# Patient Record
Sex: FEMALE | Race: WHITE | NOT HISPANIC OR LATINO | Employment: FULL TIME | URBAN - METROPOLITAN AREA
[De-identification: names, ages, dates, MRNs, and addresses within clinical notes are randomized per-mention and may not be internally consistent; named-entity substitution may affect disease eponyms.]

---

## 2017-01-03 ENCOUNTER — APPOINTMENT (OUTPATIENT)
Dept: PHYSICAL THERAPY | Facility: CLINIC | Age: 52
End: 2017-01-03
Payer: COMMERCIAL

## 2017-01-05 ENCOUNTER — APPOINTMENT (OUTPATIENT)
Dept: PHYSICAL THERAPY | Facility: CLINIC | Age: 52
End: 2017-01-05
Payer: COMMERCIAL

## 2017-01-05 PROCEDURE — 97140 MANUAL THERAPY 1/> REGIONS: CPT

## 2017-01-05 PROCEDURE — 97110 THERAPEUTIC EXERCISES: CPT

## 2017-01-09 ENCOUNTER — APPOINTMENT (OUTPATIENT)
Dept: PHYSICAL THERAPY | Facility: CLINIC | Age: 52
End: 2017-01-09
Payer: COMMERCIAL

## 2017-01-09 PROCEDURE — 97140 MANUAL THERAPY 1/> REGIONS: CPT

## 2017-01-09 PROCEDURE — 97110 THERAPEUTIC EXERCISES: CPT

## 2017-01-12 ENCOUNTER — APPOINTMENT (OUTPATIENT)
Dept: PHYSICAL THERAPY | Facility: CLINIC | Age: 52
End: 2017-01-12
Payer: COMMERCIAL

## 2017-01-12 PROCEDURE — 97140 MANUAL THERAPY 1/> REGIONS: CPT

## 2017-01-12 PROCEDURE — 97110 THERAPEUTIC EXERCISES: CPT

## 2017-01-16 ENCOUNTER — APPOINTMENT (OUTPATIENT)
Dept: PHYSICAL THERAPY | Facility: CLINIC | Age: 52
End: 2017-01-16
Payer: COMMERCIAL

## 2017-01-16 PROCEDURE — 97110 THERAPEUTIC EXERCISES: CPT

## 2017-01-16 PROCEDURE — 97016 VASOPNEUMATIC DEVICE THERAPY: CPT

## 2017-01-17 ENCOUNTER — APPOINTMENT (OUTPATIENT)
Dept: PHYSICAL THERAPY | Facility: CLINIC | Age: 52
End: 2017-01-17
Payer: COMMERCIAL

## 2017-01-17 PROCEDURE — 97110 THERAPEUTIC EXERCISES: CPT

## 2017-01-17 PROCEDURE — 97140 MANUAL THERAPY 1/> REGIONS: CPT

## 2017-01-17 PROCEDURE — 97016 VASOPNEUMATIC DEVICE THERAPY: CPT

## 2017-01-19 ENCOUNTER — APPOINTMENT (OUTPATIENT)
Dept: PHYSICAL THERAPY | Facility: CLINIC | Age: 52
End: 2017-01-19
Payer: COMMERCIAL

## 2017-01-19 PROCEDURE — 97110 THERAPEUTIC EXERCISES: CPT

## 2017-01-19 PROCEDURE — 97016 VASOPNEUMATIC DEVICE THERAPY: CPT

## 2017-01-19 PROCEDURE — 97140 MANUAL THERAPY 1/> REGIONS: CPT

## 2017-01-23 ENCOUNTER — APPOINTMENT (OUTPATIENT)
Dept: PHYSICAL THERAPY | Facility: CLINIC | Age: 52
End: 2017-01-23
Payer: COMMERCIAL

## 2017-01-24 ENCOUNTER — APPOINTMENT (OUTPATIENT)
Dept: PHYSICAL THERAPY | Facility: CLINIC | Age: 52
End: 2017-01-24
Payer: COMMERCIAL

## 2017-01-24 PROCEDURE — 97140 MANUAL THERAPY 1/> REGIONS: CPT

## 2017-01-24 PROCEDURE — 97110 THERAPEUTIC EXERCISES: CPT

## 2017-01-25 ENCOUNTER — HOSPITAL ENCOUNTER (OUTPATIENT)
Dept: RADIOLOGY | Facility: CLINIC | Age: 52
Discharge: HOME | End: 2017-01-25
Payer: COMMERCIAL

## 2017-01-25 ENCOUNTER — TRANSCRIBE ORDERS (OUTPATIENT)
Dept: RADIOLOGY | Facility: CLINIC | Age: 52
End: 2017-01-25

## 2017-01-25 ENCOUNTER — GENERIC CONVERSION - ENCOUNTER (OUTPATIENT)
Dept: OTHER | Facility: OTHER | Age: 52
End: 2017-01-25

## 2017-01-25 DIAGNOSIS — M21.42 ACQUIRED RIGID FLAT FOOT, LEFT: Primary | ICD-10-CM

## 2017-01-25 DIAGNOSIS — M21.42 ACQUIRED RIGID FLAT FOOT, LEFT: ICD-10-CM

## 2017-01-25 PROCEDURE — 73630 X-RAY EXAM OF FOOT: CPT

## 2017-01-26 ENCOUNTER — APPOINTMENT (OUTPATIENT)
Dept: PHYSICAL THERAPY | Facility: CLINIC | Age: 52
End: 2017-01-26
Payer: COMMERCIAL

## 2017-01-26 PROCEDURE — 97140 MANUAL THERAPY 1/> REGIONS: CPT

## 2017-01-26 PROCEDURE — 97016 VASOPNEUMATIC DEVICE THERAPY: CPT

## 2017-01-26 PROCEDURE — 97110 THERAPEUTIC EXERCISES: CPT

## 2017-01-27 ENCOUNTER — GENERIC CONVERSION - ENCOUNTER (OUTPATIENT)
Dept: OTHER | Facility: OTHER | Age: 52
End: 2017-01-27

## 2017-01-30 ENCOUNTER — APPOINTMENT (OUTPATIENT)
Dept: PHYSICAL THERAPY | Facility: CLINIC | Age: 52
End: 2017-01-30
Payer: COMMERCIAL

## 2017-01-30 PROCEDURE — 97110 THERAPEUTIC EXERCISES: CPT

## 2017-01-30 PROCEDURE — 97016 VASOPNEUMATIC DEVICE THERAPY: CPT

## 2017-01-30 PROCEDURE — 97140 MANUAL THERAPY 1/> REGIONS: CPT

## 2017-02-01 ENCOUNTER — APPOINTMENT (OUTPATIENT)
Dept: PHYSICAL THERAPY | Facility: CLINIC | Age: 52
End: 2017-02-01
Payer: COMMERCIAL

## 2017-02-01 PROCEDURE — 97140 MANUAL THERAPY 1/> REGIONS: CPT

## 2017-02-01 PROCEDURE — 97110 THERAPEUTIC EXERCISES: CPT

## 2017-02-02 ENCOUNTER — APPOINTMENT (OUTPATIENT)
Dept: PHYSICAL THERAPY | Facility: CLINIC | Age: 52
End: 2017-02-02
Payer: COMMERCIAL

## 2017-02-06 ENCOUNTER — APPOINTMENT (OUTPATIENT)
Dept: PHYSICAL THERAPY | Facility: CLINIC | Age: 52
End: 2017-02-06
Payer: COMMERCIAL

## 2017-02-06 PROCEDURE — 97110 THERAPEUTIC EXERCISES: CPT

## 2017-02-06 PROCEDURE — 97140 MANUAL THERAPY 1/> REGIONS: CPT

## 2017-02-08 ENCOUNTER — APPOINTMENT (OUTPATIENT)
Dept: PHYSICAL THERAPY | Facility: CLINIC | Age: 52
End: 2017-02-08
Payer: COMMERCIAL

## 2017-02-08 PROCEDURE — 97140 MANUAL THERAPY 1/> REGIONS: CPT

## 2017-02-08 PROCEDURE — 97110 THERAPEUTIC EXERCISES: CPT

## 2017-02-09 ENCOUNTER — APPOINTMENT (OUTPATIENT)
Dept: PHYSICAL THERAPY | Facility: CLINIC | Age: 52
End: 2017-02-09
Payer: COMMERCIAL

## 2017-02-09 PROCEDURE — 97140 MANUAL THERAPY 1/> REGIONS: CPT

## 2017-02-09 PROCEDURE — 97016 VASOPNEUMATIC DEVICE THERAPY: CPT

## 2017-02-09 PROCEDURE — 97110 THERAPEUTIC EXERCISES: CPT

## 2017-02-13 ENCOUNTER — APPOINTMENT (OUTPATIENT)
Dept: PHYSICAL THERAPY | Facility: CLINIC | Age: 52
End: 2017-02-13
Payer: COMMERCIAL

## 2017-02-13 PROCEDURE — 97016 VASOPNEUMATIC DEVICE THERAPY: CPT

## 2017-02-13 PROCEDURE — 97110 THERAPEUTIC EXERCISES: CPT

## 2017-02-13 PROCEDURE — 97140 MANUAL THERAPY 1/> REGIONS: CPT

## 2017-02-15 ENCOUNTER — APPOINTMENT (OUTPATIENT)
Dept: PHYSICAL THERAPY | Facility: CLINIC | Age: 52
End: 2017-02-15
Payer: COMMERCIAL

## 2017-02-16 ENCOUNTER — APPOINTMENT (OUTPATIENT)
Dept: PHYSICAL THERAPY | Facility: CLINIC | Age: 52
End: 2017-02-16
Payer: COMMERCIAL

## 2017-02-17 ENCOUNTER — ALLSCRIPTS OFFICE VISIT (OUTPATIENT)
Dept: OTHER | Facility: OTHER | Age: 52
End: 2017-02-17

## 2017-02-17 ENCOUNTER — APPOINTMENT (OUTPATIENT)
Dept: PHYSICAL THERAPY | Facility: CLINIC | Age: 52
End: 2017-02-17
Payer: COMMERCIAL

## 2017-02-17 PROCEDURE — 97110 THERAPEUTIC EXERCISES: CPT

## 2017-02-17 PROCEDURE — 97140 MANUAL THERAPY 1/> REGIONS: CPT

## 2017-02-20 ENCOUNTER — APPOINTMENT (OUTPATIENT)
Dept: PHYSICAL THERAPY | Facility: CLINIC | Age: 52
End: 2017-02-20
Payer: COMMERCIAL

## 2017-02-20 PROCEDURE — 97110 THERAPEUTIC EXERCISES: CPT

## 2017-02-20 PROCEDURE — 97140 MANUAL THERAPY 1/> REGIONS: CPT

## 2017-02-21 ENCOUNTER — APPOINTMENT (OUTPATIENT)
Dept: PHYSICAL THERAPY | Facility: CLINIC | Age: 52
End: 2017-02-21
Payer: COMMERCIAL

## 2017-02-21 PROCEDURE — 97016 VASOPNEUMATIC DEVICE THERAPY: CPT

## 2017-02-21 PROCEDURE — 97140 MANUAL THERAPY 1/> REGIONS: CPT

## 2017-02-21 PROCEDURE — 97110 THERAPEUTIC EXERCISES: CPT

## 2017-02-23 ENCOUNTER — APPOINTMENT (OUTPATIENT)
Dept: PHYSICAL THERAPY | Facility: CLINIC | Age: 52
End: 2017-02-23
Payer: COMMERCIAL

## 2017-02-23 PROCEDURE — 97140 MANUAL THERAPY 1/> REGIONS: CPT

## 2017-02-23 PROCEDURE — 97110 THERAPEUTIC EXERCISES: CPT

## 2017-02-24 ENCOUNTER — ALLSCRIPTS OFFICE VISIT (OUTPATIENT)
Dept: OTHER | Facility: OTHER | Age: 52
End: 2017-02-24

## 2017-02-27 ENCOUNTER — APPOINTMENT (OUTPATIENT)
Dept: PHYSICAL THERAPY | Facility: CLINIC | Age: 52
End: 2017-02-27
Payer: COMMERCIAL

## 2017-02-27 PROCEDURE — 97110 THERAPEUTIC EXERCISES: CPT

## 2017-02-27 PROCEDURE — 97140 MANUAL THERAPY 1/> REGIONS: CPT

## 2017-02-28 ENCOUNTER — APPOINTMENT (OUTPATIENT)
Dept: PHYSICAL THERAPY | Facility: CLINIC | Age: 52
End: 2017-02-28
Payer: COMMERCIAL

## 2017-02-28 PROCEDURE — 97110 THERAPEUTIC EXERCISES: CPT

## 2017-02-28 PROCEDURE — 97140 MANUAL THERAPY 1/> REGIONS: CPT

## 2017-03-01 ENCOUNTER — ALLSCRIPTS OFFICE VISIT (OUTPATIENT)
Dept: OTHER | Facility: OTHER | Age: 52
End: 2017-03-01

## 2017-03-02 ENCOUNTER — APPOINTMENT (OUTPATIENT)
Dept: PHYSICAL THERAPY | Facility: CLINIC | Age: 52
End: 2017-03-02
Payer: COMMERCIAL

## 2017-03-06 ENCOUNTER — APPOINTMENT (OUTPATIENT)
Dept: PHYSICAL THERAPY | Facility: CLINIC | Age: 52
End: 2017-03-06
Payer: COMMERCIAL

## 2017-03-06 PROCEDURE — 97016 VASOPNEUMATIC DEVICE THERAPY: CPT

## 2017-03-06 PROCEDURE — 97140 MANUAL THERAPY 1/> REGIONS: CPT

## 2017-03-06 PROCEDURE — 97110 THERAPEUTIC EXERCISES: CPT

## 2017-03-07 ENCOUNTER — APPOINTMENT (OUTPATIENT)
Dept: PHYSICAL THERAPY | Facility: CLINIC | Age: 52
End: 2017-03-07
Payer: COMMERCIAL

## 2017-03-08 ENCOUNTER — APPOINTMENT (OUTPATIENT)
Dept: PHYSICAL THERAPY | Facility: CLINIC | Age: 52
End: 2017-03-08
Payer: COMMERCIAL

## 2017-03-08 PROCEDURE — 97110 THERAPEUTIC EXERCISES: CPT

## 2017-03-08 PROCEDURE — 97140 MANUAL THERAPY 1/> REGIONS: CPT

## 2017-03-08 PROCEDURE — 97016 VASOPNEUMATIC DEVICE THERAPY: CPT

## 2017-03-09 ENCOUNTER — APPOINTMENT (OUTPATIENT)
Dept: PHYSICAL THERAPY | Facility: CLINIC | Age: 52
End: 2017-03-09
Payer: COMMERCIAL

## 2017-03-10 ENCOUNTER — APPOINTMENT (OUTPATIENT)
Dept: PHYSICAL THERAPY | Facility: CLINIC | Age: 52
End: 2017-03-10
Payer: COMMERCIAL

## 2017-03-13 ENCOUNTER — APPOINTMENT (OUTPATIENT)
Dept: PHYSICAL THERAPY | Facility: CLINIC | Age: 52
End: 2017-03-13
Payer: COMMERCIAL

## 2017-03-13 PROCEDURE — 97164 PT RE-EVAL EST PLAN CARE: CPT

## 2017-03-13 PROCEDURE — 97110 THERAPEUTIC EXERCISES: CPT

## 2017-03-14 ENCOUNTER — APPOINTMENT (OUTPATIENT)
Dept: PHYSICAL THERAPY | Facility: CLINIC | Age: 52
End: 2017-03-14
Payer: COMMERCIAL

## 2017-03-15 ENCOUNTER — APPOINTMENT (OUTPATIENT)
Dept: PHYSICAL THERAPY | Facility: CLINIC | Age: 52
End: 2017-03-15
Payer: COMMERCIAL

## 2017-03-15 PROCEDURE — 97140 MANUAL THERAPY 1/> REGIONS: CPT

## 2017-03-15 PROCEDURE — 97110 THERAPEUTIC EXERCISES: CPT

## 2017-03-16 ENCOUNTER — APPOINTMENT (OUTPATIENT)
Dept: PHYSICAL THERAPY | Facility: CLINIC | Age: 52
End: 2017-03-16
Payer: COMMERCIAL

## 2017-03-17 ENCOUNTER — GENERIC CONVERSION - ENCOUNTER (OUTPATIENT)
Dept: OTHER | Facility: OTHER | Age: 52
End: 2017-03-17

## 2017-03-17 ENCOUNTER — APPOINTMENT (OUTPATIENT)
Dept: RADIATION ONCOLOGY | Facility: HOSPITAL | Age: 52
End: 2017-03-17
Attending: RADIOLOGY
Payer: COMMERCIAL

## 2017-03-17 PROCEDURE — 99214 OFFICE O/P EST MOD 30 MIN: CPT | Performed by: RADIOLOGY

## 2017-03-20 ENCOUNTER — APPOINTMENT (OUTPATIENT)
Dept: PHYSICAL THERAPY | Facility: CLINIC | Age: 52
End: 2017-03-20
Payer: COMMERCIAL

## 2017-03-20 PROCEDURE — 97016 VASOPNEUMATIC DEVICE THERAPY: CPT

## 2017-03-20 PROCEDURE — 97140 MANUAL THERAPY 1/> REGIONS: CPT

## 2017-03-20 PROCEDURE — 97110 THERAPEUTIC EXERCISES: CPT

## 2017-03-21 ENCOUNTER — APPOINTMENT (OUTPATIENT)
Dept: PHYSICAL THERAPY | Facility: CLINIC | Age: 52
End: 2017-03-21
Payer: COMMERCIAL

## 2017-03-22 ENCOUNTER — GENERIC CONVERSION - ENCOUNTER (OUTPATIENT)
Dept: OTHER | Facility: OTHER | Age: 52
End: 2017-03-22

## 2017-03-22 ENCOUNTER — APPOINTMENT (OUTPATIENT)
Dept: PHYSICAL THERAPY | Facility: CLINIC | Age: 52
End: 2017-03-22
Payer: COMMERCIAL

## 2017-03-22 PROCEDURE — 97110 THERAPEUTIC EXERCISES: CPT

## 2017-03-22 PROCEDURE — 97140 MANUAL THERAPY 1/> REGIONS: CPT

## 2017-03-23 ENCOUNTER — APPOINTMENT (OUTPATIENT)
Dept: PHYSICAL THERAPY | Facility: CLINIC | Age: 52
End: 2017-03-23
Payer: COMMERCIAL

## 2017-03-24 ENCOUNTER — APPOINTMENT (OUTPATIENT)
Dept: PHYSICAL THERAPY | Facility: CLINIC | Age: 52
End: 2017-03-24
Payer: COMMERCIAL

## 2017-03-27 ENCOUNTER — APPOINTMENT (OUTPATIENT)
Dept: PHYSICAL THERAPY | Facility: CLINIC | Age: 52
End: 2017-03-27
Payer: COMMERCIAL

## 2017-03-27 PROCEDURE — 97110 THERAPEUTIC EXERCISES: CPT

## 2017-03-27 PROCEDURE — 97140 MANUAL THERAPY 1/> REGIONS: CPT

## 2017-03-28 ENCOUNTER — APPOINTMENT (OUTPATIENT)
Dept: PHYSICAL THERAPY | Facility: CLINIC | Age: 52
End: 2017-03-28
Payer: COMMERCIAL

## 2017-03-29 ENCOUNTER — APPOINTMENT (OUTPATIENT)
Dept: PHYSICAL THERAPY | Facility: CLINIC | Age: 52
End: 2017-03-29
Payer: COMMERCIAL

## 2017-03-30 ENCOUNTER — APPOINTMENT (OUTPATIENT)
Dept: PHYSICAL THERAPY | Facility: CLINIC | Age: 52
End: 2017-03-30
Payer: COMMERCIAL

## 2017-03-31 ENCOUNTER — APPOINTMENT (OUTPATIENT)
Dept: PHYSICAL THERAPY | Facility: CLINIC | Age: 52
End: 2017-03-31
Payer: COMMERCIAL

## 2017-03-31 PROCEDURE — 97140 MANUAL THERAPY 1/> REGIONS: CPT

## 2017-03-31 PROCEDURE — 97110 THERAPEUTIC EXERCISES: CPT

## 2017-04-03 ENCOUNTER — APPOINTMENT (OUTPATIENT)
Dept: PHYSICAL THERAPY | Facility: CLINIC | Age: 52
End: 2017-04-03
Payer: COMMERCIAL

## 2017-04-03 PROCEDURE — 97140 MANUAL THERAPY 1/> REGIONS: CPT

## 2017-04-03 PROCEDURE — 97110 THERAPEUTIC EXERCISES: CPT

## 2017-04-03 PROCEDURE — 97016 VASOPNEUMATIC DEVICE THERAPY: CPT

## 2017-04-05 ENCOUNTER — APPOINTMENT (OUTPATIENT)
Dept: PHYSICAL THERAPY | Facility: CLINIC | Age: 52
End: 2017-04-05
Payer: COMMERCIAL

## 2017-04-07 ENCOUNTER — APPOINTMENT (OUTPATIENT)
Dept: PHYSICAL THERAPY | Facility: CLINIC | Age: 52
End: 2017-04-07
Payer: COMMERCIAL

## 2017-04-07 PROCEDURE — 97140 MANUAL THERAPY 1/> REGIONS: CPT

## 2017-04-07 PROCEDURE — 97110 THERAPEUTIC EXERCISES: CPT

## 2017-04-10 ENCOUNTER — APPOINTMENT (OUTPATIENT)
Dept: PHYSICAL THERAPY | Facility: CLINIC | Age: 52
End: 2017-04-10
Payer: COMMERCIAL

## 2017-04-10 PROCEDURE — 97110 THERAPEUTIC EXERCISES: CPT

## 2017-04-10 PROCEDURE — 97010 HOT OR COLD PACKS THERAPY: CPT

## 2017-04-10 PROCEDURE — 97140 MANUAL THERAPY 1/> REGIONS: CPT

## 2017-04-12 ENCOUNTER — APPOINTMENT (OUTPATIENT)
Dept: PHYSICAL THERAPY | Facility: CLINIC | Age: 52
End: 2017-04-12
Payer: COMMERCIAL

## 2017-04-14 ENCOUNTER — APPOINTMENT (OUTPATIENT)
Dept: PHYSICAL THERAPY | Facility: CLINIC | Age: 52
End: 2017-04-14
Payer: COMMERCIAL

## 2017-04-14 PROCEDURE — 97110 THERAPEUTIC EXERCISES: CPT

## 2017-04-14 PROCEDURE — 97140 MANUAL THERAPY 1/> REGIONS: CPT

## 2017-04-14 PROCEDURE — 97016 VASOPNEUMATIC DEVICE THERAPY: CPT

## 2017-04-17 ENCOUNTER — APPOINTMENT (OUTPATIENT)
Dept: PHYSICAL THERAPY | Facility: CLINIC | Age: 52
End: 2017-04-17
Payer: COMMERCIAL

## 2017-04-17 PROCEDURE — 97110 THERAPEUTIC EXERCISES: CPT

## 2017-04-17 PROCEDURE — 97016 VASOPNEUMATIC DEVICE THERAPY: CPT

## 2017-04-17 PROCEDURE — 97140 MANUAL THERAPY 1/> REGIONS: CPT

## 2017-04-18 ENCOUNTER — ALLSCRIPTS OFFICE VISIT (OUTPATIENT)
Dept: OTHER | Facility: OTHER | Age: 52
End: 2017-04-18

## 2017-04-18 DIAGNOSIS — C50.919 MALIGNANT NEOPLASM OF FEMALE BREAST (HCC): ICD-10-CM

## 2017-04-18 DIAGNOSIS — Z09 ENCOUNTER FOR FOLLOW-UP EXAMINATION AFTER COMPLETED TREATMENT FOR CONDITIONS OTHER THAN MALIGNANT NEOPLASM: ICD-10-CM

## 2017-04-19 ENCOUNTER — TRANSCRIBE ORDERS (OUTPATIENT)
Dept: ADMINISTRATIVE | Facility: HOSPITAL | Age: 52
End: 2017-04-19

## 2017-04-19 ENCOUNTER — GENERIC CONVERSION - ENCOUNTER (OUTPATIENT)
Dept: OTHER | Facility: OTHER | Age: 52
End: 2017-04-19

## 2017-04-19 ENCOUNTER — HOSPITAL ENCOUNTER (OUTPATIENT)
Dept: ULTRASOUND IMAGING | Facility: CLINIC | Age: 52
Discharge: HOME | End: 2017-04-19
Payer: COMMERCIAL

## 2017-04-19 DIAGNOSIS — Z09 RADIOTHERAPY FOLLOW-UP EXAMINATION: ICD-10-CM

## 2017-04-19 DIAGNOSIS — C50.912 MALIGNANT NEOPLASM OF LEFT FEMALE BREAST, UNSPECIFIED SITE OF BREAST: Primary | ICD-10-CM

## 2017-04-19 DIAGNOSIS — C50.912 MALIGNANT NEOPLASM OF LEFT FEMALE BREAST, UNSPECIFIED SITE OF BREAST: ICD-10-CM

## 2017-04-19 PROCEDURE — 76642 ULTRASOUND BREAST LIMITED: CPT

## 2017-04-20 ENCOUNTER — APPOINTMENT (OUTPATIENT)
Dept: PHYSICAL THERAPY | Facility: CLINIC | Age: 52
End: 2017-04-20
Payer: COMMERCIAL

## 2017-04-20 PROCEDURE — 97110 THERAPEUTIC EXERCISES: CPT

## 2017-04-20 PROCEDURE — 97140 MANUAL THERAPY 1/> REGIONS: CPT

## 2017-04-21 ENCOUNTER — ALLSCRIPTS OFFICE VISIT (OUTPATIENT)
Dept: OTHER | Facility: OTHER | Age: 52
End: 2017-04-21

## 2017-04-24 ENCOUNTER — APPOINTMENT (OUTPATIENT)
Dept: PHYSICAL THERAPY | Facility: CLINIC | Age: 52
End: 2017-04-24
Payer: COMMERCIAL

## 2017-04-24 PROCEDURE — 97140 MANUAL THERAPY 1/> REGIONS: CPT

## 2017-04-24 PROCEDURE — 97110 THERAPEUTIC EXERCISES: CPT

## 2017-04-27 ENCOUNTER — APPOINTMENT (OUTPATIENT)
Dept: PHYSICAL THERAPY | Facility: CLINIC | Age: 52
End: 2017-04-27
Payer: COMMERCIAL

## 2017-04-27 PROCEDURE — 97016 VASOPNEUMATIC DEVICE THERAPY: CPT

## 2017-04-27 PROCEDURE — 97140 MANUAL THERAPY 1/> REGIONS: CPT

## 2017-04-27 PROCEDURE — 97110 THERAPEUTIC EXERCISES: CPT

## 2017-04-28 ENCOUNTER — ALLSCRIPTS OFFICE VISIT (OUTPATIENT)
Dept: OTHER | Facility: OTHER | Age: 52
End: 2017-04-28

## 2017-05-01 ENCOUNTER — APPOINTMENT (OUTPATIENT)
Dept: PHYSICAL THERAPY | Facility: CLINIC | Age: 52
End: 2017-05-01
Payer: COMMERCIAL

## 2017-05-02 ENCOUNTER — APPOINTMENT (OUTPATIENT)
Dept: PHYSICAL THERAPY | Facility: CLINIC | Age: 52
End: 2017-05-02
Payer: COMMERCIAL

## 2017-05-02 PROCEDURE — 97140 MANUAL THERAPY 1/> REGIONS: CPT

## 2017-05-02 PROCEDURE — 97110 THERAPEUTIC EXERCISES: CPT

## 2017-05-02 PROCEDURE — 97016 VASOPNEUMATIC DEVICE THERAPY: CPT

## 2017-05-03 ENCOUNTER — HOSPITAL ENCOUNTER (OUTPATIENT)
Dept: RADIOLOGY | Facility: CLINIC | Age: 52
Discharge: HOME | End: 2017-05-03
Payer: COMMERCIAL

## 2017-05-03 ENCOUNTER — TRANSCRIBE ORDERS (OUTPATIENT)
Dept: RADIOLOGY | Facility: CLINIC | Age: 52
End: 2017-05-03

## 2017-05-03 DIAGNOSIS — M76.822 TIBIALIS POSTICUS TENDINITIS, LEFT: ICD-10-CM

## 2017-05-03 DIAGNOSIS — M21.41 FLAT FEET: ICD-10-CM

## 2017-05-03 DIAGNOSIS — M20.12: ICD-10-CM

## 2017-05-03 DIAGNOSIS — M21.42 FLAT FEET: ICD-10-CM

## 2017-05-03 DIAGNOSIS — M21.41 FLAT FEET: Primary | ICD-10-CM

## 2017-05-03 DIAGNOSIS — M21.42 FLAT FEET: Primary | ICD-10-CM

## 2017-05-03 PROCEDURE — 73610 X-RAY EXAM OF ANKLE: CPT

## 2017-05-03 PROCEDURE — 73630 X-RAY EXAM OF FOOT: CPT

## 2017-05-04 ENCOUNTER — APPOINTMENT (OUTPATIENT)
Dept: PHYSICAL THERAPY | Facility: CLINIC | Age: 52
End: 2017-05-04
Payer: COMMERCIAL

## 2017-05-05 ENCOUNTER — APPOINTMENT (OUTPATIENT)
Dept: PHYSICAL THERAPY | Facility: CLINIC | Age: 52
End: 2017-05-05
Payer: COMMERCIAL

## 2017-05-05 PROCEDURE — 97110 THERAPEUTIC EXERCISES: CPT

## 2017-05-05 PROCEDURE — 97140 MANUAL THERAPY 1/> REGIONS: CPT

## 2017-05-08 ENCOUNTER — APPOINTMENT (OUTPATIENT)
Dept: PHYSICAL THERAPY | Facility: CLINIC | Age: 52
End: 2017-05-08
Payer: COMMERCIAL

## 2017-05-08 PROCEDURE — 97016 VASOPNEUMATIC DEVICE THERAPY: CPT

## 2017-05-08 PROCEDURE — 97140 MANUAL THERAPY 1/> REGIONS: CPT

## 2017-05-08 PROCEDURE — 97110 THERAPEUTIC EXERCISES: CPT

## 2017-05-10 ENCOUNTER — ALLSCRIPTS OFFICE VISIT (OUTPATIENT)
Dept: OTHER | Facility: OTHER | Age: 52
End: 2017-05-10

## 2017-05-11 ENCOUNTER — APPOINTMENT (OUTPATIENT)
Dept: PHYSICAL THERAPY | Facility: CLINIC | Age: 52
End: 2017-05-11
Payer: COMMERCIAL

## 2017-05-11 PROCEDURE — 97140 MANUAL THERAPY 1/> REGIONS: CPT

## 2017-05-11 PROCEDURE — 97110 THERAPEUTIC EXERCISES: CPT

## 2017-05-11 PROCEDURE — 97112 NEUROMUSCULAR REEDUCATION: CPT

## 2017-05-12 ENCOUNTER — ALLSCRIPTS OFFICE VISIT (OUTPATIENT)
Dept: OTHER | Facility: OTHER | Age: 52
End: 2017-05-12

## 2017-05-16 ENCOUNTER — APPOINTMENT (OUTPATIENT)
Dept: PHYSICAL THERAPY | Facility: CLINIC | Age: 52
End: 2017-05-16
Payer: COMMERCIAL

## 2017-05-16 PROCEDURE — 97140 MANUAL THERAPY 1/> REGIONS: CPT

## 2017-05-16 PROCEDURE — 97016 VASOPNEUMATIC DEVICE THERAPY: CPT

## 2017-05-16 PROCEDURE — 97110 THERAPEUTIC EXERCISES: CPT

## 2017-06-05 ENCOUNTER — APPOINTMENT (OUTPATIENT)
Dept: PHYSICAL THERAPY | Facility: CLINIC | Age: 52
End: 2017-06-05
Payer: COMMERCIAL

## 2017-06-05 PROCEDURE — 97110 THERAPEUTIC EXERCISES: CPT

## 2017-06-05 PROCEDURE — 97140 MANUAL THERAPY 1/> REGIONS: CPT

## 2017-06-08 ENCOUNTER — APPOINTMENT (OUTPATIENT)
Dept: PHYSICAL THERAPY | Facility: CLINIC | Age: 52
End: 2017-06-08
Payer: COMMERCIAL

## 2017-06-08 PROCEDURE — 97110 THERAPEUTIC EXERCISES: CPT

## 2017-06-08 PROCEDURE — 97140 MANUAL THERAPY 1/> REGIONS: CPT

## 2017-06-12 ENCOUNTER — APPOINTMENT (OUTPATIENT)
Dept: PHYSICAL THERAPY | Facility: CLINIC | Age: 52
End: 2017-06-12
Payer: COMMERCIAL

## 2017-06-12 PROCEDURE — 97110 THERAPEUTIC EXERCISES: CPT

## 2017-06-12 PROCEDURE — 97140 MANUAL THERAPY 1/> REGIONS: CPT

## 2017-06-14 ENCOUNTER — ALLSCRIPTS OFFICE VISIT (OUTPATIENT)
Dept: OTHER | Facility: OTHER | Age: 52
End: 2017-06-14

## 2017-06-14 DIAGNOSIS — Z12.39 ENCOUNTER FOR OTHER SCREENING FOR MALIGNANT NEOPLASM OF BREAST: ICD-10-CM

## 2017-06-15 ENCOUNTER — APPOINTMENT (OUTPATIENT)
Dept: PHYSICAL THERAPY | Facility: CLINIC | Age: 52
End: 2017-06-15
Payer: COMMERCIAL

## 2017-06-15 PROCEDURE — 97140 MANUAL THERAPY 1/> REGIONS: CPT

## 2017-06-15 PROCEDURE — 97110 THERAPEUTIC EXERCISES: CPT

## 2017-06-16 ENCOUNTER — ALLSCRIPTS OFFICE VISIT (OUTPATIENT)
Dept: OTHER | Facility: OTHER | Age: 52
End: 2017-06-16

## 2017-06-16 ENCOUNTER — APPOINTMENT (OUTPATIENT)
Dept: PHYSICAL THERAPY | Facility: CLINIC | Age: 52
End: 2017-06-16
Payer: COMMERCIAL

## 2017-06-19 ENCOUNTER — APPOINTMENT (OUTPATIENT)
Dept: PHYSICAL THERAPY | Facility: CLINIC | Age: 52
End: 2017-06-19
Payer: COMMERCIAL

## 2017-06-19 PROCEDURE — 97110 THERAPEUTIC EXERCISES: CPT

## 2017-06-19 PROCEDURE — 97140 MANUAL THERAPY 1/> REGIONS: CPT

## 2017-06-22 ENCOUNTER — APPOINTMENT (OUTPATIENT)
Dept: PHYSICAL THERAPY | Facility: CLINIC | Age: 52
End: 2017-06-22
Payer: COMMERCIAL

## 2017-06-26 ENCOUNTER — APPOINTMENT (OUTPATIENT)
Dept: PHYSICAL THERAPY | Facility: CLINIC | Age: 52
End: 2017-06-26
Payer: COMMERCIAL

## 2017-06-26 PROCEDURE — 97110 THERAPEUTIC EXERCISES: CPT

## 2017-06-26 PROCEDURE — 97164 PT RE-EVAL EST PLAN CARE: CPT | Performed by: PHYSICAL THERAPIST

## 2017-06-26 PROCEDURE — 97164 PT RE-EVAL EST PLAN CARE: CPT

## 2017-06-26 PROCEDURE — 97140 MANUAL THERAPY 1/> REGIONS: CPT

## 2017-06-29 ENCOUNTER — APPOINTMENT (OUTPATIENT)
Dept: PHYSICAL THERAPY | Facility: CLINIC | Age: 52
End: 2017-06-29
Payer: COMMERCIAL

## 2017-06-29 PROCEDURE — 97140 MANUAL THERAPY 1/> REGIONS: CPT

## 2017-06-29 PROCEDURE — 97110 THERAPEUTIC EXERCISES: CPT

## 2017-07-03 ENCOUNTER — APPOINTMENT (OUTPATIENT)
Dept: PHYSICAL THERAPY | Facility: CLINIC | Age: 52
End: 2017-07-03
Payer: COMMERCIAL

## 2017-07-03 PROCEDURE — 97110 THERAPEUTIC EXERCISES: CPT

## 2017-07-03 PROCEDURE — 97140 MANUAL THERAPY 1/> REGIONS: CPT

## 2017-07-05 ENCOUNTER — ALLSCRIPTS OFFICE VISIT (OUTPATIENT)
Dept: OTHER | Facility: OTHER | Age: 52
End: 2017-07-05

## 2017-07-06 ENCOUNTER — APPOINTMENT (OUTPATIENT)
Dept: PHYSICAL THERAPY | Facility: CLINIC | Age: 52
End: 2017-07-06
Payer: COMMERCIAL

## 2017-07-07 ENCOUNTER — APPOINTMENT (OUTPATIENT)
Dept: PHYSICAL THERAPY | Facility: CLINIC | Age: 52
End: 2017-07-07
Payer: COMMERCIAL

## 2017-07-07 PROCEDURE — 97110 THERAPEUTIC EXERCISES: CPT

## 2017-07-07 PROCEDURE — 97140 MANUAL THERAPY 1/> REGIONS: CPT

## 2017-07-10 ENCOUNTER — APPOINTMENT (OUTPATIENT)
Dept: PHYSICAL THERAPY | Facility: CLINIC | Age: 52
End: 2017-07-10
Payer: COMMERCIAL

## 2017-07-10 PROCEDURE — 97140 MANUAL THERAPY 1/> REGIONS: CPT

## 2017-07-10 PROCEDURE — 97110 THERAPEUTIC EXERCISES: CPT

## 2017-07-13 ENCOUNTER — APPOINTMENT (OUTPATIENT)
Dept: PHYSICAL THERAPY | Facility: CLINIC | Age: 52
End: 2017-07-13
Payer: COMMERCIAL

## 2017-07-13 PROCEDURE — 97140 MANUAL THERAPY 1/> REGIONS: CPT

## 2017-07-13 PROCEDURE — 97110 THERAPEUTIC EXERCISES: CPT

## 2017-07-17 ENCOUNTER — APPOINTMENT (OUTPATIENT)
Dept: PHYSICAL THERAPY | Facility: CLINIC | Age: 52
End: 2017-07-17
Payer: COMMERCIAL

## 2017-07-17 PROCEDURE — 97110 THERAPEUTIC EXERCISES: CPT

## 2017-07-17 PROCEDURE — 97140 MANUAL THERAPY 1/> REGIONS: CPT

## 2017-07-20 ENCOUNTER — APPOINTMENT (OUTPATIENT)
Dept: PHYSICAL THERAPY | Facility: CLINIC | Age: 52
End: 2017-07-20
Payer: COMMERCIAL

## 2017-07-20 PROCEDURE — 97140 MANUAL THERAPY 1/> REGIONS: CPT

## 2017-07-20 PROCEDURE — 97110 THERAPEUTIC EXERCISES: CPT

## 2017-07-24 ENCOUNTER — APPOINTMENT (OUTPATIENT)
Dept: PHYSICAL THERAPY | Facility: CLINIC | Age: 52
End: 2017-07-24
Payer: COMMERCIAL

## 2017-07-24 PROCEDURE — 97110 THERAPEUTIC EXERCISES: CPT

## 2017-07-24 PROCEDURE — 97140 MANUAL THERAPY 1/> REGIONS: CPT

## 2017-07-27 ENCOUNTER — APPOINTMENT (OUTPATIENT)
Dept: PHYSICAL THERAPY | Facility: CLINIC | Age: 52
End: 2017-07-27
Payer: COMMERCIAL

## 2017-07-27 PROCEDURE — 97140 MANUAL THERAPY 1/> REGIONS: CPT

## 2017-07-31 ENCOUNTER — APPOINTMENT (OUTPATIENT)
Dept: PHYSICAL THERAPY | Facility: CLINIC | Age: 52
End: 2017-07-31
Payer: COMMERCIAL

## 2017-08-01 ENCOUNTER — APPOINTMENT (OUTPATIENT)
Dept: PHYSICAL THERAPY | Facility: CLINIC | Age: 52
End: 2017-08-01
Payer: COMMERCIAL

## 2017-08-01 PROCEDURE — 97140 MANUAL THERAPY 1/> REGIONS: CPT

## 2017-08-07 ENCOUNTER — APPOINTMENT (OUTPATIENT)
Dept: PHYSICAL THERAPY | Facility: CLINIC | Age: 52
End: 2017-08-07
Payer: COMMERCIAL

## 2017-08-07 PROCEDURE — 97140 MANUAL THERAPY 1/> REGIONS: CPT

## 2017-08-08 ENCOUNTER — TRANSCRIBE ORDERS (OUTPATIENT)
Dept: ADMINISTRATIVE | Facility: HOSPITAL | Age: 52
End: 2017-08-08

## 2017-08-08 ENCOUNTER — APPOINTMENT (OUTPATIENT)
Dept: LAB | Facility: CLINIC | Age: 52
End: 2017-08-08

## 2017-08-08 DIAGNOSIS — Z00.8 HEALTH EXAMINATION IN POPULATION SURVEY: Primary | ICD-10-CM

## 2017-08-08 DIAGNOSIS — Z00.8 HEALTH EXAMINATION IN POPULATION SURVEY: ICD-10-CM

## 2017-08-08 LAB
CHOLEST SERPL-MCNC: 210 MG/DL (ref 50–200)
EST. AVERAGE GLUCOSE BLD GHB EST-MCNC: 117 MG/DL
HBA1C MFR BLD: 5.7 % (ref 4.2–6.3)
HDLC SERPL-MCNC: 91 MG/DL (ref 40–60)
LDLC SERPL CALC-MCNC: 101 MG/DL (ref 0–100)
TRIGL SERPL-MCNC: 91 MG/DL

## 2017-08-08 PROCEDURE — 36415 COLL VENOUS BLD VENIPUNCTURE: CPT | Performed by: PREVENTIVE MEDICINE

## 2017-08-08 PROCEDURE — 83036 HEMOGLOBIN GLYCOSYLATED A1C: CPT | Performed by: PREVENTIVE MEDICINE

## 2017-08-08 PROCEDURE — 80061 LIPID PANEL: CPT

## 2017-08-10 ENCOUNTER — APPOINTMENT (OUTPATIENT)
Dept: PHYSICAL THERAPY | Facility: CLINIC | Age: 52
End: 2017-08-10
Payer: COMMERCIAL

## 2017-08-10 PROCEDURE — 97140 MANUAL THERAPY 1/> REGIONS: CPT

## 2017-08-14 ENCOUNTER — APPOINTMENT (OUTPATIENT)
Dept: PHYSICAL THERAPY | Facility: CLINIC | Age: 52
End: 2017-08-14
Payer: COMMERCIAL

## 2017-08-15 ENCOUNTER — APPOINTMENT (OUTPATIENT)
Dept: PHYSICAL THERAPY | Facility: CLINIC | Age: 52
End: 2017-08-15
Payer: COMMERCIAL

## 2017-08-15 PROCEDURE — 97140 MANUAL THERAPY 1/> REGIONS: CPT

## 2017-08-17 ENCOUNTER — APPOINTMENT (OUTPATIENT)
Dept: PHYSICAL THERAPY | Facility: CLINIC | Age: 52
End: 2017-08-17
Payer: COMMERCIAL

## 2017-08-17 PROCEDURE — 97140 MANUAL THERAPY 1/> REGIONS: CPT

## 2017-08-18 ENCOUNTER — ALLSCRIPTS OFFICE VISIT (OUTPATIENT)
Dept: OTHER | Facility: OTHER | Age: 52
End: 2017-08-18

## 2017-08-22 ENCOUNTER — APPOINTMENT (OUTPATIENT)
Dept: PHYSICAL THERAPY | Facility: CLINIC | Age: 52
End: 2017-08-22
Payer: COMMERCIAL

## 2017-08-22 PROCEDURE — 97140 MANUAL THERAPY 1/> REGIONS: CPT

## 2017-08-28 ENCOUNTER — APPOINTMENT (OUTPATIENT)
Dept: PHYSICAL THERAPY | Facility: CLINIC | Age: 52
End: 2017-08-28
Payer: COMMERCIAL

## 2017-08-29 ENCOUNTER — TRANSCRIBE ORDERS (OUTPATIENT)
Dept: ADMINISTRATIVE | Facility: HOSPITAL | Age: 52
End: 2017-08-29

## 2017-08-29 ENCOUNTER — APPOINTMENT (OUTPATIENT)
Dept: LAB | Facility: CLINIC | Age: 52
End: 2017-08-29
Payer: COMMERCIAL

## 2017-08-29 DIAGNOSIS — Z01.812 PRE-OPERATIVE LABORATORY EXAMINATION: Primary | ICD-10-CM

## 2017-08-29 DIAGNOSIS — Z01.812 PRE-OPERATIVE LABORATORY EXAMINATION: ICD-10-CM

## 2017-08-29 LAB
ANION GAP SERPL CALCULATED.3IONS-SCNC: 5 MMOL/L (ref 4–13)
BUN SERPL-MCNC: 15 MG/DL (ref 5–25)
CALCIUM SERPL-MCNC: 9.2 MG/DL (ref 8.3–10.1)
CHLORIDE SERPL-SCNC: 107 MMOL/L (ref 100–108)
CO2 SERPL-SCNC: 26 MMOL/L (ref 21–32)
CREAT SERPL-MCNC: 0.81 MG/DL (ref 0.6–1.3)
ERYTHROCYTE [DISTWIDTH] IN BLOOD BY AUTOMATED COUNT: 12.3 % (ref 11.6–15.1)
GFR SERPL CREATININE-BSD FRML MDRD: 85 ML/MIN/1.73SQ M
GLUCOSE SERPL-MCNC: 97 MG/DL (ref 65–140)
HCT VFR BLD AUTO: 37.9 % (ref 34.8–46.1)
HGB BLD-MCNC: 12.8 G/DL (ref 11.5–15.4)
MCH RBC QN AUTO: 29.8 PG (ref 26.8–34.3)
MCHC RBC AUTO-ENTMCNC: 33.8 G/DL (ref 31.4–37.4)
MCV RBC AUTO: 88 FL (ref 82–98)
PLATELET # BLD AUTO: 206 THOUSANDS/UL (ref 149–390)
PMV BLD AUTO: 12.3 FL (ref 8.9–12.7)
POTASSIUM SERPL-SCNC: 4.7 MMOL/L (ref 3.5–5.3)
RBC # BLD AUTO: 4.3 MILLION/UL (ref 3.81–5.12)
SODIUM SERPL-SCNC: 138 MMOL/L (ref 136–145)
WBC # BLD AUTO: 7.11 THOUSAND/UL (ref 4.31–10.16)

## 2017-08-29 PROCEDURE — 85027 COMPLETE CBC AUTOMATED: CPT

## 2017-08-29 PROCEDURE — 36415 COLL VENOUS BLD VENIPUNCTURE: CPT

## 2017-08-29 PROCEDURE — 80048 BASIC METABOLIC PNL TOTAL CA: CPT

## 2017-08-29 NOTE — H&P
Assessment  1  Breast Reconstruction With Tissue Expander Left Breast     Discussion/Summary  Discussion Summary:   Please see history of present illness  We discussed latissimus dorsi flap and tissue expander reconstruction of the left breast where she has significant radiation fibrosis, skin excoriation  Just the procedure in detail, including risks complications and limitations such as infection, bleeding, scarring, asymmetry, contour deformity, wound healing problems, unfavorable scars, need for Additional/revisional procedures in the future, etc  Their questions have been answered to their satisfaction, consent has been obtained  She will be seen the day prior to surgery to be marked  Counseling Documentation With Imm: The patient was counseled regarding patient and family education,impressions,risks and benefits of treatment options  total time of encounter was 25 [id number] minutes was spent counseling       Chief Complaint  Chief Complaint Free Text Note Form: Preoperative visit      History of Present Illness  HPI: Emili Felix presents today for a preoperative visit, she is scheduled next month for latissimus dorsi flap/tissue expander reconstruction of left breast  She is accompanied by her boyfriend, Emely Gentleman today       Review of Systems  Complete-Female:   Constitutional: no feverno chills  Eyes: no eyesight problems  ENT: no hearing loss  Cardiovascular: no chest pain  Respiratory: no shortness of breath  Gastrointestinal: no nausea,no constipationno diarrhea  Genitourinary: no dysuriano incontinence  Musculoskeletal: no limb swelling  Integumentary: no rashes  Neurological: no headache,no confusionno convulsions  Psychiatric: no anxietyno depression  Endocrine: no proptosisno deepening of the voice  Hematologic/Lymphatic: no tendency for easy bleedingno tendency for easy bruising       Active Problems  1  Aftercare involving the use of plastic surgery (V51 8) (Z09)  2   Ankle pain, unspecified laterality  3  Ankle Sprain (845 00)  4  Basal cell carcinoma of face (173 31) (C44 310)  5  Breast cancer (174 9) (C50 919)  6  Breast Reconstruction With Tissue Expander Left Breast  7  Capsular contracture of breast implant, initial encounter (611 83) (T85 44XA)  8  Encounter for gynecological examination (general) (routine) without abnormal findings   (V72 31) (Z01 419)  9  Encounter for routine gynecological examination (V72 31) (Z01 419)  10  Encounter for special screening examination for neoplasm of breast (V76 10) (Z12 31)  11  Encounter to discuss breast reconstruction (V65 49) (Z71 89)  12  Left Peroneal Tendonitis (726 79)  13  Mohs Micrographic Surgery Face  14  Posterior tibial tendinitis (726 72) (M76 829)  15  Right Breast Reconstruction With Latissimus Dorsi Flap  16  Skin lesion (709 9) (L98 9)  17  Swelling of extremity, right (729 81) (M79 89)  18  Tarsal tunnel syndrome of left side (355 5) (G57 52)  19  Upper extremity pain (729 5) (M79 603)  20  Use of tamoxifen (Nolvadex) (V07 51) (Z79 810)     Past Medical History  1  History of Abnormal findings on diagnostic imaging of breast (793 89) (R92 8)  2  History of Age At First Period 10 Years Old (Menarche)  3  History of Asthma (493 90) (J45 909)  4  History of Breast Cancer (V10 3)  5  History of Difficulty Falling Asleep  6  History of Gluteal abscess (682 5) (L02 31)  7  History of allergy (V15 09) (Z88 9)  8  History of headache (V13 89) (Z87 898)  9  History of Lobular carcinoma in situ of breast (233 0) (D05 00)  10  History of Mammogram Inconclusive Due To Dense Breasts (793 82)  11  History of Never Pregnant  12  History of Nipple Discharge (611 79)     Surgical History  1  History of Biopsy Breast Percutaneous Needle Core  2  Breast Reconstruction With Tissue Expander Left Breast  3  History of Breast Surgery  4  History of Breast Surgery Excision Of Breast Single Lesion  5  History of Breast Surgery Mastectomy  6   History of Bunion Correction By Rudolpho Conrad Procedure  7  History of Bx Breast Percutan Needle Core Use Imag Guide (Stereotactic)  8  History of Bx Breast Percutan Needle Core Use Imag Guide (Stereotactic)  9  History of Foot Arthrodesis Pantalar  10  History of Knee Surgery Right  11  Mohs Micrographic Surgery Face  12  History of Osteotomy Of The Calcaneus  13  History of Right Breast Lumpectomy  14  Right Breast Reconstruction With Latissimus Dorsi Flap  15  History of [place] Lymph Node Biopsy  16  History of [place] Lymph Node Biopsy  Surgical History Reviewed: The surgical history was reviewed and updated today        Family History  Mother   1  No pertinent family history  Father   2  No pertinent family history  Family History   3  Family history of Adopted  Family History Reviewed: The family history was reviewed and updated today        Social History   · Alcohol Use (History)   · Never A Smoker  Social History Reviewed: The social history was reviewed and updated today  The social history was reviewed and is unchanged       Current Meds  1  Acidophilus TABS; Therapy: (Recorded:24Apr2015) to Recorded  2  ALPRAZolam 0 25 MG Oral Tablet; prn Recorded  3  Calcium Citrate + Oral Tablet; Therapy: (Recorded:05Nov2015) to Recorded  4  Chromium Xtra 400 MCG Oral Tablet; Therapy: (Recorded:05Nov2015) to Recorded  5  Ibuprofen TABS; Therapy: (Recorded:11Nov2015) to Recorded  6  Multivitamin TABS; Therapy: [phone]) to Recorded  7  Tamoxifen Citrate 20 MG Oral Tablet; Take 1 tablet daily; Therapy: 20JBJ2556 to (Evaluate:14Nov2017)  Requested for: 84MWS9183; Last   Rx:[id number] Ordered  8  Vitamin C 500 MG Oral Tablet; Therapy: (Stormy Sprain) to Recorded  9  Vitamin D 2000 UNIT Oral Tablet; Therapy: (Recorded:14Jun2017) to Recorded  10  Zinc TABS; Therapy: (Recorded:24Apr2015) to Recorded  Medication List Reviewed: The medication list was reviewed and updated today        Allergies  1  Codeine Derivatives  2  Dilaudid SOLN     Vitals  Vital Signs     Recorded: 07Epk7759 01:06PM Recorded: 18Aug2017 01:03PM   BP Comments unobtainable unobtainable   Height 5 ft 6 in 5 ft 6 in   Weight 158 lb  150 lb    BMI Calculated 25 5 24 21   BSA Calculated 1 81 1 77      Physical Exam     Constitutional   General appearance: No acute distress, well appearing and well nourished  Eyes   Conjunctiva and lids: No swelling, erythema or discharge  Pulmonary   Respiratory effort: No increased work of breathing or signs of respiratory distress  Heart: RRR without gallop or rub  Abdomen   Abdomen: Non-tender, no masses      Musculoskeletal   Gait and station: Normal     Skin   Skin and subcutaneous tissue: Abnormal  Significant radiation fibrosis breast, tissue expander in place, well-healed latissimus dorsi flap reconstruction right breast    Psychiatric   Orientation to person, place, and time: Normal     Mood and affect: Normal

## 2017-08-31 ENCOUNTER — APPOINTMENT (OUTPATIENT)
Dept: PHYSICAL THERAPY | Facility: CLINIC | Age: 52
End: 2017-08-31
Payer: COMMERCIAL

## 2017-09-06 RX ORDER — LORATADINE 10 MG/1
CAPSULE, LIQUID FILLED ORAL
COMMUNITY
End: 2018-07-23 | Stop reason: ALTCHOICE

## 2017-09-06 RX ORDER — TAMOXIFEN CITRATE 20 MG/1
20 TABLET ORAL 2 TIMES DAILY
COMMUNITY
End: 2018-02-02

## 2017-09-07 ENCOUNTER — ANESTHESIA EVENT (OUTPATIENT)
Dept: PERIOP | Facility: HOSPITAL | Age: 52
DRG: 585 | End: 2017-09-07
Payer: COMMERCIAL

## 2017-09-07 ENCOUNTER — HOSPITAL ENCOUNTER (INPATIENT)
Facility: HOSPITAL | Age: 52
LOS: 2 days | Discharge: HOME | DRG: 585 | End: 2017-09-09
Attending: SURGERY | Admitting: SURGERY
Payer: COMMERCIAL

## 2017-09-07 ENCOUNTER — ANESTHESIA (OUTPATIENT)
Dept: PERIOP | Facility: HOSPITAL | Age: 52
DRG: 585 | End: 2017-09-07
Payer: COMMERCIAL

## 2017-09-07 ENCOUNTER — GENERIC CONVERSION - ENCOUNTER (OUTPATIENT)
Dept: OTHER | Facility: OTHER | Age: 52
End: 2017-09-07

## 2017-09-07 PROBLEM — Z09 SURGICAL FOLLOW-UP CARE: Status: ACTIVE | Noted: 2017-09-07

## 2017-09-07 LAB — EXT PREGNANCY TEST URINE: NEGATIVE

## 2017-09-07 PROCEDURE — 0KXG0ZZ TRANSFER LEFT TRUNK MUSCLE, OPEN APPROACH: ICD-10-PCS | Performed by: SURGERY

## 2017-09-07 PROCEDURE — 0HHU0NZ INSERTION OF TISSUE EXPANDER INTO LEFT BREAST, OPEN APPROACH: ICD-10-PCS | Performed by: SURGERY

## 2017-09-07 PROCEDURE — C1789 PROSTHESIS, BREAST, IMP: HCPCS | Performed by: SURGERY

## 2017-09-07 PROCEDURE — 81025 URINE PREGNANCY TEST: CPT | Performed by: SURGERY

## 2017-09-07 DEVICE — IMPLANTABLE DEVICE: Type: IMPLANTABLE DEVICE | Site: BREAST | Status: FUNCTIONAL

## 2017-09-07 RX ORDER — EPHEDRINE SULFATE 50 MG/ML
INJECTION, SOLUTION INTRAVENOUS AS NEEDED
Status: DISCONTINUED | OUTPATIENT
Start: 2017-09-07 | End: 2017-09-07 | Stop reason: SURG

## 2017-09-07 RX ORDER — MORPHINE SULFATE 2 MG/ML
2 INJECTION, SOLUTION INTRAMUSCULAR; INTRAVENOUS EVERY 4 HOURS PRN
Status: DISCONTINUED | OUTPATIENT
Start: 2017-09-07 | End: 2017-09-09 | Stop reason: HOSPADM

## 2017-09-07 RX ORDER — ONDANSETRON 2 MG/ML
4 INJECTION INTRAMUSCULAR; INTRAVENOUS ONCE AS NEEDED
Status: DISCONTINUED | OUTPATIENT
Start: 2017-09-07 | End: 2017-09-07 | Stop reason: HOSPADM

## 2017-09-07 RX ORDER — ROCURONIUM BROMIDE 10 MG/ML
INJECTION, SOLUTION INTRAVENOUS AS NEEDED
Status: DISCONTINUED | OUTPATIENT
Start: 2017-09-07 | End: 2017-09-07 | Stop reason: SURG

## 2017-09-07 RX ORDER — ONDANSETRON 2 MG/ML
4 INJECTION INTRAMUSCULAR; INTRAVENOUS EVERY 6 HOURS PRN
Status: DISCONTINUED | OUTPATIENT
Start: 2017-09-07 | End: 2017-09-09 | Stop reason: HOSPADM

## 2017-09-07 RX ORDER — TAMOXIFEN CITRATE 10 MG/1
20 TABLET ORAL 2 TIMES DAILY
Status: DISCONTINUED | OUTPATIENT
Start: 2017-09-07 | End: 2017-09-09 | Stop reason: HOSPADM

## 2017-09-07 RX ORDER — MORPHINE SULFATE 2 MG/ML
1 INJECTION, SOLUTION INTRAMUSCULAR; INTRAVENOUS
Status: DISCONTINUED | OUTPATIENT
Start: 2017-09-07 | End: 2017-09-07 | Stop reason: HOSPADM

## 2017-09-07 RX ORDER — GLYCOPYRROLATE 0.2 MG/ML
INJECTION INTRAMUSCULAR; INTRAVENOUS AS NEEDED
Status: DISCONTINUED | OUTPATIENT
Start: 2017-09-07 | End: 2017-09-07 | Stop reason: SURG

## 2017-09-07 RX ORDER — PROMETHAZINE HYDROCHLORIDE 25 MG/ML
25 INJECTION, SOLUTION INTRAMUSCULAR; INTRAVENOUS ONCE AS NEEDED
Status: DISCONTINUED | OUTPATIENT
Start: 2017-09-07 | End: 2017-09-07 | Stop reason: HOSPADM

## 2017-09-07 RX ORDER — FENTANYL CITRATE/PF 50 MCG/ML
25 SYRINGE (ML) INJECTION AS NEEDED
Status: DISCONTINUED | OUTPATIENT
Start: 2017-09-07 | End: 2017-09-07 | Stop reason: HOSPADM

## 2017-09-07 RX ORDER — KETOROLAC TROMETHAMINE 30 MG/ML
INJECTION, SOLUTION INTRAMUSCULAR; INTRAVENOUS AS NEEDED
Status: DISCONTINUED | OUTPATIENT
Start: 2017-09-07 | End: 2017-09-07 | Stop reason: SURG

## 2017-09-07 RX ORDER — DIPHENHYDRAMINE HYDROCHLORIDE 50 MG/ML
12.5 INJECTION INTRAMUSCULAR; INTRAVENOUS ONCE AS NEEDED
Status: DISCONTINUED | OUTPATIENT
Start: 2017-09-07 | End: 2017-09-07 | Stop reason: HOSPADM

## 2017-09-07 RX ORDER — SCOLOPAMINE TRANSDERMAL SYSTEM 1 MG/1
1 PATCH, EXTENDED RELEASE TRANSDERMAL
Status: DISCONTINUED | OUTPATIENT
Start: 2017-09-07 | End: 2017-09-07 | Stop reason: HOSPADM

## 2017-09-07 RX ORDER — SODIUM CHLORIDE, SODIUM LACTATE, POTASSIUM CHLORIDE, CALCIUM CHLORIDE 600; 310; 30; 20 MG/100ML; MG/100ML; MG/100ML; MG/100ML
20 INJECTION, SOLUTION INTRAVENOUS CONTINUOUS
Status: DISCONTINUED | OUTPATIENT
Start: 2017-09-07 | End: 2017-09-09 | Stop reason: HOSPADM

## 2017-09-07 RX ORDER — B-COMPLEX WITH VITAMIN C
1 TABLET ORAL
Status: DISCONTINUED | OUTPATIENT
Start: 2017-09-08 | End: 2017-09-09 | Stop reason: HOSPADM

## 2017-09-07 RX ORDER — ALPRAZOLAM 0.5 MG/1
0.5 TABLET ORAL
Status: DISCONTINUED | OUTPATIENT
Start: 2017-09-07 | End: 2017-09-09 | Stop reason: HOSPADM

## 2017-09-07 RX ORDER — BUPIVACAINE HYDROCHLORIDE 2.5 MG/ML
INJECTION, SOLUTION EPIDURAL; INFILTRATION; INTRACAUDAL AS NEEDED
Status: DISCONTINUED | OUTPATIENT
Start: 2017-09-07 | End: 2017-09-07 | Stop reason: HOSPADM

## 2017-09-07 RX ORDER — ACETAMINOPHEN 325 MG/1
650 TABLET ORAL EVERY 6 HOURS PRN
Status: DISCONTINUED | OUTPATIENT
Start: 2017-09-07 | End: 2017-09-09 | Stop reason: HOSPADM

## 2017-09-07 RX ORDER — ONDANSETRON 2 MG/ML
INJECTION INTRAMUSCULAR; INTRAVENOUS AS NEEDED
Status: DISCONTINUED | OUTPATIENT
Start: 2017-09-07 | End: 2017-09-07 | Stop reason: SURG

## 2017-09-07 RX ORDER — PROPOFOL 10 MG/ML
INJECTION, EMULSION INTRAVENOUS AS NEEDED
Status: DISCONTINUED | OUTPATIENT
Start: 2017-09-07 | End: 2017-09-07 | Stop reason: SURG

## 2017-09-07 RX ORDER — FENTANYL CITRATE 50 UG/ML
INJECTION, SOLUTION INTRAMUSCULAR; INTRAVENOUS AS NEEDED
Status: DISCONTINUED | OUTPATIENT
Start: 2017-09-07 | End: 2017-09-07 | Stop reason: SURG

## 2017-09-07 RX ORDER — LORATADINE 10 MG/1
10 TABLET ORAL DAILY
Status: DISCONTINUED | OUTPATIENT
Start: 2017-09-08 | End: 2017-09-09 | Stop reason: HOSPADM

## 2017-09-07 RX ORDER — OXYCODONE HYDROCHLORIDE AND ACETAMINOPHEN 5; 325 MG/1; MG/1
1 TABLET ORAL EVERY 4 HOURS PRN
Status: DISCONTINUED | OUTPATIENT
Start: 2017-09-07 | End: 2017-09-09 | Stop reason: HOSPADM

## 2017-09-07 RX ORDER — MIDAZOLAM HYDROCHLORIDE 1 MG/ML
INJECTION INTRAMUSCULAR; INTRAVENOUS AS NEEDED
Status: DISCONTINUED | OUTPATIENT
Start: 2017-09-07 | End: 2017-09-07 | Stop reason: SURG

## 2017-09-07 RX ORDER — SULFAMETHOXAZOLE AND TRIMETHOPRIM 800; 160 MG/1; MG/1
1 TABLET ORAL EVERY 12 HOURS SCHEDULED
Status: DISCONTINUED | OUTPATIENT
Start: 2017-09-07 | End: 2017-09-09 | Stop reason: HOSPADM

## 2017-09-07 RX ADMIN — FENTANYL CITRATE 25 MCG: 50 INJECTION, SOLUTION INTRAMUSCULAR; INTRAVENOUS at 14:09

## 2017-09-07 RX ADMIN — MORPHINE SULFATE 2 MG: 2 INJECTION, SOLUTION INTRAMUSCULAR; INTRAVENOUS at 16:52

## 2017-09-07 RX ADMIN — GLYCOPYRROLATE 0.2 MG: 0.2 INJECTION, SOLUTION INTRAMUSCULAR; INTRAVENOUS at 08:10

## 2017-09-07 RX ADMIN — FENTANYL CITRATE 100 MCG: 50 INJECTION, SOLUTION INTRAMUSCULAR; INTRAVENOUS at 08:10

## 2017-09-07 RX ADMIN — FENTANYL CITRATE 50 MCG: 50 INJECTION, SOLUTION INTRAMUSCULAR; INTRAVENOUS at 10:30

## 2017-09-07 RX ADMIN — MORPHINE SULFATE 2 MG: 2 INJECTION, SOLUTION INTRAMUSCULAR; INTRAVENOUS at 21:06

## 2017-09-07 RX ADMIN — FENTANYL CITRATE 50 MCG: 50 INJECTION, SOLUTION INTRAMUSCULAR; INTRAVENOUS at 12:30

## 2017-09-07 RX ADMIN — OXYCODONE HYDROCHLORIDE AND ACETAMINOPHEN 1 TABLET: 5; 325 TABLET ORAL at 18:59

## 2017-09-07 RX ADMIN — OXYCODONE HYDROCHLORIDE AND ACETAMINOPHEN 1 TABLET: 5; 325 TABLET ORAL at 18:58

## 2017-09-07 RX ADMIN — FENTANYL CITRATE 25 MCG: 50 INJECTION, SOLUTION INTRAMUSCULAR; INTRAVENOUS at 11:15

## 2017-09-07 RX ADMIN — CEFAZOLIN SODIUM 1000 MG: 1 SOLUTION INTRAVENOUS at 08:20

## 2017-09-07 RX ADMIN — ROCURONIUM BROMIDE 10 MG: 10 INJECTION, SOLUTION INTRAVENOUS at 12:00

## 2017-09-07 RX ADMIN — ROCURONIUM BROMIDE 50 MG: 10 INJECTION, SOLUTION INTRAVENOUS at 08:15

## 2017-09-07 RX ADMIN — OXYCODONE HYDROCHLORIDE AND ACETAMINOPHEN 1 TABLET: 5; 325 TABLET ORAL at 23:21

## 2017-09-07 RX ADMIN — ROCURONIUM BROMIDE 20 MG: 10 INJECTION, SOLUTION INTRAVENOUS at 10:30

## 2017-09-07 RX ADMIN — MIDAZOLAM HYDROCHLORIDE 2 MG: 1 INJECTION, SOLUTION INTRAMUSCULAR; INTRAVENOUS at 08:10

## 2017-09-07 RX ADMIN — ONDANSETRON 4 MG: 2 INJECTION INTRAMUSCULAR; INTRAVENOUS at 12:42

## 2017-09-07 RX ADMIN — SCOPALAMINE 1 PATCH: 1 PATCH, EXTENDED RELEASE TRANSDERMAL at 08:09

## 2017-09-07 RX ADMIN — FENTANYL CITRATE 100 MCG: 50 INJECTION, SOLUTION INTRAMUSCULAR; INTRAVENOUS at 08:45

## 2017-09-07 RX ADMIN — NEOSTIGMINE METHYLSULFATE 3 MG: 1 INJECTION, SOLUTION INTRAMUSCULAR; INTRAVENOUS; SUBCUTANEOUS at 12:30

## 2017-09-07 RX ADMIN — SODIUM CHLORIDE, SODIUM LACTATE, POTASSIUM CHLORIDE, AND CALCIUM CHLORIDE 20 ML/HR: .6; .31; .03; .02 INJECTION, SOLUTION INTRAVENOUS at 16:47

## 2017-09-07 RX ADMIN — SULFAMETHOXAZOLE AND TRIMETHOPRIM 1 TABLET: 800; 160 TABLET ORAL at 21:06

## 2017-09-07 RX ADMIN — PROPOFOL 200 MG: 10 INJECTION, EMULSION INTRAVENOUS at 08:15

## 2017-09-07 RX ADMIN — FENTANYL CITRATE 25 MCG: 50 INJECTION, SOLUTION INTRAMUSCULAR; INTRAVENOUS at 12:10

## 2017-09-07 RX ADMIN — DEXAMETHASONE SODIUM PHOSPHATE 8 MG: 10 INJECTION INTRAMUSCULAR; INTRAVENOUS at 08:20

## 2017-09-07 RX ADMIN — EPHEDRINE SULFATE 10 MG: 50 INJECTION, SOLUTION INTRAMUSCULAR; INTRAVENOUS; SUBCUTANEOUS at 10:11

## 2017-09-07 RX ADMIN — ROCURONIUM BROMIDE 50 MG: 10 INJECTION, SOLUTION INTRAVENOUS at 08:45

## 2017-09-07 RX ADMIN — FENTANYL CITRATE 25 MCG: 50 INJECTION, SOLUTION INTRAMUSCULAR; INTRAVENOUS at 13:40

## 2017-09-07 RX ADMIN — GLYCOPYRROLATE 0.4 MG: 0.2 INJECTION, SOLUTION INTRAMUSCULAR; INTRAVENOUS at 12:45

## 2017-09-07 RX ADMIN — MORPHINE SULFATE 1 MG: 2 INJECTION, SOLUTION INTRAMUSCULAR; INTRAVENOUS at 15:03

## 2017-09-07 RX ADMIN — ROCURONIUM BROMIDE 10 MG: 10 INJECTION, SOLUTION INTRAVENOUS at 11:15

## 2017-09-07 RX ADMIN — CEFAZOLIN SODIUM 1000 MG: 1 SOLUTION INTRAVENOUS at 16:53

## 2017-09-07 RX ADMIN — KETOROLAC TROMETHAMINE 30 MG: 30 INJECTION, SOLUTION INTRAMUSCULAR at 12:55

## 2017-09-07 RX ADMIN — SODIUM CHLORIDE, SODIUM LACTATE, POTASSIUM CHLORIDE, AND CALCIUM CHLORIDE 20 ML/HR: .6; .31; .03; .02 INJECTION, SOLUTION INTRAVENOUS at 07:20

## 2017-09-07 RX ADMIN — ROCURONIUM BROMIDE 10 MG: 10 INJECTION, SOLUTION INTRAVENOUS at 09:30

## 2017-09-07 RX ADMIN — ONDANSETRON 4 MG: 2 INJECTION INTRAMUSCULAR; INTRAVENOUS at 08:20

## 2017-09-07 RX ADMIN — SODIUM CHLORIDE, SODIUM LACTATE, POTASSIUM CHLORIDE, AND CALCIUM CHLORIDE: .6; .31; .03; .02 INJECTION, SOLUTION INTRAVENOUS at 11:24

## 2017-09-07 NOTE — DISCHARGE INSTRUCTIONS
[facility]  Dr Carrion Poster   [address] [facility], [address]  Phone: [phone]     Postoperative Instructions for Outpatient Surgery     These instructions are being provided by your doctor to give you basic guidelines during your post-op recovery  Please let our office know if your contact information has changed       Please call the office today for an appointment early next week      Dressings: Drain sponge around drains, change daily      Activity Restrictions: No strenuous activity      Bathing:  Keep dressings dry       Medications:    Resume pre-op medications  You may take tylenol, aleve, or ibuprofen for pain control             Percocet and Bactrim  Other: Ice to area as needed  Visiting nurses to assist with drain care  Drain and measure drain output daily

## 2017-09-08 PROBLEM — C50.919 BREAST CANCER (HCC): Status: ACTIVE | Noted: 2017-09-08

## 2017-09-08 LAB
PLATELET # BLD AUTO: 182 THOUSANDS/UL (ref 149–390)
PMV BLD AUTO: 11.7 FL (ref 8.9–12.7)

## 2017-09-08 PROCEDURE — 85049 AUTOMATED PLATELET COUNT: CPT | Performed by: PHYSICIAN ASSISTANT

## 2017-09-08 RX ORDER — DIPHENHYDRAMINE HCL 25 MG
25 TABLET ORAL EVERY 6 HOURS PRN
Status: DISCONTINUED | OUTPATIENT
Start: 2017-09-08 | End: 2017-09-09 | Stop reason: HOSPADM

## 2017-09-08 RX ADMIN — OXYCODONE HYDROCHLORIDE AND ACETAMINOPHEN 1 TABLET: 5; 325 TABLET ORAL at 03:22

## 2017-09-08 RX ADMIN — ENOXAPARIN SODIUM 40 MG: 40 INJECTION SUBCUTANEOUS at 08:01

## 2017-09-08 RX ADMIN — OXYCODONE HYDROCHLORIDE AND ACETAMINOPHEN 1 TABLET: 5; 325 TABLET ORAL at 12:57

## 2017-09-08 RX ADMIN — SULFAMETHOXAZOLE AND TRIMETHOPRIM 1 TABLET: 800; 160 TABLET ORAL at 21:20

## 2017-09-08 RX ADMIN — OXYCODONE HYDROCHLORIDE AND ACETAMINOPHEN 1 TABLET: 5; 325 TABLET ORAL at 17:24

## 2017-09-08 RX ADMIN — OXYCODONE HYDROCHLORIDE AND ACETAMINOPHEN 1 TABLET: 5; 325 TABLET ORAL at 07:58

## 2017-09-08 RX ADMIN — SULFAMETHOXAZOLE AND TRIMETHOPRIM 1 TABLET: 800; 160 TABLET ORAL at 08:02

## 2017-09-08 RX ADMIN — CEFAZOLIN SODIUM 1000 MG: 1 SOLUTION INTRAVENOUS at 00:53

## 2017-09-08 RX ADMIN — OXYCODONE HYDROCHLORIDE AND ACETAMINOPHEN 1 TABLET: 5; 325 TABLET ORAL at 21:21

## 2017-09-08 RX ADMIN — DIPHENHYDRAMINE HCL 25 MG: 25 TABLET, COATED ORAL at 12:18

## 2017-09-08 RX ADMIN — CALCIUM CARBONATE 500 MG (1,250 MG)-VITAMIN D3 200 UNIT TABLET 1 TABLET: at 07:58

## 2017-09-09 VITALS
BODY MASS INDEX: 27.64 KG/M2 | DIASTOLIC BLOOD PRESSURE: 64 MMHG | WEIGHT: 171.96 LBS | RESPIRATION RATE: 18 BRPM | TEMPERATURE: 99.3 F | SYSTOLIC BLOOD PRESSURE: 141 MMHG | HEART RATE: 68 BPM | HEIGHT: 66 IN | OXYGEN SATURATION: 98 %

## 2017-09-09 PROBLEM — T85.44XA BREAST IMPLANT CAPSULAR CONTRACTURE: Status: ACTIVE | Noted: 2017-09-09

## 2017-09-09 RX ADMIN — OXYCODONE HYDROCHLORIDE AND ACETAMINOPHEN 1 TABLET: 5; 325 TABLET ORAL at 09:05

## 2017-09-09 RX ADMIN — OXYCODONE HYDROCHLORIDE AND ACETAMINOPHEN 1 TABLET: 5; 325 TABLET ORAL at 03:55

## 2017-09-09 RX ADMIN — CALCIUM CARBONATE 500 MG (1,250 MG)-VITAMIN D3 200 UNIT TABLET 1 TABLET: at 09:05

## 2017-09-09 RX ADMIN — SULFAMETHOXAZOLE AND TRIMETHOPRIM 1 TABLET: 800; 160 TABLET ORAL at 10:02

## 2017-10-11 ENCOUNTER — ALLSCRIPTS OFFICE VISIT (OUTPATIENT)
Dept: OTHER | Facility: OTHER | Age: 52
End: 2017-10-11

## 2017-10-27 ENCOUNTER — ALLSCRIPTS OFFICE VISIT (OUTPATIENT)
Dept: OTHER | Facility: OTHER | Age: 52
End: 2017-10-27

## 2018-01-03 ENCOUNTER — ALLSCRIPTS OFFICE VISIT (OUTPATIENT)
Dept: OTHER | Facility: OTHER | Age: 53
End: 2018-01-03

## 2018-01-04 NOTE — PROGRESS NOTES
Assessment   1  Breast cancer (174 9) (C50 919)    Plan   Breast cancer    · Tamoxifen Citrate 20 MG Oral Tablet; Take 1 tablet daily   Rx By: Silviano Johns; Dispense: 90 Days ; #:90 Tablet; Refill: 3;For: Breast cancer; JORDON = N; Verified Transmission to [id number] Last Updated By: System, [employer]; 1/3/2018 3:02:00 PM   · Follow-up visit in 1 year Evaluation and Treatment  Follow-up  Status: Complete  Done:    [id number]   Ordered; For: Breast cancer; Ordered By: Silviano Johns Performed:  Due: 50LGP1304; Last Updated By: Mortimer Carolus; 1/3/2018 3:06:09 PM    Discussion/Summary   Discussion Summary:    A 61-year-old premenopausal woman, who has 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 , as well as a history of radiation treatment post lumpectomy  She developed bilateral breast cancer  Both of which are ER/NC positive, HER-2 negative disease  She has stage IA on the right, as well as stage IIA on the left with one positive lymph node  She underwent bilateral mastectomy with reconstruction resulting in the FRANKY in 2014  Her left-sided breast cancer has Oncotype DX score of only 7  Therefore, adjuvant chemotherapy was not indicated  He had postmastectomy radiation therapy to the left chest wall  Currently, she is on adjuvant hormonal therapy with tamoxifen with no side effect  She has no evidence recurrent disease, clinically  I recommended her to continue with tamoxifen 20 mg daily  Her last menstrual cycle was in September 2017  Therefore, she is not completely menopause  I will see her again in a year for routine follow-up  She is in agreement with my recommendations  Chief Complaint   Chief Complaint: Chief Complaint:      The patient presents to the office today with 1  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  Stage IA right breast cancer, grade 1, ER/NC positive, HER-2 negative disease  Diagnosed in June 2014   Stage IIA left breast cancer, grade 2, ER/NC positive, HER-2 negative disease with one positive lymph node  Oncotype DX recurrence score 7  Diagnosed in June 2014  BRCA mutation negative  Chief Complaint Free Text Note Form: 1  Stage IA right breast cancer, grade 1, ER/VT positive, HER-2 negative disease  Stage IIA left breast cancer, grade 2, ER/VT positive, HER-2 negative disease with one positive lymph node  BRCA mutation negative  History of Present Illness   HPI: A 54year old premenopausal woman, who has 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, 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  She underwent lumpectomy followed by radiation  She did not have any hormonal treatment  In 2013, she developed nipple discharge in the left breast  [facility] went to [facility], where she underwent stereotactic biopsy, which showed precancerous lesions  She underwent lumpectomy, which showed no evidence of malignancy  In March 2014, she appreciated the lump in the left breast  Subsequently, she was found to have abnormality of bilateral breast  She underwent bilateral mastectomy in June 6, 2014  Right mastectomy specimen showed up to 7 mm of invasive ductal carcinoma grade 1 ER VT positive, HER-2 negative disease  7  [place] were all negative for the metastatic disease in the right axilla  Left mastectomy specimen showed multifocal invasive ductal carcinoma with tumor measuring up to 15 mm, grade 2  This was ER/VT positive, HER-2 negative disease  One sentinel was positive for metastatic disease  There was some evidence of focal extranodal extension  She had immediate reconstruction with tissue expander  She presents today to discuss adjuvant treatment options  Her left breast tumor was sent for the Oncotype DX testing which came back recurrence score of 7, which is low risk disease  Previously, she was tested for BRCA gene mutation in 2013, which was negative  She does not know the family history, since she was adopted  Currently, she has no complaints except for some chest tightness   She has no respiratory symptoms  Her weight is stable  She has no bone pain  Her performance status is normal  She is to have 2 surgical drain in each axilla  Current Therapy: Adjuvant hormonal therapy with tamoxifen 20 mg once a day since July 2014  Disease Status: FRANKY status post bilateral mastectomy  Interval History: A 50year old premenopausal woman, who has 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  She underwent lumpectomy followed by radiation therapy  In June 2014, she was diagnosed with bilateral invasive breast cancer, stage IA, on the right and stage IIA on the left, both of which ER/PA positive, HER-2 negative disease  Her Oncotype DX score on the left breast cancer with only 7  Therefore, adjuvant chemotherapy was not indicated  She underwent bilateral mastectomy with reconstruction  She completed postmastectomy radiation therapy to the left chest wall  She is currently on adjuvant hormonal therapy with tamoxifen  She presents today for routine follow-up  She had regular menstrual cycle until September 2017  Since then, her menstrual cycle has stopped completely  She has no complaint hot flashes  She has no bone pain  Her weight is stable  She denied any respiratory symptoms  Her performance status is normal       Review of Systems   Complete-Female:      Constitutional: No fever, no chills, feels well, no tiredness, no recent weight gain or weight loss  Eyes: No complaints of eye pain, no red eyes, no eyesight problems, no discharge, no dry eyes, no itching of eyes  ENT: no complaints of earache, no loss of hearing, no nose bleeds, no nasal discharge, no sore throat, no hoarseness  Cardiovascular: No complaints of slow heart rate, no fast heart rate, no chest pain, no palpitations, no leg claudication, no lower extremity edema  Respiratory: No complaints of shortness of breath, no wheezing, no cough, no SOB on exertion, no orthopnea, no PND        Gastrointestinal: No complaints of abdominal pain, no constipation, no nausea or vomiting, no diarrhea, no bloody stools  Genitourinary: No complaints of dysuria, no incontinence, no pelvic pain, no dysmenorrhea, no vaginal discharge or bleeding  Musculoskeletal: No complaints of arthralgias, no myalgias, no joint swelling or stiffness, no limb pain or swelling  Integumentary: No complaints of skin rash or lesions, no itching, no skin wounds, no breast pain or lump  Neurological: No complaints of headache, no confusion, no convulsions, no numbness, no dizziness or fainting, no tingling, no limb weakness, no difficulty walking  Psychiatric: Not suicidal, no sleep disturbance, no anxiety or depression, no change in personality, no emotional problems  Endocrine: No complaints of proptosis, no hot flashes, no muscle weakness, no deepening of the voice, no feelings of weakness  Hematologic/Lymphatic: No complaints of swollen glands, no swollen glands in the neck, does not bleed easily, does not bruise easily  ROS Reviewed:    ROS reviewed  Active Problems   1  Aftercare involving the use of plastic surgery (V51 8) (Z09)   2  Ankle pain, unspecified laterality   3  Ankle Sprain (845 00)   4  Basal cell carcinoma of face (173 31) (C44 310)   5  Breast cancer (174 9) (C50 919)   6  Breast Reconstruction With Tissue Expander Left Breast   7  Breast Surgery Reconstruction With Latissimus Dorsi Flap   8  Capsular contracture of breast implant, initial encounter (511 83) (T85 44XA)   9  Encounter for gynecological examination (general) (routine) without abnormal findings     (V72 31) (Z01 419)   10  Encounter for routine gynecological examination (V72 31) (Z01 419)   11  Encounter for special screening examination for neoplasm of breast (V76 10) (Z12 31)   12  Encounter to discuss breast reconstruction (V65 49) (Z71 89)   13  Left Peroneal Tendonitis (001 79)   14  Mohs Micrographic Surgery Face   15  Posterior tibial tendinitis (726 72) (M76 829)   16  Right Breast Reconstruction With Latissimus Dorsi Flap   17  Skin lesion (709 9) (L98 9)   18  Swelling of extremity, right (729 81) (M79 89)   19  Tarsal tunnel syndrome of left side (355 5) (G57 52)   20  Upper extremity pain (729 5) (M79 603)   21  Use of tamoxifen (Nolvadex) (V07 51) (Z79 810)    Past Medical History   1  History of Abnormal findings on diagnostic imaging of breast (793 89) (R92 8)   2  History of Age At First Period 10 Years Old (Menarche)   3  History of Asthma (493 90) (J45 909)   4  History of Breast Cancer (V10 3)   5  History of Difficulty Falling Asleep   6  History of Gluteal abscess (682 5) (L02 31)   7  History of allergy (V15 09) (Z88 9)   8  History of headache (V13 89) (Z87 898)   9  History of Lobular carcinoma in situ of breast (233 0) (D05 00)   10  History of Mammogram Inconclusive Due To Dense Breasts (793 82)   11  History of Never Pregnant   12  History of Nipple Discharge (611 79)  Past Medical History Reviewed: The past medical history was reviewed and updated today  Surgical History   1  History of Biopsy Breast Percutaneous Needle Core   2  Breast Reconstruction With Tissue Expander Left Breast   3  History of Breast Surgery   4  History of Breast Surgery Excision Of Breast Single Lesion   5  History of Breast Surgery Mastectomy   6  History of Bunion Correction By Katrinka Richardson Procedure   7  History of Bx Breast Percutan Needle Core Use Imag Guide (Stereotactic)   8  History of Bx Breast Percutan Needle Core Use Imag Guide (Stereotactic)   9  History of Foot Arthrodesis Pantalar   10  History of Knee Surgery Right   11  Mohs Micrographic Surgery Face   12  History of Osteotomy Of The Calcaneus   13  History of Right Breast Lumpectomy   14  Right Breast Reconstruction With Latissimus Dorsi Flap   15  History of [place] Lymph Node Biopsy   16  History of [place] Lymph Node Biopsy  Surgical History Reviewed:     The surgical history was reviewed and updated today  Family History   Mother    1  No pertinent family history  Father    2  No pertinent family history  Family History    3  Family history of Adopted  Family History Reviewed: The family history was reviewed and updated today  Social History    · Alcohol Use (History)   · Never A Smoker  Social History Reviewed: The social history was reviewed and updated today  The social history was reviewed and is unchanged  Current Meds    1  Acidophilus TABS; Therapy: (Recorded:24Apr2015) to Recorded   2  ALPRAZolam 0 25 MG Oral Tablet; prn Recorded   3  Calcium Citrate + Oral Tablet; Therapy: (Recorded:05Nov2015) to Recorded   4  Chromium Xtra 400 MCG Oral Tablet; Therapy: (Recorded:05Nov2015) to Recorded   5  Ibuprofen TABS; Therapy: (Recorded:11Nov2015) to Recorded   6  Multivitamin TABS; Therapy: (24-58-82-35) to Recorded   7  Tamoxifen Citrate 20 MG Oral Tablet; Take 1 tablet daily; Therapy: 32BQO0841 to (Evaluate:11Mar2018)  Requested for: 03Ybv1822; Last     Rx:[id number] Ordered   8  Vitamin C 500 MG Oral Tablet; Therapy: (Luiza Rodarte) to Recorded   9  Vitamin D 2000 UNIT Oral Tablet; Therapy: (Recorded:14Jun2017) to Recorded   10  Zinc TABS; Therapy: (Recorded:24Apr2015) to Recorded  Medication List Reviewed: The medication list was reviewed and updated today  Allergies   1  Codeine Derivatives   2  Dilaudid SOLN    Vitals   Vital Signs    Recorded: 53RJG8668 02:31PM   Temperature 97 7 F   Heart Rate 91   Respiration 16   Systolic 023   Diastolic 70   Height 5 ft 6 in   Weight 164 lb    BMI Calculated 26 47   BSA Calculated 1 84   O2 Saturation 97     Physical Exam        Constitutional      General appearance: No acute distress, well appearing and well nourished  Eyes      Conjunctiva and lids: No swelling, erythema or discharge  Pupils and irises: Equal, round and reactive to light  Ears, Nose, Mouth, and Throat      External inspection of ears and nose: Normal        Otoscopic examination: Tympanic membranes translucent with normal light reflex  Canals patent without erythema  Nasal mucosa, septum, and turbinates: Normal without edema or erythema  Oropharynx: Normal with no erythema, edema, exudate or lesions  Pulmonary      Respiratory effort: No increased work of breathing or signs of respiratory distress  Auscultation of lungs: Clear to auscultation  Cardiovascular      Palpation of heart: Normal PMI, no thrills  Auscultation of heart: Normal rate and rhythm, normal S1 and S2, without murmurs  Examination of extremities for edema and/or varicosities: Normal        Carotid pulses: Normal        Abdomen      Abdomen: Non-tender, no masses  Liver and spleen: No hepatomegaly or splenomegaly  Lymphatic      Palpation of lymph nodes in neck: No lymphadenopathy  Musculoskeletal      Gait and station: Normal        Digits and nails: Normal without clubbing or cyanosis  Inspection/palpation of joints, bones, and muscles: Normal        Skin      Skin and subcutaneous tissue: Normal without rashes or lesions  Neurologic      Cranial nerves: Cranial nerves 2-12 intact  Reflexes: 2+ and symmetric  Sensation: No sensory loss  Psychiatric      Orientation to person, place, and time: Normal        Mood and affect: Normal        Additional Exam:  Breast exam; status post bilateral mastectomy with reconstruction  No palpable abnormality in either reconstructed breast or chest wall  ECOG 0       Results/Data   Results Form:    Results      Pathology Right mastectomy specimen showed a 7 mm of invasive ductal carcinoma, grade 1, ER/[place] positive, HER-2 negative disease  7  [place] lymph nodes were all negative for metastatic disease   Stage IA(pT1b,pN0,M0) mastectomy specimen showed up to 15 mm of invasive ductal carcinoma, multifocal, grade 2  ER/CO positive, HER-2 negative disease  One sentinel lymph node was positive for metastatic disease with focal extranodal extension  Stage IIA(pT1c, pN1a, M0)  Radiology Chest x-ray was negative for pulmonary disease  Lab Results BMP in September 2017 was normal  CBC is within normal limits in September 2017  Future Appointments      Date/Time Provider Specialty Site   01/24/2018 01:30 PM INDIRA Bonilla  Surgical Oncology [facility]   01/12/2018 03:00 PM INDIRA Toure  Plastic Surgery [facility] [id number]   02/02/2018 02:30 PM INDIRA Toure  Plastic Surgery [facility] [id number]   06/27/2018 11:30 AM INDIRA Chowdhury   Obstetrics/Gynecology [facility] [address]     Signatures    Electronically signed by : INDIRA Thomas ; Heath  3 2018  3:07PM EST                       (Author)

## 2018-01-10 NOTE — MISCELLANEOUS
Message   Date: 19 Apr 2017 1:17 PM EST, Recorded By: Donaldo Faulkner   Calling For: Gm Oakley, Care Giver   Received call from Radha Lopez at [facility] requesting clarification on US left breast order  Per Dr Margarette Kimbrough, pt is to have ultra sound of full left breast to rule out seroma with drainage and culture of fluid if needed  Radha Lopez verbalized understanding and states they will try their best         Active Problems    1  Aftercare involving the use of plastic surgery (V51 8) (Z09)   2  Ankle pain, unspecified laterality   3  Ankle Sprain (845 00)   4  Basal cell carcinoma of face (173 31) (C44 310)   5  Breast cancer (174 9) (C50 919)   6  Breast Reconstruction With Tissue Expander Left Breast   7  Capsular contracture of breast implant, initial encounter (611 83) (T85 44XA)   8  Encounter for gynecological examination with Papanicolaou smear of cervix (V72 31)   (Z01 419)   9  Encounter for routine gynecological examination (V72 31) (Z01 419)   10  Encounter to discuss breast reconstruction (V65 49) (Z71 89)   11  Left Peroneal Tendonitis (726 79)   12  Mohs Micrographic Surgery Face   13  Posterior tibial tendinitis (726 72) (M76 829)   14  Right Breast Reconstruction With Latissimus Dorsi Flap   15  Skin lesion (709 9) (L98 9)   16  Swelling of extremity, right (729 81) (M79 89)   17  Tarsal tunnel syndrome of left side (355 5) (G57 52)   18  Upper extremity pain (729 5) (M79 603)   19  Use of tamoxifen (Nolvadex) (V07 51) (Z79 810)    Current Meds   1  Acidophilus TABS; Therapy: (Recorded:81Cqh9186) to Recorded   2  ALPRAZolam 0 25 MG Oral Tablet; prn Recorded   3  Calcium Citrate + Oral Tablet; Therapy: (Recorded:76Wyd4992) to Recorded   4  Chromium Xtra 400 MCG Oral Tablet; Therapy: (Recorded:30Yiv6497) to Recorded   5  Ibuprofen TABS; Therapy: (Recorded:54Whf2265) to Recorded   6  Multivitamin TABS; Therapy: ([phone]) to Recorded   7   Tamoxifen Citrate 20 MG Oral Tablet; Take 1 tablet daily; Therapy: 09OVF4007 to (Evaluate:14Nov2017)  Requested for: 24LAB3562; Last   Rx:83Ghh9935 Ordered   8  Vitamin C 500 MG Oral Tablet; Therapy: (Synetta Drain) to Recorded   9  Zinc TABS; Therapy: (Recorded:24Apr2015) to Recorded    Allergies    1  Codeine Derivatives   2   Dilaudid SOLN    Signatures   Electronically signed by : Faiza Rivero RN; Apr 19 2017  1:28PM EST                       (Author)

## 2018-01-11 NOTE — PROGRESS NOTES
Assessment    1  Breast Surgery Reconstruction With Latissimus Dorsi Flap   2  Breast Reconstruction With Tissue Expander Left Breast    Discussion/Summary    Durga Marion is a pleasant 56-year-old female who is 4 weeks status post latissimus dorsi flap with tissue expander of left breast  Please see HPI  She will follow up with us in 2 weeks time to begin tissue expansion  She is encouraged to follow up sooner if needed  Chief Complaint  4 weeks post op  Post-Op  Post Op [facility]: Durga Marion is a pleasant 56-year-old female who is 4 weeks status post latissimus dorsi flap with tissue expander of left breast  She is feeling well and denies any complaints regarding her incision area as  Review of Systems    Integumentary: as noted in HPI  Active Problems    1  Aftercare involving the use of plastic surgery (V51 8) (Z09)   2  Ankle pain, unspecified laterality   3  Ankle Sprain (845 00)   4  Basal cell carcinoma of face (173 31) (C44 310)   5  Breast cancer (174 9) (C50 919)   6  Breast Reconstruction With Tissue Expander Left Breast   7  Breast Surgery Reconstruction With Latissimus Dorsi Flap   8  Capsular contracture of breast implant, initial encounter (611 83) (T85 44XA)   9  Encounter for gynecological examination (general) (routine) without abnormal findings   (V72 31) (Z01 419)   10  Encounter for routine gynecological examination (V72 31) (Z01 419)   11  Encounter for special screening examination for neoplasm of breast (V76 10) (Z12 31)   12  Encounter to discuss breast reconstruction (V65 49) (Z71 89)   13  Left Peroneal Tendonitis (726 79)   14  Mohs Micrographic Surgery Face   15  Posterior tibial tendinitis (726 72) (M76 829)   16  Right Breast Reconstruction With Latissimus Dorsi Flap   17  Skin lesion (709 9) (L98 9)   18  Swelling of extremity, right (729 81) (M79 89)   19  Tarsal tunnel syndrome of left side (355 5) (G57 52)   20  Upper extremity pain (729 5) (M79 603)   21   Use of tamoxifen (Nolvadex) (V07 51) (Z79 810)    Social History    · Alcohol Use (History)   · Never A Smoker    Current Meds   1  Acidophilus TABS; Therapy: (Recorded:24Apr2015) to Recorded   2  ALPRAZolam 0 25 MG Oral Tablet; prn Recorded   3  Calcium Citrate + Oral Tablet; Therapy: (Recorded:05Nov2015) to Recorded   4  Chromium Xtra 400 MCG Oral Tablet; Therapy: (Recorded:05Nov2015) to Recorded   5  Ibuprofen TABS; Therapy: (Recorded:11Nov2015) to Recorded   6  Multivitamin TABS; Therapy: ([phone]) to Recorded   7  Tamoxifen Citrate 20 MG Oral Tablet; Take 1 tablet daily; Therapy: 08YWT2901 to (Evaluate:14Nov2017)  Requested for: 13IQQ8001; Last   Rx:[id number] Ordered   8  Vitamin C 500 MG Oral Tablet; Therapy: (Abdi Penny) to Recorded   9  Vitamin D 2000 UNIT Oral Tablet; Therapy: (Recorded:14Jun2017) to Recorded   10  Zinc TABS; Therapy: (Recorded:24Apr2015) to Recorded    Allergies    1  Codeine Derivatives   2  Dilaudid SOLN    Physical Exam    Constitutional   General appearance: No acute distress, well appearing and well nourished  Skin   Skin and subcutaneous tissue: Normal without rashes or lesions  Her incisions are clean and intact healing well  No evidence of infection or dehiscence  Future Appointments    Date/Time Provider Specialty Site   06/27/2018 11:30 AM INDIRA Suarez   Obstetrics/Gynecology [facility] [place]     Signatures   Electronically signed by : Marii Harrington, [facility]; Oct 11 2017  1:34PM EST                       (Author)

## 2018-01-12 ENCOUNTER — ALLSCRIPTS OFFICE VISIT (OUTPATIENT)
Dept: OTHER | Facility: OTHER | Age: 53
End: 2018-01-12

## 2018-01-12 VITALS — HEIGHT: 66 IN | WEIGHT: 145 LBS | BODY MASS INDEX: 23.3 KG/M2

## 2018-01-12 VITALS — WEIGHT: 145 LBS | HEIGHT: 66 IN | BODY MASS INDEX: 23.3 KG/M2

## 2018-01-12 VITALS
OXYGEN SATURATION: 98 % | HEIGHT: 66 IN | DIASTOLIC BLOOD PRESSURE: 68 MMHG | TEMPERATURE: 98.4 F | WEIGHT: 154 LBS | HEART RATE: 98 BPM | RESPIRATION RATE: 14 BRPM | SYSTOLIC BLOOD PRESSURE: 110 MMHG | BODY MASS INDEX: 24.75 KG/M2

## 2018-01-13 VITALS — SYSTOLIC BLOOD PRESSURE: 110 MMHG | BODY MASS INDEX: 25.5 KG/M2 | DIASTOLIC BLOOD PRESSURE: 80 MMHG | WEIGHT: 158 LBS

## 2018-01-13 VITALS
SYSTOLIC BLOOD PRESSURE: 100 MMHG | TEMPERATURE: 98.9 F | DIASTOLIC BLOOD PRESSURE: 60 MMHG | HEART RATE: 102 BPM | OXYGEN SATURATION: 100 % | HEIGHT: 66 IN | RESPIRATION RATE: 16 BRPM | BODY MASS INDEX: 24.91 KG/M2 | WEIGHT: 155 LBS

## 2018-01-13 VITALS — HEIGHT: 66 IN | WEIGHT: 158 LBS | BODY MASS INDEX: 25.39 KG/M2

## 2018-01-13 VITALS
RESPIRATION RATE: 16 BRPM | BODY MASS INDEX: 24.67 KG/M2 | OXYGEN SATURATION: 100 % | HEART RATE: 95 BPM | TEMPERATURE: 97.7 F | DIASTOLIC BLOOD PRESSURE: 64 MMHG | SYSTOLIC BLOOD PRESSURE: 120 MMHG | WEIGHT: 153.5 LBS | HEIGHT: 66 IN

## 2018-01-13 NOTE — MISCELLANEOUS
Message   Recorded as Task   Date: 01/25/2017 01:37 PM, Created By: Sharmaine Carey   Task Name: Follow Up   Assigned To: [facility],Team   Regarding Patient: Sdynee Gentile, Status: Active   CommentKiara Osman - 25 Jan 2017 1:37 PM     TASK CREATED   [facility],April - 25 Jan 2017 1:58 PM     TASK EDITED  Rec'd call from patient r/t excoriated skin with associated pruritus to intergluteal cleft  patient states she was seen/treated by Dr Zackery Andino in the past for a ruptured sebaceous cyst to her left gluteal fold  patient denies any warmth/tenderness to area  Denies fever/chills  patient states she tried "jock itch cream" which was ineffective  advised patient to utilize over the counter Hydrocortisone cream and if symptoms persist to contact pcp if issue remains non-surgical   [facility],April - 27 Jan 2017 1:10 PM     TASK EDITED  rec'd call from patient concerning intergluteal cleft excoriation, spoke with provider and advised to have patient seen by pcp  patient in agreement        Active Problems    1  Aftercare involving the use of plastic surgery (V51 8) (Z09)   2  Ankle pain, unspecified laterality   3  Ankle Sprain (845 00)   4  Basal cell carcinoma of face (173 31) (C44 310)   5  Breast cancer (174 9) (C50 919)   6  Breast Reconstruction With Tissue Expander Left Breast   7  Encounter for gynecological examination with Papanicolaou smear of cervix   (V72 31,V76 2) (Z01 419,Z12 4)   8  Encounter for routine gynecological examination (V72 31) (Z01 419)   9  Encounter to discuss breast reconstruction (V65 49) (Z71 89)   10  Left Peroneal Tendonitis (726 79)   11  Mohs Micrographic Surgery Face   12  Posterior tibial tendinitis (726 72) (M76 829)   13  Right Breast Reconstruction With Latissimus Dorsi Flap   14  Skin lesion (709 9) (L98 9)   15  Swelling of extremity, right (729 81) (M79 89)   16  Tarsal tunnel syndrome of left side (355 5) (G57 52)   17  Upper extremity pain (729 5) (M79 603)   18   Use of tamoxifen (Nolvadex) (V07 51) (Z79 810)    Current Meds   1  Acidophilus TABS; Therapy: (Recorded:24Apr2015) to Recorded   2  ALPRAZolam 0 25 MG Oral Tablet; prn Recorded   3  Amoxicillin-Pot Clavulanate 500-125 MG Oral Tablet; TAKE 1 TABLET 3 TIMES DAILY   UNTIL GONE;   Therapy: 28QFX2498 to (Evaluate:06Jan2017)  Requested for: 87ZTW7073; Last   Rx:47Voz1141 Ordered   4  Calcium Citrate + Oral Tablet; Therapy: (Recorded:05Nov2015) to Recorded   5  Chromium Xtra 400 MCG Oral Tablet; Therapy: ([phone]) to Recorded   6  Ibuprofen TABS; Therapy: (Recorded:11Nov2015) to Recorded   7  Multivitamin TABS; Therapy: ([phone]) to Recorded   8  Tamoxifen Citrate 20 MG Oral Tablet; Take 1 tablet daily; Therapy: 97XCQ8005 to (Luz Corey)  Requested for: 75PCA9479; Last   Rx:00Bvf9014 Ordered   9  Vitamin C 500 MG Oral Tablet; Therapy: (Recorded:24Apr2015) to Recorded   10  Zinc TABS; Therapy: (Recorded:24Apr2015) to Recorded    Allergies    1  Codeine Derivatives   2   Dilaudid SOLN    Signatures   Electronically signed by : Yemi Carrizales LPN; Jan 27 5534  0:39QQ EST                       (Author)

## 2018-01-14 VITALS — BODY MASS INDEX: 25.39 KG/M2 | HEIGHT: 66 IN | WEIGHT: 158 LBS

## 2018-01-14 NOTE — MISCELLANEOUS
Message   Recorded as Task   Date: 01/25/2017 01:37 PM, Created By: Smita Oswald   Task Name: Follow Up   Assigned To: [facility],Team   Regarding Patient: Natalie Gaona, Status: Active   CommentCedric Colon - 25 Jan 2017 1:37 PM     TASK CREATED   [facility] Jan [facility] 1:58 PM     TASK EDITED  Rec'd call from patient r/t excoriated skin with associated pruritus to intergluteal cleft  patient states she was seen/treated by Dr Anjali Choudhary in the past for a ruptured sebaceous cyst to her left gluteal fold  patient denies any warmth/tenderness to area  Denies fever/chills  patient states she tried "jock itch cream" which was ineffective  advised patient to utilize over the counter Hydrocortisone cream and if symptoms persist to contact pcp if issue remains non-surgical        Active Problems    1  Aftercare involving the use of plastic surgery (V51 8) (Z09)   2  Ankle pain, unspecified laterality   3  Ankle Sprain (845 00)   4  Basal cell carcinoma of face (173 31) (C44 310)   5  Breast cancer (174 9) (C50 919)   6  Breast Reconstruction With Tissue Expander Left Breast   7  Encounter for gynecological examination with Papanicolaou smear of cervix   (V72 31,V76 2) (Z01 419,Z12 4)   8  Encounter for routine gynecological examination (V72 31) (Z01 419)   9  Encounter to discuss breast reconstruction (V65 49) (Z71 89)   10  Left Peroneal Tendonitis (726 79)   11  Mohs Micrographic Surgery Face   12  Posterior tibial tendinitis (726 72) (M76 829)   13  Right Breast Reconstruction With Latissimus Dorsi Flap   14  Skin lesion (709 9) (L98 9)   15  Swelling of extremity, right (729 81) (M79 89)   16  Tarsal tunnel syndrome of left side (355 5) (G57 52)   17  Upper extremity pain (729 5) (M79 603)   18  Use of tamoxifen (Nolvadex) (V07 51) (Z79 810)    Current Meds   1  Acidophilus TABS; Therapy: (Recorded:77Vvr0400) to Recorded   2  ALPRAZolam 0 25 MG Oral Tablet; prn Recorded   3   Amoxicillin-Pot Clavulanate 500-125 MG Oral Tablet; TAKE 1 TABLET 3 TIMES DAILY   UNTIL GONE;   Therapy: 29DKO0407 to (Evaluate:06Jan2017)  Requested for: 97KZX6465; Last   Rx:36Ygm0363 Ordered   4  Calcium Citrate + Oral Tablet; Therapy: (Recorded:05Nov2015) to Recorded   5  Chromium Xtra 400 MCG Oral Tablet; Therapy: (0472 51 11 42) to Recorded   6  Ibuprofen TABS; Therapy: (Recorded:11Nov2015) to Recorded   7  Multivitamin TABS; Therapy: (0472 51 11 42) to Recorded   8  Tamoxifen Citrate 20 MG Oral Tablet; Take 1 tablet daily; Therapy: 16FHS9067 to (Elroy Conde)  Requested for: 57BYU2651; Last   Rx:05Jxu4959 Ordered   9  Vitamin C 500 MG Oral Tablet; Therapy: (Recorded:24Apr2015) to Recorded   10  Zinc TABS; Therapy: (Recorded:24Apr2015) to Recorded    Allergies    1  Codeine Derivatives   2   Dilaudid SOLN    Signatures   Electronically signed by : Pacheco Mercedes LPN; Jan 25 9799  7:72RO EST                       (Author)

## 2018-01-17 NOTE — PROGRESS NOTES
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 , as well as a history of radiation treatment post lumpectomy  She developed bilateral breast cancer  Both of which are ER/ME positive, HER-2 negative disease  She has stage IA on the right, as well as stage IIA on the left with one positive lymph node  She underwent bilateral mastectomy with reconstruction resulting in the FRANKY  Her left-sided breast cancer has Oncotype DX score of only 7  Therefore, adjuvant chemotherapy was not indicated  He had postmastectomy radiation therapy to the left chest wall  Currently, she is on adjuvant hormonal therapy with tamoxifen with no side effect  She has no evidence of recurrent disease, based on her symptomatology and physical examination  I recommended her to continue with tamoxifen 20 mg once a day  Regarding her left foot surgery, I recommended her to hold tamoxifen 3 days prior and 10 days after the surgery, to prevent postoperative DVT  I will see her again in 6 months for routine followup  She is in agreement with my recommendations  Chief Complaint  Chief Complaint: Chief Complaint:   The patient presents to the office today with 1  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   2  Stage IA right breast cancer, grade 1, ER/ME positive, HER-2 negative disease  Diagnosed in June 2014    3  Stage IIA left breast cancer, grade 2, ER/ME positive, HER-2 negative disease with one positive lymph node  Oncotype DX recurrence score 7  Diagnosed in June 2014  4  BRCA mutation negative  Chief Complaint Free Text Note Form: 1  Stage IA right breast cancer, grade 1, ER/OK positive, HER-2 negative disease  2  Stage IIA left breast cancer, grade 2, ER/OK positive, HER-2 negative disease with one positive lymph node  3  BRCA mutation negative  History of Present Illness  HPI: A 54year old premenopausal woman, who has 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, 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  She underwent lumpectomy followed by radiation  She did not have any hormonal treatment  In 2013, she developed nipple discharge in the left breast  She went to [facility], where she underwent stereotactic biopsy, which showed precancerous lesions  She underwent lumpectomy, which showed no evidence of malignancy  In March 2014, she appreciated the lump in the left breast  Subsequently, she was found to have abnormality of bilateral breast  She underwent bilateral mastectomy in June 6, 2014  Right mastectomy specimen showed up to 7 mm of invasive ductal carcinoma grade 1 ER OK positive, HER-2 negative disease  7  Tate were all negative for the metastatic disease in the right axilla  Left mastectomy specimen showed multifocal invasive ductal carcinoma with tumor measuring up to 15 mm, grade 2  This was ER/OK positive, HER-2 negative disease  One sentinel was positive for metastatic disease  There was some evidence of focal extranodal extension  She had immediate reconstruction with tissue expander  She presents today to discuss adjuvant treatment options  Her left breast tumor was sent for the Oncotype DX testing which came back recurrence score of 7, which is low risk disease  Previously, she was tested for BRCA gene mutation in 2013, which was negative  She does not know the family history, since she was adopted  Currently, she has no complaints except for some chest tightness  She has no respiratory symptoms  Her weight is stable  She has no bone pain  Her performance status is normal  She is to have 2 surgical drain in each axilla  Current Therapy: Adjuvant hormonal therapy with tamoxifen 20 mg once a day since July 2014  Disease Status: FRANKY status post bilateral mastectomy  Interval History: A 52year old premenopausal woman, who has 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  She underwent lumpectomy followed by radiation therapy  In June 2014, she was diagnosed with bilateral invasive breast cancer, stage IA, on the right and stage IIA on the left, both of which ER/NY positive, HER-2 negative disease  Her Oncotype DX score on the left breast cancer with only 7  Therefore, adjuvant chemotherapy was not indicated  She underwent bilateral mastectomy with reconstruction  She completed postmastectomy radiation therapy to the left chest wall  She is currently on adjuvant hormonal therapy with tamoxifen  She came in today for followup  She continued to have regular menstrual cycle  She has no hot flashes  She is going to have left foot surgery in fall of 2016  She has no respiratory symptoms  Her weight is stable  She has no complaint of pain  Her performance status is normal       Review of Systems  Complete-Female:   Constitutional: No fever, no chills, feels well, no tiredness, no recent weight gain or weight loss  Eyes: No complaints of eye pain, no red eyes, no eyesight problems, no discharge, no dry eyes, no itching of eyes  ENT: no complaints of earache, no loss of hearing, no nose bleeds, no nasal discharge, no sore throat, no hoarseness  Cardiovascular: No complaints of slow heart rate, no fast heart rate, no chest pain, no palpitations, no leg claudication, no lower extremity edema  Respiratory: No complaints of shortness of breath, no wheezing, no cough, no SOB on exertion, no orthopnea, no PND     Gastrointestinal: No complaints of abdominal pain, no constipation, no nausea or vomiting, no diarrhea, no bloody stools  Genitourinary: No complaints of dysuria, no incontinence, no pelvic pain, no dysmenorrhea, no vaginal discharge or bleeding  Musculoskeletal: No complaints of arthralgias, no myalgias, no joint swelling or stiffness, no limb pain or swelling  Integumentary: No complaints of skin rash or lesions, no itching, no skin wounds, no breast pain or lump  Neurological: No complaints of headache, no confusion, no convulsions, no numbness, no dizziness or fainting, no tingling, no limb weakness, no difficulty walking  Psychiatric: Not suicidal, no sleep disturbance, no anxiety or depression, no change in personality, no emotional problems  Endocrine: No complaints of proptosis, no hot flashes, no muscle weakness, no deepening of the voice, no feelings of weakness  Hematologic/Lymphatic: No complaints of swollen glands, no swollen glands in the neck, does not bleed easily, does not bruise easily  ROS Reviewed:   ROS reviewed  Active Problems    1  Aftercare involving the use of plastic surgery (V51 8) (Z09)   2  Ankle pain, unspecified laterality (719 47) (M25 579)   3  Ankle Sprain (845 00)   4  Basal cell carcinoma of face (173 31) (C44 310)   5  Breast cancer (174 9) (C50 919)   6  Breast Reconstruction With Tissue Expander Left Breast   7  Encounter for gynecological examination with Papanicolaou smear of cervix   (V72 31,V76 2) (Z01 419,Z12 4)   8  Encounter for routine gynecological examination (V72 31) (Z01 419)   9  Encounter for screening mammogram for malignant neoplasm of breast (V76 12)   (Z12 31)   10  Encounter to discuss breast reconstruction (V65 49) (Z71 89)   11  History of breast cancer (V10 3) (Z85 3)   12  Left Peroneal Tendonitis (726 79)   13  Mohs Micrographic Surgery Face   14  Posterior tibial tendinitis (726 72) (X82 208)   15  Right Breast Reconstruction With Latissimus Dorsi Flap   16  Skin lesion (709 9) (L98 9)   17   Swelling of extremity, right (729 81) (M79 89)   18  Tarsal tunnel syndrome of left side (355 5) (G57 52)   19  Upper extremity pain (729 5) (M79 603)    Past Medical History    1  History of Abnormal findings on diagnostic imaging of breast (793 89) (R92 8)   2  History of Age At First Period 10 Years Old (Menarche)   3  History of Asthma (493 90) (J45 909)   4  History of Breast Cancer (V10 3)   5  History of Difficulty Falling Asleep   6  History of allergy (V15 09) (Z88 9)   7  History of headache (V13 89) (Z87 898)   8  History of Lobular carcinoma in situ of breast (233 0) (D05 00)   9  History of Mammogram Inconclusive Due To Dense Breasts (793 82)   10  History of Never Pregnant   11  History of Nipple Discharge (611 79)  Past Medical History Reviewed: The past medical history was reviewed and updated today  Surgical History    1  History of Biopsy Breast Percutaneous Needle Core   2  Breast Reconstruction With Tissue Expander Left Breast   3  History of Breast Surgery   4  History of Breast Surgery Excision Of Breast Single Lesion   5  History of Breast Surgery Mastectomy   6  History of Bx Breast Percutan Needle Core Use Imag Guide (Stereotactic)   7  History of Bx Breast Percutan Needle Core Use Imag Guide (Stereotactic)   8  History of Knee Surgery Right   9  Mohs Micrographic Surgery Face   10  History of Right Breast Lumpectomy   11  Right Breast Reconstruction With Latissimus Dorsi Flap   12  History of Perkins Lymph Node Biopsy   13  History of Perkins Lymph Node Biopsy  Surgical History Reviewed: The surgical history was reviewed and updated today  Family History    1  No pertinent family history    2  No pertinent family history    3  Family history of Adopted  Family History Reviewed: The family history was reviewed and updated today  Social History    · Alcohol Use (History)   · Never A Smoker  Social History Reviewed: The social history was reviewed and updated today   The social history was reviewed and is unchanged  Current Meds   1  Acidophilus TABS; Therapy: (Recorded:24Apr2015) to Recorded   2  ALPRAZolam 0 25 MG Oral Tablet; prn Recorded   3  Calcium Citrate + Oral Tablet; Therapy: (Recorded:05Nov2015) to Recorded   4  Chromium Xtra 400 MCG Oral Tablet; Therapy: (Recorded:05Nov2015) to Recorded   5  Ibuprofen TABS; Therapy: (Recorded:11Nov2015) to Recorded   6  Multivitamin TABS; Therapy: ([phone]) to Recorded   7  Tamoxifen Citrate 20 MG Oral Tablet; Take 1 tablet daily; Therapy: [id number] to (Félix Nori Marina)  Requested for: 44Cni9282 Ordered   8  Vitamin C 500 MG Oral Tablet; Therapy: (Vaishnavi Lewis) to Recorded   9  Zinc TABS; Therapy: (Recorded:24Apr2015) to Recorded  Medication List Reviewed: The medication list was reviewed and updated today  Allergies    1  Codeine Derivatives   2  Dilaudid SOLN    Vitals  Vital Signs [Data Includes: Current Encounter]    Recorded: 27WUB9138 03:36PM   Temperature 98 2 F   Heart Rate 68   Respiration 16   Systolic 716   Diastolic 70   Height 5 ft 6 in   Weight 162 lb    BMI Calculated 26 15   BSA Calculated 1 84   O2 Saturation 98     Physical Exam    Constitutional   General appearance: No acute distress, well appearing and well nourished  Eyes   Conjunctiva and lids: No swelling, erythema or discharge  Pupils and irises: Equal, round and reactive to light  Ears, Nose, Mouth, and Throat   External inspection of ears and nose: Normal     Otoscopic examination: Tympanic membranes translucent with normal light reflex  Canals patent without erythema  Nasal mucosa, septum, and turbinates: Normal without edema or erythema  Oropharynx: Normal with no erythema, edema, exudate or lesions  Pulmonary   Respiratory effort: No increased work of breathing or signs of respiratory distress  Auscultation of lungs: Clear to auscultation  Cardiovascular   Palpation of heart: Normal PMI, no thrills  Auscultation of heart: Normal rate and rhythm, normal S1 and S2, without murmurs  Examination of extremities for edema and/or varicosities: Normal     Carotid pulses: Normal     Abdomen   Abdomen: Non-tender, no masses  Liver and spleen: No hepatomegaly or splenomegaly  Lymphatic   Palpation of lymph nodes in neck: No lymphadenopathy  Musculoskeletal   Gait and station: Normal     Digits and nails: Normal without clubbing or cyanosis  Inspection/palpation of joints, bones, and muscles: Normal     Skin   Skin and subcutaneous tissue: Normal without rashes or lesions  Neurologic   Cranial nerves: Cranial nerves 2-12 intact  Reflexes: 2+ and symmetric  Sensation: No sensory loss  Psychiatric   Orientation to person, place, and time: Normal     Mood and affect: Normal     Additional Exam:  Breast exam; status post bilateral mastectomy with reconstruction  No palpable abnormality in either reconstructed breast or chest wall  ECOG 0       Results/Data  Results Form:   Results   Pathology Right mastectomy specimen showed a 7 mm of invasive ductal carcinoma, grade 1, ER/NV positive, HER-2 negative disease  7  [place] lymph nodes were all negative for metastatic disease  Stage IA(pT1b,pN0,M0)  Left mastectomy specimen showed up to 15 mm of invasive ductal carcinoma, multifocal, grade 2  ER/NV positive, HER-2 negative disease  One sentinel lymph node was positive for metastatic disease with focal extranodal extension  Stage IIA(pT1c, pN1a, M0)  Radiology Chest x-ray was negative for pulmonary disease  Lab Results Recent CBC is within normal limits  Future Appointments    Date/Time Provider Specialty Site   05/11/2016 01:00 PM INDIRA Reeder   [facility]urgical [facility]   05/06/2016 01:30 PM INDIRA Fernandez  [facility] PLASTIC/RECON SURGERY     Signatures   Electronically signed by : INDIRA Dolan ; Jan 20 2016  4:05PM EST (Author)

## 2018-01-23 VITALS
DIASTOLIC BLOOD PRESSURE: 70 MMHG | TEMPERATURE: 97.7 F | BODY MASS INDEX: 26.36 KG/M2 | RESPIRATION RATE: 16 BRPM | WEIGHT: 164 LBS | HEIGHT: 66 IN | SYSTOLIC BLOOD PRESSURE: 124 MMHG | HEART RATE: 91 BPM | OXYGEN SATURATION: 97 %

## 2018-01-24 ENCOUNTER — OFFICE VISIT (OUTPATIENT)
Dept: SURGICAL ONCOLOGY | Facility: CLINIC | Age: 53
End: 2018-01-24
Payer: COMMERCIAL

## 2018-01-24 VITALS
HEIGHT: 66 IN | SYSTOLIC BLOOD PRESSURE: 142 MMHG | RESPIRATION RATE: 15 BRPM | HEART RATE: 84 BPM | TEMPERATURE: 98.4 F | WEIGHT: 165 LBS | DIASTOLIC BLOOD PRESSURE: 90 MMHG | BODY MASS INDEX: 26.52 KG/M2

## 2018-01-24 DIAGNOSIS — Z17.0 BILATERAL MALIGNANT NEOPLASM OF BREAST IN FEMALE, ESTROGEN RECEPTOR POSITIVE, UNSPECIFIED SITE OF BREAST (HCC): Primary | ICD-10-CM

## 2018-01-24 DIAGNOSIS — C50.911 BILATERAL MALIGNANT NEOPLASM OF BREAST IN FEMALE, ESTROGEN RECEPTOR POSITIVE, UNSPECIFIED SITE OF BREAST (HCC): Primary | ICD-10-CM

## 2018-01-24 DIAGNOSIS — C50.912 BILATERAL MALIGNANT NEOPLASM OF BREAST IN FEMALE, ESTROGEN RECEPTOR POSITIVE, UNSPECIFIED SITE OF BREAST (HCC): Primary | ICD-10-CM

## 2018-01-24 PROCEDURE — 99214 OFFICE O/P EST MOD 30 MIN: CPT | Performed by: SURGERY

## 2018-01-24 RX ORDER — IBUPROFEN 800 MG/1
TABLET ORAL
Status: ON HOLD | COMMUNITY
End: 2019-03-07

## 2018-01-24 RX ORDER — TAMOXIFEN CITRATE 20 MG/1
1 TABLET ORAL DAILY
COMMUNITY
Start: 2014-07-09 | End: 2018-02-02 | Stop reason: SDUPTHER

## 2018-01-24 RX ORDER — MULTIVIT-MIN/IRON/FOLIC ACID/K 18-600-40
CAPSULE ORAL DAILY
COMMUNITY

## 2018-01-24 RX ORDER — MULTIVIT-MIN/IRON/FOLIC ACID/K 18-600-40
500 CAPSULE ORAL DAILY
COMMUNITY

## 2018-01-24 RX ORDER — ALPRAZOLAM 0.25 MG/1
0.25 TABLET ORAL
COMMUNITY
End: 2018-07-23 | Stop reason: SDUPTHER

## 2018-01-24 RX ORDER — L. ACIDOPHILUS/PECTIN, CITRUS 25MM-100MG
TABLET ORAL
COMMUNITY
End: 2018-07-23 | Stop reason: SDUPTHER

## 2018-01-24 RX ORDER — ELECTROLYTES/DEXTROSE
SOLUTION, ORAL ORAL
COMMUNITY
End: 2018-07-23 | Stop reason: SDUPTHER

## 2018-01-24 RX ORDER — ZINC GLUCONATE 50 MG
TABLET ORAL DAILY
COMMUNITY

## 2018-01-24 RX ORDER — CALCIUM CITRATE 1040MG
TABLET ORAL
COMMUNITY
End: 2019-06-20

## 2018-01-24 NOTE — PROGRESS NOTES
Surgical Oncology Follow Up       [address]  [facility] [place]  [address] [facility] [address]    Kedar Naranjo  1965  [id number]  [address]  [facility] [place]  [address] [address]    No chief complaint on file  Assessment/Plan     Advance Care Planning/Advance Directives:  Did not discuss n/a with the patient  Breast cancer ([address] )    5/2008 Surgery     Right breast stereotactic breast biopsy         2008 Surgery     Right breast lumpectomy, SLNB         5/2013 Surgery     Left breast stereotactic biopsy         7/24/2013 Surgery     Right breast excisional biopsy         5/2/2014 Surgery     Left breast biopsy X 3 sites         5/2/2014 Surgery     Right breast US guided  biopsy         6/6/2014 Surgery     Left breast mastectomy, SLNB  Right breast mastectomy, SLNB  Bilateral breast reconstruction with expanders  7/3/2014 Surgery     Revision right breast          9/17/2014 Surgery     latisimus dorsi flap, skin graft, replaced expander         10/27/2014 Surgery     Right breast revision         11/2014 -  Radiation     WBRT         9/7/2017 Surgery     Left breast LDF reconstruction with expander         9/8/2017 Initial Diagnosis     Breast cancer [facility])        Surgery             Surgery             Radiation     Right post mastectomy RT Left brast            History of Present Illness:   53-year-old female statu post bilateral mastectomy Melonie Wells for bilateral carcinoma  She had a recent expander placed with Dr Yumiko Diamond and is currently undergoing expansions   -Interval History:none    Review of Systems:  Review of Systems   Constitutional: Negative  Negative for appetite change and fever  HENT: Negative  Eyes: Negative  Respiratory: Negative  Negative for shortness of breath  Cardiovascular: Negative  Gastrointestinal: Negative  Endocrine: Negative      Genitourinary: Negative  Musculoskeletal: Negative  Negative for arthralgias and myalgias  Skin: Negative  Allergic/Immunologic: Negative  Neurological: Negative  Hematological: Negative  Negative for adenopathy  Does not bruise/bleed easily  Psychiatric/Behavioral: Negative  Patient Active Problem List   Diagnosis    Surgical follow-up care    Breast cancer [facility])    Breast implant capsular contracture     Past Medical History:   Diagnosis Date    Arthritis     Knees and feet    Asthma     [facility]     Cancer ([facility] )     Bilateral breast cancer history  Had two bouts of radiation    Pain in left foot     Pneumonia 2009    x1    PONV (postoperative nausea and vomiting)     Skin cancer      Past Surgical History:   Procedure Laterality Date    BREAST SURGERY Bilateral 2008, 2013, 2014    Breast reconstruction with expanders  Total of 7 surgeries   KNEE ARTHROSCOPY Right     MASTECTOMY Bilateral     Lymph nodes removed bilaterally  States she may have IV's in left side      MN BREAST RECONSTRUC W LAT DORSI FLAP Left 9/7/2017    Procedure: BREAST RECONSTRUCTION; LATISSIMUS DORSI FLAP; TISSUE EXPANDER;  Surgeon: Marlene Carr MD;  Location: [facility];  Service: Plastics    [address]  W/SESMDC W/RESCJ PROX PHAL  9/9/2016    Procedure: [place] Ohs MODIFIED ;  Surgeon: Karthik Gill DPM;  Location[facility];  Service: Podiatry    MN FUSION FOOT BONES,MIDTARSAL,MULTI Left 9/9/2016    Procedure: ARTHRODESIS FIRST [address] [address] ;  Surgeon: Karthik Gill DPM;  [facility];  Service: Podiatry    MN INSERT BREAST PROS IMMED AFTER EXCIS Left 9/7/2017    Procedure: TISSUE EXPANDER;  Surgeon: Malrene Carr MD;  [facility]: [facility];  Service: Plastics    MN OSTEOTOMY HEEL BONE Left 9/9/2016    Procedure: OSTEOTOMY CALCANEUS WITH APPLICATION AND ACTIVATION OF BONE STIMULATOR ;  Surgeon: Karthik Gill DPM;  [facility];  Service: Podiatry    WISDOM TOOTH EXTRACTION       Family History   Problem Relation Age of Onset    Adopted: Yes    Family history unknown: Yes     Social History     Social History    Marital status: Single     Spouse name: N/A    Number of children: N/A    Years of education: N/A     Occupational History    Not on file  Social History Main Topics    Smoking status: Never Smoker    Smokeless tobacco: Never Used    Alcohol use Yes      Comment: 4 weekly    Drug use: No    Sexual activity: Yes     Partners: Male     Other Topics Concern    Not on file     Social History Narrative    No narrative on file       Current Outpatient Prescriptions:     ALPRAZolam (XANAX) 0 25 mg tablet, Take 0 25 mg by mouth  , Disp: , Rfl:     Ascorbic Acid (VITAMIN C) 500 MG CAPS, Take 500 mg by mouth  , Disp: , Rfl:     Calcium Citrate 1040 MG TABS, Take by mouth, Disp: , Rfl:     Cholecalciferol (VITAMIN D) 2000 units CAPS, Take by mouth, Disp: , Rfl:     ibuprofen (MOTRIN) 800 mg tablet, Take by mouth, Disp: , Rfl:     Multiple Vitamin (MULTIVITAMIN) capsule, Take 1 capsule by mouth daily  , Disp: , Rfl:     tamoxifen (NOLVADEX) 20 mg tablet, Take 20 mg by mouth 2 (two) times a day, Disp: , Rfl:     zinc gluconate 50 mg tablet, Take by mouth, Disp: , Rfl:     acetaminophen (TYLENOL) 500 mg tablet, Take 500 mg by mouth every 6 (six) hours as needed for mild pain , Disp: , Rfl:     ALPRAZolam (XANAX) 0 5 mg tablet, Take 0 5 mg by mouth daily at bedtime as needed for anxiety  , Disp: , Rfl:     calcium-vitamin D (OSCAL) 250-125 MG-UNIT per tablet, Take 2 tablets by mouth daily  , Disp: , Rfl:     Chromium Picolinate 200 MCG CAPS, Take 200 mg by mouth daily  , Disp: , Rfl:     Lactobacillus (ACIDOPHILUS PO), Take by mouth daily  , Disp: , Rfl:     Lactobacillus Acid-Pectin (ACIDOPHILUS/CITRUS PECTIN) TABS, Take by mouth, Disp: , Rfl:     Loratadine (CLARITIN) 10 MG CAPS, Take by mouth, Disp: , Rfl:   Misc Natural Products (CHROMIUM XTRA) 400 MCG TABS, Take by mouth, Disp: , Rfl:     Multiple Vitamins-Minerals (MULTIVITAMIN ADULT) TABS, Take by mouth, Disp: , Rfl:     tamoxifen (NOLVADEX) 20 mg tablet, Take 1 tablet by mouth daily, Disp: , Rfl:   Allergies   Allergen Reactions    Codeine Itching     Severe    Dilaudid [Hydromorphone Hcl] Itching     Severe     Vitals:    01/24/18 1347   BP: 142/90   Pulse: 84   Resp: 15   Temp: 98 4 °F (36 9 °C)       Physical Exam   Constitutional: She is oriented to person, place, and time  She appears well-developed and well-nourished  HENT:   Head: Normocephalic and atraumatic  Cardiovascular: Normal rate  Pulmonary/Chest: Breath sounds normal  Right breast exhibits no mass and no tenderness  Left breast exhibits no mass and no tenderness  She has bilateral mastectomy scars with reconstruction  She recently had a revision on the left side and has some tightness in the superior aspect of the reconstructed breast   There are no chest wall masses or skin nodules  Abdominal: Soft  Neurological: She is alert and oriented to person, place, and time  Psychiatric: She has a normal mood and affect  Results:  Labs:      Imaging  No results found  I reviewed the above laboratory and imaging data  Discussion/Summary:  73-year-old female status post bilateral mastectomy and reconstruction  She had a recent revision with Dr Lon Nelson in September for the left side  There is no evidence of disease based on examination today  We discussed genetic status in general   She had prior BRCA one and two testing which were negative  I will refer her back to our genetic counselor to discuss a potential extended panel  I will see her again in one year sooner should the need arise

## 2018-02-02 ENCOUNTER — TELEPHONE (OUTPATIENT)
Dept: HEMATOLOGY ONCOLOGY | Facility: CLINIC | Age: 53
End: 2018-02-02

## 2018-02-02 ENCOUNTER — OFFICE VISIT (OUTPATIENT)
Dept: PLASTIC SURGERY | Facility: CLINIC | Age: 53
End: 2018-02-02
Payer: COMMERCIAL

## 2018-02-02 VITALS — WEIGHT: 160 LBS | HEIGHT: 66 IN | BODY MASS INDEX: 25.71 KG/M2

## 2018-02-02 DIAGNOSIS — Z42.1 AFTERCARE POSTMASTECTOMY FOR BREAST RECONSTRUCTION: Primary | ICD-10-CM

## 2018-02-02 DIAGNOSIS — C50.919 MALIGNANT NEOPLASM OF BREAST IN FEMALE, ESTROGEN RECEPTOR POSITIVE, UNSPECIFIED LATERALITY, UNSPECIFIED SITE OF BREAST (HCC): Primary | ICD-10-CM

## 2018-02-02 DIAGNOSIS — Z17.0 MALIGNANT NEOPLASM OF BREAST IN FEMALE, ESTROGEN RECEPTOR POSITIVE, UNSPECIFIED LATERALITY, UNSPECIFIED SITE OF BREAST (HCC): Primary | ICD-10-CM

## 2018-02-02 PROCEDURE — 99213 OFFICE O/P EST LOW 20 MIN: CPT | Performed by: SURGERY

## 2018-02-02 RX ORDER — TAMOXIFEN CITRATE 20 MG/1
20 TABLET ORAL DAILY
Qty: 90 TABLET | Refills: 3 | Status: SHIPPED | OUTPATIENT
Start: 2018-02-02 | End: 2019-01-17 | Stop reason: SDUPTHER

## 2018-02-02 NOTE — LETTER
February 2, 2018     Shiloh Evans MD  [address] [facility]  [address] [facility]    Patient: Yajaira Rivero   YOB: 1965   Date of Visit: 2/2/2018       Dear Dr Stanton Memory: Thank you for referring Yajaira Rivero to me for evaluation  Below are my notes for this consultation  If you have questions, please do not hesitate to call me  I look forward to following your patient along with you           Sincerely,        Camilla France MD        CC: No Recipients

## 2018-02-02 NOTE — PROGRESS NOTES
Assessment/Plan:  Please see HPI  Overall, she is doing well  She is interested in additional expansion of the left breast   At today's visit 50 cc of saline was injected into the left breast tissue expander, she now has 300 cc in a 500 cc expander with a 13 centimeter base diameter  Given the prior radiation, we will proceed a bit slowly, she will be seen again in 3-4 weeks for additional expansion  There are no diagnoses linked to this encounter  Subjective:  Overall, she is doing well and presents today for additional expansion  Patient ID: Rich Mckeon is a 50 y o  female  HPI Abelardo Roach had undergone latissimus dorsi flap reconstruction of the left breast, necessitated by prior radiation and severe capsular contracture  The latissimus dorsi flap and expander were placed on the September 7, 2017  She presents for expansion  The following portions of the patient's history were reviewed and updated as appropriate: allergies, current medications, past family history, past medical history, past social history, past surgical history and problem list     Review of Systems   Constitutional: Negative for chills and fever  HENT: Negative for hearing loss  Eyes: Negative for discharge and visual disturbance  Respiratory: Negative for chest tightness and shortness of breath  Cardiovascular: Negative for chest pain and leg swelling  Genitourinary: Negative for dysuria  Musculoskeletal: Negative for gait problem  Skin: Negative for rash  Allergic/Immunologic: Negative for immunocompromised state  Neurological: Negative for seizures and headaches  Hematological: Does not bruise/bleed easily  Psychiatric/Behavioral: Negative for dysphoric mood  The patient is not nervous/anxious  Objective:     Physical Exam   Constitutional: She is oriented to person, place, and time  She appears well-developed and well-nourished  HENT:   Head: Normocephalic     Eyes: Pupils are equal, round, and reactive to light  Neck: Normal range of motion  Pulmonary/Chest: Effort normal    Musculoskeletal: Normal range of motion  Neurological: She is alert and oriented to person, place, and time  Skin: Skin is warm and dry  Breast examination reveals the presence of a well-healed right latissimus dorsi flap and implant, the left breast demonstrates a well-healed latissimus dorsi flap and tissue expander    There is evidence of prior radiation, there is no evidence of unusual inflammation or infection

## 2018-02-02 NOTE — LETTER
February 2, 2018     Antoinette Muhammad MD  [address] [facility]  [address] [facility]    Patient: Deneen Velasquez   YOB: 1965   Date of Visit: 2/2/2018       Dear Dr Astrid Chappell: Thank you for referring Deneen Velasquez to me for evaluation  Below are my notes for this consultation  If you have questions, please do not hesitate to call me  I look forward to following your patient along with you           Sincerely,        Suni Uriarte MD        CC: No Recipients

## 2018-02-26 NOTE — CONSULTS
I had the pleasure of evaluating your patient, DEEDEE CROOKS  My full evaluation follows:      Chief Complaint  Follow-up visit      History of Present Illness  Dallin Hanna presents today for a follow-up visit, she is status post left breast reconstruction with a latissimus dorsi myocutaneous flap and tissue expander from September 7, 2017  Review of Systems    Constitutional: no fever and no chills  Eyes: no eyesight problems  ENT: no hearing loss  Cardiovascular: no chest pain and no lower extremity edema  Respiratory: no shortness of breath  Gastrointestinal: no nausea, no vomiting, no constipation, no diarrhea and no blood in stools  Genitourinary: no dysuria and no incontinence  Musculoskeletal: no limb swelling  Integumentary: no rashes and no skin wound  Neurological: no headache, no confusion and no convulsions  Endocrine: no proptosis and no deepening of the voice  Hematologic/Lymphatic: no tendency for easy bleeding and no tendency for easy bruising  Active Problems    1  Aftercare involving the use of plastic surgery (V51 8) (Z09)   2  Ankle pain, unspecified laterality   3  Ankle Sprain (845 00)   4  Basal cell carcinoma of face (173 31) (C44 310)   5  Breast cancer (174 9) (C50 919)   6  Breast Reconstruction With Tissue Expander Left Breast   7  Breast Surgery Reconstruction With Latissimus Dorsi Flap   8  Capsular contracture of breast implant, initial encounter (611 83) (T85 44XA)   9  Encounter for gynecological examination (general) (routine) without abnormal findings   (V72 31) (Z01 419)   10  Encounter for routine gynecological examination (V72 31) (Z01 419)   11  Encounter for special screening examination for neoplasm of breast (V76 10) (Z12 31)   12  Encounter to discuss breast reconstruction (V65 49) (Z71 89)   13  Left Peroneal Tendonitis (956 79)   14  Mohs Micrographic Surgery Face   15  Posterior tibial tendinitis (606 72) (P30 527)   16   Right Breast Reconstruction With Latissimus Dorsi Flap   17  Skin lesion (709 9) (L98 9)   18  Swelling of extremity, right (729 81) (M79 89)   19  Tarsal tunnel syndrome of left side (355 5) (G57 52)   20  Upper extremity pain (729 5) (M79 603)   21  Use of tamoxifen (Nolvadex) (V07 51) (Z79 810)    Past Medical History    · History of Abnormal findings on diagnostic imaging of breast (793 89) (R92 8)   · History of Age At First Period 10 Years Old (Menarche)   · History of Asthma (493 90) (J45 909)   · History of Breast Cancer (V10 3)   · History of Difficulty Falling Asleep   · History of Gluteal abscess (682 5) (L02 31)   · History of allergy (V15 09) (Z88 9)   · History of headache (V13 89) (Z87 898)   · History of Lobular carcinoma in situ of breast (233 0) (D05 00)   · History of Mammogram Inconclusive Due To Dense Breasts (793 82)   · History of Never Pregnant   · History of Nipple Discharge (611 79)    Surgical History    · History of Biopsy Breast Percutaneous Needle Core   · Breast Reconstruction With Tissue Expander Left Breast   · History of Breast Surgery   · History of Breast Surgery Excision Of Breast Single Lesion   · History of Breast Surgery Mastectomy   · History of Bunion Correction By Simonelyle Solis Procedure   · History of Bx Breast Percutan Needle Core Use Imag Guide (Stereotactic)   · History of Bx Breast Percutan Needle Core Use Imag Guide (Stereotactic)   · History of Foot Arthrodesis Pantalar   · History of Knee Surgery Right   · Mohs Micrographic Surgery Face   · History of Osteotomy Of The Calcaneus   · History of Right Breast Lumpectomy   · Right Breast Reconstruction With Latissimus Dorsi Flap   · History of [place] Lymph Node Biopsy   · History of [place] Lymph Node Biopsy    The surgical history was reviewed and updated today  Family History    · No pertinent family history    · No pertinent family history    · Family history of Adopted    The family history was reviewed and updated today         Social History    · Alcohol Use (History)   · Never A Smoker  The social history was reviewed and updated today  The social history was reviewed and is unchanged  Current Meds   1  Acidophilus TABS; Therapy: (Recorded:24Apr2015) to Recorded   2  ALPRAZolam 0 25 MG Oral Tablet; prn Recorded   3  Calcium Citrate + Oral Tablet; Therapy: (Recorded:05Nov2015) to Recorded   4  Chromium Xtra 400 MCG Oral Tablet; Therapy: (Recorded:05Nov2015) to Recorded   5  Ibuprofen TABS; Therapy: (Recorded:11Nov2015) to Recorded   6  Multivitamin TABS; Therapy: ([phone]) to Recorded   7  Tamoxifen Citrate 20 MG Oral Tablet; Take 1 tablet daily; Therapy: 48YDO8461 to (Evaluate:06Dkl7822)  Requested for: 76XPD5433; Last   Rx:03Jan2018 Ordered   8  Vitamin C 500 MG Oral Tablet; Therapy: ([phone]) to Recorded   9  Vitamin D 2000 UNIT Oral Tablet; Therapy: (Recorded:14Jun2017) to Recorded   10  Zinc TABS; Therapy: (Recorded:24Apr2015) to Recorded    The medication list was reviewed and updated today  Allergies    1  Codeine Derivatives   2  Dilaudid SOLN    Physical Exam    Constitutional   General appearance: No acute distress, well appearing and well nourished  Eyes   Conjunctiva and lids: No swelling, erythema or discharge  Pupils and irises: Equal, round and reactive to light  Ears, Nose, Mouth, and Throat   External inspection of ears and nose: Normal     Pulmonary   Respiratory effort: No increased work of breathing or signs of respiratory distress  Abdomen   Abdomen: Non-tender, no masses  Musculoskeletal   Gait and station: Normal     Skin   Skin and subcutaneous tissue: Normal without rashes or lesions  Psychiatric   Orientation to person, place, and time: Normal     Mood and affect: Normal        Assessment    1  Breast Surgery Reconstruction With Latissimus Dorsi Flap    Discussion/Summary    Please see HPI   She is interested in proceeding with tissue expansion and 50 cc was injected into the left breast tissue expander  She now has 250 cc in a 500 cc expander with a 13 cm base diameter  Given her prior radiation, we will proceed a bit slowly, and she will be seen again in 1 month for additional expansion  Thank you very much for allowing me to participate in the care of this patient  If you have any questions, please do not hesitate to contact me        Signatures   Electronically signed by : INDIRA Benson ; Jan 12 2018  3:30PM EST                       (Author)    Electronically signed by : INDIRA Benson ; Jan 12 2018  3:31PM EST                       (Author)

## 2018-04-06 ENCOUNTER — OFFICE VISIT (OUTPATIENT)
Dept: PLASTIC SURGERY | Facility: CLINIC | Age: 53
End: 2018-04-06
Payer: COMMERCIAL

## 2018-04-06 DIAGNOSIS — Z42.1 AFTERCARE POSTMASTECTOMY FOR BREAST RECONSTRUCTION: Primary | ICD-10-CM

## 2018-04-06 PROCEDURE — 99213 OFFICE O/P EST LOW 20 MIN: CPT | Performed by: SURGERY

## 2018-04-06 NOTE — PROGRESS NOTES
Assessment/Plan:  Please see HPI  Overall, she is doing well, she is interested in additional expansion  The flap looks great  An additional 60 cc was injected into the left breast tissue expander, she now has 360 cc in a 500 cc expander with a 13 cm base diameter  Given the prior radiation, we will explore band her slowly, and she will be seen again in 3-4 weeks     Diagnoses and all orders for this visit:    Aftercare postmastectomy for breast reconstruction          Subjective: Follow-up visit     Patient ID: Arlen Sarmiento is a 50 y o  female  HPI status post latissimus Tanner flap and tissue expander for left breast reconstruction    Review of Systems   Constitutional: Negative for chills and fever  HENT: Negative for hearing loss  Eyes: Negative for discharge and visual disturbance  Respiratory: Negative for chest tightness and shortness of breath  Cardiovascular: Negative for chest pain and leg swelling  Gastrointestinal: Negative for constipation and diarrhea  Endocrine: Negative for cold intolerance and heat intolerance  Genitourinary: Negative for dysuria  Musculoskeletal: Negative for gait problem  Skin: Negative for rash  Allergic/Immunologic: Negative for immunocompromised state  Neurological: Negative for seizures and headaches  Hematological: Does not bruise/bleed easily  Psychiatric/Behavioral: Negative for dysphoric mood  The patient is not nervous/anxious  Objective:     Physical Exam   Constitutional: She is oriented to person, place, and time  She appears well-developed and well-nourished  HENT:   Head: Normocephalic  Eyes: Pupils are equal, round, and reactive to light  Neck: Normal range of motion  Neck supple  Cardiovascular: Normal rate  Pulmonary/Chest: Effort normal and breath sounds normal    Musculoskeletal: Normal range of motion  Neurological: She is alert and oriented to person, place, and time  Skin: Skin is warm and dry     Left breast tissue expander in good position, well-healed flap, donor site benign

## 2018-05-04 ENCOUNTER — OFFICE VISIT (OUTPATIENT)
Dept: PLASTIC SURGERY | Facility: CLINIC | Age: 53
End: 2018-05-04

## 2018-05-04 VITALS — WEIGHT: 152 LBS | BODY MASS INDEX: 24.43 KG/M2 | HEIGHT: 66 IN

## 2018-05-04 DIAGNOSIS — Z42.1 AFTERCARE POSTMASTECTOMY FOR BREAST RECONSTRUCTION: Primary | ICD-10-CM

## 2018-05-04 PROCEDURE — 99213 OFFICE O/P EST LOW 20 MIN: CPT | Performed by: SURGERY

## 2018-05-04 NOTE — PROGRESS NOTES
Assessment/Plan:  Please see HPI  Overall, she is doing well, and interested in additional expansion  At today's visit an additional 40 cc was injected into the left breast tissue expander  She now has 400 cc in a 500 cc expander with a 13 centimeter base diameter  Given her prior radiation, we will proceed slowly, and she will be seen again in approximately 4 weeks  There are no diagnoses linked to this encounter  Subjective: Follow-up visit for tissue expansion left breast     Patient ID: Shade [place]ows is a 50 y o  female  HPI status post latissimus dorsi flap and tissue expander reconstruction of left breast from September of 2017  The following portions of the patient's history were reviewed and updated as appropriate: allergies, current medications, past family history, past medical history, past social history, past surgical history and problem list     Review of Systems   Constitutional: Negative for chills and fever  HENT: Negative for hearing loss  Eyes: Negative for discharge and visual disturbance  Respiratory: Negative for chest tightness and shortness of breath  Cardiovascular: Negative for chest pain and leg swelling  Genitourinary: Negative for dysuria  Musculoskeletal: Negative for gait problem  Skin: Negative for rash  Allergic/Immunologic: Negative for immunocompromised state  Neurological: Negative for seizures and headaches  Hematological: Does not bruise/bleed easily  Psychiatric/Behavioral: Negative for dysphoric mood  The patient is not nervous/anxious  Objective:      Ht 5' 6" (1 676 m)   Wt 68 9 kg (152 lb)   BMI 24 53 kg/m²          Physical Exam  left breast tissue expander in place, well-healed latissimus dorsi flap, no evidence of unusual inflammation or infection

## 2018-06-15 ENCOUNTER — OFFICE VISIT (OUTPATIENT)
Dept: PLASTIC SURGERY | Facility: CLINIC | Age: 53
End: 2018-06-15
Payer: COMMERCIAL

## 2018-06-15 DIAGNOSIS — Z42.1 AFTERCARE POSTMASTECTOMY FOR BREAST RECONSTRUCTION: Primary | ICD-10-CM

## 2018-06-15 PROCEDURE — 99214 OFFICE O/P EST MOD 30 MIN: CPT | Performed by: SURGERY

## 2018-06-15 NOTE — LETTER
Kaylie 15, 2018     Ty Jerome MD  [address] [facility]  [address] [facility]    Patient: Olu Spaer   YOB: 1965   Date of Visit: 6/15/2018       Dear Dr Yesi Fuentes Recipients: Thank you for referring Olu Spear to me for evaluation  Below are my notes for this consultation  If you have questions, please do not hesitate to call me  I look forward to following your patient along with you  Sincerely,        Michael Morin MD        CC: No Recipients  Michael Morin MD  6/15/2018  2:23 PM  Sign at close encounter  Assessment/Plan:  Please see HPI  Overall, she is doing well, she anticipates a surgical date perhaps in January of 2019  Given this timeframe, we will defer additional expansion for the time being  We will see her again in approximately 2 months  There are no diagnoses linked to this encounter  Subjective: With tissue expander Left breast reconstruction     Patient ID: Olu Spear is a 48 y o  female  HPI she is status post latissimus dorsi flap and tissue expander reconstruction of the left breast from September of 2017, the area had previously been radiated  The following portions of the patient's history were reviewed and updated as appropriate: allergies, current medications, past family history, past medical history, past social history, past surgical history and problem list     Review of Systems   Constitutional: Negative for chills and fever  HENT: Negative for hearing loss  Eyes: Negative for discharge and visual disturbance  Respiratory: Negative for chest tightness and shortness of breath  Cardiovascular: Negative for chest pain and leg swelling  Gastrointestinal: Negative for blood in stool, constipation, diarrhea and nausea  Endocrine: Negative for cold intolerance and heat intolerance  Genitourinary: Negative for dysuria  Musculoskeletal: Negative for gait problem  Skin: Negative for rash     Allergic/Immunologic: Negative for immunocompromised state  Neurological: Negative for seizures and headaches  Hematological: Does not bruise/bleed easily  Psychiatric/Behavioral: Negative for dysphoric mood  The patient is not nervous/anxious  Objective: There were no vitals taken for this visit  Physical Exam   Constitutional: She is oriented to person, place, and time  She appears well-nourished  HENT:   Head: Normocephalic  Eyes: Pupils are equal, round, and reactive to light  Neck: Normal range of motion  Pulmonary/Chest: Effort normal    Abdominal: Soft  Musculoskeletal: Normal range of motion  Neurological: She is alert and oriented to person, place, and time  Skin: Skin is warm and dry     Left breast with well-healed latissimus dorsi flap, tissue expander in place, no evidence of unusual inflammation or infection

## 2018-06-15 NOTE — PROGRESS NOTES
Assessment/Plan:  Please see HPI  Overall, she is doing well, she anticipates a surgical date perhaps in January of 2019  Given this timeframe, we will defer additional expansion for the time being  We will see her again in approximately 2 months  There are no diagnoses linked to this encounter  Subjective: With tissue expander Left breast reconstruction     Patient ID: Veronica Land is a 48 y o  female  HPI she is status post latissimus dorsi flap and tissue expander reconstruction of the left breast from September of 2017, the area had previously been radiated  The following portions of the patient's history were reviewed and updated as appropriate: allergies, current medications, past family history, past medical history, past social history, past surgical history and problem list     Review of Systems   Constitutional: Negative for chills and fever  HENT: Negative for hearing loss  Eyes: Negative for discharge and visual disturbance  Respiratory: Negative for chest tightness and shortness of breath  Cardiovascular: Negative for chest pain and leg swelling  Gastrointestinal: Negative for blood in stool, constipation, diarrhea and nausea  Endocrine: Negative for cold intolerance and heat intolerance  Genitourinary: Negative for dysuria  Musculoskeletal: Negative for gait problem  Skin: Negative for rash  Allergic/Immunologic: Negative for immunocompromised state  Neurological: Negative for seizures and headaches  Hematological: Does not bruise/bleed easily  Psychiatric/Behavioral: Negative for dysphoric mood  The patient is not nervous/anxious  Objective: There were no vitals taken for this visit  Physical Exam   Constitutional: She is oriented to person, place, and time  She appears well-nourished  HENT:   Head: Normocephalic  Eyes: Pupils are equal, round, and reactive to light  Neck: Normal range of motion     Pulmonary/Chest: Effort normal    Abdominal: Soft  Musculoskeletal: Normal range of motion  Neurological: She is alert and oriented to person, place, and time  Skin: Skin is warm and dry     Left breast with well-healed latissimus dorsi flap, tissue expander in place, no evidence of unusual inflammation or infection

## 2018-07-16 ENCOUNTER — EVALUATION (OUTPATIENT)
Dept: PHYSICAL THERAPY | Facility: CLINIC | Age: 53
End: 2018-07-16
Payer: COMMERCIAL

## 2018-07-16 DIAGNOSIS — M79.672 LEFT FOOT PAIN: Primary | ICD-10-CM

## 2018-07-16 DIAGNOSIS — R26.2 DIFFICULTY WALKING: ICD-10-CM

## 2018-07-16 PROCEDURE — G8979 MOBILITY GOAL STATUS: HCPCS | Performed by: PHYSICAL THERAPIST

## 2018-07-16 PROCEDURE — G8978 MOBILITY CURRENT STATUS: HCPCS | Performed by: PHYSICAL THERAPIST

## 2018-07-16 PROCEDURE — 97110 THERAPEUTIC EXERCISES: CPT | Performed by: PHYSICAL THERAPIST

## 2018-07-16 PROCEDURE — 97161 PT EVAL LOW COMPLEX 20 MIN: CPT | Performed by: PHYSICAL THERAPIST

## 2018-07-16 NOTE — PROGRESS NOTES
PT Evaluation     Today's date: 2018  Patient name: Christ Thomas  : 1965  MRN: [id number]  Referring provider: Moi Casiano PT , DPT  Dx:   Encounter Diagnosis     ICD-10-CM    1  Left foot pain M79 672    2  Difficulty walking R26 2                   Assessment  Impairments: abnormal muscle firing, abnormal or restricted ROM, activity intolerance, impaired physical strength, lacks appropriate home exercise program and pain with function    Assessment details: Christ Thomas is a 48 y o  female was evaluated on 2018 under Direct Access for Left foot pain  (primary encounter diagnosis)  Difficulty walking  Christ Thomas has the above listed impairments and will benefit from skilled PT to improve deficits to return to prior level of function  Under direct access, the patient can be treated for 30 days without a prescription from the physician  If you agree to the patient's plan of care, please sign and return  Please do not hesitate to contact me at [phone]  Thank you for allowing me to participate in Christ Pod care       Understanding of Dx/Px/POC: good   Prognosis: good    Goals  Impairment Goals  - Decrease pain <2/10  - Improve ROM to 15 degrees of DF  - Increase strength to 4/5 throughout    Functional Goals  - Return to Prior Level of Function  - Increase Functional Status Measure to: 54  - Patient will be independent with HEP  -Patient will be able to return to ambulate for more than 1 hour with out pain    Plan  Patient would benefit from: skilled PT  Planned therapy interventions: joint mobilization, manual therapy, patient education, postural training, activity modification, abdominal trunk stabilization, body mechanics training, flexibility, functional ROM exercises, graded exercise, home exercise program, neuromuscular re-education, strengthening, stretching, therapeutic activities and therapeutic exercise  Frequency: 2x week  Duration in weeks: 8  Plan of Care beginning date: 2018  Plan of Care expiration date: 9/10/2018  Treatment plan discussed with: patient        Subjective Evaluation    History of Present Illness  Mechanism of injury: Pt notes that overall her left foot has been having increased pain especially with ambulation over 20 minutes  She notes that she is not able to ambulate for more than a few minutes with out her shoes on  She notes that her ankle has also been cracking/snapping more since she was previously in PT  She notes that her orthotics do help  Pain  Current pain ratin  At best pain ratin  At worst pain ratin  Quality: dull ache, sharp, throbbing and pulling    Patient Goals  Patient goals for therapy: decreased pain, increased motion, independence with ADLs/IADLs, increased strength and return to sport/leisure activities          Objective     Observations   Left Ankle/Foot   Positive for edema  Palpation   Left   Tenderness of the posterior tibialis  Tenderness   Left Ankle/Foot   Tenderness in the Achilles insertion, fibula and navicular  Neurological Testing     Sensation     Ankle/Foot   Left Ankle/Foot   Intact: light touch    Right Ankle/Foot   Intact: light touch     Active Range of Motion   Left Ankle/Foot   Dorsiflexion (ke): 10 degrees   Plantar flexion: 50 degrees   Inversion: 10 degrees   Eversion: 5 degrees   Great toe flexion: 0 degrees   Great toe extension: 70 degrees     Right Ankle/Foot   Dorsiflexion (ke): 18 degrees   Plantar flexion: 68 degrees   Inversion: 45 degrees   Eversion: 25 degrees   Great toe flexion: WFL  Great toe extension: WFL    Passive Range of Motion   Left Ankle/Foot    Inversion: 20 degrees   Eversion: 15 degrees     Joint Play   Left Ankle/Foot  Hypermobile in the fibular head, proximal tibiofibular joint, distal tibiofibular joint, talocrural joint and subtalar joint       Strength/Myotome Testing     Left Hip   Planes of Motion   Abduction: 3+    Right Hip   Planes of Motion   Abduction: 3+    Left Ankle/Foot   Dorsiflexion: 3-  Plantar flexion: 3  Inversion: 3-  Eversion: 3-  Great toe flexion: 3-  Great toe extension: 3-    Right Ankle/Foot   Dorsiflexion: 4  Plantar flexion: 4-  Inversion: 4-  Eversion: 4-  Great toe flexion: 4-  Great toe extension: 4    Tests     Right Ankle/Foot   Positive for navicular drop  Ambulation   Weight-Bearing Status   Weight-Bearing Status (Left): full weight bearing   Weight-Bearing Status (Right): full weight-bearing    Assistive device used: none    Ambulation: Level Surfaces   Ambulation without assistive device: independent    Ambulation: Stairs   Ascend stairs: independent  Pattern: reciprocal  Railings: one rail  Descend stairs: independent  Pattern: non-reciprocal  Railings: one rail    Observational Gait   Gait: antalgic, asymmetric and circumduction   Walking speed within functional limits  Increased right stance time and left swing time  Decreased left stance time and right swing time  Right arm swing: within functional limits  Base of support: increased    Quality of Movement During Gait     Pelvis  Anterior pelvic tilt  Pelvis (Left): Positive retracted  Knee    Knee (Left): Positive recurvatum  Knee (Right): Positive recurvatum         Flowsheet Rows      Most Recent Value   PT/OT G-Codes   Current Score  44   Projected Score  66   FOTO information reviewed  Yes   Assessment Type  Evaluation   G code set  Mobility: Walking & Moving Around   Mobility: Walking and Moving Around Current Status ()  CK   Mobility: Walking and Moving Around Goal Status ()  CK          Precautions: History of breast cancer, with B mastectomy, hx of skin cancer, left midfoot fusion

## 2018-07-16 NOTE — LETTER
2018    Estephania Gautam Rome [facility] [address]  [address] [facility] [address]    Patient: Mayda Ruiz   YOB: 1965   Date of Visit: 2018     Encounter Diagnosis     ICD-10-CM    1  Left foot pain M79 672    2  Difficulty walking R26 2        Dear Dr Janie Cervantes:    Please review the attached Plan of Care from [facility] recent visit  Please verify that you agree therapy should continue by signing the attached document and sending it back to our office  If you have any questions or concerns, please don't hesitate to call  Sincerely,    Johanna Kenyon, PT      Referring Provider:      I certify that I have read the below Plan of Care and certify the need for these services furnished under this plan of treatment while under my care  Estephania RomeDO  [address] [facility] [address]  [facility] [place]: [phone]          PT Evaluation     Today's date: 2018  Patient name: Mayda Ruiz  : 1965  MRN: [id number]  Referring provider: Ajith Wren PT , DPT  Dx:   Encounter Diagnosis     ICD-10-CM    1  Left foot pain M79 672    2  Difficulty walking R26 2                   Assessment  Impairments: abnormal muscle firing, abnormal or restricted ROM, activity intolerance, impaired physical strength, lacks appropriate home exercise program and pain with function    Assessment details: Mayda Ruiz is a 48 y o  female was evaluated on 2018 under Direct Access for Left foot pain  (primary encounter diagnosis)  Difficulty walking  Mayda Ruiz has the above listed impairments and will benefit from skilled PT to improve deficits to return to prior level of function  Under direct access, the patient can be treated for 30 days without a prescription from the physician  If you agree to the patient's plan of care, please sign and return  Please do not hesitate to contact me at [phone]   Thank you for allowing me to participate in Mayda Ruiz care      Understanding of Dx/Px/POC: good   Prognosis: good    Goals  Impairment Goals  - Decrease pain <2/10  - Improve ROM to 15 degrees of DF  - Increase strength to 4/5 throughout    Functional Goals  - Return to Prior Level of Function  - Increase Functional Status Measure to: 54  - Patient will be independent with HEP  -Patient will be able to return to ambulate for more than 1 hour with out pain    Plan  Patient would benefit from: skilled PT  Planned therapy interventions: joint mobilization, manual therapy, patient education, postural training, activity modification, abdominal trunk stabilization, body mechanics training, flexibility, functional ROM exercises, graded exercise, home exercise program, neuromuscular re-education, strengthening, stretching, therapeutic activities and therapeutic exercise  Frequency: 2x week  Duration in weeks: 8  Plan of Care beginning date: 2018  Plan of Care expiration date: 9/10/2018  Treatment plan discussed with: patient        Subjective Evaluation    History of Present Illness  Mechanism of injury: Pt notes that overall her left foot has been having increased pain especially with ambulation over 20 minutes  She notes that she is not able to ambulate for more than a few minutes with out her shoes on  She notes that her ankle has also been cracking/snapping more since she was previously in PT  She notes that her orthotics do help  Pain  Current pain ratin  At best pain ratin  At worst pain ratin  Quality: dull ache, sharp, throbbing and pulling    Patient Goals  Patient goals for therapy: decreased pain, increased motion, independence with ADLs/IADLs, increased strength and return to sport/leisure activities          Objective     Observations   Left Ankle/Foot   Positive for edema  Palpation   Left   Tenderness of the posterior tibialis  Tenderness   Left Ankle/Foot   Tenderness in the Achilles insertion, fibula and navicular       Neurological Testing     Sensation     Ankle/Foot   Left Ankle/Foot   Intact: light touch    Right Ankle/Foot   Intact: light touch     Active Range of Motion   Left Ankle/Foot   Dorsiflexion (ke): 10 degrees   Plantar flexion: 50 degrees   Inversion: 10 degrees   Eversion: 5 degrees   Great toe flexion: 0 degrees   Great toe extension: 70 degrees     Right Ankle/Foot   Dorsiflexion (ke): 18 degrees   Plantar flexion: 68 degrees   Inversion: 45 degrees   Eversion: 25 degrees   Great toe flexion: WFL  Great toe extension: WFL    Passive Range of Motion   Left Ankle/Foot    Inversion: 20 degrees   Eversion: 15 degrees     Joint Play   Left Ankle/Foot  Hypermobile in the fibular head, proximal tibiofibular joint, distal tibiofibular joint, talocrural joint and subtalar joint  Strength/Myotome Testing     Left Hip   Planes of Motion   Abduction: 3+    Right Hip   Planes of Motion   Abduction: 3+    Left Ankle/Foot   Dorsiflexion: 3-  Plantar flexion: 3  Inversion: 3-  Eversion: 3-  Great toe flexion: 3-  Great toe extension: 3-    Right Ankle/Foot   Dorsiflexion: 4  Plantar flexion: 4-  Inversion: 4-  Eversion: 4-  Great toe flexion: 4-  Great toe extension: 4    Tests     Right Ankle/Foot   Positive for navicular drop  Ambulation   Weight-Bearing Status   Weight-Bearing Status (Left): full weight bearing   Weight-Bearing Status (Right): full weight-bearing    Assistive device used: none    Ambulation: Level Surfaces   Ambulation without assistive device: independent    Ambulation: Stairs   Ascend stairs: independent  Pattern: reciprocal  Railings: one rail  Descend stairs: independent  Pattern: non-reciprocal  Railings: one rail    Observational Gait   Gait: antalgic, asymmetric and circumduction   Walking speed within functional limits  Increased right stance time and left swing time  Decreased left stance time and right swing time     Right arm swing: within functional limits  Base of support: increased    Quality of Movement During Gait     Pelvis  Anterior pelvic tilt  Pelvis (Left): Positive retracted  Knee    Knee (Left): Positive recurvatum  Knee (Right): Positive recurvatum         Flowsheet Rows      Most Recent Value   PT/OT G-Codes   Current Score  44   Projected Score  66   FOTO information reviewed  Yes   Assessment Type  Evaluation   G code set  Mobility: Walking & Moving Around   Mobility: Walking and Moving Around Current Status ()  CK   Mobility: Walking and Moving Around Goal Status ()  CK          Precautions: History of breast cancer, with B mastectomy, hx of skin cancer, left midfoot fusion

## 2018-07-18 ENCOUNTER — TRANSCRIBE ORDERS (OUTPATIENT)
Dept: PHYSICAL THERAPY | Facility: CLINIC | Age: 53
End: 2018-07-18

## 2018-07-18 DIAGNOSIS — M79.672 LEFT FOOT PAIN: Primary | ICD-10-CM

## 2018-07-19 ENCOUNTER — APPOINTMENT (OUTPATIENT)
Dept: PHYSICAL THERAPY | Facility: CLINIC | Age: 53
End: 2018-07-19
Payer: COMMERCIAL

## 2018-07-23 ENCOUNTER — OFFICE VISIT (OUTPATIENT)
Dept: PHYSICAL THERAPY | Facility: CLINIC | Age: 53
End: 2018-07-23
Payer: COMMERCIAL

## 2018-07-23 DIAGNOSIS — M79.672 LEFT FOOT PAIN: Primary | ICD-10-CM

## 2018-07-23 DIAGNOSIS — R26.2 DIFFICULTY WALKING: ICD-10-CM

## 2018-07-23 PROCEDURE — 97110 THERAPEUTIC EXERCISES: CPT | Performed by: PHYSICAL THERAPIST

## 2018-07-23 PROCEDURE — 97112 NEUROMUSCULAR REEDUCATION: CPT | Performed by: PHYSICAL THERAPIST

## 2018-07-23 PROCEDURE — 97140 MANUAL THERAPY 1/> REGIONS: CPT | Performed by: PHYSICAL THERAPIST

## 2018-07-23 NOTE — PRE-PROCEDURE INSTRUCTIONS
Pre-Surgery Instructions:   Medication Instructions    acetaminophen (TYLENOL) 500 mg tablet Patient was instructed by Physician and understands   ALPRAZolam (XANAX) 0 5 mg tablet Patient was instructed by Physician and understands   Ascorbic Acid (VITAMIN C) 500 MG CAPS Patient was instructed by Physician and understands   Calcium-Magnesium-Vitamin D (CALCIUM 500 PO) Patient was instructed by Physician and understands   Chromium Picolinate 200 MCG CAPS Patient was instructed by Physician and understands   ibuprofen (MOTRIN) 800 mg tablet Patient was instructed by Physician and understands   tamoxifen (NOLVADEX) 20 mg tablet Patient was instructed by Physician and understands   zinc gluconate 50 mg tablet Patient was instructed by Physician and understands  Follow prep as per physician  You will receive a call with your time to arrive at the hospital   Secure transportation, you will be having anesthesia

## 2018-07-23 NOTE — PROGRESS NOTES
Daily Note     Today's date: 2018  Patient name: Olu Spear  : 1965  MRN: [id number]  Referring provider: June Metcalf DPM  Dx:   Encounter Diagnosis     ICD-10-CM    1  Left foot pain M79 672    2  Difficulty walking R26 2                   Subjective: pt notes that her foot has been aching, but unsure if it is from the footwear or the exercises  She does note that she was doing all the exercises with the blue band but was supposed to use the green for the side to side  Objective: See treatment diary below  Precautions: history of breast ca, and B mastectomy    Daily Treatment Diary       Manuals             Left soleus medially and left peroneal STM  8'                                                   Exercise Diary              Standing HR with Inv 2x10            Ankle IR and ER Green 2x10            S/l hip abd 3x10            S/l clams x2 2x10 ea                                                                                                                                                                                                                                                      Modalities                                                 Assessment: Tolerated treatment well  Patient demonstrated fatigue post treatment      Plan: Continue per plan of care

## 2018-07-31 ENCOUNTER — OFFICE VISIT (OUTPATIENT)
Dept: PHYSICAL THERAPY | Facility: CLINIC | Age: 53
End: 2018-07-31
Payer: COMMERCIAL

## 2018-07-31 ENCOUNTER — APPOINTMENT (OUTPATIENT)
Dept: PHYSICAL THERAPY | Facility: CLINIC | Age: 53
End: 2018-07-31
Payer: COMMERCIAL

## 2018-07-31 DIAGNOSIS — R26.2 DIFFICULTY WALKING: ICD-10-CM

## 2018-07-31 DIAGNOSIS — M79.672 LEFT FOOT PAIN: Primary | ICD-10-CM

## 2018-07-31 PROCEDURE — 97110 THERAPEUTIC EXERCISES: CPT

## 2018-07-31 NOTE — PROGRESS NOTES
Daily Note     Today's date: 2018  Patient name: Malu Garcia  : 1965  MRN: [id number]  Referring provider: Gloria Whitman, *  Dx:   Encounter Diagnosis     ICD-10-CM    1  Left foot pain M79 672    2  Difficulty walking R26 2                   Subjective: notes that she has been compliant with HEP every day      Objective: See treatment diary below  Precautions: history of breast ca, and B mastectomy    Daily Treatment Diary       Manuals            Left soleus medially and left peroneal STM  8'                                                   Exercise Diary              Standing HR with Inv 2x10            Ankle IR and ER Green 2x10 gtb  2x10           S/l hip abd 3x10 nv           S/l clams x2 2x10 ea Green  2x10           Bridges with band  Blue 2x10           PF and DF with band   Blue  2x10                                                                                                                                                                                                                           Modalities                                             Assessment: Reviewed exercises for technique and minimal cues for hip flexor stretch only   Patient would benefit from continued PT      Plan: Continue per plan of care

## 2018-08-01 ENCOUNTER — HOSPITAL ENCOUNTER (OUTPATIENT)
Facility: HOSPITAL | Age: 53
Setting detail: OUTPATIENT SURGERY
Discharge: HOME | End: 2018-08-01
Attending: INTERNAL MEDICINE | Admitting: INTERNAL MEDICINE
Payer: COMMERCIAL

## 2018-08-01 ENCOUNTER — ANESTHESIA (OUTPATIENT)
Dept: PERIOP | Facility: HOSPITAL | Age: 53
End: 2018-08-01
Payer: COMMERCIAL

## 2018-08-01 ENCOUNTER — ANESTHESIA EVENT (OUTPATIENT)
Dept: PERIOP | Facility: HOSPITAL | Age: 53
End: 2018-08-01
Payer: COMMERCIAL

## 2018-08-01 VITALS
SYSTOLIC BLOOD PRESSURE: 125 MMHG | TEMPERATURE: 98 F | OXYGEN SATURATION: 100 % | WEIGHT: 160 LBS | HEART RATE: 62 BPM | DIASTOLIC BLOOD PRESSURE: 64 MMHG | BODY MASS INDEX: 25.71 KG/M2 | RESPIRATION RATE: 18 BRPM | HEIGHT: 66 IN

## 2018-08-01 LAB — EXT PREGNANCY TEST URINE: NEGATIVE

## 2018-08-01 PROCEDURE — 81025 URINE PREGNANCY TEST: CPT | Performed by: INTERNAL MEDICINE

## 2018-08-01 RX ORDER — PROPOFOL 10 MG/ML
INJECTION, EMULSION INTRAVENOUS AS NEEDED
Status: DISCONTINUED | OUTPATIENT
Start: 2018-08-01 | End: 2018-08-01 | Stop reason: SURG

## 2018-08-01 RX ORDER — ONDANSETRON 2 MG/ML
4 INJECTION INTRAMUSCULAR; INTRAVENOUS ONCE AS NEEDED
Status: DISCONTINUED | OUTPATIENT
Start: 2018-08-01 | End: 2018-08-01 | Stop reason: HOSPADM

## 2018-08-01 RX ORDER — FENTANYL CITRATE/PF 50 MCG/ML
25 SYRINGE (ML) INJECTION
Status: DISCONTINUED | OUTPATIENT
Start: 2018-08-01 | End: 2018-08-01 | Stop reason: HOSPADM

## 2018-08-01 RX ORDER — SODIUM CHLORIDE 9 MG/ML
20 INJECTION, SOLUTION INTRAVENOUS CONTINUOUS
Status: DISCONTINUED | OUTPATIENT
Start: 2018-08-01 | End: 2018-08-01 | Stop reason: HOSPADM

## 2018-08-01 RX ADMIN — SODIUM CHLORIDE: 0.9 INJECTION, SOLUTION INTRAVENOUS at 08:57

## 2018-08-01 RX ADMIN — PROPOFOL 50 MG: 10 INJECTION, EMULSION INTRAVENOUS at 09:10

## 2018-08-01 RX ADMIN — PROPOFOL 70 MG: 10 INJECTION, EMULSION INTRAVENOUS at 09:03

## 2018-08-01 RX ADMIN — PROPOFOL 30 MG: 10 INJECTION, EMULSION INTRAVENOUS at 09:12

## 2018-08-01 RX ADMIN — PROPOFOL 50 MG: 10 INJECTION, EMULSION INTRAVENOUS at 09:08

## 2018-08-01 RX ADMIN — PROPOFOL 30 MG: 10 INJECTION, EMULSION INTRAVENOUS at 09:15

## 2018-08-01 RX ADMIN — PROPOFOL 50 MG: 10 INJECTION, EMULSION INTRAVENOUS at 09:06

## 2018-08-01 RX ADMIN — PROPOFOL 50 MG: 10 INJECTION, EMULSION INTRAVENOUS at 09:07

## 2018-08-01 NOTE — ANESTHESIA PREPROCEDURE EVALUATION
Review of Systems/Medical History  Patient summary reviewed  Chart reviewed  History of anesthetic complications PONV    Cardiovascular  Negative cardio ROS    Pulmonary  Asthma , well controlled/ stable ,        GI/Hepatic  Negative GI/hepatic ROS          Negative  ROS        Endo/Other  Negative endo/other ROS      GYN    Breast cancer Right mastectomy       Hematology  Negative hematology ROS      Musculoskeletal    Arthritis     Neurology  Negative neurology ROS      Psychology   Negative psychology ROS              Physical Exam    Airway    Mallampati score: II  TM Distance: >3 FB  Neck ROM: full     Dental   No notable dental hx     Cardiovascular  Comment: Negative ROS, Rhythm: regular, Rate: normal, Cardiovascular exam normal    Pulmonary  Pulmonary exam normal Breath sounds clear to auscultation,     Other Findings        Anesthesia Plan  ASA Score- 2     Anesthesia Type- IV sedation with anesthesia with ASA Monitors  Additional Monitors:   Airway Plan:         Plan Factors-    Induction- intravenous  Postoperative Plan-     Informed Consent- Anesthetic plan and risks discussed with patient  I personally reviewed this patient with the CRNA  Discussed and agreed on the Anesthesia Plan with the CRNA  Madhuri Garcia

## 2018-08-01 NOTE — OP NOTE
**** GI/ENDOSCOPY REPORT ****     PATIENT NAME: DEEDEE CROOKS ------ VISIT ID:  Patient ID: [id number] YOB: 1965     INTRODUCTION: Colonoscopy - A 48 female patient presents for an outpatient   Colonoscopy at [facility]  PREVIOUS COLONOSCOPY: No prior colonoscopy  INDICATIONS: Average-risk screening  CONSENT:  The benefits, risks, and alternatives to the procedure were   discussed and informed consent was obtained from the patient  PREPARATION: EKG, pulse, pulse oximetry and blood pressure were monitored   throughout the procedure  The patient was identified by myself both   verbally and by visual inspection of ID band  Airway Assessment   Classification: Airway class 2 - Visualization of the soft palate, fauces   and uvula  ASA Classification: See anesthesia record  MEDICATIONS: Anesthesia-check records     PROCEDURE:  The endoscope was passed without difficulty through the anus   under direct visualization and advanced to the cecum, confirmed by   appendiceal orifice and ileocecal valve  The scope was withdrawn and the   mucosa was carefully examined  The quality of the preparation was  RECTAL EXAM: Normal rectal exam      FINDINGS:  There was evidence of diverticulosis in the sigmoid colon and   descending colon  Diminutive internal hemorrhoids were found  COMPLICATIONS: There were no complications  IMPRESSIONS: Diverticulosis found in the sigmoid colon and descending   colon  Internal hemorrhoids  RECOMMENDATIONS: Resume regular diet as tolerated  Continue current   medications  Colonoscopy recommended in 10 years       ESTIMATED BLOOD LOSS:     PATHOLOGY SPECIMENS:     PROCEDURE CODES:  - colonoscopy for CA screen: Average risk without   biopsy     ICD-9 Codes: 562 10 Diverticulosis of colon (without mention of hemorrhage)     ICD-10 Codes: Z12 11 Encounter for screening for malignant neoplasm of   colon K57 Diverticular disease of intestine K64 9 Unspecified hemorrhoids     PERFORMED BY: INDIRA White  on  Version 1, electronically signed by INDIRA Cooley  on   08/01/2018 at 09:21

## 2018-08-10 ENCOUNTER — APPOINTMENT (OUTPATIENT)
Dept: PHYSICAL THERAPY | Facility: CLINIC | Age: 53
End: 2018-08-10
Payer: COMMERCIAL

## 2018-08-10 ENCOUNTER — OFFICE VISIT (OUTPATIENT)
Dept: PHYSICAL THERAPY | Facility: CLINIC | Age: 53
End: 2018-08-10
Payer: COMMERCIAL

## 2018-08-10 DIAGNOSIS — R26.2 DIFFICULTY WALKING: ICD-10-CM

## 2018-08-10 DIAGNOSIS — M79.672 LEFT FOOT PAIN: Primary | ICD-10-CM

## 2018-08-10 PROCEDURE — 97140 MANUAL THERAPY 1/> REGIONS: CPT

## 2018-08-10 PROCEDURE — 97110 THERAPEUTIC EXERCISES: CPT

## 2018-08-10 NOTE — PROGRESS NOTES
Daily Note     Today's date: 8/10/2018  Patient name: Neisha Leavitt  : 1965  MRN: [id number]  Referring provider: Charla Bojorquez, *  Dx:   Encounter Diagnosis     ICD-10-CM    1  Left foot pain M79 672    2  Difficulty walking R26 2                   Subjective: pt notes compliance with HEP      Objective: See treatment diary below  Precautions: history of breast ca, and B mastectomy    Daily Treatment Diary       Manuals 10          Left soleus medially and left peroneal STM  8'                                                   Exercise Diary              Standing HR with Inv 2x10            Ankle IR and ER Green 2x10 gtb  2x10 GTB  2x10          S/l hip abd 3x10 nv 3x10          S/l clams x2 2x10 ea Green  2x10 Green  2x10          Bridges with band  Blue 2x10 Blue  2x10          PF and DF with band   Blue  2x10 Blue  2x10                                                                                                                                                                                                                          Modalities                                               Assessment: Tolerated treatment with TTP over peroneal/solues region and mild tightness also    Patient exhibited good technique with therapeutic exercises and would benefit from continued PT      Plan: pt to increase to 3x10 at home with ankle TBands

## 2018-08-14 ENCOUNTER — APPOINTMENT (OUTPATIENT)
Dept: LAB | Facility: CLINIC | Age: 53
End: 2018-08-14

## 2018-08-14 ENCOUNTER — TRANSCRIBE ORDERS (OUTPATIENT)
Dept: ADMINISTRATIVE | Facility: HOSPITAL | Age: 53
End: 2018-08-14

## 2018-08-14 DIAGNOSIS — Z00.8 HEALTH EXAMINATION IN POPULATION SURVEY: ICD-10-CM

## 2018-08-14 DIAGNOSIS — Z00.8 HEALTH EXAMINATION IN POPULATION SURVEY: Primary | ICD-10-CM

## 2018-08-14 LAB
CHOLEST SERPL-MCNC: 201 MG/DL (ref 50–200)
EST. AVERAGE GLUCOSE BLD GHB EST-MCNC: 120 MG/DL
HBA1C MFR BLD: 5.8 % (ref 4.2–6.3)
HDLC SERPL-MCNC: 81 MG/DL (ref 40–60)
LDLC SERPL CALC-MCNC: 93 MG/DL (ref 0–100)
NONHDLC SERPL-MCNC: 120 MG/DL
TRIGL SERPL-MCNC: 134 MG/DL

## 2018-08-14 PROCEDURE — 80061 LIPID PANEL: CPT

## 2018-08-14 PROCEDURE — 36415 COLL VENOUS BLD VENIPUNCTURE: CPT

## 2018-08-14 PROCEDURE — 83036 HEMOGLOBIN GLYCOSYLATED A1C: CPT

## 2018-08-15 ENCOUNTER — ANNUAL EXAM (OUTPATIENT)
Dept: OBGYN CLINIC | Facility: MEDICAL CENTER | Age: 53
End: 2018-08-15
Payer: COMMERCIAL

## 2018-08-15 VITALS — SYSTOLIC BLOOD PRESSURE: 124 MMHG | WEIGHT: 160 LBS | BODY MASS INDEX: 25.82 KG/M2 | DIASTOLIC BLOOD PRESSURE: 52 MMHG

## 2018-08-15 DIAGNOSIS — Z01.419 ENCOUNTER FOR GYNECOLOGICAL EXAMINATION (GENERAL) (ROUTINE) WITHOUT ABNORMAL FINDINGS: Primary | ICD-10-CM

## 2018-08-15 PROCEDURE — 99396 PREV VISIT EST AGE 40-64: CPT | Performed by: OBSTETRICS & GYNECOLOGY

## 2018-08-15 NOTE — PROGRESS NOTES
ASSESSMENT & PLAN: Aisha Stephen was seen today for gynecologic exam     Diagnoses and all orders for this visit:    Encounter for gynecological examination (general) (routine) without abnormal findings        1  Routine well woman exam done today  2  Pap and HPV:  The patient's pap is up to date  Pap and cotesting was not done today  Current ASCCP Guidelines reviewed  3   Mammogram was ordered through Dr Mac Vega office  4   Colonoscopy is up to date  4  The following were reviewed in today's visit: adequate intake of calcium and vitamin D, exercise and healthy diet  5  F/u 1 year  CC:  Annual Gynecologic Examination    HPI: Dara Glasgow is a 48 y o  who presents for annual gynecologic examination  She has the following concerns:  Patient recently had a latissimus dorsi flap reconstruction performed  States she is doing great  Has breast expander on her left and will return for implant for left  Menses  Still gets monthly menses  Health Maintenance:    Patient describes her health as good  Patient has weight concerns  She exercises 3 days per week with exercise bike, leg exercises  She doeswears her seatbelt routinely  She does not perform regular monthly self breast exams  She does feel safe at home  Patients does follow a healthy diet  Patient gets 2 servings of dairy or calcium rich foods a day  Last pap: 2015  Last mammogram: per Dr Aryan Peoples  Last colonoscopy: 2018    Patient Active Problem List   Diagnosis    Surgical follow-up care    Breast cancer [facility])    Breast implant capsular contracture    Aftercare postmastectomy for breast reconstruction       Past Medical History:   Diagnosis Date    Abnormal findings on diagnostic imaging of breast     Last Assessesd: 4/25/2014    Arthritis     Knees and feet    Asthma     Breast cancer ([address] ) 2008    Right    Cancer [facility])     Bilateral breast cancer history   Had two bouts of radiation    Difficulty falling asleep     Gluteal abscess Last Assessed: 12/30/2016    Inconclusive mammogram due to dense breasts     Last Assessed: 4/7/2014    Limb alert care status     right    Lobular carcinoma in situ of breast     Last Assesed: 4/7/2014    Nipple discharge     Last Assessed: 4/7/2014    Pain in left foot     Pneumonia 2009    x1    PONV (postoperative nausea and vomiting)     Skin cancer        Past Surgical History:   Procedure Laterality Date    BREAST BIOPSY  05/02/2014    BREAST BIOPSY  05/2013    BREAST BIOPSY  05/2008    BREAST LUMPECTOMY Right 2008    BREAST SURGERY Bilateral 2008, 2013, 2014    Breast reconstruction with expanders  Total of 7 surgeries  7/3/14 right breast reconstruction revision  10/27/14 right breast reconstruction revision     BREAST SURGERY Left 07/24/2013    Excision of breast lesion    FOOT ARTHRODESIS      Pantalar    KNEE ARTHROSCOPY Right 05/1997    MASTECTOMY Bilateral 06/06/2014    Lymph nodes removed bilaterally  States she may have IV's in left side   MOHS SURGERY      Mohs Micrographic Surgery Face;  Last Assessed: 5/15/2015    NH BREAST RECONSTRUC W LAT DORSI FLAP Left 9/7/2017    Procedure: BREAST RECONSTRUCTION; LATISSIMUS DORSI FLAP; TISSUE EXPANDER;  Surgeon: Michael Morin MD;  Location[facility];  Service: Plastics    NH COLONOSCOPY FLX DX W/COLLJ [place] 1978 PFRMD N/A 8/1/2018    Procedure: COLONOSCOPY;  Surgeon: Chayo Kennedy MD;  [facility];  Service: Gastroenterology    NH [place] W/SESMDC W/RESCJ [address] [facility]  9/9/2016    Procedure: Catina Brito MODIFIED ;  Surgeon: Tita Samuels DPM;  Location[facility];  Service: Podiatry    NH FUSION FOOT BONES,MIDTARSAL,MULTI Left 9/9/2016    Procedure: ARTHRODESIS FIRST [address] [address] ;  Surgeon: Tita Samuels DPM;  [facility];  Service: Podiatry    NH INSERT BREAST PROS IMMED AFTER EXCIS Left 9/7/2017    Procedure: TISSUE EXPANDER;  Surgeon: Francisco Mcgrath MD;  Location:  MAIN OR;  Service: Plastics    AK OSTEOTOMY HEEL BONE Left 9/9/2016    Procedure: OSTEOTOMY CALCANEUS WITH APPLICATION AND ACTIVATION OF BONE STIMULATOR ;  Surgeon: Fabian Estrada DPM;  [facility];  Service: Podiatry    SENTINEL LYMPH NODE BIOPSY Bilateral 06/06/2014    SENTINEL LYMPH NODE BIOPSY Right 2008    WISDOM TOOTH EXTRACTION         Past OB/Gyn History:    History of abnormal pap smears: No    Patient is currently sexually active  heterosexual  Patient's family planning method is condoms always  Family History   Problem Relation Age of Onset    Adopted: Yes    No Known Problems Mother     No Known Problems Father        Social History:  Social History     Social History    Marital status: Single     Spouse name: N/A    Number of children: N/A    Years of education: N/A     Occupational History    Not on file  Social History Main Topics    Smoking status: Never Smoker    Smokeless tobacco: Never Used    Alcohol use Yes      Comment: 4 weekly    Drug use: No    Sexual activity: Yes     Partners: Male     Birth control/ protection: Condom Male     Other Topics Concern    Not on file     Social History Narrative    No narrative on file     Presently lives with domestic partner  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    Allergies   Allergen Reactions    Codeine Itching     Severe    Dilaudid [Hydromorphone Hcl] Itching     Severe    Hydromorphone Itching         Current Outpatient Prescriptions:     acetaminophen (TYLENOL) 500 mg tablet, Take 500 mg by mouth every 6 (six) hours as needed for mild pain , Disp: , Rfl:     ALPRAZolam (XANAX) 0 5 mg tablet, Take 0 5 mg by mouth daily at bedtime as needed for anxiety  , Disp: , Rfl:     Ascorbic Acid (VITAMIN C) 500 MG CAPS, Take 500 mg by mouth  , Disp: , Rfl:     Calcium Citrate 1040 MG TABS, Take by mouth, Disp: , Rfl:    Calcium-Magnesium-Vitamin D (CALCIUM 500 PO), Take by mouth daily, Disp: , Rfl:     calcium-vitamin D (OSCAL) 250-125 MG-UNIT per tablet, Take 2 tablets by mouth daily  , Disp: , Rfl:     Cholecalciferol (VITAMIN D) 2000 units CAPS, Take by mouth, Disp: , Rfl:     Chromium Picolinate 200 MCG CAPS, Take 200 mg by mouth daily  , Disp: , Rfl:     ibuprofen (MOTRIN) 800 mg tablet, Take by mouth, Disp: , Rfl:     Lactobacillus (ACIDOPHILUS PO), Take by mouth daily  , Disp: , Rfl:     Multiple Vitamin (MULTIVITAMIN) capsule, Take 1 capsule by mouth daily  , Disp: , Rfl:     tamoxifen (NOLVADEX) 20 mg tablet, Take 1 tablet (20 mg total) by mouth daily, Disp: 90 tablet, Rfl: 3    zinc gluconate 50 mg tablet, Take by mouth, Disp: , Rfl:     Review of Systems  Constitutional :no fever, feels well, no tiredness, no recent weight gain or loss  ENT: no ear ache, no loss of hearing, no nosebleeds or nasal discharge, no sore throat or hoarseness  Cardiovascular: no complaints of slow or fast heart beat, no chest pain, no palpitations, no leg claudication or lower extremity edema  Respiratory: no complaints of shortness of shortness of breath, no CORONADO  Breasts:no complaints of breast pain, breast lump, or nipple discharge  Gastrointestinal: no complaints of abdominal pain, constipation, nausea, vomiting, or diarrhea or bloody stools  Genitourinary : no complaints of dysuria, incontinence, pelvic pain, no dysmenorrhea, vaginal discharge or abnormal vaginal bleeding and as noted in HPI  Musculoskeletal: no complaints of arthralgia, no myalgia, no joint swelling or stiffness, no limb pain or swelling    Integumentary: no complaints of skin rash or lesion, itching or dry skin  Neurological: no complaints of headache, no confusion, no numbness or tingling, no dizziness or fainting    Physical Exam:   /52   Wt 72 6 kg (160 lb)   LMP 08/05/2018   BMI 25 82 kg/m²     General:   appears stated age, cooperative, alert normal mood and affect   Psychiatric oriented to person, place and time  Mood and affect normal   Neck: normal, supple,trachea midline, no masses  Thyroid: normal, no thyromegaly   Heart: regular rate and rhythm, S1, S2 normal, no murmur, click, rub or gallop   Lungs: clear to auscultation bilaterally, no increased work of breathing or signs of respiratory distress   Breasts: deferred   Abdomen: soft, non-tender, without masses or organomegaly   Vulva: normal , no lesions   Vagina: normal , no lesions or dryness   Urethra: normal   Urethal meatus normal   Bladder Normal, soft, non-tender and no prolapse or masses appreciated   Cervix: normal, no palpable masses    Uterus: normal , non-tender, not enlarged, no palpable masses   Adnexa: normal, non-tender without fullness or masses   Lymphatic Palpation of lymph nodes in neck, axilla, groin and/or other locations: no lymphadenopathy or masses noted   Skin Normal skin turgor and no rashes    Palpation of skin and subcutaneous tissue normal

## 2018-08-16 ENCOUNTER — OFFICE VISIT (OUTPATIENT)
Dept: PHYSICAL THERAPY | Facility: CLINIC | Age: 53
End: 2018-08-16
Payer: COMMERCIAL

## 2018-08-16 DIAGNOSIS — M79.672 LEFT FOOT PAIN: Primary | ICD-10-CM

## 2018-08-16 DIAGNOSIS — R26.2 DIFFICULTY WALKING: ICD-10-CM

## 2018-08-16 PROCEDURE — 97110 THERAPEUTIC EXERCISES: CPT

## 2018-08-16 PROCEDURE — G8978 MOBILITY CURRENT STATUS: HCPCS

## 2018-08-16 PROCEDURE — G8979 MOBILITY GOAL STATUS: HCPCS

## 2018-08-16 PROCEDURE — 97140 MANUAL THERAPY 1/> REGIONS: CPT

## 2018-08-16 NOTE — PROGRESS NOTES
Daily Note     Today's date: 2018  Patient name: Tk Cheung  : 1965  MRN: [id number]  Referring provider: Vinny Davenport, *  Dx:   Encounter Diagnosis     ICD-10-CM    1  Left foot pain M79 672    2  Difficulty walking R26 2                   Subjective: pt notes that she did increase to 3x10 reps of ankle theraband with no adverse effects  Objective: See treatment diary below  Precautions: history of breast ca, and B mastectomy    Daily Treatment Diary       Manuals 7/23 7/31 8/10 8/16         Left soleus medially and left peroneal STM  8'   10'                                                Exercise Diary              Standing HR with Inv 2x10            Ankle IR and ER Green 2x10 gtb  2x10 GTB  2x10 GTB  3x10         S/l hip abd 3x10 nv 3x10          S/l clams x2 2x10 ea Green  2x10 Green  2x10          Bridges with band  Blue 2x10 Blue  2x10          PF and DF with band   Blue  2x10 Blue  2x10 Blue  3x10         Hip flexor stretch    Supine  30"x3 ea                                                                                                                                                                                                            Modalities                                           Assessment: Tolerated treatment with TTP with manual treatment    Patient exhibited good technique with therapeutic exercises      Plan: Continue per plan of care

## 2018-10-03 ENCOUNTER — OFFICE VISIT (OUTPATIENT)
Dept: PLASTIC SURGERY | Facility: HOSPITAL | Age: 53
End: 2018-10-03
Payer: COMMERCIAL

## 2018-10-03 VITALS — WEIGHT: 161 LBS | BODY MASS INDEX: 25.88 KG/M2 | HEIGHT: 66 IN

## 2018-10-03 DIAGNOSIS — Z42.1 AFTERCARE POSTMASTECTOMY FOR BREAST RECONSTRUCTION: Primary | ICD-10-CM

## 2018-10-03 PROCEDURE — 99214 OFFICE O/P EST MOD 30 MIN: CPT | Performed by: SURGERY
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
## 2018-11-20 ENCOUNTER — OFFICE VISIT (OUTPATIENT)
Dept: PLASTIC SURGERY | Facility: CLINIC | Age: 53
End: 2018-11-20
Payer: COMMERCIAL

## 2018-11-20 VITALS — HEIGHT: 66 IN | BODY MASS INDEX: 25.88 KG/M2 | WEIGHT: 161 LBS

## 2018-11-20 DIAGNOSIS — Z42.1 AFTERCARE POSTMASTECTOMY FOR BREAST RECONSTRUCTION: Primary | ICD-10-CM

## 2018-11-20 PROCEDURE — 99214 OFFICE O/P EST MOD 30 MIN: CPT | Performed by: SURGERY

## 2018-11-20 NOTE — PROGRESS NOTES
Assessment/Plan:  Please see HPI  PETERSON is interested in undergoing removal of the left breast tissue expander and implant placement, she is looking at a potential date in February of 2019  At today's visit we discussed the procedure, how it is performed, as well as potential risks, complications and limitations including, but not limited to infection, bleeding, scarring, asymmetry, contour deformity, implant related problems such as migration, deflation, capsular contracture, rippling, etc   She is also interested in fat grafting to the right breast to address the hollowing of the upper pole as well as the rippling of the central portion of the breast, and will be interested in fat grafting to the left breast if it is deemed appropriate at the time of surgery  We discussed fat grafting as well, how it is performed, as well as potential risks, complications and limitations  Appropriate photographs were obtained  Her questions were answered to her satisfaction,and consent was obtained  She will be seen the day prior to surgery to be marked  The implant on the right is an [employer] Style 15 - 371 cc implant with a 12 9 centimeter base diameter and 4 1 centimeter projection  There are no diagnoses linked to this encounter  Subjective:      Patient ID: Justo Bella is a 48 y o  female  HPI she is status post latissimus dorsi flap tissue expander reconstruction of the left breast due to severe radiation induced skin changes  She is now seen for preoperative visit prior to expander/implant exchange  The following portions of the patient's history were reviewed and updated as appropriate: allergies, current medications, past family history, past medical history, past social history, past surgical history and problem list     Review of Systems   Constitutional: Negative for chills and fever  HENT: Negative for hearing loss  Eyes: Negative for discharge and visual disturbance     Respiratory: Negative for chest tightness and shortness of breath  Cardiovascular: Negative for chest pain and leg swelling  Gastrointestinal: Negative for blood in stool, constipation, diarrhea and nausea  Genitourinary: Negative for dysuria  Musculoskeletal: Negative for gait problem  Skin: Negative for rash  Allergic/Immunologic: Negative for immunocompromised state  Neurological: Negative for seizures and headaches  Hematological: Does not bruise/bleed easily  Psychiatric/Behavioral: Negative for dysphoric mood  The patient is not nervous/anxious  Objective:      Ht 5' 6" (1 676 m)   Wt 73 kg (161 lb)   BMI 25 99 kg/m²          Physical Exam   Constitutional: She is oriented to person, place, and time  She appears well-developed and well-nourished  HENT:   Head: Normocephalic  Eyes: Pupils are equal, round, and reactive to light  Neck: Normal range of motion  Cardiovascular: Normal rate  Pulmonary/Chest: Effort normal    Abdominal: Soft  Musculoskeletal: Normal range of motion  Neurological: She is alert and oriented to person, place, and time  Skin: Skin is warm  Left breast with well-healed latissimus dorsi flap, expander in good position, left breast volume slightly greater than right  Some hollowing right chest upper pole and rippling over central aspect of right breast mound, stable  Psychiatric: She has a normal mood and affect

## 2018-11-27 PROBLEM — Z85.3 PERSONAL HISTORY OF MALIGNANT NEOPLASM OF BREAST: Status: ACTIVE | Noted: 2018-11-27

## 2018-12-27 ENCOUNTER — OFFICE VISIT (OUTPATIENT)
Dept: PLASTIC SURGERY | Facility: CLINIC | Age: 53
End: 2018-12-27
Payer: COMMERCIAL

## 2018-12-27 DIAGNOSIS — C44.319 BASAL CELL CARCINOMA, FOREHEAD: ICD-10-CM

## 2018-12-27 DIAGNOSIS — C44.519 BASAL CELL CARCINOMA (BCC) OF BACK: ICD-10-CM

## 2018-12-27 DIAGNOSIS — C44.311 BASAL CELL CARCINOMA (BCC) OF RIGHT SIDE OF NOSE: ICD-10-CM

## 2018-12-27 DIAGNOSIS — L98.9 SKIN LESIONS: Primary | ICD-10-CM

## 2018-12-27 DIAGNOSIS — C44.519 BASAL CELL CARCINOMA (BCC) OF SKIN OF TRUNK: ICD-10-CM

## 2018-12-27 PROCEDURE — 99214 OFFICE O/P EST MOD 30 MIN: CPT | Performed by: PHYSICIAN ASSISTANT

## 2018-12-27 PROCEDURE — 88305 TISSUE EXAM BY PATHOLOGIST: CPT | Performed by: PATHOLOGY

## 2018-12-27 PROCEDURE — 11100 PR BIOPSY OF SKIN LESION: CPT | Performed by: PHYSICIAN ASSISTANT

## 2018-12-27 NOTE — PROGRESS NOTES
Assessment/Plan:  Renay Gonzalez is a pleasant 61-year-old female who presents for evaluation of multiple skin lesions located right medial canthus/nose, left forehead, right upper chest and left upper back  Please see HPI in the office I performed biopsies  We will see her back in 1 week to remove the stitch and to review pathology  Pathology results revealed basal cell carcinoma which was reviewed with the patient  Plan for [facility] of the right nose/medial canthus with reconstruction, possible full thickness skin graft, excision of the right chest, left back and left forehead with complex closure vs flap  Patient understood and agreed  We discussed with the patient the options, benefits, and risks of surgery such as anesthesia, bleeding, infection, scarring and the need for additional procedures  Consent was obtained and all questions answered to her satisfaction  We will plan for surgery at her earliest convenience  Diagnoses and all orders for this visit:    Skin lesions  -     Tissue Exam  -     Biopsy    Basal cell carcinoma (BCC) of skin of trunk    Basal cell carcinoma (BCC) of right side of nose    Basal cell carcinoma (BCC) of back    Basal cell carcinoma, forehead          Subjective:      Patient ID: Stevenson Archuleta is a 48 y o  female  HPI   Renay Gonzalez is a pleasant 61-year-old female who presents for evaluation of multiple skin lesions located right medial canthus/nose, left forehead, right upper chest and left upper back  She reports a history of previous basal cell carcinomas  She reports these 4 lesions have been present for several months to 1 on her right medial canthus/nose has been present for years  The following portions of the patient's history were reviewed and updated as appropriate: allergies, current medications, past family history, past medical history, past social history, past surgical history and problem list     Review of Systems   HENT: Negative for hearing loss      Eyes: Negative for visual disturbance  Respiratory: Negative for shortness of breath  Cardiovascular: Negative for chest pain  Gastrointestinal: Negative for abdominal pain, blood in stool, constipation, diarrhea, nausea and vomiting  Genitourinary: Negative for hematuria  Musculoskeletal: Negative for gait problem  Skin:        As per HPI  Neurological: Negative for seizures and headaches  Hematological: Does not bruise/bleed easily  Psychiatric/Behavioral: The patient is not nervous/anxious  Objective: There were no vitals taken for this visit  Physical Exam   Constitutional: She is oriented to person, place, and time  She appears well-developed and well-nourished  No distress  HENT:   Head: Normocephalic and atraumatic  Eyes: Pupils are equal, round, and reactive to light  EOM are normal  No scleral icterus  Neck: Neck supple  No tracheal deviation present  No thyromegaly present  Cardiovascular: Normal rate and regular rhythm  Exam reveals no gallop and no friction rub  No murmur heard  Pulmonary/Chest: Effort normal and breath sounds normal  She has no wheezes  She has no rales  Abdominal: Soft  Bowel sounds are normal  She exhibits no distension  There is no tenderness  There is no rebound and no guarding  Musculoskeletal: Normal range of motion  Lymphadenopathy:     She has no cervical adenopathy  Neurological: She is alert and oriented to person, place, and time  No cranial nerve deficit  Skin:   Right medial canthus/nose lesion measuring approximately 12 mm  It is irregularly shaped and scaled in appearance  Left forehead lesion in the hairline  It is pink raised and scaled and measures approximately 4-5 mm  Right upper chest pink raised scaled lesion noted next to a previous biopsy site which is voiding pigment  This area measures approximately 2 cm in length and is irregularly shaped    The left upper back/scapula lesion is pink raised and scaled with an irregular border and measures approximately 11 mm  Please see photos  Psychiatric: She has a normal mood and affect

## 2018-12-27 NOTE — PROGRESS NOTES
Biopsy  Date/Time: 12/27/2018 9:28 AM  Performed by: Dilia Alfredo  Authorized by: Dilia Alfredo     Procedure Details - Lesion Biopsy: Body area:  Head/neck    Head/neck location:  Forehead    Initial size (mm):  5    Final defect size (mm):  5    Destruction method: shave biopsy       Punch biopsy performed of the right medial canthus/nose  Shave biopsy performed of the left forehead  Shave biopsy performed of the right upper chest   Shave biopsy performed of the left upper back  Olivet Jimenez

## 2018-12-27 NOTE — LETTER
December 27, 2018     Cristian No, DO  [address] [facility]  [facility] [address]  [address] [address]    Patient: Jono Lance   YOB: 1965   Date of Visit: 12/27/2018       Dear Dr Paula Domingo: Thank you for referring Jono Lance to me for evaluation  Below are my notes for this consultation  If you have questions, please do not hesitate to call me  I look forward to following your patient along with you  Sincerely,        Pan Kelley PA-C        CC: No Recipients  Vergia Safer  12/27/2018  9:33 AM  Sign at close encounter  Biopsy  Date/Time: 12/27/2018 9:28 AM  Performed by: Loly Christiansen  Authorized by: Loly Christiansen     Procedure Details - Lesion Biopsy: Body area:  Head/neck    Head/neck location:  Forehead    Initial size (mm):  5    Final defect size (mm):  5    Destruction method: shave biopsy       Punch biopsy performed of the right medial canthus/nose  Shave biopsy performed of the left forehead  Shave biopsy performed of the right upper chest   Shave biopsy performed of the left upper back  Delvin Smith PA-C  12/27/2018  9:28 AM  Sign at close encounter  Assessment/Plan:  Salvador is a pleasant 57-year-old female who presents for evaluation of multiple skin lesions located right medial canthus/nose, left forehead, right upper chest and left upper back  Please see HPI in the office I performed biopsies  We will see her back in 1 week to remove the stitch and to review pathology  Diagnoses and all orders for this visit:    Skin lesions  -     Tissue Exam          Subjective:      Patient ID: Jono Lance is a 48 y o  female  HPI   Salvador is a pleasant 57-year-old female who presents for evaluation of multiple skin lesions located right medial canthus/nose, left forehead, right upper chest and left upper back  She reports a history of previous basal cell carcinomas    She reports these 4 lesions have been present for several months to 1 on her right medial canthus/nose has been present for years  The following portions of the patient's history were reviewed and updated as appropriate: allergies, current medications, past family history, past medical history, past social history, past surgical history and problem list     Review of Systems   HENT: Negative for hearing loss  Eyes: Negative for visual disturbance  Respiratory: Negative for shortness of breath  Cardiovascular: Negative for chest pain  Gastrointestinal: Negative for abdominal pain, blood in stool, constipation, diarrhea, nausea and vomiting  Genitourinary: Negative for hematuria  Musculoskeletal: Negative for gait problem  Skin:        As per HPI  Neurological: Negative for seizures and headaches  Hematological: Does not bruise/bleed easily  Psychiatric/Behavioral: The patient is not nervous/anxious  Objective: There were no vitals taken for this visit  Physical Exam   Constitutional: She is oriented to person, place, and time  She appears well-developed and well-nourished  No distress  HENT:   Head: Normocephalic and atraumatic  Eyes: Pupils are equal, round, and reactive to light  EOM are normal  No scleral icterus  Neck: Neck supple  No tracheal deviation present  No thyromegaly present  Cardiovascular: Normal rate and regular rhythm  Exam reveals no gallop and no friction rub  No murmur heard  Pulmonary/Chest: Effort normal and breath sounds normal  She has no wheezes  She has no rales  Abdominal: Soft  Bowel sounds are normal  She exhibits no distension  There is no tenderness  There is no rebound and no guarding  Musculoskeletal: Normal range of motion  Lymphadenopathy:     She has no cervical adenopathy  Neurological: She is alert and oriented to person, place, and time  No cranial nerve deficit  Skin:   Right medial canthus/nose lesion measuring approximately 12 mm  It is irregularly shaped and scaled in appearance  Left forehead lesion in the hairline  It is pink raised and scaled and measures approximately 4-5 mm  Right upper chest pink raised scaled lesion noted next to a previous biopsy site which is voiding pigment  This area measures approximately 2 cm in length and is irregularly shaped  The left upper back/scapula lesion is pink raised and scaled with an irregular border and measures approximately 11 mm  Please see photos  Psychiatric: She has a normal mood and affect

## 2019-01-04 PROBLEM — C44.519 BASAL CELL CARCINOMA (BCC) OF SKIN OF TRUNK: Status: ACTIVE | Noted: 2019-01-04

## 2019-01-04 PROBLEM — C44.311 BASAL CELL CARCINOMA (BCC) OF RIGHT SIDE OF NOSE: Status: ACTIVE | Noted: 2019-01-04

## 2019-01-11 ENCOUNTER — OFFICE VISIT (OUTPATIENT)
Dept: URGENT CARE | Age: 54
End: 2019-01-11
Payer: COMMERCIAL

## 2019-01-11 VITALS
DIASTOLIC BLOOD PRESSURE: 93 MMHG | RESPIRATION RATE: 20 BRPM | HEART RATE: 98 BPM | OXYGEN SATURATION: 96 % | TEMPERATURE: 98.4 F | SYSTOLIC BLOOD PRESSURE: 141 MMHG

## 2019-01-11 DIAGNOSIS — S20.01XA CONTUSION OF RIGHT BREAST, INITIAL ENCOUNTER: Primary | ICD-10-CM

## 2019-01-11 PROCEDURE — G0382 LEV 3 HOSP TYPE B ED VISIT: HCPCS | Performed by: FAMILY MEDICINE

## 2019-01-11 NOTE — PATIENT INSTRUCTIONS
Rest, limit activity  Ice to injured areas 2 to 3 times a day for 15-20 minutes  Tylenol, or ibuprofen (Advil/Motrin), or try Aleve (please take with food) as needed  Recheck/follow-up with plastic surgeon  Please go to the hospital emergency department if needed
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
## 2019-01-17 ENCOUNTER — OFFICE VISIT (OUTPATIENT)
Dept: HEMATOLOGY ONCOLOGY | Facility: CLINIC | Age: 54
End: 2019-01-17
Payer: COMMERCIAL

## 2019-01-17 VITALS
SYSTOLIC BLOOD PRESSURE: 118 MMHG | OXYGEN SATURATION: 98 % | HEIGHT: 66 IN | DIASTOLIC BLOOD PRESSURE: 78 MMHG | HEART RATE: 94 BPM | RESPIRATION RATE: 16 BRPM | TEMPERATURE: 98 F | BODY MASS INDEX: 27.16 KG/M2 | WEIGHT: 169 LBS

## 2019-01-17 DIAGNOSIS — C50.912 BILATERAL MALIGNANT NEOPLASM OF BREAST IN FEMALE, ESTROGEN RECEPTOR POSITIVE, UNSPECIFIED SITE OF BREAST (HCC): Primary | ICD-10-CM

## 2019-01-17 DIAGNOSIS — C50.911 BILATERAL MALIGNANT NEOPLASM OF BREAST IN FEMALE, ESTROGEN RECEPTOR POSITIVE, UNSPECIFIED SITE OF BREAST (HCC): Primary | ICD-10-CM

## 2019-01-17 DIAGNOSIS — Z17.0 MALIGNANT NEOPLASM OF BREAST IN FEMALE, ESTROGEN RECEPTOR POSITIVE, UNSPECIFIED LATERALITY, UNSPECIFIED SITE OF BREAST (HCC): ICD-10-CM

## 2019-01-17 DIAGNOSIS — C50.919 MALIGNANT NEOPLASM OF BREAST IN FEMALE, ESTROGEN RECEPTOR POSITIVE, UNSPECIFIED LATERALITY, UNSPECIFIED SITE OF BREAST (HCC): ICD-10-CM

## 2019-01-17 DIAGNOSIS — Z17.0 BILATERAL MALIGNANT NEOPLASM OF BREAST IN FEMALE, ESTROGEN RECEPTOR POSITIVE, UNSPECIFIED SITE OF BREAST (HCC): Primary | ICD-10-CM

## 2019-01-17 PROCEDURE — 99214 OFFICE O/P EST MOD 30 MIN: CPT | Performed by: INTERNAL MEDICINE

## 2019-01-17 RX ORDER — TAMOXIFEN CITRATE 20 MG/1
20 TABLET ORAL DAILY
Qty: 90 TABLET | Refills: 3 | Status: SHIPPED | OUTPATIENT
Start: 2019-01-17 | End: 2020-02-18 | Stop reason: SDUPTHER

## 2019-01-17 NOTE — PROGRESS NOTES
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 , as well as a history of radiation treatment post lumpectomy  She developed bilateral breast cancer  Both of which are ER/KS positive, HER-2 negative disease  She has stage IA on the right, as well as stage IIA on the left with one positive lymph node  She underwent bilateral mastectomy with reconstruction resulting in the FRANKY in   Her left-sided breast cancer has Oncotype DX score of only 7  Therefore, adjuvant chemotherapy was not indicated  He had postmastectomy radiation therapy to the left chest wall  Currently, she is on adjuvant hormonal therapy with tamoxifen with no side effect  Clinically, she has no evidence recurrent disease  I recommended her to continue with tamoxifen 20 mg daily  We discussed 10 years versus 5 years of tamoxifen  As long as she remain to be premenopausal, I would prefer 10 years of treatment which she is agreeable  I will see her again in a year for routine follow-up  Subjective:     HPI:  A 54year old premenopausal woman, who has 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, 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  She underwent lumpectomy followed by radiation  She did not have any hormonal treatment  In , she developed nipple discharge in the left breast  She went to [facility], where she underwent stereotactic biopsy, which showed precancerous lesions  She underwent lumpectomy, which showed no evidence of malignancy  In 2014, she appreciated the lump in the left breast  Subsequently, she was found to have abnormality of bilateral breast  She underwent bilateral mastectomy in 2014  Right mastectomy specimen showed up to 7 mm of invasive ductal carcinoma grade 1 ER KS positive, HER-2 negative disease   7  [place] were all negative for the metastatic disease in the right axilla  Left mastectomy specimen showed multifocal invasive ductal carcinoma with tumor measuring up to 15 mm, grade 2  This was ER/ME positive, HER-2 negative disease  One sentinel was positive for metastatic disease  There was some evidence of focal extranodal extension  She had immediate reconstruction with tissue expander  She presents today to discuss adjuvant treatment options  Her left breast tumor was sent for the Oncotype DX testing which came back recurrence score of 7, which is low risk disease  Previously, she was tested for BRCA gene mutation in 2013, which was negative  She does not know the family history, since she was adopted  Currently, she has no complaints except for some chest tightness  She has no respiratory symptoms  Her weight is stable  She has no bone pain  Her performance status is normal  She is to have 2 surgical drain in each axilla  Interval History:  A 48year old premenopausal woman, who has 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  She underwent lumpectomy followed by radiation therapy  In June 2014, she was diagnosed with bilateral invasive breast cancer, stage IA, on the right and stage IIA on the left, both of which ER/ME positive, HER-2 negative disease  Her Oncotype DX score on the left breast cancer with only 7  Therefore, adjuvant chemotherapy was not indicated  She underwent bilateral mastectomy with reconstruction  She completed postmastectomy radiation therapy to the left chest wall  She is currently on adjuvant hormonal therapy with tamoxifen  She presents today for routine follow-up  She continued to have quite regular menstrual cycle  She has no complaint hot flashes  She denied any respiratory symptoms  She has no swelling in the lower extremity  She has no complaint of pain  Her performance status is normal     Objective:     Primary Diagnosis:    1   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    2  Stage IA right breast cancer, grade 1, ER/ID positive, HER-2 negative disease  Diagnosed in June 2014  3  Stage IIA left breast cancer, grade 2, ER/ID positive, HER-2 negative disease with one positive lymph node  Oncotype DX recurrence score 7  Diagnosed in June 2014  4  BRCA mutation negative  Cancer Staging:  Cancer Staging  No matching staging information was found for the patient  Previous Hematologic/ Oncologic Treatment:         Current Hematologic/ Oncologic Treatment:      Adjuvant hormonal therapy with tamoxifen 20 mg once a day since July 2014  Disease Status:     FRANKY status post bilateral mastectomy  Test Results:    Pathology:    Right mastectomy specimen showed a 7 mm of invasive ductal carcinoma, grade 1, ER/ID positive, HER-2 negative disease  7  [place] lymph nodes were all negative for metastatic disease  Stage IA(pT1b,pN0,M0) mastectomy specimen showed up to 15 mm of invasive ductal carcinoma, multifocal, grade 2  ER/ID positive, HER-2 negative disease  One sentinel lymph node was positive for metastatic disease with focal extranodal extension  Stage IIA(pT1c, pN1a, M0)  Radiology:        Laboratory:        Physical Exam:      General Appearance:    Alert, oriented        Eyes:    PERRL   Ears:    Normal external ear canals, both ears   Nose:   Nares normal, septum midline   Throat:   Mucosa moist  Pharynx without injection  Neck:   Supple       Lungs:     Clear to auscultation bilaterally   Chest Wall:    No tenderness or deformity    Heart:    Regular rate and rhythm       Abdomen:     Soft, non-tender, bowel sounds +, no organomegaly           Extremities:   Extremities no cyanosis or edema       Skin:   no rash or icterus      Lymph nodes:   Cervical, supraclavicular, and axillary nodes normal   Neurologic:   CNII-XII intact, normal strength, sensation and reflexes     Throughout          Breast exam:    status post bilateral mastectomy with reconstruction  No palpable abnormality in either reconstructed breast or chest wall  ROS: Review of Systems   All other systems reviewed and are negative  Imaging: No results found  Labs:   Lab Results   Component Value Date    WBC 7 11 08/29/2017    HGB 12 8 08/29/2017    HCT 37 9 08/29/2017    MCV 88 08/29/2017     09/08/2017     Lab Results   Component Value Date     06/24/2015    K 4 7 08/29/2017     08/29/2017    CO2 26 08/29/2017    ANIONGAP 11 06/24/2015    BUN 15 08/29/2017    CREATININE 0 81 08/29/2017    GLUCOSE 80 06/24/2015    GLUF 74 08/14/2014    CALCIUM 9 2 08/29/2017    AST 14 05/27/2014    ALT 17 05/27/2014    ALKPHOS 75 05/27/2014    PROT 7 6 05/27/2014    BILITOT 0 3 05/27/2014    EGFR 85 08/29/2017         Current Medications: Reviewed  Allergies: Reviewed  PMH/FH/SH:  Reviewed      Vital Sign:    Body surface area is 1 86 meters squared      Wt Readings from Last 3 Encounters:   01/17/19 76 7 kg (169 lb)   11/20/18 73 kg (161 lb)   10/03/18 73 kg (161 lb)        Temp Readings from Last 3 Encounters:   01/17/19 98 °F (36 7 °C) (Tympanic)   01/11/19 98 4 °F (36 9 °C) (Temporal)   08/01/18 98 °F (36 7 °C)        BP Readings from Last 3 Encounters:   01/17/19 118/78   01/11/19 141/93   08/15/18 124/52         Pulse Readings from Last 3 Encounters:   01/17/19 94   01/11/19 98   08/01/18 62     @LASTSAO2(3)@

## 2019-02-11 ENCOUNTER — OFFICE VISIT (OUTPATIENT)
Dept: PLASTIC SURGERY | Facility: CLINIC | Age: 54
End: 2019-02-11
Payer: COMMERCIAL

## 2019-02-11 DIAGNOSIS — Z71.89 ENCOUNTER TO DISCUSS BREAST RECONSTRUCTION: ICD-10-CM

## 2019-02-11 DIAGNOSIS — C44.519 BASAL CELL CARCINOMA (BCC) OF BACK: ICD-10-CM

## 2019-02-11 DIAGNOSIS — C44.519 BASAL CELL CARCINOMA (BCC) OF SKIN OF TRUNK: Primary | ICD-10-CM

## 2019-02-11 PROCEDURE — 99214 OFFICE O/P EST MOD 30 MIN: CPT | Performed by: SURGERY

## 2019-02-11 NOTE — PROGRESS NOTES
Assessment/Plan:  Please see HPI  She is scheduled to undergo removal of the left breast tissue expander,  capsulectomy, and implant placement as well as excision of basal cell carcinoma from the right upper chest, and left upper back/posterior shoulder  We discussed the procedures, how they are performed, as well as potential risks, complications, and limitations  Photographs were taken  Her questions have been answered to her satisfaction, and consent had been obtained previously  There are no diagnoses linked to this encounter  Subjective:      Patient ID: Fede Becker is a 48 y o  female  HPI PETERSON presents today for a preoperative visit, she will be undergoing removal of the left breast tissue expander, capsulectomy and implant placement as well as excision of basal cell carcinomas from the right upper chest and left upper back/posterior shoulder  The following portions of the patient's history were reviewed and updated as appropriate: allergies, current medications, past family history, past medical history, past social history, past surgical history and problem list     Review of Systems   Constitutional: Negative for chills and fever  HENT: Negative for hearing loss  Eyes: Positive for visual disturbance  Negative for discharge  Respiratory: Negative for chest tightness and shortness of breath  Cardiovascular: Negative for chest pain and leg swelling  Gastrointestinal: Negative for blood in stool, constipation, diarrhea and nausea  Genitourinary: Negative for difficulty urinating and dysuria  Musculoskeletal: Negative for gait problem  Skin: Negative for rash  Allergic/Immunologic: Negative for immunocompromised state  Neurological: Negative for seizures and headaches  Hematological: Does not bruise/bleed easily  Psychiatric/Behavioral: Negative for dysphoric mood  The patient is nervous/anxious  Objective:       There were no vitals taken for this visit          Physical Exam   Constitutional: She is oriented to person, place, and time  She appears well-developed and well-nourished  HENT:   Head: Normocephalic  Eyes: Pupils are equal, round, and reactive to light  Neck: Normal range of motion  Cardiovascular: Normal rate and regular rhythm  Pulmonary/Chest: Effort normal    Abdominal: Soft  Musculoskeletal: Normal range of motion  Neurological: She is alert and oriented to person, place, and time  Skin: Skin is warm  Breast examination reveals the presence of bilateral latissimus dorsi flap reconstruction, there is a right breast implant and the breast is soft and supple  There is a left breast tissue expander, the breast is more firm, some surrounding skin displays stigmata of prior radiation, ulcerated skin lesion right upper chest left posterior shoulder/upper back consistent with basal cell carcinoma   Psychiatric: She has a normal mood and affect

## 2019-02-13 ENCOUNTER — APPOINTMENT (OUTPATIENT)
Dept: LAB | Facility: CLINIC | Age: 54
End: 2019-02-13
Payer: COMMERCIAL

## 2019-02-13 DIAGNOSIS — Z01.818 ENCOUNTER FOR PREADMISSION TESTING: ICD-10-CM

## 2019-02-13 LAB
ALBUMIN SERPL BCP-MCNC: 3.9 G/DL (ref 3.5–5)
ALP SERPL-CCNC: 50 U/L (ref 46–116)
ALT SERPL W P-5'-P-CCNC: 26 U/L (ref 12–78)
ANION GAP SERPL CALCULATED.3IONS-SCNC: 10 MMOL/L (ref 4–13)
AST SERPL W P-5'-P-CCNC: 20 U/L (ref 5–45)
BILIRUB SERPL-MCNC: 0.4 MG/DL (ref 0.2–1)
BUN SERPL-MCNC: 14 MG/DL (ref 5–25)
CALCIUM SERPL-MCNC: 9 MG/DL (ref 8.3–10.1)
CHLORIDE SERPL-SCNC: 105 MMOL/L (ref 100–108)
CO2 SERPL-SCNC: 25 MMOL/L (ref 21–32)
CREAT SERPL-MCNC: 0.73 MG/DL (ref 0.6–1.3)
GFR SERPL CREATININE-BSD FRML MDRD: 96 ML/MIN/1.73SQ M
GLUCOSE SERPL-MCNC: 102 MG/DL (ref 65–140)
POTASSIUM SERPL-SCNC: 3.9 MMOL/L (ref 3.5–5.3)
PROT SERPL-MCNC: 7.3 G/DL (ref 6.4–8.2)
SODIUM SERPL-SCNC: 140 MMOL/L (ref 136–145)

## 2019-02-13 PROCEDURE — 80053 COMPREHEN METABOLIC PANEL: CPT

## 2019-02-13 PROCEDURE — 36415 COLL VENOUS BLD VENIPUNCTURE: CPT

## 2019-02-13 NOTE — H&P
Assessment/Plan:  Please see HPI  She is scheduled to undergo removal of the left breast tissue expander,  capsulectomy, and implant placement as well as excision of basal cell carcinoma from the right upper chest, and left upper back/posterior shoulder  We discussed the procedures, how they are performed, as well as potential risks, complications, and limitations  Photographs were taken  Her questions have been answered to her satisfaction, and consent had been obtained previously             There are no diagnoses linked to this encounter        Subjective:       Patient ID: Travis Butler is a 48 y o  female      HPI PETERSON presents today for a preoperative visit, she will be undergoing removal of the left breast tissue expander, capsulectomy and implant placement as well as excision of basal cell carcinomas from the right upper chest and left upper back/posterior shoulder      The following portions of the patient's history were reviewed and updated as appropriate: allergies, current medications, past family history, past medical history, past social history, past surgical history and problem list      Review of Systems   Constitutional: Negative for chills and fever  HENT: Negative for hearing loss  Eyes: Positive for visual disturbance  Negative for discharge  Respiratory: Negative for chest tightness and shortness of breath  Cardiovascular: Negative for chest pain and leg swelling  Gastrointestinal: Negative for blood in stool, constipation, diarrhea and nausea  Genitourinary: Negative for difficulty urinating and dysuria  Musculoskeletal: Negative for gait problem  Skin: Negative for rash  Allergic/Immunologic: Negative for immunocompromised state  Neurological: Negative for seizures and headaches  Hematological: Does not bruise/bleed easily  Psychiatric/Behavioral: Negative for dysphoric mood   The patient is nervous/anxious            Objective:        There were no vitals taken for this visit             Physical Exam   Constitutional: She is oriented to person, place, and time  She appears well-developed and well-nourished  HENT:   Head: Normocephalic  Eyes: Pupils are equal, round, and reactive to light  Neck: Normal range of motion  Cardiovascular: Normal rate and regular rhythm  Pulmonary/Chest: Effort normal  CTAB  Abdominal: Soft  Musculoskeletal: Normal range of motion  Neurological: She is alert and oriented to person, place, and time  Skin: Skin is warm  Breast examination reveals the presence of bilateral latissimus dorsi flap reconstruction, there is a right breast implant and the breast is soft and supple  There is a left breast tissue expander, the breast is more firm, some surrounding skin displays stigmata of prior radiation, ulcerated skin lesion right upper chest left posterior shoulder/upper back consistent with basal cell carcinoma   Psychiatric: She has a normal mood and affect

## 2019-02-15 DIAGNOSIS — Z71.89 ENCOUNTER TO DISCUSS BREAST RECONSTRUCTION: Primary | ICD-10-CM

## 2019-02-15 RX ORDER — SODIUM CHLORIDE, SODIUM LACTATE, POTASSIUM CHLORIDE, CALCIUM CHLORIDE 600; 310; 30; 20 MG/100ML; MG/100ML; MG/100ML; MG/100ML
75 INJECTION, SOLUTION INTRAVENOUS CONTINUOUS
Status: CANCELLED | OUTPATIENT
Start: 2019-03-07

## 2019-02-15 RX ORDER — OXYCODONE HYDROCHLORIDE AND ACETAMINOPHEN 5; 325 MG/1; MG/1
1 TABLET ORAL EVERY 4 HOURS PRN
Qty: 18 TABLET | Refills: 0 | Status: SHIPPED | OUTPATIENT
Start: 2019-02-15 | End: 2019-02-26 | Stop reason: SDUPTHER

## 2019-02-15 NOTE — PRE-PROCEDURE INSTRUCTIONS
Pre-Surgery Instructions:   Medication Instructions    acetaminophen (TYLENOL) 500 mg tablet Instructed patient per Anesthesia Guidelines   ALPRAZolam (XANAX) 0 5 mg tablet Instructed patient per Anesthesia Guidelines   Ascorbic Acid (VITAMIN C) 500 MG CAPS Instructed patient per Anesthesia Guidelines   Calcium Citrate 1040 MG TABS Instructed patient per Anesthesia Guidelines   calcium-vitamin D (OSCAL) 250-125 MG-UNIT per tablet Instructed patient per Anesthesia Guidelines   Lactobacillus (ACIDOPHILUS PO) Instructed patient per Anesthesia Guidelines   zinc gluconate 50 mg tablet Instructed patient per Anesthesia Guidelines  Pre-op Showering Instructions for Surgery Patients    Before your operation, you play an important role in decreasing your risk for infection by washing with special antiseptic soap  This is an effective way to reduce bacteria on the skin which may help to prevent infections at the surgical site  Please read the following directions in advance  1  In the week before your operation, purchase a 4 ounce bottle of antiseptic soap containing chlorhexidine gluconate (CHG)  4%  Some brand names include: Aplicare®, Endure, and Hibiclens®  The cost is usually less than $5 00   For your convenience, the [employer] carries the soap   It may also be available at your doctors office or pre-admission testing center, and at most retail pharmacies   If you are allergic or sensitive to soaps containing CHG, please let your doctor know so another antiseptic can be suggested   CHG antiseptic soap is for external use only  2   The day before your operation, follow these instructions carefully to get ready   Please clean linens (sheets) on your bed; you should sleep on clean sheets after your evening shower   Get clean towels and washcloth ready - you need enough for 2 showers   Set aside clean underwear, pajamas, and clothing to wear after the showers     Reminders:   DO NOT use any other soap or body rinse on your skin during or after the antiseptic showers   DO NOT use lotion, powder, deodorant, or perfume/aftershave of any kind on your skin after your antiseptic shower   DO NOT shave any body parts in the 24 hours/day before your operation   DO NOT get the antiseptic soap in your eyes, ears, nose, mouth, or vaginal area    3  You will need to shower the night before AND the morning of your surgery  Shower 1:   The first evening before the operation, take the first shower   First, shampoo your hair with regular shampoo and rinse it completely before you use the antiseptic soap  After washing and rinsing your hair, rinse your body   Next, use a clean washcloth to apply the antiseptic soap and wash your body from the neck down to your toes using ½ bottle of the antiseptic soap  You will use the other ½ bottle for the second shower   Clean the area where your incision will be; lather this area well for about 2 minutes   If you are having head or neck surgery, wash areas with the antiseptic soap   Rinse yourself completely with running water   Use a clean towel to dry off   Wear clean underwear and clothing/pajamas  Shower 2   The morning of your operation, take the second shower following the same steps as Shower 1 using the second ½ of the bottle of antiseptic soap   Use clean cloths and towels to wash and dry yourself   Wear clean underwear and clothing  Pre- procedure instructions given without any further questions or concerns

## 2019-02-15 NOTE — PRE-PROCEDURE INSTRUCTIONS
Pre-Surgery Instructions:   Medication Instructions    acetaminophen (TYLENOL) 500 mg tablet Instructed patient per Anesthesia Guidelines   ALPRAZolam (XANAX) 0 5 mg tablet Instructed patient per Anesthesia Guidelines   Ascorbic Acid (VITAMIN C) 500 MG CAPS Instructed patient per Anesthesia Guidelines   calcium-vitamin D (OSCAL) 250-125 MG-UNIT per tablet Instructed patient per Anesthesia Guidelines   Chromium Picolinate 200 MCG CAPS Instructed patient per Anesthesia Guidelines   Lactobacillus (ACIDOPHILUS PO) Instructed patient per Anesthesia Guidelines   zinc gluconate 50 mg tablet Instructed patient per Anesthesia Guidelines  Pre-op Showering Instructions for Surgery Patients    Before your operation, you play an important role in decreasing your risk for infection by washing with special antiseptic soap  This is an effective way to reduce bacteria on the skin which may help to prevent infections at the surgical site  Please read the following directions in advance  1  In the week before your operation, purchase a 4 ounce bottle of antiseptic soap containing chlorhexidine gluconate (CHG)  4%  Some brand names include: Aplicare®, Endure, and Hibiclens®  The cost is usually less than $5 00   For your convenience, the [employer] carries the soap   It may also be available at your doctors office or pre-admission testing center, and at most retail pharmacies   If you are allergic or sensitive to soaps containing CHG, please let your doctor know so another antiseptic can be suggested   CHG antiseptic soap is for external use only  2   The day before your operation, follow these instructions carefully to get ready   Please clean linens (sheets) on your bed; you should sleep on clean sheets after your evening shower   Get clean towels and washcloth ready - you need enough for 2 showers   Set aside clean underwear, pajamas, and clothing to wear after the showers     Reminders:   DO NOT use any other soap or body rinse on your skin during or after the antiseptic showers   DO NOT use lotion, powder, deodorant, or perfume/aftershave of any kind on your skin after your antiseptic shower   DO NOT shave any body parts in the 24 hours/day before your operation   DO NOT get the antiseptic soap in your eyes, ears, nose, mouth, or vaginal area    3  You will need to shower the night before AND the morning of your surgery  Shower 1:   The first evening before the operation, take the first shower   First, shampoo your hair with regular shampoo and rinse it completely before you use the antiseptic soap  After washing and rinsing your hair, rinse your body   Next, use a clean washcloth to apply the antiseptic soap and wash your body from the neck down to your toes using ½ bottle of the antiseptic soap  You will use the other ½ bottle for the second shower   Clean the area where your incision will be; lather this area well for about 2 minutes   If you are having head or neck surgery, wash areas with the antiseptic soap   Rinse yourself completely with running water   Use a clean towel to dry off   Wear clean underwear and clothing/pajamas  Shower 2   The morning of your operation, take the second shower following the same steps as Shower 1 using the second ½ of the bottle of antiseptic soap   Use clean cloths and towels to wash and dry yourself   Wear clean underwear and clothing    Pre-procedure instructions given without any further questions or concerns at this time  Pt will use an antibacterial soap such as Dial on her face

## 2019-02-21 ENCOUNTER — ANESTHESIA (OUTPATIENT)
Dept: PERIOP | Facility: HOSPITAL | Age: 54
End: 2019-02-21
Payer: COMMERCIAL

## 2019-02-21 ENCOUNTER — ANESTHESIA EVENT (OUTPATIENT)
Dept: PERIOP | Facility: HOSPITAL | Age: 54
End: 2019-02-21
Payer: COMMERCIAL

## 2019-02-21 ENCOUNTER — HOSPITAL ENCOUNTER (OUTPATIENT)
Facility: HOSPITAL | Age: 54
Setting detail: OUTPATIENT SURGERY
Discharge: HOME | End: 2019-02-21
Attending: SURGERY | Admitting: SURGERY
Payer: COMMERCIAL

## 2019-02-21 VITALS
DIASTOLIC BLOOD PRESSURE: 67 MMHG | HEIGHT: 66 IN | TEMPERATURE: 98.8 F | OXYGEN SATURATION: 100 % | RESPIRATION RATE: 20 BRPM | WEIGHT: 166.4 LBS | HEART RATE: 95 BPM | SYSTOLIC BLOOD PRESSURE: 125 MMHG | BODY MASS INDEX: 26.74 KG/M2

## 2019-02-21 DIAGNOSIS — Z85.3 PERSONAL HISTORY OF MALIGNANT NEOPLASM OF BREAST: ICD-10-CM

## 2019-02-21 PROCEDURE — 11603 EXC TR-EXT MAL+MARG 2.1-3 CM: CPT | Performed by: SURGERY

## 2019-02-21 PROCEDURE — C1789 PROSTHESIS, BREAST, IMP: HCPCS | Performed by: SURGERY

## 2019-02-21 PROCEDURE — 19342 INSJ/RPLCMT BRST IMPLT SEP D: CPT | Performed by: PHYSICIAN ASSISTANT

## 2019-02-21 PROCEDURE — 88302 TISSUE EXAM BY PATHOLOGIST: CPT | Performed by: PATHOLOGY

## 2019-02-21 PROCEDURE — 13101 CMPLX RPR TRUNK 2.6-7.5 CM: CPT | Performed by: PHYSICIAN ASSISTANT

## 2019-02-21 PROCEDURE — 19342 INSJ/RPLCMT BRST IMPLT SEP D: CPT | Performed by: SURGERY

## 2019-02-21 PROCEDURE — 13101 CMPLX RPR TRUNK 2.6-7.5 CM: CPT | Performed by: SURGERY

## 2019-02-21 PROCEDURE — 88305 TISSUE EXAM BY PATHOLOGIST: CPT | Performed by: PATHOLOGY

## 2019-02-21 PROCEDURE — 13102 CMPLX RPR TRUNK ADDL 5CM/<: CPT | Performed by: PHYSICIAN ASSISTANT

## 2019-02-21 PROCEDURE — 81025 URINE PREGNANCY TEST: CPT | Performed by: SURGERY

## 2019-02-21 PROCEDURE — 13102 CMPLX RPR TRUNK ADDL 5CM/<: CPT | Performed by: SURGERY

## 2019-02-21 PROCEDURE — 11603 EXC TR-EXT MAL+MARG 2.1-3 CM: CPT | Performed by: PHYSICIAN ASSISTANT

## 2019-02-21 DEVICE — SMOOTH ROUND MODERATE PLUS PROFILE GEL-FILLED BREAST IMPLANT, 350CC  SMOOTH ROUND SILICONE
Type: IMPLANTABLE DEVICE | Site: BREAST | Status: FUNCTIONAL
Brand: MENTOR MEMORYGEL BREAST IMPLANT

## 2019-02-21 RX ORDER — EPHEDRINE SULFATE 50 MG/ML
INJECTION, SOLUTION INTRAVENOUS AS NEEDED
Status: DISCONTINUED | OUTPATIENT
Start: 2019-02-21 | End: 2019-02-21 | Stop reason: SURG

## 2019-02-21 RX ORDER — OXYCODONE HYDROCHLORIDE AND ACETAMINOPHEN 5; 325 MG/1; MG/1
1 TABLET ORAL EVERY 4 HOURS PRN
Status: DISCONTINUED | OUTPATIENT
Start: 2019-02-21 | End: 2019-02-21 | Stop reason: HOSPADM

## 2019-02-21 RX ORDER — PROPOFOL 10 MG/ML
INJECTION, EMULSION INTRAVENOUS AS NEEDED
Status: DISCONTINUED | OUTPATIENT
Start: 2019-02-21 | End: 2019-02-21 | Stop reason: SURG

## 2019-02-21 RX ORDER — DEXAMETHASONE SODIUM PHOSPHATE 4 MG/ML
INJECTION, SOLUTION INTRA-ARTICULAR; INTRALESIONAL; INTRAMUSCULAR; INTRAVENOUS; SOFT TISSUE AS NEEDED
Status: DISCONTINUED | OUTPATIENT
Start: 2019-02-21 | End: 2019-02-21 | Stop reason: SURG

## 2019-02-21 RX ORDER — CEPHALEXIN 500 MG/1
500 CAPSULE ORAL EVERY 6 HOURS SCHEDULED
Qty: 28 CAPSULE | Refills: 0 | Status: SHIPPED | OUTPATIENT
Start: 2019-02-21 | End: 2019-02-28

## 2019-02-21 RX ORDER — FENTANYL CITRATE 50 UG/ML
INJECTION, SOLUTION INTRAMUSCULAR; INTRAVENOUS AS NEEDED
Status: DISCONTINUED | OUTPATIENT
Start: 2019-02-21 | End: 2019-02-21 | Stop reason: SURG

## 2019-02-21 RX ORDER — FENTANYL CITRATE/PF 50 MCG/ML
25 SYRINGE (ML) INJECTION
Status: DISCONTINUED | OUTPATIENT
Start: 2019-02-21 | End: 2019-02-21 | Stop reason: HOSPADM

## 2019-02-21 RX ORDER — SODIUM CHLORIDE, SODIUM LACTATE, POTASSIUM CHLORIDE, CALCIUM CHLORIDE 600; 310; 30; 20 MG/100ML; MG/100ML; MG/100ML; MG/100ML
125 INJECTION, SOLUTION INTRAVENOUS CONTINUOUS
Status: DISCONTINUED | OUTPATIENT
Start: 2019-02-21 | End: 2019-02-21 | Stop reason: HOSPADM

## 2019-02-21 RX ORDER — ROCURONIUM BROMIDE 10 MG/ML
INJECTION, SOLUTION INTRAVENOUS AS NEEDED
Status: DISCONTINUED | OUTPATIENT
Start: 2019-02-21 | End: 2019-02-21

## 2019-02-21 RX ORDER — ROCURONIUM BROMIDE 10 MG/ML
INJECTION, SOLUTION INTRAVENOUS AS NEEDED
Status: DISCONTINUED | OUTPATIENT
Start: 2019-02-21 | End: 2019-02-21 | Stop reason: SURG

## 2019-02-21 RX ORDER — ONDANSETRON 2 MG/ML
INJECTION INTRAMUSCULAR; INTRAVENOUS AS NEEDED
Status: DISCONTINUED | OUTPATIENT
Start: 2019-02-21 | End: 2019-02-21 | Stop reason: SURG

## 2019-02-21 RX ORDER — CEFAZOLIN SODIUM 1 G/50ML
1000 SOLUTION INTRAVENOUS ONCE
Status: COMPLETED | OUTPATIENT
Start: 2019-02-21 | End: 2019-02-21

## 2019-02-21 RX ORDER — GLYCOPYRROLATE 0.2 MG/ML
INJECTION INTRAMUSCULAR; INTRAVENOUS AS NEEDED
Status: DISCONTINUED | OUTPATIENT
Start: 2019-02-21 | End: 2019-02-21 | Stop reason: SURG

## 2019-02-21 RX ORDER — KETOROLAC TROMETHAMINE 30 MG/ML
INJECTION, SOLUTION INTRAMUSCULAR; INTRAVENOUS AS NEEDED
Status: DISCONTINUED | OUTPATIENT
Start: 2019-02-21 | End: 2019-02-21 | Stop reason: SURG

## 2019-02-21 RX ORDER — SCOLOPAMINE TRANSDERMAL SYSTEM 1 MG/1
1 PATCH, EXTENDED RELEASE TRANSDERMAL ONCE
Status: DISCONTINUED | OUTPATIENT
Start: 2019-02-21 | End: 2019-02-21 | Stop reason: HOSPADM

## 2019-02-21 RX ORDER — MIDAZOLAM HYDROCHLORIDE 1 MG/ML
INJECTION INTRAMUSCULAR; INTRAVENOUS AS NEEDED
Status: DISCONTINUED | OUTPATIENT
Start: 2019-02-21 | End: 2019-02-21 | Stop reason: SURG

## 2019-02-21 RX ORDER — METOCLOPRAMIDE HYDROCHLORIDE 5 MG/ML
10 INJECTION INTRAMUSCULAR; INTRAVENOUS ONCE AS NEEDED
Status: DISCONTINUED | OUTPATIENT
Start: 2019-02-21 | End: 2019-02-21 | Stop reason: HOSPADM

## 2019-02-21 RX ORDER — BUPIVACAINE HYDROCHLORIDE 2.5 MG/ML
INJECTION, SOLUTION EPIDURAL; INFILTRATION; INTRACAUDAL AS NEEDED
Status: DISCONTINUED | OUTPATIENT
Start: 2019-02-21 | End: 2019-02-21 | Stop reason: HOSPADM

## 2019-02-21 RX ORDER — DIPHENHYDRAMINE HYDROCHLORIDE 50 MG/ML
12.5 INJECTION INTRAMUSCULAR; INTRAVENOUS ONCE AS NEEDED
Status: COMPLETED | OUTPATIENT
Start: 2019-02-21 | End: 2019-02-21

## 2019-02-21 RX ADMIN — SODIUM CHLORIDE, SODIUM LACTATE, POTASSIUM CHLORIDE, AND CALCIUM CHLORIDE: .6; .31; .03; .02 INJECTION, SOLUTION INTRAVENOUS at 08:59

## 2019-02-21 RX ADMIN — KETOROLAC TROMETHAMINE 30 MG: 30 INJECTION, SOLUTION INTRAMUSCULAR; INTRAVENOUS at 09:20

## 2019-02-21 RX ADMIN — FENTANYL CITRATE 50 MCG: 50 INJECTION, SOLUTION INTRAMUSCULAR; INTRAVENOUS at 08:23

## 2019-02-21 RX ADMIN — ONDANSETRON 4 MG: 2 INJECTION INTRAMUSCULAR; INTRAVENOUS at 09:19

## 2019-02-21 RX ADMIN — MIDAZOLAM HYDROCHLORIDE 2 MG: 1 INJECTION, SOLUTION INTRAMUSCULAR; INTRAVENOUS at 07:36

## 2019-02-21 RX ADMIN — CEFAZOLIN SODIUM 1000 MG: 1 SOLUTION INTRAVENOUS at 07:36

## 2019-02-21 RX ADMIN — ROCURONIUM BROMIDE 50 MG: 10 INJECTION, SOLUTION INTRAVENOUS at 07:43

## 2019-02-21 RX ADMIN — ROCURONIUM BROMIDE 20 MG: 10 INJECTION, SOLUTION INTRAVENOUS at 08:13

## 2019-02-21 RX ADMIN — PROPOFOL 170 MG: 10 INJECTION, EMULSION INTRAVENOUS at 07:42

## 2019-02-21 RX ADMIN — FENTANYL CITRATE 50 MCG: 50 INJECTION, SOLUTION INTRAMUSCULAR; INTRAVENOUS at 07:39

## 2019-02-21 RX ADMIN — DEXAMETHASONE SODIUM PHOSPHATE 8 MG: 4 INJECTION, SOLUTION INTRAMUSCULAR; INTRAVENOUS at 07:50

## 2019-02-21 RX ADMIN — FENTANYL CITRATE 50 MCG: 50 INJECTION, SOLUTION INTRAMUSCULAR; INTRAVENOUS at 08:10

## 2019-02-21 RX ADMIN — OXYCODONE HYDROCHLORIDE AND ACETAMINOPHEN 1 TABLET: 5; 325 TABLET ORAL at 11:28

## 2019-02-21 RX ADMIN — EPHEDRINE SULFATE 10 MG: 50 INJECTION, SOLUTION INTRAMUSCULAR; INTRAVENOUS; SUBCUTANEOUS at 07:57

## 2019-02-21 RX ADMIN — SCOPALAMINE 1 PATCH: 1 PATCH, EXTENDED RELEASE TRANSDERMAL at 07:14

## 2019-02-21 RX ADMIN — FENTANYL CITRATE 50 MCG: 50 INJECTION, SOLUTION INTRAMUSCULAR; INTRAVENOUS at 07:37

## 2019-02-21 RX ADMIN — EPHEDRINE SULFATE 10 MG: 50 INJECTION, SOLUTION INTRAMUSCULAR; INTRAVENOUS; SUBCUTANEOUS at 08:49

## 2019-02-21 RX ADMIN — EPHEDRINE SULFATE 10 MG: 50 INJECTION, SOLUTION INTRAMUSCULAR; INTRAVENOUS; SUBCUTANEOUS at 08:06

## 2019-02-21 RX ADMIN — NEOSTIGMINE METHYLSULFATE 3 MG: 1 INJECTION INTRAMUSCULAR; INTRAVENOUS; SUBCUTANEOUS at 09:27

## 2019-02-21 RX ADMIN — DIPHENHYDRAMINE HYDROCHLORIDE 12.5 MG: 50 INJECTION, SOLUTION INTRAMUSCULAR; INTRAVENOUS at 09:53

## 2019-02-21 RX ADMIN — GLYCOPYRROLATE 0.6 MG: 0.2 INJECTION, SOLUTION INTRAMUSCULAR; INTRAVENOUS at 09:26

## 2019-02-21 RX ADMIN — ROCURONIUM BROMIDE 20 MG: 10 INJECTION, SOLUTION INTRAVENOUS at 08:38

## 2019-02-21 RX ADMIN — SODIUM CHLORIDE, SODIUM LACTATE, POTASSIUM CHLORIDE, AND CALCIUM CHLORIDE 125 ML/HR: .6; .31; .03; .02 INJECTION, SOLUTION INTRAVENOUS at 07:14

## 2019-02-21 NOTE — OP NOTE
OPERATIVE REPORT  PATIENT NAME: Shahid Vargas    :  1965  MRN: [id number]  Pt Location: [facility] OR ROOM 02    SURGERY DATE: 2019    Surgeon(s) and Role:     Ayala Ferraro MD - Primary     * Ambar Tate PA-C - Assisting    Preop Diagnosis:  Personal history of malignant neoplasm of breast [Z85 3] basal cell carcinoma left back and right upper chest    Post-Op Diagnosis Codes:     * Personal history of malignant neoplasm of breast [Z85 3] basal cell carcinoma left back and right upper chest     Procedure 1  Excision basal cell carcinoma left upper back 2 point 3 cm excised diameter, 5 0 cm complex closure 2  Excision basal cell carcinoma right upper chest 2 1 cm excised diameter 4 3 cm complex closure 3  Removal intact left breast tissue expander 2  Left breast capsulectomy 3  Placement left breast implant for reconstruction   Specimen(s):  ID Type Source Tests Collected by Time Destination   1 : BCC lt  upper back long suture 1200 superior aspect, short suture 6:00 inferior Tissue Back TISSUE EXAM Palmira Pan MD 2019 0802    2 : +rt  chest wallBCC long suture 1200  superior, short suture 600 inferior Tissue Chest Wall TISSUE Shannan Alvarez MD 2019 0825    3 : mastectomy scar, long suture lateral, short suture left breast Tissue Breast, Left TISSUE Shannan Alvarez MD 2019 6457        Estimated Blood Loss:   Minimal    Drains:  Closed/Suction Drain Left Back Bulb 15 Fr  (Active)   Number of days: 532       Closed/Suction Drain Left Breast Bulb 15 Fr   (Active)   Number of days: 532       Anesthesia Type:   General    Operative Indications:  Personal history of malignant neoplasm of breast [Z85 3]  Basal cell carcinoma left back, right upper chest    Operative Findings  As above    Complications:   None    Procedure and Technique:  PETERSON was seen in the holding area preoperatively, the surgical sites were marked with her participation, and we reviewed the planned procedures as well as potential risks, complications, and limitations  She was taken to the operating room in and underwent induction of general anesthesia by the anesthesia personnel  The operative field was prepped and draped in sterile fashion and a proper time-out was performed  The left upper back basal cell carcinoma was marked for an elliptical excision and infiltrated with xylocaine with epinephrine  A 15 blade used to create skin incision, this was carried down through the dermis and lesion was excised deep in the plane of subcutaneous fat with curved iris scissors  Was marked with sutures to be sent to pathology  The wound edges were then widely and circumferentially undermined with the Bovie cautery deep to the dermis around the entire circumference of the wound in order to close the wound without significant, undue tension  The wound was irrigated hemostasis assured the Bovie cautery  Closure was undertaken with 3-0 and 4-0 PDS sutures  At the level of the deep dermis this was followed by running subcuticular 4-0 PDS  Benzoin, Steri-Strips and a dry sterile dressing were applied  An identical procedure was performed on the basal cell carcinoma of the right upper chest, similar dressings were applied  Both breasts were then prepped in usual fashion for breast surgery, the breasts were prepped and draped in sterile fashion  The incision was placed at the inferior most aspect of the latissimus dorsi skin  paddle, portion of the scar was excised, it was marked and sent to pathology  Bovie cautery was then used to dissect through subcutaneous tissue down through the latissimus muscle and to the level of the expander capsule  The capsule was opened with Metzenbaum scissors in the tissue expander was removed without difficulty  The pocket was then thoroughly irrigated with antibiotic solution    A nearly complete capsulectomy was then performed to the anterior surface overlying the expander this was performed utilizing the Bovie cautery hemostasis was assured with the cautery as the capsulectomy was performed  The wound was irrigated several times with antibiotic solution  Following the capsulectomy, a capsulotomy was performed around the medial and superior base of the subpectoral pocket due to the constriction that had occurred with the expander in place  Following this several implant sizers were placed, initially a 375 cc high-profile Sizer, S next a 375 cc moderate plus profile, and finally a 350 cc moderate profile plus Sizer were placed  At each stage the patient was assessed from the foot of the bed both the supine and upright positions  350 cc moderate plus Sizer gave the best symmetry with the opposite side  The Sizer was then removed and the pocket again irrigated with antibiotic solution  The skin was re-prepped and draped in sterile fashion, there instruments were wiped down with antibiotic solution, and the surgical team's gloves were changed  A mentor 350 cc moderate plus smooth round silicone implant was prepared, soaked in antibiotic solution and placed within the [facility]  It was delivered into the pocket without difficulty, position under direct vision  Closure was undertaken with 2-0 Vicryl sutures at the level of the capsule, prior to placing the last sutures at this level 10 cc of 0 25% Marcaine was instilled into the pocket  The skin edges were then reapproximated with 3-0 Vicryl buried at the level of deep dermis and the skin was closed with running subcuticular strata fix  Benzoin Steri-Strips, dry sterile dressing, and a surgical bra were then applied  The patient was transferred to the recovery room stable condition     I was present for the entire procedure    Patient Disposition:  PACU     SIGNATURE: Nahomi Hidalgo MD  DATE: February 21, 2019  TIME: 9:37 AM

## 2019-02-21 NOTE — DISCHARGE INSTRUCTIONS
[facility]  Dr Lupillo Rios   [address] [facility], [address]  Phone: [phone]     Postoperative Instructions for Outpatient Surgery     These instructions are being provided by your doctor to give you basic guidelines during your post-op recovery  Please let our office know if your contact information has changed       Please call the office today for an appointment Tomorrow/Friday for postoperative care      Dressings:  Keep clean and dry until seen tomorrow in the office      Activity Restrictions:  Nothing strenuous      Bathing:  No bathing until seen in the office      Medications:    Resume pre-op medications  You may take tylenol, aleve, or ibuprofen for pain control             Percocet as needed for pain  Keflex for 1 week for infection prevention  Other:  Apply ice to breast at 15 minute intervals as needed for pain or swelling

## 2019-02-21 NOTE — ANESTHESIA PREPROCEDURE EVALUATION
Review of Systems/Medical History  Patient summary reviewed  Chart reviewed  History of anesthetic complications PONV    Cardiovascular  Negative cardio ROS Exercise tolerance (METS): >4,     Pulmonary  Pneumonia, Asthma , well controlled/ stable Last rescue: < 6 months ago Asthma type of rescue: PRN inhaler,        GI/Hepatic  Negative GI/hepatic ROS          Negative  ROS        Endo/Other  Negative endo/other ROS      GYN    Breast cancer Right mastectomy and left mastectomy       Hematology  Negative hematology ROS      Musculoskeletal    Arthritis     Neurology  Negative neurology ROS      Psychology   Anxiety,              Physical Exam    Airway    Mallampati score: II  TM Distance: >3 FB  Neck ROM: full     Dental   No notable dental hx     Cardiovascular  Comment: Negative ROS, Rhythm: regular, Rate: normal, Cardiovascular exam normal    Pulmonary  Pulmonary exam normal Breath sounds clear to auscultation,     Other Findings        Anesthesia Plan  ASA Score- 2     Anesthesia Type- general with ASA Monitors  Additional Monitors:   Airway Plan: ETT  Comment: R/B/A d/w pt  Plan GA  PONV precautions  Plan Factors-    Induction- intravenous  Postoperative Plan- Plan for postoperative opioid use  Informed Consent- Anesthetic plan and risks discussed with patient  I personally reviewed this patient with the CRNA  Discussed and agreed on the Anesthesia Plan with the CRNA  Azucena Steele

## 2019-02-21 NOTE — ANESTHESIA POSTPROCEDURE EVALUATION
Post-Op Assessment Note    CV Status:  Stable  Pain Score: 0    Pain management: adequate     Mental Status:  Alert and awake   Hydration Status:  Euvolemic and stable   PONV Controlled:  Inadequately controlled   Airway Patency:  Patent   Post Op Vitals Reviewed: Yes      Staff: CRNA           BP      Temp      Pulse     Resp      SpO2

## 2019-02-21 NOTE — INTERIM OP NOTE
BREAST EXPANDER/IMPLANT EXCHANGE, UPPER CHEST BASAL CELL CARSINOMA EXCISION, UPPER BACK BCC EXCISION, RIGHT upper chest & LEFT upper back complex closure, CAPSULECTOMY  Postoperative Note  PATIENT NAME: Kedar Naranjo  : 1965  MRN: [id number]  [facility] OR ROOM 02    Surgery Date: 2019    Preop Diagnosis:  Personal history of malignant neoplasm of breast [Z85 3]    Post-Op Diagnosis Codes:     * Personal history of malignant neoplasm of breast [Z85 3]    Procedure(s) (LRB):  BREAST EXPANDER/IMPLANT EXCHANGE (Left)  UPPER CHEST BASAL CELL CARSINOMA EXCISION (Right)  UPPER BACK BCC EXCISION (Left)  RIGHT upper chest & LEFT upper back complex closure (N/A)  CAPSULECTOMY (Left)    Surgeon(s) and Role:     * Adriel Ring MD - Primary     * Rivka Szymanski PA-C - Assisting    Specimens:  ID Type Source Tests Collected by Time Destination   1 : BCC lt  upper back long suture 1200 superior aspect, short suture 6:00 inferior Tissue Back TISSUE Yana Preston MD 2019 0802    2 : +rt   chest wallBCC long suture 1200  superior, short suture 600 inferior Tissue Chest Wall TISSUE Yana Preston MD 2019 0825    3 : mastectomy scar, long suture lateral, short suture left breast Tissue Breast, Left TISSUE Yana Preston MD 2019 4460        Estimated Blood Loss:   Minimal    Anesthesia Type:   General     Findings:    None  Complications:   None    SIGNATURE: Rivka Szymanski PA-C   DATE: 2019   TIME: 9:42 AM

## 2019-02-21 NOTE — ADDENDUM NOTE
Addendum  created 02/21/19 1247 by Heidy Boyce,     Intraprocedure Event edited, Order sets accessed

## 2019-02-22 ENCOUNTER — OFFICE VISIT (OUTPATIENT)
Dept: PLASTIC SURGERY | Facility: CLINIC | Age: 54
End: 2019-02-22

## 2019-02-22 DIAGNOSIS — Z17.0 BILATERAL MALIGNANT NEOPLASM OF BREAST IN FEMALE, ESTROGEN RECEPTOR POSITIVE, UNSPECIFIED SITE OF BREAST (HCC): ICD-10-CM

## 2019-02-22 DIAGNOSIS — C50.911 BILATERAL MALIGNANT NEOPLASM OF BREAST IN FEMALE, ESTROGEN RECEPTOR POSITIVE, UNSPECIFIED SITE OF BREAST (HCC): ICD-10-CM

## 2019-02-22 DIAGNOSIS — C50.912 BILATERAL MALIGNANT NEOPLASM OF BREAST IN FEMALE, ESTROGEN RECEPTOR POSITIVE, UNSPECIFIED SITE OF BREAST (HCC): ICD-10-CM

## 2019-02-22 DIAGNOSIS — C44.519 BASAL CELL CARCINOMA (BCC) OF SKIN OF TRUNK: Primary | ICD-10-CM

## 2019-02-22 PROCEDURE — 99024 POSTOP FOLLOW-UP VISIT: CPT | Performed by: PHYSICIAN ASSISTANT

## 2019-02-22 NOTE — PROGRESS NOTES
Assessment/Plan:   Kim Arceo is a pleasant 61-year-old female who is postop day #1 status post left breast tissue expander removal with implant placement and capsulectomy, excision basal cell carcinoma left upper back and right upper chest with complex closure  Please see HPI  She can shower tomorrow after removing her dressing  We will see her back in 2 weeks for suture removal   She is encouraged to follow up sooner with any concerns  Diagnoses and all orders for this visit:    Basal cell carcinoma (BCC) of skin of trunk    Bilateral malignant neoplasm of breast in female, estrogen receptor positive, [facility] site [facility]          Subjective:     Patient ID: Demetri Cabral is a 48 y o  female  HPI   Kim Arceo is a pleasant 61-year-old female who is postop day #1 status post left breast tissue expander removal with implant placement and capsulectomy, excision basal cell carcinoma left upper back and right upper chest with complex closure  She reports a sloshing sound underneath the left arm  Otherwise she is well  Review of Systems   Skin:        As per HPI  Objective:     Physical Exam   Skin:   Outer dressings removed  Steri-Strips all intact  No evidence of erythema or ecchymosis noted  New dressing applied

## 2019-02-22 NOTE — LETTER
February 22, 2019     Petroleum Oyster, DO  [address] [facility],[address]  [address]    Patient: Saji Chapman   YOB: 1965   Date of Visit: 2/22/2019       Dear Dr Abbie Delaney: Thank you for referring Saji Chapman to me for evaluation  Below are my notes for this consultation  If you have questions, please do not hesitate to call me  I look forward to following your patient along with you  Sincerely,        Antonio Morgan PA-C        CC: MD Antonio Fischer PA-C  2/22/2019 10:35 AM  Sign at close encounter  Assessment/Plan:   Salvador is a pleasant 45-year-old female who is postop day #1 status post left breast tissue expander removal with implant placement and capsulectomy, excision basal cell carcinoma left upper back and right upper chest with complex closure  Please see HPI  She can shower tomorrow after removing her dressing  We will see her back in 2 weeks for suture removal   She is encouraged to follow up sooner with any concerns  Diagnoses and all orders for this visit:    Basal cell carcinoma (BCC) of skin of trunk    Bilateral malignant neoplasm of breast in female, estrogen receptor positive, unspecified site of breast ([facility]          Subjective:     Patient ID: Saji Chapman is a 48 y o  female  HPI   Salvador is a pleasant 45-year-old female who is postop day #1 status post left breast tissue expander removal with implant placement and capsulectomy, excision basal cell carcinoma left upper back and right upper chest with complex closure  She reports a sloshing sound underneath the left arm  Otherwise she is well  Review of Systems   Skin:        As per HPI  Objective:     Physical Exam   Skin:   Outer dressings removed  Steri-Strips all intact  No evidence of erythema or ecchymosis noted  New dressing applied

## 2019-02-26 DIAGNOSIS — Z71.89 ENCOUNTER TO DISCUSS BREAST RECONSTRUCTION: ICD-10-CM

## 2019-02-26 RX ORDER — OXYCODONE HYDROCHLORIDE AND ACETAMINOPHEN 5; 325 MG/1; MG/1
1 TABLET ORAL EVERY 4 HOURS PRN
Qty: 18 TABLET | Refills: 0 | Status: SHIPPED | OUTPATIENT
Start: 2019-02-26 | End: 2019-06-10 | Stop reason: SDUPTHER

## 2019-03-06 ENCOUNTER — OFFICE VISIT (OUTPATIENT)
Dept: PLASTIC SURGERY | Facility: HOSPITAL | Age: 54
End: 2019-03-06

## 2019-03-06 DIAGNOSIS — C44.519 BASAL CELL CARCINOMA (BCC) OF SKIN OF TRUNK: Primary | ICD-10-CM

## 2019-03-06 DIAGNOSIS — Z85.3 PERSONAL HISTORY OF MALIGNANT NEOPLASM OF BREAST: ICD-10-CM

## 2019-03-06 PROCEDURE — 99024 POSTOP FOLLOW-UP VISIT: CPT | Performed by: PHYSICIAN ASSISTANT

## 2019-03-06 NOTE — H&P (VIEW-ONLY)
Assessment/Plan:   Valente Solitario is a pleasant 21-year-old female who is 2 weeks status post left breast tissue expander removal with implant placement and capsulectomy, excision basal cell carcinoma left upper back and right upper chest with complex closure  Please see HPI  Her pathology chest, back and mastectomy scar all revealed clear margins  This was reviewed with her  She was given instructions on how to use silicone scar gel  She may begin implant displacement exercises  I also encouraged her to massage the area of discomfort  We will see her back in approximately 6-8 weeks to discuss fat grafting  Diagnoses and all orders for this visit:    Basal cell carcinoma (BCC) of skin of trunk    Personal history of malignant neoplasm of breast          Subjective:     Patient ID: Vilma Hunter is a 48 y o  female  HPI   Valente Solitario is a pleasant 21-year-old female who is 2 weeks status post left breast tissue expander removal with implant placement and capsulectomy, excision basal cell carcinoma left upper back and right upper chest with complex closure  She reports some discomfort on her left lateral breast   She has been more active lately  Review of Systems   Skin:        As per HPI  Objective:     Physical Exam   Skin:   All incisions are clean, dry and intact  Suture knots were removed

## 2019-03-06 NOTE — LETTER
March 6, 2019     [address]     Patient: Rebekah Mendez   YOB: 1965   Date of Visit: 3/6/2019       Dear Dr Dorcas Arredondo: Thank you for referring Rebekah Mendez to me for evaluation  Below are my notes for this consultation  If you have questions, please do not hesitate to call me  I look forward to following your patient along with you  Sincerely,        Marcela Garcia PA-C        CC: MD Taye Michael MD Kristina Hutch, PA-C  3/6/2019 10:27 AM  Sign at close encounter  Assessment/Plan:   Yassine Alas is a pleasant 68-year-old female who is 2 weeks status post left breast tissue expander removal with implant placement and capsulectomy, excision basal cell carcinoma left upper back and right upper chest with complex closure  Please see HPI  Her pathology chest, back and mastectomy scar all revealed clear margins  This was reviewed with her  She was given instructions on how to use silicone scar gel  She may begin implant displacement exercises  I also encouraged her to massage the area of discomfort  We will see her back in approximately 6-8 weeks to discuss fat grafting  Diagnoses and all orders for this visit:    Basal cell carcinoma (BCC) of skin of trunk    Personal history of malignant neoplasm of breast          Subjective:     Patient ID: Rebekah Mendez is a 48 y o  female  HPI   Yassine Alas is a pleasant 68-year-old female who is 2 weeks status post left breast tissue expander removal with implant placement and capsulectomy, excision basal cell carcinoma left upper back and right upper chest with complex closure  She reports some discomfort on her left lateral breast   She has been more active lately  Review of Systems   Skin:        As per HPI  Objective:     Physical Exam   Skin:   All incisions are clean, dry and intact  Suture knots were removed

## 2019-03-06 NOTE — PROGRESS NOTES
Assessment/Plan:   Durga Marion is a pleasant 61-year-old female who is 2 weeks status post left breast tissue expander removal with implant placement and capsulectomy, excision basal cell carcinoma left upper back and right upper chest with complex closure  Please see HPI  Her pathology chest, back and mastectomy scar all revealed clear margins  This was reviewed with her  She was given instructions on how to use silicone scar gel  She may begin implant displacement exercises  I also encouraged her to massage the area of discomfort  We will see her back in approximately 6-8 weeks to discuss fat grafting  Diagnoses and all orders for this visit:    Basal cell carcinoma (BCC) of skin of trunk    Personal history of malignant neoplasm of breast          Subjective:     Patient ID: González Kaye is a 48 y o  female  HPI   Durga Marion is a pleasant 61-year-old female who is 2 weeks status post left breast tissue expander removal with implant placement and capsulectomy, excision basal cell carcinoma left upper back and right upper chest with complex closure  She reports some discomfort on her left lateral breast   She has been more active lately  Review of Systems   Skin:        As per HPI  Objective:     Physical Exam   Skin:   All incisions are clean, dry and intact  Suture knots were removed

## 2019-03-07 ENCOUNTER — HOSPITAL ENCOUNTER (OUTPATIENT)
Facility: HOSPITAL | Age: 54
Setting detail: OUTPATIENT SURGERY
Discharge: HOME | End: 2019-03-07
Attending: SURGERY | Admitting: SURGERY
Payer: COMMERCIAL

## 2019-03-07 ENCOUNTER — ANESTHESIA EVENT (OUTPATIENT)
Dept: PERIOP | Facility: HOSPITAL | Age: 54
End: 2019-03-07
Payer: COMMERCIAL

## 2019-03-07 ENCOUNTER — ANESTHESIA (OUTPATIENT)
Dept: PERIOP | Facility: HOSPITAL | Age: 54
End: 2019-03-07
Payer: COMMERCIAL

## 2019-03-07 VITALS
DIASTOLIC BLOOD PRESSURE: 58 MMHG | WEIGHT: 166.2 LBS | HEART RATE: 98 BPM | OXYGEN SATURATION: 96 % | BODY MASS INDEX: 26.71 KG/M2 | RESPIRATION RATE: 15 BRPM | SYSTOLIC BLOOD PRESSURE: 113 MMHG | TEMPERATURE: 98.1 F | HEIGHT: 66 IN

## 2019-03-07 DIAGNOSIS — C44.519 BASAL CELL CARCINOMA (BCC) OF SKIN OF TRUNK: ICD-10-CM

## 2019-03-07 DIAGNOSIS — C44.311 BASAL CELL CARCINOMA (BCC) OF RIGHT SIDE OF NOSE: ICD-10-CM

## 2019-03-07 LAB — EXT PREGNANCY TEST URINE: NEGATIVE

## 2019-03-07 PROCEDURE — 15260 FTH/GFT FR N/E/E/L 20 SQCM/<: CPT | Performed by: PHYSICIAN ASSISTANT

## 2019-03-07 PROCEDURE — 15004 WOUND PREP F/N/HF/G: CPT | Performed by: PHYSICIAN ASSISTANT

## 2019-03-07 PROCEDURE — 13132 CMPLX RPR F/C/C/M/N/AX/G/H/F: CPT | Performed by: SURGERY

## 2019-03-07 PROCEDURE — 88305 TISSUE EXAM BY PATHOLOGIST: CPT | Performed by: PATHOLOGY

## 2019-03-07 PROCEDURE — 81025 URINE PREGNANCY TEST: CPT | Performed by: SURGERY

## 2019-03-07 PROCEDURE — 15260 FTH/GFT FR N/E/E/L 20 SQCM/<: CPT | Performed by: SURGERY

## 2019-03-07 PROCEDURE — 11642 EXC F/E/E/N/L MAL+MRG 1.1-2: CPT | Performed by: PHYSICIAN ASSISTANT

## 2019-03-07 PROCEDURE — 13132 CMPLX RPR F/C/C/M/N/AX/G/H/F: CPT | Performed by: PHYSICIAN ASSISTANT

## 2019-03-07 PROCEDURE — 11642 EXC F/E/E/N/L MAL+MRG 1.1-2: CPT | Performed by: SURGERY

## 2019-03-07 PROCEDURE — 15004 WOUND PREP F/N/HF/G: CPT | Performed by: SURGERY

## 2019-03-07 RX ORDER — FENTANYL CITRATE/PF 50 MCG/ML
25 SYRINGE (ML) INJECTION
Status: DISCONTINUED | OUTPATIENT
Start: 2019-03-07 | End: 2019-03-07 | Stop reason: HOSPADM

## 2019-03-07 RX ORDER — ONDANSETRON 2 MG/ML
INJECTION INTRAMUSCULAR; INTRAVENOUS AS NEEDED
Status: DISCONTINUED | OUTPATIENT
Start: 2019-03-07 | End: 2019-03-07 | Stop reason: SURG

## 2019-03-07 RX ORDER — GLYCOPYRROLATE 0.2 MG/ML
INJECTION INTRAMUSCULAR; INTRAVENOUS AS NEEDED
Status: DISCONTINUED | OUTPATIENT
Start: 2019-03-07 | End: 2019-03-07 | Stop reason: SURG

## 2019-03-07 RX ORDER — EPHEDRINE SULFATE 50 MG/ML
INJECTION INTRAVENOUS AS NEEDED
Status: DISCONTINUED | OUTPATIENT
Start: 2019-03-07 | End: 2019-03-07 | Stop reason: SURG

## 2019-03-07 RX ORDER — ONDANSETRON 2 MG/ML
4 INJECTION INTRAMUSCULAR; INTRAVENOUS ONCE
Status: DISCONTINUED | OUTPATIENT
Start: 2019-03-07 | End: 2019-03-07 | Stop reason: HOSPADM

## 2019-03-07 RX ORDER — ACETAMINOPHEN 325 MG/1
650 TABLET ORAL EVERY 6 HOURS
Status: DISCONTINUED | OUTPATIENT
Start: 2019-03-07 | End: 2019-03-07 | Stop reason: HOSPADM

## 2019-03-07 RX ORDER — MIDAZOLAM HYDROCHLORIDE 1 MG/ML
INJECTION INTRAMUSCULAR; INTRAVENOUS AS NEEDED
Status: DISCONTINUED | OUTPATIENT
Start: 2019-03-07 | End: 2019-03-07 | Stop reason: SURG

## 2019-03-07 RX ORDER — LIDOCAINE HYDROCHLORIDE 10 MG/ML
INJECTION, SOLUTION INFILTRATION; PERINEURAL AS NEEDED
Status: DISCONTINUED | OUTPATIENT
Start: 2019-03-07 | End: 2019-03-07 | Stop reason: SURG

## 2019-03-07 RX ORDER — SCOLOPAMINE TRANSDERMAL SYSTEM 1 MG/1
PATCH, EXTENDED RELEASE TRANSDERMAL AS NEEDED
Status: DISCONTINUED | OUTPATIENT
Start: 2019-03-07 | End: 2019-03-07 | Stop reason: SURG

## 2019-03-07 RX ORDER — SODIUM CHLORIDE, SODIUM LACTATE, POTASSIUM CHLORIDE, CALCIUM CHLORIDE 600; 310; 30; 20 MG/100ML; MG/100ML; MG/100ML; MG/100ML
125 INJECTION, SOLUTION INTRAVENOUS CONTINUOUS
Status: DISCONTINUED | OUTPATIENT
Start: 2019-03-07 | End: 2019-03-07 | Stop reason: HOSPADM

## 2019-03-07 RX ORDER — FENTANYL CITRATE 50 UG/ML
INJECTION, SOLUTION INTRAMUSCULAR; INTRAVENOUS AS NEEDED
Status: DISCONTINUED | OUTPATIENT
Start: 2019-03-07 | End: 2019-03-07 | Stop reason: SURG

## 2019-03-07 RX ORDER — METOCLOPRAMIDE HYDROCHLORIDE 5 MG/ML
10 INJECTION INTRAMUSCULAR; INTRAVENOUS ONCE
Status: DISCONTINUED | OUTPATIENT
Start: 2019-03-07 | End: 2019-03-07 | Stop reason: HOSPADM

## 2019-03-07 RX ORDER — CEFAZOLIN SODIUM 1 G/50ML
1000 SOLUTION INTRAVENOUS ONCE
Status: COMPLETED | OUTPATIENT
Start: 2019-03-07 | End: 2019-03-07

## 2019-03-07 RX ORDER — PROPOFOL 10 MG/ML
INJECTION, EMULSION INTRAVENOUS AS NEEDED
Status: DISCONTINUED | OUTPATIENT
Start: 2019-03-07 | End: 2019-03-07 | Stop reason: SURG

## 2019-03-07 RX ADMIN — GLYCOPYRROLATE 0.15 MG: 0.2 INJECTION, SOLUTION INTRAMUSCULAR; INTRAVENOUS at 15:26

## 2019-03-07 RX ADMIN — FENTANYL CITRATE 50 MCG: 50 INJECTION, SOLUTION INTRAMUSCULAR; INTRAVENOUS at 15:27

## 2019-03-07 RX ADMIN — FENTANYL CITRATE 25 MCG: 50 INJECTION, SOLUTION INTRAMUSCULAR; INTRAVENOUS at 15:50

## 2019-03-07 RX ADMIN — PROPOFOL 200 MG: 10 INJECTION, EMULSION INTRAVENOUS at 15:30

## 2019-03-07 RX ADMIN — CEFAZOLIN SODIUM 1000 MG: 1 SOLUTION INTRAVENOUS at 15:37

## 2019-03-07 RX ADMIN — ONDANSETRON 4 MG: 2 INJECTION INTRAMUSCULAR; INTRAVENOUS at 16:36

## 2019-03-07 RX ADMIN — DEXAMETHASONE SODIUM PHOSPHATE 6 MG: 10 INJECTION INTRAMUSCULAR; INTRAVENOUS at 15:34

## 2019-03-07 RX ADMIN — ACETAMINOPHEN 650 MG: 325 TABLET, FILM COATED ORAL at 17:13

## 2019-03-07 RX ADMIN — FENTANYL CITRATE 50 MCG: 50 INJECTION, SOLUTION INTRAMUSCULAR; INTRAVENOUS at 16:40

## 2019-03-07 RX ADMIN — PHENYLEPHRINE HYDROCHLORIDE 100 MCG: 10 INJECTION INTRAVENOUS at 16:07

## 2019-03-07 RX ADMIN — FENTANYL CITRATE 25 MCG: 50 INJECTION, SOLUTION INTRAMUSCULAR; INTRAVENOUS at 15:52

## 2019-03-07 RX ADMIN — LIDOCAINE HYDROCHLORIDE 15 MG: 10 INJECTION, SOLUTION INFILTRATION; PERINEURAL at 15:28

## 2019-03-07 RX ADMIN — PHENYLEPHRINE HYDROCHLORIDE 50 MCG: 10 INJECTION INTRAVENOUS at 15:58

## 2019-03-07 RX ADMIN — SODIUM CHLORIDE, SODIUM LACTATE, POTASSIUM CHLORIDE, AND CALCIUM CHLORIDE 125 ML/HR: .6; .31; .03; .02 INJECTION, SOLUTION INTRAVENOUS at 13:38

## 2019-03-07 RX ADMIN — SCOPALAMINE 1 PATCH: 1 PATCH, EXTENDED RELEASE TRANSDERMAL at 15:05

## 2019-03-07 RX ADMIN — EPHEDRINE SULFATE 10 MG: 50 INJECTION, SOLUTION INTRAVENOUS at 16:14

## 2019-03-07 RX ADMIN — MIDAZOLAM HYDROCHLORIDE 2 MG: 1 INJECTION, SOLUTION INTRAMUSCULAR; INTRAVENOUS at 15:26

## 2019-03-07 NOTE — ANESTHESIA PREPROCEDURE EVALUATION
Review of Systems/Medical History  Patient summary reviewed  Chart reviewed  History of anesthetic complications PONV    Cardiovascular  EKG reviewed, Negative cardio ROS    Pulmonary  Negative pulmonary ROS Asthma , well controlled/ stable Last rescue: > 1 year ago Asthma type of rescue: PRN medication usage,        GI/Hepatic  Negative GI/hepatic ROS          Negative  ROS        Endo/Other  Negative endo/other ROS      GYN  Negative gynecology ROS   Breast cancer Right mastectomy, left mastectomy, axillary node dissection, left lumpectomy and right lumpectomy       Hematology  Negative hematology ROS      Musculoskeletal  Negative musculoskeletal ROS   Arthritis     Neurology  Negative neurology ROS      Psychology   Negative psychology ROS Anxiety,              Physical Exam    Airway    Mallampati score: I  TM Distance: >3 FB  Neck ROM: full     Dental   No notable dental hx     Cardiovascular  Comment: Negative ROS, Rhythm: regular, Rate: normal, Cardiovascular exam normal    Pulmonary  Pulmonary exam normal Breath sounds clear to auscultation,     Other Findings        Anesthesia Plan  ASA Score- 2     Anesthesia Type- general with ASA Monitors  Additional Monitors:   Airway Plan: LMA  Plan Factors-    Induction- intravenous  Postoperative Plan- Plan for postoperative opioid use  Informed Consent- Anesthetic plan and risks discussed with patient  I personally reviewed this patient with the CRNA  Discussed and agreed on the Anesthesia Plan with the KATHY Morgan

## 2019-03-07 NOTE — INTERIM OP NOTE
RECONSTRUCTION MOHS DEFECT RIGHT NOSE/MEDIAL CANTHUS, EXCISION BCC LEFT FOREHEAD, COMPLEX CLOSURE, FLAP, SKIN GRAFT FULL THICKNESS  ([facility])  Postoperative Note  PATIENT NAME: Travis Butler  : 1965  MRN: [id number]  [facility] OR ROOM 02    Surgery Date: 3/7/2019    Preop Diagnosis:  Basal cell carcinoma (BCC) of skin of trunk [C44 519]  Basal cell carcinoma (BCC) of right side of nose [C44 311]    Post-Op Diagnosis Codes:      * Basal cell carcinoma (BCC) of skin of trunk [C44 519]     * Basal cell carcinoma (BCC) of right side of nose [C44 311]    Procedure(s) (LRB):  RECONSTRUCTION MOHS DEFECT RIGHT NOSE/MEDIAL CANTHUS (Right)  EXCISION BCC LEFT FOREHEAD (N/A)  COMPLEX CLOSURE (N/A)  FLAP (Right)  SKIN GRAFT FULL THICKNESS  ([facility]) (Right)    Surgeon(s) and Role:     * Jelena Raines MD - Primary     * Carmella Kim PA-C - Assisting     * Anisha Gregory DPM - Assisting    Specimens:  ID Type Source Tests Collected by Time Destination   1 : bcc left forehead long suture 12 o'clock superior, short suture 6o'clock inferior Tissue Lesion TISSUE Ministerio Weldon MD 3/7/2019 1552        Estimated Blood Loss:   Minimal    Anesthesia Type:   IV Sedation with Anesthesia     Findings:    None  Complications:   None    SIGNATURE: Carmella Kim PA-C   DATE: 2019   TIME: 4:42 PM

## 2019-03-07 NOTE — DISCHARGE INSTRUCTIONS
[facility]  Dr Preston Cameron   [address] [facility], [address]  Phone: [phone]     Postoperative Instructions for Outpatient Surgery     These instructions are being provided by your doctor to give you basic guidelines during your post-op recovery  Please let our office know if your contact information has changed       Please call the office today for an appointment in 4 days for postoperative care      Dressings: Keep nose dressing clean  Forehead and neck with steri strips, replace if they fall off       Activity Restrictions: Nothing strenuous for 48 hours       Bathing:  May shower tomorrow but, keep nose dressing dry       Medications:    Resume pre-op medications  You may take tylenol, aleve, or ibuprofen for pain control                 Other: Elevate head of bed at night to sleep for the next 48 hours  May use ice to forehead or neck at 10 minute intervals as needed for pain or swelling

## 2019-03-07 NOTE — PROGRESS NOTES
Patient states that she has had IV's in her left arm before  Her right arm tends to swell  So NO iv in that side  We are "okay" per patient and anesthesia to put IV in left arm

## 2019-03-07 NOTE — ANESTHESIA POSTPROCEDURE EVALUATION
Post-Op Assessment Note    CV Status:  Stable  Pain Score: 0    Pain management: adequate     Mental Status:  Alert   Hydration Status:  Stable   PONV Controlled:  None   Airway Patency:  Patent   Post Op Vitals Reviewed: Yes      Staff: Anesthesiologist, CRNA   Comments: Awake to PACU on 3L NC          BP   1119/71   Temp      Pulse  111   Resp   16   SpO2   99

## 2019-03-07 NOTE — OP NOTE
OPERATIVE REPORT  PATIENT NAME: Justo Bella    :  1965  MRN: [id number]  Pt Location: [facility] 02    SURGERY DATE: 3/7/2019    Surgeon(s) and Role:     * Jeff Clay MD - Primary     * Ronaldo Allen PA-C - Assisting     * Chayito Danielle DPM - Assisting    Preop Diagnosis:  Basal cell carcinoma (BCC) of skin of trunk [C44 519]  Basal cell carcinoma (BCC) of right side of nose [C44 311]    Post-Op Diagnosis Codes: * Basal cell carcinoma (BCC) of skin of trunk [C44 519]     * Basal cell carcinoma (BCC) of right side of nose [C44 311]    Procedure 1  Excision basal cell carcinoma left forehead 1 2 cm  2  Reconstruction forehead defect with 3 0 cm complex closure 3  Preparation Mohs defect of nose/lower eyelid by excision of wound margins in anticipation and preparation for reconstruction (44713) 4  Full-thickness skin graft reconstruction Mohs defect of nose/lower eyelid 1 7 x 2 0 cm   Specimen(s):  ID Type Source Tests Collected by Time Destination   1 : bcc left forehead long suture 12 o'clock superior, short suture 6o'clock inferior Tissue Lesion TISSUE Dustin Mckinnon MD 3/7/2019 1552        Estimated Blood Loss:   Minimal    Drains:  Closed/Suction Drain Left Back Bulb 15 Fr  (Active)   Number of days: 546       Closed/Suction Drain Left Breast Bulb 15 Fr  (Active)   Number of days: 546       Anesthesia Type:   IV Sedation with Anesthesia    Operative Indications:  Basal cell carcinoma (BCC of forehead  Basal cell carcinoma (BCC) of right side of nose [C44 311]  Status post Mohs to right nose    Operative Findings:  As above    Complications:   None    Procedure and Technique:  Eden Smith was seen in the holding area preoperatively, the surgical sites were marked with her participation, and we reviewed the planned procedures as well as potential risks, complications limitations  She was taken the operating room and underwent induction of anesthesia by the anesthesia personnel    The operative field was prepped and draped in sterile fashion a proper time-out was performed  The forehead area involved with basal cell carcinoma was marked for excision and infiltrated with epinephrine  The skin incisions were then created with a 15 blade, carried down through the dermis and the lesion was excised the plane of subcutaneous fat, and superficial to the frontalis muscle  It was marked with suture and sent to pathology  In order to close the wound without significant, long term, widened circumferential undermining the wound margins was performed  This wide undermining was performed utilizing a spreading technique with tenotomy scissors, as well as dissection with the Bovie cautery  The wound was irrigated hemostasis assured  Closure was then undertaken with 5 O Monocryl sutures buried at the level of the deep dermis and this was followed by running subcuticular 5 O Monocryl  Benzoin and Steri-Strips were then applied  Attention was then turned to the Mohs defect of the right side of the nose and lower eyelid  The area was infiltrated with xylocaine with epinephrine and the wound edges were sharply excised with a 15 blade in order to prepare the wound for reconstruction and to remove the cautery artifact  Hemostasis was assured the wound was irrigated  Following this, an appropriately sized full-thickness skin graft was harvested from the left lateral neck  The graft was defatted on the back table and trimmed to fit the defect  It was inset with 6 0 chromic sutures  The graft was protected with sterile foam coated in bacitracin, this was secured with 5 0 silk bolster type sutures  The donor site was closed with 4-0 Monocryl buried at the level of the deep dermis followed by running subcuticular 4-0 Monocryl  Benzoin and Steri-Strips were applied to the donor site and the patient was transferred to the recovery room           I was present for the entire procedure    Patient Disposition:  PACU     SIGNATURE: Marlene Carr MD  DATE: March 7, 2019  TIME: 4:57 PM

## 2019-03-11 ENCOUNTER — OFFICE VISIT (OUTPATIENT)
Dept: PLASTIC SURGERY | Facility: CLINIC | Age: 54
End: 2019-03-11

## 2019-03-11 DIAGNOSIS — Z98.890 POST-OPERATIVE STATE: Primary | ICD-10-CM

## 2019-03-11 PROCEDURE — 99024 POSTOP FOLLOW-UP VISIT: CPT

## 2019-03-11 NOTE — PROGRESS NOTES
Patient was seen today for removal of bolster dressing  The area was clean and intact  Granulating well  Sutures from bolster and bolster were removed and area was cleaned up  A thin layer of aquaphor and a single layer of xeroform was placed on the area  Graft sites instruction were discussed and we will see her back in 10 days for suture removal of the donor site  Photos were obtained today as well

## 2019-03-13 ENCOUNTER — OFFICE VISIT (OUTPATIENT)
Dept: PLASTIC SURGERY | Facility: HOSPITAL | Age: 54
End: 2019-03-13

## 2019-03-13 DIAGNOSIS — C44.319 BASAL CELL CARCINOMA, FOREHEAD: ICD-10-CM

## 2019-03-13 DIAGNOSIS — C44.311 BASAL CELL CARCINOMA (BCC) OF RIGHT SIDE OF NOSE: Primary | ICD-10-CM

## 2019-03-13 PROCEDURE — 99024 POSTOP FOLLOW-UP VISIT: CPT | Performed by: PHYSICIAN ASSISTANT

## 2019-03-13 NOTE — PROGRESS NOTES
Assessment/Plan:   The set is a pleasant 61-year-old female who is 1 week status post excision of a left forehead basal cell carcinoma with complex closure and reconstruction of a Mohs defect of the right nose with full-thickness skin graft  This was done in conjunction with Dr Rupa Urbina  I recommended moisturizing drops for her eye  Sutures were removed from the forehead incision  We will see her back in 1 week for suture removal from the donor site  Diagnoses and all orders for this visit:    Basal cell carcinoma (BCC) of right side of nose    Basal cell carcinoma, forehead          Subjective:     Patient ID: Que Prater is a 48 y o  female  HPI   She reports some mild dryness of the right eye  Review of Systems   Skin:        As per HPI  Objective:     Physical Exam   Skin:   Right eye graft site is healing well  Left neck donor site steri strips falling off, they were replaced  Left forehead incision is clean, dry and intact  Sutures removed from the forehead

## 2019-03-13 NOTE — LETTER
March 13, 2019     Delano Gauthier DO  [address] [facility]  [facility] [address]  [address] [facility] [address]    Patient: Coral Lew   YOB: 1965   Date of Visit: 3/13/2019       Dear Dr Raven Mills: Thank you for referring Coral Lew to me for evaluation  Below are my notes for this consultation  If you have questions, please do not hesitate to call me  I look forward to following your patient along with you  Sincerely,        Eduardo Carbone PA-C        CC: MD Eduardo Solomon PA-C  3/13/2019  4:05 PM  Sign at close encounter  Assessment/Plan:   The set is a pleasant 53-year-old female who is 1 week status post excision of a left forehead basal cell carcinoma with complex closure and reconstruction of a Mohs defect of the right nose with full-thickness skin graft  This was done in conjunction with Dr Maude Carrizales  I recommended moisturizing drops for her eye  Sutures were removed from the forehead incision  We will see her back in 1 week for suture removal from the donor site  Diagnoses and all orders for this visit:    Basal cell carcinoma (BCC) of right side of nose    Basal cell carcinoma, forehead          Subjective:     Patient ID: Coral Lew is a 48 y o  female  HPI   She reports some mild dryness of the right eye  Review of Systems   Skin:        As per HPI  Objective:     Physical Exam   Skin:   Right eye graft site is healing well  Left neck donor site steri strips falling off, they were replaced  Left forehead incision is clean, dry and intact  Sutures removed from the forehead

## 2019-04-03 ENCOUNTER — OFFICE VISIT (OUTPATIENT)
Dept: PLASTIC SURGERY | Facility: HOSPITAL | Age: 54
End: 2019-04-03

## 2019-04-03 DIAGNOSIS — C44.319 BASAL CELL CARCINOMA, FOREHEAD: ICD-10-CM

## 2019-04-03 DIAGNOSIS — C44.311 BASAL CELL CARCINOMA (BCC) OF RIGHT SIDE OF NOSE: Primary | ICD-10-CM

## 2019-04-03 PROCEDURE — 99024 POSTOP FOLLOW-UP VISIT: CPT | Performed by: PHYSICIAN ASSISTANT

## 2019-04-24 ENCOUNTER — OFFICE VISIT (OUTPATIENT)
Dept: PLASTIC SURGERY | Facility: HOSPITAL | Age: 54
End: 2019-04-24

## 2019-04-24 DIAGNOSIS — C44.519 BASAL CELL CARCINOMA OF ANTERIOR CHEST: ICD-10-CM

## 2019-04-24 DIAGNOSIS — Z85.3 PERSONAL HISTORY OF MALIGNANT NEOPLASM OF BREAST: Primary | ICD-10-CM

## 2019-04-24 DIAGNOSIS — Z85.828 HISTORY OF BASAL CELL CARCINOMA: ICD-10-CM

## 2019-04-24 PROCEDURE — 99024 POSTOP FOLLOW-UP VISIT: CPT | Performed by: PHYSICIAN ASSISTANT

## 2019-05-08 ENCOUNTER — OFFICE VISIT (OUTPATIENT)
Dept: PODIATRY | Facility: CLINIC | Age: 54
End: 2019-05-08
Payer: COMMERCIAL

## 2019-05-08 VITALS
WEIGHT: 160 LBS | HEIGHT: 66 IN | DIASTOLIC BLOOD PRESSURE: 67 MMHG | BODY MASS INDEX: 25.71 KG/M2 | SYSTOLIC BLOOD PRESSURE: 120 MMHG

## 2019-05-08 DIAGNOSIS — M20.11 VALGUS DEFORMITY OF BOTH GREAT TOES: ICD-10-CM

## 2019-05-08 DIAGNOSIS — M76.822 POSTERIOR TIBIALIS TENDINITIS OF BOTH LOWER EXTREMITIES: ICD-10-CM

## 2019-05-08 DIAGNOSIS — M20.12 VALGUS DEFORMITY OF BOTH GREAT TOES: ICD-10-CM

## 2019-05-08 DIAGNOSIS — M21.42 PES PLANUS OF BOTH FEET: Primary | ICD-10-CM

## 2019-05-08 DIAGNOSIS — M21.41 PES PLANUS OF BOTH FEET: Primary | ICD-10-CM

## 2019-05-08 DIAGNOSIS — M76.821 POSTERIOR TIBIALIS TENDINITIS OF BOTH LOWER EXTREMITIES: ICD-10-CM

## 2019-05-08 PROCEDURE — 99214 OFFICE O/P EST MOD 30 MIN: CPT | Performed by: PODIATRIST

## 2019-05-22 DIAGNOSIS — T30.0 SKIN BURN: Primary | ICD-10-CM

## 2019-06-05 ENCOUNTER — OFFICE VISIT (OUTPATIENT)
Dept: PLASTIC SURGERY | Facility: HOSPITAL | Age: 54
End: 2019-06-05
Payer: COMMERCIAL

## 2019-06-05 DIAGNOSIS — C44.519 BASAL CELL CARCINOMA OF ANTERIOR CHEST: ICD-10-CM

## 2019-06-05 DIAGNOSIS — L57.0 ACTINIC KERATOSIS OF RIGHT CHEEK: Primary | ICD-10-CM

## 2019-06-05 PROCEDURE — 88305 TISSUE EXAM BY PATHOLOGIST: CPT | Performed by: PATHOLOGY

## 2019-06-05 PROCEDURE — 99024 POSTOP FOLLOW-UP VISIT: CPT | Performed by: PHYSICIAN ASSISTANT

## 2019-06-05 PROCEDURE — 11104 PUNCH BX SKIN SINGLE LESION: CPT | Performed by: PHYSICIAN ASSISTANT

## 2019-06-10 ENCOUNTER — OFFICE VISIT (OUTPATIENT)
Dept: PLASTIC SURGERY | Facility: CLINIC | Age: 54
End: 2019-06-10
Payer: COMMERCIAL

## 2019-06-10 ENCOUNTER — APPOINTMENT (OUTPATIENT)
Dept: LAB | Facility: CLINIC | Age: 54
End: 2019-06-10
Payer: COMMERCIAL

## 2019-06-10 DIAGNOSIS — L57.0 ACTINIC KERATOSIS OF RIGHT CHEEK: ICD-10-CM

## 2019-06-10 DIAGNOSIS — Z42.1 AFTERCARE POSTMASTECTOMY FOR BREAST RECONSTRUCTION: Primary | ICD-10-CM

## 2019-06-10 DIAGNOSIS — Z86.19 H/O COLD SORES: Primary | ICD-10-CM

## 2019-06-10 DIAGNOSIS — Z85.3 PERSONAL HISTORY OF MALIGNANT NEOPLASM OF BREAST: ICD-10-CM

## 2019-06-10 DIAGNOSIS — Z71.89 ENCOUNTER TO DISCUSS BREAST RECONSTRUCTION: ICD-10-CM

## 2019-06-10 LAB
ANION GAP SERPL CALCULATED.3IONS-SCNC: 7 MMOL/L (ref 4–13)
BASOPHILS # BLD AUTO: 0.01 THOUSANDS/ΜL (ref 0–0.1)
BASOPHILS NFR BLD AUTO: 0 % (ref 0–1)
BUN SERPL-MCNC: 13 MG/DL (ref 5–25)
CALCIUM SERPL-MCNC: 9 MG/DL (ref 8.3–10.1)
CHLORIDE SERPL-SCNC: 105 MMOL/L (ref 100–108)
CO2 SERPL-SCNC: 28 MMOL/L (ref 21–32)
CREAT SERPL-MCNC: 0.78 MG/DL (ref 0.6–1.3)
EOSINOPHIL # BLD AUTO: 0.03 THOUSAND/ΜL (ref 0–0.61)
EOSINOPHIL NFR BLD AUTO: 1 % (ref 0–6)
ERYTHROCYTE [DISTWIDTH] IN BLOOD BY AUTOMATED COUNT: 12.4 % (ref 11.6–15.1)
GFR SERPL CREATININE-BSD FRML MDRD: 88 ML/MIN/1.73SQ M
GLUCOSE SERPL-MCNC: 93 MG/DL (ref 65–140)
HCT VFR BLD AUTO: 37.9 % (ref 34.8–46.1)
HGB BLD-MCNC: 12 G/DL (ref 11.5–15.4)
IMM GRANULOCYTES # BLD AUTO: 0.01 THOUSAND/UL (ref 0–0.2)
IMM GRANULOCYTES NFR BLD AUTO: 0 % (ref 0–2)
LYMPHOCYTES # BLD AUTO: 1.65 THOUSANDS/ΜL (ref 0.6–4.47)
LYMPHOCYTES NFR BLD AUTO: 38 % (ref 14–44)
MCH RBC QN AUTO: 29.6 PG (ref 26.8–34.3)
MCHC RBC AUTO-ENTMCNC: 31.7 G/DL (ref 31.4–37.4)
MCV RBC AUTO: 94 FL (ref 82–98)
MONOCYTES # BLD AUTO: 0.31 THOUSAND/ΜL (ref 0.17–1.22)
MONOCYTES NFR BLD AUTO: 7 % (ref 4–12)
NEUTROPHILS # BLD AUTO: 2.32 THOUSANDS/ΜL (ref 1.85–7.62)
NEUTS SEG NFR BLD AUTO: 54 % (ref 43–75)
NRBC BLD AUTO-RTO: 0 /100 WBCS
PLATELET # BLD AUTO: 186 THOUSANDS/UL (ref 149–390)
PMV BLD AUTO: 11.5 FL (ref 8.9–12.7)
POTASSIUM SERPL-SCNC: 3.8 MMOL/L (ref 3.5–5.3)
RBC # BLD AUTO: 4.05 MILLION/UL (ref 3.81–5.12)
SODIUM SERPL-SCNC: 140 MMOL/L (ref 136–145)
WBC # BLD AUTO: 4.33 THOUSAND/UL (ref 4.31–10.16)

## 2019-06-10 PROCEDURE — 80048 BASIC METABOLIC PNL TOTAL CA: CPT

## 2019-06-10 PROCEDURE — 36415 COLL VENOUS BLD VENIPUNCTURE: CPT

## 2019-06-10 PROCEDURE — 99214 OFFICE O/P EST MOD 30 MIN: CPT | Performed by: SURGERY

## 2019-06-10 PROCEDURE — 85025 COMPLETE CBC W/AUTO DIFF WBC: CPT

## 2019-06-10 RX ORDER — FLUOROURACIL 50 MG/G
CREAM TOPICAL 2 TIMES DAILY
Qty: 40 G | Refills: 0 | Status: SHIPPED | OUTPATIENT
Start: 2019-06-10 | End: 2019-10-17 | Stop reason: ALTCHOICE

## 2019-06-10 RX ORDER — ACYCLOVIR 400 MG/1
400 TABLET ORAL 2 TIMES DAILY
Qty: 6 TABLET | Refills: 0 | Status: SHIPPED | OUTPATIENT
Start: 2019-06-10 | End: 2019-06-20

## 2019-06-10 RX ORDER — OXYCODONE HYDROCHLORIDE AND ACETAMINOPHEN 5; 325 MG/1; MG/1
1 TABLET ORAL EVERY 4 HOURS PRN
Qty: 18 TABLET | Refills: 0 | Status: SHIPPED | OUTPATIENT
Start: 2019-06-10 | End: 2019-07-02 | Stop reason: SDUPTHER

## 2019-06-17 ENCOUNTER — DOCUMENTATION (OUTPATIENT)
Dept: HEMATOLOGY ONCOLOGY | Facility: CLINIC | Age: 54
End: 2019-06-17

## 2019-06-17 ENCOUNTER — TELEPHONE (OUTPATIENT)
Dept: HEMATOLOGY ONCOLOGY | Facility: CLINIC | Age: 54
End: 2019-06-17

## 2019-06-18 ENCOUNTER — OFFICE VISIT (OUTPATIENT)
Dept: PODIATRY | Facility: CLINIC | Age: 54
End: 2019-06-18
Payer: COMMERCIAL

## 2019-06-18 VITALS — WEIGHT: 165 LBS | HEIGHT: 66 IN | BODY MASS INDEX: 26.52 KG/M2

## 2019-06-18 DIAGNOSIS — M76.821 POSTERIOR TIBIALIS TENDINITIS OF BOTH LOWER EXTREMITIES: Primary | ICD-10-CM

## 2019-06-18 DIAGNOSIS — M25.572 PAIN IN LEFT ANKLE AND JOINTS OF LEFT FOOT: ICD-10-CM

## 2019-06-18 DIAGNOSIS — M76.822 POSTERIOR TIBIALIS TENDINITIS OF BOTH LOWER EXTREMITIES: Primary | ICD-10-CM

## 2019-06-18 DIAGNOSIS — M21.41 PES PLANUS OF BOTH FEET: ICD-10-CM

## 2019-06-18 DIAGNOSIS — M21.42 PES PLANUS OF BOTH FEET: ICD-10-CM

## 2019-06-18 DIAGNOSIS — M20.12 VALGUS DEFORMITY OF BOTH GREAT TOES: ICD-10-CM

## 2019-06-18 DIAGNOSIS — M20.11 VALGUS DEFORMITY OF BOTH GREAT TOES: ICD-10-CM

## 2019-06-18 PROCEDURE — NC001 PR NO CHARGE: Performed by: PHYSICIAN ASSISTANT

## 2019-06-18 PROCEDURE — S0395 IMPRESSION CASTING FT: HCPCS | Performed by: PODIATRIST

## 2019-06-27 ENCOUNTER — ANESTHESIA (OUTPATIENT)
Dept: PERIOP | Facility: HOSPITAL | Age: 54
End: 2019-06-27
Payer: COMMERCIAL

## 2019-06-27 ENCOUNTER — HOSPITAL ENCOUNTER (OUTPATIENT)
Facility: HOSPITAL | Age: 54
Setting detail: OUTPATIENT SURGERY
Discharge: HOME | End: 2019-06-27
Attending: SURGERY | Admitting: SURGERY
Payer: COMMERCIAL

## 2019-06-27 ENCOUNTER — ANESTHESIA EVENT (OUTPATIENT)
Dept: PERIOP | Facility: HOSPITAL | Age: 54
End: 2019-06-27
Payer: COMMERCIAL

## 2019-06-27 VITALS
DIASTOLIC BLOOD PRESSURE: 63 MMHG | HEART RATE: 80 BPM | TEMPERATURE: 98.1 F | OXYGEN SATURATION: 98 % | SYSTOLIC BLOOD PRESSURE: 116 MMHG | WEIGHT: 165 LBS | BODY MASS INDEX: 26.52 KG/M2 | HEIGHT: 66 IN | RESPIRATION RATE: 16 BRPM

## 2019-06-27 DIAGNOSIS — Z85.3 PERSONAL HISTORY OF MALIGNANT NEOPLASM OF BREAST: ICD-10-CM

## 2019-06-27 LAB
EXT PREGNANCY TEST URINE: NEGATIVE
EXT. CONTROL: NORMAL

## 2019-06-27 PROCEDURE — 13101 CMPLX RPR TRUNK 2.6-7.5 CM: CPT | Performed by: SURGERY

## 2019-06-27 PROCEDURE — 11602 EXC TR-EXT MAL+MARG 1.1-2 CM: CPT | Performed by: SURGERY

## 2019-06-27 PROCEDURE — 11602 EXC TR-EXT MAL+MARG 1.1-2 CM: CPT | Performed by: PHYSICIAN ASSISTANT

## 2019-06-27 PROCEDURE — 88305 TISSUE EXAM BY PATHOLOGIST: CPT | Performed by: PATHOLOGY

## 2019-06-27 PROCEDURE — 81025 URINE PREGNANCY TEST: CPT | Performed by: SURGERY

## 2019-06-27 PROCEDURE — 20926 PR REMV TISSUE FOR GRAFT OTHR: CPT | Performed by: PHYSICIAN ASSISTANT

## 2019-06-27 PROCEDURE — 20926 PR REMV TISSUE FOR GRAFT OTHR: CPT | Performed by: SURGERY

## 2019-06-27 PROCEDURE — 13101 CMPLX RPR TRUNK 2.6-7.5 CM: CPT | Performed by: PHYSICIAN ASSISTANT

## 2019-06-27 RX ORDER — SCOLOPAMINE TRANSDERMAL SYSTEM 1 MG/1
1 PATCH, EXTENDED RELEASE TRANSDERMAL
Status: DISCONTINUED | OUTPATIENT
Start: 2019-06-27 | End: 2019-06-27 | Stop reason: HOSPADM

## 2019-06-27 RX ORDER — PROPOFOL 10 MG/ML
INJECTION, EMULSION INTRAVENOUS AS NEEDED
Status: DISCONTINUED | OUTPATIENT
Start: 2019-06-27 | End: 2019-06-27 | Stop reason: SURG

## 2019-06-27 RX ORDER — ONDANSETRON 2 MG/ML
4 INJECTION INTRAMUSCULAR; INTRAVENOUS ONCE
Status: DISCONTINUED | OUTPATIENT
Start: 2019-06-27 | End: 2019-06-27 | Stop reason: HOSPADM

## 2019-06-27 RX ORDER — DEXAMETHASONE SODIUM PHOSPHATE 10 MG/ML
INJECTION, SOLUTION INTRAMUSCULAR; INTRAVENOUS AS NEEDED
Status: DISCONTINUED | OUTPATIENT
Start: 2019-06-27 | End: 2019-06-27 | Stop reason: SURG

## 2019-06-27 RX ORDER — OXYCODONE HYDROCHLORIDE AND ACETAMINOPHEN 5; 325 MG/1; MG/1
1 TABLET ORAL EVERY 4 HOURS PRN
Status: DISCONTINUED | OUTPATIENT
Start: 2019-06-27 | End: 2019-06-27 | Stop reason: HOSPADM

## 2019-06-27 RX ORDER — SODIUM CHLORIDE, SODIUM LACTATE, POTASSIUM CHLORIDE, AND CALCIUM CHLORIDE .6; .31; .03; .02 G/100ML; G/100ML; G/100ML; G/100ML
IRRIGANT IRRIGATION AS NEEDED
Status: DISCONTINUED | OUTPATIENT
Start: 2019-06-27 | End: 2019-06-27 | Stop reason: HOSPADM

## 2019-06-27 RX ORDER — MINERAL OIL
OIL (ML) MISCELLANEOUS AS NEEDED
Status: DISCONTINUED | OUTPATIENT
Start: 2019-06-27 | End: 2019-06-27 | Stop reason: HOSPADM

## 2019-06-27 RX ORDER — MIDAZOLAM HYDROCHLORIDE 1 MG/ML
INJECTION INTRAMUSCULAR; INTRAVENOUS AS NEEDED
Status: DISCONTINUED | OUTPATIENT
Start: 2019-06-27 | End: 2019-06-27 | Stop reason: SURG

## 2019-06-27 RX ORDER — CEFAZOLIN SODIUM 1 G/50ML
1000 SOLUTION INTRAVENOUS ONCE
Status: COMPLETED | OUTPATIENT
Start: 2019-06-27 | End: 2019-06-27

## 2019-06-27 RX ORDER — SODIUM CHLORIDE, SODIUM LACTATE, POTASSIUM CHLORIDE, CALCIUM CHLORIDE 600; 310; 30; 20 MG/100ML; MG/100ML; MG/100ML; MG/100ML
125 INJECTION, SOLUTION INTRAVENOUS CONTINUOUS
Status: DISCONTINUED | OUTPATIENT
Start: 2019-06-27 | End: 2019-06-27 | Stop reason: HOSPADM

## 2019-06-27 RX ORDER — EPHEDRINE SULFATE 50 MG/ML
INJECTION INTRAVENOUS AS NEEDED
Status: DISCONTINUED | OUTPATIENT
Start: 2019-06-27 | End: 2019-06-27 | Stop reason: SURG

## 2019-06-27 RX ORDER — FENTANYL CITRATE/PF 50 MCG/ML
25 SYRINGE (ML) INJECTION
Status: DISCONTINUED | OUTPATIENT
Start: 2019-06-27 | End: 2019-06-27 | Stop reason: HOSPADM

## 2019-06-27 RX ORDER — MORPHINE SULFATE 4 MG/ML
1 INJECTION, SOLUTION INTRAMUSCULAR; INTRAVENOUS
Status: DISCONTINUED | OUTPATIENT
Start: 2019-06-27 | End: 2019-06-27 | Stop reason: HOSPADM

## 2019-06-27 RX ORDER — FENTANYL CITRATE 50 UG/ML
INJECTION, SOLUTION INTRAMUSCULAR; INTRAVENOUS AS NEEDED
Status: DISCONTINUED | OUTPATIENT
Start: 2019-06-27 | End: 2019-06-27 | Stop reason: SURG

## 2019-06-27 RX ORDER — ONDANSETRON 2 MG/ML
INJECTION INTRAMUSCULAR; INTRAVENOUS AS NEEDED
Status: DISCONTINUED | OUTPATIENT
Start: 2019-06-27 | End: 2019-06-27 | Stop reason: SURG

## 2019-06-27 RX ADMIN — SCOPALAMINE 1 PATCH: 1 PATCH, EXTENDED RELEASE TRANSDERMAL at 07:02

## 2019-06-27 RX ADMIN — SODIUM CHLORIDE, SODIUM LACTATE, POTASSIUM CHLORIDE, AND CALCIUM CHLORIDE 125 ML/HR: .6; .31; .03; .02 INJECTION, SOLUTION INTRAVENOUS at 06:39

## 2019-06-27 RX ADMIN — DEXAMETHASONE SODIUM PHOSPHATE 8 MG: 10 INJECTION, SOLUTION INTRAMUSCULAR; INTRAVENOUS at 08:01

## 2019-06-27 RX ADMIN — FENTANYL CITRATE 25 MCG: 50 INJECTION, SOLUTION INTRAMUSCULAR; INTRAVENOUS at 08:19

## 2019-06-27 RX ADMIN — PROPOFOL 40 MG: 10 INJECTION, EMULSION INTRAVENOUS at 08:17

## 2019-06-27 RX ADMIN — FENTANYL CITRATE 25 MCG: 50 INJECTION, SOLUTION INTRAMUSCULAR; INTRAVENOUS at 10:10

## 2019-06-27 RX ADMIN — ONDANSETRON 4 MG: 2 INJECTION INTRAMUSCULAR; INTRAVENOUS at 09:29

## 2019-06-27 RX ADMIN — FENTANYL CITRATE 25 MCG: 50 INJECTION, SOLUTION INTRAMUSCULAR; INTRAVENOUS at 09:10

## 2019-06-27 RX ADMIN — OXYCODONE AND ACETAMINOPHEN 1 TABLET: 5; 325 TABLET ORAL at 11:25

## 2019-06-27 RX ADMIN — EPHEDRINE SULFATE 10 MG: 50 INJECTION, SOLUTION INTRAVENOUS at 08:28

## 2019-06-27 RX ADMIN — MIDAZOLAM 2 MG: 1 INJECTION INTRAMUSCULAR; INTRAVENOUS at 07:27

## 2019-06-27 RX ADMIN — FENTANYL CITRATE 50 MCG: 50 INJECTION, SOLUTION INTRAMUSCULAR; INTRAVENOUS at 07:27

## 2019-06-27 RX ADMIN — CEFAZOLIN SODIUM 1000 MG: 1 SOLUTION INTRAVENOUS at 07:28

## 2019-06-27 RX ADMIN — PROPOFOL 160 MG: 10 INJECTION, EMULSION INTRAVENOUS at 07:37

## 2019-06-27 RX ADMIN — FENTANYL CITRATE 25 MCG: 50 INJECTION, SOLUTION INTRAMUSCULAR; INTRAVENOUS at 10:02

## 2019-06-28 ENCOUNTER — TELEPHONE (OUTPATIENT)
Dept: PLASTIC SURGERY | Facility: CLINIC | Age: 54
End: 2019-06-28

## 2019-07-02 DIAGNOSIS — Z71.89 ENCOUNTER TO DISCUSS BREAST RECONSTRUCTION: ICD-10-CM

## 2019-07-02 RX ORDER — CEPHALEXIN 500 MG/1
500 CAPSULE ORAL EVERY 6 HOURS SCHEDULED
Qty: 40 CAPSULE | Refills: 0 | Status: SHIPPED | OUTPATIENT
Start: 2019-07-02 | End: 2019-07-12

## 2019-07-02 RX ORDER — OXYCODONE HYDROCHLORIDE AND ACETAMINOPHEN 5; 325 MG/1; MG/1
1 TABLET ORAL EVERY 4 HOURS PRN
Qty: 18 TABLET | Refills: 0 | Status: SHIPPED | OUTPATIENT
Start: 2019-07-02 | End: 2019-10-21 | Stop reason: SDUPTHER

## 2019-07-10 ENCOUNTER — OFFICE VISIT (OUTPATIENT)
Dept: PLASTIC SURGERY | Facility: HOSPITAL | Age: 54
End: 2019-07-10

## 2019-07-10 DIAGNOSIS — Z85.3 PERSONAL HISTORY OF MALIGNANT NEOPLASM OF BREAST: ICD-10-CM

## 2019-07-10 DIAGNOSIS — C44.519 BASAL CELL CARCINOMA (BCC) OF SKIN OF TRUNK: Primary | ICD-10-CM

## 2019-07-10 PROCEDURE — 99024 POSTOP FOLLOW-UP VISIT: CPT | Performed by: PHYSICIAN ASSISTANT

## 2019-07-10 NOTE — PROGRESS NOTES
Assessment/Plan:   Corina Marr is a pleasant 65-year-old female who is 2 weeks status post bilateral breast mound revision with autologous fat grafting to the bilateral breasts and excision of mid chest basal cell carcinoma with complex closure  Please see HPI  I reviewed her pathology with her which revealed clear margins  Her sutures were removed today in the office  The erythema of her breasts is much improved  She may begin silicone scar gel to the chest in 1-2 weeks  We will see her back in 2 months to discuss further fat grafting  Diagnoses and all orders for this visit:    Basal cell carcinoma (BCC) of skin of trunk    Personal history of malignant neoplasm of breast          Subjective:     Patient ID: Chastity Tinsley is a 48 y o  female  HPI   She reports some mild discomfort of the left breast in the area the fat grafting  She states this is improving  She denies any complaints regarding her basal cell excision scar  Review of Systems   Skin:        Mid chest incision is clean, dry and intact  Sutures were removed  Stab incisions of the breast abdomen and thighs were all clean, dry and intact  Those sutures were also removed  There is some mild radiation changes and erythema noted of the left breast of which is improving           Objective:     Physical Exam

## 2019-07-10 NOTE — LETTER
July 10, 2019     Pinky Sanchez DO  [address] [facility]  [address]    Patient: Jose Miller   YOB: 1965   Date of Visit: 7/10/2019       Dear Dr Berna Underwood: Thank you for referring Jose Miller to me for evaluation  Below are my notes for this consultation  If you have questions, please do not hesitate to call me  I look forward to following your patient along with you  Sincerely,        Corona High PA-C        CC: MD Crystal Arambula MD Edris Hammock, PA-C  7/10/2019  3:22 PM  Sign at close encounter  Assessment/Plan:   Suhas Saldaña is a pleasant 24-year-old female who is 2 weeks status post bilateral breast mound revision with autologous fat grafting to the bilateral breasts and excision of mid chest basal cell carcinoma with complex closure  Please see HPI  I reviewed her pathology with her which revealed clear margins  Her sutures were removed today in the office  The erythema of her breasts is much improved  She may begin silicone scar gel to the chest in 1-2 weeks  We will see her back in 2 months to discuss further fat grafting  Diagnoses and all orders for this visit:    Basal cell carcinoma (BCC) of skin of trunk    Personal history of malignant neoplasm of breast          Subjective:     Patient ID: Jose Miller is a 48 y o  female  HPI   She reports some mild discomfort of the left breast in the area the fat grafting  She states this is improving  She denies any complaints regarding her basal cell excision scar  Review of Systems   Skin:        Mid chest incision is clean, dry and intact  Sutures were removed  Stab incisions of the breast abdomen and thighs were all clean, dry and intact  Those sutures were also removed  There is some mild radiation changes and erythema noted of the left breast of which is improving           Objective:     Physical Exam

## 2019-07-30 ENCOUNTER — OFFICE VISIT (OUTPATIENT)
Dept: PLASTIC SURGERY | Facility: CLINIC | Age: 54
End: 2019-07-30

## 2019-07-30 ENCOUNTER — OFFICE VISIT (OUTPATIENT)
Dept: PODIATRY | Facility: CLINIC | Age: 54
End: 2019-07-30
Payer: COMMERCIAL

## 2019-07-30 VITALS
HEIGHT: 66 IN | DIASTOLIC BLOOD PRESSURE: 69 MMHG | WEIGHT: 165 LBS | BODY MASS INDEX: 26.52 KG/M2 | HEART RATE: 81 BPM | SYSTOLIC BLOOD PRESSURE: 113 MMHG

## 2019-07-30 DIAGNOSIS — M25.572 SINUS TARSI SYNDROME OF LEFT FOOT: ICD-10-CM

## 2019-07-30 DIAGNOSIS — M24.875 OTHER SPECIFIC JOINT DERANGEMENTS LEFT FOOT, NOT ELSEWHERE CLASSIFIED: Primary | ICD-10-CM

## 2019-07-30 DIAGNOSIS — Z48.89 POSTOPERATIVE VISIT: Primary | ICD-10-CM

## 2019-07-30 PROCEDURE — 99024 POSTOP FOLLOW-UP VISIT: CPT | Performed by: SURGERY

## 2019-07-30 PROCEDURE — 20600 DRAIN/INJ JOINT/BURSA W/O US: CPT | Performed by: PODIATRIST

## 2019-07-30 RX ORDER — ROPIVACAINE HYDROCHLORIDE 5 MG/ML
10 INJECTION, SOLUTION EPIDURAL; INFILTRATION; PERINEURAL
Status: DISCONTINUED | OUTPATIENT
Start: 2019-07-30 | End: 2022-03-16

## 2019-07-30 RX ORDER — TRIAMCINOLONE ACETONIDE 40 MG/ML
20 INJECTION, SUSPENSION INTRA-ARTICULAR; INTRAMUSCULAR
Status: DISCONTINUED | OUTPATIENT
Start: 2019-07-30 | End: 2022-03-16

## 2019-07-30 RX ADMIN — TRIAMCINOLONE ACETONIDE 20 MG: 40 INJECTION, SUSPENSION INTRA-ARTICULAR; INTRAMUSCULAR at 18:14

## 2019-07-30 RX ADMIN — ROPIVACAINE HYDROCHLORIDE 10 ML: 5 INJECTION, SOLUTION EPIDURAL; INFILTRATION; PERINEURAL at 18:14

## 2019-07-30 NOTE — PROGRESS NOTES
Sheridanair Cancino  presents today in follow-up, 1 month status post autologous fat grafting to the bilateral breasts  The previously noted erythema is completely gone, the grafted areas are softening as expected  We talked about the process of fat grafting, she will benefit from additional fat grafting in the future, and is planned for a 2nd round of bilateral breast autologous fat grafting in October  She has inquired about additional filler to the lips, as well as potentially Botox to treat the lateral gross feet, we discussed this today, as well as potential benefits, risks, complications etc, and she will schedule a separate appointment for the injections

## 2019-07-30 NOTE — PROGRESS NOTES
PATIENT:  Mayda Ruiz  1965       ASSESSMENT:     1  Other specific joint derangements left foot, not elsewhere classified  Small joint arthrocentesis: L subtalar   2  Sinus tarsi syndrome of left foot               PLAN:  Patient was counseled and educated on the condition and the diagnosis  Treatment options were discussed and the patient wished to proceed with an injection  Injection was given as in the procedure  Instructed supportive care, icing, and resting  Dispensed orthotics as well  RA in 4 weeks  Small joint arthrocentesis: L subtalar  Date/Time: 7/30/2019 6:14 PM  Consent given by: patient  Site marked: site marked  Timeout: Immediately prior to procedure a time out was called to verify the correct patient, procedure, equipment, support staff and site/side marked as required   Supporting Documentation  Indications: pain   Procedure Details  Location: foot - L subtalar  Needle size: 25 G  Ultrasound guidance: no  Approach: lateral  Medications administered: 10 mL ropivacaine 0 5 %; 20 mg triamcinolone acetonide 40 mg/mL    Patient tolerance: patient tolerated the procedure well with no immediate complications            Subjective:       HPI  The patient presents for orthotic   She complained of some left ankle pain in the past few weeks with increased walking  No edema  No injury  The following portions of the patient's history were reviewed and updated as appropriate: allergies, current medications, past family history, past medical history, past social history, past surgical history and problem list   All pertinent labs and images were reviewed  Past Medical History  Past Medical History:   Diagnosis Date    Abnormal findings on diagnostic imaging of breast     Last Assessesd: 4/25/2014    Arthritis     Knees and feet    Asthma     Breast cancer ([facility] [address] ) 2008    Right    Cancer [facility])     Bilateral breast cancer history   Had two bouts of radiation    Difficulty falling asleep     Gluteal abscess     Last Assessed: 12/30/2016    Inconclusive mammogram due to dense breasts     Last Assessed: 4/7/2014    Limb alert care status     right    Lobular carcinoma in situ of breast     Last Assesed: 4/7/2014    Nipple discharge     Last Assessed: 4/7/2014    Pain in left foot     Pneumonia 2009    x1    PONV (postoperative nausea and vomiting)     Skin cancer        Past Surgical History  Past Surgical History:   Procedure Laterality Date    BREAST BIOPSY  05/02/2014    BREAST BIOPSY  05/2013    BREAST BIOPSY  05/2008    BREAST LUMPECTOMY Right 2008    BREAST RECONSTRUCTION Left 2/21/2019    Procedure: BREAST EXPANDER/IMPLANT EXCHANGE;  Surgeon: Celso Laird MD;  [facility];  Service: Plastics    BREAST SURGERY Bilateral 2008, 2013, 2014    Breast reconstruction with expanders  Total of 7 surgeries  7/3/14 right breast reconstruction revision  10/27/14 right breast reconstruction revision     BREAST SURGERY Left 07/24/2013    Excision of breast lesion    CAPSULOTOMY Left 2/21/2019    Procedure: CAPSULECTOMY;  Surgeon: Celso Laird MD;  [facility];  Service: Plastics    CHEST WALL BIOPSY Right 2/21/2019    Procedure: UPPER CHEST BASAL CELL CARSINOMA EXCISION;  Surgeon: Celso Laird MD;  [facility];  Service: Plastics    CLOSURE WOUND N/A 2/21/2019    Procedure: RIGHT upper chest & LEFT upper back complex closure;  Surgeon: Celso Laird MD;  [facility];  Service: Plastics    FOOT ARTHRODESIS      Pantalar    KNEE ARTHROSCOPY Right 05/1997    MASTECTOMY Bilateral 06/06/2014    Lymph nodes removed bilaterally  States she may have IV's in left side   MOHS RECONSTRUCTION Right 3/7/2019    Procedure: RECONSTRUCTION MOHS DEFECT RIGHT NOSE/MEDIAL CANTHUS;  Surgeon: Celso Laird MD;  [facility];  Service: Plastics    MOHS SURGERY      Mohs Micrographic Surgery Face;  Last Assessed: 5/15/2015    TX ADJ TISS Chuck Bijou LID,NOS,EAR 10 1-30 SQCM Right 3/7/2019    Procedure: FLAP;  Surgeon: Eddie Suresh MD;  [facility];  Service: [address] LAT DORSI FLAP Left 9/7/2017    Procedure: BREAST RECONSTRUCTION; LATISSIMUS DORSI FLAP; TISSUE EXPANDER;  Surgeon: Eddie Suresh MD;  [facility];  Service: Plastics    TX COLONOSCOPY FLX DX W/COLLJ [address] PFRMD N/A 8/1/2018    Procedure: COLONOSCOPY;  Surgeon: Dana Wall MD;  [facility];  Service: Gastroenterology    TX [place] W/SESMDC W/RESCJ PROX [address] [facility]  9/9/2016    Procedure: Neftaly Hoff MODIFIED ;  Surgeon: Shai Ramirez DPM;  [facility];  Service: Podiatry    TX EXC SKIN MALIG >4 CM TRUNK,ARM,LEG N/A 6/27/2019    Procedure: EXCISION BASAL CELL CARCINOMA MID CHEST;  Surgeon: Eddie Suresh MD;  [facility];  Service: Plastics    TX FULL THICK 2011 [facility] NOS,EAR,LID <20 SQCM Right 3/7/2019    Procedure: SKIN GRAFT FULL THICKNESS  (FTSG);   Surgeon: Eddie Suresh MD;  [facility];  Service: Plastics    TX FUSION FOOT BONES,MIDTARSAL,MULTI Left 9/9/2016    Procedure: ARTHRODESIS FIRST [address] [facility] [place] [address] ;  Surgeon: Shai Ramirez DPM;  [facility];  Service: Podiatry    TX INSERT BREAST PROS IMMED AFTER EXCIS Left 9/7/2017    Procedure: TISSUE EXPANDER;  Surgeon: Eddie Suresh MD;  [facility];  Service: Plastics    [address] [facility] Left 9/9/2016    Procedure: OSTEOTOMY CALCANEUS WITH APPLICATION AND ACTIVATION OF BONE STIMULATOR ;  Surgeon: Shai Ramirez DPM;  [facility];  Service: Podiatry    TX RECMPL WND HEAD,FAC,HAND 2 6-7 5 CM N/A 3/7/2019    Procedure: COMPLEX CLOSURE;  Surgeon: Eddie Suresh MD;  [facility];  Service: Plastics    TX RECMPL WND TRUNK 2 6-7 5 CM N/A 6/27/2019    Procedure: CLOSURE WOUND MID CHEST;  Surgeon: Maribel Katz Dustin Carson MD;  [facility];  Service: Plastics    AZ REMV TISSUE FOR GRAFT OTHR Bilateral 6/27/2019    Procedure: AUTOLOGOUS FAT GRAFTING BILATERAL BREASTS;  Surgeon: Bebeto Shoemaker MD;  [facility];  Service: Plastics    AZ REVISE BREAST RECONSTRUCTION Bilateral 6/27/2019    Procedure: BILATERAL BREAST MOUND REVISION;  Surgeon: Bebeto Shoemaker MD;  [facility];  Service: Plastics    SENTINEL LYMPH NODE BIOPSY Bilateral 06/06/2014    SENTINEL LYMPH NODE BIOPSY Right 2008    SKIN LESION EXCISION Left 2/21/2019    Procedure: UPPER BACK [address] EXCISION;  Surgeon: Bebeto Shoemaker MD;  [facility];  Service: Plastics    SQUAMOUS CELL CARCINOMA EXCISION N/A 3/7/2019    Procedure: EXCISION BCC LEFT FOREHEAD;  Surgeon: Bebeto Sohemaker MD;  [facility];  Service: Plastics    WISDOM TOOTH EXTRACTION          Allergies:  Codeine; Dilaudid [hydromorphone hcl]; Hydromorphone; and Tramadol    Medications:  Current Outpatient Medications   Medication Sig Dispense Refill    acetaminophen (TYLENOL) 500 mg tablet Take 500 mg by mouth every 6 (six) hours as needed for mild pain   ALPRAZolam (XANAX) 0 5 mg tablet Take 0 5 mg by mouth daily at bedtime as needed for anxiety   Ascorbic Acid (VITAMIN C) 500 MG CAPS Take 500 mg by mouth        calcium-vitamin D (OSCAL) 250-125 MG-UNIT per tablet Take 2 tablets by mouth daily   Cholecalciferol (VITAMIN D) 2000 units CAPS Take by mouth      Chromium Picolinate 200 MCG CAPS Take 200 mg by mouth daily   fluorouracil (EFUDEX) 5 % cream Apply topically 2 (two) times a day Use for 5 weeks  40 g 0    Lactobacillus (ACIDOPHILUS PO) Take by mouth daily   Multiple Vitamin (MULTIVITAMIN) capsule Take 1 capsule by mouth daily        oxyCODONE-acetaminophen (PERCOCET) 5-325 mg per tablet Take 1 tablet by mouth every 4 (four) hours as needed for moderate pain for up to 18 dosesMax Daily Amount: 6 tablets 18 tablet 0    silver sulfadiazine (SILVADENE,SSD) 1 % cream Apply to affected area daily 25 g 1    tamoxifen (NOLVADEX) 20 mg tablet Take 1 tablet (20 mg total) by mouth daily 90 tablet 3    zinc gluconate 50 mg tablet Take by mouth       No current facility-administered medications for this visit  Social History:  Social History     Socioeconomic History    Marital status: Single     Spouse name: None    Number of children: None    Years of education: None    Highest education level: None   Occupational History    None   Social Needs    Financial resource strain: None    Food insecurity:     Worry: None     Inability: None    Transportation needs:     Medical: None     Non-medical: None   Tobacco Use    Smoking status: Never Smoker    Smokeless tobacco: Never Used   Substance and Sexual Activity    Alcohol use: Yes     Comment: 4 weekly-wine    Drug use: No    Sexual activity: Yes     Partners: Male     Birth control/protection: Condom Male   Lifestyle    Physical activity:     Days per week: None     Minutes per session: None    Stress: None   Relationships    Social connections:     Talks on phone: None     Gets together: None     Attends [employer] service: None     Active member of club or organization: None     Attends meetings of clubs or organizations: None     Relationship status: None    Intimate partner violence:     Fear of current or ex partner: None     Emotionally abused: None     Physically abused: None     Forced sexual activity: None   Other Topics Concern    None   Social History Narrative    None          Review of Systems   Constitutional: Negative for chills, fatigue and fever  Respiratory: Negative for shortness of breath  Cardiovascular: Negative for chest pain  Gastrointestinal: Negative for diarrhea, nausea and vomiting  Skin: Negative for color change, pallor, rash and wound           Objective:      /69 (BP Location: Right arm, Patient Position: Sitting, Cuff Size: Adult)   Pulse 81   Ht 5' 6" (1 676 m) Comment: verbal  Wt 74 8 kg (165 lb) Comment: verbal  BMI 26 63 kg/m²          Physical Exam   Constitutional: She is oriented to person, place, and time  She appears well-developed and well-nourished  No distress  Cardiovascular: Normal rate, regular rhythm and intact distal pulses  Pulmonary/Chest: Effort normal and breath sounds normal    Musculoskeletal: She exhibits no edema  Pain presents left sinus tarsi with palpation and ROM  No acute edema noted  Neurological: She is alert and oriented to person, place, and time  She displays normal reflexes  No sensory deficit  She exhibits normal muscle tone  Skin: Skin is warm  No rash noted  No erythema  No pallor

## 2019-10-09 ENCOUNTER — OFFICE VISIT (OUTPATIENT)
Dept: PLASTIC SURGERY | Facility: HOSPITAL | Age: 54
End: 2019-10-09

## 2019-10-09 DIAGNOSIS — Z85.3 PERSONAL HISTORY OF MALIGNANT NEOPLASM OF BREAST: Primary | ICD-10-CM

## 2019-10-09 PROCEDURE — 99024 POSTOP FOLLOW-UP VISIT: CPT | Performed by: PHYSICIAN ASSISTANT

## 2019-10-09 NOTE — LETTER
October 9, 2019     [facility]  [address]     Patient: Lisa Gomes   YOB: 1965   Date of Visit: 10/9/2019       Dear Dr Juliana Rosario: Thank you for referring Lisa Gomes to me for evaluation  Below are my notes for this consultation  If you have questions, please do not hesitate to call me  I look forward to following your patient along with you  Sincerely,        Geena Cope PA-C        CC: MD Geena Garcia PA-C  10/9/2019  1:33 PM  Sign at close encounter  Assessment/Plan:  Los Terry is a 51-year-old female who is 4 months status post bilateral breast mound revision with autologous fat grafting  Please see HPI  She is interested in additional fat grafting to the bilateral upper poles  I have discussed with her bilateral breast mound revision with autologous fat grafting  She understood and agreed  We discussed with the patient the options, benefits, and risks of surgery such as anesthesia, bleeding, infection, scarring and the need for additional procedures  Consent was obtained and all questions answered to her satisfaction  We will plan for surgery at her earliest convenience  Diagnoses and all orders for this visit:    Personal history of malignant neoplasm of breast          Subjective:      Patient ID: Lisa Gomes is a 48 y o  female  HPI   She is pleased with the results of her last fat grafting  She would like to proceed with additional grafting to the upper area of both breasts       The following portions of the patient's history were reviewed and updated as appropriate: She  has a past medical history of Abnormal findings on diagnostic imaging of breast, Arthritis, Asthma, Breast cancer ([facility] [address] ) (2008), Cancer [facility]), Difficulty falling asleep, Gluteal abscess, Inconclusive mammogram due to dense breasts, Limb alert care status, Lobular carcinoma in situ of breast, Nipple discharge, Pain in left foot, Pneumonia (2009), PONV (postoperative nausea and vomiting), and Skin cancer  She  has a past surgical history that includes Mastectomy (Bilateral, 06/06/2014); Breast surgery (Bilateral, 2008, 2013, 2014); [place] tooth extraction; pr osteotomy heel bone (Left, 9/9/2016); pr fusion foot bones,midtarsal,multi (Left, 9/9/2016); pr corrj hallux valgus w/sesmdc w/rescj prox phal (9/9/2016); pr breast reconstruc w lat dorsi flap (Left, 9/7/2017); pr insert breast pros immed after excis (Left, 9/7/2017); Knee arthroscopy (Right, 05/1997); Breast biopsy (05/02/2014); Breast surgery (Left, 07/24/2013); Breast biopsy (05/2013); Breast biopsy (05/2008); Foot arthrodesis; Mohs surgery; Breast lumpectomy (Right, 2008); [place] lymph node biopsy (Bilateral, 06/06/2014); [place] lymph node biopsy (Right, 2008); pr colonoscopy flx dx w/collj spec when pfrmd (N/A, 8/1/2018); Breast reconstruction (Left, 2/21/2019); Chest wall biopsy (Right, 2/21/2019); Skin lesion excision (Left, 2/21/2019); CLOSURE WOUND (N/A, 2/21/2019); Capsulectomy (Left, 2/21/2019); pr recmpl wnd head,fac,hand 2 6-7 5 cm (N/A, 3/7/2019); pr adj tiss xfer lid,nos,ear 10 1-30 sqcm (Right, 3/7/2019); pr full thick grft nos,ear,lid <20 sqcm (Right, 3/7/2019); MOHS RECONSTRUCTION (Right, 3/7/2019); Squamous cell carcinoma excision (N/A, 3/7/2019); pr revise breast reconstruction (Bilateral, 6/27/2019); pr remv tissue for graft othr (Bilateral, 6/27/2019); pr exc skin malig >4 cm trunk,arm,leg (N/A, 6/27/2019); and pr recmpl wnd trunk 2 6-7 5 cm (N/A, 6/27/2019)  Her family history includes No Known Problems in her father and mother  She was adopted  She  reports that she has never smoked  She has never used smokeless tobacco  She reports that she drinks alcohol  She reports that she does not use drugs       Review of Systems   HENT: Negative for hearing loss  Eyes: Negative for visual disturbance  Respiratory: Negative for shortness of breath  Cardiovascular: Negative for chest pain  Gastrointestinal: Negative for abdominal pain, blood in stool, constipation, diarrhea, nausea and vomiting  Genitourinary: Negative for hematuria  Musculoskeletal: Negative for gait problem  Skin:        As per HPI  Neurological: Negative for seizures and headaches  Hematological: Does not bruise/bleed easily  Psychiatric/Behavioral: The patient is not nervous/anxious  Objective: There were no vitals taken for this visit  Physical Exam   Constitutional: She is oriented to person, place, and time  She appears well-developed and well-nourished  No distress  HENT:   Head: Normocephalic and atraumatic  Eyes: Pupils are equal, round, and reactive to light  EOM are normal  No scleral icterus  Neck: Neck supple  No tracheal deviation present  No thyromegaly present  Cardiovascular: Normal rate and regular rhythm  Exam reveals no gallop and no friction rub  No murmur heard  Pulmonary/Chest: Effort normal and breath sounds normal  She has no wheezes  She has no rales  Abdominal: Soft  Bowel sounds are normal  She exhibits no distension  There is no tenderness  There is no rebound and no guarding  Musculoskeletal: Normal range of motion  Lymphadenopathy:     She has no cervical adenopathy  Neurological: She is alert and oriented to person, place, and time  No cranial nerve deficit  Skin:   Breasts are soft and supple bilaterally  She has bilateral upper pole hollowing which is improved after 1 session of fat grafting  Psychiatric: She has a normal mood and affect

## 2019-10-09 NOTE — PROGRESS NOTES
Assessment/Plan:  Laya Darnell is a 46-year-old female who is 4 months status post bilateral breast mound revision with autologous fat grafting  Please see HPI  She is interested in additional fat grafting to the bilateral upper poles  I have discussed with her bilateral breast mound revision with autologous fat grafting  She understood and agreed  We discussed with the patient the options, benefits, and risks of surgery such as anesthesia, bleeding, infection, scarring and the need for additional procedures  Consent was obtained and all questions answered to her satisfaction  We will plan for surgery at her earliest convenience  Diagnoses and all orders for this visit:    Personal history of malignant neoplasm of breast          Subjective:      Patient ID: Lucille Gomez is a 48 y o  female  HPI   She is pleased with the results of her last fat grafting  She would like to proceed with additional grafting to the upper area of both breasts  The following portions of the patient's history were reviewed and updated as appropriate: She  has a past medical history of Abnormal findings on diagnostic imaging of breast, Arthritis, Asthma, Breast cancer ([facility] [address] ) (2008), Cancer [facility]), Difficulty falling asleep, Gluteal abscess, Inconclusive mammogram due to dense breasts, Limb alert care status, Lobular carcinoma in situ of breast, Nipple discharge, Pain in left foot, Pneumonia (2009), PONV (postoperative nausea and vomiting), and Skin cancer  She  has a past surgical history that includes Mastectomy (Bilateral, 06/06/2014); Breast surgery (Bilateral, 2008, 2013, 2014); [place] tooth extraction; pr osteotomy heel bone (Left, 9/9/2016); pr fusion foot bones,midtarsal,multi (Left, 9/9/2016); pr corrj hallux valgus w/sesmdc w/rescj prox phal (9/9/2016); pr breast reconstruc w lat dorsi flap (Left, 9/7/2017); pr insert breast pros immed after excis (Left, 9/7/2017); Knee arthroscopy (Right, 05/1997);  Breast biopsy (05/02/2014); Breast surgery (Left, 07/24/2013); Breast biopsy (05/2013); Breast biopsy (05/2008); Foot arthrodesis; Mohs surgery; Breast lumpectomy (Right, 2008); [place] lymph node biopsy (Bilateral, 06/06/2014); [place] lymph node biopsy (Right, 2008); pr colonoscopy flx dx w/collj spec when pfrmd (N/A, 8/1/2018); Breast reconstruction (Left, 2/21/2019); Chest wall biopsy (Right, 2/21/2019); Skin lesion excision (Left, 2/21/2019); CLOSURE WOUND (N/A, 2/21/2019); Capsulectomy (Left, 2/21/2019); pr recmpl wnd head,fac,hand 2 6-7 5 cm (N/A, 3/7/2019); pr adj tiss xfer lid,nos,ear 10 1-30 sqcm (Right, 3/7/2019); pr full thick grft nos,ear,lid <20 sqcm (Right, 3/7/2019); MOHS RECONSTRUCTION (Right, 3/7/2019); Squamous cell carcinoma excision (N/A, 3/7/2019); pr revise breast reconstruction (Bilateral, 6/27/2019); pr remv tissue for graft othr (Bilateral, 6/27/2019); pr exc skin malig >4 cm trunk,arm,leg (N/A, 6/27/2019); and pr recmpl wnd trunk 2 6-7 5 cm (N/A, 6/27/2019)  Her family history includes No Known Problems in her father and mother  She was adopted  She  reports that she has never smoked  She has never used smokeless tobacco  She reports that she drinks alcohol  She reports that she does not use drugs       Review of Systems   HENT: Negative for hearing loss  Eyes: Negative for visual disturbance  Respiratory: Negative for shortness of breath  Cardiovascular: Negative for chest pain  Gastrointestinal: Negative for abdominal pain, blood in stool, constipation, diarrhea, nausea and vomiting  Genitourinary: Negative for hematuria  Musculoskeletal: Negative for gait problem  Skin:        As per HPI  Neurological: Negative for seizures and headaches  Hematological: Does not bruise/bleed easily  Psychiatric/Behavioral: The patient is not nervous/anxious  Objective: There were no vitals taken for this visit           Physical Exam   Constitutional: She is oriented to person, place, and time  She appears well-developed and well-nourished  No distress  HENT:   Head: Normocephalic and atraumatic  Eyes: Pupils are equal, round, and reactive to light  EOM are normal  No scleral icterus  Neck: Neck supple  No tracheal deviation present  No thyromegaly present  Cardiovascular: Normal rate and regular rhythm  Exam reveals no gallop and no friction rub  No murmur heard  Pulmonary/Chest: Effort normal and breath sounds normal  She has no wheezes  She has no rales  Abdominal: Soft  Bowel sounds are normal  She exhibits no distension  There is no tenderness  There is no rebound and no guarding  Musculoskeletal: Normal range of motion  Lymphadenopathy:     She has no cervical adenopathy  Neurological: She is alert and oriented to person, place, and time  No cranial nerve deficit  Skin:   Breasts are soft and supple bilaterally  She has bilateral upper pole hollowing which is improved after 1 session of fat grafting  Psychiatric: She has a normal mood and affect

## 2019-10-09 NOTE — H&P (VIEW-ONLY)
Assessment/Plan:  Abilio Adams is a 47-year-old female who is 4 months status post bilateral breast mound revision with autologous fat grafting  Please see HPI  She is interested in additional fat grafting to the bilateral upper poles  I have discussed with her bilateral breast mound revision with autologous fat grafting  She understood and agreed  We discussed with the patient the options, benefits, and risks of surgery such as anesthesia, bleeding, infection, scarring and the need for additional procedures  Consent was obtained and all questions answered to her satisfaction  We will plan for surgery at her earliest convenience  Diagnoses and all orders for this visit:    Personal history of malignant neoplasm of breast          Subjective:      Patient ID: Mac Garcia is a 48 y o  female  HPI   She is pleased with the results of her last fat grafting  She would like to proceed with additional grafting to the upper area of both breasts  The following portions of the patient's history were reviewed and updated as appropriate: She  has a past medical history of Abnormal findings on diagnostic imaging of breast, Arthritis, Asthma, Breast cancer ([facility] [address] ) (2008), Cancer [facility]), Difficulty falling asleep, Gluteal abscess, Inconclusive mammogram due to dense breasts, Limb alert care status, Lobular carcinoma in situ of breast, Nipple discharge, Pain in left foot, Pneumonia (2009), PONV (postoperative nausea and vomiting), and Skin cancer  She  has a past surgical history that includes Mastectomy (Bilateral, 06/06/2014); Breast surgery (Bilateral, 2008, 2013, 2014); [place] tooth extraction; pr osteotomy heel bone (Left, 9/9/2016); pr fusion foot bones,midtarsal,multi (Left, 9/9/2016); pr corrj hallux valgus w/sesmdc w/rescj prox phal (9/9/2016); pr breast reconstruc w lat dorsi flap (Left, 9/7/2017); pr insert breast pros immed after excis (Left, 9/7/2017); Knee arthroscopy (Right, 05/1997);  Breast biopsy (05/02/2014); Breast surgery (Left, 07/24/2013); Breast biopsy (05/2013); Breast biopsy (05/2008); Foot arthrodesis; Mohs surgery; Breast lumpectomy (Right, 2008); [place] lymph node biopsy (Bilateral, 06/06/2014); [place] lymph node biopsy (Right, 2008); pr colonoscopy flx dx w/collj spec when pfrmd (N/A, 8/1/2018); Breast reconstruction (Left, 2/21/2019); Chest wall biopsy (Right, 2/21/2019); Skin lesion excision (Left, 2/21/2019); CLOSURE WOUND (N/A, 2/21/2019); Capsulectomy (Left, 2/21/2019); pr recmpl wnd head,fac,hand 2 6-7 5 cm (N/A, 3/7/2019); pr adj tiss xfer lid,nos,ear 10 1-30 sqcm (Right, 3/7/2019); pr full thick grft nos,ear,lid <20 sqcm (Right, 3/7/2019); MOHS RECONSTRUCTION (Right, 3/7/2019); Squamous cell carcinoma excision (N/A, 3/7/2019); pr revise breast reconstruction (Bilateral, 6/27/2019); pr remv tissue for graft othr (Bilateral, 6/27/2019); pr exc skin malig >4 cm trunk,arm,leg (N/A, 6/27/2019); and pr recmpl wnd trunk 2 6-7 5 cm (N/A, 6/27/2019)  Her family history includes No Known Problems in her father and mother  She was adopted  She  reports that she has never smoked  She has never used smokeless tobacco  She reports that she drinks alcohol  She reports that she does not use drugs       Review of Systems   HENT: Negative for hearing loss  Eyes: Negative for visual disturbance  Respiratory: Negative for shortness of breath  Cardiovascular: Negative for chest pain  Gastrointestinal: Negative for abdominal pain, blood in stool, constipation, diarrhea, nausea and vomiting  Genitourinary: Negative for hematuria  Musculoskeletal: Negative for gait problem  Skin:        As per HPI  Neurological: Negative for seizures and headaches  Hematological: Does not bruise/bleed easily  Psychiatric/Behavioral: The patient is not nervous/anxious  Objective: There were no vitals taken for this visit           Physical Exam   Constitutional: She is oriented to person, place, and time  She appears well-developed and well-nourished  No distress  HENT:   Head: Normocephalic and atraumatic  Eyes: Pupils are equal, round, and reactive to light  EOM are normal  No scleral icterus  Neck: Neck supple  No tracheal deviation present  No thyromegaly present  Cardiovascular: Normal rate and regular rhythm  Exam reveals no gallop and no friction rub  No murmur heard  Pulmonary/Chest: Effort normal and breath sounds normal  She has no wheezes  She has no rales  Abdominal: Soft  Bowel sounds are normal  She exhibits no distension  There is no tenderness  There is no rebound and no guarding  Musculoskeletal: Normal range of motion  Lymphadenopathy:     She has no cervical adenopathy  Neurological: She is alert and oriented to person, place, and time  No cranial nerve deficit  Skin:   Breasts are soft and supple bilaterally  She has bilateral upper pole hollowing which is improved after 1 session of fat grafting  Psychiatric: She has a normal mood and affect

## 2019-10-14 ENCOUNTER — APPOINTMENT (OUTPATIENT)
Dept: LAB | Facility: CLINIC | Age: 54
End: 2019-10-14
Payer: COMMERCIAL

## 2019-10-14 ENCOUNTER — TRANSCRIBE ORDERS (OUTPATIENT)
Dept: LAB | Facility: CLINIC | Age: 54
End: 2019-10-14

## 2019-10-14 DIAGNOSIS — Z85.3 PERSONAL HISTORY OF MALIGNANT NEOPLASM OF BREAST: ICD-10-CM

## 2019-10-14 LAB
ANION GAP SERPL CALCULATED.3IONS-SCNC: 5 MMOL/L (ref 4–13)
BASOPHILS # BLD AUTO: 0.02 THOUSANDS/ΜL (ref 0–0.1)
BASOPHILS NFR BLD AUTO: 1 % (ref 0–1)
BUN SERPL-MCNC: 14 MG/DL (ref 5–25)
CALCIUM SERPL-MCNC: 8.9 MG/DL (ref 8.3–10.1)
CHLORIDE SERPL-SCNC: 106 MMOL/L (ref 100–108)
CO2 SERPL-SCNC: 28 MMOL/L (ref 21–32)
CREAT SERPL-MCNC: 0.77 MG/DL (ref 0.6–1.3)
EOSINOPHIL # BLD AUTO: 0.04 THOUSAND/ΜL (ref 0–0.61)
EOSINOPHIL NFR BLD AUTO: 1 % (ref 0–6)
ERYTHROCYTE [DISTWIDTH] IN BLOOD BY AUTOMATED COUNT: 12.4 % (ref 11.6–15.1)
GFR SERPL CREATININE-BSD FRML MDRD: 89 ML/MIN/1.73SQ M
GLUCOSE SERPL-MCNC: 94 MG/DL (ref 65–140)
HCT VFR BLD AUTO: 39.1 % (ref 34.8–46.1)
HGB BLD-MCNC: 12.3 G/DL (ref 11.5–15.4)
IMM GRANULOCYTES # BLD AUTO: 0.01 THOUSAND/UL (ref 0–0.2)
IMM GRANULOCYTES NFR BLD AUTO: 0 % (ref 0–2)
LYMPHOCYTES # BLD AUTO: 1.59 THOUSANDS/ΜL (ref 0.6–4.47)
LYMPHOCYTES NFR BLD AUTO: 37 % (ref 14–44)
MCH RBC QN AUTO: 29.3 PG (ref 26.8–34.3)
MCHC RBC AUTO-ENTMCNC: 31.5 G/DL (ref 31.4–37.4)
MCV RBC AUTO: 93 FL (ref 82–98)
MONOCYTES # BLD AUTO: 0.35 THOUSAND/ΜL (ref 0.17–1.22)
MONOCYTES NFR BLD AUTO: 8 % (ref 4–12)
NEUTROPHILS # BLD AUTO: 2.32 THOUSANDS/ΜL (ref 1.85–7.62)
NEUTS SEG NFR BLD AUTO: 53 % (ref 43–75)
NRBC BLD AUTO-RTO: 0 /100 WBCS
PLATELET # BLD AUTO: 194 THOUSANDS/UL (ref 149–390)
PMV BLD AUTO: 11.4 FL (ref 8.9–12.7)
POTASSIUM SERPL-SCNC: 4.8 MMOL/L (ref 3.5–5.3)
RBC # BLD AUTO: 4.2 MILLION/UL (ref 3.81–5.12)
SODIUM SERPL-SCNC: 139 MMOL/L (ref 136–145)
WBC # BLD AUTO: 4.33 THOUSAND/UL (ref 4.31–10.16)

## 2019-10-14 PROCEDURE — 80048 BASIC METABOLIC PNL TOTAL CA: CPT

## 2019-10-14 PROCEDURE — 85025 COMPLETE CBC W/AUTO DIFF WBC: CPT

## 2019-10-14 PROCEDURE — 36415 COLL VENOUS BLD VENIPUNCTURE: CPT

## 2019-10-17 NOTE — PRE-PROCEDURE INSTRUCTIONS
Pre-Surgery Instructions:   Medication Instructions    acetaminophen (TYLENOL) 500 mg tablet Instructed patient per Anesthesia Guidelines   ALPRAZolam (XANAX) 0 5 mg tablet Instructed patient per Anesthesia Guidelines   Ascorbic Acid (VITAMIN C) 500 MG CAPS Instructed patient per Anesthesia Guidelines   calcium-vitamin D (OSCAL) 250-125 MG-UNIT per tablet Instructed patient per Anesthesia Guidelines   Cholecalciferol (VITAMIN D) 2000 units CAPS Instructed patient per Anesthesia Guidelines   Chromium Picolinate 200 MCG CAPS Instructed patient per Anesthesia Guidelines   Lactobacillus (ACIDOPHILUS PO) Instructed patient per Anesthesia Guidelines   oxyCODONE-acetaminophen (PERCOCET) 5-325 mg per tablet Instructed patient per Anesthesia Guidelines   zinc gluconate 50 mg tablet Instructed patient per Anesthesia Guidelines  Pre-op Showering Instructions for Surgery Patients    Before your operation, you play an important role in decreasing your risk for infection by washing with special antiseptic soap  This is an effective way to reduce bacteria on the skin which may help to prevent infections at the surgical site  Please read the following directions in advance  1  In the week before your operation, purchase a 4 ounce bottle of antiseptic soap containing chlorhexidine gluconate (CHG)  4%  Some brand names include: Aplicare®, Endure, and Hibiclens®  The cost is usually less than $5 00   For your convenience, the [employer] carries the soap   It may also be available at your doctors office or pre-admission testing center, and at most retail pharmacies   If you are allergic or sensitive to soaps containing CHG, please let your doctor know so another antiseptic can be suggested   CHG antiseptic soap is for external use only  2   The day before your operation, follow these instructions carefully to get ready   Please clean linens (sheets) on your bed; you should sleep on clean sheets after your evening shower   Get clean towels and washcloth ready - you need enough for 2 showers   Set aside clean underwear, pajamas, and clothing to wear after the showers     Reminders:   DO NOT use any other soap or body rinse on your skin during or after the antiseptic showers   DO NOT use lotion, powder, deodorant, or perfume/aftershave of any kind on your skin after your antiseptic shower   DO NOT shave any body parts in the 24 hours/day before your operation   DO NOT get the antiseptic soap in your eyes, ears, nose, mouth, or vaginal area    3  You will need to shower the night before AND the morning of your surgery  Shower 1:   The first evening before the operation, take the first shower   First, shampoo your hair with regular shampoo and rinse it completely before you use the antiseptic soap  After washing and rinsing your hair, rinse your body   Next, use a clean washcloth to apply the antiseptic soap and wash your body from the neck down to your toes using ½ bottle of the antiseptic soap  You will use the other ½ bottle for the second shower   Clean the area where your incision will be; lather this area well for about 2 minutes   If you are having head or neck surgery, wash areas with the antiseptic soap   Rinse yourself completely with running water   Use a clean towel to dry off   Wear clean underwear and clothing/pajamas  Shower 2   The morning of your operation, take the second shower following the same steps as Shower 1 using the second ½ of the bottle of antiseptic soap   Use clean cloths and towels to wash and dry yourself   Wear clean underwear and clothing    Pre-procedure instructions given without any further questions or concerns at this time

## 2019-10-21 DIAGNOSIS — Z71.89 ENCOUNTER TO DISCUSS BREAST RECONSTRUCTION: ICD-10-CM

## 2019-10-21 RX ORDER — OXYCODONE HYDROCHLORIDE AND ACETAMINOPHEN 5; 325 MG/1; MG/1
1 TABLET ORAL EVERY 4 HOURS PRN
Qty: 18 TABLET | Refills: 0 | Status: ON HOLD | OUTPATIENT
Start: 2019-10-21 | End: 2019-12-11 | Stop reason: CLARIF

## 2019-10-24 ENCOUNTER — ANESTHESIA EVENT (OUTPATIENT)
Dept: PERIOP | Facility: HOSPITAL | Age: 54
End: 2019-10-24
Payer: COMMERCIAL

## 2019-10-24 ENCOUNTER — HOSPITAL ENCOUNTER (OUTPATIENT)
Facility: HOSPITAL | Age: 54
Setting detail: OUTPATIENT SURGERY
Discharge: HOME | End: 2019-10-24
Attending: SURGERY | Admitting: SURGERY
Payer: COMMERCIAL

## 2019-10-24 ENCOUNTER — ANESTHESIA (OUTPATIENT)
Dept: PERIOP | Facility: HOSPITAL | Age: 54
End: 2019-10-24
Payer: COMMERCIAL

## 2019-10-24 VITALS
HEART RATE: 72 BPM | WEIGHT: 178 LBS | OXYGEN SATURATION: 98 % | TEMPERATURE: 98.2 F | RESPIRATION RATE: 18 BRPM | BODY MASS INDEX: 28.61 KG/M2 | SYSTOLIC BLOOD PRESSURE: 130 MMHG | DIASTOLIC BLOOD PRESSURE: 70 MMHG | HEIGHT: 66 IN

## 2019-10-24 LAB
EXT PREGNANCY TEST URINE: NEGATIVE
EXT. CONTROL: NORMAL

## 2019-10-24 PROCEDURE — NC001 PR NO CHARGE: Performed by: PHYSICIAN ASSISTANT

## 2019-10-24 PROCEDURE — 20926 PR REMV TISSUE FOR GRAFT OTHR: CPT | Performed by: PHYSICIAN ASSISTANT

## 2019-10-24 PROCEDURE — 81025 URINE PREGNANCY TEST: CPT | Performed by: SURGERY

## 2019-10-24 PROCEDURE — 20926 PR REMV TISSUE FOR GRAFT OTHR: CPT | Performed by: SURGERY

## 2019-10-24 RX ORDER — PROPOFOL 10 MG/ML
INJECTION, EMULSION INTRAVENOUS AS NEEDED
Status: DISCONTINUED | OUTPATIENT
Start: 2019-10-24 | End: 2019-10-24 | Stop reason: SURG

## 2019-10-24 RX ORDER — EPHEDRINE SULFATE 50 MG/ML
INJECTION INTRAVENOUS AS NEEDED
Status: DISCONTINUED | OUTPATIENT
Start: 2019-10-24 | End: 2019-10-24 | Stop reason: SURG

## 2019-10-24 RX ORDER — DEXAMETHASONE SODIUM PHOSPHATE 10 MG/ML
INJECTION, SOLUTION INTRAMUSCULAR; INTRAVENOUS AS NEEDED
Status: DISCONTINUED | OUTPATIENT
Start: 2019-10-24 | End: 2019-10-24 | Stop reason: SURG

## 2019-10-24 RX ORDER — MIDAZOLAM HYDROCHLORIDE 1 MG/ML
INJECTION INTRAMUSCULAR; INTRAVENOUS AS NEEDED
Status: DISCONTINUED | OUTPATIENT
Start: 2019-10-24 | End: 2019-10-24 | Stop reason: SURG

## 2019-10-24 RX ORDER — SCOLOPAMINE TRANSDERMAL SYSTEM 1 MG/1
1 PATCH, EXTENDED RELEASE TRANSDERMAL
Status: DISCONTINUED | OUTPATIENT
Start: 2019-10-24 | End: 2019-10-24 | Stop reason: HOSPADM

## 2019-10-24 RX ORDER — MINERAL OIL
OIL (ML) MISCELLANEOUS AS NEEDED
Status: DISCONTINUED | OUTPATIENT
Start: 2019-10-24 | End: 2019-10-24 | Stop reason: HOSPADM

## 2019-10-24 RX ORDER — PROMETHAZINE HYDROCHLORIDE 25 MG/ML
12.5 INJECTION, SOLUTION INTRAMUSCULAR; INTRAVENOUS ONCE
Status: COMPLETED | OUTPATIENT
Start: 2019-10-24 | End: 2019-10-24

## 2019-10-24 RX ORDER — ROCURONIUM BROMIDE 10 MG/ML
INJECTION, SOLUTION INTRAVENOUS AS NEEDED
Status: DISCONTINUED | OUTPATIENT
Start: 2019-10-24 | End: 2019-10-24 | Stop reason: SURG

## 2019-10-24 RX ORDER — GLYCOPYRROLATE 0.2 MG/ML
INJECTION INTRAMUSCULAR; INTRAVENOUS AS NEEDED
Status: DISCONTINUED | OUTPATIENT
Start: 2019-10-24 | End: 2019-10-24 | Stop reason: SURG

## 2019-10-24 RX ORDER — FENTANYL CITRATE/PF 50 MCG/ML
25 SYRINGE (ML) INJECTION
Status: COMPLETED | OUTPATIENT
Start: 2019-10-24 | End: 2019-10-24

## 2019-10-24 RX ORDER — NEOSTIGMINE METHYLSULFATE 1 MG/ML
INJECTION INTRAVENOUS AS NEEDED
Status: DISCONTINUED | OUTPATIENT
Start: 2019-10-24 | End: 2019-10-24 | Stop reason: SURG

## 2019-10-24 RX ORDER — SODIUM CHLORIDE, SODIUM LACTATE, POTASSIUM CHLORIDE, AND CALCIUM CHLORIDE .6; .31; .03; .02 G/100ML; G/100ML; G/100ML; G/100ML
IRRIGANT IRRIGATION AS NEEDED
Status: DISCONTINUED | OUTPATIENT
Start: 2019-10-24 | End: 2019-10-24 | Stop reason: HOSPADM

## 2019-10-24 RX ORDER — KETOROLAC TROMETHAMINE 30 MG/ML
INJECTION, SOLUTION INTRAMUSCULAR; INTRAVENOUS AS NEEDED
Status: DISCONTINUED | OUTPATIENT
Start: 2019-10-24 | End: 2019-10-24 | Stop reason: SURG

## 2019-10-24 RX ORDER — SODIUM CHLORIDE, SODIUM LACTATE, POTASSIUM CHLORIDE, CALCIUM CHLORIDE 600; 310; 30; 20 MG/100ML; MG/100ML; MG/100ML; MG/100ML
125 INJECTION, SOLUTION INTRAVENOUS CONTINUOUS
Status: DISCONTINUED | OUTPATIENT
Start: 2019-10-24 | End: 2019-10-24 | Stop reason: HOSPADM

## 2019-10-24 RX ORDER — OXYCODONE HYDROCHLORIDE AND ACETAMINOPHEN 5; 325 MG/1; MG/1
1 TABLET ORAL EVERY 4 HOURS PRN
Status: DISCONTINUED | OUTPATIENT
Start: 2019-10-24 | End: 2019-10-24 | Stop reason: HOSPADM

## 2019-10-24 RX ORDER — CEFAZOLIN SODIUM 1 G/50ML
1000 SOLUTION INTRAVENOUS ONCE
Status: COMPLETED | OUTPATIENT
Start: 2019-10-24 | End: 2019-10-24

## 2019-10-24 RX ORDER — ONDANSETRON 2 MG/ML
INJECTION INTRAMUSCULAR; INTRAVENOUS AS NEEDED
Status: DISCONTINUED | OUTPATIENT
Start: 2019-10-24 | End: 2019-10-24 | Stop reason: SURG

## 2019-10-24 RX ORDER — FENTANYL CITRATE 50 UG/ML
INJECTION, SOLUTION INTRAMUSCULAR; INTRAVENOUS AS NEEDED
Status: DISCONTINUED | OUTPATIENT
Start: 2019-10-24 | End: 2019-10-24 | Stop reason: SURG

## 2019-10-24 RX ADMIN — DEXAMETHASONE SODIUM PHOSPHATE 6 MG: 10 INJECTION, SOLUTION INTRAMUSCULAR; INTRAVENOUS at 08:44

## 2019-10-24 RX ADMIN — FENTANYL CITRATE 100 MCG: 50 INJECTION, SOLUTION INTRAMUSCULAR; INTRAVENOUS at 08:28

## 2019-10-24 RX ADMIN — GLYCOPYRROLATE 0.6 MG: 0.2 INJECTION INTRAMUSCULAR; INTRAVENOUS at 10:12

## 2019-10-24 RX ADMIN — SCOPALAMINE 1 PATCH: 1 PATCH, EXTENDED RELEASE TRANSDERMAL at 07:34

## 2019-10-24 RX ADMIN — FENTANYL CITRATE 25 MCG: 50 INJECTION, SOLUTION INTRAMUSCULAR; INTRAVENOUS at 10:52

## 2019-10-24 RX ADMIN — MIDAZOLAM 2 MG: 1 INJECTION INTRAMUSCULAR; INTRAVENOUS at 08:28

## 2019-10-24 RX ADMIN — FENTANYL CITRATE 25 MCG: 50 INJECTION, SOLUTION INTRAMUSCULAR; INTRAVENOUS at 11:00

## 2019-10-24 RX ADMIN — EPHEDRINE SULFATE 5 MG: 50 INJECTION, SOLUTION INTRAVENOUS at 08:58

## 2019-10-24 RX ADMIN — FENTANYL CITRATE 25 MCG: 50 INJECTION, SOLUTION INTRAMUSCULAR; INTRAVENOUS at 10:43

## 2019-10-24 RX ADMIN — KETOROLAC TROMETHAMINE 30 MG: 30 INJECTION, SOLUTION INTRAMUSCULAR; INTRAVENOUS at 09:58

## 2019-10-24 RX ADMIN — ROCURONIUM BROMIDE 50 MG: 10 INJECTION INTRAVENOUS at 08:31

## 2019-10-24 RX ADMIN — SODIUM CHLORIDE, SODIUM LACTATE, POTASSIUM CHLORIDE, AND CALCIUM CHLORIDE 125 ML/HR: .6; .31; .03; .02 INJECTION, SOLUTION INTRAVENOUS at 07:48

## 2019-10-24 RX ADMIN — PROMETHAZINE HYDROCHLORIDE 12.5 MG: 25 INJECTION INTRAMUSCULAR; INTRAVENOUS at 10:31

## 2019-10-24 RX ADMIN — ONDANSETRON 4 MG: 2 INJECTION INTRAMUSCULAR; INTRAVENOUS at 09:58

## 2019-10-24 RX ADMIN — SODIUM CHLORIDE, SODIUM LACTATE, POTASSIUM CHLORIDE, AND CALCIUM CHLORIDE 125 ML/HR: .6; .31; .03; .02 INJECTION, SOLUTION INTRAVENOUS at 10:53

## 2019-10-24 RX ADMIN — CEFAZOLIN SODIUM 1000 MG: 1 SOLUTION INTRAVENOUS at 08:46

## 2019-10-24 RX ADMIN — ROCURONIUM BROMIDE 10 MG: 10 INJECTION INTRAVENOUS at 09:54

## 2019-10-24 RX ADMIN — ROCURONIUM BROMIDE 20 MG: 10 INJECTION INTRAVENOUS at 09:03

## 2019-10-24 RX ADMIN — FENTANYL CITRATE 25 MCG: 50 INJECTION, SOLUTION INTRAMUSCULAR; INTRAVENOUS at 10:37

## 2019-10-24 RX ADMIN — ROCURONIUM BROMIDE 20 MG: 10 INJECTION INTRAVENOUS at 09:29

## 2019-10-24 RX ADMIN — OXYCODONE HYDROCHLORIDE AND ACETAMINOPHEN 1 TABLET: 5; 325 TABLET ORAL at 11:32

## 2019-10-24 RX ADMIN — PROPOFOL 200 MG: 10 INJECTION, EMULSION INTRAVENOUS at 08:30

## 2019-10-24 RX ADMIN — NEOSTIGMINE METHYLSULFATE 3 MG: 1 INJECTION INTRAVENOUS at 10:12

## 2019-10-24 NOTE — ANESTHESIA PREPROCEDURE EVALUATION
Review of Systems/Medical History  Patient summary reviewed  Chart reviewed  History of anesthetic complications PONV    Cardiovascular  Negative cardio ROS Exercise tolerance (METS): >4,     Pulmonary  Asthma , seasonal/exercise induced ,        GI/Hepatic  Negative GI/hepatic ROS          Negative  ROS        Endo/Other  Negative endo/other ROS      GYN       Hematology   Musculoskeletal    Arthritis     Neurology  Negative neurology ROS      Psychology           Physical Exam    Airway    Mallampati score: I  TM Distance: >3 FB  Neck ROM: full     Dental   No notable dental hx     Cardiovascular  Comment: Negative ROS, Rhythm: regular, Rate: normal, Cardiovascular exam normal    Pulmonary  Pulmonary exam normal Breath sounds clear to auscultation,     Other Findings        Anesthesia Plan  ASA Score- 2     Anesthesia Type- general with ASA Monitors  Additional Monitors:   Airway Plan: LMA  Comment: Benefits and risks of planned anesthetic discussed with patient, and agrees to proceed  I, Dr Gisela San, the attending anesthesiologist, have personally seen and evaluated the patient prior to anesthetic care  I have reviewed the preanesthetic record, and other medical records if appropriate to the anesthetic care  If a CRNA is involved in the case, I have reviewed the CRNA assessment, if present, and agree  The patient is in a suitable condition to proceed with my formulated anesthetic plan        Plan Factors-    Induction- intravenous  Postoperative Plan- Plan for postoperative opioid use  Informed Consent- Anesthetic plan and risks discussed with patient

## 2019-10-24 NOTE — ANESTHESIA POSTPROCEDURE EVALUATION
Post-Op Assessment Note    CV Status:  Stable  Pain Score: 3    Pain management: adequate     Mental Status:  Alert and awake   Hydration Status:  Euvolemic   PONV Controlled:  Inadequately controlled   Airway Patency:  Patent   Post Op Vitals Reviewed: Yes      Staff: Anesthesiologist, with CRNAs   Comments: Pt awaked with c/o ot slight nausea   To PACU on 3L NC          BP   120/70   Temp      Pulse  111   Resp   16   SpO2   100

## 2019-10-24 NOTE — DISCHARGE INSTRUCTIONS
[facility]  Dr Dorothy Ibanez   [address] [facility] [address], [address]  Phone: [phone]     Postoperative Instructions for Outpatient Surgery     These instructions are being provided by your doctor to give you basic guidelines during your post-op recovery  Please let our office know if your contact information has changed       Please call the office today for an appointment in 2 weeks for suture removal       Dressings: Remove in 48 hours       Activity Restrictions: Nothing strenuous for 2 weeks       Bathing:  May shower after dressing removal       Medications:    Resume pre-op medications  You may take tylenol, aleve, or ibuprofen for pain control             Percocet as needed for pain  Other: Ice to abdomen every 15 min while awake for at least 48 hours  Wear binder at all times except to shower

## 2019-10-24 NOTE — INTERIM OP NOTE
BILATERAL BREAST MOUND REVISION, AUTOLOGOUS FAT GRAFTING TO BILATERAL BREAST  Postoperative Note  PATIENT NAME: Amaury Reno  : 1965  MRN: [id number]  [facility] OR ROOM 02    Surgery Date: 10/24/2019    Preop Diagnosis:  Personal history of malignant neoplasm of breast [Z85 3]    Post-Op Diagnosis Codes:     * Personal history of malignant neoplasm of breast [Z85 3]    Procedure(s) (LRB):  BILATERAL BREAST MOUND REVISION (Bilateral)  AUTOLOGOUS FAT GRAFTING TO BILATERAL BREAST (Bilateral)    Surgeon(s) and Role:     * Macy Fisher MD - Primary     * Hoang Brizuela PA-C - Assisting    Specimens:  * No specimens in log *    Estimated Blood Loss:   Minimal    Anesthesia Type:   General/LMA     Findings:    None  Complications:   None    SIGNATURE: Hoang Brizuela PA-C   DATE: 2019   TIME: 10:20 AM

## 2019-10-24 NOTE — INTERVAL H&P NOTE
H&P reviewed  After examining the patient I find no changes in the patients condition since the H&P had been written      Vitals:    10/24/19 0723   BP: 115/85   Pulse: 74   Resp: 18   Temp: 98 1 °F (36 7 °C)   SpO2: 97%

## 2019-10-24 NOTE — OP NOTE
OPERATIVE REPORT  PATIENT NAME: Aurelio Gonzalez    :  1965  MRN: [id number]  Pt Location: [facility] 02    SURGERY DATE: 10/24/2019    Surgeon(s) and Role:     * Jonan Chung MD - Primary     * Sue Thorpe PA-C - Assisting    Preop Diagnosis:  Personal history of malignant neoplasm of breast [Z85 3]    Post-Op Diagnosis Codes:     * Personal history of malignant neoplasm of breast [Z85 3]    Procedure(s) (LRB):  BILATERAL BREAST MOUND REVISION (Bilateral)  AUTOLOGOUS FAT GRAFTING TO BILATERAL BREAST (Bilateral) 118 cc pure autologous fat to right breast, 100 cc pure autologous fat to left breast    Specimen(s):  * No specimens in log *    Estimated Blood Loss:   Minimal    Drains:  Closed/Suction Drain Left Back Bulb 15 Fr  (Active)   Number of days: 291       Closed/Suction Drain Left Breast Bulb 15 Fr  (Active)   Number of days: 705       Anesthesia Type:   General/LMA    Operative Indications:  Personal history of malignant neoplasm of breast [Z85 3]  Status post latissimus dorsi myocutaneous flaps and implant reconstruction    Operative Findings:  As above    Complications:   None    Procedure and Technique:  Salvador was seen in the holding area preoperatively, the surgical sites were marked with her participation, and we reviewed the planned procedure as well as potential risks, complications limitations  She was taken to the operating room and underwent induction of general anesthesia by the anesthesia personnel  The operative field was prepped and draped sterile fashion a proper time-out was performed  The areas planned for fat harvest were then infiltrated with tumescent solution, liposuction was then performed, and the RevNanalysis system was utilized to prepare the fat for grafting    Fat was transferred to 10 cc syringes, and standard fat grafting techniques were utilized to transfer fat to the previously determined areas of both the right and left breast   Small aliquots were injected with gentle pressure on the syringe, a total of 118 cc was utilized on the right breast and 100 cc on the left breast   The access incisions were closed with 5 O nylon sutures and the grafted areas were  splinted with Telfa and Tegaderm  Bulky dry sterile dressings were applied to the fat harvest sites and an abdominal binder was applied  The patient was then transferred to the recovery room  I was present for the entire procedure  Qualified resident was not available  The physician assistant provided essential assistance with retraction, exposure, hemostasis, fat preparation, and wound closure      Patient Disposition:  PACU     SIGNATURE: Catarino Guevara MD  DATE: October 24, 2019  TIME: 10:35 AM

## 2019-11-08 ENCOUNTER — OFFICE VISIT (OUTPATIENT)
Dept: PLASTIC SURGERY | Facility: CLINIC | Age: 54
End: 2019-11-08

## 2019-11-08 DIAGNOSIS — Z42.1 AFTERCARE POSTMASTECTOMY FOR BREAST RECONSTRUCTION: Primary | ICD-10-CM

## 2019-11-08 PROCEDURE — 99024 POSTOP FOLLOW-UP VISIT: CPT | Performed by: SURGERY

## 2019-11-08 NOTE — PROGRESS NOTES
Neris Aguilar  presents today for a follow-up visit, 15 days status post autologous fat grafting to both breasts  Overall, she is doing well  The access site sutures have been removed, there was excellent early fat take to both breasts, there is contour irregularity of the left breast, as expected given the severe fibrosis from prior radiation  This will improve over time, and we will see her again in 1 month  She may discontinue use of the abdominal binder at this point

## 2019-11-19 ENCOUNTER — OFFICE VISIT (OUTPATIENT)
Dept: PLASTIC SURGERY | Facility: CLINIC | Age: 54
End: 2019-11-19

## 2019-11-19 DIAGNOSIS — L98.9 SKIN LESION: ICD-10-CM

## 2019-11-19 DIAGNOSIS — Z85.3 PERSONAL HISTORY OF MALIGNANT NEOPLASM OF BREAST: Primary | ICD-10-CM

## 2019-11-19 PROCEDURE — 99024 POSTOP FOLLOW-UP VISIT: CPT | Performed by: PHYSICIAN ASSISTANT

## 2019-11-19 PROCEDURE — 88305 TISSUE EXAM BY PATHOLOGIST: CPT | Performed by: PATHOLOGY

## 2019-11-19 PROCEDURE — 88313 SPECIAL STAINS GROUP 2: CPT | Performed by: PATHOLOGY

## 2019-11-19 NOTE — PROGRESS NOTES
Assessment/Plan:   Neris Aguilar is a 48year old female who is 3 weeks status post bilateral breast mound revision with autologous fat grafting who now presents with concerns of a new lesion of her chest  Please see HPI  She is improving from fat grafting  I assured her it takes 2-3 months to see the final effect  I performed a punch biopsy of the chest lesion and we will remove the suture in 1 week and discuss path results  Diagnoses and all orders for this visit:    Personal history of malignant neoplasm of breast    Skin lesion          Subjective:     Patient ID: Logan Royal is a 48 y o  female  HPI   She reports a new lesion of the chest which has been present for 1 week  She denies bleeding associated with it  She denies any complaints regarding her breasts  Review of Systems   HENT: Negative for hearing loss  Eyes: Negative for visual disturbance  Respiratory: Negative for shortness of breath  Cardiovascular: Negative for chest pain  Gastrointestinal: Negative for abdominal pain, blood in stool, constipation, diarrhea, nausea and vomiting  Genitourinary: Negative for hematuria  Musculoskeletal: Negative for gait problem  Skin:        As per HPI  Neurological: Negative for seizures and headaches  Hematological: Does not bruise/bleed easily  Psychiatric/Behavioral: The patient is not nervous/anxious  Objective:     Physical Exam   Constitutional: She is oriented to person, place, and time  She appears well-developed and well-nourished  No distress  HENT:   Head: Normocephalic and atraumatic  Eyes: Pupils are equal, round, and reactive to light  EOM are normal  No scleral icterus  Neck: Neck supple  No tracheal deviation present  No thyromegaly present  Cardiovascular: Normal rate and regular rhythm  Exam reveals no gallop and no friction rub  No murmur heard  Pulmonary/Chest: Effort normal and breath sounds normal  She has no wheezes  She has no rales     Abdominal: Soft  Bowel sounds are normal  She exhibits no distension  There is no tenderness  There is no rebound and no guarding  Musculoskeletal: Normal range of motion  Lymphadenopathy:     She has no cervical adenopathy  Neurological: She is alert and oriented to person, place, and time  No cranial nerve deficit  Skin:   Breasts are soft and supple bilaterally with improving upper pole hollowing  Upper chest right medial, adjacent to her previous scar is an erythematous lesion which is flat and round  It measures approximately 4mm and is 7cm superior/medial to her left breast radiation tattoo  Please see photos  Psychiatric: She has a normal mood and affect

## 2019-11-26 ENCOUNTER — HOSPITAL ENCOUNTER (EMERGENCY)
Facility: HOSPITAL | Age: 54
Discharge: HOME | End: 2019-11-26
Attending: EMERGENCY MEDICINE | Admitting: EMERGENCY MEDICINE
Payer: COMMERCIAL

## 2019-11-26 ENCOUNTER — APPOINTMENT (EMERGENCY)
Dept: RADIOLOGY | Facility: HOSPITAL | Age: 54
End: 2019-11-26
Payer: COMMERCIAL

## 2019-11-26 VITALS
HEIGHT: 66 IN | TEMPERATURE: 97.5 F | HEART RATE: 87 BPM | OXYGEN SATURATION: 100 % | BODY MASS INDEX: 25.71 KG/M2 | RESPIRATION RATE: 20 BRPM | DIASTOLIC BLOOD PRESSURE: 83 MMHG | WEIGHT: 160 LBS | SYSTOLIC BLOOD PRESSURE: 151 MMHG

## 2019-11-26 DIAGNOSIS — S09.90XA INJURY OF HEAD, INITIAL ENCOUNTER: ICD-10-CM
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
## 2019-11-26 DIAGNOSIS — S42.402A ELBOW FRACTURE, LEFT, CLOSED, INITIAL ENCOUNTER: ICD-10-CM

## 2019-11-26 PROCEDURE — 99285 EMERGENCY DEPT VISIT HI MDM: CPT | Performed by: EMERGENCY MEDICINE

## 2019-11-26 PROCEDURE — 96376 TX/PRO/DX INJ SAME DRUG ADON: CPT

## 2019-11-26 PROCEDURE — 29105 APPLICATION LONG ARM SPLINT: CPT | Performed by: EMERGENCY MEDICINE

## 2019-11-26 PROCEDURE — 73080 X-RAY EXAM OF ELBOW: CPT

## 2019-11-26 PROCEDURE — 99283 EMERGENCY DEPT VISIT LOW MDM: CPT

## 2019-11-26 PROCEDURE — 96374 THER/PROPH/DIAG INJ IV PUSH: CPT

## 2019-11-26 PROCEDURE — 73060 X-RAY EXAM OF HUMERUS: CPT

## 2019-11-26 RX ORDER — OXYCODONE HYDROCHLORIDE AND ACETAMINOPHEN 5; 325 MG/1; MG/1
1 TABLET ORAL EVERY 4 HOURS PRN
Qty: 20 TABLET | Refills: 0 | Status: SHIPPED | OUTPATIENT
Start: 2019-11-26 | End: 2019-12-06

## 2019-11-26 RX ORDER — MORPHINE SULFATE 4 MG/ML
4 INJECTION, SOLUTION INTRAMUSCULAR; INTRAVENOUS ONCE
Status: COMPLETED | OUTPATIENT
Start: 2019-11-26 | End: 2019-11-26

## 2019-11-26 RX ADMIN — MORPHINE SULFATE 4 MG: 4 INJECTION INTRAVENOUS at 08:05

## 2019-11-26 RX ADMIN — MORPHINE SULFATE 4 MG: 4 INJECTION INTRAVENOUS at 07:27

## 2019-11-26 NOTE — ED PROVIDER NOTES
History  Chief Complaint   Patient presents with    Fall     patient presents to the ED with c/o left arm pain after falling in the shower  patient states she passed out from pain, states she did hit her head but denies LOC from that or taking blood thinners      Patient complains of left elbow pain since slip and fall in shower this morning hit left elbow and shoulder  She was able to get up as she crawled out of the bath tub and then called her co-worker for assistance  She has severe pain in the left elbow worse with movement she did hit her head and she states she passed out but she feels that was from the pain in her elbow  She does not have a headache or confusion  She did not take any pain medicines yet  Prior to Admission Medications   Prescriptions Last Dose Informant Patient Reported? Taking? ALPRAZolam (XANAX) 0 5 mg tablet  Self Yes No   Sig: Take 0 5 mg by mouth daily at bedtime as needed for anxiety  Ascorbic Acid (VITAMIN C) 500 MG CAPS  Self Yes No   Sig: Take 500 mg by mouth     Cholecalciferol (VITAMIN D) 2000 units CAPS  Self Yes No   Sig: Take by mouth   Chromium Picolinate 200 MCG CAPS  Self Yes No   Sig: Take 200 mg by mouth daily  Lactobacillus (ACIDOPHILUS PO)  Self Yes No   Sig: Take by mouth daily  Multiple Vitamin (MULTIVITAMIN) capsule  Self Yes No   Sig: Take 1 capsule by mouth daily  acetaminophen (TYLENOL) 500 mg tablet  Self Yes No   Sig: Take 500 mg by mouth every 6 (six) hours as needed for mild pain  calcium-vitamin D (OSCAL) 250-125 MG-UNIT per tablet  Self Yes No   Sig: Take 2 tablets by mouth daily     oxyCODONE-acetaminophen (PERCOCET) 5-325 mg per tablet   No No   Sig: Take 1 tablet by mouth every 4 (four) hours as needed for moderate pain for up to 18 dosesMax Daily Amount: 6 tablets   tamoxifen (NOLVADEX) 20 mg tablet  Self No No   Sig: Take 1 tablet (20 mg total) by mouth daily   zinc gluconate 50 mg tablet  Self Yes No   Sig: Take by mouth Facility-Administered Medications Last Administration Doses Remaining   ropivacaine (NAROPIN) 0 5 % injection 10 mL 7/30/2019  6:14 PM    triamcinolone acetonide (KENALOG-40) 40 mg/mL injection 20 mg 7/30/2019  6:14 PM           Past Medical History:   Diagnosis Date    Abnormal findings on diagnostic imaging of breast     Last Assessesd: 4/25/2014    Arthritis     Knees and feet    Asthma     Breast cancer ([address] ) 2008    Right    Cancer [facility])     Bilateral breast cancer history  Had two bouts of radiation    Difficulty falling asleep     Gluteal abscess     Last Assessed: 12/30/2016    Inconclusive mammogram due to dense breasts     Last Assessed: 4/7/2014    Limb alert care status     right    Lobular carcinoma in situ of breast     Last Assesed: 4/7/2014    Nipple discharge     Last Assessed: 4/7/2014    Pain in left foot     Pneumonia 2009    x1    PONV (postoperative nausea and vomiting)     Skin cancer        Past Surgical History:   Procedure Laterality Date    BREAST BIOPSY  05/02/2014    BREAST BIOPSY  05/2013    BREAST BIOPSY  05/2008    BREAST LUMPECTOMY Right 2008    BREAST RECONSTRUCTION Left 2/21/2019    Procedure: BREAST EXPANDER/IMPLANT EXCHANGE;  Surgeon: Macy Fisher MD;  [facility];  Service: Plastics    BREAST SURGERY Bilateral 2008, 2013, 2014    Breast reconstruction with expanders  Total of 7 surgeries  7/3/14 right breast reconstruction revision   10/27/14 right breast reconstruction revision     BREAST SURGERY Left 07/24/2013    Excision of breast lesion    CAPSULOTOMY Left 2/21/2019    Procedure: CAPSULECTOMY;  Surgeon: Macy Fisher MD;  [facility];  Service: Plastics    CHEST WALL BIOPSY Right 2/21/2019    Procedure: UPPER CHEST BASAL CELL CARSINOMA EXCISION;  Surgeon: Macy Fisher MD;  [facility];  Service: Plastics    CLOSURE WOUND N/A 2/21/2019    Procedure: RIGHT upper chest & LEFT upper back complex closure; Surgeon: Vahid Moore MD;  [facility];  Service: Plastics    FOOT ARTHRODESIS      Pantalar    KNEE ARTHROSCOPY Right 05/1997    LIPOSUCTION W/ FAT INJECTION Bilateral 10/24/2019    Procedure: AUTOLOGOUS FAT GRAFTING TO BILATERAL BREAST;  Surgeon: Vahid Moore MD;  [facility];  Service: Plastics    MASTECTOMY Bilateral 06/06/2014    Lymph nodes removed bilaterally  States she may have IV's in left side   MOHS RECONSTRUCTION Right 3/7/2019    Procedure: RECONSTRUCTION MOHS DEFECT RIGHT NOSE/MEDIAL CANTHUS;  Surgeon: Vahid Moore MD;  [facility];  Service: Plastics    MOHS SURGERY      Mohs Micrographic Surgery Face; Last Assessed: 5/15/2015    MN ADJ TISS [place] LID,NOS,EAR 10 1-30 SQCM Right 3/7/2019    Procedure: FLAP;  Surgeon: Vahid Moore MD;  [facility];  Service: [address] LAT DORSI FLAP Left 9/7/2017    Procedure: BREAST RECONSTRUCTION; LATISSIMUS DORSI FLAP; TISSUE EXPANDER;  Surgeon: Vahid Moore MD;  [facility];  Service: Plastics    MN COLONOSCOPY FLX DX W/COLLJ [address] PFRMD N/A 8/1/2018    Procedure: COLONOSCOPY;  Surgeon: Arleen Mccollum MD;  [facility];  Service: Gastroenterology    MN [place] W/SESMDC W/RESCJ PROX [address] [place] [address]  9/9/2016    Procedure: Aquiles Peterson MODIFIED ;  Surgeon: Josiah Kayser, DPM;  [facility];  Service: Podiatry    MN EXC SKIN MALIG >4 CM TRUNK,ARM,LEG N/A 6/27/2019    Procedure: EXCISION BASAL CELL CARCINOMA MID CHEST;  Surgeon: Vahid Moore MD;  [facility];  Service: Plastics    MN FULL THICK [address] NOS,EAR,LID <20 SQCM Right 3/7/2019    Procedure: SKIN GRAFT FULL THICKNESS  (FTSG);   Surgeon: Vahid Moore MD;  [facility];  Service: Plastics    MN FUSION FOOT BONES,MIDTARSAL,MULTI Left 9/9/2016    Procedure: ARTHRODESIS FIRST [address], TALLNAVICULAR JOINT FUSION ;  Surgeon: Sirena Jang DPM;  [facility];  Service: Podiatry    IN INSERT BREAST PROS IMMED AFTER EXCIS Left 9/7/2017    Procedure: TISSUE EXPANDER;  Surgeon: Aisha Hernandez MD;  [facility];  Service: Plastics    IN OSTEOTOMY HEEL BONE Left 9/9/2016    Procedure: OSTEOTOMY CALCANEUS WITH APPLICATION AND ACTIVATION OF BONE STIMULATOR ;  Surgeon: Sirena Jang DPM;  [facility];  Service: Podiatry    IN RECMPL WND HEAD,FAC,HAND 2 6-7 5 CM N/A 3/7/2019    Procedure: COMPLEX CLOSURE;  Surgeon: Aisha Hernandez MD;  [facility];  Service: Plastics    IN RECMPL WND TRUNK 2 6-7 5 CM N/A 6/27/2019    Procedure: CLOSURE WOUND MID CHEST;  Surgeon: Aisha Hernandez MD;  [facility];  Service: Plastics     [facility] TISSUE FOR GRAFT OTHR Bilateral 6/27/2019    Procedure: AUTOLOGOUS FAT GRAFTING BILATERAL BREASTS;  Surgeon: Aisha Hernandez MD;  [facility];  Service: Plastics    IN REVISE BREAST RECONSTRUCTION Bilateral 6/27/2019    Procedure: BILATERAL BREAST MOUND REVISION;  Surgeon: Aisha Hernandez MD;  [facility];  Service: Plastics    IN REVISE BREAST RECONSTRUCTION Bilateral 10/24/2019    Procedure: BILATERAL BREAST MOUND REVISION;  Surgeon: Aisha Hernandez MD;  [facility];  Service: Plastics    SENTINEL LYMPH NODE BIOPSY Bilateral 06/06/2014    SENTINEL LYMPH NODE BIOPSY Right 2008    SKIN LESION EXCISION Left 2/21/2019    Procedure: UPPER BACK [address];  Surgeon: Aisha Hernandez MD;  [facility];  Service: Plastics    SQUAMOUS CELL CARCINOMA EXCISION N/A 3/7/2019    Procedure: EXCISION BCC LEFT FOREHEAD;  Surgeon: Aisha Hernandez MD;  [facility];  Service: Plastics    WISDOM TOOTH EXTRACTION         Family History   Adopted: Yes   Problem Relation Age of Onset    No Known Problems Mother     No Known Problems Father      I have reviewed and agree with the history as documented      Social History     Tobacco Use    Smoking status: Never Smoker    Smokeless tobacco: Never Used   Substance Use Topics    Alcohol use: Yes     Comment: 4 weekly-wine    Drug use: No        Review of Systems   All other systems reviewed and are negative  Physical Exam  Physical Exam   Constitutional: She is oriented to person, place, and time  She appears well-developed and well-nourished  HENT:   Head: Atraumatic  Right Ear: Tympanic membrane normal    Left Ear: Tympanic membrane normal    Mouth/Throat: Oropharynx is clear and moist    Eyes: Pupils are equal, round, and reactive to light  Conjunctivae and EOM are normal    Neck: Normal range of motion  Neck supple  No spinous process tenderness present  Cardiovascular: Normal rate, regular rhythm, normal heart sounds and intact distal pulses  Pulmonary/Chest: Effort normal and breath sounds normal  No respiratory distress  She has no wheezes  Abdominal: Soft  Bowel sounds are normal  She exhibits no distension  There is no tenderness  Musculoskeletal:        Left shoulder: She exhibits tenderness and pain  She exhibits no deformity, no spasm and normal pulse  Left elbow: She exhibits decreased range of motion, swelling and deformity  Tenderness found  Medial epicondyle and lateral epicondyle tenderness noted  No radial head and no olecranon process tenderness noted  Cervical back: She exhibits no tenderness and no bony tenderness  Thoracic back: She exhibits no tenderness and no bony tenderness  Lumbar back: She exhibits no tenderness and no bony tenderness  NV intact left upper extremity to radial ulnar median distribution with active thumb extension, finger abduction, pink and thumb opposition  +2 radial pulse, good cap refill all fingers   Neurological: She is alert and oriented to person, place, and time  She has normal strength  No sensory deficit  GCS eye subscore is 4  GCS verbal subscore is 5  GCS motor subscore is 6     Skin: Skin is warm and dry  No rash noted  Psychiatric: She has a normal mood and affect  Nursing note and vitals reviewed  Vital Signs  ED Triage Vitals [11/26/19 0707]   Temperature Pulse Respirations Blood Pressure SpO2   97 5 °F (36 4 °C) 69 20 132/63 99 %      Temp Source Heart Rate Source Patient Position - Orthostatic VS BP Location FiO2 (%)   Temporal Monitor Sitting Right leg --      Pain Score       Worst Possible Pain           Vitals:    11/26/19 0707 11/26/19 0807 11/26/19 0815   BP: 132/63 151/83 151/83   Pulse: 69 87 87   Patient Position - Orthostatic VS: Sitting           Visual Acuity  Visual Acuity      Most Recent Value   L Pupil Size (mm)  3   R Pupil Size (mm)  3          ED Medications  Medications   morphine (PF) 4 mg/mL injection 4 mg (4 mg Intravenous Given 11/26/19 0727)   morphine (PF) 4 mg/mL injection 4 mg (4 mg Intravenous Given 11/26/19 0805)       Diagnostic Studies  Results Reviewed     None                 XR elbow 3+ vw left   ED Interpretation by Blair Chadwick DO (11/26 8130)   Supracondylar comminuted displaced elbow fracture      Final Result by  (11/26 2355)   Addendum 1 of 1 by Iman Youngblood MD (11/26 5447)   ADDENDUM:   Proximal radius and ulna are suboptimally evaluated due to overlapping    osseous structures and fracture fragments  If there is a concern for fractures of the proximal ulna and radius, CT    evaluation may be considered  Final      XR humerus LEFT   ED Interpretation by Blair Chadwick DO (11/26 7862)   Elbow fracture      Final Result by Iman Youngblood MD (11/26 0775)      Markedly comminuted fracture of the distal left humerus involving the condyles, with posterior displacement of fracture fragments by at least 2 cm              Workstation performed: [id number]                    Procedures  Orthopedic injury treatment  Date/Time: 11/26/2019 7:34 AM  Performed by: Blair Chadwick DO  Authorized by: Blair Chadwick DO     Patient Location:  ED  Verbal consent obtained?: Yes    Risks and benefits: Risks, benefits and alternatives were discussed    Consent given by:  Patient  Patient states understanding of procedure being performed: Yes    Patient's understanding of procedure matches consent: Yes    Relevant documents present and verified: Yes    Test results available and properly labeled: Yes    Radiology Images displayed and confirmed  If images not available, report reviewed: Yes    Patient identity confirmed:  Verbally with patient  Injury location:  Upper arm  Location details:  Left upper arm  Injury type:  Fracture  Fracture type: transcondylar    Neurovascular status: Neurovascularly intact    Distal perfusion: normal    Neurological function: normal    Range of motion: reduced    Local anesthesia used?: No    Manipulation performed?: No    Immobilization:  Splint and sling  Splint type:  Long arm  Supplies used:  Ortho-Glass  Neurovascular status: Neurovascularly intact    Distal perfusion: normal    Neurological function: normal    Range of motion: unchanged    Patient tolerance:  Patient tolerated the procedure well with no immediate complications           ED Course     spoke with Dr Khushboo Ruby who agrees with plan in department                            MDM  Number of Diagnoses or Management Options  Elbow fracture, left, closed, initial encounter: new and requires workup  Fall, initial encounter: new and requires workup  Injury of head, initial encounter: new and does not require workup     Amount and/or Complexity of Data Reviewed  Tests in the radiology section of CPT®: ordered and reviewed  Discuss the patient with other providers: yes    Patient Progress  Patient progress: improved      Disposition  Final diagnoses:   Fall, initial encounter   Injury of head, initial encounter   Elbow fracture, left, closed, initial encounter     Time reflects when diagnosis was documented in both MDM as applicable and the Disposition within this note     Time User Action Codes Description Comment    11/26/2019  9:17 AM Deysi Mcintosh Add [F71  TBLA] Fall, initial encounter     11/26/2019  9:17 AM Deysi Mcintosh Add [S51 [id number]] Injury of head, initial encounter     11/26/2019  9:17 AM Deysi Mcintosh Add [S42 402A] Elbow fracture, left, closed, initial encounter       ED Disposition     ED Disposition Condition Date/Time Comment    Discharge Stable Tue Nov 26, 2019  9:17 AM Antonio Holden discharge to home/self care  Follow-up Information     Follow up With Specialties Details Why [id number] Wisam Lazo MD Orthopedic Surgery, Hand Surgery Call today  [address] [place]  Brittany New MD Orthopedic Surgery Call today  [address]  [phone]            Discharge Medication List as of 11/26/2019  9:21 AM      START taking these medications    Details   !! oxyCODONE-acetaminophen (PERCOCET) 5-325 mg per tablet Take 1 tablet by mouth every 4 (four) hours as needed for moderate pain for up to 10 daysMax Daily Amount: 6 tablets, Starting Tue 11/26/2019, Until Fri 12/6/2019, Normal       !! - Potential duplicate medications found  Please discuss with provider  CONTINUE these medications which have NOT CHANGED    Details   acetaminophen (TYLENOL) 500 mg tablet Take 500 mg by mouth every 6 (six) hours as needed for mild pain , Historical Med      ALPRAZolam (XANAX) 0 5 mg tablet Take 0 5 mg by mouth daily at bedtime as needed for anxiety  , Historical Med      Ascorbic Acid (VITAMIN C) 500 MG CAPS Take 500 mg by mouth  , Historical Med      calcium-vitamin D (OSCAL) 250-125 MG-UNIT per tablet Take 2 tablets by mouth daily  , Historical Med      Cholecalciferol (VITAMIN D) 2000 units CAPS Take by mouth, Historical Med      Chromium Picolinate 200 MCG CAPS Take 200 mg by mouth daily  , Historical Med      Lactobacillus (ACIDOPHILUS PO) Take by mouth daily  , Historical Med      Multiple Vitamin (MULTIVITAMIN) capsule Take 1 capsule by mouth daily  , Historical Med      !! oxyCODONE-acetaminophen (PERCOCET) 5-325 mg per tablet Take 1 tablet by mouth every 4 (four) hours as needed for moderate pain for up to 18 dosesMax Daily Amount: 6 tablets, Starting Mon 10/21/2019, Normal      tamoxifen (NOLVADEX) 20 mg tablet Take 1 tablet (20 mg total) by mouth daily, Starting Thu 1/17/2019, Normal      zinc gluconate 50 mg tablet Take by mouth, Historical Med       !! - Potential duplicate medications found  Please discuss with provider  No discharge procedures on file      ED Provider  Electronically Signed by           [facility], DO  11/26/19 2776

## 2019-11-27 ENCOUNTER — TELEPHONE (OUTPATIENT)
Dept: OBGYN CLINIC | Facility: HOSPITAL | Age: 54
End: 2019-11-27

## 2019-11-27 ENCOUNTER — TELEPHONE (OUTPATIENT)
Dept: HEMATOLOGY ONCOLOGY | Facility: CLINIC | Age: 54
End: 2019-11-27

## 2019-11-27 NOTE — TELEPHONE ENCOUNTER
Patient called in to find out if there is anything other than taking Percoset that she can do for the pain and discomfort  Patient states that she's having issues with the sling loosening and and becoming unsupportive  Rosana Grimm states that she does have a friend who is a PT and has come over to adjust the sling for her, but it continues to loosen and slip  I advised Rosana Grimm that since we have not yet seen her, we cannot provide medical advice  I advised that she either go back to the ED or perhaps reach out to her PCP  Rosana Grimm understood and was appreciative of our time

## 2019-11-27 NOTE — TELEPHONE ENCOUNTER
Called stating she fell and will be needing surgery, patient shattered her elbow  Patient needs to know how soon she should stop her TMX and when to restart  I spoke with nurse Trudy Govea being instructed to have patient stop 1 week prior to surgery and restart the day after surgery    Message left on patient voice mail

## 2019-12-02 ENCOUNTER — TELEPHONE (OUTPATIENT)
Dept: OBGYN CLINIC | Facility: HOSPITAL | Age: 54
End: 2019-12-02

## 2019-12-02 ENCOUNTER — OFFICE VISIT (OUTPATIENT)
Dept: OBGYN CLINIC | Facility: MEDICAL CENTER | Age: 54
End: 2019-12-02
Payer: COMMERCIAL

## 2019-12-02 VITALS
WEIGHT: 171.6 LBS | SYSTOLIC BLOOD PRESSURE: 166 MMHG | HEIGHT: 66 IN | HEART RATE: 80 BPM | BODY MASS INDEX: 27.58 KG/M2 | DIASTOLIC BLOOD PRESSURE: 92 MMHG

## 2019-12-02 DIAGNOSIS — S42.492A CLOSED BICONDYLAR FRACTURE OF DISTAL END OF LEFT HUMERUS, INITIAL ENCOUNTER: Primary | ICD-10-CM

## 2019-12-02 PROCEDURE — 29105 APPLICATION LONG ARM SPLINT: CPT | Performed by: ORTHOPAEDIC SURGERY

## 2019-12-02 PROCEDURE — 99204 OFFICE O/P NEW MOD 45 MIN: CPT | Performed by: ORTHOPAEDIC SURGERY

## 2019-12-02 RX ORDER — OXYCODONE HYDROCHLORIDE 5 MG/1
5 TABLET ORAL EVERY 4 HOURS PRN
Qty: 20 TABLET | Refills: 0 | Status: SHIPPED | OUTPATIENT
Start: 2019-12-02 | End: 2020-09-28 | Stop reason: ALTCHOICE

## 2019-12-02 RX ORDER — HYDROCODONE BITARTRATE AND ACETAMINOPHEN 5; 325 MG/1; MG/1
1 TABLET ORAL EVERY 6 HOURS PRN
Qty: 20 TABLET | Refills: 0 | Status: ON HOLD | OUTPATIENT
Start: 2019-12-02 | End: 2019-12-11 | Stop reason: SDUPTHER

## 2019-12-02 NOTE — H&P (VIEW-ONLY)
Chief Complaint     Left elbow fracture      History of the Present Illness     Herminia Glover is a 48 y o  female who is right-hand-dominant presents to the office in a posterior long-arm splint and sling  Patient states that last Tuesday 11/26/19, she lost her footing when getting out of the shower and fell, she is unsure of the position of which her arm was in when she fell  She states that she passed out due to the pain  She states when she came to her arm was dangling and she had sharp pain in her elbow  She states that she could feel the bones shifting  She then went to [facility] ER are obtained an x-ray that demonstrated fracture was placed in a long-arm posterior splint with sling and instructed to follow up  She was given Percocet when at the ER and has finished her prescription  Since then she has been taking Ibuprofen  At today's visit, her pain is of the left elbow  She denies numbness and tingling  She states that she is having swelling in her hand  She states that she has been opening and closing her fist  She denies current active cancer  She states that she has discontinued her Tamoxifen in preparation for surgery to decrease risk of blood clots  Past Medical History:   Diagnosis Date    Abnormal findings on diagnostic imaging of breast     Last Assessesd: 4/25/2014    Arthritis     Knees and feet    Breast cancer ([address] ) 2008    Right    Cancer [facility])     Bilateral breast cancer history   Had two bouts of radiation    Difficulty falling asleep     Gluteal abscess     Last Assessed: 12/30/2016    Inconclusive mammogram due to dense breasts     Last Assessed: 4/7/2014    Limb alert care status     right    Lobular carcinoma in situ of breast     Last Assesed: 4/7/2014    Nipple discharge     Last Assessed: 4/7/2014    Pain in left foot     Pneumonia 2009    x1    PONV (postoperative nausea and vomiting)     Skin cancer        Past Surgical History:   Procedure Laterality Date  BREAST BIOPSY  05/02/2014    BREAST BIOPSY  05/2013    BREAST BIOPSY  05/2008    BREAST LUMPECTOMY Right 2008    BREAST RECONSTRUCTION Left 2/21/2019    Procedure: BREAST EXPANDER/IMPLANT EXCHANGE;  Surgeon: Macy Fisher MD;  [facility];  Service: Plastics    BREAST SURGERY Bilateral 2008, 2013, 2014    Breast reconstruction with expanders  Total of 7 surgeries  7/3/14 right breast reconstruction revision  10/27/14 right breast reconstruction revision     BREAST SURGERY Left 07/24/2013    Excision of breast lesion    CAPSULOTOMY Left 2/21/2019    Procedure: CAPSULECTOMY;  Surgeon: Macy Fisher MD;  [facility];  Service: Plastics    CHEST WALL BIOPSY Right 2/21/2019    Procedure: UPPER CHEST BASAL CELL CARSINOMA EXCISION;  Surgeon: Macy Fisher MD;  [facility];  Service: Plastics    CLOSURE WOUND N/A 2/21/2019    Procedure: RIGHT upper chest & LEFT upper back complex closure;  Surgeon: Macy Fisher MD;  [facility];  Service: Plastics    FOOT ARTHRODESIS      Pantalar    KNEE ARTHROSCOPY Right 05/1997    LIPOSUCTION W/ FAT INJECTION Bilateral 10/24/2019    Procedure: AUTOLOGOUS FAT GRAFTING TO BILATERAL BREAST;  Surgeon: Macy Fisher MD;  [facility];  Service: Plastics    MASTECTOMY Bilateral 06/06/2014    Lymph nodes removed bilaterally  States she may have IV's in left side   MOHS RECONSTRUCTION Right 3/7/2019    Procedure: RECONSTRUCTION MOHS DEFECT RIGHT NOSE/MEDIAL CANTHUS;  Surgeon: Macy Fisher MD;  [facility];  Service: Plastics    MOHS SURGERY      Mohs Micrographic Surgery Face;  Last Assessed: 5/15/2015    NH ADJ TISS Chanel Board LID,NOS,EAR 10 1-30 SQCM Right 3/7/2019    Procedure: FLAP;  Surgeon: Macy Fisher MD;  [facility];  Service: [address] LAT DORSI FLAP Left 9/7/2017    Procedure: BREAST RECONSTRUCTION; LATISSIMUS DORSI FLAP; TISSUE EXPANDER; Surgeon: Nahomi Mensah MD;  [facility];  Service: Plastics    MA COLONOSCOPY FLX DX W/[facility] WHEN PFRMD N/A 8/1/2018    Procedure: COLONOSCOPY;  Surgeon: Claritza Fraga MD;  [facility];  Service: Gastroenterology    MA [place] W/[facility]MD[facility] W/RESCJ PROX [address] [facility]  9/9/2016    Procedure: Elise Rule MODIFIED ;  Surgeon: Shai Gay DPM;  [facility];  Service: Podiatry    MA EXC SKIN MALIG >4 CM TRUNK,ARM,LEG N/A 6/27/2019    Procedure: EXCISION BASAL CELL CARCINOMA MID CHEST;  Surgeon: Nahomi Mensah MD;  [facility];  Service: Plastics    MA FULL THICK [facility] NOS,EAR,LID <20 SQCM Right 3/7/2019    Procedure: SKIN GRAFT FULL THICKNESS  (FTSG);   Surgeon: Nahomi Mensah MD;  [facility];  Service: Plastics    MA FUSION FOOT BONES,MIDTARSAL,MULTI Left 9/9/2016    Procedure: ARTHRODESIS FIRST [address] [facility] [place] [address] ;  Surgeon: Shai Gay DPM;  [facility];  Service: Podiatry    MA INSERT BREAST PROS IMMED AFTER EXCIS Left 9/7/2017    Procedure: TISSUE EXPANDER;  Surgeon: Nahomi Mensah MD;  [facility];  Service: Plastics    [address] [facility] BONE Left 9/9/2016    Procedure: OSTEOTOMY CALCANEUS WITH APPLICATION AND ACTIVATION OF BONE STIMULATOR ;  Surgeon: Shai Gay DPM;  [facility];  Service: Podiatry    MA RECMPL WND HEAD,FAC,HAND 2 6-7 5 CM N/A 3/7/2019    Procedure: COMPLEX CLOSURE;  Surgeon: Nahomi Mensah MD;  [facility];  Service: Plastics    MA RECMPL WND TRUNK 2 6-7 5 CM N/A 6/27/2019    Procedure: CLOSURE WOUND MID CHEST;  Surgeon: Nahomi Mensah MD;  [facility];  Service: Plastics     [facility] TISSUE FOR GRAFT OTHR Bilateral 6/27/2019    Procedure: AUTOLOGOUS FAT GRAFTING BILATERAL BREASTS;  Surgeon: Nahomi Mensah MD;  [facility];  Service: Plastics    MA REVISE BREAST RECONSTRUCTION Bilateral 6/27/2019    Procedure: BILATERAL BREAST MOUND REVISION;  Surgeon: David Barker MD;  [facility];  Service: Plastics    MO REVISE BREAST RECONSTRUCTION Bilateral 10/24/2019    Procedure: BILATERAL BREAST MOUND REVISION;  Surgeon: David Barker MD;  [facility];  Service: Plastics    SENTINEL LYMPH NODE BIOPSY Bilateral 06/06/2014    SENTINEL LYMPH NODE BIOPSY Right 2008    SKIN LESION EXCISION Left 2/21/2019    Procedure: UPPER BACK [address] EXCISION;  Surgeon: David Barker MD;  [facility];  Service: Plastics    SQUAMOUS CELL CARCINOMA EXCISION N/A 3/7/2019    Procedure: EXCISION BCC LEFT FOREHEAD;  Surgeon: David Barker MD;  [facility];  Service: Plastics    WISDOM TOOTH EXTRACTION         Allergies   Allergen Reactions    Codeine Itching     Severe    Dilaudid [Hydromorphone Hcl] Itching     Severe    Hydromorphone Itching    Tramadol Itching       Current Outpatient Medications on File Prior to Visit   Medication Sig Dispense Refill    acetaminophen (TYLENOL) 500 mg tablet Take 500 mg by mouth every 6 (six) hours as needed for mild pain   ALPRAZolam (XANAX) 0 5 mg tablet Take 0 5 mg by mouth daily at bedtime as needed for anxiety   Ascorbic Acid (VITAMIN C) 500 MG CAPS Take 500 mg by mouth        calcium-vitamin D (OSCAL) 250-125 MG-UNIT per tablet Take 2 tablets by mouth daily   Cholecalciferol (VITAMIN D) 2000 units CAPS Take by mouth      Chromium Picolinate 200 MCG CAPS Take 200 mg by mouth daily   Lactobacillus (ACIDOPHILUS PO) Take by mouth daily   Multiple Vitamin (MULTIVITAMIN) capsule Take 1 capsule by mouth daily        oxyCODONE-acetaminophen (PERCOCET) 5-325 mg per tablet Take 1 tablet by mouth every 4 (four) hours as needed for moderate pain for up to 10 daysMax Daily Amount: 6 tablets 20 tablet 0    tamoxifen (NOLVADEX) 20 mg tablet Take 1 tablet (20 mg total) by mouth daily 90 tablet 3    zinc gluconate 50 mg tablet Take by mouth      oxyCODONE-acetaminophen (PERCOCET) 5-325 mg per tablet Take 1 tablet by mouth every 4 (four) hours as needed for moderate pain for up to 18 dosesMax Daily Amount: 6 tablets (Patient not taking: Reported on 12/2/2019) 18 tablet 0     Current Facility-Administered Medications on File Prior to Visit   Medication Dose Route Frequency Provider Last Rate Last Dose    ropivacaine (NAROPIN) 0 5 % injection 10 mL  10 mL Infiltration  Que Kayser, DPM   10 mL at 07/30/19 1814    triamcinolone acetonide (KENALOG-40) 40 mg/mL injection 20 mg  20 mg Intra-articular  Que Kayser, DPM   20 mg at 07/30/19 1814       Social History     Tobacco Use    Smoking status: Never Smoker    Smokeless tobacco: Never Used   Substance Use Topics    Alcohol use: Yes     Comment: 4 weekly-wine    Drug use: No       Family History   Adopted: Yes   Problem Relation Age of Onset    No Known Problems Mother     No Known Problems Father        Review of Systems     As stated in the HPI  All other systems were reviewed and are negative  Physical Exam     /92   Pulse 80   Ht 5' 6" (1 676 m)   Wt 77 8 kg (171 lb 9 6 oz)   BMI 27 70 kg/m²     GENERAL: This is a well-developed, well-nourished, age-appropriate patient in no acute distress  The patient is alert and oriented x3  Pleasant and cooperative  Eyes: Anicteric sclerae  Extraocular movements appear intact  HENT: Nares are patent with no drainage  Lungs: There is equal chest rise on inspection  Breathing is non-labored with no audible wheezing  Cardiovascular: No cyanosis  No upper extremity lymphadema  Skin: Skin is warm to touch  No obvious skin lesions or rashes other than described below  Neurologic: No ataxia  Psychiatric: Mood and affect are appropriate  Left upper extremity:  -  Ecchymosis of the posterior aspect of the elbow, skin intact, swelling of the elbow and hand  5/5 Motor to the APB, FDI, FDP2, FDP5, EDC   Sensation intact to light touch in the median, radial, and ulnar nerve distribution  Tenderness to palpation about the elbow  Limited range of motion secondary to pain  Crepitance with any range of motion  Brisk capillary refill    Data Review     Results Reviewed     None           Imaging:  - I have personally reviewed all films and my interpretation is as follows:  X-ray of the left elbow demonstrates left distal humerus fracture that is intra-articular with minimal comminution  Assessment and Plan      Diagnoses and all orders for this visit:    Closed bicondylar fracture of distal end of left humerus, initial encounter  -     CT elbow left wo contrast; Future  -     Comfort Arm Sling         Left distal humerus posterior displacement condylar fracture, DOI: 11/26/19  - Posterior Long arm splint applied in the office today  Instructed to prop up elbow up while laying flat for elevation  - Ice to shoulder 1-2 times daily, for 20 minutes at a time   -Analgesics as needed for pain, prescribed oxycodone  -discussed surgical and nonsurgical options with the patient  Nonsurgical options would not be ideal for this young person with an intra-articular fracture and so we have discussed surgical fixation  -All imaging from today was reviewed by myself and explained to the patient    -We will obtain CT of the Left elbow for fracture definition and surgical planing prior to surgery    -All of the risks and benefits of operative treatment were explained to the patient, as well as the risks and benefits of any alternative treatment options, including nonoperative care  This risks of surgery include, but are not limited to, infection, bleeding, blood clot, damage to nerves/arteries, need for further surgyer, cardiovascular risk, anesthesia risk, and continued pain  Instructed the risk of decreased ROM compared to contralateral side    Also discussed a small risk for implant removal   The patient understood this and elects to proceed forward with surgical intervention   -We will proceed forward with surgical ORIF of the Left distal humerus     -Work note provided  -Oxycodone 5mg x 20 tabs sent to pharmacy     Follow Up:  10-14 days post-op     To Do Next Visit:  Left elbow xray, suture removal     PROCEDURES PERFORMED:  Splint application  Date/Time: 12/2/2019 11:17 AM  Performed by: Dash Talamantes MD  Authorized by:  Dash Talamantes MD     Procedure details:     Location:  Elbow    Splint type:  Long arm    Supplies:  Elastic bandage, Ortho-Glass and cotton padding

## 2019-12-02 NOTE — LETTER
December 2, 2019     Patient: Terry Marquez   YOB: 1965   Date of Visit: 12/2/2019       To Whom it May Concern:    Terry Marquez is under my professional care  She was seen in my office on 12/2/2019  She may return to work with hours as tolerated with no use of the Left arm  She will be receiving surgery to the Left upper extremity on 12/4/19 and will be out of work on that date until her first follow-up appointment  At her first post-operative appointment her work status will be re-evaluated  If you have any questions or concerns, please don't hesitate to call           Sincerely,          Lakeshia Mar MD        CC: Terry Marquez

## 2019-12-02 NOTE — PROGRESS NOTES
Chief Complaint     Left elbow fracture      History of the Present Illness     Marianela White is a 48 y o  female who is right-hand-dominant presents to the office in a posterior long-arm splint and sling  Patient states that last Tuesday 11/26/19, she lost her footing when getting out of the shower and fell, she is unsure of the position of which her arm was in when she fell  She states that she passed out due to the pain  She states when she came to her arm was dangling and she had sharp pain in her elbow  She states that she could feel the bones shifting  She then went to [facility] ER are obtained an x-ray that demonstrated fracture was placed in a long-arm posterior splint with sling and instructed to follow up  She was given Percocet when at the ER and has finished her prescription  Since then she has been taking Ibuprofen  At today's visit, her pain is of the left elbow  She denies numbness and tingling  She states that she is having swelling in her hand  She states that she has been opening and closing her fist  She denies current active cancer  She states that she has discontinued her Tamoxifen in preparation for surgery to decrease risk of blood clots  Past Medical History:   Diagnosis Date    Abnormal findings on diagnostic imaging of breast     Last Assessesd: 4/25/2014    Arthritis     Knees and feet    Breast cancer ([facility] [address] ) 2008    Right    Cancer [facility])     Bilateral breast cancer history   Had two bouts of radiation    Difficulty falling asleep     Gluteal abscess     Last Assessed: 12/30/2016    Inconclusive mammogram due to dense breasts     Last Assessed: 4/7/2014    Limb alert care status     right    Lobular carcinoma in situ of breast     Last Assesed: 4/7/2014    Nipple discharge     Last Assessed: 4/7/2014    Pain in left foot     Pneumonia 2009    x1    PONV (postoperative nausea and vomiting)     Skin cancer        Past Surgical History:   Procedure Laterality Date  BREAST BIOPSY  05/02/2014    BREAST BIOPSY  05/2013    BREAST BIOPSY  05/2008    BREAST LUMPECTOMY Right 2008    BREAST RECONSTRUCTION Left 2/21/2019    Procedure: BREAST EXPANDER/IMPLANT EXCHANGE;  Surgeon: David Barker MD;  [facility];  Service: Plastics    BREAST SURGERY Bilateral 2008, 2013, 2014    Breast reconstruction with expanders  Total of 7 surgeries  7/3/14 right breast reconstruction revision  10/27/14 right breast reconstruction revision     BREAST SURGERY Left 07/24/2013    Excision of breast lesion    CAPSULOTOMY Left 2/21/2019    Procedure: CAPSULECTOMY;  Surgeon: David Barker MD;  [facility];  Service: Plastics    CHEST WALL BIOPSY Right 2/21/2019    Procedure: UPPER CHEST BASAL CELL CARSINOMA EXCISION;  Surgeon: David Barker MD;  [facility];  Service: Plastics    CLOSURE WOUND N/A 2/21/2019    Procedure: RIGHT upper chest & LEFT upper back complex closure;  Surgeon: David Barker MD;  [facility];  Service: Plastics    FOOT ARTHRODESIS      Pantalar    KNEE ARTHROSCOPY Right 05/1997    LIPOSUCTION W/ FAT INJECTION Bilateral 10/24/2019    Procedure: AUTOLOGOUS FAT GRAFTING TO BILATERAL BREAST;  Surgeon: David Barker MD;  [facility];  Service: Plastics    MASTECTOMY Bilateral 06/06/2014    Lymph nodes removed bilaterally  States she may have IV's in left side   MOHS RECONSTRUCTION Right 3/7/2019    Procedure: RECONSTRUCTION MOHS DEFECT RIGHT NOSE/MEDIAL CANTHUS;  Surgeon: David Barker MD;  [facility];  Service: Plastics    MOHS SURGERY      Mohs Micrographic Surgery Face;  Last Assessed: 5/15/2015    IA ADJ TISS Jackie Copper LID,NOS,EAR 10 1-30 SQCM Right 3/7/2019    Procedure: FLAP;  Surgeon: David Barker MD;  [facility];  Service: [address] LAT DORSI FLAP Left 9/7/2017    Procedure: BREAST RECONSTRUCTION; LATISSIMUS DORSI FLAP; TISSUE EXPANDER; Surgeon: Gloria Montero MD;  [facility];  Service: Plastics    [facility] WHEN P[facility] N/A 2018    Procedure: COLONOSCOPY;  Surgeon: El Apgar, MD;  [facility];  Service: Gastroenterology    ME [place] W/SESMDC W/RESCJ PROX [address] [facility]  2016    Procedure: [place] MODIFIED ;  Surgeon: Kitty Paredes DPM;  [facility];  Service: Podiatry    ME EXC SKIN MALIG >4 CM TRUNK,ARM,LEG N/A 2019    Procedure: EXCISION BASAL CELL CARCINOMA MID CHEST;  Surgeon: Gloria Montero MD;  [facility];  Service: Plastics    ME FULL THICK  [facility] NOS,EAR,LID <20 SQCM Right 3/7/2019    Procedure: SKIN GRAFT FULL THICKNESS  (FTSG);   Surgeon: Gloria Montero MD;  [facility];  Service: Plastics    ME FUSION FOOT BONES,MIDTARSAL,MULTI Left 2016    Procedure: ARTHRODESIS FIRST [address] [facility], [place] [address] ;  Surgeon: Kitty Paredes DPM;  [facility];  Service: Podiatry    ME INSERT BREAST PROS IMMED AFTER EXCIS Left 2017    Procedure: TISSUE EXPANDER;  Surgeon: Gloria Montero MD;  [facility];  Service: Plastics    [address] [facility] BONE Left 2016    Procedure: OSTEOTOMY CALCANEUS WITH APPLICATION AND ACTIVATION OF BONE STIMULATOR ;  Surgeon: Kitty Paredes DPM;  [facility];  Service: Podiatry    ME RECMPL WND HEAD,FAC,HAND 2 6-7 5 CM N/A 3/7/2019    Procedure: COMPLEX CLOSURE;  Surgeon: Gloria Montero MD;  [facility];  Service: Plastics    ME RECMPL WND TRUNK 2 6-7 5 CM N/A 2019    Procedure: CLOSURE WOUND MID CHEST;  Surgeon: Gloria Montero MD;  [facility];  Service: Plastics     [facility] TISSUE FOR GRAFT OTHR Bilateral 2019    Procedure: AUTOLOGOUS FAT GRAFTING BILATERAL BREASTS;  Surgeon: Gloria Montero MD;  [facility];  Service: Plastics    ME REVISE BREAST RECONSTRUCTION Bilateral 2019    Procedure: BILATERAL BREAST MOUND REVISION;  Surgeon: Vahid Moore MD;  [facility];  Service: Plastics    MN REVISE BREAST RECONSTRUCTION Bilateral 10/24/2019    Procedure: BILATERAL BREAST MOUND REVISION;  Surgeon: Vahid Moore MD;  [facility];  Service: Plastics    SENTINEL LYMPH NODE BIOPSY Bilateral 06/06/2014    SENTINEL LYMPH NODE BIOPSY Right 2008    SKIN LESION EXCISION Left 2/21/2019    Procedure: UPPER BACK [address] EXCISION;  Surgeon: Vahid Moore MD;  [facility];  Service: Plastics    SQUAMOUS CELL CARCINOMA EXCISION N/A 3/7/2019    Procedure: EXCISION BCC LEFT FOREHEAD;  Surgeon: Vahid Moore MD;  [facility];  Service: Plastics    WISDOM TOOTH EXTRACTION         Allergies   Allergen Reactions    Codeine Itching     Severe    Dilaudid [Hydromorphone Hcl] Itching     Severe    Hydromorphone Itching    Tramadol Itching       Current Outpatient Medications on File Prior to Visit   Medication Sig Dispense Refill    acetaminophen (TYLENOL) 500 mg tablet Take 500 mg by mouth every 6 (six) hours as needed for mild pain   ALPRAZolam (XANAX) 0 5 mg tablet Take 0 5 mg by mouth daily at bedtime as needed for anxiety   Ascorbic Acid (VITAMIN C) 500 MG CAPS Take 500 mg by mouth        calcium-vitamin D (OSCAL) 250-125 MG-UNIT per tablet Take 2 tablets by mouth daily   Cholecalciferol (VITAMIN D) 2000 units CAPS Take by mouth      Chromium Picolinate 200 MCG CAPS Take 200 mg by mouth daily   Lactobacillus (ACIDOPHILUS PO) Take by mouth daily   Multiple Vitamin (MULTIVITAMIN) capsule Take 1 capsule by mouth daily        oxyCODONE-acetaminophen (PERCOCET) 5-325 mg per tablet Take 1 tablet by mouth every 4 (four) hours as needed for moderate pain for up to 10 daysMax Daily Amount: 6 tablets 20 tablet 0    tamoxifen (NOLVADEX) 20 mg tablet Take 1 tablet (20 mg total) by mouth daily 90 tablet 3    zinc gluconate 50 mg tablet Take by mouth      oxyCODONE-acetaminophen (PERCOCET) 5-325 mg per tablet Take 1 tablet by mouth every 4 (four) hours as needed for moderate pain for up to 18 dosesMax Daily Amount: 6 tablets (Patient not taking: Reported on 12/2/2019) 18 tablet 0     Current Facility-Administered Medications on File Prior to Visit   Medication Dose Route Frequency Provider Last Rate Last Dose    ropivacaine (NAROPIN) 0 5 % injection 10 mL  10 mL Infiltration  Danetta Ernestine, DPM   10 mL at 07/30/19 1814    triamcinolone acetonide (KENALOG-40) 40 mg/mL injection 20 mg  20 mg Intra-articular  Danetta Ernestine, DPM   20 mg at 07/30/19 1814       Social History     Tobacco Use    Smoking status: Never Smoker    Smokeless tobacco: Never Used   Substance Use Topics    Alcohol use: Yes     Comment: 4 weekly-wine    Drug use: No       Family History   Adopted: Yes   Problem Relation Age of Onset    No Known Problems Mother     No Known Problems Father        Review of Systems     As stated in the HPI  All other systems were reviewed and are negative  Physical Exam     /92   Pulse 80   Ht 5' 6" (1 676 m)   Wt 77 8 kg (171 lb 9 6 oz)   BMI 27 70 kg/m²     GENERAL: This is a well-developed, well-nourished, age-appropriate patient in no acute distress  The patient is alert and oriented x3  Pleasant and cooperative  Eyes: Anicteric sclerae  Extraocular movements appear intact  HENT: Nares are patent with no drainage  Lungs: There is equal chest rise on inspection  Breathing is non-labored with no audible wheezing  Cardiovascular: No cyanosis  No upper extremity lymphadema  Skin: Skin is warm to touch  No obvious skin lesions or rashes other than described below  Neurologic: No ataxia  Psychiatric: Mood and affect are appropriate  Left upper extremity:  -  Ecchymosis of the posterior aspect of the elbow, skin intact, swelling of the elbow and hand  5/5 Motor to the APB, FDI, FDP2, FDP5, EDC   Sensation intact to light touch in the median, radial, and ulnar nerve distribution  Tenderness to palpation about the elbow  Limited range of motion secondary to pain  Crepitance with any range of motion  Brisk capillary refill    Data Review     Results Reviewed     None           Imaging:  - I have personally reviewed all films and my interpretation is as follows:  X-ray of the left elbow demonstrates left distal humerus fracture that is intra-articular with minimal comminution  Assessment and Plan      Diagnoses and all orders for this visit:    Closed bicondylar fracture of distal end of left humerus, initial encounter  -     CT elbow left wo contrast; Future  -     Comfort Arm Sling         Left distal humerus posterior displacement condylar fracture, DOI: 11/26/19  - Posterior Long arm splint applied in the office today  Instructed to prop up elbow up while laying flat for elevation  - Ice to shoulder 1-2 times daily, for 20 minutes at a time   -Analgesics as needed for pain, prescribed oxycodone  -discussed surgical and nonsurgical options with the patient  Nonsurgical options would not be ideal for this young person with an intra-articular fracture and so we have discussed surgical fixation  -All imaging from today was reviewed by myself and explained to the patient    -We will obtain CT of the Left elbow for fracture definition and surgical planing prior to surgery    -All of the risks and benefits of operative treatment were explained to the patient, as well as the risks and benefits of any alternative treatment options, including nonoperative care  This risks of surgery include, but are not limited to, infection, bleeding, blood clot, damage to nerves/arteries, need for further surgyer, cardiovascular risk, anesthesia risk, and continued pain  Instructed the risk of decreased ROM compared to contralateral side    Also discussed a small risk for implant removal   The patient understood this and elects to proceed forward with surgical intervention   -We will proceed forward with surgical ORIF of the Left distal humerus     -Work note provided  -Oxycodone 5mg x 20 tabs sent to pharmacy     Follow Up:  10-14 days post-op     To Do Next Visit:  Left elbow xray, suture removal     PROCEDURES PERFORMED:  Splint application  Date/Time: 12/2/2019 11:17 AM  Performed by: Garima Padron MD  Authorized by:  Garima Padron MD     Procedure details:     Location:  Elbow    Splint type:  Long arm    Supplies:  Elastic bandage, Ortho-Glass and cotton padding

## 2019-12-02 NOTE — LETTER
December 2, 2019     Patient: Robert Coleman   YOB: 1965   Date of Visit: 12/2/2019       To Whom it May Concern:    Robert Coleman is under my professional care  She was seen in my office on 12/2/2019  She may return to work with hours as tolerated with no use of the Left arm  She will be receiving surgery to the Left upper extremity on 12/11/19 and will be out of work on that date until her first follow-up appointment   At her first post-operative appointment her work status will be re-evaluated         Sincerely,          Nathaniel Jain MD        CC: Robert Coleman

## 2019-12-02 NOTE — TELEPHONE ENCOUNTER
Riaz Zendejas    Patient states wrong RX was called into pharmacy, she can not take Oxycodone, she is requesting a new RX for Hydrocodone  Please advise patient so she can

## 2019-12-04 ENCOUNTER — TELEPHONE (OUTPATIENT)
Dept: OBGYN CLINIC | Facility: HOSPITAL | Age: 54
End: 2019-12-04

## 2019-12-04 ENCOUNTER — HOSPITAL ENCOUNTER (OUTPATIENT)
Dept: CT IMAGING | Facility: CLINIC | Age: 54
Discharge: HOME | End: 2019-12-04
Payer: COMMERCIAL

## 2019-12-04 DIAGNOSIS — S42.492A CLOSED BICONDYLAR FRACTURE OF DISTAL END OF LEFT HUMERUS, INITIAL ENCOUNTER: ICD-10-CM

## 2019-12-04 PROCEDURE — 73200 CT UPPER EXTREMITY W/O DYE: CPT

## 2019-12-04 NOTE — TELEPHONE ENCOUNTER
Patient calling that her hand is very swollen and turning back and blue she wonders if this is normal   I advised that this is normal and she should ice and elevate the hand on a pillow and flex and extend the fingers several times per hour  Patient instructed on checking cap refill and she has cap refill WNL  She will call us if her cap refill is greater than 3 seconds    Sx scheduled for 12/11

## 2019-12-10 ENCOUNTER — ANESTHESIA EVENT (OUTPATIENT)
Dept: PERIOP | Facility: HOSPITAL | Age: 54
End: 2019-12-10
Payer: COMMERCIAL

## 2019-12-10 ENCOUNTER — TELEPHONE (OUTPATIENT)
Dept: OBGYN CLINIC | Facility: HOSPITAL | Age: 54
End: 2019-12-10

## 2019-12-10 DIAGNOSIS — S42.492A CLOSED BICONDYLAR FRACTURE OF DISTAL END OF LEFT HUMERUS, INITIAL ENCOUNTER: ICD-10-CM

## 2019-12-10 NOTE — ANESTHESIA PREPROCEDURE EVALUATION
Review of Systems/Medical History  Patient summary reviewed  Chart reviewed  History of anesthetic complications PONV    Cardiovascular  Exercise tolerance (METS): >4,     Pulmonary  Not a smoker , Pneumonia (resolved),        GI/Hepatic  Negative GI/hepatic ROS          Negative  ROS        Endo/Other  Negative endo/other ROS      GYN  Not currently pregnant , Prior pregnancy/OB history : 0 Parity: 0,   Breast cancer Right mastectomy and left mastectomy       Hematology   Musculoskeletal    Comment: fx distal humerrus Arthritis     Neurology  Negative neurology ROS      Psychology   Negative psychology ROS              Physical Exam    Airway    Mallampati score: II  TM Distance: >3 FB  Neck ROM: full     Dental       Cardiovascular  Cardiovascular exam normal    Pulmonary  Pulmonary exam normal     Other Findings  Fixed upper and lower teeth and in good repair      Anesthesia Plan  ASA Score- 2     Anesthesia Type- general and regional with ASA Monitors  Additional Monitors:   Airway Plan:     Comment: supraclavicullar block plan risks and pt consents to post op block analgesia  Plan Factors-Patient not instructed to abstain from smoking on day of procedure  Patient did not smoke on day of surgery  Induction- intravenous  Postoperative Plan- Plan for postoperative opioid use  Informed Consent- Anesthetic plan and risks discussed with patient and spouse  I personally reviewed this patient with the CRNA  Discussed and agreed on the Anesthesia Plan with the CRNA  Mateo Freitas

## 2019-12-10 NOTE — TELEPHONE ENCOUNTER
Patient is calling   [phone]    Patient is saying that her surgery is later in the day tomorrow & she would like her pain meds called in today so she can get them tonight  Patient won't be able to get them tomorrow after her sx & her  will be out of town on Thursday so she will not have anyone to pick them up for her  Please send to [facility] on [address] in [place]

## 2019-12-10 NOTE — PRE-PROCEDURE INSTRUCTIONS
Pre-Surgery Instructions:   Medication Instructions    ALPRAZolam (XANAX) 0 5 mg tablet Instructed patient per Anesthesia Guidelines   oxyCODONE (ROXICODONE) 5 mg immediate release tablet Instructed patient per Anesthesia Guidelines  Pre-op Showering Instructions for Surgery Patients    Before your operation, you play an important role in decreasing your risk for infection by washing with special antiseptic soap  This is an effective way to reduce bacteria on the skin which may help to prevent infections at the surgical site  Please read the following directions in advance  1  In the week before your operation, purchase a 4 ounce bottle of antiseptic soap containing chlorhexidine gluconate (CHG)  4%  Some brand names include: Aplicare®, Endure, and Hibiclens®  The cost is usually less than $5 00   For your convenience, the zanda carries the soap   It may also be available at your doctors office or pre-admission testing center, and at most retail pharmacies   If you are allergic or sensitive to soaps containing CHG, please let your doctor know so another antiseptic can be suggested   CHG antiseptic soap is for external use only  2  The day before your operation, follow these instructions carefully to get ready   Please clean linens (sheets) on your bed; you should sleep on clean sheets after your evening shower   Get clean towels and washcloth ready - you need enough for 2 showers   Set aside clean underwear, pajamas, and clothing to wear after the showers     Reminders:   DO NOT use any other soap or body rinse on your skin during or after the antiseptic showers   DO NOT use lotion, powder, deodorant, or perfume/aftershave of any kind on your skin after your antiseptic shower   DO NOT shave any body parts in the 24 hours/day before your operation   DO NOT get the antiseptic soap in your eyes, ears, nose, mouth, or vaginal area    3   You will need to shower the night before AND the morning of your surgery  Shower 1:   The first evening before the operation, take the first shower   First, shampoo your hair with regular shampoo and rinse it completely before you use the antiseptic soap  After washing and rinsing your hair, rinse your body   Next, use a clean washcloth to apply the antiseptic soap and wash your body from the neck down to your toes using ½ bottle of the antiseptic soap   You will use the other ½ bottle for the second shower   Clean the area where your incision will be; lather this area well for about 2 minutes   If you are having head or neck surgery, wash areas with the antiseptic soap   Rinse yourself completely with running water   Use a clean towel to dry off   Wear clean underwear and clothing/pajamas  Shower 2   The morning of your operation, take the second shower following the same steps as Shower 1 using the second ½ of the bottle of antiseptic soap   Use clean cloths and towels to wash and dry yourself   Wear clean underwear and clothing

## 2019-12-11 ENCOUNTER — APPOINTMENT (OUTPATIENT)
Dept: RADIOLOGY | Facility: HOSPITAL | Age: 54
End: 2019-12-11
Payer: COMMERCIAL

## 2019-12-11 ENCOUNTER — ANESTHESIA (OUTPATIENT)
Dept: PERIOP | Facility: HOSPITAL | Age: 54
End: 2019-12-11
Payer: COMMERCIAL

## 2019-12-11 ENCOUNTER — HOSPITAL ENCOUNTER (OUTPATIENT)
Facility: HOSPITAL | Age: 54
Setting detail: OUTPATIENT SURGERY
Discharge: HOME | End: 2019-12-11
Attending: ORTHOPAEDIC SURGERY | Admitting: ORTHOPAEDIC SURGERY
Payer: COMMERCIAL

## 2019-12-11 VITALS
TEMPERATURE: 98.7 F | SYSTOLIC BLOOD PRESSURE: 117 MMHG | HEIGHT: 66 IN | RESPIRATION RATE: 14 BRPM | OXYGEN SATURATION: 99 % | WEIGHT: 171 LBS | BODY MASS INDEX: 27.48 KG/M2 | HEART RATE: 86 BPM | DIASTOLIC BLOOD PRESSURE: 63 MMHG

## 2019-12-11 DIAGNOSIS — S42.492A CLOSED BICONDYLAR FRACTURE OF DISTAL END OF LEFT HUMERUS, INITIAL ENCOUNTER: ICD-10-CM

## 2019-12-11 LAB
EXT PREGNANCY TEST URINE: NEGATIVE
EXT. CONTROL: NORMAL

## 2019-12-11 PROCEDURE — 24586 OPTX PARTCLR FX&/DISLC ELBOW: CPT | Performed by: ORTHOPAEDIC SURGERY

## 2019-12-11 PROCEDURE — C1713 ANCHOR/SCREW BN/BN,TIS/BN: HCPCS | Performed by: ORTHOPAEDIC SURGERY

## 2019-12-11 PROCEDURE — 73070 X-RAY EXAM OF ELBOW: CPT | Performed by: ORTHOPAEDIC SURGERY

## 2019-12-11 PROCEDURE — 73070 X-RAY EXAM OF ELBOW: CPT

## 2019-12-11 PROCEDURE — 24586 OPTX PARTCLR FX&/DISLC ELBOW: CPT | Performed by: PHYSICIAN ASSISTANT

## 2019-12-11 PROCEDURE — 81025 URINE PREGNANCY TEST: CPT | Performed by: ORTHOPAEDIC SURGERY

## 2019-12-11 DEVICE — 2.7MM VA LCKNG SCREW SLF-TPNG WITH T8 STARDRIVE RECESS 28MM: Type: IMPLANTABLE DEVICE | Site: ELBOW | Status: FUNCTIONAL

## 2019-12-11 DEVICE — 3.5MM CORTEX SCREW SELF-TAPPING 22MM: Type: IMPLANTABLE DEVICE | Site: ELBOW | Status: FUNCTIONAL

## 2019-12-11 DEVICE — 2.7MM VA LCKNG SCREW SLF-TPNG WITH T8 STARDRIVE RECESS 14MM: Type: IMPLANTABLE DEVICE | Site: ELBOW | Status: FUNCTIONAL

## 2019-12-11 DEVICE — 2.7MM CORTEX SCREW SELF-TAPPING 45MM: Type: IMPLANTABLE DEVICE | Site: ELBOW | Status: FUNCTIONAL

## 2019-12-11 DEVICE — 3.5MM CORTEX SCREW SELF-TAPPING 26MM: Type: IMPLANTABLE DEVICE | Site: ELBOW | Status: FUNCTIONAL

## 2019-12-11 DEVICE — 3.5MM CORTEX SCREW SELF-TAPPING 16MM: Type: IMPLANTABLE DEVICE | Site: ELBOW | Status: FUNCTIONAL

## 2019-12-11 DEVICE — 2.7MM VA LCKNG SCREW SLF-TPNG WITH T8 STARDRIVE RECESS 50MM: Type: IMPLANTABLE DEVICE | Site: ELBOW | Status: FUNCTIONAL

## 2019-12-11 DEVICE — 2.7MM METAPHYSEAL SCR SLF-TPNG W/T8 STRDRV RECESS/44MM: Type: IMPLANTABLE DEVICE | Site: ELBOW | Status: FUNCTIONAL

## 2019-12-11 DEVICE — 3.5MM CORTEX SCREW SELF-TAPPING 28MM: Type: IMPLANTABLE DEVICE | Site: ELBOW | Status: FUNCTIONAL

## 2019-12-11 DEVICE — 2.7MM METAPHYSEAL SCR SLF-TPNG W/T8 STRDRV RECESS/38MM: Type: IMPLANTABLE DEVICE | Site: ELBOW | Status: FUNCTIONAL

## 2019-12-11 DEVICE — 2.7MM/3.5MM VA-LCP PROXIMAL OLECRANON PL 2H/LT/73MM
Type: IMPLANTABLE DEVICE | Site: ELBOW | Status: FUNCTIONAL
Brand: VA-LCP

## 2019-12-11 DEVICE — 2.7MM METAPHYSEAL SCR SLF-TPNG W/T8 STRDRV RECESS/10MM: Type: IMPLANTABLE DEVICE | Site: ELBOW | Status: FUNCTIONAL

## 2019-12-11 DEVICE — 2.7MM/3.5MM VA-LCP POSTLAT DHP-LAT SUPT 7H/LT/127MM-LONG
Type: IMPLANTABLE DEVICE | Site: ELBOW | Status: FUNCTIONAL
Brand: VA-LCP

## 2019-12-11 DEVICE — 2.7MM VA LCKNG SCREW SLF-TPNG WITH T8 STARDRIVE RECESS 22MM: Type: IMPLANTABLE DEVICE | Site: ELBOW | Status: FUNCTIONAL

## 2019-12-11 DEVICE — 3.5MM CORTEX SCREW SELF-TAPPING 24MM: Type: IMPLANTABLE DEVICE | Site: ELBOW | Status: FUNCTIONAL

## 2019-12-11 DEVICE — 2.7MM METAPHYSEAL SCR SLF-TPNG W/T8 STRDRV RECESS/14MM: Type: IMPLANTABLE DEVICE | Site: ELBOW | Status: FUNCTIONAL

## 2019-12-11 DEVICE — 2.7MM METAPHYSEAL SCR SLF-TPNG W/T8 STRDRV RECESS/34MM: Type: IMPLANTABLE DEVICE | Site: ELBOW | Status: FUNCTIONAL

## 2019-12-11 DEVICE — 2.7MM/3.5MM VA-LCP EXT MEDL DSTL HUM PL 4H/LT/111MM-LONG
Type: IMPLANTABLE DEVICE | Site: ELBOW | Status: FUNCTIONAL
Brand: VA-LCP

## 2019-12-11 DEVICE — 2.7MM VA LCKNG SCREW SLF-TPNG WITH T8 STARDRIVE RECESS 10MM: Type: IMPLANTABLE DEVICE | Site: ELBOW | Status: FUNCTIONAL

## 2019-12-11 DEVICE — 2.7MM VA LCKNG SCREW SLF-TPNG WITH T8 STARDRIVE RECESS 44MM: Type: IMPLANTABLE DEVICE | Site: ELBOW | Status: FUNCTIONAL

## 2019-12-11 DEVICE — 3.5MM CORTEX SCREW SELF-TAPPING 18MM: Type: IMPLANTABLE DEVICE | Site: ELBOW | Status: FUNCTIONAL

## 2019-12-11 RX ORDER — MAGNESIUM HYDROXIDE 1200 MG/15ML
LIQUID ORAL AS NEEDED
Status: DISCONTINUED | OUTPATIENT
Start: 2019-12-11 | End: 2019-12-11 | Stop reason: HOSPADM

## 2019-12-11 RX ORDER — DEXAMETHASONE SODIUM PHOSPHATE 4 MG/ML
4 INJECTION, SOLUTION INTRA-ARTICULAR; INTRALESIONAL; INTRAMUSCULAR; INTRAVENOUS; SOFT TISSUE ONCE AS NEEDED
Status: COMPLETED | OUTPATIENT
Start: 2019-12-11 | End: 2019-12-11

## 2019-12-11 RX ORDER — FENTANYL CITRATE/PF 50 MCG/ML
12.5 SYRINGE (ML) INJECTION
Status: DISCONTINUED | OUTPATIENT
Start: 2019-12-11 | End: 2019-12-11 | Stop reason: HOSPADM

## 2019-12-11 RX ORDER — ROPIVACAINE HYDROCHLORIDE 5 MG/ML
INJECTION, SOLUTION EPIDURAL; INFILTRATION; PERINEURAL
Status: COMPLETED | OUTPATIENT
Start: 2019-12-11 | End: 2019-12-11

## 2019-12-11 RX ORDER — MIDAZOLAM HYDROCHLORIDE 2 MG/2ML
INJECTION, SOLUTION INTRAMUSCULAR; INTRAVENOUS AS NEEDED
Status: DISCONTINUED | OUTPATIENT
Start: 2019-12-11 | End: 2019-12-11 | Stop reason: SURG

## 2019-12-11 RX ORDER — ROCURONIUM BROMIDE 10 MG/ML
INJECTION, SOLUTION INTRAVENOUS AS NEEDED
Status: DISCONTINUED | OUTPATIENT
Start: 2019-12-11 | End: 2019-12-11 | Stop reason: SURG

## 2019-12-11 RX ORDER — CEFAZOLIN SODIUM 2 G/50ML
2000 SOLUTION INTRAVENOUS EVERY 8 HOURS
Status: DISCONTINUED | OUTPATIENT
Start: 2019-12-11 | End: 2019-12-12 | Stop reason: HOSPADM

## 2019-12-11 RX ORDER — DIPHENHYDRAMINE HYDROCHLORIDE 50 MG/ML
INJECTION INTRAMUSCULAR; INTRAVENOUS AS NEEDED
Status: DISCONTINUED | OUTPATIENT
Start: 2019-12-11 | End: 2019-12-11 | Stop reason: SURG

## 2019-12-11 RX ORDER — FENTANYL CITRATE/PF 50 MCG/ML
25 SYRINGE (ML) INJECTION
Status: DISCONTINUED | OUTPATIENT
Start: 2019-12-11 | End: 2019-12-11 | Stop reason: HOSPADM

## 2019-12-11 RX ORDER — PROPOFOL 10 MG/ML
INJECTION, EMULSION INTRAVENOUS AS NEEDED
Status: DISCONTINUED | OUTPATIENT
Start: 2019-12-11 | End: 2019-12-11 | Stop reason: SURG

## 2019-12-11 RX ORDER — GLYCOPYRROLATE 0.2 MG/ML
INJECTION INTRAMUSCULAR; INTRAVENOUS AS NEEDED
Status: DISCONTINUED | OUTPATIENT
Start: 2019-12-11 | End: 2019-12-11 | Stop reason: SURG

## 2019-12-11 RX ORDER — SODIUM CHLORIDE, SODIUM LACTATE, POTASSIUM CHLORIDE, CALCIUM CHLORIDE 600; 310; 30; 20 MG/100ML; MG/100ML; MG/100ML; MG/100ML
75 INJECTION, SOLUTION INTRAVENOUS CONTINUOUS
Status: DISCONTINUED | OUTPATIENT
Start: 2019-12-11 | End: 2019-12-12 | Stop reason: HOSPADM

## 2019-12-11 RX ORDER — FENTANYL CITRATE 50 UG/ML
INJECTION, SOLUTION INTRAMUSCULAR; INTRAVENOUS
Status: COMPLETED | OUTPATIENT
Start: 2019-12-11 | End: 2019-12-11

## 2019-12-11 RX ORDER — DIPHENHYDRAMINE HYDROCHLORIDE 50 MG/ML
12.5 INJECTION INTRAMUSCULAR; INTRAVENOUS ONCE
Status: DISCONTINUED | OUTPATIENT
Start: 2019-12-11 | End: 2019-12-11 | Stop reason: HOSPADM

## 2019-12-11 RX ORDER — HYDROCODONE BITARTRATE AND ACETAMINOPHEN 5; 325 MG/1; MG/1
1 TABLET ORAL EVERY 6 HOURS PRN
Qty: 30 TABLET | Refills: 0 | Status: SHIPPED | OUTPATIENT
Start: 2019-12-11 | End: 2019-12-21

## 2019-12-11 RX ORDER — SCOLOPAMINE TRANSDERMAL SYSTEM 1 MG/1
1 PATCH, EXTENDED RELEASE TRANSDERMAL
Status: DISCONTINUED | OUTPATIENT
Start: 2019-12-11 | End: 2019-12-12 | Stop reason: HOSPADM

## 2019-12-11 RX ORDER — MIDAZOLAM HYDROCHLORIDE 2 MG/2ML
INJECTION, SOLUTION INTRAMUSCULAR; INTRAVENOUS
Status: COMPLETED | OUTPATIENT
Start: 2019-12-11 | End: 2019-12-11

## 2019-12-11 RX ORDER — HYDROCODONE BITARTRATE AND ACETAMINOPHEN 5; 325 MG/1; MG/1
1 TABLET ORAL EVERY 6 HOURS PRN
Status: DISCONTINUED | OUTPATIENT
Start: 2019-12-11 | End: 2019-12-12 | Stop reason: HOSPADM

## 2019-12-11 RX ADMIN — LIDOCAINE HYDROCHLORIDE 100 MG: 20 INJECTION, SOLUTION INTRAVENOUS at 16:12

## 2019-12-11 RX ADMIN — DEXAMETHASONE SODIUM PHOSPHATE 4 MG: 4 INJECTION, SOLUTION INTRAMUSCULAR; INTRAVENOUS at 21:38

## 2019-12-11 RX ADMIN — DIPHENHYDRAMINE HYDROCHLORIDE 50 MG: 50 INJECTION INTRAMUSCULAR; INTRAVENOUS at 16:03

## 2019-12-11 RX ADMIN — ROCURONIUM BROMIDE 30 MG: 10 INJECTION, SOLUTION INTRAVENOUS at 16:32

## 2019-12-11 RX ADMIN — SCOPALAMINE 1 PATCH: 1 PATCH, EXTENDED RELEASE TRANSDERMAL at 12:47

## 2019-12-11 RX ADMIN — PHENYLEPHRINE HYDROCHLORIDE 100 MCG: 10 INJECTION INTRAVENOUS at 16:32

## 2019-12-11 RX ADMIN — MIDAZOLAM HYDROCHLORIDE 2 MG: 1 INJECTION, SOLUTION INTRAMUSCULAR; INTRAVENOUS at 16:26

## 2019-12-11 RX ADMIN — PROPOFOL 200 MG: 10 INJECTION, EMULSION INTRAVENOUS at 16:13

## 2019-12-11 RX ADMIN — FENTANYL CITRATE 50 MCG: 50 INJECTION INTRAMUSCULAR; INTRAVENOUS at 20:50

## 2019-12-11 RX ADMIN — CEFAZOLIN SODIUM 2000 MG: 2 SOLUTION INTRAVENOUS at 16:30

## 2019-12-11 RX ADMIN — PHENYLEPHRINE HYDROCHLORIDE 100 MCG: 10 INJECTION INTRAVENOUS at 17:46

## 2019-12-11 RX ADMIN — MIDAZOLAM HYDROCHLORIDE 2 MG: 1 INJECTION, SOLUTION INTRAMUSCULAR; INTRAVENOUS at 15:36

## 2019-12-11 RX ADMIN — SODIUM CHLORIDE, SODIUM LACTATE, POTASSIUM CHLORIDE, AND CALCIUM CHLORIDE: .6; .31; .03; .02 INJECTION, SOLUTION INTRAVENOUS at 20:22

## 2019-12-11 RX ADMIN — PROPOFOL 50 MG: 10 INJECTION, EMULSION INTRAVENOUS at 16:23

## 2019-12-11 RX ADMIN — PHENYLEPHRINE HYDROCHLORIDE 100 MCG: 10 INJECTION INTRAVENOUS at 17:57

## 2019-12-11 RX ADMIN — CEFAZOLIN SODIUM 2000 MG: 2 SOLUTION INTRAVENOUS at 20:26

## 2019-12-11 RX ADMIN — ROCURONIUM BROMIDE 20 MG: 10 INJECTION, SOLUTION INTRAVENOUS at 18:04

## 2019-12-11 RX ADMIN — FENTANYL CITRATE 100 MCG: 50 INJECTION INTRAMUSCULAR; INTRAVENOUS at 19:38

## 2019-12-11 RX ADMIN — ROPIVACAINE HYDROCHLORIDE 25 ML: 5 INJECTION, SOLUTION EPIDURAL; INFILTRATION; PERINEURAL at 15:36

## 2019-12-11 RX ADMIN — PHENYLEPHRINE HYDROCHLORIDE 150 MCG: 10 INJECTION INTRAVENOUS at 17:10

## 2019-12-11 RX ADMIN — PHENYLEPHRINE HYDROCHLORIDE 200 MCG: 10 INJECTION INTRAVENOUS at 17:37

## 2019-12-11 RX ADMIN — PHENYLEPHRINE HYDROCHLORIDE 100 MCG: 10 INJECTION INTRAVENOUS at 17:28

## 2019-12-11 RX ADMIN — GLYCOPYRROLATE 0.2 MG: 0.2 INJECTION, SOLUTION INTRAMUSCULAR; INTRAVENOUS at 16:03

## 2019-12-11 RX ADMIN — ROCURONIUM BROMIDE 20 MG: 10 INJECTION, SOLUTION INTRAVENOUS at 16:51

## 2019-12-11 RX ADMIN — SODIUM CHLORIDE, SODIUM LACTATE, POTASSIUM CHLORIDE, AND CALCIUM CHLORIDE: .6; .31; .03; .02 INJECTION, SOLUTION INTRAVENOUS at 16:06

## 2019-12-11 RX ADMIN — PHENYLEPHRINE HYDROCHLORIDE 50 MCG: 10 INJECTION INTRAVENOUS at 19:44

## 2019-12-11 RX ADMIN — FENTANYL CITRATE 100 MCG: 50 INJECTION INTRAMUSCULAR; INTRAVENOUS at 15:36

## 2019-12-11 NOTE — INTERVAL H&P NOTE
H&P reviewed  After examining the patient I find no changes in the patients condition since the H&P had been written      Vitals:    12/11/19 1530   BP: 117/52   Pulse: 74   Resp: 20   Temp:    SpO2: 100%

## 2019-12-11 NOTE — ANESTHESIA PROCEDURE NOTES
Peripheral Block    Patient location during procedure: holding area  Start time: 12/11/2019 3:04 PM  Reason for block: at surgeon's request and post-op pain management  Staffing  Anesthesiologist: Ovidio Boyd DO  Preanesthetic Checklist  Completed: patient identified, site marked, surgical consent, pre-op evaluation, timeout performed, IV checked, risks and benefits discussed and monitors and equipment checked  Peripheral Block  Patient position: supine  Prep: ChloraPrep  Patient monitoring: heart rate, cardiac monitor, continuous pulse ox and frequent blood pressure checks  Block type: supraclavicular  Laterality: left  Injection technique: single-shot  Procedures: ultrasound guided, Ultrasound guidance required for the procedure to increase accuracy and safety of medication placement and decrease risk of complications  and nerve stimulator  Ultrasound permanent image savedropivacaine (NAROPIN) 0 5 % perineural infiltration, 25 mL  midazolam (VERSED) 2 mg/2 mL IV, 2 mg  fentaNYL 50 mcg/mL IV, 100 mcg  Needle  Needle type: Stimuplex   Needle gauge: 22 G  Needle length: 5 cm  Needle localization: nerve stimulator and ultrasound guidance  Needle insertion depth: 2 5 cm  Test dose: negative  Assessment  Injection assessment: incremental injection, local visualized surrounding nerve on ultrasound, negative aspiration for heme and no paresthesia on injection  Paresthesia pain: none  Heart rate change: no  Slow fractionated injection: yes  Post-procedure:  site cleaned  patient tolerated the procedure well with no immediate complications  Additional Notes  Left supraclavicular block without any known complications under sterile conditions    Pt remained ACV throughout procedure  No S/S of IV or LAST  No EKG changes  MA of 0 54 resolved at 0 49  Well tolerated by pt  Observed in PHA for 30 min without issues

## 2019-12-12 NOTE — PERIOPERATIVE NURSING NOTE
Returned from [employer] awake and alert denying any pain  Unable to wiggle fingers  No feeling in arm   Ace in place  ivs running  Taking sips of liquids

## 2019-12-12 NOTE — ANESTHESIA POSTPROCEDURE EVALUATION
Post-Op Assessment Note    CV Status:  Stable  Pain Score: 5    Pain management: adequate     Mental Status:  Alert   Hydration Status:  Stable   PONV Controlled:  Controlled   Airway Patency:  Patent   Post Op Vitals Reviewed: Yes      Staff: CRNA           /64 (12/11/19 2047)    Temp 97 8 °F (36 6 °C) (12/11/19 2047)    Pulse 100 (12/11/19 2047)   Resp 20 (12/11/19 2047)    SpO2 99 % (12/11/19 2047)

## 2019-12-12 NOTE — ANESTHESIA POSTPROCEDURE EVALUATION
Post-Op Assessment Note    CV Status:  Stable  Pain Score: 1    Pain management: adequate     Mental Status:  Alert and awake   Hydration Status:  Euvolemic   PONV Controlled:  Controlled   Airway Patency:  Patent  Airway: intubated   Post Op Vitals Reviewed: Yes      Staff: Anesthesiologist, CRNA   Comments: no post op airway issues  Responsive and co op  Effective post op block analgesia   Op arm well paddid and protected          BP      Temp      Pulse     Resp      SpO2

## 2019-12-12 NOTE — OP NOTE
DATE OF SURGERY: 12/11/2019    SURGEON: Giovany Mcnamara MD    PREOPERATIVE DIAGNOSIS:  Left intra-articular distal humerus fracture    POSTOPERATIVE DIAGNOSIS:  Same    PROCEDURE:  Open reduction internal fixation left distal humerus fracture with olecranon osteotomy and subcutaneous ulnar nerve transposition    IMPLANTS:  Synthes variable angle distal humerus posterior lateral and direct medial locking plates with proximal olecranon locking plate     ASSISTANTS: CECELIA Romo     ANESTHESIA: General w/ Regional    ESTIMATED BLOOD LOSS: Minimal     INTRAVENOUS FLUIDS: Per anesthesia    URINE OUTPUT:  No Owusu    TOURNIQUET TIME:  336 minutes    COMPLICATIONS:  None    ANTIBIOTICS:  2 g Ancef, redosed once    SPECIMENS: * No specimens in log *         INDICATIONS: Amaury Reno is a 48y o  year old female who sustained the above conditions  The indications for operative intervention were a displaced intra-articular distal humerus fracture  The alternatives to operative intervention included nonoperative measures  The risks of the operative procedure were discussed in detail with the patient and her  including but not limited to the risks of anesthesia, infection, injury to blood vessel/nerve, pain, malunion, nonunion, painful hardware, stiffness, changes in sensation, and the need for repeat surgery  The patient understood these risks and alternatives and elected to proceed with surgery  DESCRIPTION OF PROCEDURE: The patient was seen in the preoperative holding area and identification was performed as per the institution's protocol  The patient was then brought to the operating room, a briefing was held and prophylactic antibiotics were given  Anesthesia was induced  A sterile tourniquet cuff was applied to the operative extremity, set at 250 mmHg    Patient was placed in the lateral decubitus position with all bony prominences padded, the down arm in the sleeper position, and the operative arm draped across a wide radiolucent arm board     The site was pre-scrubbed with chlorhexidine and prepped with ChloraPrep and draped in the usual sterile fashion  Following a timeout procedure, a large longitudinal incision was made on the posterior aspect of the distal humerus curving gently around the olecranon and moving a bit distally past the olecranon  We avoided as much of the significantly contused posterior skin as possible  Dissection was carried down through skin and subcutaneous tissue 1st proximally down to the triceps fascia  Large skin flaps were made in the subcutaneous tissues both medially and laterally  Dissection began 1st medially to identify the ulnar nerve  Along with the inferior ulnar collateral artery and its plexus, the ulnar nerve was protected and released from the medial intermuscular septum proximally down through to the flexor carpi ulnaris  A bit of medial intermuscular septum was excised and the nerve was externally neurolysed taking care to protected during dissection through the transverse cubital retinaculum as well as Saha's ligament  The nerve was easily mobilized with no evidence of contusion or damage to the nerve  This was placed into a subcutaneous pocket with a wide Penrose drain and no attachments so as not to anchor or tether the nerve  Dissection was then carried laterally in a large subcutaneous pocket  The lateral and medial borders of the triceps tendon were incised from their respective intermuscular septums  Dissection was carried down both medially and laterally distally and paratricipetal approaches were raised sharply off either side of the olecranon  A periosteal elevator was used to separate the triceps from the posterior aspect of the humerus  Dissection was carried carefully down beneath the triceps tendon though sharply to expose the fracture fragments    There was some comminution in the olecranon fossa and this bone graft was taken off the table and placed on the back table in saline for later bone grafting purposes as needed  A knife was used to gently clean the periosteum off the fracture edges  There is significant callus formation given the length of time from sustaining the fracture to presentation today  This was all excised  Direct reduction reads on the medial and lateral side were attempted and at the level of the metaphysis, however, we were unable to attain direct visualization of the articular reduction, so we proceeded with an olecranon osteotomy  A sponge was placed into the articular surface of the olecranon finding the bare area  Biplanar fluoroscopy was used to confirm the montrell of the articular surface, and Bovie was used to clear off the dorsal surface of the ulna for osteotomy purposes  A proximal olecranon plate was then templated to the bone prior to osteotomy and 3 screws were placed to facilitate later replantation  These were then removed  A microsagittal saw blade was then used to make a chevron osteotomy pointing distally using saline to cool the blade throughout the process  3/4 thickness cut was made and the cut was finished with an osteotome breaking up the volar surface gently  The olecranon was then covered with a moist gauze and reflected out of the way with the triceps moved proximally  We then had excellent visualization  The joint surface of the distal humerus was reduced anatomically and held with point-to-point reduction clamps as well as wire  A 2 7 mm lag screw was placed from the nonarticular portion of the trochlea medially across the fracture site  We then affected reduction laterally 1st   This was held with a point-to-point reduction clamp as well as a 1 6 mm Mandeep wire which was later removed  A posterior lateral distal humerus variable angle locking plate with lateral extension was applied and nonlocking screw was placed both proximally and distally    Further fixation distally was achieved and then a nonlocking screw in compression was used proximally to further compress at the metaphysis  We were satisfied with this reduction  Further locking and nonlocking screws were placed in the posterior lateral plate which included locking screws that cross the articular surface from lateral to medial   We turned our attention medially then and took a direct medial plate, templated this to the bone, and fixed with a nonlocking screw proximally  Fine adjustments were made and this was fixed proximally and distally with further nonlocking and locking screws as indicated  We ensured that our transverse screws at the articular level crossed the fracture in multiple planes  We were very satisfied with the visual reduction and biplanar fluoroscopy was used prior to closing the olecranon osteotomy to ensure plate placement, screw lengths  Live fluoroscopy was used to ensure that there was no anterior perforation of any of the screws at the articular level  We then replaced our olecranon tip back down  The plate was then reapplied  An additional nonlocking screw was placed from posterior to anterior across the osteotomy site yielding excellent compression at the fracture site volarly, though with small gapping dorsally secondary to bone removal from the blade and osteotome  We then took the bone graft that had been placed on the back table, decorticated a bit to allow for osteo integration, and tamped this into the olecranon site dorsally where it would fit  We were satisfied with this  A further locking screw was placed proximally  The elbow was then taken through range of motion and it was noted to be nearly full passively  The ulnar nerve was ensured to not have any kink points and this is placed into a subcutaneous pocket anteriorly along the flexor pronator mass    Fascia was tied posterior to the ulnar nerve along a long distance to prevent posterior subluxation of the nerve     The wounds were copiously irrigated and closed in layers including and 0 Vicryl in the para triceps all approaches as well as in the deep fat layer, 2 O Vicryl, and 3 0 nylon in the skin  Sterile dressings were applied as well as a posterior slab splint  All sharps and sponge counts were correct and there were no complications  I attest that IAnalia, was present and scrubbed for the entirety of the procedure  No qualified resident was available to assist with this case  and A physician assistant was required during the procedure for retraction, tissue handling, dissection and suturing  The patient was awoken from anesthesia in good condition and taken to the PACU  PLAN: The patient will follow up in 10-14 days for suture removal and early active motion

## 2019-12-12 NOTE — PERIOPERATIVE NURSING NOTE
oob to br with sling applied  Fingers warm to touch  Wants to go home  ivs out  Discharged via w/c after discharge instructions given and verbalized an understanding of same

## 2019-12-17 ENCOUNTER — TELEPHONE (OUTPATIENT)
Dept: OBGYN CLINIC | Facility: HOSPITAL | Age: 54
End: 2019-12-17

## 2019-12-17 NOTE — TELEPHONE ENCOUNTER
Dr Wolf Weldon patient -  L humeral fx  ORIF 12/11    Patient calling that she is having 8/10 pain since surgery  She did whack her splint on a door a couple of days ago  No increase in pain after that  She feels like a weird wetness to the elbow but yet there is nothing coming thru the bandaging  Her friend looked at the cotton and did not see anything  She is maintaining the sling at all times  Is she to do that or is she to come out at all? She is icing 20 min on 20 min off, she is taking the Norco every 6-8 hrs and taking ibuprofen 800mg TID with food  I advised that she can add a tylenol with each dosage of Norco (3000 max QD)  She has good cap refill to the fingers  PO appt 12/23 Please advise

## 2019-12-17 NOTE — TELEPHONE ENCOUNTER
All acceptable  Possibly a bit of drainage post operatively which is ok  This was a big surgery and pain is typical so the pain regimen is acceptable as described  Sling use PRN  Should do pendulum exercises for shoulder and finger motion exercises to keep things moving though   Follow up 12/23 ok unless something changes

## 2019-12-23 ENCOUNTER — APPOINTMENT (OUTPATIENT)
Dept: RADIOLOGY | Facility: MEDICAL CENTER | Age: 54
End: 2019-12-23
Payer: COMMERCIAL

## 2019-12-23 ENCOUNTER — OFFICE VISIT (OUTPATIENT)
Dept: OBGYN CLINIC | Facility: MEDICAL CENTER | Age: 54
End: 2019-12-23

## 2019-12-23 VITALS
DIASTOLIC BLOOD PRESSURE: 85 MMHG | HEIGHT: 66 IN | BODY MASS INDEX: 29.25 KG/M2 | HEART RATE: 79 BPM | SYSTOLIC BLOOD PRESSURE: 150 MMHG | WEIGHT: 182 LBS

## 2019-12-23 DIAGNOSIS — S42.492A CLOSED BICONDYLAR FRACTURE OF DISTAL END OF LEFT HUMERUS, INITIAL ENCOUNTER: Primary | ICD-10-CM

## 2019-12-23 DIAGNOSIS — S42.492A CLOSED BICONDYLAR FRACTURE OF DISTAL END OF LEFT HUMERUS, INITIAL ENCOUNTER: ICD-10-CM

## 2019-12-23 PROCEDURE — 73080 X-RAY EXAM OF ELBOW: CPT

## 2019-12-23 PROCEDURE — 99024 POSTOP FOLLOW-UP VISIT: CPT | Performed by: ORTHOPAEDIC SURGERY

## 2019-12-23 RX ORDER — HYDROCODONE BITARTRATE AND ACETAMINOPHEN 5; 325 MG/1; MG/1
1 TABLET ORAL EVERY 6 HOURS PRN
Qty: 30 TABLET | Refills: 0 | Status: SHIPPED | OUTPATIENT
Start: 2019-12-23 | End: 2020-01-07 | Stop reason: SDUPTHER

## 2019-12-23 NOTE — LETTER
December 23, 2019     Patient: Valentina Trinh   YOB: 1965   Date of Visit: 12/23/2019       To Whom it May Concern:    Valentina Trinh is under my professional care  She was seen in my office on 12/23/2019  She underwent surgery on 12/11/19 for an injury sustained on 11/26/19  She should remain out of work until 1/6/20 and then at that time, be able to return to work half days until 1/13/19  She may then return full time  While at work, she cannot use her left arm for carrying, grasping, pushing, pulling, or lifting  She may be allowed to wear her sling as needed  Expected return to full duty with no restrictions is 3 months from surgery  If you have any questions or concerns, please don't hesitate to call           Sincerely,          Melyssa Padilla MD        CC: No Recipients

## 2019-12-23 NOTE — PROGRESS NOTES
History of the Present Illness     Jerrold Cockayne is a 48 y o  female status post open reduction internal fixation left intra-articular distal humerus fracture with olecranon osteotomy and submuscular ulnar nerve transposition  Patient notes that she has had significant swelling  Pain has been tolerable and has been taking some Vicodin  Does not describe any numbness or tingling          Physical Exam     /85   Pulse 79   Ht 5' 6" (1 676 m)   Wt 82 6 kg (182 lb)   BMI 29 38 kg/m²     Examination of the left upper extremity reveals moderate edge necrosis of the incision over the distal 3/4  There is no fluctuance erythema or drainage  Sutures were all removed  She has range of motion about the elbow from about 45-90 degrees  Pro supination is limited as well  Data Review       Imaging: Three-view imaging of the left elbow taken in the office and personally reviewed by me shows evidence of stable alignment of fracture fixation of the distal humerus with olecranon osteotomy in place  Assessment and Plan       82-year-old female status post above surgery  She is healing expectedly with a bit of edge necrosis  I took her sutures out, cleaned her wound with alcohol and the surface, and placed Steri-Strips  Discussed with her that she is to leave the compressive wrap on for about a week and to not get this wet  She will take a look at the incision about a week and if it looks good, may commence showering at that point    Discussed with her that we will see her back in 1 month and work on therapy that involves active and passive flexion of the elbow, gravity assisted extension of the elbow, active and passive pro supination      Follow Up:  4 weeks    To Do Next Visit:  Two view imaging left elbow    PROCEDURES PERFORMED:  Procedures  No Procedures performed today

## 2019-12-24 ENCOUNTER — EVALUATION (OUTPATIENT)
Dept: PHYSICAL THERAPY | Facility: CLINIC | Age: 54
End: 2019-12-24
Payer: COMMERCIAL

## 2019-12-24 DIAGNOSIS — G89.18 ACUTE POST-OPERATIVE PAIN: Primary | ICD-10-CM

## 2019-12-24 DIAGNOSIS — Z87.81 S/P ORIF (OPEN REDUCTION INTERNAL FIXATION) FRACTURE: ICD-10-CM

## 2019-12-24 DIAGNOSIS — M25.522 LEFT ELBOW PAIN: ICD-10-CM

## 2019-12-24 DIAGNOSIS — Z98.890 S/P ORIF (OPEN REDUCTION INTERNAL FIXATION) FRACTURE: ICD-10-CM

## 2019-12-24 PROCEDURE — 97140 MANUAL THERAPY 1/> REGIONS: CPT | Performed by: PHYSICAL THERAPIST

## 2019-12-24 PROCEDURE — 97161 PT EVAL LOW COMPLEX 20 MIN: CPT | Performed by: PHYSICAL THERAPIST

## 2019-12-24 PROCEDURE — 97110 THERAPEUTIC EXERCISES: CPT | Performed by: PHYSICAL THERAPIST

## 2019-12-24 NOTE — PROGRESS NOTES
PT Evaluation     Today's date: 2019  Patient name: Antonio Holden  : 1965  MRN: [id number]  Referring provider: Johanna Goodwin, *  Dx:   Encounter Diagnosis     ICD-10-CM    1  Acute post-operative pain G89 18    2  S/P ORIF (open reduction internal fixation) fracture Z98 890     Z87 81    3  Left elbow pain M25 522                   Assessment  Assessment details: Antonio Holden is a 48 y o  female who presents with pain, decreased strength, decreased ROM, decreased joint mobility, and left UE edema  Due to these impairments, patient has difficulty performing ADL's, recreational activities, work-related activities, engaging in social activities, lifting/carrying, transfers, reaching  Patient's clinical presentation is consistent with their referring diagnosis of Acute post-operative pain, S/P ORIF, Left elbow pain  Patient has been educated in post-op contraindications / precautions, wound care, home exercise program and plan of care  Patient would benefit from skilled physical therapy services to address their aforementioned functional limitations and progress towards prior level of function and independence with home exercise program      Impairments: abnormal muscle firing, abnormal or restricted ROM, activity intolerance, impaired physical strength, lacks appropriate home exercise program, pain with function, scapular dyskinesis, weight-bearing intolerance, poor posture  and poor body mechanics  Understanding of Dx/Px/POC: good   Prognosis: good    Goals  Short Term Goals: Target Date 30 days  1  Initiate and advance HEP  2  Decrease c/o pain to 4/10 at worst  3  Increase ROM to WNL  4  Increase strength by 1 grade    Long Term Goals: Target Date 90 days  1  Indep with HEP  2  Abolish c/o pain   3   Increase strength to >4/5 through out left UE      Plan  Patient would benefit from: skilled PT  Planned modality interventions: cryotherapy  Planned therapy interventions: joint mobilization, manual therapy, patient education, postural training, activity modification, abdominal trunk stabilization, body mechanics training, flexibility, functional ROM exercises, graded exercise, home exercise program, neuromuscular re-education, strengthening, stretching, therapeutic activities, therapeutic exercise, motor coordination training, muscle pump exercises, gait training, balance/weight bearing training and ADL training  Frequency: 3x week  Duration in weeks: 8  Plan of Care beginning date: 2019  Plan of Care expiration date: 2020  Treatment plan discussed with: patient        Subjective Evaluation    History of Present Illness  Mechanism of injury: Pt notes that she was in the shower on the  and fell as she was getting out of the shower  She landed on her elbow  She was taken to ED, but dind't have surgery until the   Pt notes that she has been having a lot of pain and stiffness since the surgery  She has been sleeping in a recliner as well  She notes that she is not allowed to get the arm wet for a few more weeks until incision is healed  She does note increased bruising and swelling  She has been doing hand exercises and icing to help with the swelling  Pain  Current pain ratin  At best pain ratin  At worst pain ratin  Location: left elbow wrist shoulder  Quality: dull ache, throbbing, tight and discomfort    Patient Goals  Patient goals for therapy: decreased pain, increased motion, independence with ADLs/IADLs, increased strength, return to sport/leisure activities and return to work          Objective     Observations     Left Elbow   Postive for atrophy, edema, effusion, incision and spasms  Negative for drainage  Additional Observation Details  - signs and symptoms of infection  Steri strips still all intact  Pt with sling donned correctly, but needs to be cued for scapular retraction      Will continue monitor moderate edge necrosis of the incision over the distal 3/4 (as noted by physician)     Palpation   Left   No palpable tenderness to the biceps  Tenderness of the biceps, brachialis, brachioradialis, triceps, wrist extensors and wrist flexors  Neurological Testing     Sensation     Elbow   Left Elbow   Inact: light touch    Right Elbow   Intact: light touch    Active Range of Motion     Left Elbow   Flexion: 90 degrees   Extension: 45 degrees   Forearm supination: 45 degrees   Forearm pronation: 70 degrees     Right Elbow   Normal active range of motion    Left Wrist   Wrist flexion: 30 degrees   Wrist extension: 25 degrees   Radial deviation: WFL  Ulnar deviation: WFL      Right Wrist   Normal active range of motion    Passive Range of Motion     Left Elbow   Flexion: 95 degrees with pain  Extension: 40 degrees with pain    General Comments:      Elbow Comments   Will document girlth measuremnts next visit  Pt has lymphedema glove for the left hand which was not able to be donned until active wrist and hand exercises performed with hand above heart in supine position  Pt noted increased comfort in hand with glove on                   Precautions: hx of breast cancer and lymphedema  Left elbow fx 11/26/2019, left elbow ORIF 12/11/2019              Daily Treatment Diary       Manuals 12/24            Left elbow and wrist PROM all planes to tolerance DB            Left hand drainage in supine DB                                      Exercise Diary              pendulums nv            Elbow flexion arom nv            Elbow pro/sup nv            shld IR/Er arom nv            Passive elbow extension 5''x20 nv            digigrip Whole nv             indiv nv            HEP DB                                                                                                                                                                                                  Modalities

## 2019-12-27 ENCOUNTER — OFFICE VISIT (OUTPATIENT)
Dept: PHYSICAL THERAPY | Facility: CLINIC | Age: 54
End: 2019-12-27
Payer: COMMERCIAL

## 2019-12-27 DIAGNOSIS — M25.522 LEFT ELBOW PAIN: ICD-10-CM

## 2019-12-27 DIAGNOSIS — Z98.890 S/P ORIF (OPEN REDUCTION INTERNAL FIXATION) FRACTURE: ICD-10-CM

## 2019-12-27 DIAGNOSIS — G89.18 ACUTE POST-OPERATIVE PAIN: Primary | ICD-10-CM

## 2019-12-27 DIAGNOSIS — Z87.81 S/P ORIF (OPEN REDUCTION INTERNAL FIXATION) FRACTURE: ICD-10-CM

## 2019-12-27 PROCEDURE — 97140 MANUAL THERAPY 1/> REGIONS: CPT

## 2019-12-27 PROCEDURE — 97110 THERAPEUTIC EXERCISES: CPT

## 2019-12-27 NOTE — PROGRESS NOTES
Daily Note     Today's date: 2019  Patient name: Terry Marquez  : 1965  MRN: [id number]  Referring provider: Dario Munguia, *  Dx:   Encounter Diagnosis     ICD-10-CM    1  Acute post-operative pain G89 18    2  S/P ORIF (open reduction internal fixation) fracture Z98 890     Z87 81    3  Left elbow pain M25 522                   Subjective: pt notes continued pain in arm, but compliance with HEP, unsure if performing correctly  Objective: See treatment diary below      Assessment: Tolerated treatment fair continues with pain with motion and tightness in shoulder, and elbow   Reviewed HEP and corrected pendulums as she ws using more arm motion than body  Rewrapped gaurav put on compression glove  Pt was encouraged to do gravity assisted extension several times a day as she notes that she feels like she feels like she needs to straighten arm (tightness)      Plan: Progress treatment as tolerated         Precautions: hx of breast cancer and lymphedema  Left elbow fx 2019, left elbow ORIF 2019              Daily Treatment Diary       Manuals            Left elbow and wrist PROM all planes to tolerance DB DL           Left hand drainage in supine DB DL                                     Exercise Diary              pendulums nv x10           Elbow flexion arom nv x5           Elbow pro/sup nv x10           shld IR/Er arom nv nv           Passive elbow extension 5''x20 nv 5"x20           digigrip Whole nv             indiv nv            HEP DB                                                                                                                                                                                                  Modalities

## 2019-12-30 ENCOUNTER — OFFICE VISIT (OUTPATIENT)
Dept: PHYSICAL THERAPY | Facility: CLINIC | Age: 54
End: 2019-12-30
Payer: COMMERCIAL

## 2019-12-30 DIAGNOSIS — M25.522 LEFT ELBOW PAIN: ICD-10-CM

## 2019-12-30 DIAGNOSIS — Z87.81 S/P ORIF (OPEN REDUCTION INTERNAL FIXATION) FRACTURE: ICD-10-CM

## 2019-12-30 DIAGNOSIS — G89.18 ACUTE POST-OPERATIVE PAIN: Primary | ICD-10-CM

## 2019-12-30 DIAGNOSIS — Z98.890 S/P ORIF (OPEN REDUCTION INTERNAL FIXATION) FRACTURE: ICD-10-CM

## 2019-12-30 PROCEDURE — 97140 MANUAL THERAPY 1/> REGIONS: CPT | Performed by: PHYSICAL THERAPIST

## 2019-12-30 NOTE — PROGRESS NOTES
Daily Note     Today's date: 2019  Patient name: Linwood Oakes  : 1965  MRN: [id number]  Referring provider: Manjula Turcios MD  Dx:   Encounter Diagnosis     ICD-10-CM    1  Acute post-operative pain G89 18    2  S/P ORIF (open reduction internal fixation) fracture Z98 890     Z87 81    3  Left elbow pain M25 522                   Subjective: pt notes that she has a lot of stretching felt when she is doing the pendulums  Objective: See treatment diary below      Assessment: pt able to attain 110 degrees of elbow flexion passively today  Pt with excessive tightness in the right shoulder as well limited to 90 degrees of passive flexion    Plan: Continue per plan of care        Precautions: hx of breast cancer and lymphedema  Left elbow fx 2019, left elbow ORIF 2019              Daily Treatment Diary       Manuals           Left elbow and wrist PROM all planes to tolerance DB DL DB          Left hand drainage in supine DB DL DB          Left shld prom all planes to tolerance   DB                       Exercise Diary              pendulums nv x10 x20          Elbow flexion arom nv x5 x10          Elbow pro/sup nv x10 2x10          shld IR/Er arom nv nv supine x10           Passive elbow extension 5''x20 nv 5"x20 5''x20x2          digigrip Whole nv  Red 2x10           indiv nv  Yellow 2x10          HEP DB                                                                                                                                                                                                  Modalities

## 2019-12-31 ENCOUNTER — APPOINTMENT (OUTPATIENT)
Dept: RADIOLOGY | Facility: MEDICAL CENTER | Age: 54
End: 2019-12-31
Payer: COMMERCIAL

## 2019-12-31 ENCOUNTER — OFFICE VISIT (OUTPATIENT)
Dept: OBGYN CLINIC | Facility: MEDICAL CENTER | Age: 54
End: 2019-12-31

## 2019-12-31 VITALS
SYSTOLIC BLOOD PRESSURE: 146 MMHG | HEIGHT: 66 IN | DIASTOLIC BLOOD PRESSURE: 81 MMHG | BODY MASS INDEX: 26.52 KG/M2 | HEART RATE: 86 BPM | WEIGHT: 165 LBS

## 2019-12-31 DIAGNOSIS — M25.522 PAIN IN LEFT ELBOW: ICD-10-CM

## 2019-12-31 DIAGNOSIS — M25.522 PAIN IN LEFT ELBOW: Primary | ICD-10-CM

## 2019-12-31 PROCEDURE — 73070 X-RAY EXAM OF ELBOW: CPT

## 2019-12-31 PROCEDURE — 99024 POSTOP FOLLOW-UP VISIT: CPT | Performed by: ORTHOPAEDIC SURGERY

## 2019-12-31 NOTE — PROGRESS NOTES
History of the Present Illness     Logan Royal is a 48 y o  female presents status post open reduction internal fixation left distal humerus fracture with olecranon osteotomy  Patient is feeling well but this morning fell onto her left elbow  She had some mild bleeding and wanted to come today for a check  Denies any persistent pain  No further bleeding  No numbness or tingling          Physical Exam     /81   Pulse 86   Ht 5' 6" (1 676 m)   Wt 74 8 kg (165 lb)   BMI 26 63 kg/m²     Examination left upper extremity reveals improving wound  Mild edema about the elbow but no fluctuance erythema or drainage  Nothing expressible  Range of motion is similar to last visit  5/5 Motor to the APB, FDI, FDP2, FDP5, EDC  Sensation intact to light touch in the median, radial, and ulnar nerve distribution  Data Review       Imaging:  Two view imaging of the left elbow taken in the office percent reviewed by me today shows evidence of stable alignment of her distal humerus fracture and olecranon osteotomy    Assessment and Plan       61-year-old female status post open reduction internal fixation left distal humerus fracture with olecranon osteotomy  Patient is proceeding on expected course and the fall did not disturb anything    She will continue with therapy and follow up as scheduled      Follow Up:  As scheduled    To Do Next Visit:  Two view x-rays left elbow    PROCEDURES PERFORMED:  Procedures  No Procedures performed today

## 2020-01-02 ENCOUNTER — OFFICE VISIT (OUTPATIENT)
Dept: PHYSICAL THERAPY | Facility: CLINIC | Age: 55
End: 2020-01-02
Payer: COMMERCIAL

## 2020-01-02 DIAGNOSIS — G89.18 ACUTE POST-OPERATIVE PAIN: Primary | ICD-10-CM

## 2020-01-02 DIAGNOSIS — Z98.890 S/P ORIF (OPEN REDUCTION INTERNAL FIXATION) FRACTURE: ICD-10-CM

## 2020-01-02 DIAGNOSIS — M25.522 LEFT ELBOW PAIN: ICD-10-CM

## 2020-01-02 DIAGNOSIS — Z87.81 S/P ORIF (OPEN REDUCTION INTERNAL FIXATION) FRACTURE: ICD-10-CM

## 2020-01-02 PROCEDURE — 97110 THERAPEUTIC EXERCISES: CPT

## 2020-01-02 PROCEDURE — 97140 MANUAL THERAPY 1/> REGIONS: CPT

## 2020-01-02 NOTE — PROGRESS NOTES
Daily Note     Today's date: 2020  Patient name: Kee Sparrow  : 1965  MRN: [id number]  Referring provider: Bowen Cole MD  Dx:   Encounter Diagnosis     ICD-10-CM    1  Acute post-operative pain G89 18    2  S/P ORIF (open reduction internal fixation) fracture Z98 890     Z87 81    3  Left elbow pain M25 522                   Subjective: pt notes that her hand was really bothering her last night  Objective: See treatment diary below      Assessment: swelling increased in dorsal side of hand despite having compression glove on    Patient with 40* of extension and 36* with overpressure  Tightness throughout shoulder      Plan: Continue per plan of care        Precautions: hx of breast cancer and lymphedema  Left elbow fx 2019, left elbow ORIF 2019              Daily Treatment Diary       Manuals          Left elbow and wrist PROM all planes to tolerance DB DL DB DL         Left hand drainage in supine DB DL DB DL         Left shld prom all planes to tolerance   DB DL                      Exercise Diary              pendulums nv x10 x20 x20         Elbow flexion arom nv x5 x10 x10         Elbow pro/sup nv x10 2x10 [id number]         shld IR/Er arom nv nv supine x10  [id number]          Passive elbow extension 5''x20 nv 5"x20 5''x20x2 5" x10 supine         digigrip Whole nv  Red 2x10 Red 2x10          indiv nv  Yellow 2x10 Yellow 2x10         HEP DB                                                                                                                                                                                                  Modalities

## 2020-01-03 ENCOUNTER — OFFICE VISIT (OUTPATIENT)
Dept: PHYSICAL THERAPY | Facility: CLINIC | Age: 55
End: 2020-01-03
Payer: COMMERCIAL

## 2020-01-03 DIAGNOSIS — Z87.81 S/P ORIF (OPEN REDUCTION INTERNAL FIXATION) FRACTURE: ICD-10-CM

## 2020-01-03 DIAGNOSIS — G89.18 ACUTE POST-OPERATIVE PAIN: Primary | ICD-10-CM

## 2020-01-03 DIAGNOSIS — Z98.890 S/P ORIF (OPEN REDUCTION INTERNAL FIXATION) FRACTURE: ICD-10-CM

## 2020-01-03 DIAGNOSIS — M25.522 LEFT ELBOW PAIN: ICD-10-CM

## 2020-01-03 PROCEDURE — 97140 MANUAL THERAPY 1/> REGIONS: CPT

## 2020-01-03 PROCEDURE — 97110 THERAPEUTIC EXERCISES: CPT

## 2020-01-03 NOTE — PROGRESS NOTES
Daily Note     Today's date: 1/3/2020  Patient name: Linwood Oakes  : 1965  MRN: [id number]  Referring provider: Manjula Turcios MD  Dx:   Encounter Diagnosis     ICD-10-CM    1  Acute post-operative pain G89 18    2  S/P ORIF (open reduction internal fixation) fracture Z98 890     Z87 81    3  Left elbow pain M25 522                   Subjective: pt notes that she felt good and more loosened up after yesterday appointment but notes it tightness up as day goes on  Objective: See treatment diary below      Assessment: Tolerated treatment with more passive flexion and abduction in shoulder and tolerated extension of elbow with less guarding   Patient exhibited good technique with therapeutic exercises      Plan: Continue per plan of care        Precautions: hx of breast cancer and lymphedema  Left elbow fx 2019, left elbow ORIF 2019              Daily Treatment Diary       Manuals / 1/3        Left elbow and wrist PROM all planes to tolerance DB DL DB DL DL        Left hand drainage in supine DB DL DB DL DL        Left shld prom all planes to tolerance   DB DL DL                     Exercise Diary              pendulums nv x10 x20 x20 x20        Elbow flexion arom nv x5 x10 x10 x10        Elbow pro/sup nv x10 2x10 [id number] seated 2x10        shld IR/Er arom nv nv supine x10  [id number]  2x10        Passive elbow extension 5''x20 nv 5"x20 5''x20x2 5" x10 supine seated 5"x10        digigrip Whole nv  Red 2x10 Red 2x10 Red 2x10         indiv nv  Yellow 2x10 Yellow 2x10 Yellow 2x10        HEP DB                                                                                                                                                                                                  Modalities

## 2020-01-06 ENCOUNTER — OFFICE VISIT (OUTPATIENT)
Dept: PHYSICAL THERAPY | Facility: CLINIC | Age: 55
End: 2020-01-06
Payer: COMMERCIAL

## 2020-01-06 DIAGNOSIS — Z87.81 S/P ORIF (OPEN REDUCTION INTERNAL FIXATION) FRACTURE: ICD-10-CM

## 2020-01-06 DIAGNOSIS — Z98.890 S/P ORIF (OPEN REDUCTION INTERNAL FIXATION) FRACTURE: ICD-10-CM

## 2020-01-06 DIAGNOSIS — M25.522 LEFT ELBOW PAIN: ICD-10-CM

## 2020-01-06 DIAGNOSIS — G89.18 ACUTE POST-OPERATIVE PAIN: Primary | ICD-10-CM

## 2020-01-06 PROCEDURE — 97110 THERAPEUTIC EXERCISES: CPT

## 2020-01-06 PROCEDURE — 97140 MANUAL THERAPY 1/> REGIONS: CPT

## 2020-01-06 NOTE — PROGRESS NOTES
Daily Note     Today's date: 2020  Patient name: Yeison Rivero  : 1965  MRN: [id number]  Referring provider: Patricia Ewing, *  Dx:   Encounter Diagnosis     ICD-10-CM    1  Acute post-operative pain G89 18    2  S/P ORIF (open reduction internal fixation) fracture Z98 890     Z87 81    3  Left elbow pain M25 522                   Subjective: notes that hand was very swollen yesterday and today  Elbow feels very tight and restricted  Objective: See treatment diary below      Assessment: Tolerated treatment with increased shoulder abduction and flexion  Better tolerated elbow extension compared to last visit    Patient was given shoulder stretches for work and home  Plan: Progress treatment as tolerated         Precautions: hx of breast cancer and lymphedema  Left elbow fx 2019, left elbow ORIF 2019              Daily Treatment Diary       Manuals 12/24 12/27 12/30 1/2 1/3 1/6       Left elbow and wrist PROM all planes to tolerance DB DL DB DL DL DL       Left hand drainage in supine DB DL DB DL DL DL       Left shld prom all planes to tolerance   DB DL DL DL                    Exercise Diary              pendulums nv x10 x20 x20 x20        Elbow flexion arom nv x5 x10 x10 x10        Elbow pro/sup nv x10 2x10 [id number] seated 2x10        shld IR/Er arom nv nv supine x10  [id number]  2x10        Passive elbow extension 5''x20 nv 5"x20 5''x20x2 5" x10 supine seated 5"x10 seated 5"x10       digigrip Whole nv  Red 2x10 Red 2x10 Red 2x10 Red x20        indiv nv  Yellow 2x10 Yellow 2x10 Yellow 2x10 Red x20       HEP DB     Table slide flexion 20"x3       Table slide scaption      20"x3                                                                                                                                                                                Modalities

## 2020-01-07 ENCOUNTER — OFFICE VISIT (OUTPATIENT)
Dept: PHYSICAL THERAPY | Facility: CLINIC | Age: 55
End: 2020-01-07
Payer: COMMERCIAL

## 2020-01-07 ENCOUNTER — APPOINTMENT (OUTPATIENT)
Dept: PHYSICAL THERAPY | Facility: CLINIC | Age: 55
End: 2020-01-07
Payer: COMMERCIAL

## 2020-01-07 ENCOUNTER — TELEPHONE (OUTPATIENT)
Dept: OBGYN CLINIC | Facility: HOSPITAL | Age: 55
End: 2020-01-07

## 2020-01-07 DIAGNOSIS — Z98.890 S/P ORIF (OPEN REDUCTION INTERNAL FIXATION) FRACTURE: ICD-10-CM

## 2020-01-07 DIAGNOSIS — G89.18 ACUTE POST-OPERATIVE PAIN: Primary | ICD-10-CM

## 2020-01-07 DIAGNOSIS — Z87.81 S/P ORIF (OPEN REDUCTION INTERNAL FIXATION) FRACTURE: ICD-10-CM

## 2020-01-07 DIAGNOSIS — S42.492A CLOSED BICONDYLAR FRACTURE OF DISTAL END OF LEFT HUMERUS, INITIAL ENCOUNTER: ICD-10-CM

## 2020-01-07 DIAGNOSIS — M25.522 LEFT ELBOW PAIN: ICD-10-CM

## 2020-01-07 PROCEDURE — 97140 MANUAL THERAPY 1/> REGIONS: CPT | Performed by: PHYSICAL THERAPIST

## 2020-01-07 PROCEDURE — 97110 THERAPEUTIC EXERCISES: CPT | Performed by: PHYSICAL THERAPIST

## 2020-01-07 NOTE — TELEPHONE ENCOUNTER
Dr Scott   #: [phone]           Patient is calling in checking status on disability paper work   Please advise, thank you

## 2020-01-07 NOTE — PROGRESS NOTES
Daily Note     Today's date: 2020  Patient name: Kojo Selby  : 1965  MRN: [id number]  Referring provider: Stephanie Canales MD  Dx:   Encounter Diagnosis     ICD-10-CM    1  Acute post-operative pain G89 18    2  S/P ORIF (open reduction internal fixation) fracture Z98 890     Z87 81    3  Left elbow pain M25 522                   Subjective: Pt notes that she is not as painful today, and not as swollen either  Objective: See treatment diary below      Assessment: Pt able to attain 103 degrees of flexion and lacked 30 deg of extension  Pt was able to don "lymphadiva's" sleeve today with PT assistance  Proximal incision continues to look good  Plan: Continue per plan of care        Precautions: hx of breast cancer and lymphedema  Left elbow fx 2019, left elbow ORIF 2019              Daily Treatment Diary       Manuals  12 1/3 1/6 1/7      Left elbow and wrist PROM all planes to tolerance DB DL DB DL DL DL DB      Left hand drainage in supine DB DL DB DL DL DL DB      Left shld prom all planes to tolerance   DB DL DL DL DB                   Exercise Diary              pendulums nv x10 x20 x20 x20  *20      Elbow flexion arom nv x5 x10 x10 x10  x20      Elbow pro/sup nv x10 2x10 [id number] seated 2x10  seated 2x10      shld IR/Er arom nv nv supine x10  [id number]  2x10  2x10      Passive elbow extension 5''x20 nv 5"x20 5''x20x2 5" x10 supine seated 5"x10 seated 5"x10 supine 5''x10      digigrip Whole nv  Red 2x10 Red 2x10 Red 2x10 Red x20 Red x20       indiv nv  Yellow 2x10 Yellow 2x10 Yellow 2x10 Red x20 yellow x20      HEP DB     Table slide flexion 20"x3 nv      Table slide scaption      20"x3 nv                                                                                                                                                                               Modalities

## 2020-01-08 NOTE — TELEPHONE ENCOUNTER
Left message for patient stating her FMLA paperwork has been completed and faxed to Odalis Camilo at [facility]

## 2020-01-09 ENCOUNTER — APPOINTMENT (OUTPATIENT)
Dept: PHYSICAL THERAPY | Facility: CLINIC | Age: 55
End: 2020-01-09
Payer: COMMERCIAL

## 2020-01-09 ENCOUNTER — OFFICE VISIT (OUTPATIENT)
Dept: PHYSICAL THERAPY | Facility: CLINIC | Age: 55
End: 2020-01-09
Payer: COMMERCIAL

## 2020-01-09 DIAGNOSIS — Z98.890 S/P ORIF (OPEN REDUCTION INTERNAL FIXATION) FRACTURE: ICD-10-CM

## 2020-01-09 DIAGNOSIS — Z87.81 S/P ORIF (OPEN REDUCTION INTERNAL FIXATION) FRACTURE: ICD-10-CM

## 2020-01-09 DIAGNOSIS — M25.522 LEFT ELBOW PAIN: ICD-10-CM

## 2020-01-09 DIAGNOSIS — S42.492A CLOSED BICONDYLAR FRACTURE OF DISTAL END OF LEFT HUMERUS, INITIAL ENCOUNTER: ICD-10-CM

## 2020-01-09 DIAGNOSIS — G89.18 ACUTE POST-OPERATIVE PAIN: Primary | ICD-10-CM

## 2020-01-09 PROCEDURE — 97140 MANUAL THERAPY 1/> REGIONS: CPT

## 2020-01-09 PROCEDURE — 97110 THERAPEUTIC EXERCISES: CPT

## 2020-01-09 NOTE — PROGRESS NOTES
Daily Note     Today's date: 2020  Patient name: Jerrold Cockayne  : 1965  MRN: [id number]  Referring provider: Herrera Landry MD  Dx:   Encounter Diagnosis     ICD-10-CM    1  Acute post-operative pain G89 18    2  S/P ORIF (open reduction internal fixation) fracture Z98 890     Z87 81    3  Left elbow pain M25 522    4  Closed bicondylar fracture of distal end of left humerus, initial encounter S42 492A                   Subjective: pt notes that her arm is stiff very tight but is still working on motion   Objective: See treatment diary below      Assessment: Tolerated treatment with less TTP over forearm with retrograde massage    Patient is making gains with shoulder motions  She was able to don sleeve with no A required  Discussed with patient that she is allowed to use arm for things like carrying keys, holding a bottle of water or brush  She is able to try to move arm just not lift objects that have weight to the,        Plan: Continue per plan of care        Precautions: hx of breast cancer and lymphedema  Left elbow fx 2019, left elbow ORIF 2019              Daily Treatment Diary       Manuals  1 1/3 1/6 1/7 1/9     Left elbow and wrist PROM all planes to tolerance DB DL DB DL DL DL DB DL     Left hand drainage in supine DB DL DB DL DL DL DB DL     Left shld prom all planes to tolerance   DB DL DL DL DB DL                  Exercise Diary              pendulums nv x10 x20 x20 x20  *20      Elbow flexion arom nv x5 x10 x10 x10  x20 x20     Elbow pro/sup nv x10 2x10 [id number] seated 2x10  seated 2x10      shld IR/Er arom nv nv supine x10  [id number]  2x10  2x10      Passive elbow extension 5''x20 nv 5"x20 5''x20x2 5" x10 supine seated 5"x10 seated 5"x10 supine 5''x10      digigrip Whole nv  Red 2x10 Red 2x10 Red 2x10 Red x20 Red x20 Red x20      indiv nv  Yellow 2x10 Yellow 2x10 Yellow 2x10 Red x20 yellow x20 Yellow x20     HEP DB     Table slide flexion 20"x3 nv flexion 20"x3     Table slide scaption      20"x3 nv 20"x3                                                                                                                                                                              Modalities

## 2020-01-10 RX ORDER — HYDROCODONE BITARTRATE AND ACETAMINOPHEN 5; 325 MG/1; MG/1
1 TABLET ORAL EVERY 6 HOURS PRN
Qty: 30 TABLET | Refills: 0 | Status: SHIPPED | OUTPATIENT
Start: 2020-01-10 | End: 2020-08-17 | Stop reason: SDUPTHER

## 2020-01-13 ENCOUNTER — TELEPHONE (OUTPATIENT)
Dept: OBGYN CLINIC | Facility: HOSPITAL | Age: 55
End: 2020-01-13

## 2020-01-13 ENCOUNTER — OFFICE VISIT (OUTPATIENT)
Dept: PHYSICAL THERAPY | Facility: CLINIC | Age: 55
End: 2020-01-13
Payer: COMMERCIAL

## 2020-01-13 ENCOUNTER — APPOINTMENT (OUTPATIENT)
Dept: PHYSICAL THERAPY | Facility: CLINIC | Age: 55
End: 2020-01-13
Payer: COMMERCIAL

## 2020-01-13 DIAGNOSIS — M25.522 LEFT ELBOW PAIN: ICD-10-CM

## 2020-01-13 DIAGNOSIS — Z87.81 S/P ORIF (OPEN REDUCTION INTERNAL FIXATION) FRACTURE: ICD-10-CM

## 2020-01-13 DIAGNOSIS — S42.492A CLOSED BICONDYLAR FRACTURE OF DISTAL END OF LEFT HUMERUS, INITIAL ENCOUNTER: ICD-10-CM

## 2020-01-13 DIAGNOSIS — G89.18 ACUTE POST-OPERATIVE PAIN: Primary | ICD-10-CM

## 2020-01-13 DIAGNOSIS — Z98.890 S/P ORIF (OPEN REDUCTION INTERNAL FIXATION) FRACTURE: ICD-10-CM

## 2020-01-13 PROCEDURE — 97110 THERAPEUTIC EXERCISES: CPT | Performed by: PHYSICAL THERAPIST

## 2020-01-13 PROCEDURE — 97140 MANUAL THERAPY 1/> REGIONS: CPT | Performed by: PHYSICAL THERAPIST

## 2020-01-13 PROCEDURE — 97112 NEUROMUSCULAR REEDUCATION: CPT | Performed by: PHYSICAL THERAPIST

## 2020-01-13 NOTE — TELEPHONE ENCOUNTER
Patient calling to find out if she is required to air out her incision with the steri-strips or okay to keep covered at night  She takes the compression sleeve off  I advised okay to keep covered as it will help keep steri-strips from sticking to bed clothes  Johnie Zambrano

## 2020-01-13 NOTE — PROGRESS NOTES
Daily Note     Today's date: 2020  Patient name: Quyen Edgar  : 1965  MRN: [id number]  Referring provider: Alicia Llamas MD  Dx:   Encounter Diagnosis     ICD-10-CM    1  Acute post-operative pain G89 18    2  S/P ORIF (open reduction internal fixation) fracture Z98 890     Z87 81    3  Left elbow pain M25 522    4  Closed bicondylar fracture of distal end of left humerus, initial encounter S42 492A                   Subjective: pt notes that she has been sleeping better iwht ou tth ecompression sleeve, and that she has also been working on her exercises consistently, but gets sore and tired at the end of the day      Objective: See treatment diary below      Assessment: pt able to reach 95 degrees of flexion, and 30 of extension today  Extension has been improving much quicker than flexion  Multiple steri strips were removed with out any exudate, or drainage  - signs or sx's of infection      Plan: Continue per plan of care        Precautions: hx of breast cancer and lymphedema  Left elbow fx 2019, left elbow ORIF 2019              Daily Treatment Diary       Manuals  1 1/3 1/6 1/7 1/9 1/13    Left elbow and wrist PROM all planes to tolerance DB DL DB DL DL DL DB DL DB    Left hand drainage in supine DB DL DB DL DL DL DB DL DB    Left shld prom all planes to tolerance   DB DL DL DL DB DL DB                 Exercise Diary              pendulums nv x10 x20 x20 x20  *20  *20    Elbow flexion arom nv x5 x10 x10 x10  x20 x20 *20    Elbow pro/sup nv x10 2x10 [id number] seated 2x10  seated 2x10  seated 2x10    shld IR/Er arom nv nv supine x10  [id number]  2x10  2x10  2x10    Passive elbow extension 5''x20 nv 5"x20 5''x20x2 5" x10 supine seated 5"x10 seated 5"x10 supine 5''x10  supine 5''x10    digigrip Whole nv  Red 2x10 Red 2x10 Red 2x10 Red x20 Red x20 Red x20 Green 2x10     indiv nv  Yellow 2x10 Yellow 2x10 Yellow 2x10 Red x20 yellow x20 Yellow x20 Red 2x10    HEP DB     Table slide flexion 20"x3 nv flexion 20"x3 pulleys 5''x20    Table slide scaption      20"x3 nv 20"x3                                                                                                                                                                              Modalities

## 2020-01-16 ENCOUNTER — OFFICE VISIT (OUTPATIENT)
Dept: PHYSICAL THERAPY | Facility: CLINIC | Age: 55
End: 2020-01-16
Payer: COMMERCIAL

## 2020-01-16 ENCOUNTER — APPOINTMENT (OUTPATIENT)
Dept: PHYSICAL THERAPY | Facility: CLINIC | Age: 55
End: 2020-01-16
Payer: COMMERCIAL

## 2020-01-16 DIAGNOSIS — M25.522 LEFT ELBOW PAIN: ICD-10-CM

## 2020-01-16 DIAGNOSIS — Z98.890 S/P ORIF (OPEN REDUCTION INTERNAL FIXATION) FRACTURE: ICD-10-CM

## 2020-01-16 DIAGNOSIS — Z87.81 S/P ORIF (OPEN REDUCTION INTERNAL FIXATION) FRACTURE: ICD-10-CM

## 2020-01-16 DIAGNOSIS — S42.492A CLOSED BICONDYLAR FRACTURE OF DISTAL END OF LEFT HUMERUS, INITIAL ENCOUNTER: ICD-10-CM

## 2020-01-16 DIAGNOSIS — G89.18 ACUTE POST-OPERATIVE PAIN: Primary | ICD-10-CM

## 2020-01-16 PROCEDURE — 97140 MANUAL THERAPY 1/> REGIONS: CPT | Performed by: PHYSICAL THERAPIST

## 2020-01-16 PROCEDURE — 97110 THERAPEUTIC EXERCISES: CPT | Performed by: PHYSICAL THERAPIST

## 2020-01-16 PROCEDURE — 97112 NEUROMUSCULAR REEDUCATION: CPT | Performed by: PHYSICAL THERAPIST

## 2020-01-16 NOTE — PROGRESS NOTES
Daily Note     Today's date: 2020  Patient name: Harriet Morrell  : 1965  MRN: [id number]  Referring provider: Cheng Mendoza MD  Dx:   Encounter Diagnosis     ICD-10-CM    1  Acute post-operative pain G89 18    2  S/P ORIF (open reduction internal fixation) fracture Z98 890     Z87 81    3  Left elbow pain M25 522    4  Closed bicondylar fracture of distal end of left humerus, initial encounter S42 492A                   Subjective: Pt note that she is very sore today  She ordered a new sleeve but thinks its the wrong size  She has been trying to type recently and has some mild pain with this as well      Objective: See treatment diary below      Assessment:Pt able to attain 25 degrees of elbow ext, and continues to be aorund 95 degrees for flexion  Flexion limited by pain only  Also able to attain 125 degrees of passive shld flexion    Plan: Continue per plan of care        Precautions: hx of breast cancer and lymphedema  Left elbow fx 2019, left elbow ORIF 2019              Daily Treatment Diary       Manuals 12/24 12/27 12/30 1/2 1/3 1/6 1/7 1/9 1/13 1/16   Left elbow and wrist PROM all planes to tolerance DB DL DB DL DL DL DB DL DB DB   Left hand drainage in supine DB DL DB DL DL DL DB DL DB Db   Left shld prom all planes to tolerance   DB DL DL DL DB DL DB Db                Exercise Diary              pendulums nv x10 x20 x20 x20  *20  *20 x20   Elbow flexion arom nv x5 x10 x10 x10  x20 x20 *20 x20   Elbow pro/sup nv x10 2x10 [id number] seated 2x10  seated 2x10  seated 2x10 seated 2x10   shld IR/Er arom nv nv supine x10  [id number]  2x10  2x10  2x10 2x10   Passive elbow extension 5''x20 nv 5"x20 5''x20x2 5" x10 supine seated 5"x10 seated 5"x10 supine 5''x10  supine 5''x10 5''x10   digigrip Whole nv  Red 2x10 Red 2x10 Red 2x10 Red x20 Red x20 Red x20 Green 2x10 nv    indiv nv  Yellow 2x10 Yellow 2x10 Yellow 2x10 Red x20 yellow x20 Yellow x20 Red 2x10 nv   HEP DB     Table slide flexion 20"x3 nv flexion 20"x3 pulleys 5''x20 pulleys 5''x20   Table slide scaption      20"x3 nv 20"x3                                                                                                                                                                              Modalities

## 2020-01-20 ENCOUNTER — APPOINTMENT (OUTPATIENT)
Dept: PHYSICAL THERAPY | Facility: CLINIC | Age: 55
End: 2020-01-20
Payer: COMMERCIAL

## 2020-01-20 ENCOUNTER — OFFICE VISIT (OUTPATIENT)
Dept: PHYSICAL THERAPY | Facility: CLINIC | Age: 55
End: 2020-01-20
Payer: COMMERCIAL

## 2020-01-20 DIAGNOSIS — S42.492A CLOSED BICONDYLAR FRACTURE OF DISTAL END OF LEFT HUMERUS, INITIAL ENCOUNTER: ICD-10-CM

## 2020-01-20 DIAGNOSIS — Z98.890 S/P ORIF (OPEN REDUCTION INTERNAL FIXATION) FRACTURE: ICD-10-CM

## 2020-01-20 DIAGNOSIS — M25.522 LEFT ELBOW PAIN: ICD-10-CM

## 2020-01-20 DIAGNOSIS — Z85.3 PERSONAL HISTORY OF MALIGNANT NEOPLASM OF BREAST: Primary | ICD-10-CM

## 2020-01-20 DIAGNOSIS — Z87.81 S/P ORIF (OPEN REDUCTION INTERNAL FIXATION) FRACTURE: ICD-10-CM

## 2020-01-20 DIAGNOSIS — G89.18 ACUTE POST-OPERATIVE PAIN: Primary | ICD-10-CM

## 2020-01-20 PROCEDURE — 97110 THERAPEUTIC EXERCISES: CPT

## 2020-01-20 PROCEDURE — 97140 MANUAL THERAPY 1/> REGIONS: CPT

## 2020-01-20 RX ORDER — AMOXICILLIN 500 MG/1
500 CAPSULE ORAL EVERY 8 HOURS SCHEDULED
Qty: 30 CAPSULE | Refills: 0 | Status: SHIPPED | OUTPATIENT
Start: 2020-01-20 | End: 2020-01-30

## 2020-01-20 NOTE — PROGRESS NOTES
Daily Note     Today's date: 2020  Patient name: Marianela White  : 1965  MRN: [id number]  Referring provider: Tracy Burris MD  Dx:   Encounter Diagnosis     ICD-10-CM    1  Acute post-operative pain G89 18    2  S/P ORIF (open reduction internal fixation) fracture Z98 890     Z87 81    3  Left elbow pain M25 522    4  Closed bicondylar fracture of distal end of left humerus, initial encounter S42 492A                   Subjective: pt notes that she is able to reach head now to help with putting her hair up  Challenged by typing at work      Objective: See treatment diary below      Assessment:   Patient with discomfort in shoulder with passive stretching but is making progress with motion in elbow  Plan: Continue per plan of care        Precautions: hx of breast cancer and lymphedema  Left elbow fx 2019, left elbow ORIF 2019              Daily Treatment Diary       Manuals    Left elbow and wrist PROM all planes to tolerance DL        DB DB   Left hand drainage in supine DL        DB Db   Left shld prom all planes to tolerance DL        DB Db                Exercise Diary              pendulums         *20 x20   Elbow flexion arom         *20 x20   Elbow pro/sup seated 2x10        seated 2x10 seated 2x10   shld IR/Er arom x2x10        2x10 2x10   Passive elbow extension 5"x10        supine 5''x10 5''x10   digigrip indiv red 2x10        Green 2x10 nv    Blue  2x10        Red 2x10 nv   pulleys 5"x20        pulleys 5''x20 pulleys 5''x20   Table slide scaption             Serratus punches 2x10                                                                                                                                                                        Modalities

## 2020-01-23 ENCOUNTER — APPOINTMENT (OUTPATIENT)
Dept: PHYSICAL THERAPY | Facility: CLINIC | Age: 55
End: 2020-01-23
Payer: COMMERCIAL

## 2020-01-23 ENCOUNTER — APPOINTMENT (OUTPATIENT)
Dept: RADIOLOGY | Facility: MEDICAL CENTER | Age: 55
End: 2020-01-23
Payer: COMMERCIAL

## 2020-01-23 ENCOUNTER — OFFICE VISIT (OUTPATIENT)
Dept: OBGYN CLINIC | Facility: MEDICAL CENTER | Age: 55
End: 2020-01-23

## 2020-01-23 VITALS — WEIGHT: 165 LBS | HEIGHT: 66 IN | BODY MASS INDEX: 26.52 KG/M2

## 2020-01-23 DIAGNOSIS — M25.522 PAIN IN LEFT ELBOW: ICD-10-CM

## 2020-01-23 DIAGNOSIS — S42.492A CLOSED BICONDYLAR FRACTURE OF DISTAL END OF LEFT HUMERUS, INITIAL ENCOUNTER: ICD-10-CM

## 2020-01-23 DIAGNOSIS — M25.522 PAIN IN LEFT ELBOW: Primary | ICD-10-CM

## 2020-01-23 PROCEDURE — 99024 POSTOP FOLLOW-UP VISIT: CPT | Performed by: ORTHOPAEDIC SURGERY

## 2020-01-23 PROCEDURE — 73070 X-RAY EXAM OF ELBOW: CPT

## 2020-01-27 ENCOUNTER — APPOINTMENT (OUTPATIENT)
Dept: PHYSICAL THERAPY | Facility: CLINIC | Age: 55
End: 2020-01-27
Payer: COMMERCIAL

## 2020-01-28 ENCOUNTER — OFFICE VISIT (OUTPATIENT)
Dept: PHYSICAL THERAPY | Facility: CLINIC | Age: 55
End: 2020-01-28
Payer: COMMERCIAL

## 2020-01-28 DIAGNOSIS — M25.522 LEFT ELBOW PAIN: ICD-10-CM

## 2020-01-28 DIAGNOSIS — S42.492A CLOSED BICONDYLAR FRACTURE OF DISTAL END OF LEFT HUMERUS, INITIAL ENCOUNTER: ICD-10-CM

## 2020-01-28 DIAGNOSIS — Z87.81 S/P ORIF (OPEN REDUCTION INTERNAL FIXATION) FRACTURE: ICD-10-CM

## 2020-01-28 DIAGNOSIS — Z98.890 S/P ORIF (OPEN REDUCTION INTERNAL FIXATION) FRACTURE: ICD-10-CM

## 2020-01-28 DIAGNOSIS — G89.18 ACUTE POST-OPERATIVE PAIN: Primary | ICD-10-CM

## 2020-01-28 PROCEDURE — 97140 MANUAL THERAPY 1/> REGIONS: CPT

## 2020-01-28 PROCEDURE — 97110 THERAPEUTIC EXERCISES: CPT

## 2020-01-28 NOTE — PROGRESS NOTES
History of the Present Illness     Jerrold Cockayne is a 48 y o  female status post open reduction internal fixation left distal humerus fracture  Patient has been doing well with decreasing pain  Has some deep aching in the joint  Has not gotten her incision wet yet          Physical Exam     Ht 5' 6" (1 676 m)   Wt 74 8 kg (165 lb)   BMI 26 63 kg/m²     Examination of the left upper extremity reveals continued limitation on motion flexion-extension but grade pro supination  She has an incision that has some slight superficial dehiscence especially proximally but well-healed distally  There is no fluctuance erythema or drainage  Nothing expressible  Minimal tenderness to palpation around the incision  Significant swelling about the entire arm secondary to her lymphedema    Data Review       Imaging: Three-view imaging of the left elbow taken the office and personally reviewed by me today shows evidence of stable alignment of her internal fixation with interval healing of the olecranon osteotomy    Assessment and Plan       70-year-old female healing expectedly after open reduction internal fixation left distal humerus fracture  I discussed with the patient that the wound healing is slow and likely related to a bit of superficial wound necrosis that occurred around the time of surgery postoperatively and then also because of the lymphedema with significant swelling there  I do not see any deep infection or anything expressible and thus I think that this is simply a superficial wound healing issue  I advised her that she can get her incision wet in gently patted dry in the shower  This will help keep it clean  She will keep it covered throughout the day otherwise  We will work more aggressively on motion and she can now perform active extension exercises given the healing of the olecranon osteotomy    I have sent a message to her therapist   I will see her back in 1 month and have advised her to call me in the interim if there is any wound issues      Follow Up:  1 month    To Do Next Visit:  Two view x-rays left elbow    PROCEDURES PERFORMED:  Procedures  No Procedures performed today

## 2020-01-28 NOTE — PROGRESS NOTES
Daily Note     Today's date: 2020  Patient name: Olvin Tenorio  : 1965  MRN: [id number]  Referring provider: Yancy Johnston MD  Dx:   Encounter Diagnosis     ICD-10-CM    1  Acute post-operative pain G89 18    2  S/P ORIF (open reduction internal fixation) fracture Z98 890     Z87 81    3  Left elbow pain M25 522    4  Closed bicondylar fracture of distal end of left humerus, initial encounter S42 492A                   Subjective: pt notes that she saw D  Who notes hardware is in proper place and bones are healing nicely  Pt to keep an eye on this open spot of incision  Objective: See treatment diary below      Assessment:   Patient with decreased overall swelling in full arm  She does have slightly open spot on incision, will continue to monitor this area  Plan: Continue per plan of care  Will measure elbow extension and flexion of elbow nv       Precautions: hx of breast cancer and lymphedema  Left elbow fx 2019, left elbow ORIF 2019              Daily Treatment Diary       Manuals    Left elbow and wrist PROM all planes to tolerance DL DL       DB DB   Left hand drainage in supine DL DL       DB Db   Left shld prom all planes to tolerance DL DL       DB Db                Exercise Diary              pendulums  x20       *20 x20   Elbow flexion arom         *20 x20   Elbow pro/sup seated 2x10        seated 2x10 seated 2x10   shld IR/Er arom x2x10        2x10 2x10   Passive elbow extension 5"x10        supine 5''x10 5''x10   digigrip indiv red 2x10        Green 2x10 nv    Blue  2x10        Red 2x10 nv   pulleys 5"x20 5"x20       pulleys 5''x20 pulleys 5''x20   Table slide scaption             Serratus punches 2x10 2x10                                                                                                                                                                       Modalities

## 2020-01-30 ENCOUNTER — OFFICE VISIT (OUTPATIENT)
Dept: PHYSICAL THERAPY | Facility: CLINIC | Age: 55
End: 2020-01-30
Payer: COMMERCIAL

## 2020-01-30 ENCOUNTER — TELEPHONE (OUTPATIENT)
Dept: OBGYN CLINIC | Facility: HOSPITAL | Age: 55
End: 2020-01-30

## 2020-01-30 ENCOUNTER — APPOINTMENT (OUTPATIENT)
Dept: PHYSICAL THERAPY | Facility: CLINIC | Age: 55
End: 2020-01-30
Payer: COMMERCIAL

## 2020-01-30 DIAGNOSIS — Z98.890 S/P ORIF (OPEN REDUCTION INTERNAL FIXATION) FRACTURE: ICD-10-CM

## 2020-01-30 DIAGNOSIS — S42.492A CLOSED BICONDYLAR FRACTURE OF DISTAL END OF LEFT HUMERUS, INITIAL ENCOUNTER: ICD-10-CM

## 2020-01-30 DIAGNOSIS — M25.522 LEFT ELBOW PAIN: ICD-10-CM

## 2020-01-30 DIAGNOSIS — Z87.81 S/P ORIF (OPEN REDUCTION INTERNAL FIXATION) FRACTURE: ICD-10-CM

## 2020-01-30 DIAGNOSIS — G89.18 ACUTE POST-OPERATIVE PAIN: Primary | ICD-10-CM

## 2020-01-30 PROCEDURE — 97110 THERAPEUTIC EXERCISES: CPT | Performed by: PHYSICAL THERAPIST

## 2020-01-30 PROCEDURE — 97112 NEUROMUSCULAR REEDUCATION: CPT | Performed by: PHYSICAL THERAPIST

## 2020-01-30 PROCEDURE — 97140 MANUAL THERAPY 1/> REGIONS: CPT | Performed by: PHYSICAL THERAPIST

## 2020-01-30 NOTE — TELEPHONE ENCOUNTER
I spoke with Geovanny Barger and she spoke with Dr Jez Reynoso who said it looks like fibrous exudate  Patient should wash the area daily, pat dry, and keep covered  Patient did complain of throbbing to the area  I advised her to call if pain becomes worse    Please advise

## 2020-01-30 NOTE — PROGRESS NOTES
Daily Note     Today's date: 2020  Patient name: Logan Royal  : 1965  MRN: [id number]  Referring provider: Jayna Estrada MD  Dx:   Encounter Diagnosis     ICD-10-CM    1  Acute post-operative pain G89 18    2  S/P ORIF (open reduction internal fixation) fracture Z98 890     Z87 81    3  Left elbow pain M25 522    4  Closed bicondylar fracture of distal end of left humerus, initial encounter S42 492A                   Subjective: pt notes that her elbow is sore and tight consistently      Objective: See treatment diary below      Assessment: pt able to attain -20 ext, and 125 deg flexion passively today      Plan: Continue per plan of care        Precautions: hx of breast cancer and lymphedema  Left elbow fx 2019, left elbow ORIF 2019              Daily Treatment Diary       Manuals    Left elbow and wrist PROM all planes to tolerance DL DL DB      DB DB   Left hand drainage in supine DL DL DB      DB Db   Left shld prom all planes to tolerance DL DL DB      DB Db                Exercise Diary              pendulums  x20 *20      *20 x20   Elbow flexion arom         *20 x20   Elbow pro/sup seated 2x10  2x10      seated 2x10 seated 2x10   shld IR/Er arom x2x10        2x10 2x10   Passive elbow extension 5"x10        supine 5''x10 5''x10   digigrip indiv red 2x10        Green 2x10 nv    Blue  2x10        Red 2x10 nv   pulleys 5"x20 5"x20 5''x10      pulleys 5''x20 pulleys 5''x20   Table slide scaption             Serratus punches 2x10 2x10           Supine flexion   x10          Supine tricep ext   2x10                                                                                                                                            Modalities

## 2020-01-30 NOTE — TELEPHONE ENCOUNTER
She should also keep track of how much drainage there is on the gauze when she removes it prior to shower and this amount should decrease over the next week or two

## 2020-01-30 NOTE — TELEPHONE ENCOUNTER
Dr Sadi Arceo patient - L Elbow surgery     Patient stating her elbow is red, throbbing and draining  She is sending a picture thru mychart for Dr Sadi Arceo to view  Please advise if we can place her on the schedule

## 2020-02-03 ENCOUNTER — OFFICE VISIT (OUTPATIENT)
Dept: PHYSICAL THERAPY | Facility: CLINIC | Age: 55
End: 2020-02-03
Payer: COMMERCIAL

## 2020-02-03 DIAGNOSIS — Z98.890 S/P ORIF (OPEN REDUCTION INTERNAL FIXATION) FRACTURE: ICD-10-CM

## 2020-02-03 DIAGNOSIS — M25.522 LEFT ELBOW PAIN: ICD-10-CM

## 2020-02-03 DIAGNOSIS — G89.18 ACUTE POST-OPERATIVE PAIN: Primary | ICD-10-CM

## 2020-02-03 DIAGNOSIS — Z87.81 S/P ORIF (OPEN REDUCTION INTERNAL FIXATION) FRACTURE: ICD-10-CM

## 2020-02-03 DIAGNOSIS — S42.492A CLOSED BICONDYLAR FRACTURE OF DISTAL END OF LEFT HUMERUS, INITIAL ENCOUNTER: ICD-10-CM

## 2020-02-03 PROCEDURE — 97110 THERAPEUTIC EXERCISES: CPT | Performed by: PHYSICAL THERAPIST

## 2020-02-03 PROCEDURE — 97140 MANUAL THERAPY 1/> REGIONS: CPT

## 2020-02-03 NOTE — PROGRESS NOTES
Daily Note     Today's date: 2/3/2020  Patient name: Antonio Holden  : 1965  MRN: [id number]  Referring provider: Radha Hopper MD  Dx:   Encounter Diagnosis     ICD-10-CM    1  Acute post-operative pain G89 18    2  S/P ORIF (open reduction internal fixation) fracture Z98 890     Z87 81    3  Left elbow pain M25 522    4  Closed bicondylar fracture of distal end of left humerus, initial encounter S42 492A                   Subjective: pt notes that she is worried about her incision, especially with previous issues with incision healing other places  Objective: See treatment diary below      Assessment:   Elbow Flexion 125* ext 30*, pt was with more swelling in arm than last week noted throughout  Recommended calling Dr Mady Newman to get earlier appt to discuss incision healing with him  Plan: Continue per plan of care        Precautions: hx of breast cancer and lymphedema  Left elbow fx 2019, left elbow ORIF 2019              Daily Treatment Diary       Manuals 1/20 1/28 1/30 2/3     1/13 1/16   Left elbow and wrist PROM all planes to tolerance DL DL DB DL     DB DB   Left hand drainage in supine DL DL DB DL     DB Db   Left shld prom all planes to tolerance DL DL DB DL     DB Db   STM bicep    DL         Exercise Diary              pendulums  x20 *20 x20     *20 x20   Elbow flexion arom         *20 x20   Elbow pro/sup seated 2x10  2x10 2x10     seated 2x10 seated 2x10   shld IR/Er arom x2x10        2x10 2x10   Passive elbow extension 5"x10        supine 5''x10 5''x10   digigrip indiv red 2x10        Green 2x10 nv    Blue  2x10        Red 2x10 nv   pulleys 5"x20 5"x20 5''x10      pulleys 5''x20 pulleys 5''x20   Table slide scaption             Serratus punches 2x10 2x10           Supine flexion   x10 x10         Supine tricep ext   2x10 2x10                                                                                                                                           Modalities

## 2020-02-06 ENCOUNTER — APPOINTMENT (OUTPATIENT)
Dept: PHYSICAL THERAPY | Facility: CLINIC | Age: 55
End: 2020-02-06
Payer: COMMERCIAL

## 2020-02-10 ENCOUNTER — OFFICE VISIT (OUTPATIENT)
Dept: PHYSICAL THERAPY | Facility: CLINIC | Age: 55
End: 2020-02-10
Payer: COMMERCIAL

## 2020-02-10 DIAGNOSIS — Z87.81 S/P ORIF (OPEN REDUCTION INTERNAL FIXATION) FRACTURE: ICD-10-CM

## 2020-02-10 DIAGNOSIS — M25.522 LEFT ELBOW PAIN: ICD-10-CM

## 2020-02-10 DIAGNOSIS — Z98.890 S/P ORIF (OPEN REDUCTION INTERNAL FIXATION) FRACTURE: ICD-10-CM

## 2020-02-10 DIAGNOSIS — G89.18 ACUTE POST-OPERATIVE PAIN: Primary | ICD-10-CM

## 2020-02-10 DIAGNOSIS — S42.492A CLOSED BICONDYLAR FRACTURE OF DISTAL END OF LEFT HUMERUS, INITIAL ENCOUNTER: ICD-10-CM

## 2020-02-10 PROCEDURE — 97140 MANUAL THERAPY 1/> REGIONS: CPT

## 2020-02-10 NOTE — PROGRESS NOTES
Daily Note     Today's date: 2/10/2020  Patient name: Quyen Edgar  : 1965  MRN: [id number]  Referring provider: Alicia Llamas MD  Dx:   Encounter Diagnosis     ICD-10-CM    1  Acute post-operative pain G89 18    2  S/P ORIF (open reduction internal fixation) fracture Z98 890     Z87 81    3  Left elbow pain M25 522    4  Closed bicondylar fracture of distal end of left humerus, initial encounter S42 492A                   Subjective: pt notes that she continues with difficulty when stretching but is still going through and doing HEP  Objective: See treatment diary below      Assessment: Tolerated treatment with PROM of 22*-125* patient standing with arm extended was 26*  Patient demonstrated fatigue post treatment      Plan: Continue per plan of care        Precautions: hx of breast cancer and lymphedema  Left elbow fx 2019, left elbow ORIF 2019              Daily Treatment Diary       Manuals 1/20 1/28 1/30 2/3 2/10    1/13 1/16   Left elbow and wrist PROM all planes to tolerance DL DL DB DL DL    DB DB   Left hand drainage in supine DL DL DB DL DL    DB Db   Left shld prom all planes to tolerance DL DL DB DL DL    DB Db   STM bicep    DL DL        Exercise Diary              pendulums  x20 *20 x20 x20    *20 x20   Elbow flexion arom         *20 x20   Elbow pro/sup seated 2x10  2x10 2x10 2x10    seated 2x10 seated 2x10   shld IR/Er arom x2x10        2x10 2x10   Passive elbow extension 5"x10        supine 5''x10 5''x10   digigrip indiv red 2x10        Green 2x10 nv    Blue  2x10        Red 2x10 nv   pulleys 5"x20 5"x20 5''x10  5"x10    pulleys 5''x20 pulleys 5''x20   Table slide scaption             Serratus punches 2x10 2x10           Supine flexion   x10 x10         Supine tricep ext   2x10 2x10 2x10        Hand placed on table active ext elbow     2x10                                                                                                                             Modalities

## 2020-02-13 ENCOUNTER — OFFICE VISIT (OUTPATIENT)
Dept: PHYSICAL THERAPY | Facility: CLINIC | Age: 55
End: 2020-02-13
Payer: COMMERCIAL

## 2020-02-13 DIAGNOSIS — Z87.81 S/P ORIF (OPEN REDUCTION INTERNAL FIXATION) FRACTURE: ICD-10-CM

## 2020-02-13 DIAGNOSIS — M25.522 LEFT ELBOW PAIN: ICD-10-CM

## 2020-02-13 DIAGNOSIS — G89.18 ACUTE POST-OPERATIVE PAIN: Primary | ICD-10-CM

## 2020-02-13 DIAGNOSIS — Z98.890 S/P ORIF (OPEN REDUCTION INTERNAL FIXATION) FRACTURE: ICD-10-CM

## 2020-02-13 DIAGNOSIS — S42.492A CLOSED BICONDYLAR FRACTURE OF DISTAL END OF LEFT HUMERUS, INITIAL ENCOUNTER: ICD-10-CM

## 2020-02-13 PROCEDURE — 97112 NEUROMUSCULAR REEDUCATION: CPT | Performed by: PHYSICAL THERAPIST

## 2020-02-13 PROCEDURE — 97140 MANUAL THERAPY 1/> REGIONS: CPT | Performed by: PHYSICAL THERAPIST

## 2020-02-13 NOTE — PROGRESS NOTES
Daily Note     Today's date: 2020  Patient name: Leroy Bates  : 1965  MRN: [id number]  Referring provider: Susaan Alba MD  Dx:   Encounter Diagnosis     ICD-10-CM    1  Acute post-operative pain G89 18    2  S/P ORIF (open reduction internal fixation) fracture Z98 890     Z87 81    3  Left elbow pain M25 522    4  Closed bicondylar fracture of distal end of left humerus, initial encounter S42 492A                   Subjective: pt notes that her elbow is constantly sore  Objective: See treatment diary below      Assessment: advised pt that as we continually push the limit with her rom, she is going to continually have increased soreness  Plan: Continue per plan of care        Precautions: hx of breast cancer and lymphedema  Left elbow fx 2019, left elbow ORIF 2019              Daily Treatment Diary       Manuals 1/20 1/28 1/30 2/3 2/10 2/13   1/13 1/16   Left elbow and wrist PROM all planes to tolerance DL DL DB DL DL DB   DB DB   Left hand drainage in supine DL DL DB DL DL DB   DB Db   Left shld prom all planes to tolerance DL DL DB DL DL DB   DB Db   STM bicep    DL DL Db       Exercise Diary              pendulums  x20 *20 x20 x20 x20   *20 x20   Elbow flexion arom         *20 x20   Elbow pro/sup seated 2x10  2x10 2x10 2x10 2x10   seated 2x10 seated 2x10   shld IR/Er arom x2x10        2x10 2x10   Passive elbow extension 5"x10        supine 5''x10 5''x10   digigrip indiv red 2x10        Green 2x10 nv    Blue  2x10        Red 2x10 nv   pulleys 5"x20 5"x20 5''x10  5"x10 5''x10   pulleys 5''x20 pulleys 5''x20   Table slide scaption             Serratus punches 2x10 2x10           Supine flexion   x10 x10         Supine tricep ext   2x10 2x10 2x10 2x10       Hand placed on table active ext elbow     2x10 2x10                                                                                                                            Modalities

## 2020-02-14 ENCOUNTER — TELEPHONE (OUTPATIENT)
Dept: OBGYN CLINIC | Facility: HOSPITAL | Age: 55
End: 2020-02-14

## 2020-02-14 NOTE — TELEPHONE ENCOUNTER
Patient is calling because she replied to a message to Dr Hal Allison via [facility] about creams  but she has not heard back  Please review & let patient know if she can us dermagrain or calcium alginate  Patient is going to post a pic in her my chart & is stating that she is going to hold off making an appt until the doctor has time to review this message & respond        [phone]

## 2020-02-14 NOTE — TELEPHONE ENCOUNTER
I haven't seen a message in my inbox    In reviewing the photo she attached today, I would prefer that she does not use any medicated ointments currently  Wash with water and unscented soap and pat dry  Do not submerge or scrub   Cover with dry dressing

## 2020-02-17 ENCOUNTER — OFFICE VISIT (OUTPATIENT)
Dept: PHYSICAL THERAPY | Facility: CLINIC | Age: 55
End: 2020-02-17
Payer: COMMERCIAL

## 2020-02-17 DIAGNOSIS — S42.492A CLOSED BICONDYLAR FRACTURE OF DISTAL END OF LEFT HUMERUS, INITIAL ENCOUNTER: ICD-10-CM

## 2020-02-17 DIAGNOSIS — Z98.890 S/P ORIF (OPEN REDUCTION INTERNAL FIXATION) FRACTURE: ICD-10-CM

## 2020-02-17 DIAGNOSIS — M25.522 LEFT ELBOW PAIN: ICD-10-CM

## 2020-02-17 DIAGNOSIS — Z87.81 S/P ORIF (OPEN REDUCTION INTERNAL FIXATION) FRACTURE: ICD-10-CM

## 2020-02-17 DIAGNOSIS — G89.18 ACUTE POST-OPERATIVE PAIN: Primary | ICD-10-CM

## 2020-02-17 PROCEDURE — 97110 THERAPEUTIC EXERCISES: CPT | Performed by: PHYSICAL THERAPIST

## 2020-02-17 PROCEDURE — 97112 NEUROMUSCULAR REEDUCATION: CPT | Performed by: PHYSICAL THERAPIST

## 2020-02-17 PROCEDURE — 97140 MANUAL THERAPY 1/> REGIONS: CPT | Performed by: PHYSICAL THERAPIST

## 2020-02-17 NOTE — PROGRESS NOTES
Daily Note     Today's date: 2020  Patient name: Sonia Bloom  : 1965  MRN: [id number]  Referring provider: Indira Crenshaw MD  Dx:   Encounter Diagnosis     ICD-10-CM    1  Acute post-operative pain G89 18    2  S/P ORIF (open reduction internal fixation) fracture Z98 890     Z87 81    3  Closed bicondylar fracture of distal end of left humerus, initial encounter S42 492A    4  Left elbow pain M25 522                   Subjective: Pt notes her arm is "status quo" with it feeling about the same as it has been recently  Objective: See treatment diary below      Assessment: pt's extension to 22 degrees and a flexion 127 degrees today passively  Despite pt feeling like she is at a plateau pt does have improved ability to go from full flexion to extension passively with out pain or stiffness  Plan: Progress note during next visit        Precautions: hx of breast cancer and lymphedema  Left elbow fx 2019, left elbow ORIF 2019              Daily Treatment Diary       Manuals 1/20 1/28 1/30 2/3 2/10 2/13 2/17      Left elbow and wrist PROM all planes to tolerance DL DL DB DL DL DB DB      Left hand drainage in supine DL DL DB DL DL DB DB      Left shld prom all planes to tolerance DL DL DB DL DL DB DB      STM bicep    DL DL Db DB      Exercise Diary              pendulums  x20 *20 x20 x20 x20 x20      Elbow flexion arom             Elbow pro/sup seated 2x10  2x10 2x10 2x10 2x10 2x10      shld IR/Er arom x2x10            Passive elbow extension 5"x10            digigrip indiv red 2x10             Blue  2x10            pulleys 5"x20 5"x20 5''x10  5"x10 5''x10 5"x10      Table slide scaption             Serratus punches 2x10 2x10           Supine flexion   x10 x10         Supine tricep ext   2x10 2x10 2x10 2x10 2x10      Hand placed on table active ext elbow     2x10 2x10 2x10      S/l ER       2x10 Modalities

## 2020-02-18 ENCOUNTER — OFFICE VISIT (OUTPATIENT)
Dept: HEMATOLOGY ONCOLOGY | Facility: CLINIC | Age: 55
End: 2020-02-18
Payer: COMMERCIAL

## 2020-02-18 VITALS
HEART RATE: 100 BPM | OXYGEN SATURATION: 99 % | RESPIRATION RATE: 18 BRPM | WEIGHT: 165 LBS | TEMPERATURE: 98.7 F | BODY MASS INDEX: 26.63 KG/M2

## 2020-02-18 DIAGNOSIS — Z17.0 BILATERAL MALIGNANT NEOPLASM OF BREAST IN FEMALE, ESTROGEN RECEPTOR POSITIVE, UNSPECIFIED SITE OF BREAST (HCC): Primary | ICD-10-CM

## 2020-02-18 DIAGNOSIS — C50.912 BILATERAL MALIGNANT NEOPLASM OF BREAST IN FEMALE, ESTROGEN RECEPTOR POSITIVE, UNSPECIFIED SITE OF BREAST (HCC): Primary | ICD-10-CM

## 2020-02-18 DIAGNOSIS — C50.911 BILATERAL MALIGNANT NEOPLASM OF BREAST IN FEMALE, ESTROGEN RECEPTOR POSITIVE, UNSPECIFIED SITE OF BREAST (HCC): Primary | ICD-10-CM

## 2020-02-18 DIAGNOSIS — C50.919 MALIGNANT NEOPLASM OF BREAST IN FEMALE, ESTROGEN RECEPTOR POSITIVE, UNSPECIFIED LATERALITY, UNSPECIFIED SITE OF BREAST (HCC): ICD-10-CM

## 2020-02-18 DIAGNOSIS — Z17.0 MALIGNANT NEOPLASM OF BREAST IN FEMALE, ESTROGEN RECEPTOR POSITIVE, UNSPECIFIED LATERALITY, UNSPECIFIED SITE OF BREAST (HCC): ICD-10-CM

## 2020-02-18 PROCEDURE — 99214 OFFICE O/P EST MOD 30 MIN: CPT | Performed by: INTERNAL MEDICINE

## 2020-02-18 RX ORDER — TAMOXIFEN CITRATE 20 MG/1
20 TABLET ORAL DAILY
Qty: 90 TABLET | Refills: 3 | Status: SHIPPED | OUTPATIENT
Start: 2020-02-18 | End: 2021-03-10 | Stop reason: SDUPTHER

## 2020-02-18 NOTE — PROGRESS NOTES
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 , as well as a history of radiation treatment post lumpectomy  She developed bilateral breast cancer  Both of which are ER/WY positive, HER-2 negative disease  She has stage IA on the right, as well as stage IIA on the left with one positive lymph node  She underwent bilateral mastectomy with reconstruction resulting in the FRANKY in   Her left-sided breast cancer has Oncotype DX score of only 7  Therefore, adjuvant chemotherapy was not indicated  He had postmastectomy radiation therapy to the left chest wall  Currently, she is on adjuvant hormonal therapy with tamoxifen with no side effect  she has no evidence recurrent disease, based on her symptomatology and physical examinations  Since she had quite regular menstrual cycle until 2019, she has not completed menopause yet  Therefore, I recommended her to continue with tamoxifen 20 mg daily  She is in agreement with my recommendation  I will see her again in a year for routine follow-up           Subjective:      HPI:  A 54year old premenopausal woman, who has 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, 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  She underwent lumpectomy followed by radiation  She did not have any hormonal treatment  In , she developed nipple discharge in the left breast  She went to [facility], where she underwent stereotactic biopsy, which showed precancerous lesions  She underwent lumpectomy, which showed no evidence of malignancy  In 2014, she appreciated the lump in the left breast  Subsequently, she was found to have abnormality of bilateral breast  She underwent bilateral mastectomy in 2014   Right mastectomy specimen showed up to 7 mm of invasive ductal carcinoma grade 1 ER WY positive, HER-2 negative disease  7  [place] were all negative for the metastatic disease in the right axilla  Left mastectomy specimen showed multifocal invasive ductal carcinoma with tumor measuring up to 15 mm, grade 2  This was ER/TX positive, HER-2 negative disease  One sentinel was positive for metastatic disease  There was some evidence of focal extranodal extension  She had immediate reconstruction with tissue expander  She presents today to discuss adjuvant treatment options  Her left breast tumor was sent for the Oncotype DX testing which came back recurrence score of 7, which is low risk disease  Previously, she was tested for BRCA gene mutation in 2013, which was negative  She does not know the family history, since she was adopted  Currently, she has no complaints except for some chest tightness  She has no respiratory symptoms  Her weight is stable  She has no bone pain  Her performance status is normal  She is to have 2 surgical drain in each axilla              Interval History:  A 48year old premenopausal woman, who has 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  She underwent lumpectomy followed by radiation therapy  In June 2014, she was diagnosed with bilateral invasive breast cancer, stage IA, on the right and stage IIA on the left, both of which ER/TX positive, HER-2 negative disease  Her Oncotype DX score on the left breast cancer with only 7  Therefore, adjuvant chemotherapy was not indicated  She underwent bilateral mastectomy with reconstruction  She completed postmastectomy radiation therapy to the left chest wall  She is currently on adjuvant hormonal therapy with tamoxifen  She presents today for routine follow-up  Her last menstrual cycle was in August 2019  She still does not have any hot flashes  She denied pain  She has no respiratory symptoms  Her weight is stable  She has normal performance status         Objective:      Primary Diagnosis:     1   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    2  Stage IA right breast cancer, grade 1, ER/WI positive, HER-2 negative disease  Diagnosed in June 2014  3  Stage IIA left breast cancer, grade 2, ER/WI positive, HER-2 negative disease with one positive lymph node  Oncotype DX recurrence score 7  Diagnosed in June 2014  4  BRCA mutation negative        Cancer Staging:  Cancer Staging  No matching staging information was found for the patient         Previous Hematologic/ Oncologic Treatment:            Current Hematologic/ Oncologic Treatment:       Adjuvant hormonal therapy with tamoxifen 20 mg once a day since July 2014        Disease Status:      FRANKY status post bilateral mastectomy      Test Results:     Pathology:     Right mastectomy specimen showed a 7 mm of invasive ductal carcinoma, grade 1, ER/WI positive, HER-2 negative disease  7  [place] lymph nodes were all negative for metastatic disease  Stage IA(pT1b,pN0,M0) mastectomy specimen showed up to 15 mm of invasive ductal carcinoma, multifocal, grade 2  ER/WI positive, HER-2 negative disease  One sentinel lymph node was positive for metastatic disease with focal extranodal extension  Stage IIA(pT1c, pN1a, M0)       Radiology:           Laboratory:           Physical Exam:        General Appearance:    Alert, oriented          Eyes:    PERRL   Ears:    Normal external ear canals, both ears   Nose:   Nares normal, septum midline   Throat:   Mucosa moist  Pharynx without injection  Neck:   Supple         Lungs:     Clear to auscultation bilaterally   Chest Wall:    No tenderness or deformity    Heart:    Regular rate and rhythm         Abdomen:     Soft, non-tender, bowel sounds +, no organomegaly               Extremities:   Extremities no cyanosis or edema         Skin:   no rash or icterus      Lymph nodes:   Cervical, supraclavicular, and axillary nodes normal   Neurologic:   CNII-XII intact, normal strength, sensation and reflexes     Throughout             Breast exam:  status post bilateral mastectomy with reconstruction  No palpable abnormality in either reconstructed breast or chest wall              ROS: Review of Systems   All other systems reviewed and are negative  Imaging: Xr Elbow 2 Vw Left    Result Date: 1/31/2020  Narrative: LEFT ELBOW INDICATION:   M25 522: Pain in left elbow  COMPARISON:  12/31/2019 VIEWS:  XR ELBOW 2 VW LEFT FINDINGS: Stable alignment status post ORIF for distal humerus and olecranon process fractures without evidence of hardware complication  There is no joint effusion  No significant degenerative changes  No lytic or blastic lesions are seen  Soft tissues are unremarkable  Impression: Stable postoperative alignment status post ORIF for distal humeral and olecranon process fractures  Workstation performed: [id number]         Labs:   Lab Results   Component Value Date    WBC 4 33 10/14/2019    HGB 12 3 10/14/2019    HCT 39 1 10/14/2019    MCV 93 10/14/2019     10/14/2019     Lab Results   Component Value Date     06/24/2015    K 4 8 10/14/2019     10/14/2019    CO2 28 10/14/2019    ANIONGAP 11 06/24/2015    BUN 14 10/14/2019    CREATININE 0 77 10/14/2019    GLUCOSE 80 06/24/2015    GLUF 74 08/14/2014    CALCIUM 8 9 10/14/2019    AST 20 02/13/2019    ALT 26 02/13/2019    ALKPHOS 50 02/13/2019    PROT 7 6 05/27/2014    BILITOT 0 3 05/27/2014    EGFR 89 10/14/2019         Current Medications: Reviewed  Allergies: Reviewed  PMH/FH/SH:  Reviewed      Vital Sign:    Body surface area is 1 84 meters squared      Wt Readings from Last 3 Encounters:   02/18/20 74 8 kg (165 lb)   01/23/20 74 8 kg (165 lb)   12/31/19 74 8 kg (165 lb)        Temp Readings from Last 3 Encounters:   02/18/20 98 7 °F (37 1 °C) (Tympanic Core)   12/11/19 98 7 °F (37 1 °C)   11/26/19 97 5 °F (36 4 °C) (Temporal)        BP Readings from Last 3 Encounters:   12/31/19 146/81   12/23/19 150/85   12/11/19 117/63         Pulse Readings from Last 3 Encounters: 02/18/20 100   12/31/19 86   12/23/19 79     @LASTSAO2(3)@

## 2020-02-20 ENCOUNTER — APPOINTMENT (OUTPATIENT)
Dept: PHYSICAL THERAPY | Facility: CLINIC | Age: 55
End: 2020-02-20
Payer: COMMERCIAL

## 2020-02-24 ENCOUNTER — OFFICE VISIT (OUTPATIENT)
Dept: PHYSICAL THERAPY | Facility: CLINIC | Age: 55
End: 2020-02-24
Payer: COMMERCIAL

## 2020-02-24 DIAGNOSIS — Z87.81 S/P ORIF (OPEN REDUCTION INTERNAL FIXATION) FRACTURE: ICD-10-CM

## 2020-02-24 DIAGNOSIS — Z98.890 S/P ORIF (OPEN REDUCTION INTERNAL FIXATION) FRACTURE: ICD-10-CM

## 2020-02-24 DIAGNOSIS — G89.18 ACUTE POST-OPERATIVE PAIN: Primary | ICD-10-CM

## 2020-02-24 DIAGNOSIS — M25.522 LEFT ELBOW PAIN: ICD-10-CM

## 2020-02-24 DIAGNOSIS — S42.492A CLOSED BICONDYLAR FRACTURE OF DISTAL END OF LEFT HUMERUS, INITIAL ENCOUNTER: ICD-10-CM

## 2020-02-24 PROCEDURE — 97140 MANUAL THERAPY 1/> REGIONS: CPT

## 2020-02-24 NOTE — PROGRESS NOTES
Daily Note     Today's date: 2020  Patient name: Yeison Rivero  : 1965  MRN: [id number]  Referring provider: Simin Gomez MD  Dx:   Encounter Diagnosis     ICD-10-CM    1  Acute post-operative pain G89 18    2  S/P ORIF (open reduction internal fixation) fracture Z98 890     Z87 81    3  Closed bicondylar fracture of distal end of left humerus, initial encounter S42 492A    4  Left elbow pain M25 522                   Subjective: pt notes that her elbow is tight but that she continues to work on it and that she is able to get fork to mouth now  Incision is healing well  Objective: See treatment diary below      Assessment: Tolerated treatment with flexion at 130* and ext at 18*  Patient cont to make progress with ROM in elbow and shoulder  Less tightness through bicep region but still moderate tightness through forearm  Plan: Continue per plan of care        Precautions: hx of breast cancer and lymphedema  Left elbow fx 2019, left elbow ORIF 2019              Daily Treatment Diary       Manuals 1/20 1/28 1/30 2/3 2/10 2/13 2/17 2/24     Left elbow and wrist PROM all planes to tolerance DL DL DB DL DL DB DB DL     Left hand drainage in supine DL DL DB DL DL DB DB nv     Left shld prom all planes to tolerance DL DL DB DL DL DB DB DL     STM bicep    DL DL Db DB DL+ forearm     Exercise Diary              pendulums  x20 *20 x20 x20 x20 x20 x20     Elbow flexion arom             Elbow pro/sup seated 2x10  2x10 2x10 2x10 2x10 2x10      shld IR/Er arom x2x10            Passive elbow extension 5"x10            digigrip indiv red 2x10             Blue  2x10            pulleys 5"x20 5"x20 5''x10  5"x10 5''x10 5"x10      Table slide scaption             Serratus punches 2x10 2x10           Supine flexion   x10 x10         Supine tricep ext   2x10 2x10 2x10 2x10 2x10 2x10     Hand placed on table active ext elbow     2x10 2x10 2x10 2x10     S/l ER       2x10 2x10 Modalities

## 2020-02-27 ENCOUNTER — OFFICE VISIT (OUTPATIENT)
Dept: PHYSICAL THERAPY | Facility: CLINIC | Age: 55
End: 2020-02-27
Payer: COMMERCIAL

## 2020-02-27 DIAGNOSIS — M25.522 LEFT ELBOW PAIN: ICD-10-CM

## 2020-02-27 DIAGNOSIS — Z87.81 S/P ORIF (OPEN REDUCTION INTERNAL FIXATION) FRACTURE: ICD-10-CM

## 2020-02-27 DIAGNOSIS — Z98.890 S/P ORIF (OPEN REDUCTION INTERNAL FIXATION) FRACTURE: ICD-10-CM

## 2020-02-27 DIAGNOSIS — S42.492A CLOSED BICONDYLAR FRACTURE OF DISTAL END OF LEFT HUMERUS, INITIAL ENCOUNTER: ICD-10-CM

## 2020-02-27 DIAGNOSIS — G89.18 ACUTE POST-OPERATIVE PAIN: Primary | ICD-10-CM

## 2020-02-27 PROCEDURE — 97140 MANUAL THERAPY 1/> REGIONS: CPT | Performed by: PHYSICAL THERAPIST

## 2020-02-27 PROCEDURE — 97110 THERAPEUTIC EXERCISES: CPT | Performed by: PHYSICAL THERAPIST

## 2020-02-27 NOTE — PROGRESS NOTES
Daily Note     Today's date: 2020  Patient name: Rock Maravilla  : 1965  MRN: [id number]  Referring provider: Pablo Quinonez MD  Dx:   Encounter Diagnosis     ICD-10-CM    1  Acute post-operative pain G89 18    2  S/P ORIF (open reduction internal fixation) fracture Z98 890     Z87 81    3  Closed bicondylar fracture of distal end of left humerus, initial encounter S42 492A    4  Left elbow pain M25 522                   Subjective: pt notes that she has been able to do her hair at this point but certain motions are still very painful overhead  She also continues to have increased pain in the left shoulder  Objective: See treatment diary below      Assessment: pt with almost full passive flexion rom, but still is limited to minus 25 degrees extension      Plan: Continue per plan of care        Precautions: hx of breast cancer and lymphedema  Left elbow fx 2019, left elbow ORIF 2019              Daily Treatment Diary       Manuals 1/20 1/28 1/30 2/3 2/10 2/13 2/17 2/24 2/27    Left elbow and wrist PROM all planes to tolerance DL DL DB DL DL DB DB DL DB    Left hand drainage in supine DL DL DB DL DL DB DB nv     Left shld prom all planes to tolerance DL DL DB DL DL DB DB DL DB    STM bicep    DL DL Db DB DL+ forearm DB    Exercise Diary              pendulums  x20 *20 x20 x20 x20 x20 x20 x20    Elbow flexion arom             Elbow pro/sup seated 2x10  2x10 2x10 2x10 2x10 2x10 2x10 2x10    shld IR/Er arom x2x10            Passive elbow extension 5"x10            digigrip indiv red 2x10             Blue  2x10            pulleys 5"x20 5"x20 5''x10  5"x10 5''x10 5"x10  5''x20    Table slide scaption             Serratus punches 2x10 2x10           Supine flexion   x10 x10         Supine tricep ext   2x10 2x10 2x10 2x10 2x10 2x10 2x10    Hand placed on table active ext elbow     2x10 2x10 2x10 2x10 2x10    S/l ER       2x10 2x10 3x10    UBE         2'x2' Modalities

## 2020-03-02 ENCOUNTER — OFFICE VISIT (OUTPATIENT)
Dept: PHYSICAL THERAPY | Facility: CLINIC | Age: 55
End: 2020-03-02
Payer: COMMERCIAL

## 2020-03-02 DIAGNOSIS — Z98.890 S/P ORIF (OPEN REDUCTION INTERNAL FIXATION) FRACTURE: ICD-10-CM

## 2020-03-02 DIAGNOSIS — M25.522 LEFT ELBOW PAIN: ICD-10-CM

## 2020-03-02 DIAGNOSIS — G89.18 ACUTE POST-OPERATIVE PAIN: Primary | ICD-10-CM

## 2020-03-02 DIAGNOSIS — Z87.81 S/P ORIF (OPEN REDUCTION INTERNAL FIXATION) FRACTURE: ICD-10-CM

## 2020-03-02 DIAGNOSIS — S42.492A CLOSED BICONDYLAR FRACTURE OF DISTAL END OF LEFT HUMERUS, INITIAL ENCOUNTER: ICD-10-CM

## 2020-03-02 PROCEDURE — 97110 THERAPEUTIC EXERCISES: CPT

## 2020-03-02 PROCEDURE — 97140 MANUAL THERAPY 1/> REGIONS: CPT

## 2020-03-02 NOTE — PROGRESS NOTES
Daily Note     Today's date: 3/2/2020  Patient name: Mac Garcia  : 1965  MRN: [id number]  Referring provider: Chrystal Headley MD  Dx:   Encounter Diagnosis     ICD-10-CM    1  Acute post-operative pain G89 18    2  S/P ORIF (open reduction internal fixation) fracture Z98 890     Z87 81    3  Closed bicondylar fracture of distal end of left humerus, initial encounter S42 492A    4  Left elbow pain M25 522                   Subjective: pt notes that she continues to work on motion       Objective: See treatment diary below      Assessment: passive motion after STM to bicep and forearm to 15*-135*  Patient has decreased swelling over all and incision site with scabs and less redness in area,      Plan: Continue per plan of care  Pt to see Dr Cat Jamison tomorrow and will progress according to his plan       Precautions: hx of breast cancer and lymphedema  Left elbow fx 2019, left elbow ORIF 2019              Daily Treatment Diary       Manuals 1/20 1/28 1/30 2/3 2/10 2/13 2/17 2/24 2/27 3/2   Left elbow and wrist PROM all planes to tolerance DL DL DB DL DL DB DB DL DB DL   Left hand drainage in supine DL DL DB DL DL DB DB nv     Left shld prom all planes to tolerance DL DL DB DL DL DB DB DL DB DL   STM bicep    DL DL Db DB DL+ forearm DB DL   Exercise Diary              pendulums  x20 *20 x20 x20 x20 x20 x20 x20    Elbow flexion arom             Elbow pro/sup seated 2x10  2x10 2x10 2x10 2x10 2x10 2x10 2x10    shld IR/Er arom x2x10            Passive elbow extension 5"x10            digigrip indiv red 2x10             Blue  2x10            pulleys 5"x20 5"x20 5''x10  5"x10 5''x10 5"x10  5''x20    Table slide scaption             Serratus punches 2x10 2x10           Supine flexion   x10 x10         Supine tricep ext   2x10 2x10 2x10 2x10 2x10 2x10 2x10 3x10   Hand placed on table active ext elbow     2x10 2x10 2x10 2x10 2x10 x10   S/l ER       2x10 2x10 3x10 3x10   UBE         2'x2' 2'x2' Modalities

## 2020-03-03 ENCOUNTER — OFFICE VISIT (OUTPATIENT)
Dept: OBGYN CLINIC | Facility: MEDICAL CENTER | Age: 55
End: 2020-03-03

## 2020-03-03 ENCOUNTER — APPOINTMENT (OUTPATIENT)
Dept: RADIOLOGY | Facility: MEDICAL CENTER | Age: 55
End: 2020-03-03
Payer: COMMERCIAL

## 2020-03-03 VITALS
DIASTOLIC BLOOD PRESSURE: 111 MMHG | HEIGHT: 66 IN | HEART RATE: 89 BPM | BODY MASS INDEX: 25.71 KG/M2 | WEIGHT: 160 LBS | SYSTOLIC BLOOD PRESSURE: 171 MMHG

## 2020-03-03 DIAGNOSIS — S42.492D CLOSED BICONDYLAR FRACTURE OF DISTAL END OF LEFT HUMERUS WITH ROUTINE HEALING, SUBSEQUENT ENCOUNTER: Primary | ICD-10-CM

## 2020-03-03 DIAGNOSIS — S42.492D CLOSED BICONDYLAR FRACTURE OF DISTAL END OF LEFT HUMERUS WITH ROUTINE HEALING, SUBSEQUENT ENCOUNTER: ICD-10-CM

## 2020-03-03 PROCEDURE — 99024 POSTOP FOLLOW-UP VISIT: CPT | Performed by: ORTHOPAEDIC SURGERY

## 2020-03-03 PROCEDURE — 73070 X-RAY EXAM OF ELBOW: CPT

## 2020-03-03 NOTE — PROGRESS NOTES
History of the Present Illness     Alvaro Holbrook is a 48 y o  female presents the office today status post ORIF of left distal humerus fracture  Patient states she is overall doing well  She does still experience mild pain about the elbow particularly on the medial aspect of of the elbow  She has been compliant attending physical therapy  She states she continues to make progress with her range of motion  Patient reports she has been covering her wound but allowing it to get wet in the shower and washing with antibacterial soap  She reports the wound is healing and is not draining  She denies any new injury  She denies any distal paresthesias  Physical Exam     BP (!) 171/111   Pulse 89   Ht 5' 6" (1 676 m)   Wt 72 6 kg (160 lb)   BMI 25 82 kg/m²     Left elbow  Incision is healing well to the posterior aspect, pencil eraser size of eschar, no drainage note, no erythema  Mildly tender to palpate medial aspect of the elbow  120° of flexion  34° of extension measured with a goniometer  Full supination/pronation  5/5 Motor to the APB, FDI, FDP2, FDP5, EDC  Sensation intact to light touch in the median, radial, and ulnar nerve distribution  Data Review       Imaging:  X-rays of the left elbow obtained on 03/03/2020 was personally reviewed by me in the office and demonstrates stable implants with interval healing at the fracture    Assessment and Plan       48year old female status post ORIF of distal humerus fracture on 12/11/2019  I am happy with the continued progress that Tess's making  She may now begin strengthening with physical therapy  I did advise her that her range of motion will continue to get better however she will likely never have full range of motion  She should continue to keep her incision area clean, dry, and intact  She may continue to wear a bandage well as her lymphedema compression sleeves    I would not recommend that she seek an evaluation from wound care as she has asked for as her wound is healing nicely  We discussed that her wound should continue to heal in the next few weeks and that she should be able to swim on her honeymoon in early May    Follow-up in 1 month for re-evaluation and new x-ray     Follow Up: 1 month     To Do Next Visit: re-eval, ROM check and new x-rays     PROCEDURES PERFORMED:  Procedures  No Procedures performed today     Scribe Attestation    I,:   Jesus Vasquez MA am acting as a scribe while in the presence of the attending physician :        I,:   Christian Dunn MD personally performed the services described in this documentation    as scribed in my presence :

## 2020-03-03 NOTE — LETTER
March 3, 2020     Patient: Lisa Gomes   YOB: 1965   Date of Visit: 3/3/2020       To Whom it May Concern:    Lisa Gomes is under my professional care  She was seen in my office on 3/3/2020  She may return to work on 3/4/2020  If you have any questions or concerns, please don't hesitate to call           Sincerely,          Wendie Reyes MD        CC: No Recipients

## 2020-03-05 ENCOUNTER — OFFICE VISIT (OUTPATIENT)
Dept: PHYSICAL THERAPY | Facility: CLINIC | Age: 55
End: 2020-03-05
Payer: COMMERCIAL

## 2020-03-05 DIAGNOSIS — G89.18 ACUTE POST-OPERATIVE PAIN: Primary | ICD-10-CM

## 2020-03-05 DIAGNOSIS — Z98.890 S/P ORIF (OPEN REDUCTION INTERNAL FIXATION) FRACTURE: ICD-10-CM

## 2020-03-05 DIAGNOSIS — M25.522 LEFT ELBOW PAIN: ICD-10-CM

## 2020-03-05 DIAGNOSIS — S42.492A CLOSED BICONDYLAR FRACTURE OF DISTAL END OF LEFT HUMERUS, INITIAL ENCOUNTER: ICD-10-CM

## 2020-03-05 DIAGNOSIS — Z87.81 S/P ORIF (OPEN REDUCTION INTERNAL FIXATION) FRACTURE: ICD-10-CM

## 2020-03-05 PROCEDURE — 97110 THERAPEUTIC EXERCISES: CPT

## 2020-03-05 PROCEDURE — 97140 MANUAL THERAPY 1/> REGIONS: CPT

## 2020-03-05 NOTE — PROGRESS NOTES
Daily Note     Today's date: 3/5/2020  Patient name: Juaquin Dawson  : 1965  MRN: [id number]  Referring provider: Renita Shell MD  Dx:   Encounter Diagnosis     ICD-10-CM    1  Acute post-operative pain G89 18    2  S/P ORIF (open reduction internal fixation) fracture Z98 890     Z87 81    3  Closed bicondylar fracture of distal end of left humerus, initial encounter S42 492A    4  Left elbow pain M25 522                   Subjective: pt notes that she was at Dr Sadi Arceo and is now able to gradually strengthen  Objective: See treatment diary below      Assessment: Tolerated treatment with some shoulder pain with PROM of shoulder   Patient is able to perform bicep curl with weight and no pain  Will continue to strengthen slowly as tolerated  Plan: Continue per plan of care        Precautions: hx of breast cancer and lymphedema  Left elbow fx 2019, left elbow ORIF 2019              Daily Treatment Diary       Manuals 3/5       2/24 2/27 3/2   Left elbow and wrist PROM all planes to tolerance DL       DL DB DL   Left hand drainage in supine np       nv     Left shld prom all planes to tolerance DL       DL DB DL   STM bicep DL       DL+ forearm DB DL   Exercise Diary              pendulums        x20 x20    Elbow flexion arom             Elbow pro/sup        2x10 2x10    shld IR/Er arom             Passive elbow extension             digigrip                          pulleys         5''x20    Table slide scaption             Serratus punches (2#nv)            Supine flexion nv 2#            Supine tricep ext 3x10       2x10 2x10 3x10   Hand placed on table active ext elbow x10       2x10 2x10 x10   S/l ER 3x10       2x10 3x10 3x10   UBE 2'x2'        2'x2' 2'x2'   Biceps curl 1#  3x10                                                                                          Modalities

## 2020-03-09 ENCOUNTER — OFFICE VISIT (OUTPATIENT)
Dept: PHYSICAL THERAPY | Facility: CLINIC | Age: 55
End: 2020-03-09
Payer: COMMERCIAL

## 2020-03-09 DIAGNOSIS — Z98.890 S/P ORIF (OPEN REDUCTION INTERNAL FIXATION) FRACTURE: ICD-10-CM

## 2020-03-09 DIAGNOSIS — Z87.81 S/P ORIF (OPEN REDUCTION INTERNAL FIXATION) FRACTURE: ICD-10-CM

## 2020-03-09 DIAGNOSIS — M25.522 LEFT ELBOW PAIN: ICD-10-CM

## 2020-03-09 DIAGNOSIS — G89.18 ACUTE POST-OPERATIVE PAIN: Primary | ICD-10-CM

## 2020-03-09 DIAGNOSIS — S42.492A CLOSED BICONDYLAR FRACTURE OF DISTAL END OF LEFT HUMERUS, INITIAL ENCOUNTER: ICD-10-CM

## 2020-03-09 PROCEDURE — 97140 MANUAL THERAPY 1/> REGIONS: CPT

## 2020-03-09 PROCEDURE — 97112 NEUROMUSCULAR REEDUCATION: CPT

## 2020-03-09 NOTE — PROGRESS NOTES
Daily Note     Today's date: 3/9/2020  Patient name: Juaquin Dawson  : 1965  MRN: [id number]  Referring provider: Renita Shell MD  Dx:   Encounter Diagnosis     ICD-10-CM    1  Acute post-operative pain G89 18    2  S/P ORIF (open reduction internal fixation) fracture Z98 890     Z87 81    3  Closed bicondylar fracture of distal end of left humerus, initial encounter S42 492A    4  Left elbow pain M25 522                   Subjective: pt notes that her arm is sore from weekend where she did help move and sort things  Objective: See treatment diary below      Assessment: Tolerated treatment with challenge on serratus punch and pulleys    Patient is making progress with ROM in elbow, she does still experience pain in shoulder with motion above head both passively and actively      Plan: Continue per plan of care        Precautions: hx of breast cancer and lymphedema  Left elbow fx 2019, left elbow ORIF 2019              Daily Treatment Diary       Manuals 3/5 3/9           Left elbow and wrist PROM all planes to tolerance DL DL           Left hand drainage in supine np            Left shld prom all planes to tolerance DL DL           STM bicep DL DL           Exercise Diary              pendulums                          Elbow pro/sup  Tiger tail nv            shld IR/Er arom  rtb nv           Passive elbow extension                                       pulleys  10"x10 ea           Table slide scaption             Serratus punches (2#nv) 2x10           Supine flexion nv 2#            Supine tricep ext 3x10 3x10           Hand placed on table active ext elbow x10 x10           S/l ER 3x10            UBE 2'x2' 2'x2'           Biceps curl 1#  3x10 2#  3x10                                                                                         Modalities

## 2020-03-12 ENCOUNTER — OFFICE VISIT (OUTPATIENT)
Dept: PHYSICAL THERAPY | Facility: CLINIC | Age: 55
End: 2020-03-12
Payer: COMMERCIAL

## 2020-03-12 DIAGNOSIS — Z87.81 S/P ORIF (OPEN REDUCTION INTERNAL FIXATION) FRACTURE: ICD-10-CM

## 2020-03-12 DIAGNOSIS — Z98.890 S/P ORIF (OPEN REDUCTION INTERNAL FIXATION) FRACTURE: ICD-10-CM

## 2020-03-12 DIAGNOSIS — S42.492A CLOSED BICONDYLAR FRACTURE OF DISTAL END OF LEFT HUMERUS, INITIAL ENCOUNTER: ICD-10-CM

## 2020-03-12 DIAGNOSIS — G89.18 ACUTE POST-OPERATIVE PAIN: Primary | ICD-10-CM

## 2020-03-12 DIAGNOSIS — M25.522 LEFT ELBOW PAIN: ICD-10-CM

## 2020-03-12 PROCEDURE — 97112 NEUROMUSCULAR REEDUCATION: CPT

## 2020-03-12 PROCEDURE — 97140 MANUAL THERAPY 1/> REGIONS: CPT

## 2020-03-12 NOTE — PROGRESS NOTES
Daily Note     Today's date: 3/12/2020  Patient name: Valerie Bianchi  : 1965  MRN: [id number]  Referring provider: Jeremy Matta MD  Dx:   Encounter Diagnosis     ICD-10-CM    1  Acute post-operative pain G89 18    2  S/P ORIF (open reduction internal fixation) fracture Z98 890     Z87 81    3  Closed bicondylar fracture of distal end of left humerus, initial encounter S42 492A    4  Left elbow pain M25 522                   Subjective: pt with less pain in arm compared to last visit  Objective: See treatment diary below      Assessment: Tolerated treatment with increases and increased light strengthening    Patient exhibited good technique with therapeutic exercises      Plan: Continue per plan of care  Precautions: hx of breast cancer and lymphedema  Left elbow fx 2019, left elbow ORIF 2019              Daily Treatment Diary       Manuals 3/5 3/9 3/12          Left elbow and wrist PROM all planes to tolerance DL DL DL          Left hand drainage in supine np            Left shld prom all planes to tolerance DL DL DL          STM bicep DL DL DL           Exercise Diary              pendulums                          Elbow pro/sup  Tiger tail nv   x20          shld IR/Er arom  rtb nv rtb ir x20          Passive elbow extension                                       pulleys  10"x10 ea 10"x10          Table slide scaption             Serratus punches (2#nv) 2x10 2x10          Supine flexion nv 2# x10 x10          Supine tricep ext 3x10 3x10 Red x10          Hand placed on table active ext elbow x10 x10           S/l ER 3x10  1# 2x10          UBE 2'x2' 2'x2' 2'x2'          Biceps curl 1#  3x10 2#  3x10 2#  3x10                                                                                        Modalities

## 2020-03-16 ENCOUNTER — APPOINTMENT (OUTPATIENT)
Dept: PHYSICAL THERAPY | Facility: CLINIC | Age: 55
End: 2020-03-16
Payer: COMMERCIAL

## 2020-03-17 ENCOUNTER — APPOINTMENT (OUTPATIENT)
Dept: PHYSICAL THERAPY | Facility: CLINIC | Age: 55
End: 2020-03-17
Payer: COMMERCIAL

## 2020-03-19 ENCOUNTER — APPOINTMENT (OUTPATIENT)
Dept: PHYSICAL THERAPY | Facility: CLINIC | Age: 55
End: 2020-03-19
Payer: COMMERCIAL

## 2020-03-23 ENCOUNTER — OFFICE VISIT (OUTPATIENT)
Dept: PHYSICAL THERAPY | Facility: CLINIC | Age: 55
End: 2020-03-23
Payer: COMMERCIAL

## 2020-03-23 DIAGNOSIS — M25.522 LEFT ELBOW PAIN: ICD-10-CM

## 2020-03-23 DIAGNOSIS — G89.18 ACUTE POST-OPERATIVE PAIN: Primary | ICD-10-CM

## 2020-03-23 DIAGNOSIS — Z87.81 S/P ORIF (OPEN REDUCTION INTERNAL FIXATION) FRACTURE: ICD-10-CM

## 2020-03-23 DIAGNOSIS — Z98.890 S/P ORIF (OPEN REDUCTION INTERNAL FIXATION) FRACTURE: ICD-10-CM

## 2020-03-23 DIAGNOSIS — S42.492A CLOSED BICONDYLAR FRACTURE OF DISTAL END OF LEFT HUMERUS, INITIAL ENCOUNTER: ICD-10-CM

## 2020-03-23 PROCEDURE — 97110 THERAPEUTIC EXERCISES: CPT

## 2020-03-23 PROCEDURE — 97112 NEUROMUSCULAR REEDUCATION: CPT

## 2020-03-23 PROCEDURE — 97140 MANUAL THERAPY 1/> REGIONS: CPT

## 2020-03-23 NOTE — PROGRESS NOTES
Daily Note     Today's date: 3/23/2020  Patient name: Miri Salazar  : 1965  MRN: [id number]  Referring provider: Jong Kumar MD  Dx:   Encounter Diagnosis     ICD-10-CM    1  Acute post-operative pain G89 18    2  S/P ORIF (open reduction internal fixation) fracture Z98 890     Z87 81    3  Closed bicondylar fracture of distal end of left humerus, initial encounter S42 492A    4  Left elbow pain M25 522                   Subjective: pt notes that she has been using a large soup can for bicep curls and weight hangs while walking  Objective: See treatment diary below      Assessment: Tolerated treatment with pain at end ranges of elbow motions  She is still getting pain in shoulder while performing passive abduction more than flexion    Patient is very weak in tricep extensors as red band was challenging  Plan: cont with POC     Precautions: hx of breast cancer and lymphedema  Left elbow fx 2019, left elbow ORIF 2019              Daily Treatment Diary       Manuals 3/5 3/9 3/12 3/23         Left elbow and wrist PROM all planes to tolerance DL DL DL DL         Left hand drainage in supine np            Left shld prom all planes to tolerance DL DL DL DL         STM bicep DL DL DL  DL         Exercise Diary              pendulums                          Elbow pro/sup  Tiger tail nv   x20          shld IR/Er arom  rtb nv rtb ir x20 rtb ir/er 2x10         Passive elbow extension                                       pulleys  10"x10 ea 10"x10          Table slide scaption             Serratus punches (2#nv) 2x10 2x10 2x10 0#         Supine flexion nv 2# x10 x10          Supine tricep ext 3x10 3x10 Red x10 supine active 2x10         Hand placed on table active ext elbow x10 x10           S/l ER 3x10  1# 2x10 1#  2x15         UBE 2'x2' 2'x2' 2'x2' 2'x2'         Biceps curl 1#  3x10 2#  3x10 2#  3x10          Standing tricep press    Red 2x10 Modalities

## 2020-03-26 ENCOUNTER — APPOINTMENT (OUTPATIENT)
Dept: PHYSICAL THERAPY | Facility: CLINIC | Age: 55
End: 2020-03-26
Payer: COMMERCIAL

## 2020-03-30 ENCOUNTER — OFFICE VISIT (OUTPATIENT)
Dept: PHYSICAL THERAPY | Facility: CLINIC | Age: 55
End: 2020-03-30
Payer: COMMERCIAL

## 2020-03-30 DIAGNOSIS — G89.18 ACUTE POST-OPERATIVE PAIN: Primary | ICD-10-CM

## 2020-03-30 DIAGNOSIS — Z87.81 S/P ORIF (OPEN REDUCTION INTERNAL FIXATION) FRACTURE: ICD-10-CM

## 2020-03-30 DIAGNOSIS — S42.492A CLOSED BICONDYLAR FRACTURE OF DISTAL END OF LEFT HUMERUS, INITIAL ENCOUNTER: ICD-10-CM

## 2020-03-30 DIAGNOSIS — M25.522 LEFT ELBOW PAIN: ICD-10-CM

## 2020-03-30 DIAGNOSIS — Z98.890 S/P ORIF (OPEN REDUCTION INTERNAL FIXATION) FRACTURE: ICD-10-CM

## 2020-03-30 PROCEDURE — 97140 MANUAL THERAPY 1/> REGIONS: CPT

## 2020-03-30 PROCEDURE — 97110 THERAPEUTIC EXERCISES: CPT

## 2020-03-30 NOTE — PROGRESS NOTES
Daily Note     Today's date: 3/30/2020  Patient name: Logan Royal  : 1965  MRN: [id number]  Referring provider: Jayna Estrada MD  Dx:   Encounter Diagnosis     ICD-10-CM    1  Acute post-operative pain G89 18    2  S/P ORIF (open reduction internal fixation) fracture Z98 890     Z87 81    3  Closed bicondylar fracture of distal end of left humerus, initial encounter S42 492A    4  Left elbow pain M25 522                   Subjective: pt notes that she is cont to stretch arm but feels like she has lost a bit of ground not being able to be here last week  Objective: See treatment diary below      Assessment: Tolerated treatment with slightly less extension and increased tension in distal bicep region    Patient exhibited good technique with therapeutic exercises      Plan: Continue per plan of care  Precautions: hx of breast cancer and lymphedema  Left elbow fx 2019, left elbow ORIF 2019              Daily Treatment Diary       Manuals 3/5 3/9 3/12 3/23 3/30        Left elbow and wrist PROM all planes to tolerance DL DL DL DL DL        Left hand drainage in supine np            Left shld prom all planes to tolerance DL DL DL DL DL        STM bicep DL DL DL  DL DL        Exercise Diary              pendulums                          Elbow pro/sup  Tiger tail nv   x20          shld IR/Er arom  rtb nv rtb ir x20 rtb ir/er 2x10 gtb 2x10        Passive elbow extension                                       pulleys  10"x10 ea 10"x10          Table slide scaption             Serratus punches (2#nv) 2x10 2x10 2x10 0# 2x10        Supine flexion nv 2# x10 x10          Supine tricep ext 3x10 3x10 Red x10 supine active 2x10 Active 2x10        Hand placed on table active ext elbow x10 x10           S/l ER 3x10  1# 2x10 1#  2x15 2x15        UBE 2'x2' 2'x2' 2'x2' 2'x2' 2'x2'        Biceps curl 1#  3x10 2#  3x10 2#  3x10          Standing tricep press    Red 2x10 red Modalities

## 2020-04-02 ENCOUNTER — APPOINTMENT (OUTPATIENT)
Dept: PHYSICAL THERAPY | Facility: CLINIC | Age: 55
End: 2020-04-02
Payer: COMMERCIAL

## 2020-04-06 ENCOUNTER — APPOINTMENT (OUTPATIENT)
Dept: PHYSICAL THERAPY | Facility: CLINIC | Age: 55
End: 2020-04-06
Payer: COMMERCIAL

## 2020-04-06 ENCOUNTER — OFFICE VISIT (OUTPATIENT)
Dept: OBGYN CLINIC | Facility: MEDICAL CENTER | Age: 55
End: 2020-04-06
Payer: COMMERCIAL

## 2020-04-06 VITALS — HEIGHT: 66 IN | WEIGHT: 155 LBS | BODY MASS INDEX: 24.91 KG/M2

## 2020-04-06 DIAGNOSIS — S42.492D CLOSED BICONDYLAR FRACTURE OF DISTAL END OF LEFT HUMERUS WITH ROUTINE HEALING, SUBSEQUENT ENCOUNTER: Primary | ICD-10-CM

## 2020-04-06 DIAGNOSIS — G56.02 CARPAL TUNNEL SYNDROME ON LEFT: ICD-10-CM

## 2020-04-06 DIAGNOSIS — G56.22 CUBITAL TUNNEL SYNDROME ON LEFT: ICD-10-CM

## 2020-04-06 PROCEDURE — 99214 OFFICE O/P EST MOD 30 MIN: CPT | Performed by: ORTHOPAEDIC SURGERY

## 2020-04-09 ENCOUNTER — EVALUATION (OUTPATIENT)
Dept: PHYSICAL THERAPY | Facility: CLINIC | Age: 55
End: 2020-04-09
Payer: COMMERCIAL

## 2020-04-09 DIAGNOSIS — G89.18 ACUTE POST-OPERATIVE PAIN: Primary | ICD-10-CM

## 2020-04-09 DIAGNOSIS — M25.522 LEFT ELBOW PAIN: ICD-10-CM

## 2020-04-09 DIAGNOSIS — Z87.81 S/P ORIF (OPEN REDUCTION INTERNAL FIXATION) FRACTURE: ICD-10-CM

## 2020-04-09 DIAGNOSIS — Z98.890 S/P ORIF (OPEN REDUCTION INTERNAL FIXATION) FRACTURE: ICD-10-CM

## 2020-04-09 DIAGNOSIS — S42.492A CLOSED BICONDYLAR FRACTURE OF DISTAL END OF LEFT HUMERUS, INITIAL ENCOUNTER: ICD-10-CM

## 2020-04-09 PROCEDURE — 97110 THERAPEUTIC EXERCISES: CPT | Performed by: PHYSICAL THERAPIST

## 2020-04-09 PROCEDURE — 97140 MANUAL THERAPY 1/> REGIONS: CPT | Performed by: PHYSICAL THERAPIST

## 2020-04-09 PROCEDURE — 97164 PT RE-EVAL EST PLAN CARE: CPT | Performed by: PHYSICAL THERAPIST

## 2020-04-10 ENCOUNTER — OFFICE VISIT (OUTPATIENT)
Dept: PLASTIC SURGERY | Facility: CLINIC | Age: 55
End: 2020-04-10
Payer: COMMERCIAL

## 2020-04-10 DIAGNOSIS — Z85.3 PERSONAL HISTORY OF MALIGNANT NEOPLASM OF BREAST: Primary | ICD-10-CM

## 2020-04-10 DIAGNOSIS — Z98.890 STATUS POST BREAST RECONSTRUCTION: ICD-10-CM

## 2020-04-10 PROCEDURE — 99212 OFFICE O/P EST SF 10 MIN: CPT | Performed by: PHYSICIAN ASSISTANT

## 2020-04-13 ENCOUNTER — TELEMEDICINE (OUTPATIENT)
Dept: PHYSICAL THERAPY | Facility: CLINIC | Age: 55
End: 2020-04-13
Payer: COMMERCIAL

## 2020-04-13 DIAGNOSIS — M25.522 LEFT ELBOW PAIN: ICD-10-CM

## 2020-04-13 DIAGNOSIS — Z98.890 S/P ORIF (OPEN REDUCTION INTERNAL FIXATION) FRACTURE: ICD-10-CM

## 2020-04-13 DIAGNOSIS — Z87.81 S/P ORIF (OPEN REDUCTION INTERNAL FIXATION) FRACTURE: ICD-10-CM

## 2020-04-13 DIAGNOSIS — G89.18 ACUTE POST-OPERATIVE PAIN: Primary | ICD-10-CM

## 2020-04-13 DIAGNOSIS — S42.492A CLOSED BICONDYLAR FRACTURE OF DISTAL END OF LEFT HUMERUS, INITIAL ENCOUNTER: ICD-10-CM

## 2020-04-13 PROCEDURE — 97110 THERAPEUTIC EXERCISES: CPT | Performed by: PHYSICAL THERAPIST

## 2020-04-16 ENCOUNTER — OFFICE VISIT (OUTPATIENT)
Dept: PHYSICAL THERAPY | Facility: CLINIC | Age: 55
End: 2020-04-16
Payer: COMMERCIAL

## 2020-04-16 DIAGNOSIS — Z87.81 S/P ORIF (OPEN REDUCTION INTERNAL FIXATION) FRACTURE: ICD-10-CM

## 2020-04-16 DIAGNOSIS — G89.18 ACUTE POST-OPERATIVE PAIN: Primary | ICD-10-CM

## 2020-04-16 DIAGNOSIS — Z98.890 S/P ORIF (OPEN REDUCTION INTERNAL FIXATION) FRACTURE: ICD-10-CM

## 2020-04-16 DIAGNOSIS — S42.492A CLOSED BICONDYLAR FRACTURE OF DISTAL END OF LEFT HUMERUS, INITIAL ENCOUNTER: ICD-10-CM

## 2020-04-16 DIAGNOSIS — M25.522 LEFT ELBOW PAIN: ICD-10-CM

## 2020-04-16 PROCEDURE — 97112 NEUROMUSCULAR REEDUCATION: CPT | Performed by: PHYSICAL THERAPIST

## 2020-04-16 PROCEDURE — 97140 MANUAL THERAPY 1/> REGIONS: CPT | Performed by: PHYSICAL THERAPIST

## 2020-04-20 ENCOUNTER — OFFICE VISIT (OUTPATIENT)
Dept: PHYSICAL THERAPY | Facility: CLINIC | Age: 55
End: 2020-04-20
Payer: COMMERCIAL

## 2020-04-20 DIAGNOSIS — G89.18 ACUTE POST-OPERATIVE PAIN: Primary | ICD-10-CM

## 2020-04-20 DIAGNOSIS — M25.522 LEFT ELBOW PAIN: ICD-10-CM

## 2020-04-20 DIAGNOSIS — Z87.81 S/P ORIF (OPEN REDUCTION INTERNAL FIXATION) FRACTURE: ICD-10-CM

## 2020-04-20 DIAGNOSIS — S42.492A CLOSED BICONDYLAR FRACTURE OF DISTAL END OF LEFT HUMERUS, INITIAL ENCOUNTER: ICD-10-CM

## 2020-04-20 DIAGNOSIS — Z98.890 S/P ORIF (OPEN REDUCTION INTERNAL FIXATION) FRACTURE: ICD-10-CM

## 2020-04-20 PROCEDURE — 97140 MANUAL THERAPY 1/> REGIONS: CPT

## 2020-04-20 PROCEDURE — 97112 NEUROMUSCULAR REEDUCATION: CPT

## 2020-04-23 ENCOUNTER — OFFICE VISIT (OUTPATIENT)
Dept: PHYSICAL THERAPY | Facility: CLINIC | Age: 55
End: 2020-04-23
Payer: COMMERCIAL

## 2020-04-23 DIAGNOSIS — Z87.81 S/P ORIF (OPEN REDUCTION INTERNAL FIXATION) FRACTURE: ICD-10-CM

## 2020-04-23 DIAGNOSIS — G89.18 ACUTE POST-OPERATIVE PAIN: Primary | ICD-10-CM

## 2020-04-23 DIAGNOSIS — S42.492A CLOSED BICONDYLAR FRACTURE OF DISTAL END OF LEFT HUMERUS, INITIAL ENCOUNTER: ICD-10-CM

## 2020-04-23 DIAGNOSIS — Z98.890 S/P ORIF (OPEN REDUCTION INTERNAL FIXATION) FRACTURE: ICD-10-CM

## 2020-04-23 DIAGNOSIS — M25.522 LEFT ELBOW PAIN: ICD-10-CM

## 2020-04-23 PROCEDURE — 97140 MANUAL THERAPY 1/> REGIONS: CPT | Performed by: PHYSICAL THERAPIST

## 2020-04-23 PROCEDURE — 97110 THERAPEUTIC EXERCISES: CPT | Performed by: PHYSICAL THERAPIST

## 2020-04-27 ENCOUNTER — APPOINTMENT (OUTPATIENT)
Dept: PHYSICAL THERAPY | Facility: CLINIC | Age: 55
End: 2020-04-27
Payer: COMMERCIAL

## 2020-04-30 ENCOUNTER — OFFICE VISIT (OUTPATIENT)
Dept: PHYSICAL THERAPY | Facility: CLINIC | Age: 55
End: 2020-04-30
Payer: COMMERCIAL

## 2020-04-30 DIAGNOSIS — M25.522 LEFT ELBOW PAIN: ICD-10-CM

## 2020-04-30 DIAGNOSIS — Z98.890 S/P ORIF (OPEN REDUCTION INTERNAL FIXATION) FRACTURE: ICD-10-CM

## 2020-04-30 DIAGNOSIS — G89.18 ACUTE POST-OPERATIVE PAIN: Primary | ICD-10-CM

## 2020-04-30 DIAGNOSIS — Z87.81 S/P ORIF (OPEN REDUCTION INTERNAL FIXATION) FRACTURE: ICD-10-CM

## 2020-04-30 DIAGNOSIS — S42.492A CLOSED BICONDYLAR FRACTURE OF DISTAL END OF LEFT HUMERUS, INITIAL ENCOUNTER: ICD-10-CM

## 2020-04-30 PROCEDURE — 97140 MANUAL THERAPY 1/> REGIONS: CPT

## 2020-04-30 PROCEDURE — 97112 NEUROMUSCULAR REEDUCATION: CPT

## 2020-04-30 PROCEDURE — 97110 THERAPEUTIC EXERCISES: CPT

## 2020-05-04 ENCOUNTER — APPOINTMENT (OUTPATIENT)
Dept: PHYSICAL THERAPY | Facility: CLINIC | Age: 55
End: 2020-05-04
Payer: COMMERCIAL

## 2020-05-11 ENCOUNTER — APPOINTMENT (OUTPATIENT)
Dept: PHYSICAL THERAPY | Facility: CLINIC | Age: 55
End: 2020-05-11
Payer: COMMERCIAL

## 2020-05-18 ENCOUNTER — OFFICE VISIT (OUTPATIENT)
Dept: PHYSICAL THERAPY | Facility: CLINIC | Age: 55
End: 2020-05-18
Payer: COMMERCIAL

## 2020-05-18 DIAGNOSIS — M25.522 LEFT ELBOW PAIN: ICD-10-CM

## 2020-05-18 DIAGNOSIS — G89.18 ACUTE POST-OPERATIVE PAIN: Primary | ICD-10-CM

## 2020-05-18 DIAGNOSIS — Z98.890 S/P ORIF (OPEN REDUCTION INTERNAL FIXATION) FRACTURE: ICD-10-CM

## 2020-05-18 DIAGNOSIS — S42.492A CLOSED BICONDYLAR FRACTURE OF DISTAL END OF LEFT HUMERUS, INITIAL ENCOUNTER: ICD-10-CM

## 2020-05-18 DIAGNOSIS — Z87.81 S/P ORIF (OPEN REDUCTION INTERNAL FIXATION) FRACTURE: ICD-10-CM

## 2020-05-18 PROCEDURE — 97110 THERAPEUTIC EXERCISES: CPT | Performed by: PHYSICAL THERAPIST

## 2020-05-18 PROCEDURE — 97140 MANUAL THERAPY 1/> REGIONS: CPT | Performed by: PHYSICAL THERAPIST

## 2020-05-25 ENCOUNTER — APPOINTMENT (OUTPATIENT)
Dept: PHYSICAL THERAPY | Facility: CLINIC | Age: 55
End: 2020-05-25
Payer: COMMERCIAL

## 2020-05-28 ENCOUNTER — OFFICE VISIT (OUTPATIENT)
Dept: PHYSICAL THERAPY | Facility: CLINIC | Age: 55
End: 2020-05-28
Payer: COMMERCIAL

## 2020-05-28 DIAGNOSIS — M25.522 LEFT ELBOW PAIN: ICD-10-CM

## 2020-05-28 DIAGNOSIS — Z98.890 S/P ORIF (OPEN REDUCTION INTERNAL FIXATION) FRACTURE: ICD-10-CM

## 2020-05-28 DIAGNOSIS — G89.18 ACUTE POST-OPERATIVE PAIN: Primary | ICD-10-CM

## 2020-05-28 DIAGNOSIS — Z87.81 S/P ORIF (OPEN REDUCTION INTERNAL FIXATION) FRACTURE: ICD-10-CM

## 2020-05-28 DIAGNOSIS — S42.492A CLOSED BICONDYLAR FRACTURE OF DISTAL END OF LEFT HUMERUS, INITIAL ENCOUNTER: ICD-10-CM

## 2020-05-28 PROCEDURE — 97140 MANUAL THERAPY 1/> REGIONS: CPT

## 2020-05-28 PROCEDURE — 97112 NEUROMUSCULAR REEDUCATION: CPT

## 2020-05-28 PROCEDURE — 97110 THERAPEUTIC EXERCISES: CPT

## 2020-06-01 ENCOUNTER — OFFICE VISIT (OUTPATIENT)
Dept: PHYSICAL THERAPY | Facility: CLINIC | Age: 55
End: 2020-06-01
Payer: COMMERCIAL

## 2020-06-01 DIAGNOSIS — S42.492A CLOSED BICONDYLAR FRACTURE OF DISTAL END OF LEFT HUMERUS, INITIAL ENCOUNTER: ICD-10-CM

## 2020-06-01 DIAGNOSIS — M25.522 LEFT ELBOW PAIN: ICD-10-CM

## 2020-06-01 DIAGNOSIS — Z87.81 S/P ORIF (OPEN REDUCTION INTERNAL FIXATION) FRACTURE: ICD-10-CM

## 2020-06-01 DIAGNOSIS — G89.18 ACUTE POST-OPERATIVE PAIN: Primary | ICD-10-CM

## 2020-06-01 DIAGNOSIS — Z98.890 S/P ORIF (OPEN REDUCTION INTERNAL FIXATION) FRACTURE: ICD-10-CM

## 2020-06-01 PROCEDURE — 97140 MANUAL THERAPY 1/> REGIONS: CPT

## 2020-06-01 PROCEDURE — 97110 THERAPEUTIC EXERCISES: CPT

## 2020-06-04 ENCOUNTER — OFFICE VISIT (OUTPATIENT)
Dept: PHYSICAL THERAPY | Facility: CLINIC | Age: 55
End: 2020-06-04
Payer: COMMERCIAL

## 2020-06-04 DIAGNOSIS — S42.492A CLOSED BICONDYLAR FRACTURE OF DISTAL END OF LEFT HUMERUS, INITIAL ENCOUNTER: ICD-10-CM

## 2020-06-04 DIAGNOSIS — Z87.81 S/P ORIF (OPEN REDUCTION INTERNAL FIXATION) FRACTURE: ICD-10-CM

## 2020-06-04 DIAGNOSIS — M25.522 LEFT ELBOW PAIN: ICD-10-CM

## 2020-06-04 DIAGNOSIS — G89.18 ACUTE POST-OPERATIVE PAIN: Primary | ICD-10-CM

## 2020-06-04 DIAGNOSIS — Z98.890 S/P ORIF (OPEN REDUCTION INTERNAL FIXATION) FRACTURE: ICD-10-CM

## 2020-06-04 PROCEDURE — 97140 MANUAL THERAPY 1/> REGIONS: CPT | Performed by: PHYSICAL THERAPIST

## 2020-06-04 PROCEDURE — 97110 THERAPEUTIC EXERCISES: CPT | Performed by: PHYSICAL THERAPIST

## 2020-06-08 ENCOUNTER — OFFICE VISIT (OUTPATIENT)
Dept: PHYSICAL THERAPY | Facility: CLINIC | Age: 55
End: 2020-06-08
Payer: COMMERCIAL

## 2020-06-08 DIAGNOSIS — S42.492D CLOSED BICONDYLAR FRACTURE OF DISTAL END OF LEFT HUMERUS WITH ROUTINE HEALING, SUBSEQUENT ENCOUNTER: ICD-10-CM

## 2020-06-08 DIAGNOSIS — M25.522 LEFT ELBOW PAIN: ICD-10-CM

## 2020-06-08 DIAGNOSIS — G89.18 ACUTE POST-OPERATIVE PAIN: Primary | ICD-10-CM

## 2020-06-08 DIAGNOSIS — Z87.81 S/P ORIF (OPEN REDUCTION INTERNAL FIXATION) FRACTURE: ICD-10-CM

## 2020-06-08 DIAGNOSIS — Z98.890 S/P ORIF (OPEN REDUCTION INTERNAL FIXATION) FRACTURE: ICD-10-CM

## 2020-06-08 PROCEDURE — 97140 MANUAL THERAPY 1/> REGIONS: CPT | Performed by: PHYSICAL THERAPIST

## 2020-06-08 PROCEDURE — 97110 THERAPEUTIC EXERCISES: CPT | Performed by: PHYSICAL THERAPIST

## 2020-06-10 ENCOUNTER — APPOINTMENT (OUTPATIENT)
Dept: RADIOLOGY | Facility: MEDICAL CENTER | Age: 55
End: 2020-06-10
Payer: COMMERCIAL

## 2020-06-10 ENCOUNTER — APPOINTMENT (OUTPATIENT)
Dept: PHYSICAL THERAPY | Facility: CLINIC | Age: 55
End: 2020-06-10
Payer: COMMERCIAL

## 2020-06-10 ENCOUNTER — OFFICE VISIT (OUTPATIENT)
Dept: PHYSICAL THERAPY | Facility: CLINIC | Age: 55
End: 2020-06-10
Payer: COMMERCIAL

## 2020-06-10 ENCOUNTER — OFFICE VISIT (OUTPATIENT)
Dept: OBGYN CLINIC | Facility: MEDICAL CENTER | Age: 55
End: 2020-06-10
Payer: COMMERCIAL

## 2020-06-10 DIAGNOSIS — S42.492D CLOSED BICONDYLAR FRACTURE OF DISTAL END OF LEFT HUMERUS WITH ROUTINE HEALING, SUBSEQUENT ENCOUNTER: ICD-10-CM

## 2020-06-10 DIAGNOSIS — Z98.890 S/P ORIF (OPEN REDUCTION INTERNAL FIXATION) FRACTURE: ICD-10-CM

## 2020-06-10 DIAGNOSIS — S42.492A CLOSED BICONDYLAR FRACTURE OF DISTAL END OF LEFT HUMERUS, INITIAL ENCOUNTER: ICD-10-CM

## 2020-06-10 DIAGNOSIS — Z87.81 S/P ORIF (OPEN REDUCTION INTERNAL FIXATION) FRACTURE: ICD-10-CM

## 2020-06-10 DIAGNOSIS — G89.18 ACUTE POST-OPERATIVE PAIN: Primary | ICD-10-CM

## 2020-06-10 DIAGNOSIS — M25.522 LEFT ELBOW PAIN: ICD-10-CM

## 2020-06-10 DIAGNOSIS — S42.492D CLOSED BICONDYLAR FRACTURE OF DISTAL END OF LEFT HUMERUS WITH ROUTINE HEALING, SUBSEQUENT ENCOUNTER: Primary | ICD-10-CM

## 2020-06-10 PROCEDURE — 99213 OFFICE O/P EST LOW 20 MIN: CPT | Performed by: ORTHOPAEDIC SURGERY

## 2020-06-10 PROCEDURE — 97140 MANUAL THERAPY 1/> REGIONS: CPT | Performed by: PHYSICAL THERAPIST

## 2020-06-10 PROCEDURE — 73070 X-RAY EXAM OF ELBOW: CPT

## 2020-06-15 ENCOUNTER — OFFICE VISIT (OUTPATIENT)
Dept: PHYSICAL THERAPY | Facility: CLINIC | Age: 55
End: 2020-06-15
Payer: COMMERCIAL

## 2020-06-15 DIAGNOSIS — M25.522 LEFT ELBOW PAIN: ICD-10-CM

## 2020-06-15 DIAGNOSIS — S42.492D CLOSED BICONDYLAR FRACTURE OF DISTAL END OF LEFT HUMERUS WITH ROUTINE HEALING, SUBSEQUENT ENCOUNTER: ICD-10-CM

## 2020-06-15 DIAGNOSIS — G89.18 ACUTE POST-OPERATIVE PAIN: Primary | ICD-10-CM

## 2020-06-15 DIAGNOSIS — S42.492A CLOSED BICONDYLAR FRACTURE OF DISTAL END OF LEFT HUMERUS, INITIAL ENCOUNTER: ICD-10-CM

## 2020-06-15 DIAGNOSIS — Z87.81 S/P ORIF (OPEN REDUCTION INTERNAL FIXATION) FRACTURE: ICD-10-CM

## 2020-06-15 DIAGNOSIS — Z98.890 S/P ORIF (OPEN REDUCTION INTERNAL FIXATION) FRACTURE: ICD-10-CM

## 2020-06-15 PROCEDURE — 97110 THERAPEUTIC EXERCISES: CPT

## 2020-06-15 PROCEDURE — 97140 MANUAL THERAPY 1/> REGIONS: CPT

## 2020-06-15 PROCEDURE — 97112 NEUROMUSCULAR REEDUCATION: CPT

## 2020-06-18 ENCOUNTER — OFFICE VISIT (OUTPATIENT)
Dept: PHYSICAL THERAPY | Facility: CLINIC | Age: 55
End: 2020-06-18
Payer: COMMERCIAL

## 2020-06-18 DIAGNOSIS — M25.522 LEFT ELBOW PAIN: ICD-10-CM

## 2020-06-18 DIAGNOSIS — S42.492A CLOSED BICONDYLAR FRACTURE OF DISTAL END OF LEFT HUMERUS, INITIAL ENCOUNTER: ICD-10-CM

## 2020-06-18 DIAGNOSIS — Z98.890 S/P ORIF (OPEN REDUCTION INTERNAL FIXATION) FRACTURE: ICD-10-CM

## 2020-06-18 DIAGNOSIS — S42.492D CLOSED BICONDYLAR FRACTURE OF DISTAL END OF LEFT HUMERUS WITH ROUTINE HEALING, SUBSEQUENT ENCOUNTER: ICD-10-CM

## 2020-06-18 DIAGNOSIS — G89.18 ACUTE POST-OPERATIVE PAIN: Primary | ICD-10-CM

## 2020-06-18 DIAGNOSIS — Z87.81 S/P ORIF (OPEN REDUCTION INTERNAL FIXATION) FRACTURE: ICD-10-CM

## 2020-06-18 PROCEDURE — 97140 MANUAL THERAPY 1/> REGIONS: CPT | Performed by: PHYSICAL THERAPIST

## 2020-06-18 PROCEDURE — 97112 NEUROMUSCULAR REEDUCATION: CPT | Performed by: PHYSICAL THERAPIST

## 2020-06-18 PROCEDURE — 97110 THERAPEUTIC EXERCISES: CPT | Performed by: PHYSICAL THERAPIST

## 2020-06-22 ENCOUNTER — APPOINTMENT (OUTPATIENT)
Dept: PHYSICAL THERAPY | Facility: CLINIC | Age: 55
End: 2020-06-22
Payer: COMMERCIAL

## 2020-06-23 ENCOUNTER — OFFICE VISIT (OUTPATIENT)
Dept: PHYSICAL THERAPY | Facility: CLINIC | Age: 55
End: 2020-06-23
Payer: COMMERCIAL

## 2020-06-23 DIAGNOSIS — G89.18 ACUTE POST-OPERATIVE PAIN: Primary | ICD-10-CM

## 2020-06-23 DIAGNOSIS — S42.492A CLOSED BICONDYLAR FRACTURE OF DISTAL END OF LEFT HUMERUS, INITIAL ENCOUNTER: ICD-10-CM

## 2020-06-23 DIAGNOSIS — S42.492D CLOSED BICONDYLAR FRACTURE OF DISTAL END OF LEFT HUMERUS WITH ROUTINE HEALING, SUBSEQUENT ENCOUNTER: ICD-10-CM

## 2020-06-23 DIAGNOSIS — M25.522 LEFT ELBOW PAIN: ICD-10-CM

## 2020-06-23 DIAGNOSIS — Z87.81 S/P ORIF (OPEN REDUCTION INTERNAL FIXATION) FRACTURE: ICD-10-CM

## 2020-06-23 DIAGNOSIS — Z98.890 S/P ORIF (OPEN REDUCTION INTERNAL FIXATION) FRACTURE: ICD-10-CM

## 2020-06-23 PROCEDURE — 97110 THERAPEUTIC EXERCISES: CPT | Performed by: PHYSICAL THERAPIST

## 2020-06-23 PROCEDURE — 97140 MANUAL THERAPY 1/> REGIONS: CPT | Performed by: PHYSICAL THERAPIST

## 2020-06-29 ENCOUNTER — APPOINTMENT (OUTPATIENT)
Dept: PHYSICAL THERAPY | Facility: CLINIC | Age: 55
End: 2020-06-29
Payer: COMMERCIAL

## 2020-07-06 ENCOUNTER — OFFICE VISIT (OUTPATIENT)
Dept: PHYSICAL THERAPY | Facility: CLINIC | Age: 55
End: 2020-07-06
Payer: COMMERCIAL

## 2020-07-06 DIAGNOSIS — Z98.890 S/P ORIF (OPEN REDUCTION INTERNAL FIXATION) FRACTURE: ICD-10-CM

## 2020-07-06 DIAGNOSIS — Z87.81 S/P ORIF (OPEN REDUCTION INTERNAL FIXATION) FRACTURE: ICD-10-CM

## 2020-07-06 DIAGNOSIS — S42.492A CLOSED BICONDYLAR FRACTURE OF DISTAL END OF LEFT HUMERUS, INITIAL ENCOUNTER: ICD-10-CM

## 2020-07-06 DIAGNOSIS — M25.522 LEFT ELBOW PAIN: ICD-10-CM

## 2020-07-06 DIAGNOSIS — S42.492D CLOSED BICONDYLAR FRACTURE OF DISTAL END OF LEFT HUMERUS WITH ROUTINE HEALING, SUBSEQUENT ENCOUNTER: ICD-10-CM

## 2020-07-06 DIAGNOSIS — G89.18 ACUTE POST-OPERATIVE PAIN: Primary | ICD-10-CM

## 2020-07-06 PROCEDURE — 97140 MANUAL THERAPY 1/> REGIONS: CPT | Performed by: PHYSICAL THERAPIST

## 2020-07-06 PROCEDURE — 97110 THERAPEUTIC EXERCISES: CPT | Performed by: PHYSICAL THERAPIST

## 2020-07-06 NOTE — PROGRESS NOTES
PT Re-Evaluation     Today's date: 2020  Patient name: Deneen Velasquez  : 1965  MRN: [id number]  Referring provider: Dar Lindsey MD  Dx:   Encounter Diagnosis     ICD-10-CM    1  Acute post-operative pain G89 18    2  S/P ORIF (open reduction internal fixation) fracture Z98 890     Z87 81    3  Closed bicondylar fracture of distal end of left humerus with routine healing, subsequent encounter S42 492D    4  Left elbow pain M25 522    5  Closed bicondylar fracture of distal end of left humerus, initial encounter S42 492A                   Assessment  Assessment details: Deneen Velasquez has been compliant with attending PT and home exercise program since initial eval   Jessica Arechiga  has made improvements in objective data since initial eval but is still limited compared to prior level of function  Jessica Arechiga continues to have gross weakness through out the left UE, and has difficulty with weightbearing in the arm, Jessica Arechiga also has irritation to the median nerve possibly secondary to increased amount of tension in the bicep, or tension with in the surrounding tissue after having the elbow bent for multiple months  Jessica Arechiga continues to have a high fear avoidance as well slowing progress  Jessica Arechiga continues with above listed impairments and would benefit from additional skilled PT to address these deficits to return to prior level of function  Impairments: abnormal muscle firing, abnormal or restricted ROM, activity intolerance, impaired physical strength, lacks appropriate home exercise program, pain with function, scapular dyskinesis, weight-bearing intolerance, poor posture  and poor body mechanics    Symptom irritability: moderateUnderstanding of Dx/Px/POC: good   Prognosis: good    Goals  Short Term Goals: Target Date 30 days  1  Initiate and advance HEP  2  Decrease c/o pain to 4/10 at worst  3  Increase ROM to WNL not met  4  Increase strength by 1 grade met    Long Term Goals: Target Date 90 days  1  Indep with HEP  2   Abolish c/o pain   3  Increase strength to >4/5 through out left UE not met  4  Pt will be able to bear full body weight through the left UE (added 2020) not met  5  Pt will be able to return to full exercise program by time of d/c (added 2020) partially met  6  Pt will be able to wake with out n/t in left hand  (added 2020) met      Plan  Patient would benefit from: skilled PT  Planned modality interventions: cryotherapy  Planned therapy interventions: joint mobilization, manual therapy, patient education, postural training, activity modification, abdominal trunk stabilization, body mechanics training, flexibility, functional ROM exercises, graded exercise, home exercise program, neuromuscular re-education, strengthening, stretching, therapeutic activities, therapeutic exercise, motor coordination training, muscle pump exercises, gait training, balance/weight bearing training and ADL training  Frequency: 2x week  Duration in weeks: 12  Plan of Care beginning date: 2020  Plan of Care expiration date: 2020  Treatment plan discussed with: patient        Subjective Evaluation    History of Present Illness  Mechanism of injury: Pt notes that she continues to use the arm more, but is still very weak and has trouble bearing weight through the extremity  Pt notes that she tried to ride a bike but had trouble with the weight bearing for more than a few minutes  She exercises weekly, but still feels as if the stretching has helped her arm and the guidance on the exercises is neccessary     Pain  Current pain ratin  At best pain ratin  At worst pain ratin  Location: left elbow wrist shoulder  Quality: dull ache, throbbing, tight and discomfort    Patient Goals  Patient goals for therapy: decreased pain, increased motion, independence with ADLs/IADLs, increased strength, return to sport/leisure activities and return to work          Objective     Observations     Left Elbow   Postive for atrophy and incision  Negative for drainage, edema, effusion and spasms  Additional Observation Details  Incision is fully healed and has been cleared for WBAT, and no limitations on weight holding    Palpation   Left   No palpable tenderness to the biceps  Muscle spasm in the brachialis and pronator teres  Tenderness of the biceps, brachialis, brachioradialis, pronator teres, triceps and wrist flexors  Neurological Testing     Sensation     Elbow   Left Elbow   Inact: light touch    Right Elbow   Intact: light touch    Active Range of Motion   Left Shoulder   Flexion: 130 degrees   External rotation BTH: T2 with pain  Internal rotation BTB: L3 with pain    Left Elbow   Flexion: 135 degrees   Extension: 10 degrees   Forearm supination: 75 degrees   Forearm pronation: 90 degrees     Right Elbow   Normal active range of motion    Left Wrist   Wrist flexion: 80 degrees   Wrist extension: 80 degrees   Radial deviation: WFL  Ulnar deviation: WFL      Right Wrist   Normal active range of motion    Passive Range of Motion     Left Elbow   Flexion: 140 degrees with pain  Extension: 8 degrees with pain    Strength/Myotome Testing     Left Shoulder     Planes of Motion   Flexion: 4   Abduction: 4-   External rotation at 0°: 4   External rotation at 45°: 3+   Internal rotation at 0°: 4+   Internal rotation at 45°: 4-     Left Elbow   Flexion: 4  Extension: 3+  Forearm supination: 3+  Forearm pronation: 3+    Right Elbow   Flexion: 4+  Extension: 4    Additional Strength Details  Test  strength nv    Tests     Left Shoulder   Negative ULTT2  Left Elbow   Negative Tinel's sign (cubital tunnel)  Left Wrist/Hand   Negative Tinel's sign (medial nerve)       General Comments:      Elbow Comments                    Precautions: hx of breast cancer and lymphedema  Left elbow fx 11/26/2019, left elbow ORIF 12/11/2019                                Manuals 7/6            Left supination stretch DB            Left radial head mobs DB            Left bicep stm DB                         Neuro Re-Ed                                                                                                        Ther Ex             UBE 3'x3' lv 4 2            Push ups counter x10            shld rows/ ext Silver/black 2x10            tricep ext Green 2x10            Prone tricep ext nv            Bicep curl Blue nv                                      Ther Activity                                       Gait Training                                       Modalities

## 2020-07-07 ENCOUNTER — OFFICE VISIT (OUTPATIENT)
Dept: PLASTIC SURGERY | Facility: CLINIC | Age: 55
End: 2020-07-07

## 2020-07-07 VITALS — TEMPERATURE: 97.7 F | BODY MASS INDEX: 27.56 KG/M2 | WEIGHT: 171.5 LBS | HEIGHT: 66 IN

## 2020-07-07 DIAGNOSIS — L98.9 SKIN LESIONS: Primary | ICD-10-CM

## 2020-07-07 PROCEDURE — RECHECK: Performed by: SURGERY

## 2020-07-07 NOTE — PROGRESS NOTES
DEEDEE presents today with concern regarding 2 lesions of the back, 1 in the mid upper back, 2nd the inferior medial aspect of the right little LD harvest site scar, each of these lesions is approximately 1 cm in diameter, pinkish in color, she recalls basal cell carcinoma at the sites many years ago which were burned by her dermatologist   We talked about the value of biopsying these, given that she will be away on vacation this week we had agreed to defer biopsy for a week or 2  She will schedule another appointment to undergo biopsy of 2 lesions of the back

## 2020-07-13 ENCOUNTER — APPOINTMENT (OUTPATIENT)
Dept: PHYSICAL THERAPY | Facility: CLINIC | Age: 55
End: 2020-07-13
Payer: COMMERCIAL

## 2020-07-15 ENCOUNTER — OFFICE VISIT (OUTPATIENT)
Dept: PHYSICAL THERAPY | Facility: CLINIC | Age: 55
End: 2020-07-15
Payer: COMMERCIAL

## 2020-07-15 DIAGNOSIS — Z98.890 S/P ORIF (OPEN REDUCTION INTERNAL FIXATION) FRACTURE: ICD-10-CM

## 2020-07-15 DIAGNOSIS — Z87.81 S/P ORIF (OPEN REDUCTION INTERNAL FIXATION) FRACTURE: ICD-10-CM

## 2020-07-15 DIAGNOSIS — S42.492A CLOSED BICONDYLAR FRACTURE OF DISTAL END OF LEFT HUMERUS, INITIAL ENCOUNTER: ICD-10-CM

## 2020-07-15 DIAGNOSIS — G89.18 ACUTE POST-OPERATIVE PAIN: Primary | ICD-10-CM

## 2020-07-15 DIAGNOSIS — S42.492D CLOSED BICONDYLAR FRACTURE OF DISTAL END OF LEFT HUMERUS WITH ROUTINE HEALING, SUBSEQUENT ENCOUNTER: ICD-10-CM

## 2020-07-15 DIAGNOSIS — M25.522 LEFT ELBOW PAIN: ICD-10-CM

## 2020-07-15 PROCEDURE — 97112 NEUROMUSCULAR REEDUCATION: CPT

## 2020-07-15 PROCEDURE — 97110 THERAPEUTIC EXERCISES: CPT

## 2020-07-15 PROCEDURE — 97140 MANUAL THERAPY 1/> REGIONS: CPT

## 2020-07-15 NOTE — PROGRESS NOTES
Daily Note     Today's date: 7/15/2020  Patient name: Domonique Kahn  : 1965  MRN: [id number]  Referring provider: Geovanny Real MD  Dx:   Encounter Diagnosis     ICD-10-CM    1  Acute post-operative pain G89 18    2  S/P ORIF (open reduction internal fixation) fracture Z98 890     Z87 81    3  Closed bicondylar fracture of distal end of left humerus with routine healing, subsequent encounter S42 492D    4  Left elbow pain M25 522    5  Closed bicondylar fracture of distal end of left humerus, initial encounter S42 492A        Start Time: 915  Stop Time: 955  Total time in clinic (min): 40 minutes    Subjective: no new co offered  Objective: See treatment diary below      Assessment:   Patient needed to decrease band intensity for ext to blue band as she was fatigued at end of treatment  Is making gains with ROM for elbow extension and less tension is noted through bicep      Plan: Continue per plan of care        Precautions: hx of breast cancer and lymphedema  Left elbow fx 2019, left elbow ORIF 2019                                Manuals 7/6 7/15           Left supination stretch DB DL           Left radial head mobs DB            Left bicep stm DB DL                        Neuro Re-Ed                                                                                                        Ther Ex             UBE 3'x3' lv 4 2 lv 4 2 3'x'           Push ups counter x10 nv           shld rows/ ext Silver/black 2x10 Silver 2x10/blue 2x10           tricep ext Green 2x10 gtb 2x10           Prone tricep ext nv 2x10           Bicep curl Blue nv Blue 2x10                                     Ther Activity                                       Gait Training                                       Modalities

## 2020-07-20 ENCOUNTER — APPOINTMENT (OUTPATIENT)
Dept: PHYSICAL THERAPY | Facility: CLINIC | Age: 55
End: 2020-07-20
Payer: COMMERCIAL

## 2020-07-21 ENCOUNTER — OFFICE VISIT (OUTPATIENT)
Dept: PHYSICAL THERAPY | Facility: CLINIC | Age: 55
End: 2020-07-21
Payer: COMMERCIAL

## 2020-07-21 DIAGNOSIS — M25.522 LEFT ELBOW PAIN: ICD-10-CM

## 2020-07-21 DIAGNOSIS — Z87.81 S/P ORIF (OPEN REDUCTION INTERNAL FIXATION) FRACTURE: ICD-10-CM

## 2020-07-21 DIAGNOSIS — Z98.890 S/P ORIF (OPEN REDUCTION INTERNAL FIXATION) FRACTURE: ICD-10-CM

## 2020-07-21 DIAGNOSIS — G89.18 ACUTE POST-OPERATIVE PAIN: Primary | ICD-10-CM

## 2020-07-21 DIAGNOSIS — S42.492D CLOSED BICONDYLAR FRACTURE OF DISTAL END OF LEFT HUMERUS WITH ROUTINE HEALING, SUBSEQUENT ENCOUNTER: ICD-10-CM

## 2020-07-21 PROCEDURE — 97110 THERAPEUTIC EXERCISES: CPT | Performed by: PHYSICAL THERAPIST

## 2020-07-21 PROCEDURE — 97140 MANUAL THERAPY 1/> REGIONS: CPT | Performed by: PHYSICAL THERAPIST

## 2020-07-22 ENCOUNTER — OFFICE VISIT (OUTPATIENT)
Dept: PLASTIC SURGERY | Facility: HOSPITAL | Age: 55
End: 2020-07-22
Payer: COMMERCIAL

## 2020-07-22 DIAGNOSIS — D49.2 SKIN NEOPLASM: ICD-10-CM

## 2020-07-22 DIAGNOSIS — D49.2 SKIN NEOPLASM: Primary | ICD-10-CM

## 2020-07-22 PROCEDURE — 88342 IMHCHEM/IMCYTCHM 1ST ANTB: CPT | Performed by: PATHOLOGY

## 2020-07-22 PROCEDURE — 11105 PUNCH BX SKIN EA SEP/ADDL: CPT | Performed by: SURGERY

## 2020-07-22 PROCEDURE — 88341 IMHCHEM/IMCYTCHM EA ADD ANTB: CPT | Performed by: PATHOLOGY

## 2020-07-22 PROCEDURE — 99214 OFFICE O/P EST MOD 30 MIN: CPT | Performed by: SURGERY

## 2020-07-22 PROCEDURE — 88305 TISSUE EXAM BY PATHOLOGIST: CPT | Performed by: PATHOLOGY

## 2020-07-22 PROCEDURE — 11104 PUNCH BX SKIN SINGLE LESION: CPT | Performed by: SURGERY

## 2020-07-22 NOTE — PROGRESS NOTES
Assessment/Plan:  Please see HPI  Local anesthesia was administered, punch biopsies were performed of the 2 lesions of the back, mid back and right back, utilizing a 3 mm punch  The areas were closed with 5 O nylon sutures  Band-Aids were applied  The usual post treatment instructions were given and she will be seen again in 10-14 days for suture removal     No problem-specific Assessment & Plan notes found for this encounter  Diagnoses and all orders for this visit:    skin lesion  -     Biopsy  -     Biopsy    Skin neoplasm  -     Biopsy  -     Biopsy          Subjective:  Lesions of back     Patient ID: Ange Nicole is a 50 y o  female  HPI Herbert Aceves has noticed a skin lesion of the midback, it waxes and wanes, is occasionally itchy/bothersome, she does have a history of skin cancer  Examination reveals an approximately 1 cm pinkish plaque of the mid back, a similar, smaller lesion in the central aspect of the right LD donor site scar  Given her history of skin cancer the areas will be biopsied  The following portions of the patient's history were reviewed and updated as appropriate: allergies, current medications, past family history, past medical history, past social history, past surgical history and problem list     Review of Systems   Constitutional: Negative for chills and fever  HENT: Negative for hearing loss  Eyes: Positive for visual disturbance  Negative for discharge  Respiratory: Negative for chest tightness and shortness of breath  Cardiovascular: Negative for chest pain and leg swelling  Gastrointestinal: Negative for blood in stool, constipation, diarrhea and nausea  Genitourinary: Negative for dysuria  Musculoskeletal: Negative for gait problem  Skin: Negative for rash  Allergic/Immunologic: Negative for immunocompromised state  Neurological: Negative for seizures and headaches  Hematological: Does not bruise/bleed easily     Psychiatric/Behavioral: Negative for dysphoric mood  The patient is not nervous/anxious  Objective: There were no vitals taken for this visit  Physical Exam   Constitutional: She is oriented to person, place, and time  She appears well-developed and well-nourished  HENT:   Head: Normocephalic  Eyes: Pupils are equal, round, and reactive to light  Neck: Normal range of motion  Pulmonary/Chest: Effort normal    Abdominal: Soft  Musculoskeletal: Normal range of motion  Neurological: She is alert and oriented to person, place, and time  Skin: Skin is warm  1 cm pink ulcerated plaque midback, 8 mm similar lesion mid LD donor site scar   Psychiatric: She has a normal mood and affect

## 2020-07-22 NOTE — PROGRESS NOTES
Biopsy  Date/Time: 7/22/2020 8:52 AM  Performed by: Vahid Hernandez MD  Authorized by: Vahid Hernandez MD     Procedure Details - Skin Biopsy:     Biopsy tissue type: skin and subcutaneous    Biopsy method: punch biopsy      Body area:  Trunk    Trunk location:  Back    Initial size (mm):  10

## 2020-07-22 NOTE — PROGRESS NOTES
Biopsy  Date/Time: 7/22/2020 8:54 AM  Performed by: Nury Melendrez MD  Authorized by: Nury Melendrez MD     Procedure Details - Skin Biopsy:     Biopsy tissue type: skin    Biopsy method: punch biopsy      Body area:  Trunk    Trunk location:  Back    Initial size (mm):  9

## 2020-07-27 ENCOUNTER — APPOINTMENT (OUTPATIENT)
Dept: PHYSICAL THERAPY | Facility: CLINIC | Age: 55
End: 2020-07-27
Payer: COMMERCIAL

## 2020-08-05 ENCOUNTER — OFFICE VISIT (OUTPATIENT)
Dept: PLASTIC SURGERY | Facility: HOSPITAL | Age: 55
End: 2020-08-05
Payer: COMMERCIAL

## 2020-08-05 ENCOUNTER — HOSPITAL ENCOUNTER (OUTPATIENT)
Facility: HOSPITAL | Age: 55
Discharge: HOME | End: 2020-08-05
Attending: SURGERY | Admitting: SURGERY
Payer: COMMERCIAL

## 2020-08-05 VITALS
WEIGHT: 172 LBS | BODY MASS INDEX: 27.64 KG/M2 | RESPIRATION RATE: 16 BRPM | HEIGHT: 66 IN | TEMPERATURE: 97.5 F | DIASTOLIC BLOOD PRESSURE: 73 MMHG | HEART RATE: 74 BPM | SYSTOLIC BLOOD PRESSURE: 113 MMHG

## 2020-08-05 DIAGNOSIS — Z98.890 STATUS POST BREAST RECONSTRUCTION: ICD-10-CM

## 2020-08-05 DIAGNOSIS — C44.519 BASAL CELL CARCINOMA (BCC) OF BACK: ICD-10-CM

## 2020-08-05 DIAGNOSIS — L98.9 SKIN LESION: ICD-10-CM

## 2020-08-05 DIAGNOSIS — Z85.3 PERSONAL HISTORY OF MALIGNANT NEOPLASM OF BREAST: Primary | ICD-10-CM

## 2020-08-05 PROCEDURE — 99024 POSTOP FOLLOW-UP VISIT: CPT | Performed by: PHYSICIAN ASSISTANT

## 2020-08-05 PROCEDURE — 88305 TISSUE EXAM BY PATHOLOGIST: CPT | Performed by: STUDENT IN AN ORGANIZED HEALTH CARE EDUCATION/TRAINING PROGRAM

## 2020-08-05 PROCEDURE — 11104 PUNCH BX SKIN SINGLE LESION: CPT | Performed by: PHYSICIAN ASSISTANT

## 2020-08-05 NOTE — PROGRESS NOTES
Assessment/Plan:  Sherman Bartlett is a 50year old female who is status post biopsy of 2 lesions of the back  She is also status post bilateral breast mound revision with autologous fat grafting and is interested in further fat grafting  She also reports a new right cheek lesion  Please see HPI  I performed a biopsy of the right cheek today  Her pathology report revealed a benign lesion of the right back and basal cell carcinoma of the mid back  This was discussed with her  I discussed with her excision of the mid back basal cell carcinoma with complex closure as well as bilateral breast mound revision with autologous fat grafting  She understood and agreed  We discussed with the patient the options, benefits, and risks of surgery such as anesthesia, bleeding, infection, scarring and the need for additional procedures  Consent was obtained and all questions answered to her satisfaction  We will plan for surgery at her earliest convenience  Diagnoses and all orders for this visit:    Personal history of malignant neoplasm of breast    Basal cell carcinoma (BCC) of back    Status post breast reconstruction          Subjective:      Patient ID: Travis Butler is a 50 y o  female  HPI   She presents today in follow-up after 2 biopsies of skin lesions on the back  She is also interested in additional fat grafting to the breasts  She reports a new lesion of the right cheek that is scaled and she is concerned about possible basal cell       The following portions of the patient's history were reviewed and updated as appropriate: She  has a past medical history of Abnormal findings on diagnostic imaging of breast, Arthritis, Breast cancer ([facility] [address] ) (2008), Cancer [facility]), Difficulty falling asleep, Gluteal abscess, Inconclusive mammogram due to dense breasts, Limb alert care status, Lobular carcinoma in situ of breast, Nipple discharge, Pain in left foot, Pneumonia (2009), PONV (postoperative nausea and vomiting), and Skin cancer  She  has a past surgical history that includes Mastectomy (Bilateral, 06/06/2014); Breast surgery (Bilateral, 2008, 2013, 2014); [place] tooth extraction; pr osteotomy heel bone (Left, 9/9/2016); pr fusion foot bones,midtarsal,multi (Left, 9/9/2016); pr corrj hallux valgus w/sesmdc w/rescj prox phal (9/9/2016); pr breast reconstruc w lat dorsi flap (Left, 9/7/2017); pr insert breast pros immed after excis (Left, 9/7/2017); Knee arthroscopy (Right, 05/1997); Breast biopsy (05/02/2014); Breast surgery (Left, 07/24/2013); Breast biopsy (05/2013); Breast biopsy (05/2008); Foot arthrodesis; Mohs surgery; Breast lumpectomy (Right, 2008); [place] lymph node biopsy (Bilateral, 06/06/2014); [place] lymph node biopsy (Right, 2008); pr colonoscopy flx dx w/collj spec when pfrmd (N/A, 8/1/2018); Breast reconstruction (Left, 2/21/2019); Chest wall biopsy (Right, 2/21/2019); Skin lesion excision (Left, 2/21/2019); CLOSURE WOUND (N/A, 2/21/2019); Capsulectomy (Left, 2/21/2019); pr recmpl wnd head,fac,hand 2 6-7 5 cm (N/A, 3/7/2019); pr adj tiss xfer lid,nos,ear 10 1-30 sqcm (Right, 3/7/2019); pr full thick grft nos,ear,lid <20 sqcm (Right, 3/7/2019); MOHS RECONSTRUCTION (Right, 3/7/2019); Squamous cell carcinoma excision (N/A, 3/7/2019); pr revise breast reconstruction (Bilateral, 6/27/2019); pr remv tissue for graft othr (Bilateral, 6/27/2019); pr exc skin malig >4 cm trunk,arm,leg (N/A, 6/27/2019); pr recmpl wnd trunk 2 6-7 5 cm (N/A, 6/27/2019); pr revise breast reconstruction (Bilateral, 10/24/2019); Liposuction w/ fat injection (Bilateral, 10/24/2019); and pr open tx monteggia fracture dislocation elbow (Left, 12/11/2019)  Her family history includes No Known Problems in her father and mother  She was adopted  She  reports that she has never smoked  She has never used smokeless tobacco  She reports current alcohol use  She reports that she does not use drugs       Review of Systems   HENT: Negative for hearing loss     Eyes: Negative for visual disturbance  Respiratory: Negative for shortness of breath  Cardiovascular: Negative for chest pain  Gastrointestinal: Negative for abdominal pain, blood in stool, constipation, diarrhea, nausea and vomiting  Genitourinary: Negative for hematuria  Musculoskeletal: Negative for gait problem  Skin:        As per HPI  Neurological: Negative for seizures and headaches  Hematological: Does not bruise/bleed easily  Psychiatric/Behavioral: The patient is not nervous/anxious  Objective: There were no vitals taken for this visit  Physical Exam   Constitutional: She is oriented to person, place, and time  She appears well-developed  No distress  HENT:   Head: Normocephalic and atraumatic  Eyes: Pupils are equal, round, and reactive to light  No scleral icterus  Neck: Neck supple  No tracheal deviation present  No thyromegaly present  Cardiovascular: Normal rate and regular rhythm  Exam reveals no gallop and no friction rub  No murmur heard  Pulmonary/Chest: Effort normal and breath sounds normal  She has no wheezes  She has no rales  Abdominal: Soft  Bowel sounds are normal  She exhibits no distension  There is no abdominal tenderness  There is no rebound and no guarding  Musculoskeletal: Normal range of motion  Lymphadenopathy:     She has no cervical adenopathy  Neurological: She is alert and oriented to person, place, and time  No cranial nerve deficit  Skin:   Back lesion biopsy sites are clean, dry and intact  Sutures removed  Bilateral breasts with implants in place  Skin is soft and supple  No evidence for capsular contracture  She does have some mild hollowing in the upper poles bilaterally  Please see photos from prior visit  Right lateral cheek lesion, scaled in appearance, slightly raised and irregular border  It measures approximately 6 mm  It is located 2 cm from the right ear lobule    And 8 cm from the right lateral corner of mouth  Please see photos

## 2020-08-05 NOTE — PROGRESS NOTES
Biopsy    Date/Time: 8/5/2020 12:41 PM  Performed by: Bobo James PA-C  Authorized by: Bobo James PA-C     Procedure Details - Skin Biopsy:     Biopsy tissue type: skin    Biopsy method: punch biopsy      Body area:  [address] location:  R cheek    Initial size (mm):  6    Malignancy: malignancy unknown

## 2020-08-10 ENCOUNTER — APPOINTMENT (OUTPATIENT)
Dept: PHYSICAL THERAPY | Facility: CLINIC | Age: 55
End: 2020-08-10
Payer: COMMERCIAL

## 2020-08-13 ENCOUNTER — OFFICE VISIT (OUTPATIENT)
Dept: PHYSICAL THERAPY | Facility: CLINIC | Age: 55
End: 2020-08-13
Payer: COMMERCIAL

## 2020-08-13 DIAGNOSIS — S42.492D CLOSED BICONDYLAR FRACTURE OF DISTAL END OF LEFT HUMERUS WITH ROUTINE HEALING, SUBSEQUENT ENCOUNTER: ICD-10-CM

## 2020-08-13 DIAGNOSIS — G89.18 ACUTE POST-OPERATIVE PAIN: Primary | ICD-10-CM

## 2020-08-13 DIAGNOSIS — Z98.890 S/P ORIF (OPEN REDUCTION INTERNAL FIXATION) FRACTURE: ICD-10-CM

## 2020-08-13 DIAGNOSIS — M25.522 LEFT ELBOW PAIN: ICD-10-CM

## 2020-08-13 DIAGNOSIS — S42.492A CLOSED BICONDYLAR FRACTURE OF DISTAL END OF LEFT HUMERUS, INITIAL ENCOUNTER: ICD-10-CM

## 2020-08-13 DIAGNOSIS — Z87.81 S/P ORIF (OPEN REDUCTION INTERNAL FIXATION) FRACTURE: ICD-10-CM

## 2020-08-13 PROCEDURE — 97140 MANUAL THERAPY 1/> REGIONS: CPT

## 2020-08-13 PROCEDURE — 97110 THERAPEUTIC EXERCISES: CPT

## 2020-08-13 NOTE — PROGRESS NOTES
Daily Note     Today's date: 2020  Patient name: Ange Nicole  : 1965  MRN: [id number]  Referring provider: Flavia White MD  Dx:   Encounter Diagnosis     ICD-10-CM    1  Acute post-operative pain  G89 18    2  S/P ORIF (open reduction internal fixation) fracture  Z98 890     Z87 81    3  Closed bicondylar fracture of distal end of left humerus with routine healing, subsequent encounter  S42 492D    4  Left elbow pain  M25 522    5  Closed bicondylar fracture of distal end of left humerus, initial encounter  S42 492A                   Subjective: pt notes that she cont to work at home for strengthening  Objective: See treatment diary below      Assessment: Tolerated treatment with less tightness post treatment in elbow extension  Patient demonstrated fatigue post treatment      Plan: Continue per plan of care        Precautions: hx of breast cancer and lymphedema  Left elbow fx 2019, left elbow ORIF 2019                 Manuals 7/6 7/15 7/21 8/13         Left supination stretch DB DL DB DL         Left radial head mobs DB            Left bicep stm DB DL Db DL         Ktape incision I strip 70% pulls   DB          Neuro Re-Ed                                                                                                        Ther Ex             UBE 3'x3' lv 4 2 lv 4 2 3'x' lv 4 2 3'x'3 lv 4 2 3'x3'         Push ups counter x10 nv           shld rows/ ext Silver/black 2x10 Silver 2x10/blue 2x10 Silver 2x10/blue 2x10 Silver rows 2x10         tricep ext Green 2x10 gtb 2x10 gtb 2x10 gtb 2x10         Prone tricep ext nv 2x10 2*10          Bicep curl Blue nv Blue 2x10 Blue 2x10                                    Ther Activity                                       Gait Training                                       Modalities

## 2020-08-17 ENCOUNTER — APPOINTMENT (OUTPATIENT)
Dept: LAB | Facility: CLINIC | Age: 55
End: 2020-08-17
Payer: COMMERCIAL

## 2020-08-17 DIAGNOSIS — S42.492A CLOSED BICONDYLAR FRACTURE OF DISTAL END OF LEFT HUMERUS, INITIAL ENCOUNTER: ICD-10-CM

## 2020-08-17 DIAGNOSIS — Z85.3 PERSONAL HISTORY OF MALIGNANT NEOPLASM OF BREAST: Primary | ICD-10-CM

## 2020-08-17 DIAGNOSIS — Z85.3 PERSONAL HISTORY OF MALIGNANT NEOPLASM OF BREAST: ICD-10-CM

## 2020-08-17 DIAGNOSIS — C44.519 BASAL CELL CARCINOMA (BCC) OF BACK: ICD-10-CM

## 2020-08-17 LAB
ANION GAP SERPL CALCULATED.3IONS-SCNC: 6 MMOL/L (ref 4–13)
BASOPHILS # BLD AUTO: 0.02 THOUSANDS/ΜL (ref 0–0.1)
BASOPHILS NFR BLD AUTO: 0 % (ref 0–1)
BUN SERPL-MCNC: 14 MG/DL (ref 5–25)
CALCIUM SERPL-MCNC: 9.1 MG/DL (ref 8.3–10.1)
CHLORIDE SERPL-SCNC: 107 MMOL/L (ref 100–108)
CO2 SERPL-SCNC: 28 MMOL/L (ref 21–32)
CREAT SERPL-MCNC: 0.96 MG/DL (ref 0.6–1.3)
EOSINOPHIL # BLD AUTO: 0.03 THOUSAND/ΜL (ref 0–0.61)
EOSINOPHIL NFR BLD AUTO: 1 % (ref 0–6)
ERYTHROCYTE [DISTWIDTH] IN BLOOD BY AUTOMATED COUNT: 12 % (ref 11.6–15.1)
GFR SERPL CREATININE-BSD FRML MDRD: 68 ML/MIN/1.73SQ M
GLUCOSE SERPL-MCNC: 94 MG/DL (ref 65–140)
HCT VFR BLD AUTO: 37.9 % (ref 34.8–46.1)
HGB BLD-MCNC: 12.1 G/DL (ref 11.5–15.4)
IMM GRANULOCYTES # BLD AUTO: 0.01 THOUSAND/UL (ref 0–0.2)
IMM GRANULOCYTES NFR BLD AUTO: 0 % (ref 0–2)
LYMPHOCYTES # BLD AUTO: 1.94 THOUSANDS/ΜL (ref 0.6–4.47)
LYMPHOCYTES NFR BLD AUTO: 40 % (ref 14–44)
MCH RBC QN AUTO: 29.8 PG (ref 26.8–34.3)
MCHC RBC AUTO-ENTMCNC: 31.9 G/DL (ref 31.4–37.4)
MCV RBC AUTO: 93 FL (ref 82–98)
MONOCYTES # BLD AUTO: 0.28 THOUSAND/ΜL (ref 0.17–1.22)
MONOCYTES NFR BLD AUTO: 6 % (ref 4–12)
NEUTROPHILS # BLD AUTO: 2.58 THOUSANDS/ΜL (ref 1.85–7.62)
NEUTS SEG NFR BLD AUTO: 53 % (ref 43–75)
NRBC BLD AUTO-RTO: 0 /100 WBCS
PLATELET # BLD AUTO: 197 THOUSANDS/UL (ref 149–390)
PMV BLD AUTO: 12 FL (ref 8.9–12.7)
POTASSIUM SERPL-SCNC: 4.5 MMOL/L (ref 3.5–5.3)
RBC # BLD AUTO: 4.06 MILLION/UL (ref 3.81–5.12)
SODIUM SERPL-SCNC: 141 MMOL/L (ref 136–145)
WBC # BLD AUTO: 4.86 THOUSAND/UL (ref 4.31–10.16)

## 2020-08-17 PROCEDURE — 36415 COLL VENOUS BLD VENIPUNCTURE: CPT

## 2020-08-17 PROCEDURE — 85025 COMPLETE CBC W/AUTO DIFF WBC: CPT

## 2020-08-17 PROCEDURE — 80048 BASIC METABOLIC PNL TOTAL CA: CPT

## 2020-08-17 RX ORDER — HYDROCODONE BITARTRATE AND ACETAMINOPHEN 5; 325 MG/1; MG/1
1 TABLET ORAL EVERY 6 HOURS PRN
Qty: 18 TABLET | Refills: 0 | Status: SHIPPED | OUTPATIENT
Start: 2020-08-17 | End: 2020-09-28 | Stop reason: ALTCHOICE

## 2020-08-17 RX ORDER — OXYCODONE HYDROCHLORIDE AND ACETAMINOPHEN 5; 325 MG/1; MG/1
1 TABLET ORAL EVERY 4 HOURS PRN
Qty: 18 TABLET | Refills: 0 | Status: SHIPPED | OUTPATIENT
Start: 2020-08-17 | End: 2020-09-28 | Stop reason: ALTCHOICE

## 2020-08-18 ENCOUNTER — APPOINTMENT (OUTPATIENT)
Dept: PHYSICAL THERAPY | Facility: CLINIC | Age: 55
End: 2020-08-18
Payer: COMMERCIAL

## 2020-09-01 PROCEDURE — 99024 POSTOP FOLLOW-UP VISIT: CPT | Performed by: PHYSICIAN ASSISTANT

## 2020-09-01 RX ORDER — SODIUM CHLORIDE, SODIUM LACTATE, POTASSIUM CHLORIDE, CALCIUM CHLORIDE 600; 310; 30; 20 MG/100ML; MG/100ML; MG/100ML; MG/100ML
125 INJECTION, SOLUTION INTRAVENOUS CONTINUOUS
Status: CANCELLED | OUTPATIENT
Start: 2020-09-01

## 2020-09-01 RX ORDER — CEFAZOLIN SODIUM 1 G/50ML
1000 SOLUTION INTRAVENOUS ONCE
Status: CANCELLED | OUTPATIENT
Start: 2020-09-01 | End: 2020-09-01

## 2020-09-09 ENCOUNTER — NURSE TRIAGE (OUTPATIENT)
Dept: OTHER | Facility: OTHER | Age: 55
End: 2020-09-09

## 2020-09-09 DIAGNOSIS — Z20.828 EXPOSURE TO SARS-ASSOCIATED CORONAVIRUS: Primary | ICD-10-CM

## 2020-09-09 NOTE — TELEPHONE ENCOUNTER
Pt was scheduled for surgery tomorrow   PAT order removed and new order for testing entered due to close COVID contact

## 2020-09-09 NOTE — TELEPHONE ENCOUNTER
Reason for Disposition   [1] COVID-19 EXPOSURE (Close Contact) within last 14 days AND [2] needs COVID-19 lab test to return to work AND [3] NO symptoms    Additional Information   [1] Symptoms of COVID-19 (e g , cough, fever, SOB, or others) AND [2] within 14 days of EXPOSURE (close contact) with diagnosed or suspected COVID-19 patient   [1] COVID-19 exposure AND [2] no symptoms    Answer Assessment - Initial Assessment Questions  1  CLOSE CONTACT: "Who is the person with the confirmed or suspected COVID-19 infection that you were exposed to?"      friend  2  PLACE of CONTACT: "Where were you when you were exposed to COVID-19?" (e g , home, school, medical waiting room; which city?)     [place]   3  TYPE of CONTACT: "How much contact was there?" (e g , sitting next to, live in same house, work in same office, same building)     Dinner and home  4  DURATION of CONTACT: "How long were you in contact with the COVID-19 patient?" (e g , a few seconds, passed by person, a few minutes, live with the patient)      hours  5  DATE of CONTACT: "When did you have contact with a COVID-19 patient?" (e g , how many days ago)     saturday  6  TRAVEL: "Have you traveled out of the country recently?" If so, "When and where?"      * Also ask about out-of-state travel, since the CDC has identified some high-risk cities for community spread in the [address] [place] [address],[address]  * Note: Travel becomes less relevant if there is widespread community transmission where the patient lives  denies  7  COMMUNITY SPREAD: "Are there lots of cases of COVID-19 (community spread) where you live?" (See public health department website, if unsure)        [place]  8  SYMPTOMS: "Do you have any symptoms?" (e g , fever, cough, breathing difficulty)      none    9  HIGH RISK: "Do you have any heart or lung problems?  Do you have a weak immune system?" (e g , CHF, COPD, asthma, HIV positive, chemotherapy, renal failure, diabetes mellitus, sickle cell anemia) Cancer survivor    Protocols used: CORONAVIRUS (NGSIO-00) EXPOSURE-ADULT-[place], CORONAVIRUS (COVID-19) DIAGNOSED OR SUSPECTED-ADULT-[place]

## 2020-09-09 NOTE — TELEPHONE ENCOUNTER
Regarding: COVID CONCERN  ----- Message from Bernice Sears sent at 9/9/2020  2:46 PM EDT -----  "I had close exposure to someone with covid

## 2020-09-10 DIAGNOSIS — Z20.828 EXPOSURE TO SARS-ASSOCIATED CORONAVIRUS: ICD-10-CM

## 2020-09-10 PROCEDURE — U0003 INFECTIOUS AGENT DETECTION BY NUCLEIC ACID (DNA OR RNA); SEVERE ACUTE RESPIRATORY SYNDROME CORONAVIRUS 2 (SARS-COV-2) (CORONAVIRUS DISEASE [COVID-19]), AMPLIFIED PROBE TECHNIQUE, MAKING USE OF HIGH THROUGHPUT TECHNOLOGIES AS DESCRIBED BY CMS-2020-01-R: HCPCS | Performed by: FAMILY MEDICINE

## 2020-09-11 LAB — SARS-COV-2 RNA SPEC QL NAA+PROBE: NOT DETECTED

## 2020-09-17 DIAGNOSIS — L57.0 ACTINIC KERATOSIS OF RIGHT CHEEK: Primary | ICD-10-CM

## 2020-09-17 RX ORDER — CEPHALEXIN 500 MG/1
500 CAPSULE ORAL EVERY 6 HOURS SCHEDULED
Qty: 28 CAPSULE | Refills: 0 | Status: SHIPPED | OUTPATIENT
Start: 2020-09-17 | End: 2020-09-24

## 2020-09-23 NOTE — PROGRESS NOTES
Assessment/Plan:    AUB on tamoxifen - will schedule TVS/SIS/EMB    Dyspareunia/vaginal atrophy - patient given information on [facility]  Advised to use lubricant with intercourse  Will continue breast health with breast care team    ASCCP guidelines reviewed and pap with cotesting done today  Colonoscopy noted to be up to date  The patient denies STI risk factors and declines testing at this time  Reviewed diet/activity recommendations Calcium 8794-2599 mg and Vit D 600-1000 IU daily  Discussed perimenopausal/postmenopausal considerations and symptoms to report  Kegel exercises as instructed  RTO in one year for routine annual gyn exam or sooner PRN  Diagnoses and all orders for this visit:    History of breast cancer    Encounter for gynecological examination (general) (routine) with abnormal findings    Encounter for gynecological examination with Papanicolaou smear of cervix  -     Liquid-based pap, screening    Abnormal uterine bleeding (AUB)        Subjective:      Patient ID: Silviano Cota is a 50 y o  female  This patient presents for new patient annual gyn exam   She is s/p B/L breast cancer with mastectomy and reconstruction  She is BRCA negative  She is on tamoxifen  She reports regular menses until a year ago  Since that time, she has gone 4-5 months without menses and then had a period with heavy bleeding for the 1st 2 days  She believes that was in April  She also reports pain/dryness with intercourse  She is newly   She denies acute gyn complaints  She denies  bleeding or spotting, VM sx, pelvic pain, dyspareunia, breast concerns, abnormal discharge, bowel/bladder dysfunction, depression/anx  , sexually active and is monogamous  Denies STI concerns  No hx of STIs  Last pap done about 2 years ago, patient reports it was normal    Colonoscopy up to date and normal, done at age 48          The following portions of the patient's history were reviewed and updated as appropriate: allergies, current medications, past family history, past medical history, past social history, past surgical history and problem list     Review of Systems   Constitutional: Negative  HENT: Negative  Respiratory: Negative  Cardiovascular: Negative  Gastrointestinal: Negative  Endocrine: Negative  Genitourinary: Positive for dyspareunia and menstrual problem  Negative for difficulty urinating, dysuria, frequency, pelvic pain, urgency, vaginal bleeding, vaginal discharge and vaginal pain  Musculoskeletal: Negative  Skin: Negative  Neurological: Negative  Psychiatric/Behavioral: Negative  Objective:      /73   Ht 5' 6 2" (1 681 m)   Wt 82 8 kg (182 lb 9 6 oz)   BMI 29 29 kg/m²          Physical Exam  Vitals signs and nursing note reviewed  Exam conducted with a chaperone present  Constitutional:       Appearance: Normal appearance  She is well-developed  HENT:      Head: Normocephalic and atraumatic  Neck:      Musculoskeletal: Normal range of motion and neck supple  Thyroid: No thyroid mass or thyromegaly  Cardiovascular:      Rate and Rhythm: Normal rate and regular rhythm  Heart sounds: Normal heart sounds  Pulmonary:      Effort: Pulmonary effort is normal       Breath sounds: Normal breath sounds  Chest:      Breasts: Breasts are symmetrical          Right: Skin change (scarring noted from mastectomy and reconstruction) present  No mass or tenderness  Left: Skin change (scarring noted from mastectomy and reconstruction) present  No mass or tenderness  Abdominal:      General: Bowel sounds are normal       Palpations: Abdomen is soft  Tenderness: There is no abdominal tenderness  Hernia: There is no hernia in the left inguinal area or right inguinal area  Genitourinary:     General: Normal vulva  Exam position: Supine  Pubic Area: No rash         Labia:         Right: No rash, tenderness, lesion or injury  Left: No rash, tenderness, lesion or injury  Urethra: No prolapse, urethral pain, urethral swelling or urethral lesion  Vagina: Normal  No signs of injury and foreign body  No vaginal discharge, erythema, tenderness, bleeding, lesions or prolapsed vaginal walls  Cervix: No cervical motion tenderness, discharge, friability, lesion, erythema, cervical bleeding or eversion  Uterus: Not deviated, not enlarged, not fixed, not tender and no uterine prolapse  Adnexa:         Right: No mass, tenderness or fullness  Left: No mass, tenderness or fullness  Rectum: No external hemorrhoid  Comments: Urethra normal without lesions  No bladder tenderness  Mild to moderate VVA  Musculoskeletal: Normal range of motion  Lymphadenopathy:      Lower Body: No right inguinal adenopathy  No left inguinal adenopathy  Skin:     General: Skin is warm and dry  Neurological:      Mental Status: She is alert and oriented to person, place, and time  Psychiatric:         Speech: Speech normal          Behavior: Behavior normal  Behavior is cooperative

## 2020-09-28 ENCOUNTER — OFFICE VISIT (OUTPATIENT)
Dept: GYNECOLOGY | Facility: CLINIC | Age: 55
End: 2020-09-28
Payer: COMMERCIAL

## 2020-09-28 VITALS
BODY MASS INDEX: 29.35 KG/M2 | HEIGHT: 66 IN | DIASTOLIC BLOOD PRESSURE: 73 MMHG | WEIGHT: 182.6 LBS | SYSTOLIC BLOOD PRESSURE: 113 MMHG

## 2020-09-28 DIAGNOSIS — Z01.411 ENCOUNTER FOR GYNECOLOGICAL EXAMINATION (GENERAL) (ROUTINE) WITH ABNORMAL FINDINGS: ICD-10-CM

## 2020-09-28 DIAGNOSIS — N93.9 ABNORMAL UTERINE BLEEDING (AUB): ICD-10-CM

## 2020-09-28 DIAGNOSIS — Z85.3 HISTORY OF BREAST CANCER: Primary | ICD-10-CM

## 2020-09-28 DIAGNOSIS — Z01.419 ENCOUNTER FOR GYNECOLOGICAL EXAMINATION WITH PAPANICOLAOU SMEAR OF CERVIX: ICD-10-CM

## 2020-09-28 PROCEDURE — 87624 HPV HI-RISK TYP POOLED RSLT: CPT | Performed by: OBSTETRICS & GYNECOLOGY

## 2020-09-28 PROCEDURE — 99386 PREV VISIT NEW AGE 40-64: CPT | Performed by: OBSTETRICS & GYNECOLOGY

## 2020-09-28 PROCEDURE — G0145 SCR C/V CYTO,THINLAYER,RESCR: HCPCS | Performed by: OBSTETRICS & GYNECOLOGY

## 2020-09-29 ENCOUNTER — TELEPHONE (OUTPATIENT)
Dept: PODIATRY | Facility: CLINIC | Age: 55
End: 2020-09-29

## 2020-09-29 NOTE — TELEPHONE ENCOUNTER
NIURKA for patient explaining I had to RS her appointment and instructed her to call the office to reschedule

## 2020-10-05 LAB
HPV HR 12 DNA CVX QL NAA+PROBE: NEGATIVE
HPV16 DNA CVX QL NAA+PROBE: NEGATIVE
HPV18 DNA CVX QL NAA+PROBE: NEGATIVE

## 2020-10-06 LAB
LAB AP GYN PRIMARY INTERPRETATION: NORMAL
Lab: NORMAL

## 2020-10-07 ENCOUNTER — OFFICE VISIT (OUTPATIENT)
Dept: PODIATRY | Facility: CLINIC | Age: 55
End: 2020-10-07
Payer: COMMERCIAL

## 2020-10-07 VITALS — TEMPERATURE: 98 F | WEIGHT: 182 LBS | HEIGHT: 66 IN | BODY MASS INDEX: 29.25 KG/M2

## 2020-10-07 DIAGNOSIS — M20.12 VALGUS DEFORMITY OF BOTH GREAT TOES: ICD-10-CM

## 2020-10-07 DIAGNOSIS — S96.112A STRAIN OF MUSCLE AND TENDON OF LONG EXTENSOR MUSCLE OF TOE AT ANKLE AND FOOT LEVEL, LEFT FOOT, INITIAL ENCOUNTER: Primary | ICD-10-CM

## 2020-10-07 DIAGNOSIS — M76.822 POSTERIOR TIBIALIS TENDINITIS OF BOTH LOWER EXTREMITIES: ICD-10-CM

## 2020-10-07 DIAGNOSIS — M20.11 VALGUS DEFORMITY OF BOTH GREAT TOES: ICD-10-CM

## 2020-10-07 DIAGNOSIS — M21.42 PES PLANUS OF BOTH FEET: ICD-10-CM

## 2020-10-07 DIAGNOSIS — M21.41 PES PLANUS OF BOTH FEET: ICD-10-CM

## 2020-10-07 DIAGNOSIS — M76.821 POSTERIOR TIBIALIS TENDINITIS OF BOTH LOWER EXTREMITIES: ICD-10-CM

## 2020-10-07 PROCEDURE — 99213 OFFICE O/P EST LOW 20 MIN: CPT | Performed by: PODIATRIST

## 2020-10-08 ENCOUNTER — TELEPHONE (OUTPATIENT)
Dept: PODIATRY | Facility: CLINIC | Age: 55
End: 2020-10-08

## 2020-10-14 ENCOUNTER — OFFICE VISIT (OUTPATIENT)
Dept: GYNECOLOGY | Facility: CLINIC | Age: 55
End: 2020-10-14
Payer: COMMERCIAL

## 2020-10-14 ENCOUNTER — ULTRASOUND (OUTPATIENT)
Dept: GYNECOLOGY | Facility: CLINIC | Age: 55
End: 2020-10-14
Payer: COMMERCIAL

## 2020-10-14 DIAGNOSIS — N93.9 ABNORMAL UTERINE BLEEDING (AUB): Primary | ICD-10-CM

## 2020-10-14 DIAGNOSIS — R93.89 ENDOMETRIAL THICKENING ON ULTRASOUND: ICD-10-CM

## 2020-10-14 DIAGNOSIS — Z79.810 USE OF TAMOXIFEN (NOLVADEX): ICD-10-CM

## 2020-10-14 PROCEDURE — 76831 ECHO EXAM UTERUS: CPT | Performed by: OBSTETRICS & GYNECOLOGY

## 2020-10-14 PROCEDURE — 88305 TISSUE EXAM BY PATHOLOGIST: CPT | Performed by: PATHOLOGY

## 2020-10-14 PROCEDURE — 58100 BIOPSY OF UTERUS LINING: CPT | Performed by: OBSTETRICS & GYNECOLOGY

## 2020-10-14 PROCEDURE — 76830 TRANSVAGINAL US NON-OB: CPT | Performed by: OBSTETRICS & GYNECOLOGY

## 2020-10-14 PROCEDURE — 58340 CATHETER FOR HYSTEROGRAPHY: CPT | Performed by: OBSTETRICS & GYNECOLOGY

## 2020-10-14 PROCEDURE — 99213 OFFICE O/P EST LOW 20 MIN: CPT | Performed by: OBSTETRICS & GYNECOLOGY

## 2020-10-15 ENCOUNTER — TELEPHONE (OUTPATIENT)
Dept: GYNECOLOGY | Facility: CLINIC | Age: 55
End: 2020-10-15

## 2020-10-19 ENCOUNTER — TELEPHONE (OUTPATIENT)
Dept: GYNECOLOGY | Facility: CLINIC | Age: 55
End: 2020-10-19

## 2020-10-23 ENCOUNTER — TELEPHONE (OUTPATIENT)
Dept: GYNECOLOGY | Facility: CLINIC | Age: 55
End: 2020-10-23

## 2020-10-26 ENCOUNTER — TELEPHONE (OUTPATIENT)
Dept: GYNECOLOGY | Facility: CLINIC | Age: 55
End: 2020-10-26

## 2020-11-04 ENCOUNTER — TELEPHONE (OUTPATIENT)
Dept: OBGYN CLINIC | Facility: CLINIC | Age: 55
End: 2020-11-04

## 2020-11-04 ENCOUNTER — TELEPHONE (OUTPATIENT)
Dept: GYNECOLOGY | Facility: CLINIC | Age: 55
End: 2020-11-04

## 2020-11-04 DIAGNOSIS — R93.89 ENDOMETRIAL THICKENING ON ULTRASOUND: Primary | ICD-10-CM

## 2020-11-09 ENCOUNTER — TELEPHONE (OUTPATIENT)
Dept: HEMATOLOGY ONCOLOGY | Facility: CLINIC | Age: 55
End: 2020-11-09

## 2020-11-09 DIAGNOSIS — Z85.3 PERSONAL HISTORY OF MALIGNANT NEOPLASM OF BREAST: Primary | ICD-10-CM

## 2020-11-09 RX ORDER — CEPHALEXIN 500 MG/1
500 CAPSULE ORAL EVERY 6 HOURS SCHEDULED
Qty: 28 CAPSULE | Refills: 0 | Status: SHIPPED | OUTPATIENT
Start: 2020-11-09 | End: 2020-11-16

## 2020-11-24 ENCOUNTER — TELEPHONE (OUTPATIENT)
Dept: PODIATRY | Facility: CLINIC | Age: 55
End: 2020-11-24

## 2020-12-01 ENCOUNTER — HOSPITAL ENCOUNTER (OUTPATIENT)
Dept: ULTRASOUND IMAGING | Facility: HOSPITAL | Age: 55
Discharge: HOME | End: 2020-12-01
Payer: COMMERCIAL

## 2020-12-01 DIAGNOSIS — R93.89 ENDOMETRIAL THICKENING ON ULTRASOUND: ICD-10-CM

## 2020-12-01 PROCEDURE — 76830 TRANSVAGINAL US NON-OB: CPT

## 2020-12-01 PROCEDURE — 76856 US EXAM PELVIC COMPLETE: CPT

## 2020-12-04 ENCOUNTER — TELEPHONE (OUTPATIENT)
Dept: OBGYN CLINIC | Facility: CLINIC | Age: 55
End: 2020-12-04

## 2020-12-30 ENCOUNTER — APPOINTMENT (OUTPATIENT)
Dept: LAB | Facility: CLINIC | Age: 55
End: 2020-12-30
Payer: COMMERCIAL

## 2020-12-30 DIAGNOSIS — Z85.3 PERSONAL HISTORY OF MALIGNANT NEOPLASM OF BREAST: ICD-10-CM

## 2020-12-30 DIAGNOSIS — C44.519 BASAL CELL CARCINOMA (BCC) OF BACK: ICD-10-CM

## 2020-12-30 LAB
ANION GAP SERPL CALCULATED.3IONS-SCNC: 5 MMOL/L (ref 4–13)
BASOPHILS # BLD AUTO: 0.02 THOUSANDS/ΜL (ref 0–0.1)
BASOPHILS NFR BLD AUTO: 1 % (ref 0–1)
BUN SERPL-MCNC: 16 MG/DL (ref 5–25)
CALCIUM SERPL-MCNC: 9.6 MG/DL (ref 8.3–10.1)
CHLORIDE SERPL-SCNC: 109 MMOL/L (ref 100–108)
CO2 SERPL-SCNC: 29 MMOL/L (ref 21–32)
CREAT SERPL-MCNC: 0.7 MG/DL (ref 0.6–1.3)
EOSINOPHIL # BLD AUTO: 0.04 THOUSAND/ΜL (ref 0–0.61)
EOSINOPHIL NFR BLD AUTO: 1 % (ref 0–6)
ERYTHROCYTE [DISTWIDTH] IN BLOOD BY AUTOMATED COUNT: 11.8 % (ref 11.6–15.1)
GFR SERPL CREATININE-BSD FRML MDRD: 99 ML/MIN/1.73SQ M
GLUCOSE SERPL-MCNC: 96 MG/DL (ref 65–140)
HCT VFR BLD AUTO: 39 % (ref 34.8–46.1)
HGB BLD-MCNC: 12.6 G/DL (ref 11.5–15.4)
IMM GRANULOCYTES # BLD AUTO: 0.02 THOUSAND/UL (ref 0–0.2)
IMM GRANULOCYTES NFR BLD AUTO: 1 % (ref 0–2)
LYMPHOCYTES # BLD AUTO: 1.88 THOUSANDS/ΜL (ref 0.6–4.47)
LYMPHOCYTES NFR BLD AUTO: 43 % (ref 14–44)
MCH RBC QN AUTO: 29.9 PG (ref 26.8–34.3)
MCHC RBC AUTO-ENTMCNC: 32.3 G/DL (ref 31.4–37.4)
MCV RBC AUTO: 93 FL (ref 82–98)
MONOCYTES # BLD AUTO: 0.39 THOUSAND/ΜL (ref 0.17–1.22)
MONOCYTES NFR BLD AUTO: 9 % (ref 4–12)
NEUTROPHILS # BLD AUTO: 2.06 THOUSANDS/ΜL (ref 1.85–7.62)
NEUTS SEG NFR BLD AUTO: 45 % (ref 43–75)
NRBC BLD AUTO-RTO: 0 /100 WBCS
PLATELET # BLD AUTO: 164 THOUSANDS/UL (ref 149–390)
PMV BLD AUTO: 11.7 FL (ref 8.9–12.7)
POTASSIUM SERPL-SCNC: 4.3 MMOL/L (ref 3.5–5.3)
RBC # BLD AUTO: 4.21 MILLION/UL (ref 3.81–5.12)
SODIUM SERPL-SCNC: 143 MMOL/L (ref 136–145)
WBC # BLD AUTO: 4.41 THOUSAND/UL (ref 4.31–10.16)

## 2020-12-30 PROCEDURE — 85025 COMPLETE CBC W/AUTO DIFF WBC: CPT

## 2020-12-30 PROCEDURE — 36415 COLL VENOUS BLD VENIPUNCTURE: CPT

## 2020-12-30 PROCEDURE — 80048 BASIC METABOLIC PNL TOTAL CA: CPT

## 2020-12-31 PROCEDURE — NC001 PR NO CHARGE: Performed by: PHYSICIAN ASSISTANT

## 2020-12-31 RX ORDER — FAMOTIDINE 20 MG/1
20 TABLET, FILM COATED ORAL 2 TIMES DAILY PRN
COMMUNITY

## 2020-12-31 RX ORDER — OMEPRAZOLE 20 MG/1
20 CAPSULE, DELAYED RELEASE ORAL AS NEEDED
COMMUNITY

## 2020-12-31 NOTE — H&P
Gurdeep Green is a 50year old female who is status post biopsy of 2 lesions of the back  She is also status post bilateral breast mound revision with autologous fat grafting and is interested in further fat grafting  She also reports a new right cheek lesion  Please see HPI  I performed a biopsy of the right cheek today  Her pathology report revealed a benign lesion of the right back and basal cell carcinoma of the mid back   This was discussed with her   I discussed with her excision of the mid back basal cell carcinoma with complex closure as well as bilateral breast mound revision with autologous fat grafting   She understood and agreed  Calvin Currie discussed with the patient the options, benefits, and risks of surgery such as anesthesia, bleeding, infection, scarring and the need for additional procedures   Consent was obtained and all questions answered to her satisfaction   We will plan for surgery at her earliest convenience       Diagnoses and all orders for this visit:     Personal history of malignant neoplasm of breast     Basal cell carcinoma (BCC) of back     Status post breast reconstruction            Subjective:       Patient ID: Tess Navarrete is a 50 y  o  female      HPI   She presents today in follow-up after 2 biopsies of skin lesions on the back   She is also interested in additional fat grafting to the breasts  She reports a new lesion of the right cheek that is scaled and she is concerned about possible basal cell       The following portions of the patient's history were reviewed and updated as appropriate: She  has a past medical history of Abnormal findings on diagnostic imaging of breast, Arthritis, Breast cancer ([facility] [address] ) (2008), Cancer [facility]), Difficulty falling asleep, Gluteal abscess, Inconclusive mammogram due to dense breasts, Limb alert care status, Lobular carcinoma in situ of breast, Nipple discharge, Pain in left foot, Pneumonia (2009), PONV (postoperative nausea and vomiting), and Skin cancer    She  has a past surgical history that includes Mastectomy (Bilateral, 06/06/2014); Breast surgery (Bilateral, 2008, 2013, 2014); [place] tooth extraction; pr osteotomy heel bone (Left, 9/9/2016); pr fusion foot bones,midtarsal,multi (Left, 9/9/2016); pr corrj hallux valgus w/sesmdc w/rescj prox phal (9/9/2016); pr breast reconstruc w lat dorsi flap (Left, 9/7/2017); pr insert breast pros immed after excis (Left, 9/7/2017); Knee arthroscopy (Right, 05/1997); Breast biopsy (05/02/2014); Breast surgery (Left, 07/24/2013); Breast biopsy (05/2013); Breast biopsy (05/2008); Foot arthrodesis; Mohs surgery; Breast lumpectomy (Right, 2008); [place] lymph node biopsy (Bilateral, 06/06/2014); [place] lymph node biopsy (Right, 2008); pr colonoscopy flx dx w/collj spec when pfrmd (N/A, 8/1/2018); Breast reconstruction (Left, 2/21/2019); Chest wall biopsy (Right, 2/21/2019); Skin lesion excision (Left, 2/21/2019); CLOSURE WOUND (N/A, 2/21/2019); Capsulectomy (Left, 2/21/2019); pr recmpl wnd head,fac,hand 2 6-7 5 cm (N/A, 3/7/2019); pr adj tiss xfer lid,nos,ear 10 1-30 sqcm (Right, 3/7/2019); pr full thick grft nos,ear,lid <20 sqcm (Right, 3/7/2019); MOHS RECONSTRUCTION (Right, 3/7/2019); Squamous cell carcinoma excision (N/A, 3/7/2019); pr revise breast reconstruction (Bilateral, 6/27/2019); pr remv tissue for graft othr (Bilateral, 6/27/2019); pr exc skin malig >4 cm trunk,arm,leg (N/A, 6/27/2019); pr recmpl wnd trunk 2 6-7 5 cm (N/A, 6/27/2019); pr revise breast reconstruction (Bilateral, 10/24/2019); Liposuction w/ fat injection (Bilateral, 10/24/2019); and pr open tx monteggia fracture dislocation elbow (Left, 12/11/2019)  Her family history includes No Known Problems in her father and mother  She was adopted  She  reports that she has never smoked  She has never used smokeless tobacco  She reports current alcohol use  She reports that she does not use drugs        Review of Systems   HENT: Negative for hearing loss     Eyes: Negative for visual disturbance  Respiratory: Negative for shortness of breath     Cardiovascular: Negative for chest pain  Gastrointestinal: Negative for abdominal pain, blood in stool, constipation, diarrhea, nausea and vomiting  Genitourinary: Negative for hematuria  Musculoskeletal: Negative for gait problem  Skin:        As per HPI    Neurological: Negative for seizures and headaches  Hematological: Does not bruise/bleed easily  Psychiatric/Behavioral: The patient is not nervous/anxious             Objective:         There were no vitals taken for this visit          Physical Exam   Constitutional: She is oriented to person, place, and time  She appears well-developed  No distress  HENT:   Head: Normocephalic and atraumatic  Eyes: Pupils are equal, round, and reactive to light  No scleral icterus  Neck: Neck supple  No tracheal deviation present  No thyromegaly present  Cardiovascular: Normal rate and regular rhythm  Exam reveals no gallop and no friction rub    No murmur heard  Pulmonary/Chest: Effort normal and breath sounds normal  She has no wheezes  She has no rales  Abdominal: Soft  Bowel sounds are normal  She exhibits no distension  There is no abdominal tenderness  There is no rebound and no guarding  Musculoskeletal: Normal range of motion  Lymphadenopathy:     She has no cervical adenopathy  Neurological: She is alert and oriented to person, place, and time  No cranial nerve deficit  Skin:   Back lesion biopsy sites are clean, dry and intact   Sutures removed   Bilateral breasts with implants in place   Skin is soft and supple   No evidence for capsular contracture   She does have some mild hollowing in the upper poles bilaterally   Please see photos from prior visit  Right lateral cheek lesion, scaled in appearance, slightly raised and irregular border   It measures approximately 6 mm   It is located 2 cm from the right ear lobule   And 8 cm from the right lateral corner of mouth   Please see photos      No changes in H&P since 9/1/2020 visit  No adenopathy or splenomegaly. No cervical or inguinal lymphadenopathy.

## 2020-12-31 NOTE — PRE-PROCEDURE INSTRUCTIONS
Pre-Surgery Instructions:   Medication Instructions    acetaminophen (TYLENOL) 500 mg tablet Instructed patient per Anesthesia Guidelines  continue, may take dos    ALPRAZolam (XANAX) 0 5 mg tablet Instructed patient per Anesthesia Guidelines  continue, may take dos    Ascorbic Acid (VITAMIN C) 500 MG CAPS Instructed patient per Anesthesia Guidelines   calcium-vitamin D (OSCAL) 250-125 MG-UNIT per tablet Instructed patient per Anesthesia Guidelines  stop 1 week preop    Cholecalciferol (VITAMIN D) 2000 units CAPS Instructed patient per Anesthesia Guidelines  stop 1 week preop    Chromium Picolinate 200 MCG CAPS Instructed patient per Anesthesia Guidelines  stop 1 week preop    famotidine (PEPCID) 20 mg tablet Instructed patient per Anesthesia Guidelines  continue, take as directed    Lactobacillus (ACIDOPHILUS PO) Instructed patient per Anesthesia Guidelines  continue, hold am of sx    Multiple Vitamin (MULTIVITAMIN) capsule Instructed patient per Anesthesia Guidelines  stop 1 week preop    omeprazole (PriLOSEC) 20 mg delayed release capsule Instructed patient per Anesthesia Guidelines  continue, take am of surgery    tamoxifen (NOLVADEX) 20 mg tablet Patient was instructed by Physician and understands  will stop 1 week preop    zinc gluconate 50 mg tablet Instructed patient per Anesthesia Guidelines  may continue, hold of surgery    Acetaminophen Med Class    Continue to take this medication on your normal schedule  If this is an oral medication and you take it in the morning, then you may take this medicine with a sip of water  Benzodiazepine antagonist Med Class    If this medication is needed please continue to take on your normal schedule  If you take it in the morning, then you may take this medicine with a sip of water  H2 Blocker Med Class    Continue to take this medication on your normal schedule    If this is an oral medication and you take it in the morning, then you may take this medicine with a sip of water  Selective Estrogen Mod Med Class    Continue to take this medication on your normal schedule  If this is an oral medication and you take it in the morning, then you may take this medicine with a sip of water  This medication may need to be discontinued for a least 4 weeks prior to your surgery if the surgical procedure is associated with a high risk of blod clots as in hip or knee replacement for example  Please consult with your Surgeon to discuss discontinuing this medication before your surgery  Steroids Med Class    Continue to take this medication on your normal schedule  If this is an oral medication and you take it in the morning, then you may take this medicine with a sip of water  Vitamin Med Class    You may continue to take any vitamin that your surgeon has prescribed to you up to the day before surgery  If your surgeon has not specifically prescribed this vitamin or instructed you to continue then stop taking 7 days prior to surgery  You will receive a phone call from hospital for arrival time  Please call surgeons office if any changes in your condition  Wear easy on/off clothing; consider type of surgery;  valuables and jewelry please keep at home  **COVID-19  education done  Please: No contacts or eye make up or artificial eyelashes    Please secure transportation     Follow pre surgery showering or cleaning instructions as  Reviewed by nurse or surgeons office      Questions answered and concerns addressed

## 2021-01-04 DIAGNOSIS — Z85.3 PERSONAL HISTORY OF MALIGNANT NEOPLASM OF BREAST: Primary | ICD-10-CM

## 2021-01-04 RX ORDER — HYDROCODONE BITARTRATE AND ACETAMINOPHEN 5; 325 MG/1; MG/1
1 TABLET ORAL EVERY 6 HOURS PRN
Qty: 18 TABLET | Refills: 0 | Status: SHIPPED | OUTPATIENT
Start: 2021-01-04 | End: 2021-10-07

## 2021-01-07 ENCOUNTER — ANESTHESIA EVENT (OUTPATIENT)
Dept: PERIOP | Facility: HOSPITAL | Age: 56
End: 2021-01-07
Payer: COMMERCIAL

## 2021-01-07 ENCOUNTER — HOSPITAL ENCOUNTER (OUTPATIENT)
Facility: HOSPITAL | Age: 56
Setting detail: OUTPATIENT SURGERY
Discharge: HOME | End: 2021-01-07
Attending: SURGERY | Admitting: SURGERY
Payer: COMMERCIAL

## 2021-01-07 ENCOUNTER — ANESTHESIA (OUTPATIENT)
Dept: PERIOP | Facility: HOSPITAL | Age: 56
End: 2021-01-07
Payer: COMMERCIAL

## 2021-01-07 VITALS
WEIGHT: 179.8 LBS | HEART RATE: 80 BPM | HEIGHT: 66 IN | DIASTOLIC BLOOD PRESSURE: 79 MMHG | BODY MASS INDEX: 28.9 KG/M2 | TEMPERATURE: 98.4 F | OXYGEN SATURATION: 99 % | RESPIRATION RATE: 15 BRPM | SYSTOLIC BLOOD PRESSURE: 137 MMHG

## 2021-01-07 VITALS — HEART RATE: 27 BPM

## 2021-01-07 DIAGNOSIS — Z85.3 PERSONAL HISTORY OF MALIGNANT NEOPLASM OF BREAST: ICD-10-CM

## 2021-01-07 DIAGNOSIS — C44.519 BASAL CELL CARCINOMA (BCC) OF BACK: ICD-10-CM

## 2021-01-07 LAB
EXT PREGNANCY TEST URINE: NEGATIVE
EXT. CONTROL: NORMAL

## 2021-01-07 PROCEDURE — 11603 EXC TR-EXT MAL+MARG 2.1-3 CM: CPT | Performed by: SURGERY

## 2021-01-07 PROCEDURE — 88305 TISSUE EXAM BY PATHOLOGIST: CPT | Performed by: STUDENT IN AN ORGANIZED HEALTH CARE EDUCATION/TRAINING PROGRAM

## 2021-01-07 PROCEDURE — 81025 URINE PREGNANCY TEST: CPT | Performed by: SURGERY

## 2021-01-07 PROCEDURE — 15772 GRFG AUTOL FAT LIPO EA ADDL: CPT | Performed by: SURGERY

## 2021-01-07 PROCEDURE — 11603 EXC TR-EXT MAL+MARG 2.1-3 CM: CPT | Performed by: PHYSICIAN ASSISTANT

## 2021-01-07 PROCEDURE — 15772 GRFG AUTOL FAT LIPO EA ADDL: CPT | Performed by: PHYSICIAN ASSISTANT

## 2021-01-07 PROCEDURE — 15771 GRFG AUTOL FAT LIPO 50 CC/<: CPT | Performed by: SURGERY

## 2021-01-07 PROCEDURE — 15771 GRFG AUTOL FAT LIPO 50 CC/<: CPT | Performed by: PHYSICIAN ASSISTANT

## 2021-01-07 PROCEDURE — 13101 CMPLX RPR TRUNK 2.6-7.5 CM: CPT | Performed by: SURGERY

## 2021-01-07 PROCEDURE — 13101 CMPLX RPR TRUNK 2.6-7.5 CM: CPT | Performed by: PHYSICIAN ASSISTANT

## 2021-01-07 RX ORDER — METOCLOPRAMIDE HYDROCHLORIDE 5 MG/ML
10 INJECTION INTRAMUSCULAR; INTRAVENOUS ONCE AS NEEDED
Status: DISCONTINUED | OUTPATIENT
Start: 2021-01-07 | End: 2021-01-07 | Stop reason: HOSPADM

## 2021-01-07 RX ORDER — SODIUM CHLORIDE, SODIUM LACTATE, POTASSIUM CHLORIDE, AND CALCIUM CHLORIDE .6; .31; .03; .02 G/100ML; G/100ML; G/100ML; G/100ML
IRRIGANT IRRIGATION AS NEEDED
Status: DISCONTINUED | OUTPATIENT
Start: 2021-01-07 | End: 2021-01-07 | Stop reason: HOSPADM

## 2021-01-07 RX ORDER — CEFAZOLIN SODIUM 2 G/50ML
2000 SOLUTION INTRAVENOUS ONCE
Status: COMPLETED | OUTPATIENT
Start: 2021-01-07 | End: 2021-01-07

## 2021-01-07 RX ORDER — DEXAMETHASONE SODIUM PHOSPHATE 10 MG/ML
INJECTION, SOLUTION INTRAMUSCULAR; INTRAVENOUS AS NEEDED
Status: DISCONTINUED | OUTPATIENT
Start: 2021-01-07 | End: 2021-01-07

## 2021-01-07 RX ORDER — PROPOFOL 10 MG/ML
INJECTION, EMULSION INTRAVENOUS AS NEEDED
Status: DISCONTINUED | OUTPATIENT
Start: 2021-01-07 | End: 2021-01-07

## 2021-01-07 RX ORDER — FENTANYL CITRATE 50 UG/ML
INJECTION, SOLUTION INTRAMUSCULAR; INTRAVENOUS AS NEEDED
Status: DISCONTINUED | OUTPATIENT
Start: 2021-01-07 | End: 2021-01-07

## 2021-01-07 RX ORDER — MIDAZOLAM HYDROCHLORIDE 2 MG/2ML
INJECTION, SOLUTION INTRAMUSCULAR; INTRAVENOUS AS NEEDED
Status: DISCONTINUED | OUTPATIENT
Start: 2021-01-07 | End: 2021-01-07

## 2021-01-07 RX ORDER — HYDROCODONE BITARTRATE AND ACETAMINOPHEN 5; 325 MG/1; MG/1
1 TABLET ORAL EVERY 6 HOURS PRN
Status: DISCONTINUED | OUTPATIENT
Start: 2021-01-07 | End: 2021-01-07 | Stop reason: HOSPADM

## 2021-01-07 RX ORDER — FENTANYL CITRATE/PF 50 MCG/ML
25 SYRINGE (ML) INJECTION
Status: DISCONTINUED | OUTPATIENT
Start: 2021-01-07 | End: 2021-01-07 | Stop reason: HOSPADM

## 2021-01-07 RX ORDER — SODIUM CHLORIDE, SODIUM LACTATE, POTASSIUM CHLORIDE, CALCIUM CHLORIDE 600; 310; 30; 20 MG/100ML; MG/100ML; MG/100ML; MG/100ML
125 INJECTION, SOLUTION INTRAVENOUS CONTINUOUS
Status: DISCONTINUED | OUTPATIENT
Start: 2021-01-07 | End: 2021-01-07 | Stop reason: HOSPADM

## 2021-01-07 RX ORDER — ONDANSETRON 2 MG/ML
INJECTION INTRAMUSCULAR; INTRAVENOUS AS NEEDED
Status: DISCONTINUED | OUTPATIENT
Start: 2021-01-07 | End: 2021-01-07

## 2021-01-07 RX ORDER — SCOLOPAMINE TRANSDERMAL SYSTEM 1 MG/1
1 PATCH, EXTENDED RELEASE TRANSDERMAL
Status: DISCONTINUED | OUTPATIENT
Start: 2021-01-07 | End: 2021-01-07 | Stop reason: HOSPADM

## 2021-01-07 RX ORDER — MINERAL OIL
OIL (ML) MISCELLANEOUS AS NEEDED
Status: DISCONTINUED | OUTPATIENT
Start: 2021-01-07 | End: 2021-01-07 | Stop reason: HOSPADM

## 2021-01-07 RX ADMIN — FENTANYL CITRATE 25 MCG: 50 INJECTION, SOLUTION INTRAMUSCULAR; INTRAVENOUS at 11:00

## 2021-01-07 RX ADMIN — HYDROCODONE BITARTRATE AND ACETAMINOPHEN 1 TABLET: 5; 325 TABLET ORAL at 12:07

## 2021-01-07 RX ADMIN — PROPOFOL 40 MG: 10 INJECTION, EMULSION INTRAVENOUS at 09:46

## 2021-01-07 RX ADMIN — FENTANYL CITRATE 25 MCG: 50 INJECTION, SOLUTION INTRAMUSCULAR; INTRAVENOUS at 09:46

## 2021-01-07 RX ADMIN — FENTANYL CITRATE 25 MCG: 50 INJECTION, SOLUTION INTRAMUSCULAR; INTRAVENOUS at 09:23

## 2021-01-07 RX ADMIN — ONDANSETRON 4 MG: 2 INJECTION INTRAMUSCULAR; INTRAVENOUS at 11:00

## 2021-01-07 RX ADMIN — DEXAMETHASONE SODIUM PHOSPHATE 4 MG: 10 INJECTION, SOLUTION INTRAMUSCULAR; INTRAVENOUS at 09:28

## 2021-01-07 RX ADMIN — FENTANYL CITRATE 25 MCG: 50 INJECTION, SOLUTION INTRAMUSCULAR; INTRAVENOUS at 11:50

## 2021-01-07 RX ADMIN — MIDAZOLAM 2 MG: 1 INJECTION INTRAMUSCULAR; INTRAVENOUS at 09:12

## 2021-01-07 RX ADMIN — PROPOFOL 160 MG: 10 INJECTION, EMULSION INTRAVENOUS at 09:14

## 2021-01-07 RX ADMIN — FENTANYL CITRATE 25 MCG: 50 INJECTION, SOLUTION INTRAMUSCULAR; INTRAVENOUS at 11:38

## 2021-01-07 RX ADMIN — SODIUM CHLORIDE, SODIUM LACTATE, POTASSIUM CHLORIDE, AND CALCIUM CHLORIDE: .6; .31; .03; .02 INJECTION, SOLUTION INTRAVENOUS at 09:07

## 2021-01-07 RX ADMIN — CEFAZOLIN SODIUM 2000 MG: 2 SOLUTION INTRAVENOUS at 09:10

## 2021-01-07 RX ADMIN — SCOPALAMINE 1 PATCH: 1 PATCH, EXTENDED RELEASE TRANSDERMAL at 08:59

## 2021-01-07 RX ADMIN — FENTANYL CITRATE 25 MCG: 50 INJECTION, SOLUTION INTRAMUSCULAR; INTRAVENOUS at 10:33

## 2021-01-07 NOTE — ANESTHESIA PREPROCEDURE EVALUATION
Procedure:  EXCISION BASAL CELL CARCINOMA OF MID BACK (N/A Back)  COMPLEX CLOSURE MID BACK (N/A Back)  BILATERAL BREAST MOUND REVISION (Bilateral Breast)  AUTOLOGOUS FAT GRAFTING TO BILATERAL BREAST (Bilateral Breast)    Relevant Problems   ANESTHESIA   (+) PONV (postoperative nausea and vomiting)      GYN   (+) Breast cancer (HCC)      MUSCULOSKELETAL   (+) Arthritis      NEURO/PSYCH   (+) Personal history of malignant neoplasm of breast      Other   (+) Basal cell carcinoma (BCC) of right side of nose        Physical Exam    Airway    Mallampati score: II  TM Distance: >3 FB  Neck ROM: full     Dental   Comment: One cap,     Cardiovascular  Rhythm: regular, Rate: normal, Cardiovascular exam normal    Pulmonary  Pulmonary exam normal Breath sounds clear to auscultation,     Other Findings        Anesthesia Plan  ASA Score- 2     Anesthesia Type- general with ASA Monitors  Additional Monitors:   Airway Plan: LMA  Plan Factors-    Chart reviewed  Patient is not a current smoker  Induction- intravenous  Postoperative Plan- Plan for postoperative opioid use  Informed Consent- Anesthetic plan and risks discussed with patient  I personally reviewed this patient with the CRNA  Discussed and agreed on the Anesthesia Plan with the CRNA  Teresa Carbone

## 2021-01-07 NOTE — DISCHARGE INSTRUCTIONS
[facility]  Dr Macario Ser   [address] [facility], [address]  Phone: [phone]     Postoperative Instructions for Outpatient Surgery     These instructions are being provided by your doctor to give you basic guidelines during your post-op recovery  Please let our office know if your contact information has changed       Please call the office today for an appointment in 2 weeks for suture removal      Dressings:  Keep dressings clean and dry  Remove dressings 48 hours      Activity Restrictions:  Nothing strenuous for 2 weeks      Bathing:  May shower after dressing removal   Otherwise, you may sponge bathe until then      Medications:    Resume pre-op medications  You may take tylenol, aleve, or ibuprofen for pain control             Norco as needed for pain  Other:  Ice to abdominal and hip areas at 15 minute intervals over the next 48 hours while awake

## 2021-01-07 NOTE — INTERIM OP NOTE
EXCISION BASAL CELL CARCINOMA OF MID BACK, COMPLEX CLOSURE MID BACK, BILATERAL BREAST MOUND REVISION, AUTOLOGOUS FAT GRAFTING TO BILATERAL BREAST  Postoperative Note  PATIENT NAME: Amaury Reno  : 1965  MRN: [id number]  [facility] OR ROOM 03    Surgery Date: 2021    Preop Diagnosis:  Personal history of malignant neoplasm of breast [Z85 3]  Basal cell carcinoma (BCC) of back [C44 519]    Post-Op Diagnosis Codes:     * Personal history of malignant neoplasm of breast [Z85 3]     * Basal cell carcinoma (BCC) of back [C44 519]    Procedure(s) (LRB):  EXCISION BASAL CELL CARCINOMA OF MID BACK (N/A)  COMPLEX CLOSURE MID BACK (N/A)  BILATERAL BREAST MOUND REVISION (Bilateral)  AUTOLOGOUS FAT GRAFTING TO BILATERAL BREAST (Bilateral)    Surgeon(s) and Role:     * Macy Fisher MD - Primary     * Honag Brizuela PA-C - Assisting    Specimens:  ID Type Source Tests Collected by Time Destination   1 : Mid-back basal cell carcinoma     Tissue Back TISSUE Ludy Sebastian MD 2021 [id number]        Estimated Blood Loss:   Minimal    Anesthesia Type:   General     Findings:    None  Complications:   None    SIGNATURE: Hoang Brizuela PA-C   DATE: 2021   TIME: 11:15 AM

## 2021-01-07 NOTE — PERIOPERATIVE NURSING NOTE
Pt requests Right arm to be used for bp and IV d/t recent Left arm injury  Discussed right arm- no use and pt aware

## 2021-01-07 NOTE — OP NOTE
OPERATIVE REPORT  PATIENT NAME: Mac Garcia    :  1965  MRN: [id number]  Pt Location: [facility] OR ROOM 03    SURGERY DATE: 2021    Surgeon(s) and Role:     Zane Sinha MD - Primary     * Macario Chavez PA-C - Assisting    Preop Diagnosis:  Personal history of malignant neoplasm of breast [Z85 3]  Basal cell carcinoma (BCC) of back [C44 519]    Post-Op Diagnosis Codes:     * Personal history of malignant neoplasm of breast [Z85 3]     * Basal cell carcinoma (BCC) of back [C44 519]   Procedure 1  Excision basal cell carcinoma mid back 2 2 cm 2  Complex closure back wound 4 6 cm 3  Autologous fat grafting to right breast 160 cc 4  Autologous fat grafting to left breast 160 cc   Specimen(s):  ID Type Source Tests Collected by Time Destination   1 : Mid-back basal cell carcinoma    Tissue Back TISSUE Deidre Gosselin, MD 2021 0616        Estimated Blood Loss:   Minimal    Drains:  Closed/Suction Drain Left Back Bulb 15 Fr  (Active)   Number of days: 1218       Closed/Suction Drain Left Breast Bulb 15 Fr  (Active)   Number of days: 1218       Anesthesia Type:   General    Operative Indications:  Personal history of malignant neoplasm of breast [Z85 3]  Basal cell carcinoma (BCC) of back [C44 519]  Acquired bilateral breast defects    Operative Findings:  As above    Complications:   None    Procedure and Technique:  Yaniv was seen preoperatively in the holding area, the surgical sites were marked with her participation and I reviewed with her potential risks, complications limitations  She was taken to the operating room and underwent induction of anesthesia by the anesthesia personnel  The basal cell carcinoma the midback was addressed initially  Surgical field was prepped and draped in sterile fashion a proper time-out was performed  2 5 loupe magnification was used aid visualization    The area of concern was marked to be excised with a margin of normal-appearing skin and local anesthesia was administered  A 15 blade was used to create skin incisions, these were carried down through the dermis and lesion was excised deep in the plane of subcutaneous fat utilizing the Bovie cautery  The specimen was marked with sutures and placed in formalin to be sent to pathology  The wound edges were then widely and circumferentially undermined deep to the dermis around the entire circumference of the wound utilizing the Bovie cautery  The undermining was performed for greater than 2 5 times the with of the wound  The wound was irrigated hemostasis assured  Closure was undertaken with 3-0 PDS sutures buried at the level of the deep dermis this was followed by running subcuticular 4-0 PDS  Benzoin and a dry dressing were applied the patient was then placed in the supine position and the operative sites for the fat grafting were prepped and draped in sterile fashion  A 2nd time-out was performed  The areas of the planned fat harvest, flanks and lateral hips were then infiltrated with tumescent solution and standard liposuction techniques were utilized to access fat from these areas to prepare for autologous fat grafting  The revolve system was utilized  The pinch and roll techniques were utilized to assess the adequacy of the liposuction, and suction was performed in the mid and deep planes, superficial fat extract the and the fat was then prepared utilizing the revolve system, injected into the deficient areas of both breast utilizing standard fat grafting techniques, gentle pressure on the plunger, constant motion, etcetera  A total of 160 cc was utilized on the right side, 160 cc of on the left side  The access incisions were closed with 5 O nylon sutures the grafted areas were splinted with Telfa and Tegaderm  A well-padded abdominal binder was then placed followed by application of a surgical bra  The patient was transferred to the recovery room     I was present for the entire procedure and A qualified resident physician was not available  The physician assistant provided essential assistance with patient positioning, retraction, exposure, hemostasis, and wound closure      Patient Disposition:  PACU     SIGNATURE: Charity Grey MD  DATE: January 7, 2021  TIME: 11:44 AM

## 2021-01-07 NOTE — INTERVAL H&P NOTE
H&P reviewed  After examining the patient I find no changes in the patients condition since the H&P had been written      Vitals:    01/07/21 0719   BP: 121/73   Pulse: 96   Resp: 20   Temp: 98 1 °F (36 7 °C)   SpO2: 96%

## 2021-01-18 ENCOUNTER — OFFICE VISIT (OUTPATIENT)
Dept: OBGYN CLINIC | Facility: CLINIC | Age: 56
End: 2021-01-18
Payer: COMMERCIAL

## 2021-01-18 VITALS
HEIGHT: 66 IN | DIASTOLIC BLOOD PRESSURE: 96 MMHG | SYSTOLIC BLOOD PRESSURE: 174 MMHG | WEIGHT: 186.4 LBS | BODY MASS INDEX: 29.96 KG/M2

## 2021-01-18 DIAGNOSIS — R93.89 ABNORMAL ULTRASOUND: Primary | ICD-10-CM

## 2021-01-18 PROCEDURE — 99203 OFFICE O/P NEW LOW 30 MIN: CPT | Performed by: OBSTETRICS & GYNECOLOGY

## 2021-01-18 NOTE — PROGRESS NOTES
Susy Salazar is a 50 y o   female here for a problem visit  Patient had LMP 2020 she is being treated for breast cancer and has been on tamoxifen for about 5 years  Patient's cycles were regular until 2020 and she has had no further bleeding since that time  Patient had normal Pap and exam in 2020  She and her provider decided to do ultrasound to check on organs and endometrial lining was found to be perhaps 9 7 mm and endometrial biopsy was attempted with benign endocervical cells and stenotic cervix  No endometrial tissue on biopsy  Reviewed with patient that while she has risk factors being on tamoxifen and being nulliparous that she has not yet been 1 year without menses and is not fully postmenopausal and has not had any bleeding or other symptoms  We discussed that with her ultrasound showing possibly thicker endometrium, again cut off for her situation is not determined as she is not yet fully postmenopausal,  that we would offer consideration of observation  Verses attempted dilation and endometrial biopsy with local anesthesia to the cervix an office verses general anesthesia with D&C hysteroscopy  We discussed reviewed these different options in general we covered risks and benefits  Patient would like to discuss with her  Physician Dr Chuy Crenshaw and will call for either repeat u/s or office biopsy with dilation and local to cervix  Gynecologic History  No LMP recorded  Last Pap:  2020  Results were: normal      Obstetric History  OB History    Para Term  AB Living   0 0 0 0 0 0   SAB TAB Ectopic Multiple Live Births   0 0 0 0 0   Obstetric Comments   Menarche age 10  History of birth control pills         Past Medical History:   Diagnosis Date    Abnormal findings on diagnostic imaging of breast     Last Assessesd: 2014    Arthritis     Knees and feet    Breast cancer ([facility] [address] )     Right    Cancer [facility]) Bilateral breast cancer history  Had two bouts of radiation, BCC    Difficulty falling asleep     Gluteal abscess     Last Assessed: 12/30/2016    Inconclusive mammogram due to dense breasts     Last Assessed: 4/7/2014    Limb alert care status     right    Lobular carcinoma in situ of breast     Last Assesed: 4/7/2014    Nipple discharge     Last Assessed: 4/7/2014    Pain in left foot     Pneumonia 2009    x1    PONV (postoperative nausea and vomiting)     Skin cancer      Past Surgical History:   Procedure Laterality Date    BASAL CELL CARCINOMA EXCISION N/A 1/7/2021    Procedure: EXCISION BASAL CELL CARCINOMA OF MID BACK;  Surgeon: Leti Maxwell MD;  Location: [facility] MA[facility];  Service: Plastics    BREAST BIOPSY  05/02/2014    BREAST BIOPSY  05/2013    BREAST BIOPSY  05/2008    BREAST LUMPECTOMY Right 2008    BREAST RECONSTRUCTION Left 2/21/2019    Procedure: BREAST EXPANDER/IMPLANT EXCHANGE;  Surgeon: Leti Maxwell MD;  [facility];  Service: Plastics    BREAST SURGERY Bilateral 2008, 2013, 2014    Breast reconstruction with expanders  Total of 7 surgeries  7/3/14 right breast reconstruction revision   10/27/14 right breast reconstruction revision     BREAST SURGERY Left 07/24/2013    Excision of breast lesion    CAPSULOTOMY Left 2/21/2019    Procedure: CAPSULECTOMY;  Surgeon: Leti Maxwell MD;  [facility];  Service: Plastics    CHEST WALL BIOPSY Right 2/21/2019    Procedure: UPPER CHEST BASAL CELL CARSINOMA EXCISION;  Surgeon: Leti Maxwell MD;  [facility];  Service: Plastics    CLOSURE WOUND N/A 2/21/2019    Procedure: RIGHT upper chest & LEFT upper back complex closure;  Surgeon: Leti Maxwell MD;  [facility];  Service: Plastics    COMPLEX WOUND CLOSURE TO EXTREMITY N/A 1/7/2021    Procedure: COMPLEX CLOSURE MID BACK;  Surgeon: Leti Maxwell MD;  [facility];  Service: Plastics    FOOT [id number]      [id number]  KNEE ARTHROSCOPY Right 05/1997    LIPOSUCTION W/ FAT INJECTION Bilateral 10/24/2019    Procedure: AUTOLOGOUS FAT GRAFTING TO BILATERAL BREAST;  Surgeon: Trent Kiran MD;  [facility];  Service: Plastics    LIPOSUCTION W/ FAT INJECTION Bilateral 1/7/2021    Procedure: AUTOLOGOUS FAT GRAFTING TO BILATERAL BREAST;  Surgeon: Trent Kiran MD;  [facility]: [facility];  Service: Plastics    MASTECTOMY Bilateral 06/06/2014    Lymph nodes removed bilaterally  States she may have IV's in left side   MOHS RECONSTRUCTION Right 3/7/2019    Procedure: RECONSTRUCTION MOHS DEFECT RIGHT NOSE/MEDIAL CANTHUS;  Surgeon: Trent Kiran MD;  [facility];  Service: Plastics    MOHS SURGERY      Mohs Micrographic Surgery Face; Last Assessed: 5/15/2015    FL ADJ TISS Mili Menon LID,NOS,EAR 10 1-30 SQCM Right 3/7/2019    Procedure: FLAP;  Surgeon: Trent Kiran MD;  [facility];  Service: Plastics    FL BREAST RECONSTRUCTION W/LATISSIMUS DORSI FLAP Left 9/7/2017    Procedure: BREAST RECONSTRUCTION; LATISSIMUS DORSI FLAP; TISSUE EXPANDER;  Surgeon: Trent Kiran MD;  [facility];  Service: Plastics    FL COLONOSCOPY FLX DX W/COLLJ [address] PFRMD N/A 8/1/2018    Procedure: COLONOSCOPY;  Surgeon: Omayra Boyd MD;  [facility];  Service: Gastroenterology    FL [place] W/SESMDC W/RESCJ PROX [address] [facility]  9/9/2016    Procedure: Marlyn Oliveros MODIFIED ;  Surgeon: Yariel Ball DPM;  [facility];  Service: Podiatry    FL EXC SKIN MALIG >4 CM TRUNK,ARM,LEG N/A 6/27/2019    Procedure: EXCISION BASAL CELL CARCINOMA MID CHEST;  Surgeon: Trent Kiran MD;  [facility];  Service: Plastics    FL FULL THICK 2011 [facility] NOS,EAR,LID <20 SQCM Right 3/7/2019    Procedure: SKIN GRAFT FULL THICKNESS  (FTSG);   Surgeon: Trent Kiran MD;  [facility];  Service: Plastics    FL FUSION FOOT BONES,MIDTARSAL,MULTI Left 9/9/2016    Procedure: ARTHRODESIS FIRST [address] [facility], [place] [address] ;  Surgeon: Jossie Canales DPM;  [facility];  Service: Podiatry    IA INSERTION BREAST IMPLANT SAME DAY OF MASTECTOMY Left 9/7/2017    Procedure: TISSUE EXPANDER;  Surgeon: Charity Grey MD;  [facility];  Service: Plastics    IA OPEN 2016 [facility] Left 12/11/2019    Procedure: OPEN REDUCTION W/ INTERNAL FIXATION left distal humerus fracture, possible olecranon osteotomy;  Surgeon:  Michael Thomas MD;  Location: [facility] MA[facility] OR;  Service: Orthopedics    IA OSTEOTOMY HEEL BONE Left 9/9/2016    Procedure: OSTEOTOMY CALCANEUS WITH APPLICATION AND ACTIVATION OF BONE STIMULATOR ;  Surgeon: Jossie Canales DPM;  [facility];  Service: Podiatry    IA RECMPL WND HEAD,FAC,HAND 2 6-7 5 CM N/A 3/7/2019    Procedure: COMPLEX CLOSURE;  Surgeon: Charity Grey MD;  [facility];  Service: Plastics    IA RECMPL WND TRUNK 2 6-7 5 CM N/A 6/27/2019    Procedure: CLOSURE WOUND MID CHEST;  Surgeon: Charity Grey MD;  [facility];  Service: Plastics     [facility] TISSUE FOR GRAFT OTHR Bilateral 6/27/2019    Procedure: AUTOLOGOUS FAT GRAFTING BILATERAL BREASTS;  Surgeon: Charity Grey MD;  [facility];  Service: Plastics    IA REVISION OF RECONSTRUCTED BREAST Bilateral 6/27/2019    Procedure: BILATERAL BREAST MOUND REVISION;  Surgeon: Charity Grey MD;  [facility];  Service: Plastics    IA REVISION OF RECONSTRUCTED BREAST Bilateral 10/24/2019    Procedure: BILATERAL BREAST MOUND REVISION;  Surgeon: Charity Grey MD;  [facility];  Service: Plastics    IA REVISION OF RECONSTRUCTED BREAST Bilateral 1/7/2021    Procedure: BILATERAL BREAST MOUND REVISION;  Surgeon: Charity Grey MD;  [facility];  Service: Plastics    SENTINEL LYMPH NODE BIOPSY Bilateral 06/06/2014    SENTINEL LYMPH NODE BIOPSY Right 2008    SKIN BIOPSY      SKIN LESION EXCISION Left 2/21/2019    Procedure: UPPER BACK BCC EXCISION;  Surgeon: Nahomi Mensah MD;  [facility];  Service: Plastics    SQUAMOUS CELL CARCINOMA EXCISION N/A 3/7/2019    Procedure: EXCISION BCC LEFT FOREHEAD;  Surgeon: Nahomi Mensah MD;  [facility];  Service: Plastics    WISDOM TOOTH EXTRACTION       Current Outpatient Medications   Medication Instructions    acetaminophen (TYLENOL) 500 mg, Oral, Every 6 hours PRN    ALPRAZolam (XANAX) 0 5 mg, Oral, Daily at bedtime PRN    calcium-vitamin D (OSCAL) 250-125 MG-UNIT per tablet 2 tablets, Oral, Daily    Cholecalciferol (VITAMIN D) 2000 units CAPS Oral    Chromium Picolinate 200 mg, Oral, Daily    famotidine (PEPCID) 20 mg, Oral, 2 times daily    HYDROcodone-acetaminophen (NORCO) 5-325 mg per tablet 1 tablet, Oral, Every 6 hours PRN    Lactobacillus (ACIDOPHILUS PO) Oral, Daily    Multiple Vitamin (MULTIVITAMIN) capsule 1 capsule, Oral, Daily    omeprazole (PRILOSEC) 20 mg, Oral, Daily at bedtime    tamoxifen (NOLVADEX) 20 mg, Oral, Daily    Vitamin C 500 mg, Oral    zinc gluconate 50 mg tablet Oral       Allergies   Allergen Reactions    Codeine Itching     Severe    Dilaudid [Hydromorphone Hcl] Itching     Severe    Hydromorphone Itching    Tramadol Itching       Social History     Tobacco Use    Smoking status: Never Smoker    Smokeless tobacco: Never Used   Substance Use Topics    Alcohol use: Yes     Frequency: 4 or more times a week     Drinks per session: 1 or 2     Binge frequency: Never     Comment: 4 weekly-wine    Drug use: No         Review of Systems  Review of Systems   Constitutional: Negative for chills, fatigue, fever and unexpected weight change  HENT: Negative for dental problem, sinus pressure and sinus pain  Eyes: Negative for visual disturbance  Respiratory: Negative for cough, shortness of breath and wheezing  Cardiovascular: Negative for chest pain and leg swelling  Gastrointestinal: Negative for constipation, diarrhea, nausea and vomiting  Genitourinary: Negative for urgency  Musculoskeletal: Negative for back pain and joint swelling  Allergic/Immunologic: Negative for environmental allergies  Neurological: Negative for dizziness and headaches  Psychiatric/Behavioral: The patient is not nervous/anxious  Objective     BP (!) 174/96 (BP Location: Left arm, Patient Position: Sitting, Cuff Size: Standard)   Ht 5' 6" (1 676 m)   Wt 84 6 kg (186 lb 6 4 oz)   LMP 2020 (Approximate)   BMI 30 09 kg/m²   General appearance: alert and oriented, in no acute distress      Assessment   50year-old  with 8 mm stripe on pelvic ultrasound and LMP of 2020 with history of breast cancer on tamoxifen  Plan   patient fully counseled to options to evaluate and follow-up  She plans to review with Dr Ambar Asencio  She will call to either have ultrasound for 3-6 months ordered or schedule biopsy with dilators and local to cervix     I also asked her to call with any further questions

## 2021-01-20 ENCOUNTER — IMMUNIZATIONS (OUTPATIENT)
Dept: FAMILY MEDICINE CLINIC | Facility: HOSPITAL | Age: 56
End: 2021-01-20

## 2021-01-20 DIAGNOSIS — Z23 ENCOUNTER FOR IMMUNIZATION: Primary | ICD-10-CM

## 2021-01-20 PROCEDURE — 0011A SARS-COV-2 / COVID-19 MRNA VACCINE (MODERNA) 100 MCG: CPT

## 2021-01-20 PROCEDURE — 91301 SARS-COV-2 / COVID-19 MRNA VACCINE (MODERNA) 100 MCG: CPT

## 2021-01-27 ENCOUNTER — OFFICE VISIT (OUTPATIENT)
Dept: PLASTIC SURGERY | Facility: HOSPITAL | Age: 56
End: 2021-01-27

## 2021-01-27 DIAGNOSIS — Z85.3 PERSONAL HISTORY OF MALIGNANT NEOPLASM OF BREAST: ICD-10-CM

## 2021-01-27 DIAGNOSIS — C44.519 BASAL CELL CARCINOMA (BCC) OF SKIN OF TRUNK: Primary | ICD-10-CM

## 2021-01-27 PROCEDURE — 99024 POSTOP FOLLOW-UP VISIT: CPT | Performed by: PHYSICIAN ASSISTANT

## 2021-01-27 NOTE — PROGRESS NOTES
Assessment/Plan:   Tatianna Napier is a 74-year-old female who is 3 weeks status post bilateral breast mound revision with autologous fat grafting and excision of a basal cell carcinoma from the mid back  Please see HPI  She is healing well  There is a small eschar on the incision on her back  I have recommended that she wait until this falls off before using silicone scar gel  We will see her back in approximately 2-3 months to decide on any further fat grafting  Diagnoses and all orders for this visit:    Basal cell carcinoma (BCC) of skin of trunk    Personal history of malignant neoplasm of breast          Subjective:     Patient ID: Jose Martines is a 50 y o  female  HPI   She denies any complaints regarding her breasts or back  She would like her back looked at to see how it is healing since she can see it very well  Review of Systems   Skin:        As per HPI  Objective:     Physical Exam  Skin:     Comments: Bilateral breasts are soft and supple  Mid back incision is clean, dry and intact  There is a small eschar noted at the inferior aspect

## 2021-02-15 ENCOUNTER — TELEPHONE (OUTPATIENT)
Dept: HEMATOLOGY ONCOLOGY | Facility: CLINIC | Age: 56
End: 2021-02-15

## 2021-02-17 ENCOUNTER — OFFICE VISIT (OUTPATIENT)
Dept: PLASTIC SURGERY | Facility: HOSPITAL | Age: 56
End: 2021-02-17

## 2021-02-17 DIAGNOSIS — C44.519 BASAL CELL CARCINOMA (BCC) OF SKIN OF TRUNK: Primary | ICD-10-CM

## 2021-02-17 DIAGNOSIS — Z85.3 PERSONAL HISTORY OF MALIGNANT NEOPLASM OF BREAST: ICD-10-CM

## 2021-02-17 PROCEDURE — 99024 POSTOP FOLLOW-UP VISIT: CPT | Performed by: PHYSICIAN ASSISTANT

## 2021-02-17 NOTE — PROGRESS NOTES
Assessment/Plan:   [facility] is a 53-year-old female who is  status post bilateral breast mound revision with autologous fat grafting and excision of a basal cell carcinoma from the mid back 1/07/21  Please see HPI  She had a spitting suture from the back incision that was trimmed  I also assured her that the breasts can continue to be lumpy/bumpy over the next several weeks  We will allow more time for healing  Follow-up in approximately 2 months  Diagnoses and all orders for this visit:    Basal cell carcinoma (BCC) of skin of trunk    Personal history of malignant neoplasm of breast          Subjective:     Patient ID: Amaury Reno is a 50 y o  female  HPI   She reports some lumpy areas of left breast   She also states she has a scab on her back  Review of Systems   Skin:        As per HPI  Objective:     Physical Exam  Skin:     Comments: Bilateral breasts are soft and supple  There is a little uneven contour the left breast however, this is normal given she is 5 weeks from fat grafting  The back incision is clean, dry and intact  There is a spitting suture noted and trimmed in the office

## 2021-02-17 NOTE — LETTER
February 17, 2021     Shauna Gauthier DO  [address] [facility]  [place] [id number]    Patient: Valerie Bianchi   YOB: 1965   Date of Visit: 2/17/2021       Dear Dr Fede Muñoz: Thank you for referring Valerie Bianchi to me for evaluation  Below are my notes for this consultation  If you have questions, please do not hesitate to call me  I look forward to following your patient along with you  Sincerely,        Mishel Jarquin PA-C        CC: MD Mishel Arguelles PA-C  2/17/2021  3:11 PM  Sign when Signing Visit  Assessment/Plan:   Benita Balbuena is a 81-year-old female who is  status post bilateral breast mound revision with autologous fat grafting and excision of a basal cell carcinoma from the mid back 1/07/21  Please see HPI  She had a spitting suture from the back incision that was trimmed  I also assured her that the breasts can continue to be lumpy/bumpy over the next several weeks  We will allow more time for healing  Follow-up in approximately 2 months  Diagnoses and all orders for this visit:    Basal cell carcinoma (BCC) of skin of trunk    Personal history of malignant neoplasm of breast          Subjective:     Patient ID: Valerie Bianchi is a 50 y o  female  HPI   She reports some lumpy areas of left breast   She also states she has a scab on her back  Review of Systems   Skin:        As per HPI  Objective:     Physical Exam  Skin:     Comments: Bilateral breasts are soft and supple  There is a little uneven contour the left breast however, this is normal given she is 5 weeks from fat grafting  The back incision is clean, dry and intact  There is a spitting suture noted and trimmed in the office

## 2021-02-19 ENCOUNTER — IMMUNIZATIONS (OUTPATIENT)
Dept: FAMILY MEDICINE CLINIC | Facility: HOSPITAL | Age: 56
End: 2021-02-19

## 2021-02-19 DIAGNOSIS — Z23 ENCOUNTER FOR IMMUNIZATION: Primary | ICD-10-CM

## 2021-02-19 PROCEDURE — 0012A SARS-COV-2 / COVID-19 MRNA VACCINE (MODERNA) 100 MCG: CPT

## 2021-02-19 PROCEDURE — 91301 SARS-COV-2 / COVID-19 MRNA VACCINE (MODERNA) 100 MCG: CPT

## 2021-03-03 DIAGNOSIS — J45.990 ASTHMA, EXERCISE INDUCED: Primary | ICD-10-CM

## 2021-03-03 RX ORDER — ALBUTEROL SULFATE 90 UG/1
2 AEROSOL, METERED RESPIRATORY (INHALATION) EVERY 6 HOURS PRN
Qty: 1 INHALER | Refills: 0 | Status: SHIPPED | OUTPATIENT
Start: 2021-03-03 | End: 2021-12-21 | Stop reason: SDUPTHER

## 2021-03-04 ENCOUNTER — OFFICE VISIT (OUTPATIENT)
Dept: OBGYN CLINIC | Facility: MEDICAL CENTER | Age: 56
End: 2021-03-04
Payer: COMMERCIAL

## 2021-03-04 ENCOUNTER — APPOINTMENT (OUTPATIENT)
Dept: RADIOLOGY | Facility: MEDICAL CENTER | Age: 56
End: 2021-03-04
Payer: COMMERCIAL

## 2021-03-04 VITALS
HEART RATE: 91 BPM | DIASTOLIC BLOOD PRESSURE: 75 MMHG | WEIGHT: 180.4 LBS | BODY MASS INDEX: 28.99 KG/M2 | HEIGHT: 66 IN | SYSTOLIC BLOOD PRESSURE: 165 MMHG

## 2021-03-04 DIAGNOSIS — S42.492D CLOSED BICONDYLAR FRACTURE OF DISTAL END OF LEFT HUMERUS WITH ROUTINE HEALING, SUBSEQUENT ENCOUNTER: Primary | ICD-10-CM

## 2021-03-04 DIAGNOSIS — S42.492D CLOSED BICONDYLAR FRACTURE OF DISTAL END OF LEFT HUMERUS WITH ROUTINE HEALING, SUBSEQUENT ENCOUNTER: ICD-10-CM

## 2021-03-04 PROCEDURE — 99213 OFFICE O/P EST LOW 20 MIN: CPT | Performed by: ORTHOPAEDIC SURGERY

## 2021-03-04 PROCEDURE — 73080 X-RAY EXAM OF ELBOW: CPT

## 2021-03-04 NOTE — PROGRESS NOTES
Chief Complaint     Left elbow pain      History of Present Illness     Mac Garcia is a 50 y o  female status post ORIF left distal humerus fracture with olecranon osteotomy and subcutaneous ulnar nerve transposition on 12/11/19  Patient states that she just wants to make sure her symptoms are not anything out of the ordinary for elbow  States at baseline she has a constant discomfort to the elbow  States she does have difficulty sleeping secondary to this  She also describes difficulty with her exercise routines, primarily things like push ups and planks  She also states she feels at times like her arm goes to sleep and feels cold sensation that goes into the hand when she's sleeping  Past Medical History:   Diagnosis Date    Abnormal findings on diagnostic imaging of breast     Last Assessesd: 4/25/2014    Arthritis     Knees and feet    Breast cancer ([facility] [address] ) 2008    Right    Cancer [facility])     Bilateral breast cancer history   Had two bouts of radiation, BCC    Difficulty falling asleep     Gluteal abscess     Last Assessed: 12/30/2016    Inconclusive mammogram due to dense breasts     Last Assessed: 4/7/2014    Limb alert care status     right    Lobular carcinoma in situ of breast     Last Assesed: 4/7/2014    Nipple discharge     Last Assessed: 4/7/2014    Pain in left foot     Pneumonia 2009    x1    PONV (postoperative nausea and vomiting)     Skin cancer        Past Surgical History:   Procedure Laterality Date    BASAL CELL CARCINOMA EXCISION N/A 1/7/2021    Procedure: EXCISION BASAL CELL CARCINOMA OF MID BACK;  Surgeon: Sarah Jesus MD;  Location:  MAIN OR;  Service: Plastics    BREAST BIOPSY  05/02/2014    BREAST BIOPSY  05/2013    BREAST BIOPSY  05/2008    BREAST LUMPECTOMY Right 2008    BREAST RECONSTRUCTION Left 2/21/2019    Procedure: BREAST EXPANDER/IMPLANT EXCHANGE;  Surgeon: Sarah Jesus MD;  Location:  MAIN OR;  Service: Plastics    BREAST SURGERY Bilateral 2008, 2013, 2014    Breast reconstruction with expanders  Total of 7 surgeries  7/3/14 right breast reconstruction revision  10/27/14 right breast reconstruction revision     BREAST SURGERY Left 07/24/2013    Excision of breast lesion    CAPSULOTOMY Left 2/21/2019    Procedure: CAPSULECTOMY;  Surgeon: Barb Carson MD;  [facility];  Service: Plastics    CHEST WALL BIOPSY Right 2/21/2019    Procedure: UPPER CHEST BASAL CELL CARSINOMA EXCISION;  Surgeon: Barb Carson MD;  [facility];  Service: Plastics    CLOSURE WOUND N/A 2/21/2019    Procedure: RIGHT upper chest & LEFT upper back complex closure;  Surgeon: Barb Carson MD;  [facility];  Service: Plastics    COMPLEX WOUND CLOSURE TO EXTREMITY N/A 1/7/2021    Procedure: COMPLEX CLOSURE MID BACK;  Surgeon: Barb Carson MD;  [facility];  Service: Plastics    FOOT ARTHRODESIS      Pantalar    KNEE ARTHROSCOPY Right 05/1997    LIPOSUCTION W/ FAT INJECTION Bilateral 10/24/2019    Procedure: AUTOLOGOUS FAT GRAFTING TO BILATERAL BREAST;  Surgeon: Barb Carson MD;  [facility];  Service: Plastics    LIPOSUCTION W/ FAT INJECTION Bilateral 1/7/2021    Procedure: AUTOLOGOUS FAT GRAFTING TO BILATERAL BREAST;  Surgeon: Barb Carson MD;  [facility];  Service: Plastics    MASTECTOMY Bilateral 06/06/2014    Lymph nodes removed bilaterally  States she may have IV's in left side   MOHS RECONSTRUCTION Right 3/7/2019    Procedure: RECONSTRUCTION MOHS DEFECT RIGHT NOSE/MEDIAL CANTHUS;  Surgeon: Barb Carson MD;  [facility];  Service: Plastics    MOHS SURGERY      Mohs Micrographic Surgery Face;  Last Assessed: 5/15/2015    SD ADJ TISS Tinnie Siskin LID,NOS,EAR 10 1-30 SQCM Right 3/7/2019    Procedure: FLAP;  Surgeon: Barb Carson MD;  [facility];  Service: Plastics    SD BREAST RECONSTRUCTION W/LATISSIMUS DORSI FLAP Left 9/7/2017    Procedure: BREAST RECONSTRUCTION; LATISSIMUS DORSI FLAP; TISSUE EXPANDER;  Surgeon: Isaiah Concepcion MD;  [facility];  Service: Plastics    WA COLONOSCOPY FLX DX W/COLLJ [address] PFRMD N/A 8/1/2018    Procedure: COLONOSCOPY;  Surgeon: Ladi Curry MD;  [facility];  Service: Gastroenterology    WA [place] W/SESMDC W/RESCJ [address]OX [address] [facility]  9/9/2016    Procedure: Gabriella Mayo MODIFIED ;  Surgeon: Willy Salazar DPM;  [facility];  Service: Podiatry    WA EXC SKIN MALIG >4 CM TRUNK,ARM,LEG N/A 6/27/2019    Procedure: EXCISION BASAL CELL CARCINOMA MID CHEST;  Surgeon: Isaiah Concepcion MD;  [facility];  Service: Plastics    WA FULL THICK 2011 [facility] NOS,EAR,LID <20 SQCM Right 3/7/2019    Procedure: SKIN GRAFT FULL THICKNESS  (FTSG); Surgeon: Isaiah Concepcion MD;  [facility];  Service: Plastics    WA FUSION FOOT BONES,MIDTARSAL,MULTI Left 9/9/2016    Procedure: ARTHRODESIS FIRST [address] [facility] [place] [id number] ;  Surgeon: Willy Salazar DPM;  [facility];  Service: Podiatry    WA INSERTION BREAST IMPLANT SAME DAY OF MASTECTOMY Left 9/7/2017    Procedure: TISSUE EXPANDER;  Surgeon: Isaiah Concepcion MD;  [facility];  Service: Plastics    WA OPEN [facility] Left 12/11/2019    Procedure: OPEN REDUCTION W/ INTERNAL FIXATION left distal humerus fracture, possible olecranon osteotomy;  Surgeon:  Quang Moreno MD;  Location: [facility] MA[facility] OR;  Service: Orthopedics    WA OSTEOTOMY HEEL BONE Left 9/9/2016    Procedure: OSTEOTOMY CALCANEUS WITH APPLICATION AND ACTIVATION OF BONE STIMULATOR ;  Surgeon: Willy Salazar DPM;  [facility];  Service: Podiatry    WA RECMPL WND HEAD,FAC,HAND 2 6-7 5 CM N/A 3/7/2019    Procedure: COMPLEX CLOSURE;  Surgeon: Isaiah Concepcion MD;  [facility];  Service: Plastics    WA RECMPL WND TRUNK 2 6-7 5 CM N/A 6/27/2019    Procedure: CLOSURE WOUND MID CHEST;  Surgeon: Makenna Castaneda MD;  [facility];  Service: Plastics     [facility] TISSUE FOR GRAFT OTHR Bilateral 6/27/2019    Procedure: AUTOLOGOUS FAT GRAFTING BILATERAL BREASTS;  Surgeon: Makenna Castaneda MD;  [facility];  Service: Plastics    ME REVISION OF RECONSTRUCTED BREAST Bilateral 6/27/2019    Procedure: BILATERAL BREAST MOUND REVISION;  Surgeon: Makenna Castaneda MD;  [facility];  Service: Plastics    ME REVISION OF RECONSTRUCTED BREAST Bilateral 10/24/2019    Procedure: BILATERAL BREAST MOUND REVISION;  Surgeon: Makenna Castaneda MD;  [facility];  Service: Plastics    ME REVISION OF RECONSTRUCTED BREAST Bilateral 1/7/2021    Procedure: BILATERAL BREAST MOUND REVISION;  Surgeon: Makenna Castaneda MD;  [facility];  Service: Plastics    SENTINEL LYMPH NODE BIOPSY Bilateral 06/06/2014    SENTINEL LYMPH NODE BIOPSY Right 2008    SKIN BIOPSY      SKIN LESION EXCISION Left 2/21/2019    Procedure: UPPER BACK [address] EXCISION;  Surgeon: Makenna Castaneda MD;  [facility];  Service: Plastics    SQUAMOUS CELL CARCINOMA EXCISION N/A 3/7/2019    Procedure: EXCISION BCC LEFT FOREHEAD;  Surgeon: Makenna Castaneda MD;  [facility];  Service: Plastics    WISDOM TOOTH EXTRACTION         Allergies   Allergen Reactions    Codeine Itching     Severe    Dilaudid [Hydromorphone Hcl] Itching     Severe    Hydromorphone Itching    Tramadol Itching       Current Outpatient Medications on File Prior to Visit   Medication Sig Dispense Refill    acetaminophen (TYLENOL) 500 mg tablet Take 500 mg by mouth every 6 (six) hours as needed for mild pain   albuterol (Ventolin HFA) 90 mcg/act inhaler Inhale 2 puffs every 6 (six) hours as needed for wheezing or shortness of breath 1 Inhaler 0    ALPRAZolam (XANAX) 0 5 mg tablet Take 0 5 mg by mouth daily at bedtime as needed for anxiety        Ascorbic Acid (VITAMIN C) 500 MG CAPS Take 500 mg by mouth        calcium-vitamin D (OSCAL) 250-125 MG-UNIT per tablet Take 2 tablets by mouth daily   Cholecalciferol (VITAMIN D) 2000 units CAPS Take by mouth      Chromium Picolinate 200 MCG CAPS Take 200 mg by mouth daily   famotidine (PEPCID) 20 mg tablet Take 20 mg by mouth 2 (two) times a day      Lactobacillus (ACIDOPHILUS PO) Take by mouth daily   Multiple Vitamin (MULTIVITAMIN) capsule Take 1 capsule by mouth daily   omeprazole (PriLOSEC) 20 mg delayed release capsule Take 20 mg by mouth daily at bedtime      tamoxifen (NOLVADEX) 20 mg tablet Take 1 tablet (20 mg total) by mouth daily 90 tablet 3    zinc gluconate 50 mg tablet Take by mouth      HYDROcodone-acetaminophen (NORCO) 5-325 mg per tablet Take 1 tablet by mouth every 6 (six) hours as needed for pain for up to 18 dosesMax Daily Amount: 4 tablets (Patient not taking: Reported on 2/16/2021) 18 tablet 0     Current Facility-Administered Medications on File Prior to Visit   Medication Dose Route Frequency Provider Last Rate Last Admin    ropivacaine (NAROPIN) 0 5 % injection 10 mL  10 mL Infiltration  Karma Fulling, DPM   10 mL at 07/30/19 1814    triamcinolone acetonide (KENALOG-40) 40 mg/mL injection 20 mg  20 mg Intra-articular  Karma Fulling, DPM   20 mg at 07/30/19 1814       Social History     Tobacco Use    Smoking status: Never Smoker    Smokeless tobacco: Never Used   Substance Use Topics    Alcohol use: Yes     Frequency: 4 or more times a week     Drinks per session: 1 or 2     Binge frequency: Never     Comment: 4 weekly-wine    Drug use: No       Family History   Adopted: Yes   Problem Relation Age of Onset    No Known Problems Mother     No Known Problems Father        Review of Systems     As stated in the HPI  All other systems were reviewed and are negative        Physical Exam     /75   Pulse 91   Ht 5' 6" (1 676 m)   Wt 81 8 kg (180 lb 6 4 oz)   BMI 29 12 kg/m²     GENERAL: This is a well-developed, well-nourished, age-appropriate patient in no acute distress  The patient is alert and oriented x3  Pleasant and cooperative  Eyes: Anicteric sclerae  Extraocular movements appear intact  HENT: Nares are patent with no drainage  Lungs: There is equal chest rise on inspection  Breathing is non-labored with no audible wheezing  Cardiovascular: No cyanosis  No upper extremity lymphadema  Skin: Skin is warm to touch  No obvious skin lesions or rashes other than described below  Neurologic: No ataxia  Psychiatric: Mood and affect are appropriate  Left Upper Extremity:  Well-healed incision of the posterior elbow  Mild ecchymosis along posterior medial elbow  Range of motion is  degrees measured with goniotomer  Full pronation/supination  Patient denies tenderness to palpation along medial or lateral epicondyles  Denies tenderness to palpation of biceps/triceps tendons  Denies tenderness to the olecranon  Negative Tinel's to the carpal tunnel, negative Durkan's compression  Negative Tinel's  At transposed ulnar nerve at the level of the elbow, mildly positive compression  5/5 Motor to the APB, FDI, FDP2, FDP5, EDC  Sensation intact to light touch in the median, radial, and ulnar nerve distribution  Radial pulse 2+  Data Review     Results Reviewed     None             Imaging:  Xrays of left elbow taken today were personally reviewed by me and show stable alignment of distal humerus fracture with interval healing  status post open reduction internal fixation  Well-aligned joint with no apparent degeneration  Olecranon osteotomy is well-healed  Implants are intact, no signs of loosening  Assessment and Plan      Diagnoses and all orders for this visit:    Closed bicondylar fracture of distal end of left humerus with routine healing, subsequent encounter  -     XR elbow 3+ vw left;  Future       50 y o  female status post ORIF left distal humerus fracture with olecranon osteotomy and subcutaneous ulnar nerve transposition on 12/11/19  Xrays were reviewed with the patient today  She overall is doing well  I did discuss that it is normal after this type of trauma to have some residual discomfort and difficulty with activities  She also has had other surgeries that could contribute to her inability to perform certain exercises  I do not believe there is anything at this time that needs to be formally addressed  She should monitor for increased numbness/weakness and call me if these occur  Otherwise, proceed with activities as tolerated         Follow Up: PRN    PROCEDURES PERFORMED:  Procedures  No Procedures performed today

## 2021-03-10 ENCOUNTER — TELEMEDICINE (OUTPATIENT)
Dept: HEMATOLOGY ONCOLOGY | Facility: CLINIC | Age: 56
End: 2021-03-10
Payer: COMMERCIAL

## 2021-03-10 DIAGNOSIS — Z17.0 MALIGNANT NEOPLASM OF BREAST IN FEMALE, ESTROGEN RECEPTOR POSITIVE, UNSPECIFIED LATERALITY, UNSPECIFIED SITE OF BREAST (HCC): ICD-10-CM

## 2021-03-10 DIAGNOSIS — C50.912 BILATERAL MALIGNANT NEOPLASM OF BREAST IN FEMALE, ESTROGEN RECEPTOR POSITIVE, UNSPECIFIED SITE OF BREAST (HCC): Primary | ICD-10-CM

## 2021-03-10 DIAGNOSIS — C50.911 BILATERAL MALIGNANT NEOPLASM OF BREAST IN FEMALE, ESTROGEN RECEPTOR POSITIVE, UNSPECIFIED SITE OF BREAST (HCC): Primary | ICD-10-CM

## 2021-03-10 DIAGNOSIS — Z17.0 BILATERAL MALIGNANT NEOPLASM OF BREAST IN FEMALE, ESTROGEN RECEPTOR POSITIVE, UNSPECIFIED SITE OF BREAST (HCC): Primary | ICD-10-CM

## 2021-03-10 DIAGNOSIS — C50.919 MALIGNANT NEOPLASM OF BREAST IN FEMALE, ESTROGEN RECEPTOR POSITIVE, UNSPECIFIED LATERALITY, UNSPECIFIED SITE OF BREAST (HCC): ICD-10-CM

## 2021-03-10 PROCEDURE — 99214 OFFICE O/P EST MOD 30 MIN: CPT | Performed by: INTERNAL MEDICINE

## 2021-03-10 RX ORDER — TAMOXIFEN CITRATE 20 MG/1
20 TABLET ORAL DAILY
Qty: 90 TABLET | Refills: 3 | Status: SHIPPED | OUTPATIENT
Start: 2021-03-10 | End: 2022-03-22 | Stop reason: SDUPTHER

## 2021-03-10 NOTE — PROGRESS NOTES
Virtual Regular Visit      Assessment/Plan:    Problem List Items Addressed This Visit        Other    Breast cancer ([facility] [address] ) - Primary               Reason for visit is   Chief Complaint   Patient presents with    Follow-up    Virtual Regular Visit        Encounter provider Shady Prescott MD    Provider located at [address] [id number]      Recent Visits  No visits were found meeting these conditions  Showing recent visits within past 7 days and meeting all other requirements     Today's Visits  Date Type Provider Dept   03/10/21 Telemedicine Shady Prescott MD [facility]   Showing today's visits and meeting all other requirements     Future Appointments  No visits were found meeting these conditions  Showing future appointments within next 150 days and meeting all other requirements        The patient was identified by name and date of birth  Olvin Tenorio was informed that this is a telemedicine visit and that the visit is being conducted through [employer] and patient was informed that this is a secure, HIPAA-compliant platform  She agrees to proceed     My office door was closed  No one else was in the room  She acknowledged consent and understanding of privacy and security of the video platform  The patient has agreed to participate and understands they can discontinue the visit at any time  Patient is aware this is a billable service  Assessment / QPHH:  [id number] premenopausal woman, who has 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 , as well as a history of radiation treatment post lumpectomy  She developed bilateral breast cancer  Both of which are ER/WV positive, HER-2 negative disease  She has stage IA on the right, as well as stage IIA on the left with one positive lymph node   She underwent bilateral mastectomy with reconstruction resulting in the FRANKY in 2014  Her left-sided breast cancer has Oncotype DX score of only 7  Therefore, adjuvant chemotherapy was not indicated  He had postmastectomy radiation therapy to the left chest wall  Currently, she is on adjuvant hormonal therapy with tamoxifen with no side effect  Clinically, she is doing well  She has no sign which could suggest recurrent disease  I recommended her to continue with tamoxifen 20 mg daily for 3 more years to complete 10 years of adjuvant hormonal therapy  She is in agreement with my recommendations       Subjective:      HPI:  A 54year old premenopausal woman, who has 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, 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  She underwent lumpectomy followed by radiation  She did not have any hormonal treatment  In 2013, she developed nipple discharge in the left breast  She went to [facility], where she underwent stereotactic biopsy, which showed precancerous lesions  She underwent lumpectomy, which showed no evidence of malignancy  In March 2014, she appreciated the lump in the left breast  Subsequently, she was found to have abnormality of bilateral breast  She underwent bilateral mastectomy in June 6, 2014  Right mastectomy specimen showed up to 7 mm of invasive ductal carcinoma grade 1 ER AL positive, HER-2 negative disease  7  [place] were all negative for the metastatic disease in the right axilla  Left mastectomy specimen showed multifocal invasive ductal carcinoma with tumor measuring up to 15 mm, grade 2  This was ER/AL positive, HER-2 negative disease  One sentinel was positive for metastatic disease  There was some evidence of focal extranodal extension  She had immediate reconstruction with tissue expander  She presents today to discuss adjuvant treatment options  Her left breast tumor was sent for the Oncotype DX testing which came back recurrence score of 7, which is low risk disease  Previously, she was tested for BRCA gene mutation in 2013, which was negative  She does not know the family history, since she was adopted  Currently, she has no complaints except for some chest tightness  She has no respiratory symptoms  Her weight is stable  She has no bone pain  Her performance status is normal  She is to have 2 surgical drain in each axilla              Interval History:  A 50 year old premenopausal woman, who has 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  She underwent lumpectomy followed by radiation therapy  In June 2014, she was diagnosed with bilateral invasive breast cancer, stage IA, on the right and stage IIA on the left, both of which ER/RI positive, HER-2 negative disease  Her Oncotype DX score on the left breast cancer with only 7  Therefore, adjuvant chemotherapy was not indicated  She underwent bilateral mastectomy with reconstruction  She completed postmastectomy radiation therapy to the left chest wall  She is currently on adjuvant hormonal therapy with tamoxifen  She presents today for routine follow-up  Her last menstrual cycle was in April 2020  She only had 1 menstrual bleeding in the last 1 year  She has no complaint hot flashes  She denied any pain  Her weight is stable  She is doing quite well        Objective:      Primary Diagnosis:     1  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    2  Stage IA right breast cancer, grade 1, ER/RI positive, HER-2 negative disease  Diagnosed in June 2014    3  Stage IIA left breast cancer, grade 2, ER/RI positive, HER-2 negative disease with one positive lymph node  Oncotype DX recurrence score 7  Diagnosed in June 2014    4   BRCA mutation negative        Cancer Staging:  Cancer Staging  No matching staging information was found for the patient         Previous Hematologic/ Oncologic Treatment:            Current Hematologic/ Oncologic Treatment:       Adjuvant hormonal therapy with tamoxifen 20 mg once a day since July 2014        Disease Status:      FRANKY status post bilateral mastectomy      Test Results:     Pathology:     Right mastectomy specimen showed a 7 mm of invasive ductal carcinoma, grade 1, ER/NE positive, HER-2 negative disease  7  [place] lymph nodes were all negative for metastatic disease  Stage IA(pT1b,pN0,M0) mastectomy specimen showed up to 15 mm of invasive ductal carcinoma, multifocal, grade 2  ER/NE positive, HER-2 negative disease  One sentinel lymph node was positive for metastatic disease with focal extranodal extension  Stage IIA(pT1c, pN1a, M0)        Radiology:           Laboratory:           Physical Exam:      Due to the technical problem, I could not see her in the video  She was able to see me  Therefore, virtual physical exam could not be performed  Past Medical History:   Diagnosis Date    Abnormal findings on diagnostic imaging of breast     Last Assessesd: 4/25/2014    Arthritis     Knees and feet    Breast cancer ([facility] [address] ) 2008    Right    Cancer [facility])     Bilateral breast cancer history   Had two bouts of radiation, BCC    Difficulty falling asleep     Gluteal abscess     Last Assessed: 12/30/2016    Inconclusive mammogram due to dense breasts     Last Assessed: 4/7/2014    Limb alert care status     right    Lobular carcinoma in situ of breast     Last Assesed: 4/7/2014    Nipple discharge     Last Assessed: 4/7/2014    Pain in left foot     Pneumonia 2009    x1    PONV (postoperative nausea and vomiting)     Skin cancer        Past Surgical History:   Procedure Laterality Date    BASAL CELL CARCINOMA EXCISION N/A 1/7/2021    Procedure: EXCISION BASAL CELL CARCINOMA OF MID BACK;  Surgeon: Edy Giraldo MD;  Location:  MAIN OR;  Service: Plastics    BREAST BIOPSY  05/02/2014    BREAST BIOPSY  05/2013    BREAST BIOPSY  05/2008    BREAST LUMPECTOMY Right 2008    BREAST RECONSTRUCTION Left 2/21/2019    Procedure: BREAST EXPANDER/IMPLANT EXCHANGE;  Surgeon: Edy Giraldo MD;  Location:  MAIN OR;  Service: Plastics    BREAST SURGERY Bilateral 2008, 2013, 2014    Breast reconstruction with expanders  Total of 7 surgeries  7/3/14 right breast reconstruction revision  10/27/14 right breast reconstruction revision     BREAST SURGERY Left 07/24/2013    Excision of breast lesion    CAPSULOTOMY Left 2/21/2019    Procedure: CAPSULECTOMY;  Surgeon: Aj Arias MD;  Location: [facility] MAIN OR;  Service: Plastics    CHEST WALL BIOPSY Right 2/21/2019    Procedure: UPPER CHEST BASAL CELL CARSINOMA EXCISION;  Surgeon: Aj Arias MD;  [facility]: [facility];  Service: Plastics    CLOSURE WOUND N/A 2/21/2019    Procedure: RIGHT upper chest & LEFT upper back complex closure;  Surgeon: Aj Arias MD;  Location: [facility] MA[facility] OR;  Service: Plastics    COMPLEX WOUND CLOSURE TO EXTREMITY N/A 1/7/2021    Procedure: COMPLEX CLOSURE MID BACK;  Surgeon: Aj Arias MD;  [facility]: [facility] MAIN OR;  Service: Plastics    FOOT ARTHRODESIS      Pantalar    KNEE ARTHROSCOPY Right 05/1997    LIPOSUCTION W/ FAT INJECTION Bilateral 10/24/2019    Procedure: AUTOLOGOUS FAT GRAFTING TO BILATERAL BREAST;  Surgeon: Aj Arias MD;  [facility];  Service: Plastics    LIPOSUCTION W/ FAT INJECTION Bilateral 1/7/2021    Procedure: AUTOLOGOUS FAT GRAFTING TO BILATERAL BREAST;  Surgeon: Aj Arias MD;  [facility]: [facility] MA[facility] OR;  Service: Plastics    MASTECTOMY Bilateral 06/06/2014    Lymph nodes removed bilaterally  States she may have IV's in left side   MOHS RECONSTRUCTION Right 3/7/2019    Procedure: RECONSTRUCTION MOHS DEFECT RIGHT NOSE/MEDIAL CANTHUS;  Surgeon: Aj Arias MD;  Location: [facility];  Service: Plastics    MOHS SURGERY      Mohs Micrographic Surgery Face;  Last Assessed: 5/15/2015    [place] ADJ TISS Mariama Balboa LID,NOS,EAR 10 1-30 SQCM Right 3/7/2019    Procedure: FLAP;  Surgeon: Aj Arias MD;  Location: [facility];  Service: Plastics    [place] BREAST RECONSTRUCTION W/LATISSIMUS DORSI FLAP Left 9/7/2017    Procedure: BREAST RECONSTRUCTION; LATISSIMUS DORSI FLAP; TISSUE EXPANDER;  Surgeon: Anna Finch MD;  Location: [facility];  Service: Plastics    SD COLONOSCOPY FLX DX W/COLLJ [address] PFRMD N/A 8/1/2018    Procedure: COLONOSCOPY;  Surgeon: Dar Black MD;  Location: [facility] MAIN OR;  Service: Gastroenterology    SD [place] W/SESMDC W/RESCJ PROX [facility]  9/9/2016    Procedure: Timmothy Poser MODIFIED ;  Surgeon: Ranulfo Sinha DPM;  Location: [facility] Main OR;  Service: Podiatry    SD EXC SKIN MALIG >4 CM TRUNK,ARM,LEG N/A 6/27/2019    Procedure: EXCISION BASAL CELL CARCINOMA MID CHEST;  Surgeon: Anna Finch MD;  Location: [facility];  Service: Plastics    SD FULL THICK 2011 [facility] NOS,EAR,LID <20 SQCM Right 3/7/2019    Procedure: SKIN GRAFT FULL THICKNESS  (FTSG); Surgeon: Anna Finch MD;  Location: [facility];  Service: Plastics    SD FUSION FOOT BONES,MIDTARSAL,MULTI Left 9/9/2016    Procedure: ARTHRODESIS FIRST [address] [facility] [place] [address] ;  Surgeon: Ranulfo Sinha DPM;  Location: [facility] Main OR;  Service: Podiatry    SD INSERTION BREAST IMPLANT SAME DAY OF MASTECTOMY Left 9/7/2017    Procedure: TISSUE EXPANDER;  Surgeon: Anna Finch MD;  Location: [facility];  Service: Plastics    SD OPEN 2016 [facility] Left 12/11/2019    Procedure: OPEN REDUCTION W/ INTERNAL FIXATION left distal humerus fracture, possible olecranon osteotomy;  Surgeon:  Mateo Felix MD;  Location: [address] [place] [address] MAIN OR;  Service: Orthopedics    SD OSTEOTOMY HEEL BONE Left 9/9/2016    Procedure: OSTEOTOMY CALCANEUS WITH APPLICATION AND ACTIVATION OF BONE STIMULATOR ;  Surgeon: Ranulfo Sinha DPM;  Location: [facility] Main [facility];  Service: Podiatry    SD RECMPL WND HEAD,FAC,HAND 2 6-7 5 CM N/A 3/7/2019    Procedure: COMPLEX CLOSURE;  Surgeon: Anna Finch MD;  Location: [facility];  Service: Plastics    SD RECMPL WND TRUNK 2 6-7 5 CM N/A 6/27/2019    Procedure: CLOSURE WOUND MID CHEST; Surgeon: Edy Giraldo MD;  Location: [facility] OR;  Service: Plastics     [facility] [address] TISSUE FOR GRAFT OTHR Bilateral 6/27/2019    Procedure: AUTOLOGOUS FAT GRAFTING BILATERAL BREASTS;  Surgeon: Edy Giraldo MD;  Location: [facility];  Service: Plastics    FL REVISION OF RECONSTRUCTED BREAST Bilateral 6/27/2019    Procedure: BILATERAL BREAST MOUND REVISION;  Surgeon: Edy Giraldo MD;  Location: [facility] OR;  Service: Plastics    FL REVISION OF RECONSTRUCTED BREAST Bilateral 10/24/2019    Procedure: BILATERAL BREAST MOUND REVISION;  Surgeon: Edy Giraldo MD;  Location: [facility] OR;  Service: Plastics    FL REVISION OF RECONSTRUCTED BREAST Bilateral 1/7/2021    Procedure: BILATERAL BREAST MOUND REVISION;  Surgeon: Edy Giraldo MD;  Location: [facility] MA[facility];  Service: Plastics    SENTINEL LYMPH NODE BIOPSY Bilateral 06/06/2014    SENTINEL LYMPH NODE BIOPSY Right 2008    SKIN BIOPSY      SKIN LESION EXCISION Left 2/21/2019    Procedure: UPPER BACK [address] EXCISION;  Surgeon: Edy Giraldo MD;  [facility]: [facility];  Service: Plastics    SQUAMOUS CELL CARCINOMA EXCISION N/A 3/7/2019    Procedure: EXCISION BCC LEFT FOREHEAD;  Surgeon: Edy Giraldo MD;  Location: [facility] OR;  Service: Plastics    WISDOM TOOTH EXTRACTION         Current Outpatient Medications   Medication Sig Dispense Refill    acetaminophen (TYLENOL) 500 mg tablet Take 500 mg by mouth every 6 (six) hours as needed for mild pain   albuterol (Ventolin HFA) 90 mcg/act inhaler Inhale 2 puffs every 6 (six) hours as needed for wheezing or shortness of breath 1 Inhaler 0    ALPRAZolam (XANAX) 0 5 mg tablet Take 0 5 mg by mouth daily at bedtime as needed for anxiety   Ascorbic Acid (VITAMIN C) 500 MG CAPS Take 500 mg by mouth        calcium-vitamin D (OSCAL) 250-125 MG-UNIT per tablet Take 2 tablets by mouth daily        Cholecalciferol (VITAMIN D) 2000 units CAPS Take by mouth      Chromium Picolinate 200 MCG CAPS Take 200 mg by mouth daily   famotidine (PEPCID) 20 mg tablet Take 20 mg by mouth 2 (two) times a day      HYDROcodone-acetaminophen (NORCO) 5-325 mg per tablet Take 1 tablet by mouth every 6 (six) hours as needed for pain for up to 18 dosesMax Daily Amount: 4 tablets 18 tablet 0    Lactobacillus (ACIDOPHILUS PO) Take by mouth daily   Multiple Vitamin (MULTIVITAMIN) capsule Take 1 capsule by mouth daily   omeprazole (PriLOSEC) 20 mg delayed release capsule Take 20 mg by mouth daily at bedtime      zinc gluconate 50 mg tablet Take by mouth      tamoxifen (NOLVADEX) 20 mg tablet Take 1 tablet (20 mg total) by mouth daily 90 tablet 3     Current Facility-Administered Medications   Medication Dose Route Frequency Provider Last Rate Last Admin    ropivacaine (NAROPIN) 0 5 % injection 10 mL  10 mL Infiltration  Lova Fennel, DPM   10 mL at 07/30/19 1814    triamcinolone acetonide (KENALOG-40) 40 mg/mL injection 20 mg  20 mg Intra-articular  Lova Fennel, DPM   20 mg at 07/30/19 1814        Allergies   Allergen Reactions    Codeine Itching     Severe    Dilaudid [Hydromorphone Hcl] Itching     Severe    Hydromorphone Itching    Tramadol Itching       Review of Systems   All other systems reviewed and are negative  Video Exam    Vitals:             VIRTUAL VISIT DISCLAIMER    Marlenilanettene Yusef acknowledges that she has consented to an online visit or consultation  She understands that the online visit is based solely on information provided by her, and that, in the absence of a face-to-face physical evaluation by the physician, the diagnosis she receives is both limited and provisional in terms of accuracy and completeness  This is not intended to replace a full medical face-to-face evaluation by the physician  Alvaro Holbrook understands and accepts these terms

## 2021-03-24 ENCOUNTER — OFFICE VISIT (OUTPATIENT)
Dept: PLASTIC SURGERY | Facility: HOSPITAL | Age: 56
End: 2021-03-24

## 2021-03-24 DIAGNOSIS — Z85.3 PERSONAL HISTORY OF MALIGNANT NEOPLASM OF BREAST: Primary | ICD-10-CM

## 2021-03-24 DIAGNOSIS — Z98.890 STATUS POST BILATERAL BREAST RECONSTRUCTION: ICD-10-CM

## 2021-03-24 PROCEDURE — 99024 POSTOP FOLLOW-UP VISIT: CPT | Performed by: PHYSICIAN ASSISTANT

## 2021-03-24 NOTE — LETTER
March 24, 2021     Valentinlyle DO Arnold  [address] [facility] [address]  [facility] [address]  [address] [facility] [address]    Patient: Manda Reina   YOB: 1965   Date of Visit: 3/24/2021       Dear Dr Rosemary Fox: Thank you for referring Manda Reina to me for evaluation  Below are my notes for this consultation  If you have questions, please do not hesitate to call me  I look forward to following your patient along with you  Sincerely,        Lamond Cooks, PA-C        CC: Sherene Bibber, MD Lamond Cooks, PA-C  3/24/2021  1:21 PM  Sign when Signing Visit  Assessment/Plan:   Sumi Orona is a 50year old female who is status post autologous fat grafting on 1/7/21  Please see HPI  I assured her that the lumpy/bumpy area of the left breast is likely secondary to the recent fat grafting procedure  Would allow for more time  I did also offer her ultrasound for peace of mind however, she is comfortable waiting  Follow-up in 2-3 months  Diagnoses and all orders for this visit:    Personal history of malignant neoplasm of breast    Status post bilateral breast reconstruction          Subjective:     Patient ID: Manda Reina is a 50 y o  female  HPI   She reports some lumpy feeling on the left breast since her last fat grafting  She states feeling sore on that side as well  She does report that she has been increasing her exercise activity  Review of Systems   Skin:        As per HPI  Objective:     Physical Exam  Skin:     Comments: Bilateral breasts are soft and supple without evidence for capsular contracture  She does have some lumpy areas of the left breast   This may be some fat necrosis potentially

## 2021-03-24 NOTE — PROGRESS NOTES
Assessment/Plan:   Tatianna Napier is a 50year old female who is status post autologous fat grafting on 1/7/21  Please see HPI  I assured her that the lumpy/bumpy area of the left breast is likely secondary to the recent fat grafting procedure  Would allow for more time  I did also offer her ultrasound for peace of mind however, she is comfortable waiting  Follow-up in 2-3 months  Diagnoses and all orders for this visit:    Personal history of malignant neoplasm of breast    Status post bilateral breast reconstruction          Subjective:     Patient ID: Jose Martines is a 50 y o  female  HPI   She reports some lumpy feeling on the left breast since her last fat grafting  She states feeling sore on that side as well  She does report that she has been increasing her exercise activity  Review of Systems   Skin:        As per HPI  Objective:     Physical Exam  Skin:     Comments: Bilateral breasts are soft and supple without evidence for capsular contracture  She does have some lumpy areas of the left breast   This may be some fat necrosis potentially

## 2021-05-26 ENCOUNTER — OFFICE VISIT (OUTPATIENT)
Dept: PLASTIC SURGERY | Facility: HOSPITAL | Age: 56
End: 2021-05-26
Payer: COMMERCIAL

## 2021-05-26 DIAGNOSIS — L98.9 SKIN LESION OF LEFT UPPER EXTREMITY: Primary | ICD-10-CM

## 2021-05-26 PROCEDURE — 11104 PUNCH BX SKIN SINGLE LESION: CPT | Performed by: PHYSICIAN ASSISTANT

## 2021-05-26 PROCEDURE — 88305 TISSUE EXAM BY PATHOLOGIST: CPT | Performed by: STUDENT IN AN ORGANIZED HEALTH CARE EDUCATION/TRAINING PROGRAM

## 2021-05-26 PROCEDURE — 99214 OFFICE O/P EST MOD 30 MIN: CPT | Performed by: PHYSICIAN ASSISTANT

## 2021-05-26 NOTE — PROGRESS NOTES
Biopsy    Date/Time: 5/26/2021 1:58 PM  Performed by: Ambar Tate PA-C  Authorized by: Ambar Tate PA-C     Procedure Details - Skin Biopsy:     Biopsy tissue type: skin    Biopsy method: punch biopsy      Body area:  Upper extremity    Upper extremity location:  L shoulder    Initial size (mm):  15    Malignancy: malignancy unknown

## 2021-05-26 NOTE — PROGRESS NOTES
Assessment/Plan:  Mario Real is a 62-year-old female who presents for evaluation a left shoulder skin lesion  She is self-referred and a previous patient of ours  Please see HPI  In the office today, I performed a punch biopsy of the lesion  I will call her with her results when they are available  We will remove the suture in approximately 10 days  Diagnoses and all orders for this visit:    Skin lesion of left upper extremity          Subjective:      Patient ID: Shahid Vargas is a 49 y o  female  HPI   She reports a new lesion on her left shoulder that has been red and scaly  She states it has been present for maybe a year  It does appear to be increasing in size  She does have a personal history for skin cancer  The following portions of the patient's history were reviewed and updated as appropriate: She  has a past medical history of Abnormal findings on diagnostic imaging of breast, Arthritis, Breast cancer ([facility] [address] ) (2008), Cancer [facility]), Difficulty falling asleep, Gluteal abscess, Inconclusive mammogram due to dense breasts, Limb alert care status, Lobular carcinoma in situ of breast, Nipple discharge, Pain in left foot, Pneumonia (2009), PONV (postoperative nausea and vomiting), and Skin cancer  She  has a past surgical history that includes Mastectomy (Bilateral, 06/06/2014); Breast surgery (Bilateral, 2008, 2013, 2014); [place] tooth extraction; pr osteotomy heel bone (Left, 9/9/2016); pr fusion foot bones,midtarsal,multi (Left, 9/9/2016); pr corrj hallux valgus w/sesmdc w/rescj prox phal (9/9/2016); pr breast reconstruction w/latissimus dorsi flap (Left, 9/7/2017); pr insertion breast implant same day of mastectomy (Left, 9/7/2017); Knee arthroscopy (Right, 05/1997); Breast biopsy (05/02/2014); Breast surgery (Left, 07/24/2013); Breast biopsy (05/2013); Breast biopsy (05/2008); Foot arthrodesis; Mohs surgery; Breast lumpectomy (Right, 2008); [place] lymph node biopsy (Bilateral, 06/06/2014);  [place] lymph node biopsy (Right, 2008); pr colonoscopy flx dx w/collj spec when pfrmd (N/A, 8/1/2018); Breast reconstruction (Left, 2/21/2019); Chest wall biopsy (Right, 2/21/2019); Skin lesion excision (Left, 2/21/2019); CLOSURE WOUND (N/A, 2/21/2019); Capsulectomy (Left, 2/21/2019); pr recmpl wnd head,fac,hand 2 6-7 5 cm (N/A, 3/7/2019); pr adj tiss xfer lid,nos,ear 10 1-30 sqcm (Right, 3/7/2019); pr full thick grft nos,ear,lid <20 sqcm (Right, 3/7/2019); MOHS RECONSTRUCTION (Right, 3/7/2019); Squamous cell carcinoma excision (N/A, 3/7/2019); pr revision of reconstructed breast (Bilateral, 6/27/2019); pr remv tissue for graft othr (Bilateral, 6/27/2019); pr exc skin malig >4 cm trunk,arm,leg (N/A, 6/27/2019); pr recmpl wnd trunk 2 6-7 5 cm (N/A, 6/27/2019); pr revision of reconstructed breast (Bilateral, 10/24/2019); Liposuction w/ fat injection (Bilateral, 10/24/2019); pr open tx monteggia fracture dislocation elbow (Left, 12/11/2019); Skin biopsy; pr revision of reconstructed breast (Bilateral, 1/7/2021); Excision basal cell carcinoma (N/A, 1/7/2021); COMPLEX WOUND CLOSURE TO EXTREMITY (N/A, 1/7/2021); and Liposuction w/ fat injection (Bilateral, 1/7/2021)  Her family history includes No Known Problems in her father and mother  She was adopted  She  reports that she has never smoked  She has never used smokeless tobacco  She reports current alcohol use  She reports that she does not use drugs       Review of Systems   HENT: Negative for hearing loss  Eyes: Negative for visual disturbance  Respiratory: Negative for shortness of breath  Cardiovascular: Negative for chest pain  Gastrointestinal: Negative for abdominal pain, blood in stool, constipation, diarrhea, nausea and vomiting  Genitourinary: Negative for hematuria  Musculoskeletal: Negative for gait problem  Skin:        As per HPI  Neurological: Negative for seizures and headaches  Hematological: Does not bruise/bleed easily  Psychiatric/Behavioral: The patient is not nervous/anxious  Objective: There were no vitals taken for this visit  Physical Exam  Constitutional:       General: She is not in acute distress  Appearance: She is well-developed  HENT:      Head: Normocephalic and atraumatic  Eyes:      General: No scleral icterus  Pupils: Pupils are equal, round, and reactive to light  Neck:      Musculoskeletal: Neck supple  Thyroid: No thyromegaly  Trachea: No tracheal deviation  Cardiovascular:      Rate and Rhythm: Normal rate and regular rhythm  Heart sounds: No murmur  No friction rub  No gallop  Pulmonary:      Effort: Pulmonary effort is normal       Breath sounds: Normal breath sounds  No wheezing or rales  Abdominal:      General: Bowel sounds are normal  There is no distension  Palpations: Abdomen is soft  Tenderness: There is no abdominal tenderness  There is no guarding or rebound  Musculoskeletal: Normal range of motion  Lymphadenopathy:      Cervical: No cervical adenopathy  Skin:     Comments: Left top of shoulder lesion noted  It is scaled and pink in color  It has an irregular border  It measures approximately 1 5 cm  It is located approximately 9 cm lateral to her previous donor site scar on the left neck and it is approximately 2 5 cm medial to a pigmented lesion of the left shoulder  Please see photos  Neurological:      Mental Status: She is alert and oriented to person, place, and time  Cranial Nerves: No cranial nerve deficit

## 2021-06-04 ENCOUNTER — OFFICE VISIT (OUTPATIENT)
Dept: PLASTIC SURGERY | Facility: CLINIC | Age: 56
End: 2021-06-04

## 2021-06-04 DIAGNOSIS — C44.619 BASAL CELL CARCINOMA (BCC) OF LEFT SHOULDER: Primary | ICD-10-CM

## 2021-06-04 PROCEDURE — 99024 POSTOP FOLLOW-UP VISIT: CPT | Performed by: PHYSICIAN ASSISTANT

## 2021-06-04 NOTE — PROGRESS NOTES
Assessment/Plan:   Radha Amos is a 49year old female who is 10 days status post punch biopsy of the left shoulder  Please see HPI  Her pathology report revealed basal cell carcinoma  This was referred you had with her  We will plan to do excision with complex closure at the time of her upcoming autologous fat grafting which will be in October  We will see her back a few weeks prior to surgery to do a preop visit  Diagnoses and all orders for this visit:    Basal cell carcinoma (BCC) of left shoulder          Subjective:     Patient ID: Aaron Zapien is a 49 y o  female  HPI   She denies any complaints regarding her biopsy site  Review of Systems   Skin:        As per HPI  Objective:     Physical Exam  Skin:     Comments: The biopsy site is clean, dry and intact  Suture was removed

## 2021-06-23 ENCOUNTER — OFFICE VISIT (OUTPATIENT)
Dept: PODIATRY | Facility: CLINIC | Age: 56
End: 2021-06-23
Payer: COMMERCIAL

## 2021-06-23 VITALS — BODY MASS INDEX: 28.93 KG/M2 | HEIGHT: 66 IN | WEIGHT: 180 LBS

## 2021-06-23 DIAGNOSIS — M72.2 PLANTAR FASCIITIS: Primary | ICD-10-CM

## 2021-06-23 DIAGNOSIS — M24.875 OTHER SPECIFIC JOINT DERANGEMENTS LEFT FOOT, NOT ELSEWHERE CLASSIFIED: ICD-10-CM

## 2021-06-23 PROCEDURE — 20600 DRAIN/INJ JOINT/BURSA W/O US: CPT | Performed by: PODIATRIST

## 2021-06-23 PROCEDURE — 99213 OFFICE O/P EST LOW 20 MIN: CPT | Performed by: PODIATRIST

## 2021-06-23 RX ORDER — TRIAMCINOLONE ACETONIDE 40 MG/ML
20 INJECTION, SUSPENSION INTRA-ARTICULAR; INTRAMUSCULAR
Status: DISCONTINUED | OUTPATIENT
Start: 2021-06-23 | End: 2022-03-16

## 2021-06-23 RX ORDER — LIDOCAINE HYDROCHLORIDE 10 MG/ML
2 INJECTION, SOLUTION INFILTRATION; PERINEURAL
Status: DISCONTINUED | OUTPATIENT
Start: 2021-06-23 | End: 2022-03-16

## 2021-06-23 RX ADMIN — LIDOCAINE HYDROCHLORIDE 2 ML: 10 INJECTION, SOLUTION INFILTRATION; PERINEURAL at 17:11

## 2021-06-23 RX ADMIN — TRIAMCINOLONE ACETONIDE 20 MG: 40 INJECTION, SUSPENSION INTRA-ARTICULAR; INTRAMUSCULAR at 17:11

## 2021-06-23 NOTE — PROGRESS NOTES
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  Treatment options were discussed and the patient wished to proceed with sinus tarsi injection  Injection was given as in the procedure  Instructed supportive care, icing, and resting  3  Sent her for night splint for plantar fasciitis  Instructed stretching exercise  Possible injection depending on the progress  4  RA in 4 weeks  Small joint arthrocentesis: L subtalar  Universal Protocol:  Consent: Verbal consent obtained  Risks and benefits: risks, benefits and alternatives were discussed  Consent given by: patient  Time out: Immediately prior to procedure a "time out" was called to verify the correct patient, procedure, equipment, support staff and site/side marked as required  Timeout called at: 6/23/2021 5:10 PM   Patient understanding: patient states understanding of the procedure being performed  Site marked: the operative site was marked  Patient identity confirmed: verbally with patient    Supporting Documentation  Indications: pain   Procedure Details  Location: foot - L subtalar  Needle size: 25 G  Ultrasound guidance: no  Approach: lateral  Medications administered: 20 mg triamcinolone acetonide 40 mg/mL; 2 mL lidocaine 1 %    Patient tolerance: patient tolerated the procedure well with no immediate complications            Subjective:       HPI  The patient presents with chief complaint of bilateral foot pain  She has aches and stiffness around left ankle  She also has pain on right plantar heel  She has post-static dyskinesia  No swelling or edema  She had the symptoms for about 2 weeks  The following portions of the patient's history were reviewed and updated as appropriate: allergies, current medications, past family history, past medical history, past social history, past surgical history and problem list   All pertinent labs and images were reviewed  Past Medical History  Past Medical History:   Diagnosis Date    Abnormal findings on diagnostic imaging of breast     Last Assessesd: 4/25/2014    Arthritis     Knees and feet    Breast cancer ([facility] [address] ) 2008    Right    Cancer [facility])     Bilateral breast cancer history  Had two bouts of radiation, BCC    Difficulty falling asleep     Gluteal abscess     Last Assessed: 12/30/2016    Inconclusive mammogram due to dense breasts     Last Assessed: 4/7/2014    Limb alert care status     right    Lobular carcinoma in situ of breast     Last Assesed: 4/7/2014    Nipple discharge     Last Assessed: 4/7/2014    Pain in left foot     Pneumonia 2009    x1    PONV (postoperative nausea and vomiting)     Skin cancer        Past Surgical History  Past Surgical History:   Procedure Laterality Date    BASAL CELL CARCINOMA EXCISION N/A 1/7/2021    Procedure: EXCISION BASAL CELL CARCINOMA OF MID BACK;  Surgeon: Heide Lamebrt MD;  Location:  MAIN OR;  Service: Plastics    BREAST BIOPSY  05/02/2014    BREAST BIOPSY  05/2013    BREAST BIOPSY  05/2008    BREAST LUMPECTOMY Right 2008    BREAST RECONSTRUCTION Left 2/21/2019    Procedure: BREAST EXPANDER/IMPLANT EXCHANGE;  Surgeon: Heide Lambert MD;  Location:  MAIN OR;  Service: Plastics    BREAST SURGERY Bilateral 2008, 2013, 2014    Breast reconstruction with expanders  Total of 7 surgeries  7/3/14 right breast reconstruction revision   10/27/14 right breast reconstruction revision     BREAST SURGERY Left 07/24/2013    Excision of breast lesion    CAPSULOTOMY Left 2/21/2019    Procedure: CAPSULECTOMY;  Surgeon: Kun Hunter MD;  [facility];  Service: Plastics    CHEST WALL BIOPSY Right 2/21/2019    Procedure: UPPER CHEST BASAL CELL CARSINOMA EXCISION;  Surgeon: Kun Hunter MD;  [facility];  Service: Plastics    CLOSURE WOUND N/A 2/21/2019    Procedure: RIGHT upper chest & LEFT upper back complex closure;  Surgeon: Kun Hunter MD;  [facility];  Service: Plastics    COMPLEX WOUND CLOSURE TO EXTREMITY N/A 1/7/2021    Procedure: COMPLEX CLOSURE MID BACK;  Surgeon: Kun Hunter MD;  [facility]: [facility];  Service: Plastics    FOOT ARTHRODESIS      Pantalar    KNEE ARTHROSCOPY Right 05/1997    LIPOSUCTION W/ FAT INJECTION Bilateral 10/24/2019    Procedure: AUTOLOGOUS FAT GRAFTING TO BILATERAL BREAST;  Surgeon: Kun Hunter MD;  [facility];  Service: Plastics    LIPOSUCTION W/ FAT INJECTION Bilateral 1/7/2021    Procedure: AUTOLOGOUS FAT GRAFTING TO BILATERAL BREAST;  Surgeon: Kun Hunter MD;  [facility];  Service: Plastics    MASTECTOMY Bilateral 06/06/2014    Lymph nodes removed bilaterally  States she may have IV's in left side   MOHS RECONSTRUCTION Right 3/7/2019    Procedure: RECONSTRUCTION MOHS DEFECT RIGHT NOSE/MEDIAL CANTHUS;  Surgeon: Kun Hunter MD;  [facility];  Service: Plastics    MOHS SURGERY      Mohs Micrographic Surgery Face;  Last Assessed: 5/15/2015    WI ADJ TISS Francheska Broach LID,NOS,EAR 10 1-30 SQCM Right 3/7/2019    Procedure: FLAP;  Surgeon: Kun Hunter MD;  [facility];  Service: Plastics    WI BREAST RECONSTRUCTION W/LATISSIMUS DORSI FLAP Left 9/7/2017    Procedure: BREAST RECONSTRUCTION; LATISSIMUS DORSI FLAP; TISSUE EXPANDER;  Surgeon: Kun Hunter MD;  [facility];  Service: Plastics    WI COLONOSCOPY FLX DX W/NATALIIA [place] 1978 PFRMD N/A 8/1/2018    Procedure: COLONOSCOPY;  Surgeon: Yannick Lucia MD;  [facility];  Service: Gastroenterology    WI Yvonnjacquesire W/SESMDC W/RESCJ PROX PHAL  9/9/2016    Procedure: BUNIONECTOMY VARGAS MODIFIED ;  Surgeon: Radha Pérez DPM;  [facility];  Service: Podiatry    HI EXC SKIN MALIG >4 CM TRUNK,ARM,LEG N/A 6/27/2019    Procedure: EXCISION BASAL CELL CARCINOMA MID CHEST;  Surgeon: Shaun Leiva MD;  [facility];  Service: Plastics    HI FULL THICK 2011 [facility] NOS,EAR,LID <20 SQCM Right 3/7/2019    Procedure: SKIN GRAFT FULL THICKNESS  (FTSG); Surgeon: Shaun Leiva MD;  [facility];  Service: Plastics    HI FUSION FOOT BONES,MIDTARSAL,MULTI Left 9/9/2016    Procedure: ARTHRODESIS FIRST [address], [place] [address] ;  Surgeon: Radha Pérez DPM;  [facility];  Service: Podiatry    HI INSERTION BREAST IMPLANT SAME DAY OF MASTECTOMY Left 9/7/2017    Procedure: TISSUE EXPANDER;  Surgeon: Shaun Leiva MD;  [facility];  Service: Plastics    HI OPEN 2016 [facility] Left 12/11/2019    Procedure: OPEN REDUCTION W/ INTERNAL FIXATION left distal humerus fracture, possible olecranon osteotomy;  Surgeon:  Carolin Acuna MD;  Location: [address] [place] [address] MAIN OR;  Service: Orthopedics    HI OSTEOTOMY HEEL BONE Left 9/9/2016    Procedure: OSTEOTOMY CALCANEUS WITH APPLICATION AND ACTIVATION OF BONE STIMULATOR ;  Surgeon: Radha Pérez DPM;  [facility];  Service: Podiatry    HI RECMPL WND HEAD,FAC,HAND 2 6-7 5 CM N/A 3/7/2019    Procedure: COMPLEX CLOSURE;  Surgeon: Shaun Leiva MD;  [facility];  Service: Plastics    HI RECMPL WND TRUNK 2 6-7 5 CM N/A 6/27/2019    Procedure: CLOSURE WOUND MID CHEST;  Surgeon: Shaun Leiva MD;  [facility];  Service: Plastics    HI REMV TISSUE FOR GRAFT OTHR Bilateral 6/27/2019    Procedure: AUTOLOGOUS FAT GRAFTING BILATERAL BREASTS;  Surgeon: Shaun Leiva MD;  [facility];  Service: Plastics    HI REVISION OF RECONSTRUCTED BREAST Bilateral 6/27/2019 Procedure: BILATERAL BREAST MOUND REVISION;  Surgeon: Josefina Garrett MD;  [facility];  Service: Plastics    NM REVISION OF RECONSTRUCTED BREAST Bilateral 10/24/2019    Procedure: BILATERAL BREAST MOUND REVISION;  Surgeon: Josefina Garrett MD;  [facility];  Service: Plastics    NM REVISION OF RECONSTRUCTED BREAST Bilateral 1/7/2021    Procedure: BILATERAL BREAST MOUND REVISION;  Surgeon: Josefina Garrett MD;  [facility];  Service: Plastics    SENTINEL LYMPH NODE BIOPSY Bilateral 06/06/2014    SENTINEL LYMPH NODE BIOPSY Right 2008    SKIN BIOPSY      SKIN LESION EXCISION Left 2/21/2019    Procedure: UPPER BACK [address];  Surgeon: Josefina Garrett MD;  [facility];  Service: Plastics    SQUAMOUS CELL CARCINOMA EXCISION N/A 3/7/2019    Procedure: EXCISION BCC LEFT FOREHEAD;  Surgeon: Josefina Garrett MD;  [facility];  Service: Plastics    WISDOM TOOTH EXTRACTION          Allergies:  Codeine, Dilaudid [hydromorphone hcl], Hydromorphone, and Tramadol    Medications:  Current Outpatient Medications   Medication Sig Dispense Refill    acetaminophen (TYLENOL) 500 mg tablet Take 500 mg by mouth every 6 (six) hours as needed for mild pain   albuterol (Ventolin HFA) 90 mcg/act inhaler Inhale 2 puffs every 6 (six) hours as needed for wheezing or shortness of breath 1 Inhaler 0    ALPRAZolam (XANAX) 0 5 mg tablet Take 0 5 mg by mouth daily at bedtime as needed for anxiety   Ascorbic Acid (VITAMIN C) 500 MG CAPS Take 500 mg by mouth        calcium-vitamin D (OSCAL) 250-125 MG-UNIT per tablet Take 2 tablets by mouth daily   Cholecalciferol (VITAMIN D) 2000 units CAPS Take by mouth      Chromium Picolinate 200 MCG CAPS Take 200 mg by mouth daily        famotidine (PEPCID) 20 mg tablet Take 20 mg by mouth 2 (two) times a day      HYDROcodone-acetaminophen (NORCO) 5-325 mg per tablet Take 1 tablet by mouth every 6 (six) hours as needed for pain for up to 18 dosesMax Daily Amount: 4 tablets 18 tablet 0    Lactobacillus (ACIDOPHILUS PO) Take by mouth daily   Multiple Vitamin (MULTIVITAMIN) capsule Take 1 capsule by mouth daily   omeprazole (PriLOSEC) 20 mg delayed release capsule Take 20 mg by mouth daily at bedtime      tamoxifen (NOLVADEX) 20 mg tablet Take 1 tablet (20 mg total) by mouth daily 90 tablet 3    zinc gluconate 50 mg tablet Take by mouth       Current Facility-Administered Medications   Medication Dose Route Frequency Provider Last Rate Last Admin    ropivacaine (NAROPIN) 0 5 % injection 10 mL  10 mL Infiltration  Amada Alysia, DPM   10 mL at 07/30/19 1814    triamcinolone acetonide (KENALOG-40) 40 mg/mL injection 20 mg  20 mg Intra-articular  Amada Alysia, DPM   20 mg at 07/30/19 1814       Social History:  Social History     Socioeconomic History    Marital status: /Civil Union     Spouse name: None    Number of children: None    Years of education: None    Highest education level: None   Occupational History    None   Tobacco Use    Smoking status: Never Smoker    Smokeless tobacco: Never Used   Vaping Use    Vaping Use: Never used   Substance and Sexual Activity    Alcohol use: Yes     Comment: 4 weekly-wine    Drug use: No    Sexual activity: Yes     Partners: Male     Birth control/protection: Condom Male   Other Topics Concern    None   Social History Narrative    None     Social Determinants of Health     Financial Resource Strain:     Difficulty of Paying Living Expenses:    Food Insecurity:     Worried About Running Out of Food in the Last Year:     Ran Out of Food in the Last Year:    Transportation Needs:     Lack of Transportation (Medical):      Lack of Transportation (Non-Medical):    Physical Activity:     Days of Exercise per Week:     Minutes of Exercise per Session:    Stress:     Feeling of Stress :    Social Connections:     Frequency of Communication with Friends and Family:     Frequency of Social Gatherings with Friends and Family:     Attends [employer] Services:     Active Member of Clubs or Organizations:     Attends Club or Organization Meetings:     Marital Status:    Intimate Partner Violence:     Fear of Current or Ex-Partner:     Emotionally Abused:     Physically Abused:     Sexually Abused:           Review of Systems   Constitutional: Negative for chills, fatigue and fever  Respiratory: Negative for cough, shortness of breath and wheezing  Cardiovascular: Negative for chest pain and palpitations  Gastrointestinal: Negative for diarrhea, nausea and vomiting  Musculoskeletal: Negative for arthralgias and gait problem  Skin: Negative for color change, pallor, rash and wound  Hematological: Negative  Objective:      Ht 5' 6" (1 676 m) Comment: verbal  Wt 81 6 kg (180 lb)   BMI 29 05 kg/m²          Physical Exam  Vitals reviewed  Constitutional:       General: She is not in acute distress  Appearance: Normal appearance  She is well-developed  She is not toxic-appearing  Cardiovascular:      Rate and Rhythm: Normal rate and regular rhythm  Pulses: Normal pulses  Pulmonary:      Effort: Pulmonary effort is normal  No respiratory distress  Musculoskeletal:         General: No swelling, tenderness or signs of injury  Comments: Focal pain on left sinus tarsi and right plantar heel  No acute edema  Skin:     General: Skin is warm  Coloration: Skin is not pale  Findings: No erythema or rash  Neurological:      General: No focal deficit present  Mental Status: She is alert and oriented to person, place, and time  Cranial Nerves: No cranial nerve deficit  Sensory: No sensory deficit  Motor: No weakness or abnormal muscle tone  Coordination: Coordination normal    Psychiatric:         Mood and Affect: Mood normal          Behavior: Behavior normal          Thought Content:  Thought content normal  Judgment: Judgment normal

## 2021-07-06 ENCOUNTER — TELEPHONE (OUTPATIENT)
Dept: OBGYN CLINIC | Facility: CLINIC | Age: 56
End: 2021-07-06

## 2021-07-06 NOTE — TELEPHONE ENCOUNTER
Patient calling in to clarify if spotting is normal with use of Tamoxifen  States has not had a period for over a year and now is having reddish/brown discharge  States is very light    Just wanted to know if normal

## 2021-07-07 NOTE — PROGRESS NOTES
Endometrial biopsy    Date/Time: 7/7/2021 4:02 PM  Performed by: Geovanna Burgos PA-C  Authorized by: Geovanna Burgos PA-C   Universal Protocol:  Consent: Verbal consent obtained  Consent given by: patient  Patient understanding: patient states understanding of the procedure being performed  Patient consent: the patient's understanding of the procedure matches consent given  Procedure consent: procedure consent matches procedure scheduled  Relevant documents: relevant documents present and verified  Test results: test results available and properly labeled  Patient identity confirmed: verbally with patient      Indication:     Indications: Post-menopausal bleeding      Chronicity of post-menopausal bleeding:  New    Progression of post-menopausal bleeding:  Partially resolved  Procedure:     Procedure: endometrial biopsy with Pipelle      The cervix was dilated: yes      Uterus sounded: yes      Uterus sound depth (cm):  6 (RV)    Specimen collected: specimen collected and sent to pathology      Patient tolerated procedure well with no complications: yes    Comments:     Procedure comments:  Pt with h/o breast cancer on tamoxifen x 7 years  4/2020 was her last period, then last week had some spotting  Only lasted the days and resolved at this point  We reviewed endometrial cancer risks  Pt counseled on need for endometrial biopsy  Aware of risk of risk of infection, uterine perforation and scarring, inability to obtain a sufficient sample and possible need for additional procedures  Pt aware I will call her and review results once available and determine follow up plan as needed  Pt agrees to procedure  All questions answered

## 2021-07-12 ENCOUNTER — PROCEDURE VISIT (OUTPATIENT)
Dept: OBGYN CLINIC | Facility: CLINIC | Age: 56
End: 2021-07-12
Payer: COMMERCIAL

## 2021-07-12 VITALS — HEIGHT: 66 IN | WEIGHT: 177.2 LBS | BODY MASS INDEX: 28.48 KG/M2

## 2021-07-12 DIAGNOSIS — N95.0 POSTMENOPAUSAL BLEEDING: Primary | ICD-10-CM

## 2021-07-12 PROCEDURE — 88305 TISSUE EXAM BY PATHOLOGIST: CPT | Performed by: PATHOLOGY

## 2021-07-12 PROCEDURE — 58100 BIOPSY OF UTERUS LINING: CPT | Performed by: PHYSICIAN ASSISTANT

## 2021-07-21 ENCOUNTER — TELEPHONE (OUTPATIENT)
Dept: OBGYN CLINIC | Facility: CLINIC | Age: 56
End: 2021-07-21

## 2021-07-21 NOTE — TELEPHONE ENCOUNTER
LMOM returning pts call  Again reviewed negative biopsy results  Can call back to speak w triage if I have left for the day  If still having any bleeding will need to consider pelvic US or further evaluation

## 2021-08-08 NOTE — TELEPHONE ENCOUNTER
100% agree  It is tracking ecchymosis   Thanks for explaining to her Pt denies nay pain at the moment. Tolerating clear liquid diet. On TPN. Bs active, ileostomy with liquid brown stool. Midline incision with Aquacel cdi. Carey with moderate amount of clear ye.low urine. Generalized non pitting edema noted.  Poc updated, pt algorithm/standards of care as needed  Outcome: Progressing     Problem: MUSCULOSKELETAL - ADULT  Goal: Return mobility to safest level of function  Description: INTERVENTIONS:  - Assess patient stability and activity tolerance for standing, transferring a including physical limitations  - Instruct pt to call for assistance with activity based on assessment  - Modify environment to reduce risk of injury  - Provide assistive devices as appropriate  - Consider OT/PT consult to assist with strengthening/mobilit

## 2021-08-25 ENCOUNTER — APPOINTMENT (OUTPATIENT)
Dept: LAB | Facility: CLINIC | Age: 56
End: 2021-08-25
Payer: COMMERCIAL

## 2021-08-25 DIAGNOSIS — Z00.8 HEALTH EXAMINATION IN POPULATION SURVEY: ICD-10-CM

## 2021-08-25 LAB
CHOLEST SERPL-MCNC: 220 MG/DL (ref 50–200)
EST. AVERAGE GLUCOSE BLD GHB EST-MCNC: 111 MG/DL
HBA1C MFR BLD: 5.5 %
HDLC SERPL-MCNC: 90 MG/DL
LDLC SERPL CALC-MCNC: 119 MG/DL (ref 0–100)
NONHDLC SERPL-MCNC: 130 MG/DL
TRIGL SERPL-MCNC: 56 MG/DL

## 2021-08-25 PROCEDURE — 36415 COLL VENOUS BLD VENIPUNCTURE: CPT

## 2021-08-25 PROCEDURE — 83036 HEMOGLOBIN GLYCOSYLATED A1C: CPT

## 2021-08-25 PROCEDURE — 80061 LIPID PANEL: CPT

## 2021-09-29 ENCOUNTER — OFFICE VISIT (OUTPATIENT)
Dept: PODIATRY | Facility: CLINIC | Age: 56
End: 2021-09-29
Payer: COMMERCIAL

## 2021-09-29 ENCOUNTER — APPOINTMENT (OUTPATIENT)
Dept: RADIOLOGY | Facility: CLINIC | Age: 56
End: 2021-09-29
Payer: COMMERCIAL

## 2021-09-29 VITALS — HEIGHT: 66 IN | BODY MASS INDEX: 28.45 KG/M2 | WEIGHT: 177 LBS

## 2021-09-29 DIAGNOSIS — M79.671 FOOT PAIN, BILATERAL: ICD-10-CM

## 2021-09-29 DIAGNOSIS — M24.875 OTHER SPECIFIC JOINT DERANGEMENTS LEFT FOOT, NOT ELSEWHERE CLASSIFIED: ICD-10-CM

## 2021-09-29 DIAGNOSIS — M79.672 FOOT PAIN, BILATERAL: ICD-10-CM

## 2021-09-29 DIAGNOSIS — M72.2 PLANTAR FASCIITIS: Primary | ICD-10-CM

## 2021-09-29 PROCEDURE — 73630 X-RAY EXAM OF FOOT: CPT

## 2021-09-29 PROCEDURE — 99213 OFFICE O/P EST LOW 20 MIN: CPT | Performed by: PODIATRIST

## 2021-09-29 PROCEDURE — 20550 NJX 1 TENDON SHEATH/LIGAMENT: CPT | Performed by: PODIATRIST

## 2021-09-29 NOTE — PROGRESS NOTES
PATIENT:  Dev Marcum  1965       ASSESSMENT:     1  Plantar fasciitis  Foot injection   2  Other specific joint derangements left foot, not elsewhere classified  XR foot 3+ vw left   3  Foot pain, bilateral               PLAN:  1  Patient was counseled and educated on the condition and the diagnosis  2  X-ray was obtained and personally reviewed  The radiographic findings were discussed with the patient  The diagnosis, treatment options and prognosis were discussed with the patient  3  There is no disruption at surgical site  NCJ arthritis noted  Instructed to wear orthotics  Instructed home exercise / stretching  4  Treatment options were discussed for plantar fasciitis  The patient wished to proceed with an injection  Injection was given as in the procedure  Instructed supportive care, icing, and resting  Consider further diagnostics depending on the progress  5  Instructed stretching exercise  6  RA in 4 weeks  Foot injection     Date/Time 9/29/2021 6:00 PM     Performed by  Keyonna Veras DPM     Authorized by Keyonna Veras DPM      Universal Protocol   Consent: Verbal consent obtained  Risks and benefits: risks, benefits and alternatives were discussed  Consent given by: patient  Time out: Immediately prior to procedure a "time out" was called to verify the correct patient, procedure, equipment, support staff and site/side marked as required  Timeout called at: 9/29/2021 6:00 PM   Patient understanding: patient states understanding of the procedure being performed  Site marked: the operative site was marked  Patient identity confirmed: verbally with patient       Procedure Details   Procedure Notes:   Treatment options were discussed and the patient wished to proceed with an injection  A trigger point injection was given to right heel using 0 5cc Kenolog 40 and 2cc 1% Lidocaine pl  Instructed supportive care, icing, and resting     Patient tolerance: Patient tolerated the procedure well with no immediate complications               Subjective:       HPI  The patient presents with chief complaint of right heel pain  PT and night splint did not resolve her pain  Pain seems to be little worse  She also feels her left foot locks up and stiffens  She concerns the hardware in her left foot shifts  No acute edema  No significant numbness  Left sinus tarsi pain resolved after the last injection  The following portions of the patient's history were reviewed and updated as appropriate: allergies, current medications, past family history, past medical history, past social history, past surgical history and problem list   All pertinent labs and images were reviewed  Past Medical History  Past Medical History:   Diagnosis Date    Abnormal findings on diagnostic imaging of breast     Last Assessesd: 4/25/2014    Arthritis     Knees and feet    Breast cancer ([facility] [address] ) 2008    Right    Cancer [facility])     Bilateral breast cancer history   Had two bouts of radiation, BCC    Difficulty falling asleep     Gluteal abscess     Last Assessed: 12/30/2016    Inconclusive mammogram due to dense breasts     Last Assessed: 4/7/2014    Limb alert care status     right    Lobular carcinoma in situ of breast     Last Assesed: 4/7/2014    Nipple discharge     Last Assessed: 4/7/2014    Pain in left foot     Pneumonia 2009    x1    PONV (postoperative nausea and vomiting)     Skin cancer        Past Surgical History  Past Surgical History:   Procedure Laterality Date    BASAL CELL CARCINOMA EXCISION N/A 1/7/2021    Procedure: EXCISION BASAL CELL CARCINOMA OF MID BACK;  Surgeon: Cameron Mohs, MD;  Location:  MAIN OR;  Service: Plastics    BREAST BIOPSY  05/02/2014    BREAST BIOPSY  05/2013    BREAST BIOPSY  05/2008    BREAST LUMPECTOMY Right 2008    BREAST RECONSTRUCTION Left 2/21/2019    Procedure: BREAST EXPANDER/IMPLANT EXCHANGE;  Surgeon: Cameron Mohs, MD; [facility];  Service: Plastics    BREAST SURGERY Bilateral 2008, 2013, 2014    Breast reconstruction with expanders  Total of 7 surgeries  7/3/14 right breast reconstruction revision  10/27/14 right breast reconstruction revision     BREAST SURGERY Left 07/24/2013    Excision of breast lesion    CAPSULOTOMY Left 2/21/2019    Procedure: CAPSULECTOMY;  Surgeon: Norma Thayer MD;  [facility];  Service: Plastics    CHEST WALL BIOPSY Right 2/21/2019    Procedure: UPPER CHEST BASAL CELL CARSINOMA EXCISION;  Surgeon: Norma Thayer MD;  [facility];  Service: Plastics    CLOSURE WOUND N/A 2/21/2019    Procedure: RIGHT upper chest & LEFT upper back complex closure;  Surgeon: Norma Thayer MD;  [facility];  Service: Plastics    COMPLEX WOUND CLOSURE TO EXTREMITY N/A 1/7/2021    Procedure: COMPLEX CLOSURE MID BACK;  Surgeon: Norma Thayer MD;  [facility];  Service: Plastics    FOOT ARTHRODESIS      Pantalar    KNEE ARTHROSCOPY Right 05/1997    LIPOSUCTION W/ FAT INJECTION Bilateral 10/24/2019    Procedure: AUTOLOGOUS FAT GRAFTING TO BILATERAL BREAST;  Surgeon: Norma Thayer MD;  [facility];  Service: Plastics    LIPOSUCTION W/ FAT INJECTION Bilateral 1/7/2021    Procedure: AUTOLOGOUS FAT GRAFTING TO BILATERAL BREAST;  Surgeon: Norma Thayer MD;  [facility];  Service: Plastics    MASTECTOMY Bilateral 06/06/2014    Lymph nodes removed bilaterally  States she may have IV's in left side   MOHS RECONSTRUCTION Right 3/7/2019    Procedure: RECONSTRUCTION MOHS DEFECT RIGHT NOSE/MEDIAL CANTHUS;  Surgeon: Norma Thayer MD;  [facility];  Service: Plastics    MOHS SURGERY      Mohs Micrographic Surgery Face;  Last Assessed: 5/15/2015    NH ADJ TISS Catalina Maairani LID,NOS,EAR 10 1-30 SQCM Right 3/7/2019    Procedure: FLAP;  Surgeon: Norma Thayer MD;  [facility];  Service: Plastics    NH BREAST RECONSTRUCTION W/LATISSIMUS DORSI FLAP Left 9/7/2017    Procedure: BREAST RECONSTRUCTION; LATISSIMUS DORSI FLAP; TISSUE EXPANDER;  Surgeon: Denny Pittman MD;  [facility];  Service: Plastics    NH COLONOSCOPY FLX DX W/COLLJ [address] PFRMD N/A 8/1/2018    Procedure: COLONOSCOPY;  Surgeon: Moi Whitfield MD;  [facility];  Service: Gastroenterology    [place] W/SESMDC W/RESCJ [address] [facility]  9/9/2016    Procedure: Ferchuckye Gojohnle MODIFIED ;  Surgeon: Estefany Walton DPM;  [facility];  Service: Podiatry    NH EXC SKIN MALIG >4 CM TRUNK,ARM,LEG N/A 6/27/2019    Procedure: EXCISION BASAL CELL CARCINOMA MID CHEST;  Surgeon: Denny Pittman MD;  [facility];  Service: Plastics    NH FULL THICK [facility] NOS,EAR,LID <20 SQCM Right 3/7/2019    Procedure: SKIN GRAFT FULL THICKNESS  (FTSG); Surgeon: Denny Pittman MD;  [facility];  Service: Plastics    NH FUSION FOOT BONES,MIDTARSAL,MULTI Left 9/9/2016    Procedure: ARTHRODESIS FIRST [address], [place] [address] ;  Surgeon: Estefany Walton DPM;  [facility];  Service: Podiatry    NH INSERTION BREAST IMPLANT SAME DAY OF MASTECTOMY Left 9/7/2017    Procedure: TISSUE EXPANDER;  Surgeon: Denny Pittman MD;  [facility];  Service: Plastics    NH OPEN 2016 [facility] Left 12/11/2019    Procedure: OPEN REDUCTION W/ INTERNAL FIXATION left distal humerus fracture, possible olecranon osteotomy;  Surgeon:  Cherri Phoenix, MD;  Location: [facility] MAIN OR;  Service: Orthopedics    NH OSTEOTOMY HEEL BONE Left 9/9/2016    Procedure: OSTEOTOMY CALCANEUS WITH APPLICATION AND ACTIVATION OF BONE STIMULATOR ;  Surgeon: Estefany Walton DPM;  [facility];  Service: Podiatry    NH RECMPL WND HEAD,FAC,HAND 2 6-7 5 CM N/A 3/7/2019    Procedure: COMPLEX CLOSURE;  Surgeon: Denny Pittman MD;  [facility];  Service: Plastics    NH RECMPL WND TRUNK 2 6-7 5 CM N/A 6/27/2019    Procedure: CLOSURE WOUND MID CHEST;  Surgeon: Michael Morin MD;  [facility];  Service: Plastics     [facility] [address] TISSUE FOR GRAFT OTHR Bilateral 6/27/2019    Procedure: AUTOLOGOUS FAT GRAFTING BILATERAL BREASTS;  Surgeon: Michael Morin MD;  [facility];  Service: Plastics    NE REVISION OF RECONSTRUCTED BREAST Bilateral 6/27/2019    Procedure: BILATERAL BREAST MOUND REVISION;  Surgeon: Michael Morin MD;  [facility];  Service: Plastics    NE REVISION OF RECONSTRUCTED BREAST Bilateral 10/24/2019    Procedure: BILATERAL BREAST MOUND REVISION;  Surgeon: Michael Morin MD;  [facility];  Service: Plastics    NE REVISION OF RECONSTRUCTED BREAST Bilateral 1/7/2021    Procedure: BILATERAL BREAST MOUND REVISION;  Surgeon: Michael Morin MD;  [facility];  Service: Plastics    SENTINEL LYMPH NODE BIOPSY Bilateral 06/06/2014    SENTINEL LYMPH NODE BIOPSY Right 2008    SKIN BIOPSY      SKIN LESION EXCISION Left 2/21/2019    Procedure: UPPER BACK [address];  Surgeon: Michael Morin MD;  [facility];  Service: Plastics    SQUAMOUS CELL CARCINOMA EXCISION N/A 3/7/2019    Procedure: EXCISION BCC LEFT FOREHEAD;  Surgeon: Michael Morin MD;  [facility];  Service: Plastics    WISDOM TOOTH EXTRACTION          Allergies:  Codeine, Dilaudid [hydromorphone hcl], Hydromorphone, and Tramadol    Medications:  Current Outpatient Medications   Medication Sig Dispense Refill    acetaminophen (TYLENOL) 500 mg tablet Take 500 mg by mouth every 6 (six) hours as needed for mild pain   Ascorbic Acid (VITAMIN C) 500 MG CAPS Take 500 mg by mouth        calcium-vitamin D (OSCAL) 250-125 MG-UNIT per tablet Take 2 tablets by mouth daily        Cholecalciferol (VITAMIN D) 2000 units CAPS Take by mouth      famotidine (PEPCID) 20 mg tablet Take 20 mg by mouth 2 (two) times a day      Lactobacillus (ACIDOPHILUS PO) Take by mouth daily   Multiple Vitamin (MULTIVITAMIN) capsule Take 1 capsule by mouth daily   tamoxifen (NOLVADEX) 20 mg tablet Take 1 tablet (20 mg total) by mouth daily 90 tablet 3    zinc gluconate 50 mg tablet Take by mouth      albuterol (Ventolin HFA) 90 mcg/act inhaler Inhale 2 puffs every 6 (six) hours as needed for wheezing or shortness of breath (Patient not taking: Reported on 9/29/2021) 1 Inhaler 0    ALPRAZolam (XANAX) 0 5 mg tablet Take 0 5 mg by mouth daily at bedtime as needed for anxiety  (Patient not taking: Reported on 9/29/2021)      Chromium Picolinate 200 MCG CAPS Take 200 mg by mouth daily   (Patient not taking: Reported on 9/29/2021)      HYDROcodone-acetaminophen (NORCO) 5-325 mg per tablet Take 1 tablet by mouth every 6 (six) hours as needed for pain for up to 18 dosesMax Daily Amount: 4 tablets (Patient not taking: Reported on 9/29/2021) 18 tablet 0    omeprazole (PriLOSEC) 20 mg delayed release capsule Take 20 mg by mouth daily at bedtime (Patient not taking: Reported on 9/29/2021)       Current Facility-Administered Medications   Medication Dose Route Frequency Provider Last Rate Last Admin    lidocaine (XYLOCAINE) 1 % injection 2 mL  2 mL Injection  Jonny Foster, DPM   2 mL at 06/23/21 1711    ropivacaine (NAROPIN) 0 5 % injection 10 mL  10 mL Infiltration  Jonny Foster, DPM   10 mL at 07/30/19 1814    triamcinolone acetonide (KENALOG-40) 40 mg/mL injection 20 mg  20 mg Intra-articular  Jonny Foster, DPM   20 mg at 07/30/19 1814    triamcinolone acetonide (KENALOG-40) 40 mg/mL injection 20 mg  20 mg Intra-articular  Jonny Foster, DPM   20 mg at 06/23/21 1711       Social History:  Social History     Socioeconomic History    Marital status: /Civil Union     Spouse name: None    Number of children: None    Years of education: None    Highest education level: None   Occupational History    None   Tobacco Use    Smoking status: Never Smoker    Smokeless tobacco: Never Used Vaping Use    Vaping Use: Never used   Substance and Sexual Activity    Alcohol use: Yes     Comment: 4 weekly-wine    Drug use: No    Sexual activity: Yes     Partners: Male     Birth control/protection: Condom Male   Other Topics Concern    None   Social History Narrative    None     Social Determinants of Health     Financial Resource Strain:     Difficulty of Paying Living Expenses:    Food Insecurity:     Worried About Running Out of Food in the Last Year:     Ran Out of Food in the Last Year:    Transportation Needs:     Lack of Transportation (Medical):  Lack of Transportation (Non-Medical):    Physical Activity:     Days of Exercise per Week:     Minutes of Exercise per Session:    Stress:     Feeling of Stress :    Social Connections:     Frequency of Communication with Friends and Family:     Frequency of Social Gatherings with Friends and Family:     Attends Taoism Services:     Active Member of Clubs or Organizations:     Attends Club or Organization Meetings:     Marital Status:    Intimate Partner Violence:     Fear of Current or Ex-Partner:     Emotionally Abused:     Physically Abused:     Sexually Abused:           Review of Systems   Constitutional: Negative for chills, fatigue and fever  Respiratory: Negative for cough, shortness of breath and wheezing  Cardiovascular: Negative for chest pain and palpitations  Gastrointestinal: Negative for diarrhea, nausea and vomiting  Musculoskeletal: Negative for arthralgias and gait problem  Skin: Negative for color change, pallor, rash and wound  Hematological: Negative  Objective:      Ht 5' 6" (1 676 m) Comment: verbal  Wt 80 3 kg (177 lb)   BMI 28 57 kg/m²          Physical Exam  Vitals reviewed  Constitutional:       General: She is not in acute distress  Appearance: Normal appearance  She is well-developed  She is not toxic-appearing     Cardiovascular:      Rate and Rhythm: Normal rate and regular rhythm  Pulses: Normal pulses  Dorsalis pedis pulses are 2+ on the right side and 2+ on the left side  Posterior tibial pulses are 2+ on the right side and 2+ on the left side  Pulmonary:      Effort: Pulmonary effort is normal  No respiratory distress  Musculoskeletal:         General: Deformity present  No swelling, tenderness or signs of injury  Right lower leg: No edema  Left lower leg: No edema  Right foot: No foot drop  Left foot: No foot drop  Comments: No pain on left sinus tarsi  Pain presents right plantar and plantar medial heel  No Tinel sign  No pain on lateral compression of right heel  No acute edema  Good alignment left foot without focal pain  Skin:     General: Skin is warm  Coloration: Skin is not cyanotic or mottled  Findings: No abscess, ecchymosis, erythema, rash or wound  Nails: There is no clubbing  Neurological:      General: No focal deficit present  Mental Status: She is alert and oriented to person, place, and time  Cranial Nerves: No cranial nerve deficit  Sensory: No sensory deficit  Motor: No weakness or abnormal muscle tone  Coordination: Coordination normal    Psychiatric:         Mood and Affect: Mood normal          Behavior: Behavior normal          Thought Content:  Thought content normal          Judgment: Judgment normal

## 2021-10-06 ENCOUNTER — TELEPHONE (OUTPATIENT)
Dept: OBGYN CLINIC | Facility: CLINIC | Age: 56
End: 2021-10-06

## 2021-10-07 ENCOUNTER — OFFICE VISIT (OUTPATIENT)
Dept: OBGYN CLINIC | Facility: CLINIC | Age: 56
End: 2021-10-07
Payer: COMMERCIAL

## 2021-10-07 VITALS — BODY MASS INDEX: 28.12 KG/M2 | WEIGHT: 175 LBS | HEIGHT: 66 IN

## 2021-10-07 DIAGNOSIS — N89.8 VAGINAL IRRITATION: ICD-10-CM

## 2021-10-07 DIAGNOSIS — N94.10 DYSPAREUNIA, FEMALE: Primary | ICD-10-CM

## 2021-10-07 PROCEDURE — 87070 CULTURE OTHR SPECIMN AEROBIC: CPT | Performed by: PHYSICIAN ASSISTANT

## 2021-10-07 PROCEDURE — 99213 OFFICE O/P EST LOW 20 MIN: CPT | Performed by: PHYSICIAN ASSISTANT

## 2021-10-11 LAB — BACTERIA GENITAL AEROBE CULT: NORMAL

## 2021-10-25 ENCOUNTER — HOSPITAL ENCOUNTER (OUTPATIENT)
Dept: ULTRASOUND IMAGING | Facility: HOSPITAL | Age: 56
Discharge: HOME | End: 2021-10-25
Payer: COMMERCIAL

## 2021-10-25 DIAGNOSIS — N92.6 IRREGULAR BLEEDING: ICD-10-CM

## 2021-10-25 PROCEDURE — 76856 US EXAM PELVIC COMPLETE: CPT

## 2021-10-25 PROCEDURE — 76830 TRANSVAGINAL US NON-OB: CPT

## 2021-10-27 ENCOUNTER — OFFICE VISIT (OUTPATIENT)
Dept: PLASTIC SURGERY | Facility: HOSPITAL | Age: 56
End: 2021-10-27
Payer: COMMERCIAL

## 2021-10-27 ENCOUNTER — TELEPHONE (OUTPATIENT)
Dept: HEMATOLOGY ONCOLOGY | Facility: CLINIC | Age: 56
End: 2021-10-27

## 2021-10-27 VITALS
WEIGHT: 175 LBS | DIASTOLIC BLOOD PRESSURE: 73 MMHG | BODY MASS INDEX: 28.12 KG/M2 | RESPIRATION RATE: 16 BRPM | HEART RATE: 77 BPM | TEMPERATURE: 97.8 F | SYSTOLIC BLOOD PRESSURE: 118 MMHG | HEIGHT: 66 IN

## 2021-10-27 DIAGNOSIS — Z42.1 AFTERCARE POSTMASTECTOMY FOR BREAST RECONSTRUCTION: ICD-10-CM

## 2021-10-27 DIAGNOSIS — C44.619 BASAL CELL CARCINOMA (BCC) OF LEFT SHOULDER: Primary | ICD-10-CM

## 2021-10-27 PROCEDURE — 99214 OFFICE O/P EST MOD 30 MIN: CPT | Performed by: SURGERY

## 2021-11-01 ENCOUNTER — OFFICE VISIT (OUTPATIENT)
Dept: SURGICAL ONCOLOGY | Facility: CLINIC | Age: 56
End: 2021-11-01
Payer: COMMERCIAL

## 2021-11-01 VITALS
WEIGHT: 174 LBS | HEART RATE: 92 BPM | DIASTOLIC BLOOD PRESSURE: 73 MMHG | TEMPERATURE: 98.5 F | HEIGHT: 66 IN | OXYGEN SATURATION: 92 % | RESPIRATION RATE: 16 BRPM | SYSTOLIC BLOOD PRESSURE: 118 MMHG | BODY MASS INDEX: 27.97 KG/M2

## 2021-11-01 DIAGNOSIS — Z85.3 HISTORY OF BILATERAL BREAST CANCER: ICD-10-CM

## 2021-11-01 DIAGNOSIS — N63.0 BREAST LUMP: Primary | ICD-10-CM

## 2021-11-01 PROBLEM — Z08 ENCOUNTER FOR FOLLOW-UP SURVEILLANCE OF BREAST CANCER: Status: ACTIVE | Noted: 2021-11-01

## 2021-11-01 PROCEDURE — 76642 ULTRASOUND BREAST LIMITED: CPT | Performed by: SURGERY

## 2021-11-01 PROCEDURE — 99243 OFF/OP CNSLTJ NEW/EST LOW 30: CPT | Performed by: SURGERY

## 2021-11-29 ENCOUNTER — PROCEDURE VISIT (OUTPATIENT)
Dept: OBGYN CLINIC | Facility: CLINIC | Age: 56
End: 2021-11-29
Payer: COMMERCIAL

## 2021-11-29 VITALS — SYSTOLIC BLOOD PRESSURE: 118 MMHG | DIASTOLIC BLOOD PRESSURE: 73 MMHG | HEIGHT: 66 IN | BODY MASS INDEX: 28.08 KG/M2

## 2021-11-29 DIAGNOSIS — N95.0 POSTMENOPAUSAL BLEEDING: Primary | ICD-10-CM

## 2021-11-29 PROCEDURE — 99213 OFFICE O/P EST LOW 20 MIN: CPT | Performed by: OBSTETRICS & GYNECOLOGY

## 2021-12-08 ENCOUNTER — TELEPHONE (OUTPATIENT)
Dept: OBGYN CLINIC | Facility: CLINIC | Age: 56
End: 2021-12-08

## 2021-12-14 ENCOUNTER — PREP FOR PROCEDURE (OUTPATIENT)
Dept: OBGYN CLINIC | Facility: CLINIC | Age: 56
End: 2021-12-14

## 2021-12-14 DIAGNOSIS — N95.0 PMB (POSTMENOPAUSAL BLEEDING): Primary | ICD-10-CM

## 2021-12-15 ENCOUNTER — OFFICE VISIT (OUTPATIENT)
Dept: PLASTIC SURGERY | Facility: CLINIC | Age: 56
End: 2021-12-15
Payer: COMMERCIAL

## 2021-12-15 DIAGNOSIS — L98.9 SKIN LESION: Primary | ICD-10-CM

## 2021-12-15 PROCEDURE — 88305 TISSUE EXAM BY PATHOLOGIST: CPT | Performed by: PATHOLOGY

## 2021-12-15 PROCEDURE — 99214 OFFICE O/P EST MOD 30 MIN: CPT | Performed by: PHYSICIAN ASSISTANT

## 2021-12-21 DIAGNOSIS — J45.990 ASTHMA, EXERCISE INDUCED: ICD-10-CM

## 2021-12-21 DIAGNOSIS — Z85.3 PERSONAL HISTORY OF MALIGNANT NEOPLASM OF BREAST: Primary | ICD-10-CM

## 2021-12-21 RX ORDER — HYDROCODONE BITARTRATE AND ACETAMINOPHEN 5; 325 MG/1; MG/1
1 TABLET ORAL EVERY 6 HOURS PRN
Qty: 18 TABLET | Refills: 0 | Status: SHIPPED | OUTPATIENT
Start: 2021-12-21 | End: 2022-04-22

## 2021-12-21 RX ORDER — ALBUTEROL SULFATE 90 UG/1
2 AEROSOL, METERED RESPIRATORY (INHALATION) EVERY 6 HOURS PRN
Qty: 8 G | Refills: 1 | Status: SHIPPED | OUTPATIENT
Start: 2021-12-21

## 2021-12-23 ENCOUNTER — OFFICE VISIT (OUTPATIENT)
Dept: PLASTIC SURGERY | Facility: CLINIC | Age: 56
End: 2021-12-23
Payer: COMMERCIAL

## 2021-12-23 DIAGNOSIS — C44.619 BASAL CELL CARCINOMA OF SKIN OF LEFT UPPER EXTREMITY, INCLUDING SHOULDER: Primary | ICD-10-CM

## 2021-12-23 PROCEDURE — 99214 OFFICE O/P EST MOD 30 MIN: CPT | Performed by: PHYSICIAN ASSISTANT

## 2021-12-27 ENCOUNTER — ANESTHESIA EVENT (OUTPATIENT)
Dept: PERIOP | Facility: AMBULARY SURGERY CENTER | Age: 56
End: 2021-12-27
Payer: COMMERCIAL

## 2021-12-27 PROCEDURE — NC001 PR NO CHARGE: Performed by: OBSTETRICS & GYNECOLOGY

## 2021-12-27 NOTE — H&P
Roderick Cash  Is a 29-year-old  with normal endometrial biopsy 2021  However patient has had additional episodes of postmenopausal bleeding and a normal Pap 2020  Patient had a episode of flow in October of about 7 days that was brown to reddish in color no cramping  Patient currently also being treated with tamoxifen for breast cancer  Patient had pelvic ultrasound in 2021 showing 12 mm straight  Patient fully counseled to procedure D&C hysteroscopy as well as risks and benefits,  expected outcomes, and surgical instructions  gyn history   Contraception: Postmenopausal status   last Pap 2020 normal    Past Medical History:   Diagnosis Date    Abnormal findings on diagnostic imaging of breast     Last Assessesd: 2014    Arthritis     Knees and feet    Difficulty falling asleep     Gluteal abscess     Last Assessed: 2016    Limb alert care status     right    Pain in left foot     Pneumonia 2009    x1    PONV (postoperative nausea and vomiting)     Skin cancer      Past Surgical History:   Procedure Laterality Date    BASAL CELL CARCINOMA EXCISION N/A 2021    Procedure: EXCISION BASAL CELL CARCINOMA OF MID BACK;  Surgeon: Mariama Dai MD;  Location:  MAIN OR;  Service: Plastics    BREAST BIOPSY  2014    BREAST BIOPSY  2013    BREAST BIOPSY  2008    BREAST LUMPECTOMY Right     BREAST RECONSTRUCTION Left 2019    Procedure: BREAST EXPANDER/IMPLANT EXCHANGE;  Surgeon: Mariama Dai MD;  Location:  MAIN OR;  Service: Plastics    BREAST SURGERY Bilateral , ,     Breast reconstruction with expanders  Total of 7 surgeries  7/3/14 right breast reconstruction revision   10/27/14 right breast reconstruction revision     BREAST SURGERY Left 2013    Excision of breast lesion    CAPSULOTOMY Left 2019    Procedure: CAPSULECTOMY;  Surgeon: Mariama Dai MD;  [facility];  Service: Plastics    CHEST WALL BIOPSY Right 2/21/2019    Procedure: UPPER CHEST BASAL CELL CARSINOMA EXCISION;  Surgeon: Shaun Leiva MD;  [facility];  Service: Plastics    CLOSURE WOUND N/A 2/21/2019    Procedure: RIGHT upper chest & LEFT upper back complex closure;  Surgeon: Shaun Leiva MD;  [facility];  Service: Plastics    COMPLEX WOUND CLOSURE TO EXTREMITY N/A 1/7/2021    Procedure: COMPLEX CLOSURE MID BACK;  Surgeon: Shaun Leiva MD;  [facility]: [facility];  Service: Plastics    FOOT ARTHRODESIS      Pantalar    KNEE ARTHROSCOPY Right 05/1997    LIPOSUCTION W/ FAT INJECTION Bilateral 10/24/2019    Procedure: AUTOLOGOUS FAT GRAFTING TO BILATERAL BREAST;  Surgeon: Shaun Leiva MD;  [facility];  Service: Plastics    LIPOSUCTION W/ FAT INJECTION Bilateral 1/7/2021    Procedure: AUTOLOGOUS FAT GRAFTING TO BILATERAL BREAST;  Surgeon: Shaun Leiva MD;  [facility];  Service: Plastics    MASTECTOMY Bilateral 06/06/2014    Lymph nodes removed bilaterally  States she may have IV's in left side   MOHS RECONSTRUCTION Right 3/7/2019    Procedure: RECONSTRUCTION MOHS DEFECT RIGHT NOSE/MEDIAL CANTHUS;  Surgeon: Shaun Leiva MD;  [facility];  Service: Plastics    MOHS SURGERY      Mohs Micrographic Surgery Face;  Last Assessed: 5/15/2015    MO ADJ TISS Raynold Jumbo LID,NOS,EAR 10 1-30 SQCM Right 3/7/2019    Procedure: FLAP;  Surgeon: Shaun Leiva MD;  [facility];  Service: Plastics    MO BREAST RECONSTRUCTION W/LATISSIMUS DORSI FLAP Left 9/7/2017    Procedure: BREAST RECONSTRUCTION; LATISSIMUS DORSI FLAP; TISSUE EXPANDER;  Surgeon: Shaun Leiva MD;  [facility];  Service: Plastics    MO COLONOSCOPY FLX DX W/[place] 1978 PFRMD N/A 8/1/2018    Procedure: COLONOSCOPY;  Surgeon: Carlie Palomo MD;  [facility];  Service: Gastroenterology    [place] W/SESMDC [address]  9/9/2016 Procedure: BUNIONECTOMY VARGAS MODIFIED ;  Surgeon: Angie Vasquez DPM;  [facility];  Service: Podiatry    [place] EXC SKIN MALIG >4 CM TRUNK,ARM,LEG N/A 6/27/2019    Procedure: EXCISION BASAL CELL CARCINOMA MID CHEST;  Surgeon: Eneida Wright MD;  [facility];  Service: Plastics    [place] FULL THICK 2011 [facility] NOS,EAR,LID <20 SQCM Right 3/7/2019    Procedure: SKIN GRAFT FULL THICKNESS  (FTSG); Surgeon: Eneida Wright MD;  [facility];  Service: Plastics    [place] FUSION FOOT BONES,MIDTARSAL,MULTI Left 9/9/2016    Procedure: ARTHRODESIS FIRST [address], [facility] [address] ;  Surgeon: Angie Vasquez DPM;  [facility];  Service: Podiatry    [place] INSERTION BREAST IMPLANT SAME DAY OF MASTECTOMY Left 9/7/2017    Procedure: TISSUE EXPANDER;  Surgeon: Eneida Wright MD;  [facility]: [facility];  Service: Plastics    [place] OPEN 2016 [facility] Left 12/11/2019    Procedure: OPEN REDUCTION W/ INTERNAL FIXATION left distal humerus fracture, possible olecranon osteotomy;  Surgeon:  Woody Broderick MD;  Location: [facility] OR;  Service: Orthopedics    [place] OSTEOTOMY HEEL BONE Left 9/9/2016    Procedure: OSTEOTOMY CALCANEUS WITH APPLICATION AND ACTIVATION OF BONE STIMULATOR ;  Surgeon: Angie Vasquez DPM;  [facility];  Service: Podiatry    NY RECMPL WND HEAD,FAC,HAND 2 6-7 5 CM N/A 3/7/2019    Procedure: COMPLEX CLOSURE;  Surgeon: Eneida Wright MD;  [facility];  Service: Plastics    NY RECMPL WND TRUNK 2 6-7 5 CM N/A 6/27/2019    Procedure: CLOSURE WOUND MID CHEST;  Surgeon: Eneida Wright MD;  [facility];  Service: Plastics    NY REMV TISSUE FOR GRAFT OTHR Bilateral 6/27/2019    Procedure: AUTOLOGOUS FAT GRAFTING BILATERAL BREASTS;  Surgeon: Eneida Wright MD;  [facility];  Service: Plastics    [place] REVISION OF RECONSTRUCTED BREAST Bilateral 6/27/2019    Procedure: BILATERAL BREAST MOUND REVISION;  Surgeon: Meme Andrade MD;  [facility];  Service: Plastics    NM REVISION OF RECONSTRUCTED BREAST Bilateral 10/24/2019    Procedure: BILATERAL BREAST MOUND REVISION;  Surgeon: Meme Andrade MD;  [facility];  Service: Plastics    NM REVISION OF RECONSTRUCTED BREAST Bilateral 2021    Procedure: BILATERAL BREAST MOUND REVISION;  Surgeon: Meme Andrade MD;  [facility];  Service: Plastics    SENTINEL LYMPH NODE BIOPSY Bilateral 2014    SENTINEL LYMPH NODE BIOPSY Right     SKIN BIOPSY      SKIN LESION EXCISION Left 2019    Procedure: UPPER BACK [address];  Surgeon: Meme Andrade MD;  [facility];  Service: Plastics    SQUAMOUS CELL CARCINOMA EXCISION N/A 3/7/2019    Procedure: EXCISION BCC LEFT FOREHEAD;  Surgeon: Meme Andrade MD;  [facility];  Service: Plastics    WISDOM TOOTH EXTRACTION       OB History        0    Para   0    Term   0       0    AB   0    Living   0       SAB   0    IAB   0    Ectopic   0    Multiple   0    Live Births   0           Obstetric Comments   Menarche age 10  History of birth control pills             Current Outpatient Medications   Medication Instructions    acetaminophen (TYLENOL) 500 mg, Oral, Every 6 hours PRN    albuterol (Ventolin HFA) 90 mcg/act inhaler 2 puffs, Inhalation, Every 6 hours PRN    ALPRAZolam (XANAX) 0 5 mg, Oral, Daily at bedtime PRN    calcium-vitamin D (OSCAL) 250-125 MG-UNIT per tablet 2 tablets, Oral, Daily    Cholecalciferol (VITAMIN D) 2000 units CAPS Oral    famotidine (PEPCID) 20 mg, Oral, 2 times daily    HYDROcodone-acetaminophen (Norco) 5-325 mg per tablet 1 tablet, Oral, Every 6 hours PRN    Lactobacillus (ACIDOPHILUS PO) Oral, Daily    Multiple Vitamin (MULTIVITAMIN) capsule 1 capsule, Oral, Daily    omeprazole (PRILOSEC) 20 mg, Oral, Daily at bedtime    tamoxifen (NOLVADEX) 20 mg, Oral, Daily    Vitamin C 500 mg, Oral    zinc gluconate 50 mg tablet Oral     Allergies   Allergen Reactions    Codeine Itching     Severe    Dilaudid [Hydromorphone Hcl] Itching     Severe    Hydromorphone Itching    Tramadol Itching     Social History     Tobacco Use    Smoking status: Never Smoker    Smokeless tobacco: Never Used   Vaping Use    Vaping Use: Never used   Substance Use Topics    Alcohol use: Yes     Comment: 4 weekly-wine    Drug use: No      physical:  Height 5 ft 6 in, weight 174 lb ( 70 9 kg), BMI 20 2 weight, /73   and call room atraumatic normocephalic   Lungs:  Clear to auscultation bilaterally   Heart:  Regular rhythm S1-S2   Abdomen:  Soft nontender positive bowel sounds organomegaly   Extremities:  Normal warm well perfused no cyanosis clubbing or edema     assessment:  65-year-old  on tamoxifen for breast cancer and persistent postmenopausal bleeding along with thickened endometrium on ultrasound     plan:  D&C hysteroscopy

## 2021-12-30 ENCOUNTER — TELEPHONE (OUTPATIENT)
Dept: HEMATOLOGY ONCOLOGY | Facility: CLINIC | Age: 56
End: 2021-12-30

## 2022-01-06 ENCOUNTER — APPOINTMENT (OUTPATIENT)
Dept: LAB | Facility: CLINIC | Age: 57
End: 2022-01-06
Payer: COMMERCIAL

## 2022-01-06 DIAGNOSIS — C44.519 BASAL CELL CARCINOMA (BCC) OF SKIN OF TRUNK: ICD-10-CM

## 2022-01-06 LAB
ANION GAP SERPL CALCULATED.3IONS-SCNC: 6 MMOL/L (ref 4–13)
BASOPHILS # BLD AUTO: 0.02 THOUSANDS/ΜL (ref 0–0.1)
BASOPHILS NFR BLD AUTO: 0 % (ref 0–1)
BUN SERPL-MCNC: 12 MG/DL (ref 5–25)
CALCIUM SERPL-MCNC: 9 MG/DL (ref 8.3–10.1)
CHLORIDE SERPL-SCNC: 105 MMOL/L (ref 100–108)
CO2 SERPL-SCNC: 29 MMOL/L (ref 21–32)
CREAT SERPL-MCNC: 0.74 MG/DL (ref 0.6–1.3)
EOSINOPHIL # BLD AUTO: 0.04 THOUSAND/ΜL (ref 0–0.61)
EOSINOPHIL NFR BLD AUTO: 1 % (ref 0–6)
ERYTHROCYTE [DISTWIDTH] IN BLOOD BY AUTOMATED COUNT: 11.9 % (ref 11.6–15.1)
GFR SERPL CREATININE-BSD FRML MDRD: 92 ML/MIN/1.73SQ M
GLUCOSE SERPL-MCNC: 93 MG/DL (ref 65–140)
HCT VFR BLD AUTO: 39.6 % (ref 34.8–46.1)
HGB BLD-MCNC: 13 G/DL (ref 11.5–15.4)
IMM GRANULOCYTES # BLD AUTO: 0.02 THOUSAND/UL (ref 0–0.2)
IMM GRANULOCYTES NFR BLD AUTO: 0 % (ref 0–2)
LYMPHOCYTES # BLD AUTO: 2.06 THOUSANDS/ΜL (ref 0.6–4.47)
LYMPHOCYTES NFR BLD AUTO: 41 % (ref 14–44)
MCH RBC QN AUTO: 30.3 PG (ref 26.8–34.3)
MCHC RBC AUTO-ENTMCNC: 32.8 G/DL (ref 31.4–37.4)
MCV RBC AUTO: 92 FL (ref 82–98)
MONOCYTES # BLD AUTO: 0.46 THOUSAND/ΜL (ref 0.17–1.22)
MONOCYTES NFR BLD AUTO: 9 % (ref 4–12)
NEUTROPHILS # BLD AUTO: 2.45 THOUSANDS/ΜL (ref 1.85–7.62)
NEUTS SEG NFR BLD AUTO: 49 % (ref 43–75)
NRBC BLD AUTO-RTO: 0 /100 WBCS
PLATELET # BLD AUTO: 201 THOUSANDS/UL (ref 149–390)
PMV BLD AUTO: 11.2 FL (ref 8.9–12.7)
POTASSIUM SERPL-SCNC: 4.5 MMOL/L (ref 3.5–5.3)
RBC # BLD AUTO: 4.29 MILLION/UL (ref 3.81–5.12)
SODIUM SERPL-SCNC: 140 MMOL/L (ref 136–145)
WBC # BLD AUTO: 5.05 THOUSAND/UL (ref 4.31–10.16)

## 2022-01-06 PROCEDURE — 80048 BASIC METABOLIC PNL TOTAL CA: CPT

## 2022-01-06 PROCEDURE — 85025 COMPLETE CBC W/AUTO DIFF WBC: CPT

## 2022-01-06 PROCEDURE — 36415 COLL VENOUS BLD VENIPUNCTURE: CPT

## 2022-01-06 NOTE — PRE-PROCEDURE INSTRUCTIONS
Pre-Surgery Instructions:   Medication Instructions    acetaminophen (TYLENOL) 500 mg tablet Hold DOS    albuterol (Ventolin HFA) 90 mcg/act inhaler May take DOS if needed    ALPRAZolam Keshav Caldera) 0 5 mg tablet May take DOS if needed    Ascorbic Acid (VITAMIN C) 500 MG CAPS Hold DOS    calcium-vitamin D (OSCAL) 250-125 MG-UNIT per tablet Hold 3 days prior to surgery    Cholecalciferol (VITAMIN D) 2000 units CAPS Hold DOS    famotidine (PEPCID) 20 mg tablet May take DOS if needed    Lactobacillus (ACIDOPHILUS PO) Hold DOS    Multiple Vitamin (MULTIVITAMIN) capsule Hold 3 days prior to surgery    omeprazole (PriLOSEC) 20 mg delayed release capsule May take DOS if needed    tamoxifen (NOLVADEX) 20 mg tablet Hold 7 days prior to surgery    zinc gluconate 50 mg tablet Hold 3 days prior to surgery        NPO after midnight, meds in am with sips of water  Understands when to expect preop phone call  Before your operation, you play an important role in decreasing your risk for infection by washing with special antiseptic soap  This is an effective way to reduce bacteria on the skin which may help to prevent infections at the surgical site  Please read the following directions in advance  · May use antibacterial soap  2  The day before your operation follow these directions carefully to get ready  · Place clean lines (sheets) on your bed; you should sleep on clean sheets after your evening shower  · Get clean towels and washcloths ready - you need enough for 2 showers  · Set aside clean underwear, pajamas, and clothing to wear after the shower  Reminders:  · DO NOT use any other soap or body rinse on your skin during or after the antiseptic showers  · DO NOT use lotion , powder, deodorant, or perfume/aftershave of any kind on your skin after your antiseptic shower  · DO NOT shave any body parts in the 24 hours/the day before your operation    · DO NOT get the antiseptic soap in your eyes, ears, nose, mouth, or vaginal area  3      You will need to shower the night before AND the morning of your Surgery  Shower 1:  · The evening before your operation, take the fist shower  · First, shampoo your hair with regular shampoo and rinse it completely before you use the anitseptic soap  After washing and rinsing your hair, rinse your body  · Next, use a clean wash cloth to apply the antiseptic soap and wash your body from the neck down to your toes using 1/2 bottle of the antiseptic soap  You will use the other 1/2 bottle for the second shower  · Clean the area where your incision will be; later this area well for about 2 minutes  · If you ar having head or neck surgery, wash areas with the antiseptic soap  · Rinse yourself completely with running water  · Use a clean towel to dry off  · Wear clean underwear and clothing/pajamas  Shower 2:  · The Morning of your operation, take the second shower following the same steps as Shower 1 using the second 1/2 of the bottle of antiseptic soap  · Use clean cloths and towels to was and dry yourself off  · Wear clean underwear and clothing

## 2022-01-07 ENCOUNTER — ANESTHESIA (OUTPATIENT)
Dept: PERIOP | Facility: AMBULARY SURGERY CENTER | Age: 57
End: 2022-01-07
Payer: COMMERCIAL

## 2022-01-07 ENCOUNTER — HOSPITAL ENCOUNTER (OUTPATIENT)
Facility: AMBULARY SURGERY CENTER | Age: 57
Setting detail: OUTPATIENT SURGERY
Discharge: HOME | End: 2022-01-07
Attending: OBSTETRICS & GYNECOLOGY | Admitting: OBSTETRICS & GYNECOLOGY
Payer: COMMERCIAL

## 2022-01-07 VITALS
DIASTOLIC BLOOD PRESSURE: 61 MMHG | HEIGHT: 66 IN | OXYGEN SATURATION: 99 % | RESPIRATION RATE: 16 BRPM | HEART RATE: 76 BPM | TEMPERATURE: 97.2 F | WEIGHT: 170 LBS | BODY MASS INDEX: 27.32 KG/M2 | SYSTOLIC BLOOD PRESSURE: 114 MMHG

## 2022-01-07 DIAGNOSIS — N95.0 PMB (POSTMENOPAUSAL BLEEDING): ICD-10-CM

## 2022-01-07 PROBLEM — Z98.890 STATUS POST DILATION AND CURETTAGE: Status: ACTIVE | Noted: 2022-01-07

## 2022-01-07 PROCEDURE — 88305 TISSUE EXAM BY PATHOLOGIST: CPT | Performed by: PATHOLOGY

## 2022-01-07 PROCEDURE — 58558 HYSTEROSCOPY BIOPSY: CPT | Performed by: OBSTETRICS & GYNECOLOGY

## 2022-01-07 RX ORDER — FENTANYL CITRATE/PF 50 MCG/ML
25 SYRINGE (ML) INJECTION
Status: COMPLETED | OUTPATIENT
Start: 2022-01-07 | End: 2022-01-07

## 2022-01-07 RX ORDER — ONDANSETRON 2 MG/ML
4 INJECTION INTRAMUSCULAR; INTRAVENOUS ONCE AS NEEDED
Status: DISCONTINUED | OUTPATIENT
Start: 2022-01-07 | End: 2022-01-07 | Stop reason: HOSPADM

## 2022-01-07 RX ORDER — ONDANSETRON 2 MG/ML
INJECTION INTRAMUSCULAR; INTRAVENOUS AS NEEDED
Status: DISCONTINUED | OUTPATIENT
Start: 2022-01-07 | End: 2022-01-07

## 2022-01-07 RX ORDER — SCOLOPAMINE TRANSDERMAL SYSTEM 1 MG/1
1 PATCH, EXTENDED RELEASE TRANSDERMAL
Status: DISCONTINUED | OUTPATIENT
Start: 2022-01-07 | End: 2022-01-07 | Stop reason: HOSPADM

## 2022-01-07 RX ORDER — MIDAZOLAM HYDROCHLORIDE 2 MG/2ML
INJECTION, SOLUTION INTRAMUSCULAR; INTRAVENOUS AS NEEDED
Status: DISCONTINUED | OUTPATIENT
Start: 2022-01-07 | End: 2022-01-07

## 2022-01-07 RX ORDER — IBUPROFEN 600 MG/1
600 TABLET ORAL EVERY 6 HOURS PRN
Status: DISCONTINUED | OUTPATIENT
Start: 2022-01-07 | End: 2022-01-07 | Stop reason: HOSPADM

## 2022-01-07 RX ORDER — SODIUM CHLORIDE, SODIUM LACTATE, POTASSIUM CHLORIDE, CALCIUM CHLORIDE 600; 310; 30; 20 MG/100ML; MG/100ML; MG/100ML; MG/100ML
125 INJECTION, SOLUTION INTRAVENOUS CONTINUOUS
Status: DISCONTINUED | OUTPATIENT
Start: 2022-01-07 | End: 2022-01-07

## 2022-01-07 RX ORDER — FENTANYL CITRATE 50 UG/ML
INJECTION, SOLUTION INTRAMUSCULAR; INTRAVENOUS AS NEEDED
Status: DISCONTINUED | OUTPATIENT
Start: 2022-01-07 | End: 2022-01-07

## 2022-01-07 RX ORDER — DEXAMETHASONE SODIUM PHOSPHATE 10 MG/ML
INJECTION, SOLUTION INTRAMUSCULAR; INTRAVENOUS AS NEEDED
Status: DISCONTINUED | OUTPATIENT
Start: 2022-01-07 | End: 2022-01-07

## 2022-01-07 RX ORDER — PROPOFOL 10 MG/ML
INJECTION, EMULSION INTRAVENOUS AS NEEDED
Status: DISCONTINUED | OUTPATIENT
Start: 2022-01-07 | End: 2022-01-07

## 2022-01-07 RX ORDER — ACETAMINOPHEN 325 MG/1
650 TABLET ORAL EVERY 6 HOURS PRN
Status: DISCONTINUED | OUTPATIENT
Start: 2022-01-07 | End: 2022-01-07 | Stop reason: HOSPADM

## 2022-01-07 RX ORDER — PROPOFOL 10 MG/ML
INJECTION, EMULSION INTRAVENOUS CONTINUOUS PRN
Status: DISCONTINUED | OUTPATIENT
Start: 2022-01-07 | End: 2022-01-07

## 2022-01-07 RX ORDER — LIDOCAINE HYDROCHLORIDE 10 MG/ML
INJECTION, SOLUTION EPIDURAL; INFILTRATION; INTRACAUDAL; PERINEURAL AS NEEDED
Status: DISCONTINUED | OUTPATIENT
Start: 2022-01-07 | End: 2022-01-07

## 2022-01-07 RX ORDER — DEXAMETHASONE SODIUM PHOSPHATE 4 MG/ML
INJECTION, SOLUTION INTRA-ARTICULAR; INTRALESIONAL; INTRAMUSCULAR; INTRAVENOUS; SOFT TISSUE AS NEEDED
Status: DISCONTINUED | OUTPATIENT
Start: 2022-01-07 | End: 2022-01-07

## 2022-01-07 RX ORDER — KETOROLAC TROMETHAMINE 30 MG/ML
INJECTION, SOLUTION INTRAMUSCULAR; INTRAVENOUS AS NEEDED
Status: DISCONTINUED | OUTPATIENT
Start: 2022-01-07 | End: 2022-01-07

## 2022-01-07 RX ADMIN — PROPOFOL 30 MCG/KG/MIN: 10 INJECTION, EMULSION INTRAVENOUS at 07:40

## 2022-01-07 RX ADMIN — FENTANYL CITRATE 25 MCG: 50 INJECTION INTRAMUSCULAR; INTRAVENOUS at 08:43

## 2022-01-07 RX ADMIN — LIDOCAINE HYDROCHLORIDE 50 MG: 10 INJECTION, SOLUTION EPIDURAL; INFILTRATION; INTRACAUDAL at 07:34

## 2022-01-07 RX ADMIN — PROPOFOL 160 MG: 10 INJECTION, EMULSION INTRAVENOUS at 07:34

## 2022-01-07 RX ADMIN — FENTANYL CITRATE 25 MCG: 50 INJECTION INTRAMUSCULAR; INTRAVENOUS at 08:38

## 2022-01-07 RX ADMIN — FENTANYL CITRATE 25 MCG: 50 INJECTION INTRAMUSCULAR; INTRAVENOUS at 08:28

## 2022-01-07 RX ADMIN — KETOROLAC TROMETHAMINE 30 MG: 30 INJECTION, SOLUTION INTRAMUSCULAR at 08:12

## 2022-01-07 RX ADMIN — SODIUM CHLORIDE, SODIUM LACTATE, POTASSIUM CHLORIDE, AND CALCIUM CHLORIDE: .6; .31; .03; .02 INJECTION, SOLUTION INTRAVENOUS at 08:12

## 2022-01-07 RX ADMIN — FENTANYL CITRATE 25 MCG: 50 INJECTION INTRAMUSCULAR; INTRAVENOUS at 08:32

## 2022-01-07 RX ADMIN — SCOLOPAMINE TRANSDERMAL SYSTEM 1 PATCH: 1 PATCH, EXTENDED RELEASE TRANSDERMAL at 07:25

## 2022-01-07 RX ADMIN — FENTANYL CITRATE 25 MCG: 50 INJECTION, SOLUTION INTRAMUSCULAR; INTRAVENOUS at 07:40

## 2022-01-07 RX ADMIN — DEXAMETHASONE SODIUM PHOSPHATE 8 MG: 4 INJECTION INTRA-ARTICULAR; INTRALESIONAL; INTRAMUSCULAR; INTRAVENOUS; SOFT TISSUE at 07:40

## 2022-01-07 RX ADMIN — MIDAZOLAM HYDROCHLORIDE 2 MG: 1 INJECTION, SOLUTION INTRAMUSCULAR; INTRAVENOUS at 07:30

## 2022-01-07 RX ADMIN — ACETAMINOPHEN 650 MG: 325 TABLET, FILM COATED ORAL at 09:40

## 2022-01-07 RX ADMIN — FENTANYL CITRATE 25 MCG: 50 INJECTION, SOLUTION INTRAMUSCULAR; INTRAVENOUS at 07:34

## 2022-01-07 RX ADMIN — SODIUM CHLORIDE, SODIUM LACTATE, POTASSIUM CHLORIDE, AND CALCIUM CHLORIDE 125 ML/HR: .6; .31; .03; .02 INJECTION, SOLUTION INTRAVENOUS at 07:21

## 2022-01-07 RX ADMIN — ONDANSETRON 4 MG: 2 INJECTION INTRAMUSCULAR; INTRAVENOUS at 08:12

## 2022-01-07 RX ADMIN — FENTANYL CITRATE 50 MCG: 50 INJECTION, SOLUTION INTRAMUSCULAR; INTRAVENOUS at 07:48

## 2022-01-07 NOTE — ANESTHESIA PREPROCEDURE EVALUATION
Procedure:  DILATATION AND CURETTAGE (D&C) WITH HYSTEROSCOPY (N/A Uterus)    Relevant Problems   ANESTHESIA   (+) PONV (postoperative nausea and vomiting)      MUSCULOSKELETAL   (+) Arthritis      NEURO/PSYCH   (+) History of bilateral breast cancer        Physical Exam    Airway    Mallampati score: II  TM Distance: >3 FB  Neck ROM: full     Dental   No notable dental hx     Cardiovascular      Pulmonary      Other Findings        Anesthesia Plan  ASA Score- 2     Anesthesia Type- general with ASA Monitors  Additional Monitors:   Airway Plan: LMA  Plan Factors-Exercise tolerance (METS): >4 METS  Chart reviewed  Existing labs reviewed  Patient summary reviewed  Patient is not a current smoker  Obstructive sleep apnea risk education given perioperatively  Induction- intravenous  Postoperative Plan- Plan for postoperative opioid use  Planned trial extubation    Informed Consent- Anesthetic plan and risks discussed with patient  I personally reviewed this patient with the CRNA  Discussed and agreed on the Anesthesia Plan with the CRNA  Mady Leary

## 2022-01-07 NOTE — ANESTHESIA POSTPROCEDURE EVALUATION
Post-Op Assessment Note    CV Status:  Stable  Pain Score: 0    Pain management: adequate     Mental Status:  Awake and alert   Hydration Status:  Stable and euvolemic   PONV Controlled:  None   Airway Patency:  Patent and adequate      Post Op Vitals Reviewed: Yes      Staff: CRNA         No complications documented      BP   109/87   Temp   98 4f   Pulse 86   Resp 15   SpO2 98%

## 2022-01-07 NOTE — DISCHARGE INSTRUCTIONS
Dilation and Curettage   WHAT YOU SHOULD KNOW:   Dilation and curettage (D and C) is a procedure to remove tissue from the lining of your uterus  AFTER YOU LEAVE:   Medicines:   · Pain medicine: You may be given medicine to take away or decrease pain  Do not wait until the pain is severe before you take your medicine  · Take your medicine as directed  Call your healthcare provider if you think your medicine is not helping or if you have side effects  Tell him if you are allergic to any medicine  Keep a list of the medicines, vitamins, and herbs you take  Include the amounts, and when and why you take them  Bring the list or the pill bottles to follow-up visits  Carry your medicine list with you in case of an emergency  Follow up with your healthcare provider as directed:  Write down your questions so you remember to ask them during your visits  Activity:  Ask your primary healthcare provider when you can return to your normal activities  Contact your primary healthcare provider if:   · You have foul-smelling vaginal discharge  · You do not get your monthly period  · You feel depressed or anxious  · You feel very tired and weak  · You have questions or concerns about your condition or care  Seek care immediately or call 911 if:   · You have heavy vaginal bleeding that soaks 1 pad in 1 hour for 2 hours in a row  · You have a fever and abdominal cramps  · Your pain does not get better, even after treatment  © 2014 [employer] is for End User's use only and may not be sold, redistributed or otherwise used for commercial purposes  All illustrations and images included in CareNotes® are the copyrighted property of [employer]  or [employer]endo[employer]  The above information is an  only  It is not intended as medical advice for individual conditions or treatments   Talk to your doctor, nurse or pharmacist before following any medical regimen to see if it is safe and effective for you

## 2022-01-07 NOTE — INTERVAL H&P NOTE
H&P reviewed  After examining the patient I find no changes in the patients condition since the H&P had been written      Vitals:    01/07/22 0712   BP: 130/91   Pulse: 83   Resp: 18   Temp: 97 8 °F (36 6 °C)   SpO2: 96%

## 2022-01-07 NOTE — OP NOTE
PERATIVE REPORT  PATIENT NAME: Kindra Goldberg    :  1965  MRN: [id number]  Pt Location: [facility] OR [facility] 04    SURGERY DATE: 2022    Surgeon(s) and Role:     * King Lauren MD - Primary     * [address] ENRIQUE Cummins MD - Assisting    Preop Diagnosis:  PMB (postmenopausal bleeding) [N95 0]    Post-Op Diagnosis Codes:     * PMB (postmenopausal bleeding) [N95 0]    Procedure(s) (LRB):  DILATATION AND CURETTAGE (D&C) WITH HYSTEROSCOPY, POLYPECTOMY  (N/A)    Specimen(s):  ID Type Source Tests Collected by Time Destination   1 : ENDOCERVICAL CURETINGS Tissue Cervix, Endocervical TISSUE EXAM King Lauren MD 2022 0758    2 : ENDOMETRIAL CURETINGS Tissue Endometrium TISSUE EXAM King Lauren MD 2022 0759    3 : ENDOMETRIAL POLYP Tissue Polyp, Cervical/Endometrial TISSUE EXAM King Lauren MD 2022 0813        Estimated Blood Loss:   Minimal    Drains:  Closed/Suction Drain Left Back Bulb 15 Fr  (Active)   Number of days: 6613       Closed/Suction Drain Left Breast Bulb 15 Fr  (Active)   Number of days: 8310       Anesthesia Type:   General    Operative Indications:  PMB (postmenopausal bleeding) [N95 0]    Operative Findings:  1  External genitalia grossly normal in appearance  No ulcerations, no lacerations, no lesions  Bimanual exam revealed anteverted uterus with normal contours and freely mobile  No adnexal masses palpated bilaterally  2   Vagina and cervix were grossly normal in appearance without any lacerations or lesions  3  Uterus sounded to 8 cm  4  Hysteroscopic examination revealed thin endometrial lining  Multiple polyps appreciated within the endometrial cavity  Bilateral ostia were not visualized  Complications:   None    Procedure and Technique:  Patient was taken to the operating room where a time out was performed to confirm correct patient and correct procedure   General LMA anesthesia (LMA) was administered and the patient was positioned on the OR table in the dorsal lithotomy position  All pressure points were padded and a dimple hugger was placed to maintain control of core body temperature  A bimanual exam was performed and the uterus was noted to be anteverted, normal in size and consistency with no palpable adnexal masses or fullness  The patient was prepped and draped in the usual sterile fashion  A double sided retractor was inserted into the vagina and a Kristen retractor was used to visualize the anterior lip of the cervix, which was then grasped with a single toothed tenaculum  The uterus was sounded to 8cm  Sharp curetting of the endocervix was performed, starting at the 12'oclock position and rotating a total of 360 degrees to cover all surfaces  Endocervical curettings were obtained and sent to pathology  The cervix was serially dilated to 25F using Cotter dilators for introduction of the hysteroscope  Hysteroscope was introduced under direct visualization using normal saline solution as the distention media  Hysteroscope was advanced to the uterine fundus and the entire uterine cavity was inspected in a systematic manner  There was noted to be findings as listed above  Polyp forceps were introduced and used to remove polyp findings as listed above  Hysteroscope was withdrawn and sharp curetting was performed, starting at the 12'oclock position and rotating a total of 360 degrees to cover all surfaces  Endometrial tissue was obtained and sent for pathology  The single toothed tenaculum was removed from the anterior lip of the cervix  Good hemostasis was confirmed at the tenaculum puncture sites  Weighted speculum was then removed from the vagina  At the conclusion of the procedure, all needle, sponge, and instrument counts were noted to be correct x2  Dr Jair Linares was present and participated in all key portions of the case      Patient Disposition:  PACU       SIGNATURE: Natali Prater MD  DATE: January 7, 2022  TIME: 8:20 AM    I was present and participated in the entire procedure

## 2022-01-28 ENCOUNTER — TELEMEDICINE (OUTPATIENT)
Dept: OBGYN CLINIC | Facility: CLINIC | Age: 57
End: 2022-01-28

## 2022-01-28 DIAGNOSIS — Z98.890 STATUS POST DILATION AND CURETTAGE: Primary | ICD-10-CM

## 2022-01-28 PROCEDURE — 99024 POSTOP FOLLOW-UP VISIT: CPT | Performed by: OBSTETRICS & GYNECOLOGY

## 2022-01-28 NOTE — PROGRESS NOTES
Virtual Brief Visit    Patient is located in the following state in which I hold an active license [place]      Assessment/Plan:    Problem List Items Addressed This Visit     None          Recent Visits  No visits were found meeting these conditions  Showing recent visits within past 7 days and meeting all other requirements  Today's Visits  Date Type Provider Dept   01/28/22 Telemedicine Mustapha Yoo MD [facility] [place]   Showing today's visits and meeting all other requirements  Future Appointments  No visits were found meeting these conditions  Showing future appointments within next 150 days and meeting all other requirements         I spent 11 minutes directly with the patient during this visit      Patient and I were unable to connect by video platform  Patient reported understanding and consent to connect by telephone  I was in a room by myself  Patient reports tolerating regular diet, void and bowel without difficulty  Patient is almost two weeks post D&C hysteroscopy  Reviewed polyp and benign pathology  Patient recently contracted covid, is improving and feeling better  Patient reports post surgery had some spotting and cramping, all have resolved  Feels fully recovered  Patient to return for annual in fall Sept 2022, sooner as needed

## 2022-03-16 ENCOUNTER — OFFICE VISIT (OUTPATIENT)
Dept: PODIATRY | Facility: CLINIC | Age: 57
End: 2022-03-16
Payer: COMMERCIAL

## 2022-03-16 VITALS — HEIGHT: 66 IN | BODY MASS INDEX: 27.44 KG/M2

## 2022-03-16 DIAGNOSIS — M72.2 PLANTAR FASCIITIS: ICD-10-CM

## 2022-03-16 DIAGNOSIS — G57.81 OTHER SPECIFIED MONONEUROPATHIES OF RIGHT LOWER LIMB: Primary | ICD-10-CM

## 2022-03-16 DIAGNOSIS — M21.41 PES PLANUS OF RIGHT FOOT: ICD-10-CM

## 2022-03-16 PROCEDURE — 99213 OFFICE O/P EST LOW 20 MIN: CPT | Performed by: PODIATRIST

## 2022-03-16 PROCEDURE — 64450 NJX AA&/STRD OTHER PN/BRANCH: CPT | Performed by: PODIATRIST

## 2022-03-16 RX ORDER — LIDOCAINE HYDROCHLORIDE 10 MG/ML
2 INJECTION, SOLUTION EPIDURAL; INFILTRATION; INTRACAUDAL; PERINEURAL ONCE
Status: COMPLETED | OUTPATIENT
Start: 2022-03-16 | End: 2022-03-16

## 2022-03-16 RX ORDER — TRIAMCINOLONE ACETONIDE 40 MG/ML
20 INJECTION, SUSPENSION INTRA-ARTICULAR; INTRAMUSCULAR ONCE
Status: COMPLETED | OUTPATIENT
Start: 2022-03-16 | End: 2022-03-16

## 2022-03-16 RX ADMIN — TRIAMCINOLONE ACETONIDE 20 MG: 40 INJECTION, SUSPENSION INTRA-ARTICULAR; INTRAMUSCULAR at 17:36

## 2022-03-16 RX ADMIN — LIDOCAINE HYDROCHLORIDE 2 ML: 10 INJECTION, SOLUTION EPIDURAL; INFILTRATION; INTRACAUDAL; PERINEURAL at 17:35

## 2022-03-16 NOTE — PROGRESS NOTES
PATIENT:  Edil Lindquist  1965       ASSESSMENT:     1  Other specified mononeuropathies of right lower limb  lidocaine (PF) (XYLOCAINE-MPF) 1 % injection 2 mL    triamcinolone acetonide (KENALOG-40) 40 mg/mL injection 20 mg    Nerve block   2  Pes planus of right foot     3  Plantar fasciitis               PLAN:  1  Reviewed previous office note  Patient was counseled and educated on the condition and the diagnosis  2  The diagnosis, treatment options and prognosis were discussed with the patient  3  Probable tarsal tunnel syndrome / medial calcaneal nerve compression  Discussed options and will try nerve block  See procedure  4  Instructed supportive care, icing, resting, and arch support  5  RA in 4 weeks  Nerve block    Date/Time: 3/16/2022 5:32 PM  Performed by: Param Gonzalez DPM  Authorized by: Param Gonzalez DPM     Patient location:  Bedside  [facility] Protocol:  Consent: Verbal consent obtained  Risks and benefits: risks, benefits and alternatives were discussed  Consent given by: patient  Time out: Immediately prior to procedure a "time out" was called to verify the correct patient, procedure, equipment, support staff and site/side marked as required  Timeout called at: 3/16/2022 5:32 PM   Patient understanding: patient states understanding of the procedure being performed  Site marked: the operative site was marked  Patient identity confirmed: verbally with patient      Indications:     Indications:  Pain relief and procedural anesthesia  Location:     Body area:  Lower extremity    Lower extremity nerve:  Sural (Medial calcaneal nerve (Not sural nerve))    Nerve type:  Peripheral    Laterality:  Right  Pre-procedure details:     Skin preparation:  Alcohol  Skin anesthesia (see MAR for exact dosages):     Skin anesthesia method:  Topical application    Topical anesthesia: Ethyl Chloride spray  Procedure details (see MAR for exact dosages):      Block needle gauge:  25 G Anesthetic injected:  Lidocaine 1% w/o epi    Steroid injected:  Triamcinolone    Injection procedure:  Anatomic landmarks identified and anatomic landmarks palpated  Post-procedure details:     Dressing:  Sterile dressing    Outcome:  Anesthesia achieved    Patient tolerance of procedure: Tolerated well, no immediate complications          Subjective:       HPI  The patient presents with chief complaint of pain around right heel  Her symptoms are different from plantar fasciitis  Pain is more constant  It radiates with tingling sensation  No edema  She does not recall any injury  The following portions of the patient's history were reviewed and updated as appropriate: allergies, current medications, past family history, past medical history, past social history, past surgical history and problem list   All pertinent labs and images were reviewed  Past Medical History  Past Medical History:   Diagnosis Date    Abnormal findings on diagnostic imaging of breast     Last Assessesd: 4/25/2014    Arthritis     Knees and feet    Cancer ([facility] [address] )     breast    Difficulty falling asleep     Gluteal abscess     Last Assessed: 12/30/2016    Limb alert care status     right    Pain in left foot     Pneumonia 2009    x1    PONV (postoperative nausea and vomiting)     Skin cancer        Past Surgical History  Past Surgical History:   Procedure Laterality Date    BASAL CELL CARCINOMA EXCISION N/A 1/7/2021    Procedure: EXCISION BASAL CELL CARCINOMA OF MID BACK;  Surgeon: Sammi Ascencio MD;  Location:  MAIN OR;  Service: Plastics    BREAST BIOPSY  05/02/2014    BREAST BIOPSY  05/2013    BREAST BIOPSY  05/2008    BREAST LUMPECTOMY Right 2008    BREAST RECONSTRUCTION Left 2/21/2019    Procedure: BREAST EXPANDER/IMPLANT EXCHANGE;  Surgeon: Sammi Ascencio MD;  Location:  MAIN OR;  Service: Plastics    BREAST SURGERY Bilateral 2008, 2013, 2014    Breast reconstruction with expanders   Total of 7 surgeries  7/3/14 right breast reconstruction revision  10/27/14 right breast reconstruction revision     BREAST SURGERY Left 07/24/2013    Excision of breast lesion    CAPSULOTOMY Left 2/21/2019    Procedure: CAPSULECTOMY;  Surgeon: Palmira Pan MD;  [facility];  Service: Plastics    CHEST WALL BIOPSY Right 2/21/2019    Procedure: UPPER CHEST BASAL CELL CARSINOMA EXCISION;  Surgeon: Palmira Pan MD;  [facility];  Service: Plastics    CLOSURE WOUND N/A 2/21/2019    Procedure: RIGHT upper chest & LEFT upper back complex closure;  Surgeon: Palmira Pan MD;  [facility];  Service: Plastics    COMPLEX WOUND CLOSURE TO EXTREMITY N/A 1/7/2021    Procedure: COMPLEX CLOSURE MID BACK;  Surgeon: Palmira Pan MD;  [facility] MAIN [facility];  Service: Plastics    FOOT ARTHRODESIS      Pantalar    KNEE ARTHROSCOPY Right 05/1997    LIPOSUCTION W/ FAT INJECTION Bilateral 10/24/2019    Procedure: AUTOLOGOUS FAT GRAFTING TO BILATERAL BREAST;  Surgeon: Palmira Pan MD;  [facility];  Service: Plastics    LIPOSUCTION W/ FAT INJECTION Bilateral 1/7/2021    Procedure: AUTOLOGOUS FAT GRAFTING TO BILATERAL BREAST;  Surgeon: Palmira Pan MD;  [facility];  Service: Plastics    MASTECTOMY Bilateral 06/06/2014    Lymph nodes removed bilaterally  States she may have IV's in left side   MOHS RECONSTRUCTION Right 3/7/2019    Procedure: RECONSTRUCTION MOHS DEFECT RIGHT NOSE/MEDIAL CANTHUS;  Surgeon: Palmira Pan MD;  [facility];  Service: Plastics    MOHS SURGERY      Mohs Micrographic Surgery Face;  Last Assessed: 5/15/2015    NV ADJ TISS [place] LID,NOS,EAR 10 1-30 SQCM Right 3/7/2019    Procedure: FLAP;  Surgeon: Palmira Pan MD;  [facility];  Service: Plastics    NV BREAST RECONSTRUCTION W/LATISSIMUS DORSI FLAP Left 9/7/2017    Procedure: BREAST RECONSTRUCTION; LATISSIMUS DORSI FLAP; TISSUE EXPANDER;  Surgeon: Kleber Reeves Scott Mcdaniel MD;  [facility];  Service: Plastics    KS COLONOSCOPY FLX DX W/COLLJ [address] WHEN PFRMD N/A 8/1/2018    Procedure: COLONOSCOPY;  Surgeon: Dana Mao MD;  [facility];  Service: Gastroenterology    KS [place] W/SESMDC W/RESCJ [address] [facility]  9/9/2016    Procedure: Lupe Sutton MODIFIED ;  Surgeon: Manuel Washington DPM;  [facility] Main OR;  Service: Podiatry    KS EXC SKIN MALIG >4 CM TRUNK,ARM,LEG N/A 6/27/2019    Procedure: EXCISION BASAL CELL CARCINOMA MID CHEST;  Surgeon: Nitza Hermosillo MD;  [facility];  Service: Plastics    KS FULL THICK 2011 [facility] NOS,EAR,LID <20 SQCM Right 3/7/2019    Procedure: SKIN GRAFT FULL THICKNESS  (FTSG); Surgeon: Nitza Hermosillo MD;  [facility]: [facility];  Service: Plastics    KS FUSION FOOT BONES,MIDTARSAL,MULTI Left 9/9/2016    Procedure: ARTHRODESIS FIRST [address] [facility], [place] [address] ;  Surgeon: Manuel Washington DPM;  [facility];  Service: Podiatry    KS HYSTEROSCOPY,W/ENDO BX N/A 1/7/2022    Procedure: DILATATION AND CURETTAGE (D&C) WITH HYSTEROSCOPY, POLYPECTOMY ;  Surgeon: Mayito Love MD;  [facility] MA[facility];  Service: Gynecology    KS INSERTION BREAST IMPLANT SAME DAY OF MASTECTOMY Left 9/7/2017    Procedure: TISSUE EXPANDER;  Surgeon: Nitza Hermosillo MD;  [facility];  Service: Plastics    KS OPEN 2016 [facility] Left 12/11/2019    Procedure: OPEN REDUCTION W/ INTERNAL FIXATION left distal humerus fracture, possible olecranon osteotomy;  Surgeon:  Shahana Lundy MD;  Location: [address] [place] [address] MAIN OR;  Service: Orthopedics    KS OSTEOTOMY HEEL BONE Left 9/9/2016    Procedure: OSTEOTOMY CALCANEUS WITH APPLICATION AND ACTIVATION OF BONE STIMULATOR ;  Surgeon: Manuel Washington DPM;  [facility];  Service: Podiatry    KS RECMPL WND HEAD,FAC,HAND 2 6-7 5 CM N/A 3/7/2019    Procedure: COMPLEX CLOSURE;  Surgeon: Nitza Hermosillo MD;  [facility];  Service: Plastics    WV RECMPL WND TRUNK 2 6-7 5 CM N/A 6/27/2019    Procedure: CLOSURE WOUND MID CHEST;  Surgeon: Kun Dominguez MD;  [facility]: [facility] OR;  Service: Plastics     [facility] TISSUE FOR GRAFT OTHR Bilateral 6/27/2019    Procedure: AUTOLOGOUS FAT GRAFTING BILATERAL BREASTS;  Surgeon: Kun Dominguez MD;  [facility]: [facility];  Service: Plastics    WV REVISION OF RECONSTRUCTED BREAST Bilateral 6/27/2019    Procedure: BILATERAL BREAST MOUND REVISION;  Surgeon: Knu Dominguez MD;  [facility];  Service: Plastics    WV REVISION OF RECONSTRUCTED BREAST Bilateral 10/24/2019    Procedure: BILATERAL BREAST MOUND REVISION;  Surgeon: Kun Dominguez MD;  [facility];  Service: Plastics    WV REVISION OF RECONSTRUCTED BREAST Bilateral 1/7/2021    Procedure: BILATERAL BREAST MOUND REVISION;  Surgeon: Kun Dominguez MD;  [facility];  Service: Plastics    SENTINEL LYMPH NODE BIOPSY Bilateral 06/06/2014    SENTINEL LYMPH NODE BIOPSY Right 2008    SKIN BIOPSY      SKIN LESION EXCISION Left 2/21/2019    Procedure: UPPER BACK [address] EXCISION;  Surgeon: Kun Dominguez MD;  [facility];  Service: Plastics    SQUAMOUS CELL CARCINOMA EXCISION N/A 3/7/2019    Procedure: EXCISION BCC LEFT FOREHEAD;  Surgeon: Kun Dominguez MD;  [facility] OR;  Service: Plastics    WISDOM TOOTH EXTRACTION          Allergies:  Codeine, Dilaudid [hydromorphone hcl], Hydromorphone, Tramadol, and Other    Medications:  Current Outpatient Medications   Medication Sig Dispense Refill    acetaminophen (TYLENOL) 500 mg tablet Take 500 mg by mouth every 6 (six) hours as needed for mild pain        albuterol (Ventolin HFA) 90 mcg/act inhaler Inhale 2 puffs every 6 (six) hours as needed for wheezing or shortness of breath 8 g 1    ALPRAZolam (XANAX) 0 5 mg tablet Take 0 5 mg by mouth daily at bedtime as needed for anxiety       Ascorbic Acid (VITAMIN C) 500 MG CAPS Take 500 mg by mouth        calcium-vitamin D (OSCAL) 250-125 MG-UNIT per tablet Take 2 tablets by mouth daily   Cholecalciferol (VITAMIN D) 2000 units CAPS Take by mouth      famotidine (PEPCID) 20 mg tablet Take 20 mg by mouth 2 (two) times a day as needed        Lactobacillus (ACIDOPHILUS PO) Take by mouth daily   Multiple Vitamin (MULTIVITAMIN) capsule Take 1 capsule by mouth daily   omeprazole (PriLOSEC) 20 mg delayed release capsule Take 20 mg by mouth as needed        tamoxifen (NOLVADEX) 20 mg tablet Take 1 tablet (20 mg total) by mouth daily 90 tablet 3    zinc gluconate 50 mg tablet Take by mouth      HYDROcodone-acetaminophen (Norco) 5-325 mg per tablet Take 1 tablet by mouth every 6 (six) hours as needed for pain for up to 18 doses Max Daily Amount: 4 tablets (Patient not taking: Reported on 3/16/2022 ) 18 tablet 0     Current Facility-Administered Medications   Medication Dose Route Frequency Provider Last Rate Last Admin    lidocaine (PF) (XYLOCAINE-MPF) 1 % injection 2 mL  2 mL Injection Once Mendy Cone, DPM        triamcinolone acetonide (KENALOG-40) 40 mg/mL injection 20 mg  20 mg Subcutaneous Once Mendy Cone, DPM           Social History:  Social History     Socioeconomic History    Marital status: /Civil Union     Spouse name: None    Number of children: None    Years of education: None    Highest education level: None   Occupational History    None   Tobacco Use    Smoking status: Never Smoker    Smokeless tobacco: Never Used   Vaping Use    Vaping Use: Never used   Substance and Sexual Activity    Alcohol use:  Yes     Alcohol/week: 4 0 standard drinks     Types: 4 Glasses of wine per week     Comment: 4 weekly-wine    Drug use: No    Sexual activity: Yes     Partners: Male     Birth control/protection: Condom Male   Other Topics Concern    None   Social History Narrative    None     Social Determinants of Health     Financial Resource Strain: Not on file Food Insecurity: Not on file   Transportation Needs: Not on file   Physical Activity: Not on file   Stress: Not on file   Social Connections: Not on file   Intimate Partner Violence: Not on file   Housing Stability: Not on file          Review of Systems   Constitutional: Negative for chills, fatigue and fever  Respiratory: Negative for cough, shortness of breath and wheezing  Cardiovascular: Negative for chest pain and palpitations  Gastrointestinal: Negative for diarrhea, nausea and vomiting  Musculoskeletal: Negative for arthralgias and gait problem  Skin: Negative for color change, pallor, rash and wound  Hematological: Negative  Objective:      Ht 5' 6" (1 676 m) Comment: verbal  BMI 27 44 kg/m²          Physical Exam  Vitals reviewed  Constitutional:       General: She is not in acute distress  Appearance: Normal appearance  She is well-developed  She is not toxic-appearing  Cardiovascular:      Rate and Rhythm: Normal rate and regular rhythm  Pulses: Normal pulses  Dorsalis pedis pulses are 2+ on the right side and 2+ on the left side  Posterior tibial pulses are 2+ on the right side and 2+ on the left side  Pulmonary:      Effort: Pulmonary effort is normal  No respiratory distress  Musculoskeletal:         General: Deformity present  No swelling, tenderness or signs of injury  Right lower leg: No edema  Left lower leg: No edema  Right foot: No foot drop  Left foot: No foot drop  Comments: Pain and Tinel sign along the course of medial calcaneal nerve on right medial heel and ankle  Skin:     General: Skin is warm  Coloration: Skin is not cyanotic or mottled  Findings: No abscess, ecchymosis, erythema, rash or wound  Nails: There is no clubbing  Neurological:      General: No focal deficit present  Mental Status: She is alert and oriented to person, place, and time        Cranial Nerves: No cranial nerve deficit  Sensory: No sensory deficit  Motor: No weakness or abnormal muscle tone  Coordination: Coordination normal    Psychiatric:         Mood and Affect: Mood normal          Behavior: Behavior normal          Thought Content:  Thought content normal          Judgment: Judgment normal

## 2022-03-22 DIAGNOSIS — C50.919 MALIGNANT NEOPLASM OF BREAST IN FEMALE, ESTROGEN RECEPTOR POSITIVE, UNSPECIFIED LATERALITY, UNSPECIFIED SITE OF BREAST (HCC): ICD-10-CM

## 2022-03-22 DIAGNOSIS — Z17.0 MALIGNANT NEOPLASM OF BREAST IN FEMALE, ESTROGEN RECEPTOR POSITIVE, UNSPECIFIED LATERALITY, UNSPECIFIED SITE OF BREAST (HCC): ICD-10-CM

## 2022-03-23 RX ORDER — TAMOXIFEN CITRATE 20 MG/1
20 TABLET ORAL DAILY
Qty: 90 TABLET | Refills: 3 | Status: SHIPPED | OUTPATIENT
Start: 2022-03-23

## 2022-04-18 ENCOUNTER — ANESTHESIA EVENT (OUTPATIENT)
Dept: PERIOP | Facility: AMBULARY SURGERY CENTER | Age: 57
End: 2022-04-18
Payer: COMMERCIAL

## 2022-04-19 ENCOUNTER — OFFICE VISIT (OUTPATIENT)
Dept: PLASTIC SURGERY | Facility: CLINIC | Age: 57
End: 2022-04-19
Payer: COMMERCIAL

## 2022-04-19 VITALS
WEIGHT: 175 LBS | RESPIRATION RATE: 16 BRPM | HEIGHT: 66 IN | BODY MASS INDEX: 28.12 KG/M2 | SYSTOLIC BLOOD PRESSURE: 107 MMHG | DIASTOLIC BLOOD PRESSURE: 75 MMHG | HEART RATE: 75 BPM

## 2022-04-19 DIAGNOSIS — C44.619 BASAL CELL CARCINOMA OF SKIN OF LEFT UPPER EXTREMITY, INCLUDING SHOULDER: Primary | ICD-10-CM

## 2022-04-19 PROCEDURE — 99214 OFFICE O/P EST MOD 30 MIN: CPT | Performed by: SURGERY

## 2022-04-19 NOTE — PROGRESS NOTES
Assessment/Plan:  Please see HPI  Both surgical sites of the left shoulder /neck were marked, she confirmed the accuracy of the locations  Photographs were obtained  I discussed excision of basal cell carcinoma x2 of the left shoulder/neck, how the procedure will be performed, as well as potential risks, complications limitations including, but not limited to infection, bleeding, scarring, unfavorable scarring, hypertrophic/keloid scarring, the potential for residual/recurrent lesion, need for additional surgery, etcetera  She understands, and her questions were answered to her satisfaction  Consent was obtained     There are no diagnoses linked to this encounter  Subjective:  Basal cell carcinoma x2 left shoulder/neck      Patient ID: Margarita Andersen is a 49 y o  female  HPI [facility] presents for a preoperative visit, she is scheduled next week to undergo excision of basal cell carcinoma x2 of the left shoulder/neck  The following portions of the patient's history were reviewed and updated as appropriate: allergies, current medications, past family history, past medical history, past social history, past surgical history and problem list     Review of Systems   Constitutional: Negative for chills and fever  HENT: Negative for hearing loss  Eyes: Negative for discharge and visual disturbance  Respiratory: Negative for chest tightness and shortness of breath  Cardiovascular: Negative for chest pain and leg swelling  Gastrointestinal: Negative for blood in stool, constipation, diarrhea and nausea  Genitourinary: Negative for dysuria  Musculoskeletal: Negative for gait problem  Skin: Negative for rash  Allergic/Immunologic: Negative for immunocompromised state  Neurological: Negative for seizures and headaches  Hematological: Does not bruise/bleed easily  Psychiatric/Behavioral: Negative for dysphoric mood  The patient is not nervous/anxious            Objective:      /75 (BP Location: Left arm, Patient Position: Sitting)   Pulse 75   Resp 16   Ht 5' 6" (1 676 m)   Wt 79 4 kg (175 lb)   BMI 28 25 kg/m²          Physical Exam  HENT:      Head: Normocephalic and atraumatic  Eyes:      Extraocular Movements: Extraocular movements intact  Pupils: Pupils are equal, round, and reactive to light  Cardiovascular:      Rate and Rhythm: Normal rate  Pulmonary:      Effort: Pulmonary effort is normal    Abdominal:      Palpations: Abdomen is soft  Musculoskeletal:         General: Normal range of motion  Cervical back: Normal range of motion and neck supple  Skin:     General: Skin is warm  Comments: Appropriately healing/healed biopsy sites left shoulder/neck, see photos for orientation   Neurological:      Mental Status: She is alert and oriented to person, place, and time     Psychiatric:         Mood and Affect: Mood normal

## 2022-04-20 ENCOUNTER — APPOINTMENT (OUTPATIENT)
Dept: LAB | Facility: CLINIC | Age: 57
End: 2022-04-20

## 2022-04-20 ENCOUNTER — APPOINTMENT (OUTPATIENT)
Dept: LAB | Facility: CLINIC | Age: 57
End: 2022-04-20
Payer: COMMERCIAL

## 2022-04-20 DIAGNOSIS — C44.619 BASAL CELL CARCINOMA OF LEFT SHOULDER: ICD-10-CM

## 2022-04-20 DIAGNOSIS — Z00.8 HEALTH EXAMINATION IN POPULATION SURVEY: ICD-10-CM

## 2022-04-20 LAB
ANION GAP SERPL CALCULATED.3IONS-SCNC: 7 MMOL/L (ref 4–13)
BASOPHILS # BLD AUTO: 0.02 THOUSANDS/ΜL (ref 0–0.1)
BASOPHILS NFR BLD AUTO: 0 % (ref 0–1)
BUN SERPL-MCNC: 16 MG/DL (ref 5–25)
CALCIUM SERPL-MCNC: 9 MG/DL (ref 8.3–10.1)
CHLORIDE SERPL-SCNC: 109 MMOL/L (ref 100–108)
CHOLEST SERPL-MCNC: 215 MG/DL
CO2 SERPL-SCNC: 30 MMOL/L (ref 21–32)
CREAT SERPL-MCNC: 0.7 MG/DL (ref 0.6–1.3)
EOSINOPHIL # BLD AUTO: 0.04 THOUSAND/ΜL (ref 0–0.61)
EOSINOPHIL NFR BLD AUTO: 1 % (ref 0–6)
ERYTHROCYTE [DISTWIDTH] IN BLOOD BY AUTOMATED COUNT: 12.7 % (ref 11.6–15.1)
EST. AVERAGE GLUCOSE BLD GHB EST-MCNC: 108 MG/DL
GFR SERPL CREATININE-BSD FRML MDRD: 98 ML/MIN/1.73SQ M
GLUCOSE P FAST SERPL-MCNC: 94 MG/DL (ref 65–99)
HBA1C MFR BLD: 5.4 %
HCT VFR BLD AUTO: 39.8 % (ref 34.8–46.1)
HDLC SERPL-MCNC: 99 MG/DL
HGB BLD-MCNC: 12.8 G/DL (ref 11.5–15.4)
IMM GRANULOCYTES # BLD AUTO: 0.01 THOUSAND/UL (ref 0–0.2)
IMM GRANULOCYTES NFR BLD AUTO: 0 % (ref 0–2)
LDLC SERPL CALC-MCNC: 107 MG/DL (ref 0–100)
LYMPHOCYTES # BLD AUTO: 1.81 THOUSANDS/ΜL (ref 0.6–4.47)
LYMPHOCYTES NFR BLD AUTO: 37 % (ref 14–44)
MCH RBC QN AUTO: 29.7 PG (ref 26.8–34.3)
MCHC RBC AUTO-ENTMCNC: 32.2 G/DL (ref 31.4–37.4)
MCV RBC AUTO: 92 FL (ref 82–98)
MONOCYTES # BLD AUTO: 0.35 THOUSAND/ΜL (ref 0.17–1.22)
MONOCYTES NFR BLD AUTO: 7 % (ref 4–12)
NEUTROPHILS # BLD AUTO: 2.66 THOUSANDS/ΜL (ref 1.85–7.62)
NEUTS SEG NFR BLD AUTO: 55 % (ref 43–75)
NONHDLC SERPL-MCNC: 116 MG/DL
NRBC BLD AUTO-RTO: 0 /100 WBCS
PLATELET # BLD AUTO: 209 THOUSANDS/UL (ref 149–390)
PMV BLD AUTO: 11.7 FL (ref 8.9–12.7)
POTASSIUM SERPL-SCNC: 4.6 MMOL/L (ref 3.5–5.3)
RBC # BLD AUTO: 4.31 MILLION/UL (ref 3.81–5.12)
SODIUM SERPL-SCNC: 146 MMOL/L (ref 136–145)
TRIGL SERPL-MCNC: 43 MG/DL
WBC # BLD AUTO: 4.89 THOUSAND/UL (ref 4.31–10.16)

## 2022-04-20 PROCEDURE — 36415 COLL VENOUS BLD VENIPUNCTURE: CPT

## 2022-04-20 PROCEDURE — 80048 BASIC METABOLIC PNL TOTAL CA: CPT

## 2022-04-20 PROCEDURE — 85025 COMPLETE CBC W/AUTO DIFF WBC: CPT

## 2022-04-20 PROCEDURE — 80061 LIPID PANEL: CPT

## 2022-04-20 PROCEDURE — 83036 HEMOGLOBIN GLYCOSYLATED A1C: CPT

## 2022-04-22 NOTE — PRE-PROCEDURE INSTRUCTIONS
Pre-Surgery Instructions:   Medication Instructions    acetaminophen (TYLENOL) 500 mg tablet PRN    albuterol (Ventolin HFA) 90 mcg/act inhaler PRN    ALPRAZolam (XANAX) 0 5 mg tablet Take PRN at HS    Ascorbic Acid (VITAMIN C) 500 MG CAPS stop taking    calcium-vitamin D (OSCAL) 250-125 MG-UNIT per tablet Stop taking    Cholecalciferol (VITAMIN D) 2000 units CAPS Stop taking    famotidine (PEPCID) 20 mg tablet Take day of surgery  if needed    Lactobacillus (ACIDOPHILUS PO) stop taking    Multiple Vitamin (MULTIVITAMIN) capsule stop taking    omeprazole (PriLOSEC) 20 mg delayed release capsule Take day of surgery  if needed    tamoxifen (NOLVADEX) 20 mg tablet Stop taking 7 days prior to surgery  per patient    zinc gluconate 50 mg tablet Stop taking   Have you had / have a sore throat? No  Have you had / have a cough less than 1 week? No  Have you had / have a fever greater than 100 0 - 100  4? No  Are you experiencing any shortness of breath? No    Review with patient via phone medications and showering instructions  Advised don't take NSAID's, ok tylenol products  Advised ASC call with surgery schedule time, nothing eat or drink after midnight  Verbalized understanding

## 2022-04-25 ENCOUNTER — HOSPITAL ENCOUNTER (OUTPATIENT)
Facility: AMBULARY SURGERY CENTER | Age: 57
Setting detail: OUTPATIENT SURGERY
Discharge: HOME | End: 2022-04-25
Attending: SURGERY | Admitting: SURGERY
Payer: COMMERCIAL

## 2022-04-25 ENCOUNTER — ANESTHESIA (OUTPATIENT)
Dept: PERIOP | Facility: AMBULARY SURGERY CENTER | Age: 57
End: 2022-04-25
Payer: COMMERCIAL

## 2022-04-25 VITALS
DIASTOLIC BLOOD PRESSURE: 68 MMHG | TEMPERATURE: 97.4 F | RESPIRATION RATE: 18 BRPM | OXYGEN SATURATION: 100 % | HEART RATE: 88 BPM | SYSTOLIC BLOOD PRESSURE: 115 MMHG

## 2022-04-25 DIAGNOSIS — C44.619 BASAL CELL CARCINOMA OF LEFT SHOULDER: ICD-10-CM

## 2022-04-25 PROBLEM — J45.909 ASTHMA: Status: ACTIVE | Noted: 2022-04-25

## 2022-04-25 PROBLEM — K21.9 GASTROESOPHAGEAL REFLUX DISEASE: Status: ACTIVE | Noted: 2022-04-25

## 2022-04-25 PROCEDURE — 11623 EXC S/N/H/F/G MAL+MRG 2.1-3: CPT | Performed by: PHYSICIAN ASSISTANT

## 2022-04-25 PROCEDURE — 11623 EXC S/N/H/F/G MAL+MRG 2.1-3: CPT | Performed by: SURGERY

## 2022-04-25 PROCEDURE — 13132 CMPLX RPR F/C/C/M/N/AX/G/H/F: CPT | Performed by: SURGERY

## 2022-04-25 PROCEDURE — 13133 CMPLX RPR F/C/C/M/N/AX/G/H/F: CPT | Performed by: SURGERY

## 2022-04-25 PROCEDURE — 88305 TISSUE EXAM BY PATHOLOGIST: CPT | Performed by: PATHOLOGY

## 2022-04-25 PROCEDURE — 99024 POSTOP FOLLOW-UP VISIT: CPT | Performed by: SURGERY

## 2022-04-25 PROCEDURE — 13133 CMPLX RPR F/C/C/M/N/AX/G/H/F: CPT | Performed by: PHYSICIAN ASSISTANT

## 2022-04-25 PROCEDURE — 11624 EXC S/N/H/F/G MAL+MRG 3.1-4: CPT | Performed by: SURGERY

## 2022-04-25 PROCEDURE — 13132 CMPLX RPR F/C/C/M/N/AX/G/H/F: CPT | Performed by: PHYSICIAN ASSISTANT

## 2022-04-25 PROCEDURE — 11624 EXC S/N/H/F/G MAL+MRG 3.1-4: CPT | Performed by: PHYSICIAN ASSISTANT

## 2022-04-25 RX ORDER — PROPOFOL 10 MG/ML
INJECTION, EMULSION INTRAVENOUS CONTINUOUS PRN
Status: DISCONTINUED | OUTPATIENT
Start: 2022-04-25 | End: 2022-04-25

## 2022-04-25 RX ORDER — FENTANYL CITRATE/PF 50 MCG/ML
25 SYRINGE (ML) INJECTION
Status: DISCONTINUED | OUTPATIENT
Start: 2022-04-25 | End: 2022-04-25 | Stop reason: HOSPADM

## 2022-04-25 RX ORDER — SODIUM CHLORIDE, SODIUM LACTATE, POTASSIUM CHLORIDE, CALCIUM CHLORIDE 600; 310; 30; 20 MG/100ML; MG/100ML; MG/100ML; MG/100ML
INJECTION, SOLUTION INTRAVENOUS CONTINUOUS PRN
Status: DISCONTINUED | OUTPATIENT
Start: 2022-04-25 | End: 2022-04-25

## 2022-04-25 RX ORDER — LABETALOL HYDROCHLORIDE 5 MG/ML
10 INJECTION, SOLUTION INTRAVENOUS
Status: DISCONTINUED | OUTPATIENT
Start: 2022-04-25 | End: 2022-04-25 | Stop reason: HOSPADM

## 2022-04-25 RX ORDER — SODIUM CHLORIDE, SODIUM LACTATE, POTASSIUM CHLORIDE, CALCIUM CHLORIDE 600; 310; 30; 20 MG/100ML; MG/100ML; MG/100ML; MG/100ML
125 INJECTION, SOLUTION INTRAVENOUS CONTINUOUS
Status: DISCONTINUED | OUTPATIENT
Start: 2022-04-25 | End: 2022-04-25 | Stop reason: HOSPADM

## 2022-04-25 RX ORDER — CEFAZOLIN SODIUM 1 G/50ML
SOLUTION INTRAVENOUS AS NEEDED
Status: DISCONTINUED | OUTPATIENT
Start: 2022-04-25 | End: 2022-04-25

## 2022-04-25 RX ORDER — PROPOFOL 10 MG/ML
INJECTION, EMULSION INTRAVENOUS AS NEEDED
Status: DISCONTINUED | OUTPATIENT
Start: 2022-04-25 | End: 2022-04-25

## 2022-04-25 RX ORDER — ALBUTEROL SULFATE 2.5 MG/3ML
2.5 SOLUTION RESPIRATORY (INHALATION) ONCE AS NEEDED
Status: DISCONTINUED | OUTPATIENT
Start: 2022-04-25 | End: 2022-04-25 | Stop reason: HOSPADM

## 2022-04-25 RX ORDER — DEXAMETHASONE SODIUM PHOSPHATE 10 MG/ML
INJECTION, SOLUTION INTRAMUSCULAR; INTRAVENOUS AS NEEDED
Status: DISCONTINUED | OUTPATIENT
Start: 2022-04-25 | End: 2022-04-25

## 2022-04-25 RX ORDER — HYDRALAZINE HYDROCHLORIDE 20 MG/ML
5 INJECTION INTRAMUSCULAR; INTRAVENOUS
Status: DISCONTINUED | OUTPATIENT
Start: 2022-04-25 | End: 2022-04-25 | Stop reason: HOSPADM

## 2022-04-25 RX ORDER — MIDAZOLAM HYDROCHLORIDE 2 MG/2ML
INJECTION, SOLUTION INTRAMUSCULAR; INTRAVENOUS AS NEEDED
Status: DISCONTINUED | OUTPATIENT
Start: 2022-04-25 | End: 2022-04-25

## 2022-04-25 RX ORDER — METOCLOPRAMIDE HYDROCHLORIDE 5 MG/ML
10 INJECTION INTRAMUSCULAR; INTRAVENOUS ONCE AS NEEDED
Status: DISCONTINUED | OUTPATIENT
Start: 2022-04-25 | End: 2022-04-25 | Stop reason: HOSPADM

## 2022-04-25 RX ORDER — ONDANSETRON 2 MG/ML
INJECTION INTRAMUSCULAR; INTRAVENOUS AS NEEDED
Status: DISCONTINUED | OUTPATIENT
Start: 2022-04-25 | End: 2022-04-25

## 2022-04-25 RX ORDER — LIDOCAINE HYDROCHLORIDE 10 MG/ML
INJECTION, SOLUTION EPIDURAL; INFILTRATION; INTRACAUDAL; PERINEURAL AS NEEDED
Status: DISCONTINUED | OUTPATIENT
Start: 2022-04-25 | End: 2022-04-25

## 2022-04-25 RX ORDER — ONDANSETRON 2 MG/ML
4 INJECTION INTRAMUSCULAR; INTRAVENOUS ONCE AS NEEDED
Status: DISCONTINUED | OUTPATIENT
Start: 2022-04-25 | End: 2022-04-25 | Stop reason: HOSPADM

## 2022-04-25 RX ORDER — PROMETHAZINE HYDROCHLORIDE 25 MG/ML
12.5 INJECTION, SOLUTION INTRAMUSCULAR; INTRAVENOUS ONCE AS NEEDED
Status: DISCONTINUED | OUTPATIENT
Start: 2022-04-25 | End: 2022-04-25 | Stop reason: HOSPADM

## 2022-04-25 RX ORDER — FENTANYL CITRATE 50 UG/ML
INJECTION, SOLUTION INTRAMUSCULAR; INTRAVENOUS AS NEEDED
Status: DISCONTINUED | OUTPATIENT
Start: 2022-04-25 | End: 2022-04-25

## 2022-04-25 RX ORDER — MAGNESIUM HYDROXIDE 1200 MG/15ML
LIQUID ORAL AS NEEDED
Status: DISCONTINUED | OUTPATIENT
Start: 2022-04-25 | End: 2022-04-25 | Stop reason: HOSPADM

## 2022-04-25 RX ORDER — SCOLOPAMINE TRANSDERMAL SYSTEM 1 MG/1
1 PATCH, EXTENDED RELEASE TRANSDERMAL ONCE
Status: DISCONTINUED | OUTPATIENT
Start: 2022-04-25 | End: 2022-04-25 | Stop reason: HOSPADM

## 2022-04-25 RX ORDER — LIDOCAINE HYDROCHLORIDE AND EPINEPHRINE 10; 10 MG/ML; UG/ML
INJECTION, SOLUTION INFILTRATION; PERINEURAL AS NEEDED
Status: DISCONTINUED | OUTPATIENT
Start: 2022-04-25 | End: 2022-04-25 | Stop reason: HOSPADM

## 2022-04-25 RX ORDER — CEFAZOLIN SODIUM 1 G/50ML
1000 SOLUTION INTRAVENOUS ONCE
Status: DISCONTINUED | OUTPATIENT
Start: 2022-04-25 | End: 2022-04-25 | Stop reason: HOSPADM

## 2022-04-25 RX ADMIN — DEXAMETHASONE SODIUM PHOSPHATE 10 MG: 10 INJECTION, SOLUTION INTRAMUSCULAR; INTRAVENOUS at 09:30

## 2022-04-25 RX ADMIN — MIDAZOLAM HYDROCHLORIDE 2 MG: 1 INJECTION, SOLUTION INTRAMUSCULAR; INTRAVENOUS at 09:15

## 2022-04-25 RX ADMIN — FENTANYL CITRATE 25 MCG: 50 INJECTION, SOLUTION INTRAMUSCULAR; INTRAVENOUS at 09:34

## 2022-04-25 RX ADMIN — PROPOFOL 200 MG: 10 INJECTION, EMULSION INTRAVENOUS at 09:30

## 2022-04-25 RX ADMIN — LIDOCAINE HYDROCHLORIDE 50 MG: 10 INJECTION, SOLUTION EPIDURAL; INFILTRATION; INTRACAUDAL at 09:18

## 2022-04-25 RX ADMIN — CEFAZOLIN SODIUM 1000 MG: 1 SOLUTION INTRAVENOUS at 09:17

## 2022-04-25 RX ADMIN — SCOPALAMINE 1 PATCH: 1 PATCH, EXTENDED RELEASE TRANSDERMAL at 08:34

## 2022-04-25 RX ADMIN — PROPOFOL 140 MCG/KG/MIN: 10 INJECTION, EMULSION INTRAVENOUS at 09:20

## 2022-04-25 RX ADMIN — FENTANYL CITRATE 25 MCG: 50 INJECTION, SOLUTION INTRAMUSCULAR; INTRAVENOUS at 09:49

## 2022-04-25 RX ADMIN — FENTANYL CITRATE 25 MCG: 50 INJECTION, SOLUTION INTRAMUSCULAR; INTRAVENOUS at 09:28

## 2022-04-25 RX ADMIN — SODIUM CHLORIDE, SODIUM LACTATE, POTASSIUM CHLORIDE, AND CALCIUM CHLORIDE: .6; .31; .03; .02 INJECTION, SOLUTION INTRAVENOUS at 09:11

## 2022-04-25 RX ADMIN — ONDANSETRON 4 MG: 2 INJECTION INTRAMUSCULAR; INTRAVENOUS at 09:15

## 2022-04-25 NOTE — ANESTHESIA PREPROCEDURE EVALUATION
Procedure:  EXCISION OF BCC (X2) LEFT SHOULDER WITH COMPLEX CLOSURE (Left Shoulder)    Relevant Problems   ANESTHESIA   (+) PONV (postoperative nausea and vomiting)      GI/HEPATIC   (+) Gastroesophageal reflux disease      MUSCULOSKELETAL   (+) Arthritis      NEURO/PSYCH   (+) History of bilateral breast cancer      PULMONARY   (+) Asthma      Other   (+) Aftercare postmastectomy for breast reconstruction   (+) Basal cell carcinoma of shoulder, left        Physical Exam    Airway    Mallampati score: II  TM Distance: >3 FB  Neck ROM: full     Dental   No notable dental hx     Cardiovascular  Rhythm: regular, Rate: normal, Cardiovascular exam normal    Pulmonary  Pulmonary exam normal Breath sounds clear to auscultation,     Other Findings        Anesthesia Plan  ASA Score- 2     Anesthesia Type- general with ASA Monitors  Additional Monitors:   Airway Plan: LMA  Plan Factors-Exercise tolerance (METS): >4 METS  Chart reviewed  Existing labs reviewed  Patient summary reviewed  Patient is not a current smoker  Induction- intravenous  Postoperative Plan- Plan for postoperative opioid use  Planned trial extubation    Informed Consent- Anesthetic plan and risks discussed with patient  I personally reviewed this patient with the CRNA  Discussed and agreed on the Anesthesia Plan with the CRNA  Pricilla Clifford

## 2022-04-25 NOTE — DISCHARGE INSTRUCTIONS
[facility]  Dr Ghotra Poag   [address] [facility], [address]  Phone: [phone]     Postoperative Instructions for Outpatient Surgery     These instructions are being provided by your doctor to give you basic guidelines during your post-op recovery  Please let our office know if your contact information has changed       Please call the office today for an appointment in 2 weeks for suture removal      Dressings:  Remove outer dressing in 24-36 hours  Leave Steri-Strips on, replace if they fall off      Activity Restrictions:  Nothing strenuous for 48 hours      Bathing: You may shower after dressing removal   Pat Steri-Strips dry afterwards      Medications:    Resume pre-op medications  You may take tylenol, aleve, or ibuprofen for pain control                 Other:  Elevate head of bed at night to sleep over the next 48 hours  Apply ice at 10 minute intervals as needed for pain or swelling

## 2022-04-25 NOTE — INTERVAL H&P NOTE
H&P reviewed  After examining the patient I find no changes in the patients condition since the H&P had been written      Vitals:    04/25/22 0826   Pulse: 80   Resp: 18   SpO2: 99%

## 2022-04-25 NOTE — OP NOTE
OPERATIVE REPORT  PATIENT NAME: Joanie Singer    :  1965  MRN: [id number]  Pt Location: [facility] OR ROOM 04    SURGERY DATE: 2022    Surgeon(s) and Role:     * Edy Giraldo MD - Primary     * Ophelia Busby PA-C - Assisting    Preop Diagnosis:  Basal cell carcinoma left neck x2    Post-Op Diagnosis Codes:  Basal cell carcinoma left neck x2]    Procedure 1  Excision basal cell carcinoma left neck, proximal 2 8 cm 2  Complex closure left neck proximal 4 2 cm 3  Excision basal cell carcinoma left neck distal 3 6 cm 4  Complex closure left neck distal 6 2 cm     Specimen(s):  ID Type Source Tests Collected by Time Destination   1 : Left neck distal BCC, see comments  Tissue Soft Tissue, Other TISSUE EXAM Edy Giraldo MD 2022 [id number]    2 : Left neck proximal BCC, see comments  Tissue Soft Tissue, Other TISSUE Jonnathan Khanna MD 2022 [id number]        Estimated Blood Loss:   2 mL    Drains:  * No LDAs found *    Anesthesia Type:   General/LMA    Operative Indications:  Basal cell carcinoma of left neck x2    Operative Findings:  As above    Complications:   None    Procedure and Technique:  Dae Morocho was seen preoperatively in the holding area, the surgical sites marked with her participation  I reviewed the planned procedure, potential risks, complications limitations she was taken to the operating room and underwent introduction of intravenous sedation by the anesthesia personnel  The operative field was prepped and draped sterile fashion time-out was  2 5 loupe magnification was used to aid in visualization  Both sites were then excised to be excised in an elliptical fashion to include a margin of normal-appearing skin  At both sites local anesthesia was administered  Starting with the distal site, 15 blade used create skin incisions these were carried down dermis and lesion was excised deep in the plane of subcutaneous fat utilizing the Bovie cautery    The specimen was marked with sutures for orientation and placed in formalin  The wound edges were then widely and circumferentially undermined deep to the dermis  This wide undermining was performed with the Bovie cautery for distance of greater than 2 5 times the with of the wound in order to close the significant, undue tension  After this wide undermining had been completed the wound was irrigated and hemostasis assured  Closure was then accomplished utilizing 3-0 PDS sutures buried at the level of the deep dermis and this was followed by running subcuticular 4-0 PDS  Benzoin Steri-Strips and a light pressure dressing were applied an identical procedure was performed for the proximal neck lesion  Similar dressings were applied the patient was then transferred to recovery room  I was present for the entire procedure and A qualified resident physician was not available  The physician assistant provided essential assistance with patient positioning, exposure, hemostasis and wound closure      Patient Disposition:  PACU       SIGNATURE: Lou Zarate MD  DATE: April 25, 2022  TIME: 10:07 AM

## 2022-04-25 NOTE — ANESTHESIA POSTPROCEDURE EVALUATION
Post-Op Assessment Note    CV Status:  Stable  Pain Score: 0    Pain management: adequate     Mental Status:  Alert and awake   Hydration Status:  Stable and euvolemic   PONV Controlled:  Controlled   Airway Patency:  Patent      Post Op Vitals Reviewed: Yes      Staff: CRNA         No complications documented      BP   98/54   Temp   97 0   Pulse  96   Resp   15   SpO2   99

## 2022-04-25 NOTE — INTERIM OP NOTE
EXCISION OF BCC (X2) LEFT SHOULDER WITH COMPLEX CLOSURE  Postoperative Note  PATIENT NAME: Yuly Arellano  : 1965  MRN: [id number]  [facility]OM [facility]    Surgery Date: 2022    Preop Diagnosis:  Basal cell carcinoma of left shoulder [C44 619]    Post-Op Diagnosis Codes:      * Basal cell carcinoma of left shoulder [C44 619]    Procedure(s) (LRB):  EXCISION OF BCC (X2) LEFT SHOULDER WITH COMPLEX CLOSURE (Left)    Surgeon(s) and Role:     * Romelia Sin MD - Primary     * Crow Borja PA-C - Assisting    Specimens:  ID Type Source Tests Collected by Time Destination   1 : Left neck distal BCC, see comments  Tissue Soft Tissue, Other TISSUE EXAM Romelia Sin MD 2022 [id number]    2 : Left neck proximal BCC, see comments  Tissue Soft Tissue, Other TISSUE Jojo Castorena MD 2022 [id number]        Estimated Blood Loss:   2 mL    Anesthesia Type:   General/LMA     Findings:    None  Complications:   None    SIGNATURE: Crow Borja PA-C   DATE: 2022   TIME: 10:06 AM

## 2022-04-25 NOTE — H&P
Progress Notes              Show:Clear all  [x]Manual[x]Template[]Copied    Added by:  Astrid Rahman MD      []Renita for details    Assessment/Plan:  Please see HPI  Both surgical sites of the left shoulder /neck were marked, she confirmed the accuracy of the locations  Photographs were obtained  I discussed excision of basal cell carcinoma x2 of the left shoulder/neck, how the procedure will be performed, as well as potential risks, complications limitations including, but not limited to infection, bleeding, scarring, unfavorable scarring, hypertrophic/keloid scarring, the potential for residual/recurrent lesion, need for additional surgery, etcetera  She understands, and her questions were answered to her satisfaction  Consent was obtained      There are no diagnoses linked to this encounter        Subjective:  Basal cell carcinoma x2 left shoulder/neck       Patient ID: Sandra Abrams is a 49 y o  female      HPI Abilioromario Adams presents for a preoperative visit, she is scheduled next week to undergo excision of basal cell carcinoma x2 of the left shoulder/neck      The following portions of the patient's history were reviewed and updated as appropriate: allergies, current medications, past family history, past medical history, past social history, past surgical history and problem list      Review of Systems   Constitutional: Negative for chills and fever  HENT: Negative for hearing loss  Eyes: Negative for discharge and visual disturbance  Respiratory: Negative for chest tightness and shortness of breath  Cardiovascular: Negative for chest pain and leg swelling  Gastrointestinal: Negative for blood in stool, constipation, diarrhea and nausea  Genitourinary: Negative for dysuria  Musculoskeletal: Negative for gait problem  Skin: Negative for rash  Allergic/Immunologic: Negative for immunocompromised state  Neurological: Negative for seizures and headaches     Hematological: Does not bruise/bleed easily  Psychiatric/Behavioral: Negative for dysphoric mood  The patient is not nervous/anxious            Objective:        /75 (BP Location: Left arm, Patient Position: Sitting)   Pulse 75   Resp 16   Ht 5' 6" (1 676 m)   Wt 79 4 kg (175 lb)   BMI 28 25 kg/m²             Physical Exam  HENT:      Head: Normocephalic and atraumatic  Eyes:      Extraocular Movements: Extraocular movements intact  Pupils: Pupils are equal, round, and reactive to light  Cardiovascular:      Rate and Rhythm: Normal rate  Pulmonary:      Effort: Pulmonary effort is normal    Abdominal:      Palpations: Abdomen is soft  Musculoskeletal:         General: Normal range of motion  Cervical back: Normal range of motion and neck supple  Skin:     General: Skin is warm  Comments: Appropriately healing/healed biopsy sites left shoulder/neck, see photos for orientation   Neurological:      Mental Status: She is alert and oriented to person, place, and time     Psychiatric:         Mood and Affect: Mood normal               Electronically signed by Romelia Sin MD at 4/19/2022  5:00 PM

## 2022-04-27 ENCOUNTER — OFFICE VISIT (OUTPATIENT)
Dept: PLASTIC SURGERY | Facility: CLINIC | Age: 57
End: 2022-04-27

## 2022-04-27 DIAGNOSIS — C44.41 BASAL CELL CARCINOMA (BCC) OF LEFT SIDE OF NECK: Primary | ICD-10-CM

## 2022-04-27 PROCEDURE — 99024 POSTOP FOLLOW-UP VISIT: CPT | Performed by: PHYSICIAN ASSISTANT

## 2022-04-27 NOTE — PROGRESS NOTES
Assessment/Plan:     The patient is a 57-year-old female who is status post excision of BCC of the left neck x2 by Dr Estanislado Dance on 04/25/2022  Please see HPI  The patient presents to the office today for routine postoperative check and to have Tegaderm bandages removed  Incisions are covered with Steri-Strips which are clean and dry, with minimal underlying blood  The patient will return to the office in approximately 10 days for suture removal      Diagnoses and all orders for this visit:    Basal cell carcinoma (BCC) of left side of neck          Subjective:     Patient ID: Joy Ortega is a 49 y o  female  HPI     The patient reports that she has been feeling achy and off" since surgery, which she attributes to anesthesia  She also has itching to the surrounding area  Upon evaluation of the patient, external dressings are clean, dry and intact  Those were removed, revealing intact Steri-Strips  The patient did have some residual prep on her skin which was removed with alcohol swab  No additional concerns or complaints today  Review of Systems    See HPI    Objective:     Physical Exam      Steri-Strips are clean, dry and intact x2 and left neck

## 2022-05-06 ENCOUNTER — OFFICE VISIT (OUTPATIENT)
Dept: PLASTIC SURGERY | Facility: CLINIC | Age: 57
End: 2022-05-06

## 2022-05-06 DIAGNOSIS — Z48.89 POSTOPERATIVE VISIT: Primary | ICD-10-CM

## 2022-05-06 PROCEDURE — 99024 POSTOP FOLLOW-UP VISIT: CPT | Performed by: SURGERY

## 2022-05-06 NOTE — PROGRESS NOTES
Arabella Haddadtana presents today for follow-up, 2 weeks status post excision basal cell carcinoma the neck x2 (pathology report reviewed with Arabella Meliza, no residual basal cell carcinoma)  The Steri-Strips were removed from both sites, the suture ends cut and the wounds cleansed  Benzoin Steri-Strips for additional week, following which we will use topical silicone/Oleevaetcetera

## 2022-05-11 ENCOUNTER — HOSPITAL ENCOUNTER (INPATIENT)
Facility: HOSPITAL | Age: 57
LOS: 1 days | Discharge: HOME | DRG: 872 | End: 2022-05-13
Attending: EMERGENCY MEDICINE | Admitting: HOSPITALIST
Payer: COMMERCIAL

## 2022-05-11 ENCOUNTER — APPOINTMENT (EMERGENCY)
Dept: CT IMAGING | Facility: HOSPITAL | Age: 57
DRG: 872 | End: 2022-05-11
Payer: COMMERCIAL

## 2022-05-11 ENCOUNTER — APPOINTMENT (EMERGENCY)
Dept: RADIOLOGY | Facility: HOSPITAL | Age: 57
DRG: 872 | End: 2022-05-11
Payer: COMMERCIAL

## 2022-05-11 DIAGNOSIS — R19.7 DIARRHEA: Primary | ICD-10-CM

## 2022-05-11 DIAGNOSIS — A41.9 SEPSIS (HCC): ICD-10-CM

## 2022-05-11 DIAGNOSIS — R55 SYNCOPE AND COLLAPSE: ICD-10-CM

## 2022-05-11 DIAGNOSIS — R11.2 NAUSEA AND VOMITING: ICD-10-CM

## 2022-05-11 DIAGNOSIS — K52.9 ENTEROCOLITIS: ICD-10-CM
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
## 2022-05-11 PROBLEM — S61.452A CAT BITE OF LEFT HAND: Status: ACTIVE | Noted: 2022-05-11

## 2022-05-11 LAB
ALBUMIN SERPL BCP-MCNC: 4 G/DL (ref 3.5–5)
ALP SERPL-CCNC: 50 U/L (ref 34–104)
ALT SERPL W P-5'-P-CCNC: 12 U/L (ref 7–52)
ANION GAP SERPL CALCULATED.3IONS-SCNC: 8 MMOL/L (ref 4–13)
APTT PPP: 32 SECONDS (ref 23–37)
AST SERPL W P-5'-P-CCNC: 18 U/L (ref 13–39)
ATRIAL RATE: 108 BPM
ATRIAL RATE: 115 BPM
BASOPHILS # BLD AUTO: 0.02 THOUSANDS/ΜL (ref 0–0.1)
BASOPHILS NFR BLD AUTO: 0 % (ref 0–1)
BILIRUB SERPL-MCNC: 0.35 MG/DL (ref 0.2–1)
BILIRUB UR QL STRIP: NEGATIVE
BUN SERPL-MCNC: 14 MG/DL (ref 5–25)
C DIFF TOX B TCDB STL QL NAA+PROBE: NEGATIVE
CALCIUM SERPL-MCNC: 8.8 MG/DL (ref 8.4–10.2)
CARDIAC TROPONIN I PNL SERPL HS: 3 NG/L (ref 8–18)
CARDIAC TROPONIN I PNL SERPL HS: 4 NG/L
CHLORIDE SERPL-SCNC: 107 MMOL/L (ref 96–108)
CLARITY UR: CLEAR
CO2 SERPL-SCNC: 26 MMOL/L (ref 21–32)
COLOR UR: NORMAL
CREAT SERPL-MCNC: 0.79 MG/DL (ref 0.6–1.3)
D DIMER PPP FEU-MCNC: 0.71 UG/ML FEU
EOSINOPHIL # BLD AUTO: 0.02 THOUSAND/ΜL (ref 0–0.61)
EOSINOPHIL NFR BLD AUTO: 0 % (ref 0–6)
ERYTHROCYTE [DISTWIDTH] IN BLOOD BY AUTOMATED COUNT: 12.1 % (ref 11.6–15.1)
FLUAV RNA RESP QL NAA+PROBE: NEGATIVE
FLUBV RNA RESP QL NAA+PROBE: NEGATIVE
GFR SERPL CREATININE-BSD FRML MDRD: 85 ML/MIN/1.73SQ M
GLUCOSE SERPL-MCNC: 102 MG/DL (ref 65–140)
GLUCOSE SERPL-MCNC: 102 MG/DL (ref 65–140)
GLUCOSE UR STRIP-MCNC: NEGATIVE MG/DL
HCT VFR BLD AUTO: 41.1 % (ref 34.8–46.1)
HGB BLD-MCNC: 13.2 G/DL (ref 11.5–15.4)
HGB UR QL STRIP.AUTO: NEGATIVE
IMM GRANULOCYTES # BLD AUTO: 0.04 THOUSAND/UL (ref 0–0.2)
IMM GRANULOCYTES NFR BLD AUTO: 0 % (ref 0–2)
INR PPP: 0.87 (ref 0.84–1.19)
KETONES UR STRIP-MCNC: NEGATIVE MG/DL
LACTATE SERPL-SCNC: 1 MMOL/L (ref 0.5–2)
LEUKOCYTE ESTERASE UR QL STRIP: NEGATIVE
LIPASE SERPL-CCNC: 21 U/L (ref 11–82)
LYMPHOCYTES # BLD AUTO: 1.17 THOUSANDS/ΜL (ref 0.6–4.47)
LYMPHOCYTES NFR BLD AUTO: 10 % (ref 14–44)
MAGNESIUM SERPL-MCNC: 1.8 MG/DL (ref 1.9–2.7)
MCH RBC QN AUTO: 29.5 PG (ref 26.8–34.3)
MCHC RBC AUTO-ENTMCNC: 32.1 G/DL (ref 31.4–37.4)
MCV RBC AUTO: 92 FL (ref 82–98)
MONOCYTES # BLD AUTO: 0.4 THOUSAND/ΜL (ref 0.17–1.22)
MONOCYTES NFR BLD AUTO: 3 % (ref 4–12)
NEUTROPHILS # BLD AUTO: 10.71 THOUSANDS/ΜL (ref 1.85–7.62)
NEUTS SEG NFR BLD AUTO: 87 % (ref 43–75)
NITRITE UR QL STRIP: NEGATIVE
NRBC BLD AUTO-RTO: 0 /100 WBCS
P AXIS: 52 DEGREES
P AXIS: 63 DEGREES
PH UR STRIP.AUTO: 5.5 [PH]
PLATELET # BLD AUTO: 190 THOUSANDS/UL (ref 149–390)
PMV BLD AUTO: 12.1 FL (ref 8.9–12.7)
POTASSIUM SERPL-SCNC: 3.6 MMOL/L (ref 3.5–5.3)
PR INTERVAL: 130 MS
PR INTERVAL: 146 MS
PROCALCITONIN SERPL-MCNC: <0.05 NG/ML
PROT SERPL-MCNC: 6.9 G/DL (ref 6.4–8.4)
PROT UR STRIP-MCNC: NEGATIVE MG/DL
PROTHROMBIN TIME: 11.9 SECONDS (ref 11.6–14.5)
QRS AXIS: 66 DEGREES
QRS AXIS: 73 DEGREES
QRSD INTERVAL: 74 MS
QRSD INTERVAL: 78 MS
QT INTERVAL: 306 MS
QT INTERVAL: 338 MS
QTC INTERVAL: 414 MS
QTC INTERVAL: 452 MS
RBC # BLD AUTO: 4.48 MILLION/UL (ref 3.81–5.12)
RSV RNA RESP QL NAA+PROBE: NEGATIVE
SARS-COV-2 RNA RESP QL NAA+PROBE: NEGATIVE
SODIUM SERPL-SCNC: 141 MMOL/L (ref 135–147)
SP GR UR STRIP.AUTO: <=1.005 (ref 1–1.03)
T WAVE AXIS: 30 DEGREES
T WAVE AXIS: 41 DEGREES
UROBILINOGEN UR QL STRIP.AUTO: 0.2 E.U./DL
VENTRICULAR RATE: 108 BPM
VENTRICULAR RATE: 110 BPM
WBC # BLD AUTO: 12.36 THOUSAND/UL (ref 4.31–10.16)

## 2022-05-11 PROCEDURE — 96376 TX/PRO/DX INJ SAME DRUG ADON: CPT

## 2022-05-11 PROCEDURE — 71045 X-RAY EXAM CHEST 1 VIEW: CPT

## 2022-05-11 PROCEDURE — 93005 ELECTROCARDIOGRAM TRACING: CPT

## 2022-05-11 PROCEDURE — 96361 HYDRATE IV INFUSION ADD-ON: CPT

## 2022-05-11 PROCEDURE — 80053 COMPREHEN METABOLIC PANEL: CPT

## 2022-05-11 PROCEDURE — 85730 THROMBOPLASTIN TIME PARTIAL: CPT | Performed by: NURSE PRACTITIONER

## 2022-05-11 PROCEDURE — 84484 ASSAY OF TROPONIN QUANT: CPT | Performed by: NURSE PRACTITIONER

## 2022-05-11 PROCEDURE — 83735 ASSAY OF MAGNESIUM: CPT

## 2022-05-11 PROCEDURE — 87505 NFCT AGENT DETECTION GI: CPT | Performed by: EMERGENCY MEDICINE

## 2022-05-11 PROCEDURE — 99285 EMERGENCY DEPT VISIT HI MDM: CPT

## 2022-05-11 PROCEDURE — 85610 PROTHROMBIN TIME: CPT | Performed by: NURSE PRACTITIONER

## 2022-05-11 PROCEDURE — 84484 ASSAY OF TROPONIN QUANT: CPT

## 2022-05-11 PROCEDURE — 96374 THER/PROPH/DIAG INJ IV PUSH: CPT

## 2022-05-11 PROCEDURE — 83605 ASSAY OF LACTIC ACID: CPT

## 2022-05-11 PROCEDURE — 93010 ELECTROCARDIOGRAM REPORT: CPT | Performed by: INTERNAL MEDICINE

## 2022-05-11 PROCEDURE — 87493 C DIFF AMPLIFIED PROBE: CPT

## 2022-05-11 PROCEDURE — 74177 CT ABD & PELVIS W/CONTRAST: CPT

## 2022-05-11 PROCEDURE — 84145 PROCALCITONIN (PCT): CPT | Performed by: NURSE PRACTITIONER

## 2022-05-11 PROCEDURE — 81003 URINALYSIS AUTO W/O SCOPE: CPT

## 2022-05-11 PROCEDURE — 83690 ASSAY OF LIPASE: CPT

## 2022-05-11 PROCEDURE — 82948 REAGENT STRIP/BLOOD GLUCOSE: CPT

## 2022-05-11 PROCEDURE — 85025 COMPLETE CBC W/AUTO DIFF WBC: CPT

## 2022-05-11 PROCEDURE — 99285 EMERGENCY DEPT VISIT HI MDM: CPT | Performed by: EMERGENCY MEDICINE

## 2022-05-11 PROCEDURE — 99220 PR INITIAL OBSERVATION CARE/DAY 70 MINUTES: CPT | Performed by: NURSE PRACTITIONER

## 2022-05-11 PROCEDURE — 0241U HB NFCT DS VIR RESP RNA 4 TRGT: CPT

## 2022-05-11 PROCEDURE — 85379 FIBRIN DEGRADATION QUANT: CPT

## 2022-05-11 PROCEDURE — 36415 COLL VENOUS BLD VENIPUNCTURE: CPT

## 2022-05-11 PROCEDURE — G1004 CDSM NDSC: HCPCS

## 2022-05-11 PROCEDURE — 87040 BLOOD CULTURE FOR BACTERIA: CPT

## 2022-05-11 PROCEDURE — 71275 CT ANGIOGRAPHY CHEST: CPT

## 2022-05-11 RX ORDER — ONDANSETRON 2 MG/ML
4 INJECTION INTRAMUSCULAR; INTRAVENOUS ONCE
Status: COMPLETED | OUTPATIENT
Start: 2022-05-11 | End: 2022-05-11

## 2022-05-11 RX ORDER — ASCORBIC ACID 500 MG
500 TABLET ORAL DAILY
Status: DISCONTINUED | OUTPATIENT
Start: 2022-05-11 | End: 2022-05-13 | Stop reason: HOSPADM

## 2022-05-11 RX ORDER — B-COMPLEX WITH VITAMIN C
2 TABLET ORAL
Status: DISCONTINUED | OUTPATIENT
Start: 2022-05-11 | End: 2022-05-13 | Stop reason: HOSPADM

## 2022-05-11 RX ORDER — SACCHAROMYCES BOULARDII 250 MG
250 CAPSULE ORAL 2 TIMES DAILY
Status: DISCONTINUED | OUTPATIENT
Start: 2022-05-11 | End: 2022-05-13 | Stop reason: HOSPADM

## 2022-05-11 RX ORDER — DICYCLOMINE HYDROCHLORIDE 10 MG/1
10 CAPSULE ORAL 4 TIMES DAILY PRN
Status: DISCONTINUED | OUTPATIENT
Start: 2022-05-11 | End: 2022-05-11

## 2022-05-11 RX ORDER — SODIUM CHLORIDE, SODIUM LACTATE, POTASSIUM CHLORIDE, CALCIUM CHLORIDE 600; 310; 30; 20 MG/100ML; MG/100ML; MG/100ML; MG/100ML
125 INJECTION, SOLUTION INTRAVENOUS CONTINUOUS
Status: DISPENSED | OUTPATIENT
Start: 2022-05-11 | End: 2022-05-11

## 2022-05-11 RX ORDER — ACETAMINOPHEN 325 MG/1
500 TABLET ORAL EVERY 6 HOURS PRN
Status: DISCONTINUED | OUTPATIENT
Start: 2022-05-11 | End: 2022-05-13 | Stop reason: HOSPADM

## 2022-05-11 RX ORDER — ENOXAPARIN SODIUM 100 MG/ML
40 INJECTION SUBCUTANEOUS DAILY
Status: DISCONTINUED | OUTPATIENT
Start: 2022-05-11 | End: 2022-05-13 | Stop reason: HOSPADM

## 2022-05-11 RX ORDER — DICYCLOMINE HYDROCHLORIDE 10 MG/1
10 CAPSULE ORAL
Status: DISCONTINUED | OUTPATIENT
Start: 2022-05-11 | End: 2022-05-13 | Stop reason: HOSPADM

## 2022-05-11 RX ORDER — CIPROFLOXACIN 2 MG/ML
400 INJECTION, SOLUTION INTRAVENOUS EVERY 12 HOURS
Status: DISCONTINUED | OUTPATIENT
Start: 2022-05-11 | End: 2022-05-11

## 2022-05-11 RX ORDER — METRONIDAZOLE 500 MG/1
500 TABLET ORAL EVERY 8 HOURS SCHEDULED
Status: DISCONTINUED | OUTPATIENT
Start: 2022-05-11 | End: 2022-05-12

## 2022-05-11 RX ORDER — CIPROFLOXACIN 2 MG/ML
400 INJECTION, SOLUTION INTRAVENOUS ONCE
Status: DISCONTINUED | OUTPATIENT
Start: 2022-05-11 | End: 2022-05-11

## 2022-05-11 RX ORDER — MELATONIN
2000 DAILY
Status: DISCONTINUED | OUTPATIENT
Start: 2022-05-11 | End: 2022-05-13 | Stop reason: HOSPADM

## 2022-05-11 RX ORDER — ONDANSETRON 2 MG/ML
4 INJECTION INTRAMUSCULAR; INTRAVENOUS EVERY 8 HOURS PRN
Status: DISCONTINUED | OUTPATIENT
Start: 2022-05-11 | End: 2022-05-13 | Stop reason: HOSPADM

## 2022-05-11 RX ORDER — MAGNESIUM SULFATE HEPTAHYDRATE 40 MG/ML
2 INJECTION, SOLUTION INTRAVENOUS ONCE
Status: COMPLETED | OUTPATIENT
Start: 2022-05-11 | End: 2022-05-11

## 2022-05-11 RX ORDER — TAMOXIFEN CITRATE 10 MG/1
20 TABLET ORAL DAILY
Status: DISCONTINUED | OUTPATIENT
Start: 2022-05-11 | End: 2022-05-13 | Stop reason: HOSPADM

## 2022-05-11 RX ORDER — FAMOTIDINE 20 MG/1
20 TABLET, FILM COATED ORAL 2 TIMES DAILY PRN
Status: DISCONTINUED | OUTPATIENT
Start: 2022-05-11 | End: 2022-05-13 | Stop reason: HOSPADM

## 2022-05-11 RX ORDER — HYDROCODONE BITARTRATE AND ACETAMINOPHEN 5; 325 MG/1; MG/1
1 TABLET ORAL EVERY 6 HOURS PRN
Status: DISCONTINUED | OUTPATIENT
Start: 2022-05-11 | End: 2022-05-13 | Stop reason: HOSPADM

## 2022-05-11 RX ORDER — ALPRAZOLAM 0.5 MG/1
0.5 TABLET ORAL
Status: DISCONTINUED | OUTPATIENT
Start: 2022-05-11 | End: 2022-05-13 | Stop reason: HOSPADM

## 2022-05-11 RX ORDER — METRONIDAZOLE 500 MG/100ML
500 INJECTION, SOLUTION INTRAVENOUS EVERY 8 HOURS
Status: DISCONTINUED | OUTPATIENT
Start: 2022-05-11 | End: 2022-05-11

## 2022-05-11 RX ADMIN — DICYCLOMINE HYDROCHLORIDE 10 MG: 10 CAPSULE ORAL at 11:33

## 2022-05-11 RX ADMIN — OXYCODONE HYDROCHLORIDE AND ACETAMINOPHEN 500 MG: 500 TABLET ORAL at 09:34

## 2022-05-11 RX ADMIN — CEFTRIAXONE 1000 MG: 1 INJECTION, POWDER, FOR SOLUTION INTRAMUSCULAR; INTRAVENOUS at 06:36

## 2022-05-11 RX ADMIN — METRONIDAZOLE 500 MG: 500 TABLET ORAL at 21:26

## 2022-05-11 RX ADMIN — CHOLECALCIFEROL TAB 25 MCG (1000 UNIT) 2000 UNITS: 25 TAB at 09:35

## 2022-05-11 RX ADMIN — DICYCLOMINE HYDROCHLORIDE 10 MG: 10 CAPSULE ORAL at 16:43

## 2022-05-11 RX ADMIN — METRONIDAZOLE 500 MG: 500 TABLET ORAL at 14:02

## 2022-05-11 RX ADMIN — Medication 250 MG: at 17:15

## 2022-05-11 RX ADMIN — SODIUM CHLORIDE, SODIUM LACTATE, POTASSIUM CHLORIDE, AND CALCIUM CHLORIDE 125 ML/HR: .6; .31; .03; .02 INJECTION, SOLUTION INTRAVENOUS at 09:35

## 2022-05-11 RX ADMIN — ONDANSETRON 4 MG: 2 INJECTION INTRAMUSCULAR; INTRAVENOUS at 02:34

## 2022-05-11 RX ADMIN — DICYCLOMINE HYDROCHLORIDE 10 MG: 10 CAPSULE ORAL at 06:36

## 2022-05-11 RX ADMIN — Medication 250 MG: at 09:34

## 2022-05-11 RX ADMIN — DICYCLOMINE HYDROCHLORIDE 10 MG: 10 CAPSULE ORAL at 21:26

## 2022-05-11 RX ADMIN — SODIUM CHLORIDE, SODIUM LACTATE, POTASSIUM CHLORIDE, AND CALCIUM CHLORIDE 125 ML/HR: .6; .31; .03; .02 INJECTION, SOLUTION INTRAVENOUS at 17:20

## 2022-05-11 RX ADMIN — ONDANSETRON 4 MG: 2 INJECTION INTRAMUSCULAR; INTRAVENOUS at 04:26

## 2022-05-11 RX ADMIN — Medication 2 TABLET: at 11:32

## 2022-05-11 RX ADMIN — SODIUM CHLORIDE 500 ML: 0.9 INJECTION, SOLUTION INTRAVENOUS at 02:33

## 2022-05-11 RX ADMIN — METRONIDAZOLE 500 MG: 500 INJECTION, SOLUTION INTRAVENOUS at 04:45

## 2022-05-11 RX ADMIN — SODIUM CHLORIDE, SODIUM LACTATE, POTASSIUM CHLORIDE, AND CALCIUM CHLORIDE 1000 ML: .6; .31; .03; .02 INJECTION, SOLUTION INTRAVENOUS at 07:31

## 2022-05-11 RX ADMIN — FAMOTIDINE 20 MG: 20 TABLET ORAL at 09:35

## 2022-05-11 RX ADMIN — MAGNESIUM SULFATE HEPTAHYDRATE 2 G: 40 INJECTION, SOLUTION INTRAVENOUS at 07:31

## 2022-05-11 RX ADMIN — IOHEXOL 65 ML: 350 INJECTION, SOLUTION INTRAVENOUS at 04:09

## 2022-05-11 NOTE — ED PROVIDER NOTES
tHistory  Chief Complaint   Patient presents with    Abdominal Pain     Pt states she woke up with sharp shooting pains across midsection and into back, had one episode of diarrhea and had one syncopal episode, +nausea, onset of symptoms tonight     56-year-old female history of breast cancer in remission and recent [address] removal presenting after a syncopal episode  Patient states that she was woken up in the middle the night went to use the bathroom and had episode of diarrhea  As she was finishing up she suddenly fell sweaty, lightheaded and passed out  Patient says that she was able to slide to the ground without injuring her head/neck/back  Was not having diarrhea prior to this episode but is having episodes here in the ED  Was placed on amoxicillin 2 days ago after a cat bite to her right hand  Patient is not complaining of right upper quadrant pain  Denies chest pain, shortness of breath, feeling she can not pass out, urinary symptoms, no history of blood clots  Still having diarrhea and vomiting here in the ED  Prior to Admission Medications   Prescriptions Last Dose Informant Patient Reported? Taking? ALPRAZolam (XANAX) 0 5 mg tablet  Self Yes No   Sig: Take 0 5 mg by mouth daily at bedtime as needed for anxiety    Ascorbic Acid (VITAMIN C) 500 MG CAPS  Self Yes No   Sig: Take 500 mg by mouth in the morning     Cholecalciferol (VITAMIN D) 2000 units CAPS  Self Yes No   Sig: Take by mouth in the morning     Lactobacillus (ACIDOPHILUS PO)  Self Yes No   Sig: Take by mouth daily  Multiple Vitamin (MULTIVITAMIN) capsule  Self Yes No   Sig: Take 1 capsule by mouth daily  acetaminophen (TYLENOL) 500 mg tablet  Self Yes No   Sig: Take 500 mg by mouth every 6 (six) hours as needed for mild pain     albuterol (Ventolin HFA) 90 mcg/act inhaler   No No   Sig: Inhale 2 puffs every 6 (six) hours as needed for wheezing or shortness of breath   calcium-vitamin D (OSCAL) 250-125 MG-UNIT per tablet Self Yes No   Sig: Take 2 tablets by mouth daily  famotidine (PEPCID) 20 mg tablet  Self Yes No   Sig: Take 20 mg by mouth 2 (two) times a day as needed     omeprazole (PriLOSEC) 20 mg delayed release capsule  Self Yes No   Sig: Take 20 mg by mouth as needed     tamoxifen (NOLVADEX) 20 mg tablet   No No   Sig: Take 1 tablet (20 mg total) by mouth daily   zinc gluconate 50 mg tablet  Self Yes No   Sig: Take by mouth daily        Facility-Administered Medications: None       Past Medical History:   Diagnosis Date    Abnormal findings on diagnostic imaging of breast     Last Assessesd: 4/25/2014    Arthritis     Knees and feet    Cancer ([facility] [address] )     breast    Difficulty falling asleep     Gluteal abscess     Last Assessed: 12/30/2016    Limb alert care status     right    Pain in left foot     Pneumonia 2009    x1    PONV (postoperative nausea and vomiting)     Skin cancer        Past Surgical History:   Procedure Laterality Date    BASAL CELL CARCINOMA EXCISION N/A 1/7/2021    Procedure: EXCISION BASAL CELL CARCINOMA OF MID BACK;  Surgeon: Jessica Hernandez MD;  [facility];  Service: Plastics    BASAL CELL CARCINOMA EXCISION Left 4/25/2022    Procedure: EXCISION OF BCC (X2) LEFT SHOULDER WITH COMPLEX CLOSURE;  Surgeon: Jessica Hernandez MD;  [facility];  Service: Plastics    BREAST BIOPSY  05/02/2014    BREAST BIOPSY  05/2013    BREAST BIOPSY  05/2008    BREAST LUMPECTOMY Right 2008    BREAST RECONSTRUCTION Left 2/21/2019    Procedure: BREAST EXPANDER/IMPLANT EXCHANGE;  Surgeon: Jessica Hernandez MD;  [facility];  Service: Plastics    BREAST SURGERY Bilateral 2008, 2013, 2014    Breast reconstruction with expanders  Total of 7 surgeries  7/3/14 right breast reconstruction revision   10/27/14 right breast reconstruction revision     BREAST SURGERY Left 07/24/2013    Excision of breast lesion    CAPSULOTOMY Left 2/21/2019    Procedure: CAPSULECTOMY;  Surgeon: Jenna Olivia MD;  [facility];  Service: Plastics    CHEST WALL BIOPSY Right 2/21/2019    Procedure: UPPER CHEST BASAL CELL CARSINOMA EXCISION;  Surgeon: Jenna Olivia MD;  [facility];  Service: Plastics    CLOSURE WOUND N/A 2/21/2019    Procedure: RIGHT upper chest & LEFT upper back complex closure;  Surgeon: Jenna Olivia MD;  [facility];  Service: Plastics    COLONOSCOPY      COMPLEX WOUND CLOSURE TO EXTREMITY N/A 1/7/2021    Procedure: COMPLEX CLOSURE MID BACK;  Surgeon: Jenna Olivia MD;  [facility];  Service: Plastics    FOOT ARTHRODESIS      Pantalar    KNEE ARTHROSCOPY Right 05/1997    LIPOSUCTION W/ FAT INJECTION Bilateral 10/24/2019    Procedure: AUTOLOGOUS FAT GRAFTING TO BILATERAL BREAST;  Surgeon: Jenna Olivia MD;  [facility];  Service: Plastics    LIPOSUCTION W/ FAT INJECTION Bilateral 1/7/2021    Procedure: AUTOLOGOUS FAT GRAFTING TO BILATERAL BREAST;  Surgeon: Jenna Olivia MD;  [facility];  Service: Plastics    MASTECTOMY Bilateral 06/06/2014    Lymph nodes removed bilaterally  States she may have IV's in left side   MOHS RECONSTRUCTION Right 3/7/2019    Procedure: RECONSTRUCTION MOHS DEFECT RIGHT NOSE/MEDIAL CANTHUS;  Surgeon: Jenna Olivia MD;  [facility];  Service: Plastics    MOHS SURGERY      Mohs Micrographic Surgery Face;  Last Assessed: 5/15/2015    UT ADJ TISS Darol Paget LID,NOS,EAR 10 1-30 SQCM Right 3/7/2019    Procedure: FLAP;  Surgeon: Jenna Olivia MD;  [facility];  Service: Plastics    UT BREAST RECONSTRUCTION W/LATISSIMUS DORSI FLAP Left 9/7/2017    Procedure: BREAST RECONSTRUCTION; LATISSIMUS DORSI FLAP; TISSUE EXPANDER;  Surgeon: Jenna Olivia MD;  [facility];  Service: Plastics    UT COLONOSCOPY FLX DX W/NATALIIA [place] 1976 PFRMD N/A 8/1/2018    Procedure: COLONOSCOPY;  Surgeon: Priti Soriano MD;  [facility];  Service: Gastroenterology    TX CORRJ [address] W/SESMDC W/RESCJ PROX PHAL  9/9/2016    Procedure: Gabriella Estrada MODIFIED ;  Surgeon: Willy Salazar DPM;  [facility];  Service: Podiatry    TX EXC SKIN MALIG >4 CM TRUNK,ARM,LEG N/A 6/27/2019    Procedure: EXCISION BASAL CELL CARCINOMA MID CHEST;  Surgeon: Isaiah Concepcion MD;  [facility];  Service: Plastics    TX FULL THICK 2011 [facility] NOS,EAR,LID <20 SQCM Right 3/7/2019    Procedure: SKIN GRAFT FULL THICKNESS  (FTSG); Surgeon: Isaiah Concepcion MD;  [facility];  Service: Plastics    TX FUSION FOOT BONES,MIDTARSAL,MULTI Left 9/9/2016    Procedure: ARTHRODESIS FIRST [address] [facility] [place] [address] ;  Surgeon: Willy Salazar DPM;  [facility];  Service: Podiatry    TX HYSTEROSCOPY,W/ENDO BX N/A 1/7/2022    Procedure: DILATATION AND CURETTAGE (D&C) WITH HYSTEROSCOPY, POLYPECTOMY ;  Surgeon: Karla Del Castillo MD;  [facility];  Service: Gynecology    TX INSERTION BREAST IMPLANT SAME DAY OF MASTECTOMY Left 9/7/2017    Procedure: TISSUE EXPANDER;  Surgeon: Isaiah Concepcion MD;  [facility];  Service: Plastics    TX OPEN 2016 [facility] Left 12/11/2019    Procedure: OPEN REDUCTION W/ INTERNAL FIXATION left distal humerus fracture, possible olecranon osteotomy;  Surgeon:  Quang Moreno MD;  Location: [address] [place] [address] MAIN OR;  Service: Orthopedics    TX OSTEOTOMY HEEL BONE Left 9/9/2016    Procedure: OSTEOTOMY CALCANEUS WITH APPLICATION AND ACTIVATION OF BONE STIMULATOR ;  Surgeon: Willy Salazar DPM;  [facility];  Service: Podiatry    TX RECMPL WND HEAD,FAC,HAND 2 6-7 5 CM N/A 3/7/2019    Procedure: COMPLEX CLOSURE;  Surgeon: Isaiah Concepcion MD;  [facility];  Service: Plastics    TX RECMPL WND TRUNK 2 6-7 5 CM N/A 6/27/2019    Procedure: CLOSURE WOUND MID CHEST;  Surgeon: Isaiah Concepcion MD;  [facility];  Service: Plastics    TX [address] GRAFT OTHR Bilateral 6/27/2019    Procedure: AUTOLOGOUS FAT GRAFTING BILATERAL BREASTS;  Surgeon: Trent Kirna MD;  [facility];  Service: Plastics    KS REVISION OF RECONSTRUCTED BREAST Bilateral 6/27/2019    Procedure: BILATERAL BREAST MOUND REVISION;  Surgeon: Trent Kiran MD;  [facility];  Service: Plastics    KS REVISION OF RECONSTRUCTED BREAST Bilateral 10/24/2019    Procedure: BILATERAL BREAST MOUND REVISION;  Surgeon: Trent Kiran MD;  [facility];  Service: Plastics    KS REVISION OF RECONSTRUCTED BREAST Bilateral 1/7/2021    Procedure: BILATERAL BREAST MOUND REVISION;  Surgeon: Trent Kiran MD;  [facility];  Service: Plastics    SENTINEL LYMPH NODE BIOPSY Bilateral 06/06/2014    SENTINEL LYMPH NODE BIOPSY Right 2008    SKIN BIOPSY      SKIN LESION EXCISION Left 2/21/2019    Procedure: UPPER BACK [address] EXCISION;  Surgeon: Trent Kiran MD;  [facility];  Service: Plastics    SQUAMOUS CELL CARCINOMA EXCISION N/A 3/7/2019    Procedure: EXCISION BCC LEFT FOREHEAD;  Surgeon: Trent Kiran MD;  [facility];  Service: Plastics    WISDOM TOOTH EXTRACTION         Family History   Adopted: Yes   Problem Relation Age of Onset    No Known Problems Mother     No Known Problems Father      I have reviewed and agree with the history as documented  E-Cigarette/Vaping    E-Cigarette Use Never User      E-Cigarette/Vaping Substances    Nicotine No     THC No     CBD No     Flavoring No     Other No     Unknown No      Social History     Tobacco Use    Smoking status: Never Smoker    Smokeless tobacco: Never Used   Vaping Use    Vaping Use: Never used   Substance Use Topics    Alcohol use: Yes     Alcohol/week: 4 0 standard drinks     Types: 4 Glasses of wine per week     Comment: 4 weekly-wine    Drug use: No        Review of Systems   Constitutional: Positive for chills   Negative for activity change, fever and unexpected weight change  HENT: Negative for postnasal drip and rhinorrhea  Eyes: Negative for visual disturbance  Respiratory: Negative for cough, chest tightness and shortness of breath  Cardiovascular: Negative for chest pain, palpitations and leg swelling  Gastrointestinal: Positive for abdominal pain, diarrhea, nausea and vomiting  Negative for blood in stool and constipation  Endocrine: Negative for cold intolerance and heat intolerance  Genitourinary: Negative for difficulty urinating, dysuria and hematuria  Skin: Positive for wound  Negative for color change  Allergic/Immunologic: Negative for immunocompromised state  Neurological: Positive for syncope  Negative for dizziness  Psychiatric/Behavioral: Negative for dysphoric mood  The patient is not nervous/anxious  All other systems reviewed and are negative  Physical Exam  ED Triage Vitals   Temperature Pulse Respirations Blood Pressure SpO2   05/11/22 0217 05/11/22 0203 05/11/22 0203 05/11/22 0217 05/11/22 0203   98 9 °F (37 2 °C) (!) 119 20 125/76 99 %      Temp Source Heart Rate Source Patient Position - Orthostatic VS BP Location FiO2 (%)   05/11/22 0217 05/11/22 0203 05/11/22 0203 05/11/22 0203 --   Oral Monitor Sitting Right arm       Pain Score       05/11/22 0515       3             Orthostatic Vital Signs  Vitals:    05/11/22 0300 05/11/22 0430 05/11/22 0529 05/11/22 0727   BP:  119/60 91/57 114/69   Pulse: (!) 110 (!) 119 (!) 114 (!) 133   Patient Position - Orthostatic VS:  Lying         Physical Exam  Vitals and nursing note reviewed  Exam conducted with a chaperone present ( at bedside)  Constitutional:       General: She is in acute distress  Appearance: Normal appearance  She is ill-appearing  She is not toxic-appearing or diaphoretic  HENT:      Head: Normocephalic and atraumatic        Right Ear: External ear normal       Left Ear: External ear normal       Nose: Nose normal       Mouth/Throat: Mouth: Mucous membranes are moist    Eyes:      General: No scleral icterus  Extraocular Movements: Extraocular movements intact  Conjunctiva/sclera: Conjunctivae normal    Cardiovascular:      Rate and Rhythm: Regular rhythm  Tachycardia present  Pulses: Normal pulses  Radial pulses are 2+ on the right side  Dorsalis pedis pulses are 2+ on the right side and 2+ on the left side  Heart sounds: Normal heart sounds, S1 normal and S2 normal  No murmur heard  Pulmonary:      Effort: Pulmonary effort is normal  No respiratory distress  Breath sounds: Normal breath sounds  No stridor  No wheezing  Abdominal:      General: Bowel sounds are normal       Palpations: Abdomen is soft  There is no hepatomegaly  Tenderness: There is abdominal tenderness in the right upper quadrant and epigastric area  There is no right CVA tenderness, left CVA tenderness, guarding or rebound  Negative signs include Rovsing's sign and McBurney's sign  Hernia: No hernia is present  Musculoskeletal:         General: Normal range of motion  Cervical back: Normal range of motion  Right lower le+ Pitting Edema present  Left lower le+ Pitting Edema present  Skin:     General: Skin is warm and dry  Coloration: Skin is not jaundiced  Neurological:      General: No focal deficit present  Mental Status: She is alert and oriented to person, place, and time     Psychiatric:         Mood and Affect: Mood normal          ED Medications  Medications   metroNIDAZOLE (FLAGYL) IVPB (premix) 500 mg 100 mL (500 mg Intravenous New Bag 22 3185)   tamoxifen (NOLVADEX) tablet 20 mg (has no administration in time range)   multivitamin stress formula tablet 1 tablet (has no administration in time range)   saccharomyces boulardii (FLORASTOR) capsule 250 mg (has no administration in time range)   famotidine (PEPCID) tablet 20 mg (has no administration in time range) cholecalciferol (VITAMIN D3) tablet 2,000 Units (has no administration in time range)   calcium carbonate-vitamin D (OSCAL-D) 500 mg-200 units per tablet 2 tablet (has no administration in time range)   ascorbic acid (VITAMIN C) tablet 500 mg (has no administration in time range)   acetaminophen (TYLENOL) tablet 488 mg (has no administration in time range)   ALPRAZolam (XANAX) tablet 0 5 mg (has no administration in time range)   lactated ringers infusion (has no administration in time range)   enoxaparin (LOVENOX) subcutaneous injection 40 mg (has no administration in time range)   magnesium sulfate 2 g/50 mL IVPB (premix) 2 g (has no administration in time range)   cefTRIAXone (ROCEPHIN) 1,000 mg in dextrose 5 % 50 mL IVPB (1,000 mg Intravenous New Bag 5/11/22 0636)   lactated ringers bolus 1,000 mL (has no administration in time range)   HYDROcodone-acetaminophen (NORCO) 5-325 mg per tablet 1 tablet (has no administration in time range)   dicyclomine (BENTYL) capsule 10 mg (10 mg Oral Given 5/11/22 0636)   ondansetron (ZOFRAN) injection 4 mg (has no administration in time range)   sodium chloride 0 9 % bolus 500 mL (0 mL Intravenous Stopped 5/11/22 0426)   ondansetron (ZOFRAN) injection 4 mg (4 mg Intravenous Given 5/11/22 0234)   iohexol (OMNIPAQUE) 350 MG/ML injection (SINGLE-DOSE) 100 mL (65 mL Intravenous Given 5/11/22 0409)   ondansetron (ZOFRAN) injection 4 mg (4 mg Intravenous Given 5/11/22 0426)       Diagnostic Studies  Results Reviewed     Procedure Component Value Units Date/Time    Protime-INR [[id number]]  (Normal) Collected: 05/11/22 0236    Lab Status: Final result Specimen: Blood from Arm, Right Updated: 05/11/22 0534     Protime 11 9 seconds      INR 0 87    APTT [[id number]]  (Normal) Collected: 05/11/22 0236    Lab Status: Final result Specimen: Blood from Arm, Right Updated: 05/11/22 0534     PTT 32 seconds     Clostridium difficile toxin by PCR with EIA [[id number]] Collected: 05/11/22 0516 Lab Status: In process Specimen: Stool Updated: 05/11/22 0516    Stool Enteric Bacterial Panel by PCR [[id number]] Collected: 05/11/22 0516    Lab Status: In process Specimen: Stool from Rectum Updated: 05/11/22 0516    COVID/FLU/RSV - 2 hour TAT [[id number]]  (Normal) Collected: 05/11/22 0243    Lab Status: Final result Specimen: Nares from Nose Updated: 05/11/22 0332     SARS-CoV-2 Negative     INFLUENZA A PCR Negative     INFLUENZA B PCR Negative     RSV PCR Negative    Narrative:      FOR PEDIATRIC PATIENTS - copy/paste COVID Guidelines URL to browser: [email or web address]  [employer]    SARS-CoV-2 assay is a Nucleic Acid Amplification assay intended for the  qualitative detection of nucleic acid from SARS-CoV-2 in nasopharyngeal  swabs  Results are for the presumptive identification of SARS-CoV-2 RNA  Positive results are indicative of infection with SARS-CoV-2, the virus  causing COVID-19, but do not rule out bacterial infection or co-infection  with other viruses  Laboratories within the [place] and its  territories are required to report all positive results to the appropriate  public health authorities  Negative results do not preclude SARS-CoV-2  infection and should not be used as the sole basis for treatment or other  patient management decisions  Negative results must be combined with  clinical observations, patient history, and epidemiological information  This test has not been FDA cleared or approved  This test has been authorized by FDA under an Emergency Use Authorization  (EUA)  This test is only authorized for the duration of time the  declaration that circumstances exist justifying the authorization of the  emergency use of an in vitro diagnostic tests for detection of SARS-CoV-2  virus and/or diagnosis of COVID-19 infection under section 564(b)(1) of  the Act, 21 U  S C  041BWD-0(M)(9), unless the authorization is terminated  or revoked sooner  The test has been validated but independent review by FDA  and CLIA is pending  Test performed using [employer] GeneXpert: This RT-PCR assay targets N2,  a region unique to SARS-CoV-2  A conserved region in the E-gene was chosen  for pan-Sarbecovirus detection which includes SARS-CoV-2  HS Troponin 0hr (reflex protocol) [[id number]]  (Normal) Collected: 05/11/22 0236    Lab Status: Final result Specimen: Blood from Arm, Right Updated: 05/11/22 0317     hs TnI 0hr 4 ng/L     Lactic acid [[id number]]  (Normal) Collected: 05/11/22 0236    Lab Status: Final result Specimen: Blood from Arm, Right Updated: 05/11/22 0315     LACTIC ACID 1 0 mmol/L     Narrative:      Result may be elevated if tourniquet was used during collection      Comprehensive metabolic panel [[id number]] Collected: 05/11/22 0236    Lab Status: Final result Specimen: Blood from Arm, Right Updated: 05/11/22 0315     Sodium 141 mmol/L      Potassium 3 6 mmol/L      Chloride 107 mmol/L      CO2 26 mmol/L      ANION GAP 8 mmol/L      BUN 14 mg/dL      Creatinine 0 79 mg/dL      Glucose 102 mg/dL      Calcium 8 8 mg/dL      AST 18 U/L      ALT 12 U/L      Alkaline Phosphatase 50 U/L      Total Protein 6 9 g/dL      Albumin 4 0 g/dL      Total Bilirubin 0 35 mg/dL      eGFR 85 ml/min/1 73sq m     Narrative:      [place] guidelines for Chronic Kidney Disease (CKD):     Stage 1 with normal or high GFR (GFR > 90 mL/min/1 73 square meters)    Stage 2 Mild CKD (GFR = 60-89 mL/min/1 73 square meters)    Stage 3A Moderate CKD (GFR = 45-59 mL/min/1 73 square meters)    Stage 3B Moderate CKD (GFR = 30-44 mL/min/1 73 square meters)    Stage 4 Severe CKD (GFR = 15-29 mL/min/1 73 square meters)    Stage 5 End Stage CKD (GFR <15 mL/min/1 73 square meters)  Note: GFR calculation is accurate only with a steady state creatinine    Lipase [[id number]]  (Normal) Collected: 05/11/22 0236    Lab Status: Final result Specimen: Blood from Arm, Right Updated: 05/11/22 0315     Lipase 21 u/L     Magnesium [[id number]]  (Abnormal) Collected: 05/11/22 0236    Lab Status: Final result Specimen: Blood from Arm, Right Updated: 05/11/22 0315     Magnesium 1 8 mg/dL     D-Dimer [[id number]]  (Abnormal) Collected: 05/11/22 0236    Lab Status: Final result Specimen: Blood from Arm, Right Updated: 05/11/22 0308     D-Dimer, Quant 0 71 ug/ml FEU     Narrative: In the evaluation for possible pulmonary embolism, in the appropriate (Well's Score of 4 or less) patient, the age adjusted d-dimer cutoff for this patient can be calculated as:    Age x 0 01 (in ug/mL) for Age-adjusted D-dimer exclusion threshold for a patient over 50 years  Fingerstick Glucose (POCT) [[id number]]  (Normal) Collected: 05/11/22 0252    Lab Status: Final result Updated: 05/11/22 0253     POC Glucose 102 mg/dl     Blood culture #1 [[id number]] Collected: 05/11/22 0243    Lab Status: In process Specimen: Blood from Arm, Left Updated: 05/11/22 0253    CBC and differential [[id number]]  (Abnormal) Collected: 05/11/22 0236    Lab Status: Final result Specimen: Blood from Arm, Right Updated: 05/11/22 0252     WBC 12 36 Thousand/uL      RBC 4 48 Million/uL      Hemoglobin 13 2 g/dL      Hematocrit 41 1 %      MCV 92 fL      MCH 29 5 pg      MCHC 32 1 g/dL      RDW 12 1 %      MPV 12 1 fL      Platelets 408 Thousands/uL      nRBC 0 /100 WBCs      Neutrophils Relative 87 %      Immat GRANS % 0 %      Lymphocytes Relative 10 %      Monocytes Relative 3 %      Eosinophils Relative 0 %      Basophils Relative 0 %      Neutrophils Absolute 10 71 Thousands/µL      Immature Grans Absolute 0 04 Thousand/uL      Lymphocytes Absolute 1 17 Thousands/µL      Monocytes Absolute 0 40 Thousand/µL      Eosinophils Absolute 0 02 Thousand/µL      Basophils Absolute 0 02 Thousands/µL     Blood culture #2 [[id number]] Collected: 05/11/22 0236    Lab Status:  In process Specimen: Blood from Arm, Right Updated: 05/11/22 0245    UA w Reflex to Microscopic w Reflex to Culture [[id number]]     Lab Status: No result Specimen: Urine                  PE Study with CT Abdomen and Pelvis with contrast   Final Result by Wyatt Preciado MD (05/11 0421)      No evidence of pulmonary embolus        Findings consistent with mild enterocolitis                     Workstation performed: [id number]         XR chest 1 view portable   ED Interpretation by Dave Gillespie MD (05/11 0310)   No acute cardiopulmonary disease            Procedures  ECG 12 Lead Documentation Only    Date/Time: 5/11/2022 2:10 AM  Performed by: Dave Gillespie MD  Authorized by: Dave Gillespie MD     ECG reviewed by me, the ED Provider: yes    Patient location:  ED  Previous ECG:     Previous ECG:  Compared to current    Similarity:  No change    Comparison to cardiac monitor: Yes    Interpretation:     Interpretation: non-specific    Rate:     ECG rate:  110    ECG rate assessment: tachycardic    Rhythm:     Rhythm: sinus rhythm    Ectopy:     Ectopy: none    QRS:     QRS axis:  Normal    QRS intervals:  Normal  Conduction:     Conduction: normal    ST segments:     ST segments:  Normal  T waves:     T waves: normal            ED Course  ED Course as of 05/11/22 0729   Wed May 11, 2022   0201 Pulse(!): 119   0219 LACTIC ACID: 1 0   0309 D-Dimer, Quant(!): 0 71   0309 WBC(!): 12 36   0310 XR chest 1 view portable  No acute cardiopulmonary disease     0319 hs TnI 0hr: 4   0319 POC Glucose: 80   0425 PE Study with CT Abdomen and Pelvis with contrast  No evidence of pulmonary embolus      Findings consistent with mild enterocolitis             HEART Risk Score    Flowsheet Row Most Recent Value   Heart Score Risk Calculator    History 1 Filed at: 05/11/2022 0320   ECG 0 Filed at: 05/11/2022 0320   Age 1 Filed at: 05/11/2022 0320   Risk Factors 1 Filed at: 05/11/2022 0320   Troponin 0 Filed at: 05/11/2022 0320   HEART Score 3 Filed at: 05/11/2022 0320              PERC Rule for PE    Flowsheet Row Most Recent Value   PERC Rule for PE    Age >=50 1 Filed at: 05/11/2022 0316   HR >=100 1 Filed at: 05/11/2022 0316   O2 Sat on room air < 95% 0 Filed at: 05/11/2022 0316   History of PE or DVT 0 Filed at: 05/11/2022 2986   Recent trauma or surgery 1 Filed at: 05/11/2022 0316   Hemoptysis 0 Filed at: 05/11/2022 0316   Exogenous estrogen 0 Filed at: 05/11/2022 0316   Unilateral leg swelling 0 Filed at: 05/11/2022 0316   PERC Rule for PE Results 3 Filed at: 05/11/2022 0316           Initial Sepsis Screening     Row Name 05/11/22 0320                Is the patient's history suggestive of a new or worsening infection? Yes (Proceed)  -YB        Suspected source of infection acute abdominal infection  -YB        Are two or more of the following signs & symptoms of infection both present and new to the patient? Yes (Proceed)  -YB        Indicate SIRS criteria Tachycardia > 90 bpm;Leukocytosis (WBC > [address]4220 IJL)  -YB        If the answer is yes to both questions, suspicion of sepsis is present --        If severe sepsis is present AND tissue hypoperfusion perists in the hour after fluid resuscitation or lactate > 4, the patient meets criteria for SEPTIC SHOCK --        Are any of the following organ dysfunction criteria present within 6 hours of suspected infection and SIRS criteria that are NOT considered to be chronic conditions?  No  -YB        Organ dysfunction --        Date of presentation of severe sepsis --        Time of presentation of severe sepsis --        Tissue hypoperfusion persists in the hour after crystalloid fluid administration, evidenced, by either: --        Was hypotension present within one hour of the conclusion of crystalloid fluid administration? --        Date of presentation of septic shock --        Time of presentation of septic shock --              User Key  (r) = Recorded By, (t) = Taken By, (c) = Cosigned By    [address] Name Provider Type    YB Maged Brown MD Resident                SBIRT 20yo+    Flowsheet Row Most Recent Value   SBIRT (25 yo +)    In order to provide better care to our patients, we are screening all of our patients for alcohol and drug use  Would it be okay to ask you these screening questions? Unable to answer at this time Filed at: 05/11/2022 1219          Wells' Criteria for PE    Flowsheet Row Most Recent Value   Wells' Criteria for PE    Clinical signs and symptoms of DVT 0 Filed at: 05/11/2022 8597   PE is primary diagnosis or equally likely 3 Filed at: 05/11/2022 0316   HR >100 1 5 Filed at: 05/11/2022 0316   Immobilization at least 3 days or Surgery in the previous 4 weeks 1 5 Filed at: 05/11/2022 0316   Previous, objectively diagnosed PE or DVT 0 Filed at: 05/11/2022 0316   Hemoptysis 0 Filed at: 05/11/2022 7634   Malignancy with treatment within 6 months or palliative 1 Filed at: 05/11/2022 0316   Wells' Criteria Total 7 Filed at: 05/11/2022 4770            MDM  Number of Diagnoses or Management Options  Diarrhea: new and requires workup  Enterocolitis: new and requires workup  Nausea and vomiting: new and requires workup  Sepsis [facility]): new and requires workup  Syncope and collapse: new and requires workup  Diagnosis management comments: Initial impression:  Differential for syncope includes vasovagal, cardiogenic, electrolyte abnormality, PE  Elevated risk of PE given recent cancer and recent surgery and tachycardic here  Electrolyte abnormality could potentially be due to diarrhea but less likely given that she only had her 1st episode immediately before the syncope  Does have possible source of from infection from an injury and possible intra-abdominal pathology given her pain and diarrhea  Initial work up:  Sepsis workup including blood cultures and lactic, ACS workup including EKG/troponin/chest x-ray, D-dimer    Final impression:  D-dimer was elevated but CTA of her chest did not show any pulmonary embolisms    CT did demonstrate enteric colitis which possible source of her elevated white blood cell count  Patient had persistent vomiting which required a 2nd dose of Zofran  Will have patient admitted for syncope evaluation and observation  Explain all this to patient who agreed with our assessment plan  Disposition  Final diagnoses:   Enterocolitis   Nausea and vomiting   Diarrhea   Syncope and collapse   Sepsis ([address] )     Time reflects when diagnosis was documented in both MDM as applicable and the Disposition within this note     Time User Action Codes Description Comment    5/11/2022  4:43 AM Litzy Stephen Add [K52 9] Enterocolitis     5/11/2022  4:43 AM Litzy Stephen Add [R11 2] Nausea and vomiting     5/11/2022  4:43 AM Litzy Stephen Add [R19 7] Diarrhea     5/11/2022  4:43 AM Litzy Stephen Add [R55] Syncope and collapse     5/11/2022  4:43 AM Litzy Stephen Modify [K52 9] Enterocolitis     5/11/2022  4:43 AM Litzy Stephen Modify [R55] Syncope and collapse     5/11/2022  4:43 AM Litzy Stephen Add [A41 9] Sepsis [facility])       ED Disposition     ED Disposition   Admit    Condition   Stable    Date/Time   Wed May 11, 2022  4:43 AM    Comment   Case was discussed with Deneen Matamoros and the patient's admission status was agreed to be Admission Status: observation status to the service of Dr Chau Solis   Follow-up Information    None         Current Discharge Medication List      CONTINUE these medications which have NOT CHANGED    Details   acetaminophen (TYLENOL) 500 mg tablet Take 500 mg by mouth every 6 (six) hours as needed for mild pain  albuterol (Ventolin HFA) 90 mcg/act inhaler Inhale 2 puffs every 6 (six) hours as needed for wheezing or shortness of breath  Qty: 8 g, Refills: 1    Comments: Substitution to a formulary equivalent within the same pharmaceutical class is authorized    Associated Diagnoses: Asthma, exercise induced      ALPRAZolam (XANAX) 0 5 mg tablet Take 0 5 mg by mouth daily at bedtime as needed for anxiety       Ascorbic Acid (VITAMIN C) 500 MG CAPS Take 500 mg by mouth in the morning        calcium-vitamin D (OSCAL) 250-125 MG-UNIT per tablet Take 2 tablets by mouth daily  Cholecalciferol (VITAMIN D) 2000 units CAPS Take by mouth in the morning        famotidine (PEPCID) 20 mg tablet Take 20 mg by mouth 2 (two) times a day as needed        Lactobacillus (ACIDOPHILUS PO) Take by mouth daily  Multiple Vitamin (MULTIVITAMIN) capsule Take 1 capsule by mouth daily  omeprazole (PriLOSEC) 20 mg delayed release capsule Take 20 mg by mouth as needed        tamoxifen (NOLVADEX) 20 mg tablet Take 1 tablet (20 mg total) by mouth daily  Qty: 90 tablet, Refills: 3    Associated Diagnoses: Malignant neoplasm of breast in female, estrogen receptor positive, unspecified laterality, unspecified site of breast (HCC)      zinc gluconate 50 mg tablet Take by mouth daily             No discharge procedures on file  PDMP Review       Value Time User    PDMP Reviewed  Yes 5/11/2022  5:12 AM Jessica Torrez [address]            ED Provider  Attending physically available and evaluated Jenifer Valle I managed the patient along with the ED Attending      Electronically Signed by         Trevor Mcgarry MD  05/11/22 5848

## 2022-05-11 NOTE — ASSESSMENT & PLAN NOTE
· Presents with abdominal pain, nausea, diarrhea  · CT AP: mild enterocolitis   · Stool studies to be collected  · IV abx: cipro and flagyl in ed  Continue rocephin, flagyl    · Pain control: bentyl, norco PRN  · Severe pruritis to other narcotics but tolerates norco without difficulty

## 2022-05-11 NOTE — SEPSIS NOTE
Sepsis Note   Freda Marinelli 49 y o  female MRN: [id number]  Unit/Bed#: ED 07 Encounter: [id number]       qSOFA     [address] Name 05/11/22 0222 05/11/22 0217 05/11/22 0203          Altered mental status GCS < 15 0 -- --      Respiratory Rate > / =22 -- -- 0      Systolic BP < / =026 -- 0 --      Q Sofa Score 0 0 --                 Initial Sepsis Screening     Row Name 05/11/22 0320                Is the patient's history suggestive of a new or worsening infection? Yes (Proceed)  -YB        Suspected source of infection acute abdominal infection  -YB        Are two or more of the following signs & symptoms of infection both present and new to the patient? Yes (Proceed)  -YB        Indicate SIRS criteria Tachycardia > 90 bpm;Leukocytosis (WBC > 79861 IJL)  -YB        If the answer is yes to both questions, suspicion of sepsis is present --        If severe sepsis is present AND tissue hypoperfusion perists in the hour after fluid resuscitation or lactate > 4, the patient meets criteria for SEPTIC SHOCK --        Are any of the following organ dysfunction criteria present within 6 hours of suspected infection and SIRS criteria that are NOT considered to be chronic conditions?  No  -YB        Organ dysfunction --        Date of presentation of severe sepsis --        Time of presentation of severe sepsis --        Tissue hypoperfusion persists in the hour after crystalloid fluid administration, evidenced, by either: --        Was hypotension present within one hour of the conclusion of crystalloid fluid administration? --        Date of presentation of septic shock --        Time of presentation of septic shock --              User Key  (r) = Recorded By, (t) = Taken By, (c) = Cosigned By    [address] Name Provider Type    TRE Coyle MD Resident

## 2022-05-11 NOTE — ED ATTENDING ATTESTATION
5/11/2022  I, Edna Lopez MD, saw and evaluated the patient  I have discussed the patient with the resident/non-physician practitioner and agree with the resident's/non-physician practitioner's findings, Plan of Care, and MDM as documented in the resident's/non-physician practitioner's note, except where noted  All available labs and Radiology studies were reviewed  I was present for key portions of any procedure(s) performed by the resident/non-physician practitioner and I was immediately available to provide assistance  At this point I agree with the current assessment done in the Emergency Department  I have conducted an independent evaluation of this patient a history and physical is as follows: Patient is a 49year old female with nausea and diarrhea and abdominal pain tonight and had syncope  No chest pain or sob  Pain radiated to back  No fever  No GI bleeding  Has had prior abdominal surgery  No travel  No raw meat, eggs, fish but was started on abx recently for cat bite  No urinary sx  Had surgery last month  Was last seen at [facility] ED on 11/26/19 for [facility] website checked on this patient and last Rx filled was on 2/22/22 for xanax for 30 day supply  Normocephalic  Mucous membranes somewhat moist  Lungs clear  Tachycardia without murmur  Abdomen soft with diffuse tenderness  No guarding or rebound  Good bowel sounds  No jaundice  No rash  (+) LE edema  Neuro intact  DDx including but not limited to: gastroenteritis, food poisoning, metabolic abnormality, dehydration, PHILLIP, mesenteric adenitis, appendicitis, IBD, IBS, ileus, bowel obstruction, toxic megacolon, colitis, enteritis, gastritis, PUD, GERD, hepatitis, pancreatitis, cholecystitis, biliary colic, choledocholithiasis, doubt splenic etiology; renal colic, pyelonephritis, UTI     Differential diagnosis including but not limited to: vasovagal syncope, cardiac arrhythmia, MI, ACS, PE, metabolic abnormality, intracranial process, seizure, anemia, GI bleed, dehydration  I reviewed EKG  Labs and CT to be ordered       ED Course         Critical Care Time  Procedures

## 2022-05-11 NOTE — PLAN OF CARE
Problem: GASTROINTESTINAL - ADULT  Goal: Minimal or absence of nausea and/or vomiting  Description: INTERVENTIONS:  - Administer IV fluids if ordered to ensure adequate hydration  - Maintain NPO status until nausea and vomiting are resolved  - Nasogastric tube if ordered  - Administer ordered antiemetic medications as needed  - Provide nonpharmacologic comfort measures as appropriate  - Advance diet as tolerated, if ordered  - Consider nutrition services referral to assist patient with adequate nutrition and appropriate food choices  Outcome: Progressing     Problem: GASTROINTESTINAL - ADULT  Goal: Maintains or returns to baseline bowel function  Description: INTERVENTIONS:  - Assess bowel function  - Encourage oral fluids to ensure adequate hydration  - Administer IV fluids if ordered to ensure adequate hydration  - Administer ordered medications as needed  - Encourage mobilization and activity  - Consider nutritional services referral to assist patient with adequate nutrition and appropriate food choices  Outcome: Progressing     Problem: GASTROINTESTINAL - ADULT  Goal: Maintains adequate nutritional intake  Description: INTERVENTIONS:  - Monitor percentage of each meal consumed  - Identify factors contributing to decreased intake, treat as appropriate  - Assist with meals as needed  - Monitor I&O, weight, and lab values if indicated  - Obtain nutrition services referral as needed  Outcome: Progressing     Problem: GASTROINTESTINAL - ADULT  Goal: Oral mucous membranes remain intact  Description: INTERVENTIONS  - Assess oral mucosa and hygiene practices  - Implement preventative oral hygiene regimen  - Implement oral medicated treatments as ordered  - Initiate Nutrition services referral as needed  Outcome: Progressing     Problem: METABOLIC, FLUID AND ELECTROLYTES - ADULT  Goal: Electrolytes maintained within normal limits  Description: INTERVENTIONS:  - Monitor labs and assess patient for signs and symptoms of electrolyte imbalances  - Administer electrolyte replacement as ordered  - Monitor response to electrolyte replacements, including repeat lab results as appropriate  - Instruct patient on fluid and nutrition as appropriate  Outcome: Progressing     Problem: PAIN - ADULT  Goal: Verbalizes/displays adequate comfort level or baseline comfort level  Description: Interventions:  - Encourage patient to monitor pain and request assistance  - Assess pain using appropriate pain scale  - Administer analgesics based on type and severity of pain and evaluate response  - Implement non-pharmacological measures as appropriate and evaluate response  - Consider cultural and social influences on pain and pain management  - Notify physician/advanced practitioner if interventions unsuccessful or patient reports new pain  Outcome: Progressing     Problem: DISCHARGE PLANNING  Goal: Discharge to home or other facility with appropriate resources  Description: INTERVENTIONS:  - Identify barriers to discharge w/patient and caregiver  - Arrange for needed discharge resources and transportation as appropriate  - Identify discharge learning needs (meds, wound care, etc )  - Arrange for interpretive services to assist at discharge as needed  - Refer to Case Management Department for coordinating discharge planning if the patient needs post-hospital services based on physician/advanced practitioner order or complex needs related to functional status, cognitive ability, or social support system  Outcome: Progressing     Problem: Knowledge Deficit  Goal: Patient/family/caregiver demonstrates understanding of disease process, treatment plan, medications, and discharge instructions  Description: Complete learning assessment and assess knowledge base    Interventions:  - Provide teaching at level of understanding  - Provide teaching via preferred learning methods  Outcome: Progressing

## 2022-05-11 NOTE — ASSESSMENT & PLAN NOTE
· POA with tachycardia, leukocytosis    Lactate normal  · Flu/COVID negative  · UA to be collected  · Coags to be collected  · Suspected source: enterocolitis

## 2022-05-11 NOTE — H&P
[address] [facility] 1965, 49 y o  female MRN: [id number]  Unit/Bed#: S -01 Encounter: [id number]  Primary Care Provider: Toan Lowe DO   Date and time admitted to hospital: 5/11/2022  1:58 AM    * Syncope  Assessment & Plan  · Likely vasovagal due to acute illness as below  · DDimer elevated, no PE   · Trend trop, monitor telemetry to rule out cardiac etiology  Stat ekg   · Check orthos   · IV fluids, replete magnesium    Sepsis ([address] )  Assessment & Plan  · POA with tachycardia, leukocytosis  Lactate normal  · Flu/COVID negative  · UA to be collected  · Coags to be collected  · Suspected source: enterocolitis     Enterocolitis  Assessment & Plan  · Presents with abdominal pain, nausea, diarrhea  · CT AP: mild enterocolitis   · Stool studies to be collected  · IV abx: cipro and flagyl in ed  Continue rocephin, flagyl  · Pain control: bentyl, norco PRN  · Severe pruritis to other narcotics but tolerates norco without difficulty     Cat bite of left hand  Assessment & Plan  · Started on amoxicillin x 10 days, hold while receiving rocephin     History of bilateral breast cancer  Assessment & Plan  · S/p mastectomy, radiation  · On taxmoxifen     VTE Pharmacologic Prophylaxis: VTE Score: 5 High Risk (Score >/= 5) - Pharmacological DVT Prophylaxis Ordered: enoxaparin (Lovenox)  Sequential Compression Devices Ordered  Code Status: Level 1 - Full Code   Discussion with family: Patient declined call to   Anticipated Length of Stay: Patient will be admitted on an observation basis with an anticipated length of stay of less than 2 midnights secondary to syncope  Total Time for Visit, including Counseling / Coordination of Care: 60 minutes Greater than 50% of this total time spent on direct patient counseling and coordination of care      Chief Complaint: syncope, abdominal pain    History of Present Illness:  Luis A Montalvo is a 49 y o  female with a PMH of breast ca s/p mastectomy/reconstruction/radiation on tamoxifen, BCC, asthma, GERD who presents with sudden onset of abdominal pain, nausea, diarrhea that began in the middle of the night  She reports syncopal event occurred shortly after bowel movement and was having significant pain at the time as well  No fevers, diet change, raw food  Currently on amoxicillin due to cat bite to left hand  Meets sepsis criteria  Admitted as observation  IV antibiotics, IV fluids         Review of Systems:  Review of Systems   Gastrointestinal: Positive for abdominal pain, diarrhea and nausea  Negative for abdominal distention, anal bleeding, blood in stool, constipation, rectal pain and vomiting  All other systems reviewed and are negative        Past Medical and Surgical History:   Past Medical History:   Diagnosis Date    Abnormal findings on diagnostic imaging of breast     Last Assessesd: 4/25/2014    Arthritis     Knees and feet    Cancer ([address] )     breast    Difficulty falling asleep     Gluteal abscess     Last Assessed: 12/30/2016    Limb alert care status     right    Pain in left foot     Pneumonia 2009    x1    PONV (postoperative nausea and vomiting)     Skin cancer        Past Surgical History:   Procedure Laterality Date    BASAL CELL CARCINOMA EXCISION N/A 1/7/2021    Procedure: EXCISION BASAL CELL CARCINOMA OF MID BACK;  Surgeon: Anna Finch MD;  [facility];  Service: Plastics    BASAL CELL CARCINOMA EXCISION Left 4/25/2022    Procedure: EXCISION OF BCC (X2) LEFT SHOULDER WITH COMPLEX CLOSURE;  Surgeon: Anna Finch MD;  [facility];  Service: Plastics    BREAST BIOPSY  05/02/2014    BREAST BIOPSY  05/2013    BREAST BIOPSY  05/2008    BREAST LUMPECTOMY Right 2008    BREAST RECONSTRUCTION Left 2/21/2019    Procedure: BREAST EXPANDER/IMPLANT EXCHANGE;  Surgeon: Anna Ficnh MD;  [facility];  Service: Plastics    BREAST SURGERY Bilateral 2008, 2013, 2014    Breast reconstruction with expanders  Total of 7 surgeries  7/3/14 right breast reconstruction revision  10/27/14 right breast reconstruction revision     BREAST SURGERY Left 07/24/2013    Excision of breast lesion    CAPSULOTOMY Left 2/21/2019    Procedure: CAPSULECTOMY;  Surgeon: Isaiah Concepcion MD;  [facility];  Service: Plastics    CHEST WALL BIOPSY Right 2/21/2019    Procedure: UPPER CHEST BASAL CELL CARSINOMA EXCISION;  Surgeon: Isaiah Concepcion MD;  [facility];  Service: Plastics    CLOSURE WOUND N/A 2/21/2019    Procedure: RIGHT upper chest & LEFT upper back complex closure;  Surgeon: Isaiah Concepcion MD;  [facility];  Service: Plastics    COLONOSCOPY      COMPLEX WOUND CLOSURE TO EXTREMITY N/A 1/7/2021    Procedure: COMPLEX CLOSURE MID BACK;  Surgeon: Isaiah Concepcion MD;  [facility];  Service: Plastics    FOOT ARTHRODESIS      Pantalar    KNEE ARTHROSCOPY Right 05/1997    LIPOSUCTION W/ FAT INJECTION Bilateral 10/24/2019    Procedure: AUTOLOGOUS FAT GRAFTING TO BILATERAL BREAST;  Surgeon: Isaiah Concepcion MD;  [facility];  Service: Plastics    LIPOSUCTION W/ FAT INJECTION Bilateral 1/7/2021    Procedure: AUTOLOGOUS FAT GRAFTING TO BILATERAL BREAST;  Surgeon: Isaiah Concepcion MD;  [facility];  Service: Plastics    MASTECTOMY Bilateral 06/06/2014    Lymph nodes removed bilaterally  States she may have IV's in left side   MOHS RECONSTRUCTION Right 3/7/2019    Procedure: RECONSTRUCTION MOHS DEFECT RIGHT NOSE/MEDIAL CANTHUS;  Surgeon: Isaiah Concepcion MD;  [facility];  Service: Plastics    MOHS SURGERY      Mohs Micrographic Surgery Face;  Last Assessed: 5/15/2015    DE ADJ TISS Raf Hyatt LID,NOS,EAR 10 1-30 SQCM Right 3/7/2019    Procedure: FLAP;  Surgeon: Isaiah Concepcion MD;  [facility];  Service: Plastics    DE BREAST RECONSTRUCTION W/LATISSIMUS DORSI FLAP Left 9/7/2017 Procedure: BREAST RECONSTRUCTION; LATISSIMUS DORSI FLAP; TISSUE EXPANDER;  Surgeon: Jill Bach MD;  [facility];  Service: Plastics    HI COLONOSCOPY FLX DX W/COLLJ [address] PFRMD N/A 8/1/2018    Procedure: COLONOSCOPY;  Surgeon: Scotty Melissa MD;  [facility];  Service: Gastroenterology    HI [place] W/SESMDC W/RESCJ PROX [address] [place] [address]  9/9/2016    Procedure: Robbie Lele MODIFIED ;  Surgeon: Deloris Hodgkins, DPM;  Location: [facility] Main [facility];  Service: Podiatry    HI EXC SKIN MALIG >4 CM TRUNK,ARM,LEG N/A 6/27/2019    Procedure: EXCISION BASAL CELL CARCINOMA MID CHEST;  Surgeon: Jill Bach MD;  [facility];  Service: Plastics    HI FULL THICK [facility] NOS,EAR,LID <20 SQCM Right 3/7/2019    Procedure: SKIN GRAFT FULL THICKNESS  (FTSG); Surgeon: Jill Bach MD;  [facility];  Service: Plastics    HI FUSION FOOT BONES,MIDTARSAL,MULTI Left 9/9/2016    Procedure: ARTHRODESIS FIRST [address] [facility], [place] [address] ;  Surgeon: Deloris Hodgkins, DPM;  [facility];  Service: Podiatry    HI HYSTEROSCOPY,W/ENDO BX N/A 1/7/2022    Procedure: DILATATION AND CURETTAGE (D&C) WITH HYSTEROSCOPY, POLYPECTOMY ;  Surgeon: Anirudh Luis MD;  [facility];  Service: Gynecology    HI INSERTION BREAST IMPLANT SAME DAY OF MASTECTOMY Left 9/7/2017    Procedure: TISSUE EXPANDER;  Surgeon: Jill Bach MD;  [facility];  Service: Plastics    HI OPEN [facility] Left 12/11/2019    Procedure: OPEN REDUCTION W/ INTERNAL FIXATION left distal humerus fracture, possible olecranon osteotomy;  Surgeon:  Albert Velasco MD;  [facility] MA[facility];  Service: Orthopedics    HI OSTEOTOMY HEEL BONE Left 9/9/2016    Procedure: OSTEOTOMY CALCANEUS WITH APPLICATION AND ACTIVATION OF BONE STIMULATOR ;  Surgeon: Deloris Hodgkins, DPM;  [facility];  Service: Podiatry    HI RECMPL WND HEAD,FAC,HAND 2 6-7 5 CM N/A 3/7/2019    Procedure: COMPLEX CLOSURE;  Surgeon: Yolanda Harp MD;  [facility];  Service: Plastics    AR RECMPL WND TRUNK 2 6-7 5 CM N/A 6/27/2019    Procedure: CLOSURE WOUND MID CHEST;  Surgeon: Yolanda Harp MD;  [facility];  Service: Plastics     [address] [facility] [address] TISSUE FOR GRAFT OTHR Bilateral 6/27/2019    Procedure: AUTOLOGOUS FAT GRAFTING BILATERAL BREASTS;  Surgeon: Yolanda Harp MD;  [facility];  Service: Plastics    AR REVISION OF RECONSTRUCTED BREAST Bilateral 6/27/2019    Procedure: BILATERAL BREAST MOUND REVISION;  Surgeon: Yolanda Harp MD;  [facility];  Service: Plastics    AR REVISION OF RECONSTRUCTED BREAST Bilateral 10/24/2019    Procedure: BILATERAL BREAST MOUND REVISION;  Surgeon: Yolanda Harp MD;  [facility];  Service: Plastics    AR REVISION OF RECONSTRUCTED BREAST Bilateral 1/7/2021    Procedure: BILATERAL BREAST MOUND REVISION;  Surgeon: Yolanda Harp MD;  [facility]: [facility];  Service: Plastics    SENTINEL LYMPH NODE BIOPSY Bilateral 06/06/2014    SENTINEL LYMPH NODE BIOPSY Right 2008    SKIN BIOPSY      SKIN LESION EXCISION Left 2/21/2019    Procedure: UPPER BACK [address] EXCISION;  Surgeon: Yolanda Harp MD;  [facility];  Service: Plastics    SQUAMOUS CELL CARCINOMA EXCISION N/A 3/7/2019    Procedure: EXCISION BCC LEFT FOREHEAD;  Surgeon: Yolanda Harp MD;  [facility];  Service: Plastics    WISDOM TOOTH EXTRACTION         Meds/Allergies:  Prior to Admission medications    Medication Sig Start Date End Date Taking? Authorizing Provider   acetaminophen (TYLENOL) 500 mg tablet Take 500 mg by mouth every 6 (six) hours as needed for mild pain      Historical Provider, MD   albuterol (Ventolin HFA) 90 mcg/act inhaler Inhale 2 puffs every 6 (six) hours as needed for wheezing or shortness of breath 12/21/21   Mikayla Rock PA-C   ALPRAZolam Jolene Dill) 0 5 mg tablet Take 0 5 mg by mouth daily at bedtime as needed for anxiety     Historical Provider, MD   Ascorbic Acid (VITAMIN C) 500 MG CAPS Take 500 mg by mouth in the morning      Historical Provider, MD   calcium-vitamin D (OSCAL) 250-125 MG-UNIT per tablet Take 2 tablets by mouth daily  Historical Provider, MD   Cholecalciferol (VITAMIN D) 2000 units CAPS Take by mouth in the morning      Historical Provider, MD   famotidine (PEPCID) 20 mg tablet Take 20 mg by mouth 2 (two) times a day as needed      Historical Provider, MD   Lactobacillus (ACIDOPHILUS PO) Take by mouth daily  Historical Provider, MD   Multiple Vitamin (MULTIVITAMIN) capsule Take 1 capsule by mouth daily  Historical Provider, MD   omeprazole (PriLOSEC) 20 mg delayed release capsule Take 20 mg by mouth as needed      Historical Provider, MD   tamoxifen (NOLVADEX) 20 mg tablet Take 1 tablet (20 mg total) by mouth daily 3/23/22   Leslie Ramirez MD   zinc gluconate 50 mg tablet Take by mouth daily      Historical Provider, MD     I have reviewed home medications with a medical source (PCP, Pharmacy, other)  Allergies:    Allergies   Allergen Reactions    Codeine Itching     Severe    Dilaudid [Hydromorphone Hcl] Itching     Severe    Hydromorphone Itching    Tramadol Itching    Other Rash     Adhesive tape       Social History:  Marital Status: /Civil Union   Occupation:   Patient Pre-hospital Living Situation: Home  Patient Pre-hospital Level of Mobility: walks  Patient Pre-hospital Diet Restrictions:   Substance Use History:   Social History     Substance and Sexual Activity   Alcohol Use Yes    Alcohol/week: 4 0 standard drinks    Types: 4 Glasses of wine per week    Comment: 4 weekly-wine     Social History     Tobacco Use   Smoking Status Never Smoker   Smokeless Tobacco Never Used     Social History     Substance and Sexual Activity   Drug Use No       Family History:  Family History   Adopted: Yes   Problem Relation Age of Onset    No Known Problems Mother     No Known Problems Father        Physical Exam:     Vitals:   Blood Pressure: 91/57 (05/11/22 0529)  Pulse: (!) 114 (05/11/22 0529)  Temperature: 98 2 °F (36 8 °C) (05/11/22 0529)  Temp Source: Oral (05/11/22 0217)  Respirations: 20 (05/11/22 0430)  SpO2: 96 % (05/11/22 0529)    Physical Exam  Constitutional:       General: She is not in acute distress  Appearance: Normal appearance  She is not ill-appearing  Comments: Appears uncomfortable   HENT:      Head: Normocephalic and atraumatic  Right Ear: External ear normal       Left Ear: External ear normal       Nose: Nose normal       Mouth/Throat:      Mouth: Mucous membranes are dry  Eyes:      General: No scleral icterus  Extraocular Movements: Extraocular movements intact  Conjunctiva/sclera: Conjunctivae normal    Cardiovascular:      Rate and Rhythm: Regular rhythm  Tachycardia present  Pulses: Normal pulses  Heart sounds: Normal heart sounds  Pulmonary:      Effort: Pulmonary effort is normal       Breath sounds: Normal breath sounds  Abdominal:      General: Bowel sounds are normal  There is no distension  Palpations: Abdomen is soft  Tenderness: There is abdominal tenderness (generalized)  There is no right CVA tenderness, left CVA tenderness, guarding or rebound  Musculoskeletal:         General: Normal range of motion  Cervical back: Normal range of motion  No rigidity  Skin:     General: Skin is warm and dry  Capillary Refill: Capillary refill takes less than 2 seconds  Comments: Scabs to left hand from cat bite  Minimal erythema, no drainage  No streaking  Neurological:      General: No focal deficit present  Mental Status: She is alert and oriented to person, place, and time     Psychiatric:         Mood and Affect: Mood normal          Behavior: Behavior normal           Additional Data:     Lab Results:  Results from last 7 days   Lab Units 05/11/22  8642 WBC Thousand/uL 12 36*   HEMOGLOBIN g/dL 13 2   HEMATOCRIT % 41 1   PLATELETS Thousands/uL 190   NEUTROS PCT % 87*   LYMPHS PCT % 10*   MONOS PCT % 3*   EOS PCT % 0     Results from last 7 days   Lab Units 05/11/22  0236   SODIUM mmol/L 141   POTASSIUM mmol/L 3 6   CHLORIDE mmol/L 107   CO2 mmol/L 26   BUN mg/dL 14   CREATININE mg/dL 0 79   ANION GAP mmol/L 8   CALCIUM mg/dL 8 8   ALBUMIN g/dL 4 0   TOTAL BILIRUBIN mg/dL 0 35   ALK PHOS U/L 50   ALT U/L 12   AST U/L 18   GLUCOSE RANDOM mg/dL 102     Results from last 7 days   Lab Units 05/11/22  0236   INR  0 87     Results from last 7 days   Lab Units 05/11/22  0252   POC GLUCOSE mg/dl 102         Results from last 7 days   Lab Units 05/11/22  0236   LACTIC ACID mmol/L 1 0       Imaging: Reviewed radiology reports from this admission including: chest CT scan and abdominal/pelvic CT  PE Study with CT Abdomen and Pelvis with contrast   Final Result by Rita Sanders MD (05/11 0421)      No evidence of pulmonary embolus  Findings consistent with mild enterocolitis                     Workstation performed: [id number]         XR chest 1 view portable   ED Interpretation by Smita Hung MD (05/11 0310)   No acute cardiopulmonary disease          EKG and Other Studies Reviewed on Admission:   · EKG: to be obtained  ** Please Note: This note has been constructed using a voice recognition system   **

## 2022-05-11 NOTE — ASSESSMENT & PLAN NOTE
· Likely vasovagal due to acute illness as below  · DDimer elevated, no PE   · Trend trop, monitor telemetry to rule out cardiac etiology    Stat ekg   · Check orthos   · IV fluids, replete magnesium

## 2022-05-12 LAB
ANION GAP SERPL CALCULATED.3IONS-SCNC: 4 MMOL/L (ref 4–13)
BASOPHILS # BLD AUTO: 0.01 THOUSANDS/ΜL (ref 0–0.1)
BASOPHILS NFR BLD AUTO: 0 % (ref 0–1)
BUN SERPL-MCNC: 5 MG/DL (ref 5–25)
CALCIUM SERPL-MCNC: 8.4 MG/DL (ref 8.4–10.2)
CAMPYLOBACTER DNA SPEC NAA+PROBE: NORMAL
CHLORIDE SERPL-SCNC: 108 MMOL/L (ref 96–108)
CO2 SERPL-SCNC: 29 MMOL/L (ref 21–32)
CREAT SERPL-MCNC: 0.55 MG/DL (ref 0.6–1.3)
EOSINOPHIL # BLD AUTO: 0.01 THOUSAND/ΜL (ref 0–0.61)
EOSINOPHIL NFR BLD AUTO: 0 % (ref 0–6)
ERYTHROCYTE [DISTWIDTH] IN BLOOD BY AUTOMATED COUNT: 12.2 % (ref 11.6–15.1)
GFR SERPL CREATININE-BSD FRML MDRD: 106 ML/MIN/1.73SQ M
GLUCOSE SERPL-MCNC: 98 MG/DL (ref 65–140)
HCT VFR BLD AUTO: 36.5 % (ref 34.8–46.1)
HGB BLD-MCNC: 11.8 G/DL (ref 11.5–15.4)
IMM GRANULOCYTES # BLD AUTO: 0 THOUSAND/UL (ref 0–0.2)
IMM GRANULOCYTES NFR BLD AUTO: 0 % (ref 0–2)
LYMPHOCYTES # BLD AUTO: 0.88 THOUSANDS/ΜL (ref 0.6–4.47)
LYMPHOCYTES NFR BLD AUTO: 20 % (ref 14–44)
MAGNESIUM SERPL-MCNC: 1.8 MG/DL (ref 1.9–2.7)
MCH RBC QN AUTO: 29.6 PG (ref 26.8–34.3)
MCHC RBC AUTO-ENTMCNC: 32.3 G/DL (ref 31.4–37.4)
MCV RBC AUTO: 92 FL (ref 82–98)
MONOCYTES # BLD AUTO: 0.33 THOUSAND/ΜL (ref 0.17–1.22)
MONOCYTES NFR BLD AUTO: 7 % (ref 4–12)
NEUTROPHILS # BLD AUTO: 3.28 THOUSANDS/ΜL (ref 1.85–7.62)
NEUTS SEG NFR BLD AUTO: 73 % (ref 43–75)
NRBC BLD AUTO-RTO: 0 /100 WBCS
PLATELET # BLD AUTO: 149 THOUSANDS/UL (ref 149–390)
PMV BLD AUTO: 12.1 FL (ref 8.9–12.7)
POTASSIUM SERPL-SCNC: 3.3 MMOL/L (ref 3.5–5.3)
RBC # BLD AUTO: 3.98 MILLION/UL (ref 3.81–5.12)
SALMONELLA DNA SPEC QL NAA+PROBE: NORMAL
SHIGA TOXIN STX GENE SPEC NAA+PROBE: NORMAL
SHIGELLA DNA SPEC QL NAA+PROBE: NORMAL
SODIUM SERPL-SCNC: 141 MMOL/L (ref 135–147)
WBC # BLD AUTO: 4.51 THOUSAND/UL (ref 4.31–10.16)

## 2022-05-12 PROCEDURE — 83735 ASSAY OF MAGNESIUM: CPT | Performed by: NURSE PRACTITIONER

## 2022-05-12 PROCEDURE — 80048 BASIC METABOLIC PNL TOTAL CA: CPT | Performed by: NURSE PRACTITIONER

## 2022-05-12 PROCEDURE — 99232 SBSQ HOSP IP/OBS MODERATE 35: CPT | Performed by: HOSPITALIST

## 2022-05-12 PROCEDURE — 85025 COMPLETE CBC W/AUTO DIFF WBC: CPT | Performed by: NURSE PRACTITIONER

## 2022-05-12 RX ORDER — POTASSIUM CHLORIDE 29.8 MG/ML
40 INJECTION INTRAVENOUS ONCE
Status: DISCONTINUED | OUTPATIENT
Start: 2022-05-12 | End: 2022-05-12

## 2022-05-12 RX ORDER — POTASSIUM CHLORIDE 14.9 MG/ML
20 INJECTION INTRAVENOUS
Status: COMPLETED | OUTPATIENT
Start: 2022-05-12 | End: 2022-05-13

## 2022-05-12 RX ORDER — LOPERAMIDE HCL 1 MG/7.5ML
2 SUSPENSION ORAL EVERY 4 HOURS PRN
Status: DISCONTINUED | OUTPATIENT
Start: 2022-05-12 | End: 2022-05-13 | Stop reason: HOSPADM

## 2022-05-12 RX ORDER — MAGNESIUM SULFATE HEPTAHYDRATE 40 MG/ML
2 INJECTION, SOLUTION INTRAVENOUS ONCE
Status: COMPLETED | OUTPATIENT
Start: 2022-05-12 | End: 2022-05-12

## 2022-05-12 RX ADMIN — DICYCLOMINE HYDROCHLORIDE 10 MG: 10 CAPSULE ORAL at 16:16

## 2022-05-12 RX ADMIN — POTASSIUM CHLORIDE 20 MEQ: 14.9 INJECTION, SOLUTION INTRAVENOUS at 16:17

## 2022-05-12 RX ADMIN — ACETAMINOPHEN 488 MG: 325 TABLET ORAL at 07:53

## 2022-05-12 RX ADMIN — CHOLECALCIFEROL TAB 25 MCG (1000 UNIT) 2000 UNITS: 25 TAB at 07:54

## 2022-05-12 RX ADMIN — Medication 250 MG: at 07:54

## 2022-05-12 RX ADMIN — DICYCLOMINE HYDROCHLORIDE 10 MG: 10 CAPSULE ORAL at 05:53

## 2022-05-12 RX ADMIN — TAMOXIFEN CITRATE 20 MG: 10 TABLET, FILM COATED ORAL at 07:59

## 2022-05-12 RX ADMIN — DICYCLOMINE HYDROCHLORIDE 10 MG: 10 CAPSULE ORAL at 23:42

## 2022-05-12 RX ADMIN — LOPERAMIDE HCL 2 MG: 1 SOLUTION ORAL at 20:29

## 2022-05-12 RX ADMIN — Medication 250 MG: at 18:19

## 2022-05-12 RX ADMIN — DICYCLOMINE HYDROCHLORIDE 10 MG: 10 CAPSULE ORAL at 11:03

## 2022-05-12 RX ADMIN — POTASSIUM CHLORIDE 20 MEQ: 14.9 INJECTION, SOLUTION INTRAVENOUS at 18:18

## 2022-05-12 RX ADMIN — Medication 2 TABLET: at 11:03

## 2022-05-12 RX ADMIN — LOPERAMIDE HCL 2 MG: 1 SOLUTION ORAL at 16:16

## 2022-05-12 RX ADMIN — OXYCODONE HYDROCHLORIDE AND ACETAMINOPHEN 500 MG: 500 TABLET ORAL at 08:04

## 2022-05-12 RX ADMIN — MAGNESIUM SULFATE HEPTAHYDRATE 2 G: 40 INJECTION, SOLUTION INTRAVENOUS at 16:17

## 2022-05-12 RX ADMIN — B-COMPLEX W/ C & FOLIC ACID TAB 1 TABLET: TAB at 07:54

## 2022-05-12 RX ADMIN — METRONIDAZOLE 500 MG: 500 TABLET ORAL at 13:30

## 2022-05-12 RX ADMIN — CEFTRIAXONE 1000 MG: 1 INJECTION, POWDER, FOR SOLUTION INTRAMUSCULAR; INTRAVENOUS at 06:50

## 2022-05-12 RX ADMIN — METRONIDAZOLE 500 MG: 500 TABLET ORAL at 05:53

## 2022-05-12 NOTE — PLAN OF CARE
Problem: Potential for Falls  Goal: Patient will remain free of falls  Description: INTERVENTIONS:  - Educate patient/family on patient safety including physical limitations  - Instruct patient to call for assistance with activity   - Consult OT/PT to assist with strengthening/mobility   - Keep Call bell within reach  - Keep bed low and locked with side rails adjusted as appropriate  - Keep care items and personal belongings within reach  - Initiate and maintain comfort rounds  - Make Fall Risk Sign visible to staff  - Offer Toileting every  Hours, in advance of need  - Initiate/Maintain alarm  - Obtain necessary fall risk management equipment  - Apply yellow socks and bracelet for high fall risk patients  - Consider moving patient to room near nurses station  Outcome: Progressing     Problem: GASTROINTESTINAL - ADULT  Goal: Minimal or absence of nausea and/or vomiting  Description: INTERVENTIONS:  - Administer IV fluids if ordered to ensure adequate hydration  - Maintain NPO status until nausea and vomiting are resolved  - Nasogastric tube if ordered  - Administer ordered antiemetic medications as needed  - Provide nonpharmacologic comfort measures as appropriate  - Advance diet as tolerated, if ordered  - Consider nutrition services referral to assist patient with adequate nutrition and appropriate food choices  Outcome: Progressing  Goal: Maintains or returns to baseline bowel function  Description: INTERVENTIONS:  - Assess bowel function  - Encourage oral fluids to ensure adequate hydration  - Administer IV fluids if ordered to ensure adequate hydration  - Administer ordered medications as needed  - Encourage mobilization and activity  - Consider nutritional services referral to assist patient with adequate nutrition and appropriate food choices  Outcome: Progressing  Goal: Maintains adequate nutritional intake  Description: INTERVENTIONS:  - Monitor percentage of each meal consumed  - Identify factors contributing to decreased intake, treat as appropriate  - Assist with meals as needed  - Monitor I&O, weight, and lab values if indicated  - Obtain nutrition services referral as needed  Outcome: Progressing  Goal: Oral mucous membranes remain intact  Description: INTERVENTIONS  - Assess oral mucosa and hygiene practices  - Implement preventative oral hygiene regimen  - Implement oral medicated treatments as ordered  - Initiate Nutrition services referral as needed  Outcome: Progressing     Problem: METABOLIC, FLUID AND ELECTROLYTES - ADULT  Goal: Electrolytes maintained within normal limits  Description: INTERVENTIONS:  - Monitor labs and assess patient for signs and symptoms of electrolyte imbalances  - Administer electrolyte replacement as ordered  - Monitor response to electrolyte replacements, including repeat lab results as appropriate  - Instruct patient on fluid and nutrition as appropriate  Outcome: Progressing     Problem: PAIN - ADULT  Goal: Verbalizes/displays adequate comfort level or baseline comfort level  Description: Interventions:  - Encourage patient to monitor pain and request assistance  - Assess pain using appropriate pain scale  - Administer analgesics based on type and severity of pain and evaluate response  - Implement non-pharmacological measures as appropriate and evaluate response  - Consider cultural and social influences on pain and pain management  - Notify physician/advanced practitioner if interventions unsuccessful or patient reports new pain  Outcome: Progressing     Problem: DISCHARGE PLANNING  Goal: Discharge to home or other facility with appropriate resources  Description: INTERVENTIONS:  - Identify barriers to discharge w/patient and caregiver  - Arrange for needed discharge resources and transportation as appropriate  - Identify discharge learning needs (meds, wound care, etc )  - Arrange for interpretive services to assist at discharge as needed  - Refer to Case Management Department for coordinating discharge planning if the patient needs post-hospital services based on physician/advanced practitioner order or complex needs related to functional status, cognitive ability, or social support system  Outcome: Progressing     Problem: Knowledge Deficit  Goal: Patient/family/caregiver demonstrates understanding of disease process, treatment plan, medications, and discharge instructions  Description: Complete learning assessment and assess knowledge base    Interventions:  - Provide teaching at level of understanding  - Provide teaching via preferred learning methods  Outcome: Progressing

## 2022-05-12 NOTE — PROGRESS NOTES
[facility] Hospitalist Service Attending Physician - Progress Note      Date Evaluated: 5/12/22  Time Evaluated: 1030am    Subjective / HPI: patient reports 10-12 bms in the last 24 hrs  She reports that she has been hydrating herself but continues to have nonbloody diarrhea  Wants to try some solid food  Exam:   GENERAL APPEARANCE: The patient is a [49-year-old well-developed, well-nourished female in no acute distress  HEENT: Normocephalic  NECK: Supple  Trachea is midline     ABDOMEN: Soft, nt  EXTREMITIES: No cyanosis, clubbing, or edema  NEUROLOGIC: No focal sensory or motor deficits are noted  Gait is normal  Cranial nerves II through XII are intact  Deep tendon reflexes are intact  PSYCHIATRIC: The patient is awake, alert, and oriented x3  Recent and remote memory is intact  Appropriate mood and affect  SKIN: Warm, dry, and well perfused  Good turgor  No lesions, nodules or rashes are noted  No onychomycosis  LYMPHATICS: No cervical, axillary, or groin adenopathy is noted  Assessment and Plan:    Enterocolitis - cdiff negative, enteric bacterial panel is negative  Will stop abx  Cont supportive care with iv hydration  Will need to monitor overnight given number and volume of diarrhea  Cat bite - wound looks clean and dry, no evidence of infection  Syncope - appears to be vasovagal, no further episodes  Will dc telemetry    Patient Centered Rounds: I have performed bedside rounds with nursing staff today  Discussions with Specialists or Other Care Team Provider:     Education and Discussions with Family / Patient: patient    Time Spent for Care: 30 minutes  More than 50% of total time spent on counseling and coordination of care as described above      Current Length of Stay: 0 day(s)    Current Patient Status: Observation   Certification Statement: The patient will continue to require additional inpatient hospital stay due to severe diarrhea      VTE Pharmacologic Prophylaxis: VTE Score: 5 High Risk (Score >/= 5) - Pharmacological DVT Prophylaxis Ordered: enoxaparin (Lovenox)  Sequential Compression Devices Ordered  Current Length of Stay: 0 day(s)  Current Patient Status: Observation   Certification Statement: The patient will continue to require additional inpatient hospital stay due to severe diarrhea  Discharge Plan: Anticipate discharge tomorrow to home  Code Status: Level 1 - Full Code        Labs:  Results from last 7 days   Lab Units 05/12/22  0513   WBC Thousand/uL 4 51   HEMOGLOBIN g/dL 11 8   HEMATOCRIT % 36 5   PLATELETS Thousands/uL 149   NEUTROS PCT % 73   LYMPHS PCT % 20   MONOS PCT % 7   EOS PCT % 0     Results from last 7 days   Lab Units 05/12/22  0513 05/11/22  0236   SODIUM mmol/L 141 141   POTASSIUM mmol/L 3 3* 3 6   CHLORIDE mmol/L 108 107   CO2 mmol/L 29 26   BUN mg/dL 5 14   CREATININE mg/dL 0 55* 0 79   ANION GAP mmol/L 4 8   CALCIUM mg/dL 8 4 8 8   ALBUMIN g/dL  --  4 0   TOTAL BILIRUBIN mg/dL  --  0 35   ALK PHOS U/L  --  50   ALT U/L  --  12   AST U/L  --  18   GLUCOSE RANDOM mg/dL 98 102     Results from last 7 days   Lab Units 05/11/22  0236   INR  0 87     Results from last 7 days   Lab Units 05/11/22  0252   POC GLUCOSE mg/dl 102         Results from last 7 days   Lab Units 05/11/22  0236   LACTIC ACID mmol/L 1 0   PROCALCITONIN ng/ml <0 05       Lines/Drains:  Invasive Devices  Report    Peripheral Intravenous Line  Duration           Peripheral IV 05/11/22 Right Antecubital 1 day                      Imaging: Reviewed radiology reports from this admission including: abdominal/pelvic CT    Recent Cultures (last 7 days):   Results from last 7 days   Lab Units 05/11/22  0516 05/11/22  0243 05/11/22  0236   BLOOD CULTURE   --  No Growth at 24 hrs  No Growth at 24 hrs     C DIFF TOXIN B BY PCR  Negative  --   --        Last 24 Hours Medication List:   Current Facility-Administered Medications   Medication Dose Route Frequency Provider Last Rate    acetaminophen  488 mg Oral Q6H PRN Debbie Officer, CRNP      ALPRAZolam  0 5 mg Oral HS PRN Debbie Officer, CRNP      ascorbic acid  500 mg Oral Daily Debbie Officer, CRNP      calcium carbonate-vitamin D  2 tablet Oral Daily With Lunch Debbie Officer, CRNP      cholecalciferol  2,000 Units Oral Daily Debbie Officer, [address]      dicyclomine  10 mg Oral 4x Daily [facility]) Debbie Officer, CRNP      enoxaparin  40 mg Subcutaneous Daily Debbie Officer, CRNP      famotidine  20 mg Oral BID PRN Debbie Officer, CRNP      HYDROcodone-acetaminophen  1 tablet Oral Q6H PRN Debbie Officer, CRNP      multivitamin stress formula  1 tablet Oral Daily Debbie Officer, CRNP      ondansetron  4 mg Intravenous Q8H PRN Debbie Officer, CRNP      saccharomyces boulardii  250 mg Oral BID Debbie Officer, CRNP      tamoxifen  20 mg Oral Daily Debbie Officer, CRNP          Today, Patient Was Seen By: Antonieta Siegel MD    **Please Note: This note may have been constructed using a voice recognition system  **

## 2022-05-12 NOTE — UTILIZATION REVIEW
Initial Clinical Review  OBSERVATION  5/11/22 @0444  CONVERTED TO INPATIENT ADMISSION 5/12/22 @1548DUE TO CONTINUED STAY REQUIRED TO EVALUATE AND TREAT PATIENT WITH ENTEROCOLITIS  WITH  DIET  ADVANCEMENT, MONITORING BM'S  Admission: Date/Time/Statement:   Admission Orders (From admission, onward)     Ordered        05/12/22 1548  Inpatient Admission  Once            05/11/22 0444  Place in Observation  Once                      Orders Placed This Encounter   Procedures    Inpatient Admission     Standing Status:   Standing     Number of Occurrences:   1     Order Specific Question:   Level of Care     Answer:   Med Surg [16]     Order Specific Question:   Estimated length of stay     Answer:   More than 2 Midnights     Order Specific Question:   Certification     Answer:   I certify that inpatient services are medically necessary for this patient for a duration of greater than two midnights  See H&P and MD Progress Notes for additional information about the patient's course of treatment  ED Arrival Information     Expected   -    Arrival   5/11/2022 01:56    Acuity   Emergent            Means of arrival   Walk-In    Escorted by   Family Member    Service   Hospitalist    Admission type   Emergency            Arrival complaint   LOC, -thinners, -headstrike, Abd Pain, Vomiting           Chief Complaint   Patient presents with    Abdominal Pain     Pt states she woke up with sharp shooting pains across midsection and into back, had one episode of diarrhea and had one syncopal episode, +nausea, onset of symptoms tonight       Initial Presentation: 49 y o  female PMH of breast ca s/p mastectomy/reconstruction/radiation on tamoxifen, BCC, asthma, GERD, on Amoxicillin for recent cat bite L hand who presents to ED from home with sudden onset of abdominal pain, nausea, diarrhea that began in the middle of the night with syncopal episode shortly after having bm    On exam, has generalized abdom tenderness,abdomen soft, non distended, normal bowel sounds,   is tachycardic, dry mucous membranes, has minimal erythema w/o drainage, streaking L hand with scabs noted   Alert, oriented , GCS 15   Labs- WBC 12 36, mag 1 8,HS troponin 4  CT A/P consistent w/ enterocolitis   Pt given IVF, IV antiemetics, IV abx in ED  Pt admitted as OBS to telemetry with syncope, sepsis, enterocolitis  Plan - telemetry, trend troponin, obtain ECG, check orthostatics, IVF, replete mag, obtain UA, stool studies   IV abx- Rocephin and flagyl, pain control w/ bentyl scheduld, norco prn    Date: 5/12 Convert to IP   No further syncopal episodes, telemetry d /c'ed  Abdomen soft, non tender, has 10-12watery bm's last 24 hrs  Pt reports staying hydrated   Pt would like to try solid food   Monitor bm's   C diff neg,  stool enteric panel neg   IV and po abx d/c'ed   Pt started on imodium    Wound from cat bite clean and dry w/o signs infection  K 3 3 and mag 1 8 today, potassium and mag repletion ordered   Date 5/13 day 2  Pt denies pain, abdomen soft, tender  Bowel sounds present   Pt had diarrhea this am, and watery bm last evening per nursing notes   Imodium x2 last evening   Pt afebrile, VSS     ED Triage Vitals   Temperature Pulse Respirations Blood Pressure SpO2   05/11/22 0217 05/11/22 0203 05/11/22 0203 05/11/22 0217 05/11/22 0203   98 9 °F (37 2 °C) (!) 119 20 125/76 99 %      Temp Source Heart Rate Source Patient Position - Orthostatic VS BP Location FiO2 (%)   05/11/22 0217 05/11/22 0203 05/11/22 0203 05/11/22 0203 --   Oral Monitor Sitting Right arm       Pain Score       05/11/22 0515       3          Wt Readings from Last 1 Encounters:   04/19/22 79 4 kg (175 lb)     Additional Vital Signs:   Date/Time Temp Pulse Resp BP MAP (mmHg) SpO2   05/13/22 07:08:25 98 7 °F (37 1 °C) 67 18 121/73 89 99 %   05/12/22 22:09:15 99 °F (37 2 °C) 86 16 143/65 91 97 %   05/12/22 15:19:08 98 5 °F (36 9 °C) 85 -- 121/92 102 98 %   05/12/22 07:57:52 98 5 °F (36 9 °C) 92 -- 121/87 98 97 %       Date/Time Temp Pulse Resp BP MAP (mmHg) SpO2   05/11/22 22:32:41 99 4 °F (37 4 °C) 108 Abnormal  -- 111/74 86 95 %   05/11/22 1651 -- -- -- -- -- 97 %   05/11/22 15:43:42 99 8 °F (37 7 °C) 115 Abnormal  -- 92/71 78 98 %   05/11/22 07:27:42 -- 133 Abnormal  -- 114/69 84 98 %   05/11/22 05:29:34 98 2 °F (36 8 °C) 114 Abnormal  -- 91/57 68 96 %   05/11/22 0430 -- 119 Abnormal  20 119/60 82 95 %   05/11/22 0300 -- 110 Abnormal  -- -- -- 93 %     Date and Time Eye Opening Best Verbal Response Best Motor Response [place] Coma Scale Score   05/12/22 2040 4 5 6 15       Date and Time Eye Opening Best Verbal Response Best Motor Response [place] Coma Scale Score   05/11/22 1651 4 5 6 15   05/11/22 0515 4 5 6 15   05/11/22 0222 4 5 6 15         Pertinent Labs/Diagnostic Test Results:   PE Study with CT Abdomen and Pelvis with contrast   Final Result by Poncho Krishnan MD (05/11 0421)      No evidence of pulmonary embolus  Findings consistent with mild enterocolitis                     Workstation performed: [id number]         XR chest 1 view portable   ED Interpretation by Marcos Fernandes MD (05/11 0310)   No acute cardiopulmonary disease      Final Result by Francis Swanson MD (05/11 5614)      No acute cardiopulmonary disease                    Workstation performed: [id number]         5/11   ECG #1-Sinus tachycardia 110  ECG #2-Sinus tachycardia 108    Results from last 7 days   Lab Units 05/11/22  0243   SARS-COV-2  Negative     Results from last 7 days   Lab Units 05/12/22  0513 05/11/22  0236   WBC Thousand/uL 4 51 12 36*   HEMOGLOBIN g/dL 11 8 13 2   HEMATOCRIT % 36 5 41 1   PLATELETS Thousands/uL 149 190   NEUTROS ABS Thousands/µL 3 28 10 71*         Results from last 7 days   Lab Units 05/12/22  0513 05/11/22  0236   SODIUM mmol/L 141 141   POTASSIUM mmol/L 3 3* 3 6   CHLORIDE mmol/L 108 107   CO2 mmol/L 29 26   ANION GAP mmol/L 4 8   BUN mg/dL 5 14   CREATININE mg/dL 0 55* 0 79 EGFR ml/min/1 73sq m 106 85   CALCIUM mg/dL 8 4 8 8   MAGNESIUM mg/dL 1 8* 1 8*     Results from last 7 days   Lab Units 05/11/22  0236   AST U/L 18   ALT U/L 12   ALK PHOS U/L 50   TOTAL PROTEIN g/dL 6 9   ALBUMIN g/dL 4 0   TOTAL BILIRUBIN mg/dL 0 35     Results from last 7 days   Lab Units 05/11/22  0252   POC GLUCOSE mg/dl 102     Results from last 7 days   Lab Units 05/12/22  0513 05/11/22  0236   GLUCOSE RANDOM mg/dL 98 102               Results from last 7 days   Lab Units 05/11/22  0236   HS TNI 0HR ng/L 4     Results from last 7 days   Lab Units 05/11/22  0236   D-DIMER QUANTITATIVE ug/ml FEU 0 71*     Results from last 7 days   Lab Units 05/11/22  0236   PROTIME seconds 11 9   INR  0 87   PTT seconds 32         Results from last 7 days   Lab Units 05/11/22  0236   PROCALCITONIN ng/ml <0 05     Results from last 7 days   Lab Units 05/11/22  0236   LACTIC ACID mmol/L 1 0                         Results from last 7 days   Lab Units 05/11/22  0236   LIPASE u/L 21                 Results from last 7 days   Lab Units 05/11/22  1402   CLARITY UA  Clear   COLOR UA  Light Yellow   SPEC GRAV UA  <=1 005   PH UA  5 5   GLUCOSE UA mg/dl Negative   KETONES UA mg/dl Negative   BLOOD UA  Negative   PROTEIN UA mg/dl Negative   NITRITE UA  Negative   BILIRUBIN UA  Negative   UROBILINOGEN UA E U /dl 0 2   LEUKOCYTES UA  Negative     Results from last 7 days   Lab Units 05/11/22  0243   INFLUENZA A PCR  Negative   INFLUENZA B PCR  Negative   RSV PCR  Negative                 Results from last 7 days   Lab Units 05/11/22  0516   C DIFF TOXIN B BY PCR  Negative     Results from last 7 days   Lab Units 05/11/22  0516   SALMONELLA SP PCR  None Detected   SHIGELLA SP/ENTEROINVASIVE E  COLI (EIEC)  None Detected   CAMPYLOBACTER SP (JEJUNI AND COLI)  None Detected   SHIGA TOXIN 1/SHIGA TOXIN 2  None Detected         Results from last 7 days   Lab Units 05/11/22  0243 05/11/22  0236   BLOOD CULTURE  No Growth at 48 hrs   No Growth at 48 hrs  ED Treatment:   Medication Administration from 05/11/2022 0156 to 05/11/2022 0519       Date/Time Order Dose Route Action     05/11/2022 0233 sodium chloride 0 9 % bolus 500 mL 500 mL Intravenous New Bag     05/11/2022 0234 ondansetron (ZOFRAN) injection 4 mg 4 mg Intravenous Given     05/11/2022 0409 iohexol (OMNIPAQUE) 350 MG/ML injection (SINGLE-DOSE) 100 mL 65 mL Intravenous Given     05/11/2022 0426 ondansetron (ZOFRAN) injection 4 mg 4 mg Intravenous Given     05/11/2022 0445 metroNIDAZOLE (FLAGYL) IVPB (premix) 500 mg 100 mL 500 mg Intravenous New Bag        Past Medical History:   Diagnosis Date    Abnormal findings on diagnostic imaging of breast     Last Assessesd: 4/25/2014    Arthritis     Knees and feet    Cancer (HCC)     breast    Difficulty falling asleep     Gluteal abscess     Last Assessed: 12/30/2016    Limb alert care status     right    Pain in left foot     Pneumonia 2009    x1    PONV (postoperative nausea and vomiting)     Skin cancer      Present on Admission:  **None**      Admitting Diagnosis: Syncope and collapse [R55]  Diarrhea [R19 7]  Enterocolitis [K52 9]  Abdominal pain [R10 9]  Nausea and vomiting [R11 2]  Sepsis ([address] ) [A41 9]  Age/Sex: 49 y o  female  Admission Orders:  Scheduled Medications:  ascorbic acid, 500 mg, Oral, Daily  calcium carbonate-vitamin D, 2 tablet, Oral, Daily With Lunch  cefTRIAXone, 1,000 mg, Intravenous, [id number]: 05/12/22 1543  cholecalciferol, 2,000 Units, Oral, Daily  dicyclomine, 10 mg, Oral, 4x Daily (AC & HS)  enoxaparin, 40 mg, Subcutaneous, Daily  metroNIDAZOLE, 500 mg, Oral, Q8H SCHEnd: 05/12/22 1543  multivitamin stress formula, 1 tablet, Oral, Daily  saccharomyces boulardii, 250 mg, Oral, BID  tamoxifen, 20 mg, Oral, Daily    magnesium sulfate 2 g/50 mL IVPB (premix) 2 g  Dose: 2 g  Freq: Once Route: IV  Start: 05/11/22 0530 End: 05/11/22 0931  magnesium sulfate 2 g/50 mL IVPB (premix) 2 g  Dose: 2 g  Freq:  Once Route: IV  Start: 05/12/22 1615  potassium chloride 20 mEq IVPB (premix)  Dose: 20 mEq  Freq: Every 2 hours Route: IV x2 doses  Last Dose: 20 mEq (05/12/22 1617)  Start: 05/12/22 1615 End: 05/12/22 2014  lactated ringers bolus 1,000 mL  Dose: 1,000 mL  Freq: Once Route: IV  Last Dose: 1,000 mL (05/11/22 0731)  Start: 05/11/22 0600 End: 05/11/22 0931      Continuous IV Infusions:lactated ringers infusion  Rate: 125 mL/hr Dose: 125 mL/hr  Freq: Continuous Route: IV  Indications of Use: IV Hydration  Last Dose: 125 mL/hr (05/11/22 1720)  Start: 05/11/22 0530 End: 05/11/22 2134     PRN Meds:  acetaminophen, 488 mg, Oral, Q6H PRN x1 5/12  ALPRAZolam, 0 5 mg, Oral, HS PRN  famotidine, 20 mg, Oral, BID PRN x1 5/11  HYDROcodone-acetaminophen, 1 tablet, Oral, Q6H PRN  ondansetron, 4 mg, Intravenous, Q8H PRN  loperamide (IMODIUM) oral liquid 2 mg  Dose: 2 mg  Freq: Every 4 hours PRN Route: PO  PRN Reason: diarrhea  Indications of Use: DIARRHEA  Start: 05/12/22 1606 x2 5/12    Fall monitoring   SCD's  Up w/ assist  CL diet    Network Utilization Review Department  ATTENTION: Please call with any questions or concerns to [phone] and carefully listen to the prompts so that you are directed to the right person  All voicemails are confidential   Margarita Butler all requests for admission clinical reviews, approved or denied determinations and any other requests to dedicated fax number below belonging to the campus where the patient is receiving treatment   List of dedicated fax numbers for the Facilities:  [address] DENIALS (Administrative/Medical Necessity) [phone]   [address] [place] [address] (Maternity/NICU/Pediatrics) 261 [facility],[address]  [address] [place] [address] [address] [address] [place] [address] [address] [phone]

## 2022-05-13 VITALS
TEMPERATURE: 98.7 F | SYSTOLIC BLOOD PRESSURE: 121 MMHG | RESPIRATION RATE: 18 BRPM | DIASTOLIC BLOOD PRESSURE: 73 MMHG | OXYGEN SATURATION: 99 % | HEART RATE: 67 BPM

## 2022-05-13 PROBLEM — R55 SYNCOPE: Status: RESOLVED | Noted: 2022-05-11 | Resolved: 2022-05-13

## 2022-05-13 PROCEDURE — 99232 SBSQ HOSP IP/OBS MODERATE 35: CPT | Performed by: HOSPITALIST

## 2022-05-13 RX ORDER — DICYCLOMINE HYDROCHLORIDE 10 MG/1
10 CAPSULE ORAL
Qty: 60 CAPSULE | Refills: 0 | Status: SHIPPED | OUTPATIENT
Start: 2022-05-13 | End: 2022-05-28

## 2022-05-13 RX ADMIN — DICYCLOMINE HYDROCHLORIDE 10 MG: 10 CAPSULE ORAL at 06:39

## 2022-05-13 RX ADMIN — TAMOXIFEN CITRATE 20 MG: 10 TABLET, FILM COATED ORAL at 08:57

## 2022-05-13 RX ADMIN — Medication 250 MG: at 08:57

## 2022-05-13 RX ADMIN — CHOLECALCIFEROL TAB 25 MCG (1000 UNIT) 2000 UNITS: 25 TAB at 08:57

## 2022-05-13 RX ADMIN — DICYCLOMINE HYDROCHLORIDE 10 MG: 10 CAPSULE ORAL at 11:37

## 2022-05-13 RX ADMIN — OXYCODONE HYDROCHLORIDE AND ACETAMINOPHEN 500 MG: 500 TABLET ORAL at 08:57

## 2022-05-13 RX ADMIN — Medication 2 TABLET: at 11:37

## 2022-05-13 NOTE — PLAN OF CARE
Problem: GASTROINTESTINAL - ADULT  Goal: Minimal or absence of nausea and/or vomiting  Description: INTERVENTIONS:  - Administer IV fluids if ordered to ensure adequate hydration  - Maintain NPO status until nausea and vomiting are resolved  - Nasogastric tube if ordered  - Administer ordered antiemetic medications as needed  - Provide nonpharmacologic comfort measures as appropriate  - Advance diet as tolerated, if ordered  - Consider nutrition services referral to assist patient with adequate nutrition and appropriate food choices  Outcome: Progressing     Problem: GASTROINTESTINAL - ADULT  Goal: Maintains or returns to baseline bowel function  Description: INTERVENTIONS:  - Assess bowel function  - Encourage oral fluids to ensure adequate hydration  - Administer IV fluids if ordered to ensure adequate hydration  - Administer ordered medications as needed  - Encourage mobilization and activity  - Consider nutritional services referral to assist patient with adequate nutrition and appropriate food choices  Outcome: Progressing     Problem: GASTROINTESTINAL - ADULT  Goal: Maintains adequate nutritional intake  Description: INTERVENTIONS:  - Monitor percentage of each meal consumed  - Identify factors contributing to decreased intake, treat as appropriate  - Assist with meals as needed  - Monitor I&O, weight, and lab values if indicated  - Obtain nutrition services referral as needed  Outcome: Progressing     Problem: METABOLIC, FLUID AND ELECTROLYTES - ADULT  Goal: Electrolytes maintained within normal limits  Description: INTERVENTIONS:  - Monitor labs and assess patient for signs and symptoms of electrolyte imbalances  - Administer electrolyte replacement as ordered  - Monitor response to electrolyte replacements, including repeat lab results as appropriate  - Instruct patient on fluid and nutrition as appropriate  Outcome: Progressing     Problem: PAIN - ADULT  Goal: Verbalizes/displays adequate comfort level or baseline comfort level  Description: Interventions:  - Encourage patient to monitor pain and request assistance  - Assess pain using appropriate pain scale  - Administer analgesics based on type and severity of pain and evaluate response  - Implement non-pharmacological measures as appropriate and evaluate response  - Consider cultural and social influences on pain and pain management  - Notify physician/advanced practitioner if interventions unsuccessful or patient reports new pain  Outcome: Progressing     Problem: DISCHARGE PLANNING  Goal: Discharge to home or other facility with appropriate resources  Description: INTERVENTIONS:  - Identify barriers to discharge w/patient and caregiver  - Arrange for needed discharge resources and transportation as appropriate  - Identify discharge learning needs (meds, wound care, etc )  - Arrange for interpretive services to assist at discharge as needed  - Refer to Case Management Department for coordinating discharge planning if the patient needs post-hospital services based on physician/advanced practitioner order or complex needs related to functional status, cognitive ability, or social support system  Outcome: Progressing     Problem: Knowledge Deficit  Goal: Patient/family/caregiver demonstrates understanding of disease process, treatment plan, medications, and discharge instructions  Description: Complete learning assessment and assess knowledge base    Interventions:  - Provide teaching at level of understanding  - Provide teaching via preferred learning methods  Outcome: Progressing

## 2022-05-13 NOTE — PROGRESS NOTES
[facility]  Progress Note - Megha Mark 1965, 49 y o  female MRN: [id number]  Unit/Bed#: S -01 Encounter: [id number]  Primary Care Provider: Diana Bill DO   Date and time admitted to hospital: 5/11/2022  1:58 AM    Sepsis ([address] )  Assessment & Plan  · POA with tachycardia, leukocytosis  Lactate normal  · Flu/COVID negative  · Stool negative   · C diff negative  · Suspected source: enterocolitis   · Resolved sepsis, diarrhea improved, will advance diet as tolerated     * Syncope-resolved as of 5/13/2022  Assessment & Plan  · Likely vasovagal due to acute illness as below  · DDimer elevated, no PE   · Likely 2/2 to diarrhea, she is now eating and drinking better and without any syncope on walking  · Resolved    Enterocolitis  Assessment & Plan  · Presents with abdominal pain, nausea, diarrhea  · CT AP: mild enterocolitis   · Stool studies neg  · IV abx: cipro and flagyl in ed  Continue rocephin, flagyl    · Pain control: bentyl, norco PRN  · Severe pruritis to other narcotics but tolerates norco without difficulty   · Sent home on bentyl   · Can hold vitamins until able to tolerate more solid foods   · Patient counseled on diet     Cat bite of left hand  Assessment & Plan  · Started on amoxicillin x 10 days, hold while receiving rocephin , can complete dose of amoxicillin on discharge   · Patient plans to supplement w/ Probiotics , advised on dietary alternatives    History of bilateral breast cancer  Assessment & Plan  · S/p mastectomy, radiation  · On taxmoxifen         Medical Problems             Resolved Problems  Date Reviewed: 5/13/2022          Resolved    * (Principal) Syncope 5/13/2022     Resolved by  Julieta Beavers MD              Discharging Resident: Julieta Beavers MD  Discharging Attending: No att  providers found  PCP: Diana Bill DO  Admission Date:   Admission Orders (From admission, onward)     Ordered        05/12/22 1548  Inpatient Admission  Once 05/11/22 0444  Place in Observation  Once                      Discharge Date: 05/13/22    Consultations During Hospital Stay:  · None    Procedures Performed:   · None    Significant Findings / Test Results:   · Enterocolitis   · Covid / Flu / RSV / Stool / C diff negative    Incidental Findings:   · None    Test Results Pending at Discharge (will require follow up): · None     Outpatient Tests Requested:  · None     Complications:  None     Reason for Admission: Abdominal Pain, N, V     Hospital Course:   Jamir Feldman is a 49 y o  female patient who originally presented to the hospital on 5/11/2022 due to Abdominal Pain, N, Andreia Hoyos is a 49 y o  female with a PMH of breast ca s/p mastectomy/reconstruction/radiation on tamoxifen, BCC, asthma, GERD who presents with sudden onset of abdominal pain, nausea, diarrhea that began in the middle of the night  She reports syncopal event occurred shortly after bowel movement and was having significant pain at the time as well  No fevers, diet change, raw food  Currently on amoxicillin due to cat bite to left hand  Met sepsis criteria  Workup for diarrhea negative, and patient improved with IVF and oral intake  Likely viral gastroenteritis  Patient sent home with prn bentyl and told to advance diet as tolerated  Patient was looking and feeling better and ready for discharge on day of discharge  Antibiotics discontinued  Please see above list of diagnoses and related plan for additional information  Condition at Discharge: good    Discharge Day Visit / Exam:   Subjective:  Patient reports that diarrhea has improved and bentyl helped with the cramping  She reports no systemic signs of illness such as fever and chills  She wishes to advance her diet slowly     Vitals: Blood Pressure: 121/73 (05/13/22 0708)  Pulse: 67 (05/13/22 0708)  Temperature: 98 7 °F (37 1 °C) (05/13/22 0708)  Temp Source: Oral (05/11/22 0217)  Respirations: 18 (05/13/22 0708)  SpO2: 99 % (05/13/22 0708)  Exam:   Physical Exam  Vitals and nursing note reviewed  Constitutional:       General: She is not in acute distress  Appearance: She is well-developed  HENT:      Head: Normocephalic and atraumatic  Eyes:      Conjunctiva/sclera: Conjunctivae normal    Cardiovascular:      Rate and Rhythm: Normal rate and regular rhythm  Heart sounds: No murmur heard  Pulmonary:      Effort: Pulmonary effort is normal  No respiratory distress  Breath sounds: Normal breath sounds  Abdominal:      General: There is no distension  Palpations: Abdomen is soft  Tenderness: There is no abdominal tenderness  There is no guarding  Musculoskeletal:      Cervical back: Neck supple  Right lower leg: No edema  Left lower leg: No edema  Skin:     General: Skin is warm and dry  Neurological:      Mental Status: She is alert  Psychiatric:         Mood and Affect: Mood normal          Behavior: Behavior normal           Discussion with Family: patient called  for teo Bills Discharge instructions/Information to patient and family:   See after visit summary for information provided to patient and family  Provisions for Follow-Up Care:  See after visit summary for information related to follow-up care and any pertinent home health orders  Disposition:   Home    Planned Readmission: None    Discharge Medications:  See after visit summary for reconciled discharge medications provided to patient and/or family        **Please Note: This note may have been constructed using a voice recognition system**

## 2022-05-13 NOTE — ASSESSMENT & PLAN NOTE
· Started on amoxicillin x 10 days, hold while receiving rocephin , can complete dose of amoxicillin on discharge   · Patient plans to supplement w/ Probiotics , advised on dietary alternatives

## 2022-05-13 NOTE — DISCHARGE INSTR - AVS FIRST PAGE
Dear Ban Duran,     It was our pleasure to care for you here at [facility]  It is our hope that we were always able to exceed the expected standards for your care during your stay  You were hospitalized due to Enterocolitis  You were cared for on the 4th floor by Mario Delatorre MD under the service of Abraham Fuchs MD with the [facility] who covers for your primary care physician (PCP), Geeta Moulton DO, while you were hospitalized  If you have any questions or concerns related to this hospitalization, you may contact us at [phone]  For follow up as well as any medication refills, we recommend that you follow up with your primary care physician  A registered nurse will reach out to you by phone within a few days after your discharge to answer any additional questions that you may have after going home  However, at this time we provide for you here, the most important instructions / recommendations at discharge:     Notable Medication Adjustments -   Can hold on vitamins while recovering   Bentyl prn for stomach cramping   Testing Required after Discharge -   None  Important follow up information -   Follow up with PCP in 1 week  Other Instructions -   Can drink clear liquid diet and advance as needed - Rec BRAT as needed   Please review this entire after visit summary as additional general instructions including medication list, appointments, activity, diet, any pertinent wound care, and other additional recommendations from your care team that may be provided for you        Sincerely,     Mario Delatorre MD

## 2022-05-13 NOTE — ASSESSMENT & PLAN NOTE
· Presents with abdominal pain, nausea, diarrhea  · CT AP: mild enterocolitis   · Stool studies neg  · IV abx: cipro and flagyl in ed  Continue rocephin, flagyl    · Pain control: bentyl, norco PRN  · Severe pruritis to other narcotics but tolerates norco without difficulty   · Sent home on bentyl   · Can hold vitamins until able to tolerate more solid foods   · Patient counseled on diet

## 2022-05-13 NOTE — ASSESSMENT & PLAN NOTE
· POA with tachycardia, leukocytosis    Lactate normal  · Flu/COVID negative  · Stool negative   · C diff negative  · Suspected source: enterocolitis   · Resolved sepsis, diarrhea improved, will advance diet as tolerated

## 2022-05-13 NOTE — ASSESSMENT & PLAN NOTE
· Likely vasovagal due to acute illness as below  · DDimer elevated, no PE   · Likely 2/2 to diarrhea, she is now eating and drinking better and without any syncope on walking  · Resolved

## 2022-05-16 LAB
BACTERIA BLD CULT: NORMAL
BACTERIA BLD CULT: NORMAL

## 2022-05-16 NOTE — UTILIZATION REVIEW
Inpatient Admission Authorization Request   NOTIFICATION OF INPATIENT ADMISSION/INPATIENT AUTHORIZATION REQUEST   SERVICING FACILITY:   [address]  Tax ID: [id number]  NPI: [id number]  Place of Service: Inpatient [id number]  Place of Service Code: 24     ATTENDING PROVIDER:  Attending Name and NPI#: MelanieAlysa Canela [[id number]]  Address: [address]  Phone: [phone]     UTILIZATION REVIEW CONTACT:  Kristen Worthy Utilization   Network Utilization Review Department  Phone: [phone]  Fax: [phone]  Email: [email or web address]     PHYSICIAN ADVISORY SERVICES:  FOR AJLK-GS-KESX REVIEW - MEDICAL NECESSITY DENIAL  Phone: [phone]  Fax: [phone]  Email: [email or web address]     TYPE OF REQUEST:  Inpatient Status     ADMISSION INFORMATION:  ADMISSION DATE/TIME: 5/12/22  3:48 PM  PATIENT DIAGNOSIS CODE/DESCRIPTION:  Syncope and collapse [R55]  Diarrhea [R19 7]  Enterocolitis [K52 9]  Abdominal pain [R10 9]  Nausea and vomiting [R11 2]  Sepsis ([facility] [address] ) [A41 9]  DISCHARGE DATE/TIME: 5/13/2022  1:59 PM   IMPORTANT INFORMATION:  Please contact the Kristen Worthy directly with any questions or concerns regarding this request  Department voicemails are confidential     Send requests for admission clinical reviews, concurrent reviews, approvals, and administrative denials due to lack of clinical to fax [phone]  Initial Clinical Review  OBSERVATION  5/11/22 @0444  CONVERTED TO INPATIENT ADMISSION 5/12/22 @1548DUE TO CONTINUED STAY REQUIRED TO EVALUATE AND TREAT PATIENT WITH ENTEROCOLITIS  WITH  DIET  ADVANCEMENT, MONITORING BM'S      Admission: Date/Time/Statement:   Admission Orders (From admission, onward)     Ordered        05/12/22 1548  Inpatient Admission  Once            05/11/22 0444  Place in Observation  Once                      Orders Placed This Encounter Procedures    Inpatient Admission     Standing Status:   Standing     Number of Occurrences:   1     Order Specific Question:   Level of Care     Answer:   Med Surg [16]     Order Specific Question:   Estimated length of stay     Answer:   More than 2 Midnights     Order Specific Question:   Certification     Answer:   I certify that inpatient services are medically necessary for this patient for a duration of greater than two midnights  See H&P and MD Progress Notes for additional information about the patient's course of treatment  ED Arrival Information     Expected   -    Arrival   5/11/2022 01:56    Acuity   Emergent            Means of arrival   Walk-In    Escorted by   Family Member    Service   Hospitalist    Admission type   Emergency            Arrival complaint   LOC, -thinners, -headstrike, Abd Pain, Vomiting           Chief Complaint   Patient presents with    Abdominal Pain     Pt states she woke up with sharp shooting pains across midsection and into back, had one episode of diarrhea and had one syncopal episode, +nausea, onset of symptoms tonight       Initial Presentation: 49 y o  female PMH of breast ca s/p mastectomy/reconstruction/radiation on tamoxifen, BCC, asthma, GERD, on Amoxicillin for recent cat bite L hand who presents to ED from home with sudden onset of abdominal pain, nausea, diarrhea that began in the middle of the night with syncopal episode shortly after having bm   On exam, has generalized abdom tenderness,abdomen soft, non distended, normal bowel sounds,   is tachycardic, dry mucous membranes, has minimal erythema w/o drainage, streaking L hand with scabs noted   Alert, oriented , GCS 15   Labs- WBC 12 36, mag 1 8,HS troponin 4  CT A/P consistent w/ enterocolitis   Pt given IVF, IV antiemetics, IV abx in ED  Pt admitted as OBS to telemetry with syncope, sepsis, enterocolitis   Plan - telemetry, trend troponin, obtain ECG, check orthostatics, IVF, replete mag, obtain UA, stool studies   IV abx- Rocephin and flagyl, pain control w/ bentyl scheduld, norco prn    Date: 5/12 Convert to IP   No further syncopal episodes, telemetry d /c'ed  Abdomen soft, non tender, has 10-12watery bm's last 24 hrs  Pt reports staying hydrated   Pt would like to try solid food   Monitor bm's   C diff neg,  stool enteric panel neg   IV and po abx d/c'ed   Pt started on imodium    Wound from cat bite clean and dry w/o signs infection  K 3 3 and mag 1 8 today, potassium and mag repletion ordered   Date 5/13 day 2  Pt denies pain, abdomen soft, tender  Bowel sounds present   Pt had diarrhea this am, and watery bm last evening per nursing notes   Imodium x2 last evening   Pt afebrile, VSS     ED Triage Vitals   Temperature Pulse Respirations Blood Pressure SpO2   05/11/22 0217 05/11/22 0203 05/11/22 0203 05/11/22 0217 05/11/22 0203   98 9 °F (37 2 °C) (!) 119 20 125/76 99 %      Temp Source Heart Rate Source Patient Position - Orthostatic VS BP Location FiO2 (%)   05/11/22 0217 05/11/22 0203 05/11/22 0203 05/11/22 0203 --   Oral Monitor Sitting Right arm       Pain Score       05/11/22 0515       3          Wt Readings from Last 1 Encounters:   04/19/22 79 4 kg (175 lb)     Additional Vital Signs:   Date/Time Temp Pulse Resp BP MAP (mmHg) SpO2   05/13/22 07:08:25 98 7 °F (37 1 °C) 67 18 121/73 89 99 %   05/12/22 22:09:15 99 °F (37 2 °C) 86 16 143/65 91 97 %   05/12/22 15:19:08 98 5 °F (36 9 °C) 85 -- 121/92 102 98 %   05/12/22 07:57:52 98 5 °F (36 9 °C) 92 -- 121/87 98 97 %       Date/Time Temp Pulse Resp BP MAP (mmHg) SpO2   05/11/22 22:32:41 99 4 °F (37 4 °C) 108 Abnormal  -- 111/74 86 95 %   05/11/22 1651 -- -- -- -- -- 97 %   05/11/22 15:43:42 99 8 °F (37 7 °C) 115 Abnormal  -- 92/71 78 98 %   05/11/22 07:27:42 -- 133 Abnormal  -- 114/69 84 98 %   05/11/22 05:29:34 98 2 °F (36 8 °C) 114 Abnormal  -- 91/57 68 96 %   05/11/22 0430 -- 119 Abnormal  20 119/60 82 95 %   05/11/22 0300 -- 110 Abnormal  -- -- -- 93 %     Date and Time Eye Opening Best Verbal Response Best Motor Response [place] Coma Scale Score   05/12/22 2040 4 5 6 15       Date and Time Eye Opening Best Verbal Response Best Motor Response [place] Coma Scale Score   05/11/22 1651 4 5 6 15   05/11/22 0515 4 5 6 15   05/11/22 0222 4 5 6 15         Pertinent Labs/Diagnostic Test Results:   PE Study with CT Abdomen and Pelvis with contrast   Final Result by Jovi Lauren MD (05/11 0421)      No evidence of pulmonary embolus  Findings consistent with mild enterocolitis                     Workstation performed: [id number]         XR chest 1 view portable   ED Interpretation by Nicole Stern MD (05/11 0310)   No acute cardiopulmonary disease      Final Result by Isidro Barlow MD (05/11 3983)      No acute cardiopulmonary disease                    Workstation performed: [id number]         5/11   ECG #1-Sinus tachycardia 110  ECG #2-Sinus tachycardia 108    Results from last 7 days   Lab Units 05/11/22  0243   SARS-COV-2  Negative     Results from last 7 days   Lab Units 05/12/22  0513 05/11/22  0236   WBC Thousand/uL 4 51 12 36*   HEMOGLOBIN g/dL 11 8 13 2   HEMATOCRIT % 36 5 41 1   PLATELETS Thousands/uL 149 190   NEUTROS ABS Thousands/µL 3 28 10 71*         Results from last 7 days   Lab Units 05/12/22  0513 05/11/22  0236   SODIUM mmol/L 141 141   POTASSIUM mmol/L 3 3* 3 6   CHLORIDE mmol/L 108 107   CO2 mmol/L 29 26   ANION GAP mmol/L 4 8   BUN mg/dL 5 14   CREATININE mg/dL 0 55* 0 79   EGFR ml/min/1 73sq m 106 85   CALCIUM mg/dL 8 4 8 8   MAGNESIUM mg/dL 1 8* 1 8*     Results from last 7 days   Lab Units 05/11/22  0236   AST U/L 18   ALT U/L 12   ALK PHOS U/L 50   TOTAL PROTEIN g/dL 6 9   ALBUMIN g/dL 4 0   TOTAL BILIRUBIN mg/dL 0 35     Results from last 7 days   Lab Units 05/11/22  0252   POC GLUCOSE mg/dl 102     Results from last 7 days   Lab Units 05/12/22  0513 05/11/22  0236   GLUCOSE RANDOM mg/dL 98 102               Results from last 7 days   Lab Units 05/11/22  0236   HS TNI 0HR ng/L 4     Results from last 7 days   Lab Units 05/11/22  0236   D-DIMER QUANTITATIVE ug/ml FEU 0 71*     Results from last 7 days   Lab Units 05/11/22  0236   PROTIME seconds 11 9   INR  0 87   PTT seconds 32         Results from last 7 days   Lab Units 05/11/22  0236   PROCALCITONIN ng/ml <0 05     Results from last 7 days   Lab Units 05/11/22  0236   LACTIC ACID mmol/L 1 0                         Results from last 7 days   Lab Units 05/11/22  0236   LIPASE u/L 21                 Results from last 7 days   Lab Units 05/11/22  1402   CLARITY UA  Clear   COLOR UA  Light Yellow   SPEC GRAV UA  <=1 005   PH UA  5 5   GLUCOSE UA mg/dl Negative   KETONES UA mg/dl Negative   BLOOD UA  Negative   PROTEIN UA mg/dl Negative   NITRITE UA  Negative   BILIRUBIN UA  Negative   UROBILINOGEN UA E U /dl 0 2   LEUKOCYTES UA  Negative     Results from last 7 days   Lab Units 05/11/22  0243   INFLUENZA A PCR  Negative   INFLUENZA B PCR  Negative   RSV PCR  Negative                 Results from last 7 days   Lab Units 05/11/22  0516   C DIFF TOXIN B BY PCR  Negative     Results from last 7 days   Lab Units 05/11/22  0516   SALMONELLA SP PCR  None Detected   SHIGELLA SP/ENTEROINVASIVE E  COLI (EIEC)  None Detected   CAMPYLOBACTER SP (JEJUNI AND COLI)  None Detected   SHIGA TOXIN 1/SHIGA TOXIN 2  None Detected         Results from last 7 days   Lab Units 05/11/22  0243 05/11/22  0236   BLOOD CULTURE  No Growth After 5 Days  No Growth After 5 Days                 ED Treatment:   Medication Administration from 05/11/2022 0156 to 05/11/2022 0519       Date/Time Order Dose Route Action     05/11/2022 0233 sodium chloride 0 9 % bolus 500 mL 500 mL Intravenous New Bag     05/11/2022 0234 ondansetron (ZOFRAN) injection 4 mg 4 mg Intravenous Given     05/11/2022 0409 iohexol (OMNIPAQUE) 350 MG/ML injection (SINGLE-DOSE) 100 mL 65 mL Intravenous Given     05/11/2022 0426 ondansetron (ZOFRAN) injection 4 mg 4 mg Intravenous Given     05/11/2022 0445 metroNIDAZOLE (FLAGYL) IVPB (premix) 500 mg 100 mL 500 mg Intravenous New Bag        Past Medical History:   Diagnosis Date    Abnormal findings on diagnostic imaging of breast     Last Assessesd: 4/25/2014    Arthritis     Knees and feet    Cancer (HCC)     breast    Difficulty falling asleep     Gluteal abscess     Last Assessed: 12/30/2016    Limb alert care status     right    Pain in left foot     Pneumonia 2009    x1    PONV (postoperative nausea and vomiting)     Skin cancer      Present on Admission:  **None**      Admitting Diagnosis: Syncope and collapse [R55]  Diarrhea [R19 7]  Enterocolitis [K52 9]  Abdominal pain [R10 9]  Nausea and vomiting [R11 2]  Sepsis ([address] ) [A41 9]  Age/Sex: 49 y o  female  Admission Orders:  Scheduled Medications:  ascorbic acid, 500 mg, Oral, Daily  calcium carbonate-vitamin D, 2 tablet, Oral, Daily With Lunch  cefTRIAXone, 1,000 mg, Intravenous, [id number]: 05/12/22 1543  cholecalciferol, 2,000 Units, Oral, Daily  dicyclomine, 10 mg, Oral, 4x Daily (AC & HS)  enoxaparin, 40 mg, Subcutaneous, Daily  metroNIDAZOLE, 500 mg, Oral, Q8H SCHEnd: 05/12/22 1543  multivitamin stress formula, 1 tablet, Oral, Daily  saccharomyces boulardii, 250 mg, Oral, BID  tamoxifen, 20 mg, Oral, Daily    magnesium sulfate 2 g/50 mL IVPB (premix) 2 g  Dose: 2 g  Freq: Once Route: IV  Start: 05/11/22 0530 End: 05/11/22 0931  magnesium sulfate 2 g/50 mL IVPB (premix) 2 g  Dose: 2 g  Freq: Once Route: IV  Start: 05/12/22 1615  potassium chloride 20 mEq IVPB (premix)  Dose: 20 mEq  Freq: Every 2 hours Route: IV x2 doses  Last Dose: 20 mEq (05/12/22 1617)  Start: 05/12/22 1615 End: 05/12/22 2014  lactated ringers bolus 1,000 mL  Dose: 1,000 mL  Freq:  Once Route: IV  Last Dose: 1,000 mL (05/11/22 0731)  Start: 05/11/22 0600 End: 05/11/22 0931      Continuous IV Infusions:lactated ringers infusion  Rate: 125 mL/hr Dose: 125 mL/hr  Freq: Continuous Route: IV  Indications of Use: IV Hydration  Last Dose: 125 mL/hr (05/11/22 1720)  Start: 05/11/22 0530 End: 05/11/22 2134  No current facility-administered medications for this encounter  PRN Meds:  acetaminophen, 488 mg, Oral, Q6H PRN x1 5/12  ALPRAZolam, 0 5 mg, Oral, HS PRN  famotidine, 20 mg, Oral, BID PRN x1 5/11  HYDROcodone-acetaminophen, 1 tablet, Oral, Q6H PRN  ondansetron, 4 mg, Intravenous, Q8H PRN  loperamide (IMODIUM) oral liquid 2 mg  Dose: 2 mg  Freq: Every 4 hours PRN Route: PO  PRN Reason: diarrhea  Indications of Use: DIARRHEA  Start: 05/12/22 1606 x2 5/12    Fall monitoring   SCD's  Up w/ assist  CL diet    Network Utilization Review Department  ATTENTION: Please call with any questions or concerns to [phone] and carefully listen to the prompts so that you are directed to the right person  All voicemails are confidential   Margarita Butler all requests for admission clinical reviews, approved or denied determinations and any other requests to dedicated fax number below belonging to the campus where the patient is receiving treatment   List of dedicated fax numbers for the Facilities:  [address] DENIALS (Administrative/Medical Necessity) [phone]   [address] (Maternity/NICU/Pediatrics) [phone]   [address] [place] [address] [facility] [place] [address] [id number] [place] [address] [address] [phone]

## 2022-05-26 ENCOUNTER — OFFICE VISIT (OUTPATIENT)
Dept: PODIATRY | Facility: CLINIC | Age: 57
End: 2022-05-26
Payer: COMMERCIAL

## 2022-05-26 VITALS
WEIGHT: 175 LBS | BODY MASS INDEX: 28.12 KG/M2 | SYSTOLIC BLOOD PRESSURE: 125 MMHG | HEART RATE: 79 BPM | HEIGHT: 66 IN | DIASTOLIC BLOOD PRESSURE: 75 MMHG

## 2022-05-26 DIAGNOSIS — M21.41 PES PLANUS OF RIGHT FOOT: ICD-10-CM

## 2022-05-26 DIAGNOSIS — G57.81 OTHER SPECIFIED MONONEUROPATHIES OF RIGHT LOWER LIMB: Primary | ICD-10-CM

## 2022-05-26 PROCEDURE — 99212 OFFICE O/P EST SF 10 MIN: CPT | Performed by: PODIATRIST

## 2022-05-26 RX ORDER — ALPRAZOLAM 1 MG/1
1 TABLET ORAL
COMMUNITY
Start: 2022-05-16

## 2022-05-26 NOTE — PROGRESS NOTES
PATIENT:  Calvin Araujo  1965       ASSESSMENT:     1  Other specified mononeuropathies of right lower limb  Diclofenac Sodium (VOLTAREN) 1 %   2  Pes planus of right foot           PLAN:  1  Patient was counseled and educated on the condition and the diagnosis  2  The diagnosis, treatment options and prognosis were discussed with the patient  3  She is doing well  Instructed supportive care  Rx: Voltaren gel for residual symptoms  Subjective:     HPI  The patient presents for follow-up on right foot pain  Minimal pain  Some twinge around right heel on occasion  The following portions of the patient's history were reviewed and updated as appropriate: allergies, current medications, past family history, past medical history, past social history, past surgical history and problem list   All pertinent labs and images were reviewed        Past Medical History  Past Medical History:   Diagnosis Date    Abnormal findings on diagnostic imaging of breast     Last Assessesd: 4/25/2014    Arthritis     Knees and feet    Cancer ([address] )     breast    Difficulty falling asleep     Gluteal abscess     Last Assessed: 12/30/2016    Limb alert care status     right    Pain in left foot     Pneumonia 2009    x1    PONV (postoperative nausea and vomiting)     Skin cancer        Past Surgical History  Past Surgical History:   Procedure Laterality Date    BASAL CELL CARCINOMA EXCISION N/A 1/7/2021    Procedure: EXCISION BASAL CELL CARCINOMA OF MID BACK;  Surgeon: Aj Arias MD;  [facility];  Service: Plastics    BASAL CELL CARCINOMA EXCISION Left 4/25/2022    Procedure: EXCISION OF BCC (X2) LEFT SHOULDER WITH COMPLEX CLOSURE;  Surgeon: Aj Arias MD;  [facility];  Service: Plastics    BREAST BIOPSY  05/02/2014    BREAST BIOPSY  05/2013    BREAST BIOPSY  05/2008    BREAST LUMPECTOMY Right 2008    BREAST RECONSTRUCTION Left 2/21/2019 Procedure: BREAST EXPANDER/IMPLANT EXCHANGE;  Surgeon: Jill Bach MD;  [facility];  Service: Plastics    BREAST SURGERY Bilateral 2008, 2013, 2014    Breast reconstruction with expanders  Total of 7 surgeries  7/3/14 right breast reconstruction revision  10/27/14 right breast reconstruction revision     BREAST SURGERY Left 07/24/2013    Excision of breast lesion    CAPSULOTOMY Left 2/21/2019    Procedure: CAPSULECTOMY;  Surgeon: Jill Bach MD;  [facility];  Service: Plastics    CHEST WALL BIOPSY Right 2/21/2019    Procedure: UPPER CHEST BASAL CELL CARSINOMA EXCISION;  Surgeon: Jill Bach MD;  [facility];  Service: Plastics    CLOSURE WOUND N/A 2/21/2019    Procedure: RIGHT upper chest & LEFT upper back complex closure;  Surgeon: Jill Bach MD;  [facility];  Service: Plastics    COLONOSCOPY      COMPLEX WOUND CLOSURE TO EXTREMITY N/A 1/7/2021    Procedure: COMPLEX CLOSURE MID BACK;  Surgeon: Jill Bach MD;  [facility];  Service: Plastics    FOOT ARTHRODESIS      Pantalar    KNEE ARTHROSCOPY Right 05/1997    LIPOSUCTION W/ FAT INJECTION Bilateral 10/24/2019    Procedure: AUTOLOGOUS FAT GRAFTING TO BILATERAL BREAST;  Surgeon: Jill Bach MD;  [facility];  Service: Plastics    LIPOSUCTION W/ FAT INJECTION Bilateral 1/7/2021    Procedure: AUTOLOGOUS FAT GRAFTING TO BILATERAL BREAST;  Surgeon: Jill Bach MD;  [facility];  Service: Plastics    MASTECTOMY Bilateral 06/06/2014    Lymph nodes removed bilaterally  States she may have IV's in left side   MOHS RECONSTRUCTION Right 3/7/2019    Procedure: RECONSTRUCTION MOHS DEFECT RIGHT NOSE/MEDIAL CANTHUS;  Surgeon: Jill Bach MD;  [facility];  Service: Plastics    MOHS SURGERY      Mohs Micrographic Surgery Face;  Last Assessed: 5/15/2015    NC ADJ TISS XFER LID,NOS,EAR 10 1-30 SQCM Right 3/7/2019    Procedure: FLAP; Surgeon: Aisha Hernandez MD;  [facility];  Service: Plastics    DC BREAST RECONSTRUCTION W/LATISSIMUS DORSI FLAP Left 9/7/2017    Procedure: BREAST RECONSTRUCTION; LATISSIMUS DORSI FLAP; TISSUE EXPANDER;  Surgeon: Aisha Hernandez MD;  [facility];  Service: Plastics    DC COLONOSCOPY FLX DX W/COLLJ [address] PFRMD N/A 8/1/2018    Procedure: COLONOSCOPY;  Surgeon: Cathryn Mack MD;  [facility];  Service: Gastroenterology    DC [place] W/SESMDC W/RESCJ PROX [facility]  9/9/2016    Procedure: Ray Shook MODIFIED ;  Surgeon: Sirena Jang DPM;  [facility];  Service: Podiatry    DC EXC SKIN MALIG >4 CM TRUNK,ARM,LEG N/A 6/27/2019    Procedure: EXCISION BASAL CELL CARCINOMA MID CHEST;  Surgeon: Aisha Hernandez MD;  [facility];  Service: Plastics    DC FULL THICK 2011 [facility] NOS,EAR,LID <20 SQCM Right 3/7/2019    Procedure: SKIN GRAFT FULL THICKNESS  (FTSG); Surgeon: Aisha Hernandez MD;  [facility];  Service: Plastics    DC FUSION FOOT BONES,MIDTARSAL,MULTI Left 9/9/2016    Procedure: ARTHRODESIS FIRST [address] [facility], [place] [address] ;  Surgeon: Sirena Jang DPM;  [facility];  Service: Podiatry    DC HYSTEROSCOPY,W/ENDO BX N/A 1/7/2022    Procedure: DILATATION AND CURETTAGE (D&C) WITH HYSTEROSCOPY, POLYPECTOMY ;  Surgeon: Mario Koroma MD;  [facility];  Service: Gynecology    DC INSERTION BREAST IMPLANT SAME DAY OF MASTECTOMY Left 9/7/2017    Procedure: TISSUE EXPANDER;  Surgeon: Aisha Hernandez MD;  [facility];  Service: Plastics    DC OPEN [facility] Left 12/11/2019    Procedure: OPEN REDUCTION W/ INTERNAL FIXATION left distal humerus fracture, possible olecranon osteotomy;  Surgeon:  Collette Day, MD;  Location: [facility] MAIN OR;  Service: Orthopedics    DC OSTEOTOMY HEEL BONE Left 9/9/2016    Procedure: OSTEOTOMY CALCANEUS WITH APPLICATION AND ACTIVATION OF BONE STIMULATOR ;  Surgeon: Lindsay Patel DPM;  [facility];  Service: Podiatry    VT RECMPL WND HEAD,FAC,HAND 2 6-7 5 CM N/A 3/7/2019    Procedure: COMPLEX CLOSURE;  Surgeon: Jonna Chung MD;  [facility];  Service: Plastics    VT RECMPL WND TRUNK 2 6-7 5 CM N/A 6/27/2019    Procedure: CLOSURE WOUND MID CHEST;  Surgeon: Jonna Chung MD;  [facility];  Service: Plastics     [facility] TISSUE FOR GRAFT OTHR Bilateral 6/27/2019    Procedure: AUTOLOGOUS FAT GRAFTING BILATERAL BREASTS;  Surgeon: Jonna Chung MD;  [facility];  Service: Plastics    VT REVISION OF RECONSTRUCTED BREAST Bilateral 6/27/2019    Procedure: BILATERAL BREAST MOUND REVISION;  Surgeon: Jonna Chung MD;  [facility];  Service: Plastics    VT REVISION OF RECONSTRUCTED BREAST Bilateral 10/24/2019    Procedure: BILATERAL BREAST MOUND REVISION;  Surgeon: Jonna Chung MD;  [facility];  Service: Plastics    VT REVISION OF RECONSTRUCTED BREAST Bilateral 1/7/2021    Procedure: BILATERAL BREAST MOUND REVISION;  Surgeon: Jonna Chung MD;  [facility];  Service: Plastics    SENTINEL LYMPH NODE BIOPSY Bilateral 06/06/2014    SENTINEL LYMPH NODE BIOPSY Right 2008    SKIN BIOPSY      SKIN LESION EXCISION Left 2/21/2019    Procedure: UPPER BACK [address];  Surgeon: Jonna Chung MD;  [facility];  Service: Plastics    SQUAMOUS CELL CARCINOMA EXCISION N/A 3/7/2019    Procedure: EXCISION BCC LEFT FOREHEAD;  Surgeon: Jonna Chung MD;  [facility];  Service: Plastics    WISDOM TOOTH EXTRACTION          Allergies:  Codeine, Dilaudid [hydromorphone hcl], Hydromorphone, Tramadol, and Other    Medications:  Current Outpatient Medications   Medication Sig Dispense Refill    acetaminophen (TYLENOL) 500 mg tablet Take 500 mg by mouth every 6 (six) hours as needed for mild pain        albuterol (Ventolin HFA) 90 mcg/act inhaler Inhale 2 puffs every 6 (six) hours as needed for wheezing or shortness of breath 8 g 1    ALPRAZolam (XANAX) 0 5 mg tablet Take 0 5 mg by mouth daily at bedtime as needed for anxiety       ALPRAZolam (XANAX) 1 mg tablet Take 1 mg by mouth daily at bedtime      Ascorbic Acid (VITAMIN C) 500 MG CAPS Take 500 mg by mouth in the morning        calcium-vitamin D (OSCAL) 250-125 MG-UNIT per tablet Take 2 tablets by mouth daily   Cholecalciferol (VITAMIN D) 2000 units CAPS Take by mouth in the morning        Diclofenac Sodium (VOLTAREN) 1 % Apply 2 g topically 4 (four) times a day 150 g 2    dicyclomine (BENTYL) 10 mg capsule Take 1 capsule (10 mg total) by mouth 4 (four) times a day (before meals and at bedtime) for 15 days 60 capsule 0    famotidine (PEPCID) 20 mg tablet Take 20 mg by mouth 2 (two) times a day as needed        Lactobacillus (ACIDOPHILUS PO) Take by mouth daily   Multiple Vitamin (MULTIVITAMIN) capsule Take 1 capsule by mouth daily   omeprazole (PriLOSEC) 20 mg delayed release capsule Take 20 mg by mouth as needed        tamoxifen (NOLVADEX) 20 mg tablet Take 1 tablet (20 mg total) by mouth daily 90 tablet 3    zinc gluconate 50 mg tablet Take by mouth daily         No current facility-administered medications for this visit  Social History:  Social History     Socioeconomic History    Marital status: /Civil Union     Spouse name: None    Number of children: None    Years of education: None    Highest education level: None   Occupational History    None   Tobacco Use    Smoking status: Never Smoker    Smokeless tobacco: Never Used   Vaping Use    Vaping Use: Never used   Substance and Sexual Activity    Alcohol use:  Yes     Alcohol/week: 4 0 standard drinks     Types: 4 Glasses of wine per week     Comment: 4 weekly-wine    Drug use: No    Sexual activity: Yes     Partners: Male     Birth control/protection: Condom Male   Other Topics Concern    None   Social History Narrative    None     Social Determinants of Health     Financial Resource Strain: Not on file   Food Insecurity: Not on file   Transportation Needs: Not on file   Physical Activity: Not on file   Stress: Not on file   Social Connections: Not on file   Intimate Partner Violence: Not on file   Housing Stability: Not on file          Review of Systems   Constitutional: Negative for chills, fatigue and fever  Respiratory: Negative for cough, shortness of breath and wheezing  Cardiovascular: Negative for chest pain and palpitations  Gastrointestinal: Negative for diarrhea, nausea and vomiting  Musculoskeletal: Negative for arthralgias and gait problem  Skin: Negative for color change, pallor, rash and wound  Hematological: Negative  Objective:      /75   Pulse 79   Ht 5' 6" (1 676 m) Comment: verbal  Wt 79 4 kg (175 lb)   BMI 28 25 kg/m²          Physical Exam  Vitals reviewed  Constitutional:       General: She is not in acute distress  Appearance: Normal appearance  She is well-developed  She is not toxic-appearing  Cardiovascular:      Rate and Rhythm: Normal rate and regular rhythm  Pulses: Normal pulses  Dorsalis pedis pulses are 2+ on the right side and 2+ on the left side  Posterior tibial pulses are 2+ on the right side and 2+ on the left side  Pulmonary:      Effort: Pulmonary effort is normal  No respiratory distress  Musculoskeletal:         General: Deformity present  No swelling, tenderness or signs of injury  Right lower leg: No edema  Left lower leg: No edema  Right foot: No foot drop  Left foot: No foot drop  Comments: No pain along the course of medial calcaneal nerve on right medial heel and ankle  Mild Tinel sign noted  Skin:     General: Skin is warm  Coloration: Skin is not cyanotic or mottled  Findings: No abscess, ecchymosis, erythema, rash or wound  Nails:  There is no clubbing  Neurological:      General: No focal deficit present  Mental Status: She is alert and oriented to person, place, and time  Cranial Nerves: No cranial nerve deficit  Sensory: No sensory deficit  Motor: No weakness or abnormal muscle tone  Coordination: Coordination normal    Psychiatric:         Mood and Affect: Mood normal          Behavior: Behavior normal          Thought Content:  Thought content normal          Judgment: Judgment normal

## 2022-06-08 NOTE — DISCHARGE SUMMARY
[facility]  Progress Note - Hemanth Rim 1965, 49 y o  female MRN: [id number]  Unit/Bed#: S -01 Encounter: [id number]  Primary Care Provider: Darien Jones DO   Date and time admitted to hospital: 5/11/2022  1:58 AM     Sepsis ([address] )  Assessment & Plan  · POA with tachycardia, leukocytosis  Lactate normal  · Flu/COVID negative  · Stool negative   · C diff negative  · Suspected source: enterocolitis   · Resolved sepsis, diarrhea improved, will advance diet as tolerated      * Syncope-resolved as of 5/13/2022  Assessment & Plan  · Likely vasovagal due to acute illness as below  · DDimer elevated, no PE   · Likely 2/2 to diarrhea, she is now eating and drinking better and without any syncope on walking  · Resolved     Enterocolitis  Assessment & Plan  · Presents with abdominal pain, nausea, diarrhea  · CT AP: mild enterocolitis   · Stool studies neg  · IV abx: cipro and flagyl in ed  Continue rocephin, flagyl  · Pain control: bentyl, norco PRN  ? Severe pruritis to other narcotics but tolerates norco without difficulty   ? Sent home on bentyl   ? Can hold vitamins until able to tolerate more solid foods   ?  Patient counseled on diet      Cat bite of left hand  Assessment & Plan  · Started on amoxicillin x 10 days, hold while receiving rocephin , can complete dose of amoxicillin on discharge   · Patient plans to supplement w/ Probiotics , advised on dietary alternatives     History of bilateral breast cancer  Assessment & Plan  · S/p mastectomy, radiation  · On taxmoxifen                Medical Problems                            Resolved Problems  Date Reviewed: 5/13/2022                    Resolved      * (Principal) Syncope 5/13/2022        Resolved by  Hamilton Gee MD                  Discharging Resident: Hamilton Gee MD  Discharging Attending: No att  providers found  PCP: Darien Jones DO  Admission Date:       Admission Orders (From admission, onward)              Ordered          05/12/22 1548   Inpatient Admission  Once              05/11/22 0444   Place in Observation  Once                          Discharge Date: 05/13/22     Consultations During Hospital Stay:  · None     Procedures Performed:   · None     Significant Findings / Test Results:   · Enterocolitis   · Covid / Flu / RSV / Stool / C diff negative     Incidental Findings:   · None     Test Results Pending at Discharge (will require follow up): · None     Outpatient Tests Requested:  · None      Complications:  None      Reason for Admission: Abdominal Pain, N, V      Hospital Course:   Luis A Montalvo is a 49 y o  female patient who originally presented to the hospital on 5/11/2022 due to Abdominal Pain, N, V        Beatriz Gomes a 49 y  o  female with a PMH of breast ca s/p mastectomy/reconstruction/radiation on tamoxifen, BCC, asthma, Nika Negus presents with sudden onset of abdominal pain, nausea, diarrhea that began in the middle of the night   She reports syncopal event occurred shortly after bowel movement and was having significant pain at the time as well   No fevers, diet change, raw food   Currently on amoxicillin due to cat bite to left hand    Met sepsis criteria       Workup for diarrhea negative, and patient improved with IVF and oral intake  Likely viral gastroenteritis  Patient sent home with prn bentyl and told to advance diet as tolerated  Patient was looking and feeling better and ready for discharge on day of discharge  Antibiotics discontinued          Please see above list of diagnoses and related plan for additional information       Condition at Discharge: good     Discharge Day Visit / Exam:   Subjective:  Patient reports that diarrhea has improved and bentyl helped with the cramping  She reports no systemic signs of illness such as fever and chills  She wishes to advance her diet slowly     Vitals: Blood Pressure: 121/73 (05/13/22 0708)  Pulse: 67 (05/13/22 0708)  Temperature: 98 7 °F (37 1 °C) (05/13/22 0708)  Temp Source: Oral (05/11/22 0217)  Respirations: 18 (05/13/22 0708)  SpO2: 99 % (05/13/22 0708)  Exam:   Physical Exam  Vitals and nursing note reviewed  Constitutional:       General: She is not in acute distress  Appearance: She is well-developed  HENT:      Head: Normocephalic and atraumatic  Eyes:      Conjunctiva/sclera: Conjunctivae normal    Cardiovascular:      Rate and Rhythm: Normal rate and regular rhythm  Heart sounds: No murmur heard  Pulmonary:      Effort: Pulmonary effort is normal  No respiratory distress  Breath sounds: Normal breath sounds  Abdominal:      General: There is no distension  Palpations: Abdomen is soft  Tenderness: There is no abdominal tenderness  There is no guarding  Musculoskeletal:      Cervical back: Neck supple  Right lower leg: No edema  Left lower leg: No edema  Skin:     General: Skin is warm and dry  Neurological:      Mental Status: She is alert  Psychiatric:         Mood and Affect: Mood normal          Behavior: Behavior normal             Discussion with Family: patient called  for update         Discharge instructions/Information to patient and family:   See after visit summary for information provided to patient and family        Provisions for Follow-Up Care:  See after visit summary for information related to follow-up care and any pertinent home health orders         Disposition:   Home     Planned Readmission: None     Discharge Medications:  See after visit summary for reconciled discharge medications provided to patient and/or family        **Please Note: This note may have been constructed using a voice recognition system**

## 2022-06-17 ENCOUNTER — OFFICE VISIT (OUTPATIENT)
Dept: PLASTIC SURGERY | Facility: CLINIC | Age: 57
End: 2022-06-17
Payer: COMMERCIAL

## 2022-06-17 DIAGNOSIS — M75.91 SHOULDER LESION, RIGHT: ICD-10-CM

## 2022-06-17 DIAGNOSIS — Z85.828 HISTORY OF BASAL CELL CARCINOMA: Primary | ICD-10-CM

## 2022-06-17 PROCEDURE — 88342 IMHCHEM/IMCYTCHM 1ST ANTB: CPT | Performed by: STUDENT IN AN ORGANIZED HEALTH CARE EDUCATION/TRAINING PROGRAM

## 2022-06-17 PROCEDURE — 88341 IMHCHEM/IMCYTCHM EA ADD ANTB: CPT | Performed by: STUDENT IN AN ORGANIZED HEALTH CARE EDUCATION/TRAINING PROGRAM

## 2022-06-17 PROCEDURE — 88305 TISSUE EXAM BY PATHOLOGIST: CPT | Performed by: STUDENT IN AN ORGANIZED HEALTH CARE EDUCATION/TRAINING PROGRAM

## 2022-06-17 PROCEDURE — 11104 PUNCH BX SKIN SINGLE LESION: CPT | Performed by: PHYSICIAN ASSISTANT

## 2022-06-17 PROCEDURE — 99214 OFFICE O/P EST MOD 30 MIN: CPT | Performed by: PHYSICIAN ASSISTANT

## 2022-06-17 NOTE — PROGRESS NOTES
Biopsy    Date/Time: 6/17/2022 12:13 PM  Performed by: Lorna Doherty PA-C  Authorized by: Lorna Doherty PA-C     Procedure Details - Skin Biopsy:     Biopsy tissue type: skin    Biopsy method: punch biopsy      Body area:  Upper extremity    Upper extremity location:  R shoulder    Initial size (mm):  10    Malignancy: malignancy unknown

## 2022-06-17 NOTE — PROGRESS NOTES
Assessment/Plan:  Dae Morocho is a 44-year-old female who presents for treatment of a right shoulder skin lesion  She is also status post excision of basal cell carcinoma x2 from the left shoulder  She is self-referred  Please see HPI  In the office today, I performed a punch biopsy of the right shoulder lesion  We will see her back in in approximately 10-12 days for suture removal and pathology results  She will continue with silicone scar treatment to the left shoulder scars as well as avoiding excessive sun exposure  Diagnoses and all orders for this visit:    History of basal cell carcinoma    Shoulder lesion, right  -     Tissue Exam          Subjective:      Patient ID: Patrica Hidalgo is a 56 y o  female  HPI   She reports having an area of redness on the right shoulder for approximately 2 months  She states that she recently scratched it and it began to bleed  She does have a significant history for basal cell carcinoma and was concerned this could be another one  The following portions of the patient's history were reviewed and updated as appropriate: She  has a past medical history of Abnormal findings on diagnostic imaging of breast, Arthritis, Cancer ([address] ), Difficulty falling asleep, Gluteal abscess, Limb alert care status, Pain in left foot, Pneumonia (2009), PONV (postoperative nausea and vomiting), and Skin cancer  She  has a past surgical history that includes Mastectomy (Bilateral, 06/06/2014); Breast surgery (Bilateral, 2008, 2013, 2014); [place] tooth extraction; pr osteotomy heel bone (Left, 9/9/2016); pr fusion foot bones,midtarsal,multi (Left, 9/9/2016); pr corrj hallux valgus w/sesmdc w/rescj prox phal (9/9/2016); pr breast reconstruction w/latissimus dorsi flap (Left, 9/7/2017); pr insertion breast implant same day of mastectomy (Left, 9/7/2017); Knee arthroscopy (Right, 05/1997); Breast biopsy (05/02/2014); Breast surgery (Left, 07/24/2013); Breast biopsy (05/2013);  Breast biopsy (05/2008); Foot arthrodesis; Mohs surgery; Breast lumpectomy (Right, 2008); [place] lymph node biopsy (Bilateral, 06/06/2014); [place] lymph node biopsy (Right, 2008); pr colonoscopy flx dx w/collj spec when pfrmd (N/A, 8/1/2018); Breast reconstruction (Left, 2/21/2019); Chest wall biopsy (Right, 2/21/2019); Skin lesion excision (Left, 2/21/2019); CLOSURE WOUND (N/A, 2/21/2019); Capsulectomy (Left, 2/21/2019); pr recmpl wnd head,fac,hand 2 6-7 5 cm (N/A, 3/7/2019); pr adj tiss xfer lid,nos,ear 10 1-30 sqcm (Right, 3/7/2019); pr full thick grft nos,ear,lid <20 sqcm (Right, 3/7/2019); MOHS RECONSTRUCTION (Right, 3/7/2019); Squamous cell carcinoma excision (N/A, 3/7/2019); pr revision of reconstructed breast (Bilateral, 6/27/2019); pr remv tissue for graft othr (Bilateral, 6/27/2019); pr exc skin malig >4 cm trunk,arm,leg (N/A, 6/27/2019); pr recmpl wnd trunk 2 6-7 5 cm (N/A, 6/27/2019); pr revision of reconstructed breast (Bilateral, 10/24/2019); Liposuction w/ fat injection (Bilateral, 10/24/2019); pr open tx monteggia fracture dislocation elbow (Left, 12/11/2019); Skin biopsy; pr revision of reconstructed breast (Bilateral, 1/7/2021); Excision basal cell carcinoma (N/A, 1/7/2021); COMPLEX WOUND CLOSURE TO EXTREMITY (N/A, 1/7/2021); Liposuction w/ fat injection (Bilateral, 1/7/2021); pr hysteroscopy,w/endo bx (N/A, 1/7/2022); Colonoscopy; and Excision basal cell carcinoma (Left, 4/25/2022)  Her family history includes No Known Problems in her father and mother  She was adopted  She  reports that she has never smoked  She has never used smokeless tobacco  She reports current alcohol use of about 4 0 standard drinks of alcohol per week  She reports that she does not use drugs       Review of Systems   HENT: Negative for hearing loss  Eyes: Negative for visual disturbance  Respiratory: Negative for shortness of breath  Cardiovascular: Negative for chest pain     Gastrointestinal: Negative for abdominal pain, blood in stool, constipation, diarrhea, nausea and vomiting  Genitourinary: Negative for hematuria  Musculoskeletal: Negative for gait problem  Skin:        As per HPI  Neurological: Negative for seizures and headaches  Hematological: Does not bruise/bleed easily  Psychiatric/Behavioral: The patient is not nervous/anxious  Objective: There were no vitals taken for this visit  Physical Exam  Constitutional:       General: She is not in acute distress  Appearance: She is well-developed  HENT:      Head: Normocephalic and atraumatic  Eyes:      General: No scleral icterus  Pupils: Pupils are equal, round, and reactive to light  Neck:      Thyroid: No thyromegaly  Trachea: No tracheal deviation  Cardiovascular:      Rate and Rhythm: Normal rate and regular rhythm  Heart sounds: No murmur heard  No friction rub  No gallop  Pulmonary:      Effort: Pulmonary effort is normal       Breath sounds: Normal breath sounds  No wheezing or rales  Abdominal:      General: Bowel sounds are normal  There is no distension  Palpations: Abdomen is soft  Tenderness: There is no abdominal tenderness  There is no guarding or rebound  Musculoskeletal:         General: Normal range of motion  Cervical back: Neck supple  Lymphadenopathy:      Cervical: No cervical adenopathy  Skin:     Comments: Right anterior shoulder skin lesion with eschar and erythema surrounding  Irregular border noted  This is located 6 cm medial to a pigmented nevus and 7 cm lateral to a previous right upper chest scar  Left shoulder scars are healing very well without hypertrophy  Please see photos of new lesion in Epic  Neurological:      Mental Status: She is alert and oriented to person, place, and time  Cranial Nerves: No cranial nerve deficit

## 2022-06-27 ENCOUNTER — OFFICE VISIT (OUTPATIENT)
Dept: PLASTIC SURGERY | Facility: CLINIC | Age: 57
End: 2022-06-27

## 2022-06-27 DIAGNOSIS — M75.91 SHOULDER LESION, RIGHT: Primary | ICD-10-CM

## 2022-06-27 PROCEDURE — 99024 POSTOP FOLLOW-UP VISIT: CPT

## 2022-06-29 NOTE — PROGRESS NOTES
Patient was seen today for suture removal, excision of BCC x2 left shoulder on 4/25/22  Suture was identified and removed without difficulty  Incision appeared dry and intact  Patient has no concerns

## 2022-07-25 ENCOUNTER — TELEPHONE (OUTPATIENT)
Dept: HEMATOLOGY ONCOLOGY | Facility: CLINIC | Age: 57
End: 2022-07-25

## 2022-07-25 NOTE — TELEPHONE ENCOUNTER
Appointment Cancellation Or Reschedule     Person calling In Patient    Provider Dr Roge Mckeon   Office Visit Date and Time 7/26 8:20AM   Office Visit Location [facility]   Did patient want to reschedule their office appointment? If so, when was it scheduled to? Yes, 9/21 2:40PM   Is this patient calling to reschedule an infusion appointment? no   When is their next infusion appointment? no   Is this patient a Chemo patient? no   Reason for Cancellation or Reschedule Job conflict     If the patient is a treatment patient, please route this to the office nurse  If the patient is not on treatment, please route to the office MA  If the patient is a surgical oncology patient, please route to surg/onc clinical pool

## 2022-08-09 ENCOUNTER — TELEPHONE (OUTPATIENT)
Dept: OBGYN CLINIC | Facility: MEDICAL CENTER | Age: 57
End: 2022-08-09

## 2022-08-09 NOTE — TELEPHONE ENCOUNTER
Abhijit Bui, for the best info patient could contact medical records to request her operative report from surgery with Dr Brenda Mcnamara  The implants are listed on that document  Regardless, these implants are typically MRI safe but will obscure imaging

## 2022-08-09 NOTE — TELEPHONE ENCOUNTER
Patient had surgery with Dr Amanda Thakkar for OPEN REDUCTION W/ INTERNAL FIXATION left distal humerus fracture, possible olecranon osteotomy (Left Elbow)  She is asking for the name of the hardware in her elbow, can she get an MRI with this hardware and what is it made of? She is asking for a call back relating this  Leave a message if not available       # Cell # [id number] or [phone]

## 2022-08-17 DIAGNOSIS — J45.990 ASTHMA, EXERCISE INDUCED: ICD-10-CM

## 2022-08-17 RX ORDER — ALBUTEROL SULFATE 90 UG/1
2 AEROSOL, METERED RESPIRATORY (INHALATION) EVERY 6 HOURS PRN
Qty: 8 G | Refills: 0 | Status: SHIPPED | OUTPATIENT
Start: 2022-08-17

## 2022-09-07 ENCOUNTER — OFFICE VISIT (OUTPATIENT)
Dept: PLASTIC SURGERY | Facility: CLINIC | Age: 57
End: 2022-09-07

## 2022-09-07 DIAGNOSIS — C44.612 BASAL CELL CARCINOMA (BCC) OF RIGHT SHOULDER: Primary | ICD-10-CM

## 2022-09-07 PROCEDURE — RECHECK: Performed by: PHYSICIAN ASSISTANT

## 2022-09-07 NOTE — PROGRESS NOTES
Assessment/Plan:   Ryan Padilla is a 42-year-old female who is status post biopsy of the right shoulder skin lesion in June of 2022  She is also status post excision of basal cell carcinoma x2 from the left shoulder  Please see HPI  Her scars are healing nicely  The right shoulder biopsy site is well healed and the pathology revealed basal cell carcinoma  She would like to address this along with her upcoming fat grafting surgery to address contour deficiency of her breasts  I think this would be reasonable  She will follow-up in the next couple of months for a preop appointment  Diagnoses and all orders for this visit:    Basal cell carcinoma (BCC) of right shoulder          Subjective:     Patient ID: Liz Easton is a 56 y o  female  HPI   She denies any change in the lesion of her right shoulder  She denies any complaints regarding her left shoulder scars  She is using silicone scar sheeting  Review of Systems   Skin:        As per HPI  Objective:     Physical Exam  Skin:     Comments: Right shoulder biopsy site is well healed  The lesion does not appear to have grown  The left shoulder scars are well healed without evidence for hypertrophy or pigment change

## 2022-09-08 ENCOUNTER — TELEPHONE (OUTPATIENT)
Dept: PODIATRY | Facility: CLINIC | Age: 57
End: 2022-09-08

## 2022-09-08 DIAGNOSIS — G57.81 OTHER SPECIFIED MONONEUROPATHIES OF RIGHT LOWER LIMB: ICD-10-CM

## 2022-09-08 NOTE — TELEPHONE ENCOUNTER
Verbal order given to Jenna at [facility] per Dr Pradip Hutchins as script was set to print

## 2022-09-21 ENCOUNTER — TELEPHONE (OUTPATIENT)
Dept: HEMATOLOGY ONCOLOGY | Facility: CLINIC | Age: 57
End: 2022-09-21

## 2022-09-21 NOTE — TELEPHONE ENCOUNTER
Appointment Cancellation Or Reschedule     Person calling in Patient    Provider Dr Roge Mckeon   Office Visit Date and Time 10/19 at 10:20am   Office Visit Location [facility]   Did patient want to reschedule their office appointment? If so, when was it scheduled to? Yes  10/19 at 8am      Did you have STAR scheduled for this appointment? No    Do you need STAR set up for your new appointment? If yes, please send to "PATIENT RIDESHARE" pool for STAR rescheduling N/A   If you are cancelling appointment, can we notify STAR to cancel ride? If yes, please send to "PATIENT RIDESHARE" pool for STAR to cancel service N/A   Is this patient calling to reschedule an infusion appointment? no   When is their next infusion appointment? n/a   Is this patient a Chemo patient? no   Reason for Cancellation or Reschedule Patient calling in requesting an earlier morning appointment time  If the patient is a treatment patient, please route this to the office nurse  If the patient is not on treatment, please route to the office MA  If the patient is a surgical oncology patient, please route to surg/onc clinical pool

## 2022-10-06 DIAGNOSIS — G57.81 OTHER SPECIFIED MONONEUROPATHIES OF RIGHT LOWER LIMB: ICD-10-CM

## 2022-10-10 DIAGNOSIS — Z42.1 AFTERCARE POSTMASTECTOMY FOR BREAST RECONSTRUCTION: Primary | ICD-10-CM

## 2022-10-10 NOTE — PROGRESS NOTES
Referral placed for outpatient mastectomy boutique with [facility]

## 2022-10-11 PROBLEM — A41.9 SEPSIS (HCC): Status: RESOLVED | Noted: 2022-05-11 | Resolved: 2022-10-11

## 2022-10-19 ENCOUNTER — OFFICE VISIT (OUTPATIENT)
Dept: HEMATOLOGY ONCOLOGY | Facility: CLINIC | Age: 57
End: 2022-10-19
Payer: COMMERCIAL

## 2022-10-19 VITALS
SYSTOLIC BLOOD PRESSURE: 122 MMHG | RESPIRATION RATE: 16 BRPM | HEIGHT: 66 IN | HEART RATE: 70 BPM | TEMPERATURE: 98.4 F | BODY MASS INDEX: 28.25 KG/M2 | DIASTOLIC BLOOD PRESSURE: 80 MMHG

## 2022-10-19 DIAGNOSIS — Z85.3 HISTORY OF BILATERAL BREAST CANCER: Primary | ICD-10-CM

## 2022-10-19 PROCEDURE — 99214 OFFICE O/P EST MOD 30 MIN: CPT | Performed by: INTERNAL MEDICINE

## 2022-10-19 NOTE — PROGRESS NOTES
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 , as well as a history of radiation treatment post lumpectomy  She developed bilateral breast cancer  Both of which are ER/NY positive, HER-2 negative disease  She has stage IA on the right, as well as stage IIA on the left with one positive lymph node  She underwent bilateral mastectomy with reconstruction resulting in the FRANKY in   Her left-sided breast cancer has Oncotype DX score of only 7  Therefore, adjuvant chemotherapy was not indicated  He had postmastectomy radiation therapy to the left chest wall  Currently, she is on adjuvant hormonal therapy with tamoxifen with no side effect  She has no evidence recurrent disease, based on her symptoms and physical examinations  I recommended her to continue tamoxifen until 2024 to complete 10 years of hormonal therapy  She is in agreement with my recommendation  I will see her again in a year for routine follow-up         Subjective:      HPI:  A 54year old premenopausal woman, who has 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 [place], where she underwent stereotactic biopsy, which showed precancerous lesions  She underwent lumpectomy, which showed no evidence of malignancy  In 2014, she appreciated the lump in the left breast  Subsequently, she was found to have abnormality of bilateral breast  She underwent bilateral mastectomy in 2014  Right mastectomy specimen showed up to 7 mm of invasive ductal carcinoma grade 1 ER [place] positive, HER-2 negative disease   7  [place] were all negative for the metastatic disease in the right axilla  Left mastectomy specimen showed multifocal invasive ductal carcinoma with tumor measuring up to 15 mm, grade 2  This was ER/MT positive, HER-2 negative disease  One sentinel was positive for metastatic disease  There was some evidence of focal extranodal extension  She had immediate reconstruction with tissue expander  She presents today to discuss adjuvant treatment options  Her left breast tumor was sent for the Oncotype DX testing which came back recurrence score of 7, which is low risk disease  Previously, she was tested for BRCA gene mutation in 2013, which was negative  She does not know the family history, since she was adopted  Currently, she has no complaints except for some chest tightness  She has no respiratory symptoms  Her weight is stable  She has no bone pain  Her performance status is normal  She is to have 2 surgical drain in each axilla              Interval History:  A 49 year old premenopausal woman, who has 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  She underwent lumpectomy followed by radiation therapy  In June 2014, she was diagnosed with bilateral invasive breast cancer, stage IA, on the right and stage IIA on the left, both of which ER/MT positive, HER-2 negative disease  Her Oncotype DX score on the left breast cancer with only 7  Therefore, adjuvant chemotherapy was not indicated  She underwent bilateral mastectomy with reconstruction  She completed postmastectomy radiation therapy to the left chest wall  She is currently on adjuvant hormonal therapy with tamoxifen  She presents today for routine follow-up  Her last menstrual cycle was in April 2020  She is tolerating tamoxifen well  She has no new complaint  She denied any pain  Her weight is stable  She has no respiratory symptoms  Her performance status is normal         Objective:      Primary Diagnosis:     1   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    2  Stage IA right breast cancer, grade 1, ER/NY positive, HER-2 negative disease  Diagnosed in June 2014    3  Stage IIA left breast cancer, grade 2, ER/NY positive, HER-2 negative disease with one positive lymph node  Oncotype DX recurrence score 7  Diagnosed in June 2014    4  BRCA mutation negative        Cancer Staging:  Cancer Staging  No matching staging information was found for the patient         Previous Hematologic/ Oncologic Treatment:            Current Hematologic/ Oncologic Treatment:       Adjuvant hormonal therapy with tamoxifen 20 mg once a day since July 2014        Disease Status:      FARNKY status post bilateral mastectomy      Test Results:     Pathology:     Right mastectomy specimen showed a 7 mm of invasive ductal carcinoma, grade 1, ER/NY positive, HER-2 negative disease  7  [place] lymph nodes were all negative for metastatic disease  Stage IA(pT1b,pN0,M0) mastectomy specimen showed up to 15 mm of invasive ductal carcinoma, multifocal, grade 2  ER/NY positive, HER-2 negative disease  One sentinel lymph node was positive for metastatic disease with focal extranodal extension  Stage IIA(pT1c, pN1a, M0)        Radiology:      CT scan of chest abdomen pelvis May 2022 showed no evidence of metastatic disease      Laboratory:          Physical Exam:      General Appearance:    Alert, oriented        Eyes:    PERRL   Ears:    Normal external ear canals, both ears   Nose:   Nares normal, septum midline   Throat:   Mucosa moist  Pharynx without injection  Neck:   Supple       Lungs:     Clear to auscultation bilaterally   Chest Wall:    No tenderness or deformity    Heart:    Regular rate and rhythm       Abdomen:     Soft, non-tender, bowel sounds +, no organomegaly           Extremities:   Extremities no cyanosis or edema       Skin:   no rash or icterus      Lymph nodes:   Cervical, supraclavicular, and axillary nodes normal   Neurologic:   CNII-XII intact, normal strength, sensation and reflexes     Throughout Breast exam:   status post bilateral mastectomy with reconstruction  No palpable abnormality in either reconstructed breast or chest wall            ROS: Review of Systems   All other systems reviewed and are negative  Imaging: No results found  Labs:   Lab Results   Component Value Date    WBC 4 51 05/12/2022    HGB 11 8 05/12/2022    HCT 36 5 05/12/2022    MCV 92 05/12/2022     05/12/2022     Lab Results   Component Value Date     06/24/2015    K 3 3 (L) 05/12/2022     05/12/2022    CO2 29 05/12/2022    ANIONGAP 11 06/24/2015    BUN 5 05/12/2022    CREATININE 0 55 (L) 05/12/2022    GLUCOSE 80 06/24/2015    GLUF 94 04/20/2022    CALCIUM 8 4 05/12/2022    AST 18 05/11/2022    ALT 12 05/11/2022    ALKPHOS 50 05/11/2022    PROT 7 6 05/27/2014    BILITOT 0 3 05/27/2014    EGFR 106 05/12/2022           Current Medications: Reviewed  Allergies: Reviewed  PMH/FH/SH:  Reviewed      Vital Sign:    Body surface area is 1 89 meters squared      Wt Readings from Last 3 Encounters:   05/26/22 79 4 kg (175 lb)   04/19/22 79 4 kg (175 lb)   01/07/22 77 1 kg (170 lb)        Temp Readings from Last 3 Encounters:   10/19/22 98 4 °F (36 9 °C)   05/13/22 98 7 °F (37 1 °C)   04/25/22 (!) 97 4 °F (36 3 °C)        BP Readings from Last 3 Encounters:   10/19/22 122/80   05/26/22 125/75   05/13/22 121/73         Pulse Readings from Last 3 Encounters:   10/19/22 70   05/26/22 79   05/13/22 67     @LASTSAO2(3)@

## 2022-10-25 DIAGNOSIS — Z85.3 HISTORY OF BILATERAL BREAST CANCER: ICD-10-CM

## 2022-10-25 DIAGNOSIS — Z42.1 AFTERCARE POSTMASTECTOMY FOR BREAST RECONSTRUCTION: Primary | ICD-10-CM

## 2022-10-25 LAB
DME PARACHUTE DELIVERY DATE REQUESTED: NORMAL
DME PARACHUTE ITEM DESCRIPTION: NORMAL
DME PARACHUTE ORDER STATUS: NORMAL
DME PARACHUTE SUPPLIER NAME: NORMAL
DME PARACHUTE SUPPLIER PHONE: NORMAL

## 2022-10-27 ENCOUNTER — OFFICE VISIT (OUTPATIENT)
Dept: PODIATRY | Facility: CLINIC | Age: 57
End: 2022-10-27
Payer: COMMERCIAL

## 2022-10-27 VITALS
HEIGHT: 66 IN | DIASTOLIC BLOOD PRESSURE: 86 MMHG | SYSTOLIC BLOOD PRESSURE: 137 MMHG | HEART RATE: 80 BPM | BODY MASS INDEX: 28.25 KG/M2

## 2022-10-27 DIAGNOSIS — M25.372 ANKLE JOINT INSTABILITY, LEFT: ICD-10-CM

## 2022-10-27 DIAGNOSIS — M24.875 OTHER SPECIFIC JOINT DERANGEMENTS LEFT FOOT, NOT ELSEWHERE CLASSIFIED: ICD-10-CM

## 2022-10-27 DIAGNOSIS — G57.81 OTHER SPECIFIED MONONEUROPATHIES OF RIGHT LOWER LIMB: Primary | ICD-10-CM

## 2022-10-27 PROCEDURE — 64450 NJX AA&/STRD OTHER PN/BRANCH: CPT | Performed by: PODIATRIST

## 2022-10-27 PROCEDURE — 99213 OFFICE O/P EST LOW 20 MIN: CPT | Performed by: PODIATRIST

## 2022-10-27 RX ORDER — BUPIVACAINE HYDROCHLORIDE 5 MG/ML
2 INJECTION, SOLUTION EPIDURAL; INTRACAUDAL ONCE
Status: COMPLETED | OUTPATIENT
Start: 2022-10-27 | End: 2022-10-27

## 2022-10-27 RX ORDER — TRIAMCINOLONE ACETONIDE 40 MG/ML
20 INJECTION, SUSPENSION INTRA-ARTICULAR; INTRAMUSCULAR ONCE
Status: COMPLETED | OUTPATIENT
Start: 2022-10-27 | End: 2022-10-27

## 2022-10-27 RX ADMIN — TRIAMCINOLONE ACETONIDE 20 MG: 40 INJECTION, SUSPENSION INTRA-ARTICULAR; INTRAMUSCULAR at 17:33

## 2022-10-27 RX ADMIN — BUPIVACAINE HYDROCHLORIDE 2 ML: 5 INJECTION, SOLUTION EPIDURAL; INTRACAUDAL at 17:33

## 2022-10-27 NOTE — PROGRESS NOTES
PATIENT:  Gil Acuña  1965         ASSESSMENT:     1  Other specified mononeuropathies of right lower limb  Diclofenac Sodium (VOLTAREN) 1 %    bupivacaine (PF) (MARCAINE) 0 5 % injection 2 mL    triamcinolone acetonide (KENALOG-40) 40 mg/mL injection 20 mg    Nerve block   2  Other specific joint derangements left foot, not elsewhere classified  Ambulatory referral to Physical Therapy   3  Ankle joint instability, left  Ambulatory referral to Physical Therapy         PLAN:  1  Reviewed previous office note  Patient was counseled and educated on the condition and the diagnosis  2  The diagnosis, treatment options and prognosis were discussed with the patient  3  Discussed options and will try another nerve block  See procedure  4  Instructed supportive care, icing, resting, and arch support  5  Rx: Voltaren gel  6  Referred her to PT for left foot instability  7  RA in 8 weeks  Nerve block    Date/Time: 10/27/2022 4:48 PM  Performed by: Selene Jones DPM  Authorized by: Selene Jones DPM     Patient location:  Bedside  [place] Protocol:  Consent: Verbal consent obtained  Risks and benefits: risks, benefits and alternatives were discussed  Consent given by: patient  Time out: Immediately prior to procedure a "time out" was called to verify the correct patient, procedure, equipment, support staff and site/side marked as required    Timeout called at: 10/27/2022 4:48 PM   Patient understanding: patient states understanding of the procedure being performed  Site marked: the operative site was marked  Patient identity confirmed: verbally with patient      Indications:     Indications:  Pain relief and procedural anesthesia  Location:     Body area:  Lower extremity    Lower extremity nerve:  Sural (Medial calcaneal nerve (Not sural nerve))    Laterality:  Right  Pre-procedure details:     Skin preparation:  Alcohol    Preparation: Patient was prepped and draped in usual sterile fashion    Skin anesthesia (see MAR for exact dosages):     Skin anesthesia method:  Topical application    Topical anesthesia: Ethyl Chloride spray  Procedure details (see MAR for exact dosages): Block needle gauge:  25 G    Anesthetic injected:  Bupivacaine 0 5% w/o epi    Steroid injected:  Triamcinolone    Injection procedure:  Anatomic landmarks identified and anatomic landmarks palpated  Post-procedure details:     Dressing:  Sterile dressing    Outcome:  Anesthesia achieved    Patient tolerance of procedure: Tolerated well, no immediate complications            Subjective:       HPI  The patient presents with chief complaint of recurring pain around right heel  It radiates with tingling sensation  No edema  She does not recall any injury  She also complains that her left foot feels weak when she stands up in the morning  The following portions of the patient's history were reviewed and updated as appropriate: allergies, current medications, past family history, past medical history, past social history, past surgical history and problem list   All pertinent labs and images were reviewed        Past Medical History  Past Medical History:   Diagnosis Date   • Abnormal findings on diagnostic imaging of breast     Last Assessesd: 4/25/2014   • Arthritis     Knees and feet   • Cancer [facility])     breast   • Difficulty falling asleep    • Gluteal abscess     Last Assessed: 12/30/2016   • Limb alert care status     right   • Pain in left foot    • Pneumonia 2009    x1   • PONV (postoperative nausea and vomiting)    • Skin cancer        Past Surgical History  Past Surgical History:   Procedure Laterality Date   • BASAL CELL CARCINOMA EXCISION N/A 1/7/2021    Procedure: EXCISION BASAL CELL CARCINOMA OF MID BACK;  Surgeon: Jessi Mccray MD;  Location:  MAIN OR;  Service: Plastics   • BASAL CELL CARCINOMA EXCISION Left 4/25/2022    Procedure: EXCISION OF BCC (X2) LEFT SHOULDER WITH COMPLEX CLOSURE; Surgeon: Edith Escobar MD;  [facility];  Service: Plastics   • BREAST BIOPSY  05/02/2014   • BREAST BIOPSY  05/2013   • BREAST BIOPSY  05/2008   • BREAST LUMPECTOMY Right 2008   • BREAST RECONSTRUCTION Left 2/21/2019    Procedure: BREAST EXPANDER/IMPLANT EXCHANGE;  Surgeon: Edith Escobar MD;  [facility];  Service: Plastics   • BREAST SURGERY Bilateral 2008, 2013, 2014    Breast reconstruction with expanders  Total of 7 surgeries  7/3/14 right breast reconstruction revision  10/27/14 right breast reconstruction revision    • BREAST SURGERY Left 07/24/2013    Excision of breast lesion   • CAPSULOTOMY Left 2/21/2019    Procedure: CAPSULECTOMY;  Surgeon: Edith Escobar MD;  [facility];  Service: Plastics   • CHEST WALL BIOPSY Right 2/21/2019    Procedure: UPPER CHEST BASAL CELL CARSINOMA EXCISION;  Surgeon: Edith Escobar MD;  [facility];  Service: Plastics   • CLOSURE WOUND N/A 2/21/2019    Procedure: RIGHT upper chest & LEFT upper back complex closure;  Surgeon: Edith Escobar MD;  [facility];  Service: Plastics   • COLONOSCOPY     • COMPLEX WOUND CLOSURE TO EXTREMITY N/A 1/7/2021    Procedure: COMPLEX CLOSURE MID BACK;  Surgeon: Edith Escobar MD;  [facility];  Service: Plastics   • FOOT ARTHRODESIS      Pantalar   • KNEE ARTHROSCOPY Right 05/1997   • LIPOSUCTION W/ FAT INJECTION Bilateral 10/24/2019    Procedure: AUTOLOGOUS FAT GRAFTING TO BILATERAL BREAST;  Surgeon: Edith Escobar MD;  [facility];  Service: Plastics   • LIPOSUCTION W/ FAT INJECTION Bilateral 1/7/2021    Procedure: AUTOLOGOUS FAT GRAFTING TO BILATERAL BREAST;  Surgeon: Edith Escobar MD;  [facility];  Service: Plastics   • MASTECTOMY Bilateral 06/06/2014    Lymph nodes removed bilaterally  States she may have IV's in left side     • MOHS RECONSTRUCTION Right 3/7/2019    Procedure: RECONSTRUCTION MOHS DEFECT RIGHT NOSE/MEDIAL CANTHUS; Surgeon: Edith Escobar MD;  [facility];  Service: Plastics   • MOHS SURGERY      Mohs Micrographic Surgery Face; Last Assessed: 5/15/2015   • TN ADJ TISS Odella [place] 10 1-30 SQCM Right 3/7/2019    Procedure: FLAP;  Surgeon: Edith Escobar MD;  [facility];  Service: Plastics   • TN BREAST RECONSTRUCTION W/LATISSIMUS DORSI FLAP Left 9/7/2017    Procedure: BREAST RECONSTRUCTION; LATISSIMUS DORSI FLAP; TISSUE EXPANDER;  Surgeon: Edith Escobar MD;  [facility];  Service: Plastics   • TN COLONOSCOPY FLX DX W/COLLJ SPEC WHEN PFRMD N/A 8/1/2018    Procedure: COLONOSCOPY;  Surgeon: Ree Thomas MD;  [facility];  Service: Gastroenterology   • [place] U  97  W/SESMDC W/RESCJ PROX 404 [facility]  9/9/2016    Procedure: [place] MODIFIED ;  Surgeon: Antonieta Aguirre DPM;  [facility];  Service: Podiatry   • TN EXC SKIN MALIG >4 CM TRUNK,ARM,LEG N/A 6/27/2019    Procedure: EXCISION BASAL CELL CARCINOMA MID CHEST;  Surgeon: Edith Escobar MD;  [facility];  Service: Plastics   • TN FULL THICK 2011 [facility] NOS,EAR,LID <20 SQCM Right 3/7/2019    Procedure: SKIN GRAFT FULL THICKNESS  (FTSG);   Surgeon: Edith Escobar MD;  [facility];  Service: Plastics   • TN FUSION FOOT BONES,MIDTARSAL,MULTI Left 9/9/2016    Procedure: ARTHRODESIS FIRST [address] [id number] ;  Surgeon: Antonieta Aguirre DPM;  [facility];  Service: Podiatry   • TN HYSTEROSCOPY,W/ENDO BX N/A 1/7/2022    Procedure: DILATATION AND CURETTAGE (D&C) WITH HYSTEROSCOPY, POLYPECTOMY ;  Surgeon: Danette Rajan MD;  [facility];  Service: Gynecology   • TN INSERTION BREAST IMPLANT SAME DAY OF MASTECTOMY Left 9/7/2017    Procedure: TISSUE EXPANDER;  Surgeon: Edith Escobar MD;  [facility];  Service: Plastics   • TN OPEN 2016 [facility] Left 12/11/2019    Procedure: OPEN REDUCTION W/ INTERNAL FIXATION left distal humerus fracture, possible olecranon osteotomy;  Surgeon:  Anna Mcnair MD;  Location: [address] [place] [address] MAIN OR;  Service: Orthopedics   • MD OSTEOTOMY HEEL BONE Left 9/9/2016    Procedure: OSTEOTOMY CALCANEUS WITH APPLICATION AND ACTIVATION OF BONE STIMULATOR ;  Surgeon: Selene Jones DPM;  [facility];  Service: Podiatry   • MD RECMPL WND HEAD,FAC,HAND 2 6-7 5 CM N/A 3/7/2019    Procedure: COMPLEX CLOSURE;  Surgeon: Trinidad Marcum MD;  [facility];  Service: Plastics   • MD RECMPL WND TRUNK 2 6-7 5 CM N/A 6/27/2019    Procedure: CLOSURE WOUND MID CHEST;  Surgeon: Trinidad Marcum MD;  [facility];  Service: Plastics   • MD REMV TISSUE FOR GRAFT OTHR Bilateral 6/27/2019    Procedure: AUTOLOGOUS FAT GRAFTING BILATERAL BREASTS;  Surgeon: Trinidad Marcum MD;  [facility];  Service: Plastics   • MD REVISION OF RECONSTRUCTED BREAST Bilateral 6/27/2019    Procedure: BILATERAL BREAST MOUND REVISION;  Surgeon: rTinidad Marcum MD;  [facility];  Service: Plastics   • MD REVISION OF RECONSTRUCTED BREAST Bilateral 10/24/2019    Procedure: BILATERAL BREAST MOUND REVISION;  Surgeon: Trinidad Marcum MD;  [facility];  Service: Plastics   • MD REVISION OF RECONSTRUCTED BREAST Bilateral 1/7/2021    Procedure: BILATERAL BREAST MOUND REVISION;  Surgeon: Trinidad Marcum MD;  [facility];  Service: Plastics   • SENTINEL LYMPH NODE BIOPSY Bilateral 06/06/2014   • SENTINEL LYMPH NODE BIOPSY Right 2008   • SKIN BIOPSY     • SKIN LESION EXCISION Left 2/21/2019    Procedure: UPPER BACK [address] EXCISION;  Surgeon: Trinidad Marcum MD;  [facility];  Service: Plastics   • SQUAMOUS CELL CARCINOMA EXCISION N/A 3/7/2019    Procedure: EXCISION BCC LEFT FOREHEAD;  Surgeon: Trinidad Marcum MD;  [facility];  Service: Plastics   • WISDOM TOOTH EXTRACTION          Allergies:  Codeine, Dilaudid [hydromorphone hcl], Hydromorphone, Tramadol, and Other    Medications:  Current Outpatient Medications   Medication Sig Dispense Refill   • acetaminophen (TYLENOL) 500 mg tablet Take 500 mg by mouth every 6 (six) hours as needed for mild pain  • albuterol (Ventolin HFA) 90 mcg/act inhaler Inhale 2 puffs every 6 (six) hours as needed for wheezing or shortness of breath 8 g 0   • ALPRAZolam (XANAX) 1 mg tablet Take 1 mg by mouth daily at bedtime     • Ascorbic Acid (VITAMIN C) 500 MG CAPS Take 500 mg by mouth in the morning       • calcium-vitamin D (OSCAL) 250-125 MG-UNIT per tablet Take 2 tablets by mouth daily  • Cholecalciferol (VITAMIN D) 2000 units CAPS Take by mouth in the morning       • Diclofenac Sodium (VOLTAREN) 1 % Apply 2 g topically 4 (four) times a day 300 g 5   • famotidine (PEPCID) 20 mg tablet Take 20 mg by mouth 2 (two) times a day as needed       • Lactobacillus (ACIDOPHILUS PO) Take by mouth daily  • Multiple Vitamin (MULTIVITAMIN) capsule Take 1 capsule by mouth daily  • omeprazole (PriLOSEC) 20 mg delayed release capsule Take 20 mg by mouth as needed       • tamoxifen (NOLVADEX) 20 mg tablet Take 1 tablet (20 mg total) by mouth daily 90 tablet 3   • zinc gluconate 50 mg tablet Take by mouth daily       • ALPRAZolam (XANAX) 0 5 mg tablet Take 0 5 mg by mouth daily at bedtime as needed for anxiety  (Patient not taking: No sig reported)     • dicyclomine (BENTYL) 10 mg capsule Take 1 capsule (10 mg total) by mouth 4 (four) times a day (before meals and at bedtime) for 15 days 60 capsule 0     No current facility-administered medications for this visit         Social History:  Social History     Socioeconomic History   • Marital status: /Civil Union     Spouse name: None   • Number of children: None   • Years of education: None   • Highest education level: None   Occupational History   • None   Tobacco Use   • Smoking status: Never Smoker   • Smokeless tobacco: Never Used   Vaping Use   • Vaping Use: Never used   Substance and Sexual Activity   • Alcohol use: Yes     Alcohol/week: 4 0 standard drinks     Types: 4 Glasses of wine per week     Comment: 4 weekly-wine   • Drug use: No   • Sexual activity: Yes     Partners: Male     Birth control/protection: Condom Male   Other Topics Concern   • None   Social History Narrative   • None     Social Determinants of Health     Financial Resource Strain: Not on file   Food Insecurity: Not on file   Transportation Needs: Not on file   Physical Activity: Not on file   Stress: Not on file   Social Connections: Not on file   Intimate Partner Violence: Not on file   Housing Stability: Not on file          Review of Systems   Constitutional: Negative for chills, fatigue and fever  Respiratory: Negative for cough, shortness of breath and wheezing  Cardiovascular: Negative for chest pain and palpitations  Gastrointestinal: Negative for diarrhea, nausea and vomiting  Musculoskeletal: Negative for arthralgias and gait problem  Skin: Negative for color change, pallor, rash and wound  Hematological: Negative  Objective:    /86   Pulse 80   Ht 5' 6" (1 676 m) Comment: verbal  BMI 28 25 kg/m²        Physical Exam  Vitals reviewed  Constitutional:       General: She is not in acute distress  Appearance: Normal appearance  She is well-developed  She is not toxic-appearing  Cardiovascular:      Rate and Rhythm: Normal rate and regular rhythm  Pulses: Normal pulses  Dorsalis pedis pulses are 2+ on the right side and 2+ on the left side  Posterior tibial pulses are 2+ on the right side and 2+ on the left side  Pulmonary:      Effort: Pulmonary effort is normal  No respiratory distress  Musculoskeletal:         General: Deformity present  No swelling, tenderness or signs of injury  Right lower leg: No edema  Left lower leg: No edema  Right foot: No foot drop  Left foot: No foot drop        Comments: Pain and Tinel sign along the course of medial calcaneal nerve on right medial heel and ankle  No acute finding in left foot  Heel rise intact left  Good surgical correction left foot  Skin:     General: Skin is warm  Coloration: Skin is not cyanotic or mottled  Findings: No abscess, ecchymosis, erythema, rash or wound  Nails: There is no clubbing  Neurological:      General: No focal deficit present  Mental Status: She is alert and oriented to person, place, and time  Cranial Nerves: No cranial nerve deficit  Sensory: No sensory deficit  Motor: No weakness or abnormal muscle tone  Coordination: Coordination normal    Psychiatric:         Mood and Affect: Mood normal          Behavior: Behavior normal          Thought Content:  Thought content normal          Judgment: Judgment normal

## 2022-10-28 DIAGNOSIS — Z98.890 STATUS POST BILATERAL BREAST RECONSTRUCTION: ICD-10-CM

## 2022-10-28 DIAGNOSIS — Z85.3 PERSONAL HISTORY OF MALIGNANT NEOPLASM OF BREAST: ICD-10-CM

## 2022-10-28 DIAGNOSIS — Z42.1 AFTERCARE POSTMASTECTOMY FOR BREAST RECONSTRUCTION: ICD-10-CM

## 2022-10-28 DIAGNOSIS — Z85.3 HISTORY OF BILATERAL BREAST CANCER: Primary | ICD-10-CM

## 2022-10-31 ENCOUNTER — TELEPHONE (OUTPATIENT)
Dept: PODIATRY | Facility: CLINIC | Age: 57
End: 2022-10-31

## 2022-10-31 DIAGNOSIS — M21.42 ACQUIRED PES PLANUS, LEFT: Primary | ICD-10-CM

## 2022-10-31 NOTE — TELEPHONE ENCOUNTER
Caller: Pt    Doctor: Kendell    Reason for call: Pt  Needs xray of left foot  Can we put it in her chart?     Call back#: [phone]

## 2022-11-15 ENCOUNTER — OFFICE VISIT (OUTPATIENT)
Dept: PLASTIC SURGERY | Facility: CLINIC | Age: 57
End: 2022-11-15

## 2022-11-15 DIAGNOSIS — Z42.1 AFTERCARE POSTMASTECTOMY FOR BREAST RECONSTRUCTION: ICD-10-CM

## 2022-11-15 DIAGNOSIS — E66.9 OBESITY (BMI 30-39.9): Primary | ICD-10-CM

## 2022-11-15 NOTE — PROGRESS NOTES
Assessment/Plan:  Please see HPI  We discussed the benefit of deferring abdominoplasty until she has been able to achieve her desired weight and the weight has been stable for several/6 months or thereabouts  She understands the logic/reasoning behind this  We discussed excision of the basal cell carcinoma of the right shoulder, as well as autologous fat grafting to both breasts, how the procedures will be performed, as well as potential risks, complications, and limitations  These include, but not limited to infection, bleeding, scarring, asymmetry, contour deformity, lumps/bumps/cyst formation, potential for fat necrosis, unfavorable contour, need for additional surgery/procedures, excess/redundant skin, donor site defects, etcetera etcetera  She understands, her questions were answered to her satisfaction and consent was obtained  She will work with the weight management team regarding weight loss and we will proceed with more discussion regarding abdominoplasty as she nears her desired weight  Diagnoses and all orders for this visit:    Obesity (BMI 30-39 9)  -     Ambulatory Referral to Weight Management; Future          Subjective: Interested in fat grafting to bilateral reconstructed breasts, excision of basal cell carcinoma right shoulder, and potentially abdominoplasty     Patient ID: Mary Calero is a 56 y o  female  She is accompanied today by co-worker [facility] Cherry Hair presents for discussion regarding excision of biopsy-proven basal cell carcinoma of the right anterior shoulder, as well as her interest in autologous fat grafting to the breasts, and she is interested in abdominoplasty  She states that her current weight is 175-180 lb, confirmed at 180 in the office today  Her stated goal weight is in the 145-150 lb range, she is amenable to working with the weight management team regarding weight loss strategies      The following portions of the patient's history were reviewed and updated as appropriate: allergies, current medications, past family history, past medical history, past social history, past surgical history and problem list     Review of Systems   Constitutional: Negative for chills and fever  HENT: Negative for hearing loss  Eyes: Positive for visual disturbance  Negative for discharge  Respiratory: Negative for chest tightness and shortness of breath  Cardiovascular: Negative for chest pain and leg swelling  Gastrointestinal: Negative for blood in stool, constipation, diarrhea and nausea  Genitourinary: Negative for dysuria  Musculoskeletal: Negative for gait problem  Skin: Negative for rash  Allergic/Immunologic: Negative for immunocompromised state  Neurological: Negative for seizures and headaches  Hematological: Does not bruise/bleed easily  Psychiatric/Behavioral: Negative for dysphoric mood  The patient is not nervous/anxious  Objective: There were no vitals taken for this visit  Physical Exam  HENT:      Head: Normocephalic and atraumatic  Eyes:      Extraocular Movements: Extraocular movements intact  Pupils: Pupils are equal, round, and reactive to light  Cardiovascular:      Rate and Rhythm: Normal rate  Pulmonary:      Effort: Pulmonary effort is normal    Abdominal:      Palpations: Abdomen is soft  Musculoskeletal:         General: Normal range of motion  Cervical back: Normal range of motion and neck supple  Skin:     General: Skin is warm  Comments: Bilateral reconstructed breast with implants in good position, both breasts were soft and supple, there was a volume discrepancy with the right breast being somewhat smaller than the right, there are some areas of hollowing/dimpling of the bilateral reconstructed breasts, there are no signs of unusual inflammation or infection, see photos   Neurological:      Mental Status: She is alert and oriented to person, place, and time     Psychiatric: Mood and Affect: Mood normal

## 2022-11-17 ENCOUNTER — OFFICE VISIT (OUTPATIENT)
Dept: SURGICAL ONCOLOGY | Facility: CLINIC | Age: 57
End: 2022-11-17

## 2022-11-17 VITALS
TEMPERATURE: 97.6 F | RESPIRATION RATE: 16 BRPM | HEART RATE: 66 BPM | SYSTOLIC BLOOD PRESSURE: 120 MMHG | BODY MASS INDEX: 28.25 KG/M2 | DIASTOLIC BLOOD PRESSURE: 80 MMHG | OXYGEN SATURATION: 98 % | HEIGHT: 66 IN

## 2022-11-17 DIAGNOSIS — Z13.71 BRCA GENE MUTATION NEGATIVE: ICD-10-CM

## 2022-11-17 DIAGNOSIS — C50.811 MALIGNANT NEOPLASM OF OVERLAPPING SITES OF BOTH BREASTS IN FEMALE, ESTROGEN RECEPTOR POSITIVE (HCC): Primary | ICD-10-CM

## 2022-11-17 DIAGNOSIS — Z79.810 USE OF TAMOXIFEN (NOLVADEX): ICD-10-CM

## 2022-11-17 DIAGNOSIS — C50.812 MALIGNANT NEOPLASM OF OVERLAPPING SITES OF BOTH BREASTS IN FEMALE, ESTROGEN RECEPTOR POSITIVE (HCC): Primary | ICD-10-CM

## 2022-11-17 DIAGNOSIS — Z17.0 MALIGNANT NEOPLASM OF OVERLAPPING SITES OF BOTH BREASTS IN FEMALE, ESTROGEN RECEPTOR POSITIVE (HCC): Primary | ICD-10-CM

## 2022-11-17 PROBLEM — Z85.3 PERSONAL HISTORY OF BREAST CANCER: Status: ACTIVE | Noted: 2022-11-17

## 2022-11-17 PROBLEM — C50.912 BILATERAL BREAST CANCER (HCC): Status: ACTIVE | Noted: 2017-09-08

## 2022-11-17 PROBLEM — C50.911 BILATERAL BREAST CANCER (HCC): Status: ACTIVE | Noted: 2017-09-08

## 2022-11-17 RX ORDER — NYSTATIN 100000 [USP'U]/G
POWDER TOPICAL AS NEEDED
COMMUNITY
Start: 2022-11-14

## 2022-11-17 NOTE — PROGRESS NOTES
Surgical Oncology Follow Up       [facility] [place] [facility] [address]    Kenya Flores  1965  [id number]  [facility] SURGICAL ONCOLOGY [place]  [place] [address]    Chief Complaint   Patient presents with   • Follow-up       Assessment/Plan   Diagnoses and all orders for this visit:    Malignant neoplasm of overlapping sites of both breasts in female, estrogen receptor positive ([facility] [address] )  -     Ambulatory Referral to Oncology Genetics; Future    Use of tamoxifen (Nolvadex)    Other orders  -     nystatin (MYCOSTATIN) powder; Apply topically as needed        Advance Care Planning/Advance Directives:  Discussed disease status, cancer treatment plans and/or cancer treatment goals with the patient  Oncology History:    Oncology History   Bilateral breast cancer ([facility] [address] )   2008 Initial Diagnosis    Breast cancer ([facility] [address] )     5/2008 Surgery    Right breast stereotactic breast biopsy     2008 Surgery    Right breast lumpectomy, SLNB     5/2013 Surgery    Left breast stereotactic biopsy     7/24/2013 Surgery    Right breast excisional biopsy     5/2/2014 Biopsy    Left breast biopsy X 3 sites   Ultrasound guided left breast (200 position) core biopsy- 2 passes:     - Invasive ductal carcinoma, 0 25 cm maximum dimension, histologic grade   I-II, present on one core    - Combined Timbo score: 5-6/9   - Tubule formation: Less than 10% (3/3)   - Nuclear pleomorphism: Mild to moderate (1-2/3)   - Mitotic count: Fewer than 1 mitosis per 10 high power fields (1/3)  Ultrasound guided left breast (300 position) core biopsy 3 passes:   - Invasive ductal carcinoma, 0 5 cm maximum dimension, histologic grade I-II,   present on one core and possibly involving a second core    - Combined Worcester score: 5-6/9   - Tubule formation: Less than 10% (3/3)     - Nuclear pleomorphism: Mild to moderate (1-2/3)   - Mitotic count: Fewer than 1 mitosis per 10 high power fields (1/3)  Ultrasound guided left breast (500 position) core biopsy- 3 passes:   - Benign breast parenchyma    - No atypical ductal/lobular hyperplasia or malignancy is identified  Right breast US guided  Biopsy Ultrasound guided right breast core biopsy 3 passes:    - Invasive ductal carcinoma, 0 4 cm maximum dimension, histologic grade I,   present on one core and possibly involving a second core    - Combined Kinross score:4-5/9   -Tubule formation: 10-75% (2/3)   -Nuclear pleomorphism (1-2/3)   -Mitotic count: fewer than 1 mitosis per 10 HPFs  Comment: Block B1 is suggested if further studies are indicated for the left   breast  Block D2 is suggested if further studies are indicated for the right   breast            6/6/2014 Surgery      Right and left breast, mastectomy:         Right breast:  Invasive ductal carcinoma, NOS, Grade I of III,   7mm in greatest   dimension     -Margins:   - 2 mm from the posterior inked margin     - >2 mm from all other margins       - Ductal carcinoma in situ, solid, low nuclear grade,   1mm in greatest   dimension    - Margins:   - >2mm from all inked margins      - Negative for definitive lymphovascular invasion      - Negative for microcalcifications       - Uninvolved breast with columnar cell change, sclerosing adenosis,   fibroadenomatoid       change and dense stromal fibrosis    Left breast, mastectomy:       - Multifocal Invasive ductal carcinoma, NOS, Grade II of III,   15mm in   greatest dimension,        see note    - Other foci measure 12mm, 11mm and 8mm with similar morphology     - Margins:   - <1 mm from the anterior and posterior inked margin     - >2 mm from all other margins       - Multiple foci of Ductal carcinoma in situ, solid, low nuclear grade, see   note    - Margins:   - <1 mm from the anterior and posterior inked margin     - >2 mm from all other margins       - Negative for definitive lymphovascular invasion      - Microcalcifications in association with invasive carcinoma       - Intraductal papilloma      - Uninvolved breast with sclerosing adenosis, papillary apocrine cell   change, fibroadenomatoid       change, duct ectasia and dense stromal fibrosis          7/3/2014 Surgery    Revision right breast      7/2014 -  Hormone Therapy    Tamoxifen     9/17/2014 Surgery    latisimus dorsi flap, skin graft, replaced expander     10/10/2014 - 11/20/2014 Radiation    :  Course: C1    Plan ID Energy Fractions Dose per Fraction (cGy) Total Dose Delivered (cGy) Elapsed Days   Left PAB 6X 25 / 25 41 1,025 34   Left Sclav 6X 25 / 25 200 5,000 34   Lt CW 6X 13 / 13 200 2,600 34   Lt CW Bolus 6X 12 / 12 200 2,400 30   Lt CW e:1 6E/9E 5 / 5 200 1,000 6      Treatment dates:  C1: 10/10/2014 - 11/20/2014       10/27/2014 Surgery    Right breast revision     9/7/2017 Surgery    Left breast LDF reconstruction with expander         History of Present Illness:  Breast cancer follow-up, no breast referable concerns, states she will be having upcoming revision surgery for her reconstruction, continues on tamoxifen with no reported issues  -Interval History:  None    Review of Systems:  Review of Systems   Constitutional: Negative  Negative for appetite change and fever  Eyes: Negative  Respiratory: Negative for shortness of breath  Cardiovascular: Negative  Gastrointestinal: Negative  Endocrine: Negative  Genitourinary: Negative  Musculoskeletal: Negative  Negative for arthralgias and myalgias  Skin: Negative  Allergic/Immunologic: Negative  Neurological: Negative  Hematological: Negative  Negative for adenopathy  Does not bruise/bleed easily  Psychiatric/Behavioral: Negative          Patient Active Problem List   Diagnosis   • Surgical follow-up care   • Bilateral breast cancer [facility])   • Breast implant capsular contracture   • Aftercare postmastectomy for breast reconstruction   • Basal cell carcinoma (BCC) of skin of trunk   • Basal cell carcinoma (BCC) of right side of nose   • Closed bicondylar fracture of distal end of left humerus   • Arthritis   • PONV (postoperative nausea and vomiting)   • Dyspareunia, female   • Status post dilation and curettage   • Basal cell carcinoma of shoulder, left   • Asthma   • Gastroesophageal reflux disease   • Basal cell carcinoma (BCC) of left side of neck   • Enterocolitis   • Cat bite of left hand   • Use of tamoxifen (Nolvadex)   • Personal history of breast cancer     Past Medical History:   Diagnosis Date   • Arthritis     Knees and feet   • Difficulty falling asleep    • Gluteal abscess     Last Assessed: 12/30/2016   • Limb alert care status     right   • Pain in left foot    • Pneumonia 2009    x1   • PONV (postoperative nausea and vomiting)    • Skin cancer      Past Surgical History:   Procedure Laterality Date   • BASAL CELL CARCINOMA EXCISION N/A 1/7/2021    Procedure: EXCISION BASAL CELL CARCINOMA OF MID BACK;  Surgeon: Ryan Carr MD;  Location:  MAIN OR;  Service: Plastics   • BASAL CELL CARCINOMA EXCISION Left 4/25/2022    Procedure: EXCISION OF BCC (X2) LEFT SHOULDER WITH COMPLEX CLOSURE;  Surgeon: Ryan Carr MD;  Location: [facility] MAIN OR;  Service: Plastics   • BREAST BIOPSY  05/02/2014   • BREAST BIOPSY  05/2013   • BREAST BIOPSY  05/2008   • BREAST LUMPECTOMY Right 2008   • BREAST RECONSTRUCTION Left 2/21/2019    Procedure: BREAST EXPANDER/IMPLANT EXCHANGE;  Surgeon: Ryan Carr MD;  Location:  MAIN OR;  Service: Plastics   • BREAST SURGERY Bilateral 2008, 2013, 2014    Breast reconstruction with expanders  Total of 7 surgeries  7/3/14 right breast reconstruction revision   10/27/14 right breast reconstruction revision    • BREAST SURGERY Left 07/24/2013    Excision of breast lesion   • CAPSULOTOMY Left 2/21/2019    Procedure: CAPSULECTOMY;  Surgeon: Ryan Carr MD;  [facility];  Service: Plastics   • CHEST WALL BIOPSY Right 2/21/2019    Procedure: UPPER CHEST BASAL CELL CARSINOMA EXCISION;  Surgeon: Palmira Pan MD;  Location: [facility] MAIN OR;  Service: Plastics   • CLOSURE WOUND N/A 2/21/2019    Procedure: RIGHT upper chest & LEFT upper back complex closure;  Surgeon: Palmira Pan MD;  [facility] MAIN OR;  Service: Plastics   • COLONOSCOPY     • COMPLEX WOUND CLOSURE TO EXTREMITY N/A 1/7/2021    Procedure: COMPLEX CLOSURE MID BACK;  Surgeon: Palmira Pan MD;  [facility]: [facility] MAIN OR;  Service: Plastics   • FOOT ARTHRODESIS      Pantalar   • KNEE ARTHROSCOPY Right 05/1997   • LIPOSUCTION W/ FAT INJECTION Bilateral 10/24/2019    Procedure: AUTOLOGOUS FAT GRAFTING TO BILATERAL BREAST;  Surgeon: Palmira Pan MD;  [facility];  Service: Plastics   • LIPOSUCTION W/ FAT INJECTION Bilateral 1/7/2021    Procedure: AUTOLOGOUS FAT GRAFTING TO BILATERAL BREAST;  Surgeon: Palmira Pan MD;  Location: [facility] MAIN OR;  Service: Plastics   • MASTECTOMY Bilateral 06/06/2014    Lymph nodes removed bilaterally  States she may have IV's in left side  • MOHS RECONSTRUCTION Right 3/7/2019    Procedure: RECONSTRUCTION MOHS DEFECT RIGHT NOSE/MEDIAL CANTHUS;  Surgeon: Palmira Pan MD;  [facility]IN OR;  Service: Plastics   • MOHS SURGERY      Mohs Micrographic Surgery Face;  Last Assessed: 5/15/2015   • MN ADJ TISS Edger Nunez 10 1-30 SQCM Right 3/7/2019    Procedure: FLAP;  Surgeon: Palmira Pan MD;  [facility]: [facility] MAIN OR;  Service: Plastics   • MN BREAST RECONSTRUCTION W/LATISSIMUS DORSI FLAP Left 9/7/2017    Procedure: BREAST RECONSTRUCTION; LATISSIMUS DORSI FLAP; TISSUE EXPANDER;  Surgeon: Palmira Pan MD;  [facility]: [facility]IN [facility];  Service: Plastics   • MN COLONOSCOPY FLX DX W/[facility] WHEN PFRMD N/A 8/1/2018    Procedure: COLONOSCOPY;  Surgeon: Niurka Nails MD;  Location: [facility] MAIN OR;  Service: Gastroenterology   • Piroska U  97  W/SESMDC W/RESCJ PROX PHAL  9/9/2016    Procedure: Nannette Daugherty MODIFIED ;  Surgeon: Guillermo Fisher DPM;  Location: [facility] Main OR;  Service: Podiatry   • ID EXC SKIN MALIG >4 CM TRUNK,ARM,LEG N/A 6/27/2019    Procedure: EXCISION BASAL CELL CARCINOMA MID CHEST;  Surgeon: Dominic Perez MD;  Location: [facility] MAIN OR;  Service: Plastics   • ID FULL THICK 2011 [facility] NOS,EAR,LID <20 SQCM Right 3/7/2019    Procedure: SKIN GRAFT FULL THICKNESS  (FTSG); Surgeon: Dominic Perez MD;  Location: [facility] MAIN OR;  Service: Plastics   • ID FUSION FOOT BONES,MIDTARSAL,MULTI Left 9/9/2016    Procedure: ARTHRODESIS FIRST [address], [place] [address] ;  Surgeon: Guillermo Fisher DPM;  Location[facility] Main OR;  Service: Podiatry   • ID HYSTEROSCOPY,W/ENDO BX N/A 1/7/2022    Procedure: DILATATION AND CURETTAGE (D&C) WITH HYSTEROSCOPY, POLYPECTOMY ;  Surgeon: Safia Hyatt MD;  [facility];  Service: Gynecology   • ID INSERTION BREAST IMPLANT SAME DAY OF MASTECTOMY Left 9/7/2017    Procedure: TISSUE EXPANDER;  Surgeon: Dominic Perez MD;  [facility] OR;  Service: Plastics   • ID OPEN 2016 [facility] Left 12/11/2019    Procedure: OPEN REDUCTION W/ INTERNAL FIXATION left distal humerus fracture, possible olecranon osteotomy;  Surgeon:  Samuel Floyd MD;  Location: [facility] MAIN OR;  Service: Orthopedics   • ID OSTEOTOMY HEEL BONE Left 9/9/2016    Procedure: OSTEOTOMY CALCANEUS WITH APPLICATION AND ACTIVATION OF BONE STIMULATOR ;  Surgeon: Guillermo Fisher DPM;  [facility] Main [facility];  Service: Podiatry   • ID RECMPL WND HEAD,FAC,HAND 2 6-7 5 CM N/A 3/7/2019    Procedure: COMPLEX CLOSURE;  Surgeon: Dominic Perez MD;  [facility];  Service: Plastics   • ID RECMPL WND TRUNK 2 6-7 5 CM N/A 6/27/2019    Procedure: CLOSURE WOUND MID CHEST;  Surgeon: Dominic Perez MD;  [facility];  Service: Plastics   • ID REMV TISSUE FOR GRAFT OTHR Bilateral 6/27/2019    Procedure: AUTOLOGOUS FAT GRAFTING BILATERAL BREASTS;  Surgeon: Palmira Pan MD;  [facility];  Service: Plastics   • NE REVISION OF RECONSTRUCTED BREAST Bilateral 6/27/2019    Procedure: BILATERAL BREAST MOUND REVISION;  Surgeon: Palmira aPn MD;  [facility];  Service: Plastics   • NE REVISION OF RECONSTRUCTED BREAST Bilateral 10/24/2019    Procedure: BILATERAL BREAST MOUND REVISION;  Surgeon: Palimra Pan MD;  [facility];  Service: Plastics   • NE REVISION OF RECONSTRUCTED BREAST Bilateral 1/7/2021    Procedure: BILATERAL BREAST MOUND REVISION;  Surgeon: Palmira Pan MD;  [facility]: [facility];  Service: Plastics   • SENTINEL LYMPH NODE BIOPSY Bilateral 06/06/2014   • SENTINEL LYMPH NODE BIOPSY Right 2008   • SKIN BIOPSY     • SKIN LESION EXCISION Left 2/21/2019    Procedure: UPPER BACK [address] EXCISION;  Surgeon: Palmira Pan MD;  [facility];  Service: Plastics   • SQUAMOUS CELL CARCINOMA EXCISION N/A 3/7/2019    Procedure: EXCISION BCC LEFT FOREHEAD;  Surgeon: Palmira Pan MD;  [facility];  Service: Plastics   • WISDOM TOOTH EXTRACTION       Family History   Adopted: Yes   Problem Relation Age of Onset   • No Known Problems Mother    • No Known Problems Father      Social History     Socioeconomic History   • Marital status: /Civil Union     Spouse name: Not on file   • Number of children: Not on file   • Years of education: Not on file   • Highest education level: Not on file   Occupational History   • Not on file   Tobacco Use   • Smoking status: Never   • Smokeless tobacco: Never   Vaping Use   • Vaping Use: Never used   Substance and Sexual Activity   • Alcohol use:  Yes     Alcohol/week: 4 0 standard drinks     Types: 4 Glasses of wine per week     Comment: 4 weekly-wine   • Drug use: No   • Sexual activity: Yes     Partners: Male     Birth control/protection: Condom Male   Other Topics Concern   • Not on file   Social History Narrative   • Not on file     Social Determinants of Health     Financial Resource Strain: Not on file   Food Insecurity: Not on file   Transportation Needs: Not on file   Physical Activity: Not on file   Stress: Not on file   Social Connections: Not on file   Intimate Partner Violence: Not on file   Housing Stability: Not on file       Current Outpatient Medications:   •  acetaminophen (TYLENOL) 500 mg tablet, Take 500 mg by mouth every 6 (six) hours as needed for mild pain , Disp: , Rfl:   •  albuterol (Ventolin HFA) 90 mcg/act inhaler, Inhale 2 puffs every 6 (six) hours as needed for wheezing or shortness of breath, Disp: 8 g, Rfl: 0  •  ALPRAZolam (XANAX) 1 mg tablet, Take 1 mg by mouth daily at bedtime, Disp: , Rfl:   •  Ascorbic Acid (VITAMIN C) 500 MG CAPS, Take 500 mg by mouth in the morning  , Disp: , Rfl:   •  calcium-vitamin D (OSCAL) 250-125 MG-UNIT per tablet, Take 2 tablets by mouth daily  , Disp: , Rfl:   •  Cholecalciferol (VITAMIN D) 2000 units CAPS, Take by mouth in the morning  , Disp: , Rfl:   •  Diclofenac Sodium (VOLTAREN) 1 %, Apply 2 g topically 4 (four) times a day, Disp: 300 g, Rfl: 5  •  famotidine (PEPCID) 20 mg tablet, Take 20 mg by mouth 2 (two) times a day as needed  , Disp: , Rfl:   •  Lactobacillus (ACIDOPHILUS PO), Take by mouth daily  , Disp: , Rfl:   •  Multiple Vitamin (MULTIVITAMIN) capsule, Take 1 capsule by mouth daily  , Disp: , Rfl:   •  nystatin (MYCOSTATIN) powder, Apply topically as needed, Disp: , Rfl:   •  omeprazole (PriLOSEC) 20 mg delayed release capsule, Take 20 mg by mouth as needed  , Disp: , Rfl:   •  tamoxifen (NOLVADEX) 20 mg tablet, Take 1 tablet (20 mg total) by mouth daily, Disp: 90 tablet, Rfl: 3  •  zinc gluconate 50 mg tablet, Take by mouth daily  , Disp: , Rfl:   •  ALPRAZolam (XANAX) 0 5 mg tablet, Take 0 5 mg by mouth daily at bedtime as needed for anxiety (Patient not taking: Reported on 11/17/2022), Disp: , Rfl:   •  dicyclomine (BENTYL) 10 mg capsule, Take 1 capsule (10 mg total) by mouth 4 (four) times a day (before meals and at bedtime) for 15 days (Patient not taking: Reported on 11/17/2022), Disp: 60 capsule, Rfl: 0  Allergies   Allergen Reactions   • Codeine Itching     Severe   • Dilaudid [Hydromorphone Hcl] Itching     Severe   • Hydromorphone Itching   • Tramadol Itching   • Other Rash     Adhesive tape       The following portions of the patient's history were reviewed and updated as appropriate: allergies, current medications, past family history, past medical history, past social history, past surgical history and problem list         Vitals:    11/17/22 0903   BP: 120/80   Pulse: 66   Resp: 16   Temp: 97 6 °F (36 4 °C)   SpO2: 98%       Physical Exam  Constitutional:       General: She is not in acute distress  Appearance: Normal appearance  She is well-developed and well-nourished  HENT:      Head: Normocephalic and atraumatic  Cardiovascular:      Heart sounds: Normal heart sounds  Pulmonary:      Breath sounds: Normal breath sounds  Chest:   Breasts:     Right: Skin change ( mastectomy scar with reconstruction) present  No mass or tenderness  Left: Skin change (Mastectomy scar with reconstruction) present  No mass or tenderness  Abdominal:      Palpations: Abdomen is soft  Lymphadenopathy:      Upper Body:      Right upper body: No supraclavicular, axillary, pectoral or lateral adenopathy  Left upper body: No supraclavicular, axillary, pectoral or lateral adenopathy  Neurological:      Mental Status: She is alert and oriented to person, place, and time  Psychiatric:         Mood and Affect: Mood and affect and mood normal                Discussion/Summary:  65-year-old female with a history of breast cancer in 2008 status post breast conservation  She then developed bilateral breast cancer in 2014 and is status post bilateral mastectomy with reconstruction  She had radiation therapy  She continues on tamoxifen  There is no evidence of disease based on exam today  Her prior genetic testing was negative  I am placing an order for Oncology Genetics to see if she would benefit from updated testing  I will plan to see her again in one year or sooner should the need arise

## 2022-11-30 ENCOUNTER — TELEPHONE (OUTPATIENT)
Dept: GENETICS | Facility: CLINIC | Age: 57
End: 2022-11-30

## 2022-11-30 NOTE — TELEPHONE ENCOUNTER
I called Abilio Adams to schedule a new patient appointment with the Cancer Risk and Genetics Program       Outcome:   I left a voice message encouraging the patient to call the genetics team at [phone] to schedule this appointment  Follow-up:   At this time the referral will be closed and we will wait to hear back from the patient regarding scheduling this appointment

## 2022-12-06 ENCOUNTER — PREP FOR PROCEDURE (OUTPATIENT)
Dept: PLASTIC SURGERY | Facility: CLINIC | Age: 57
End: 2022-12-06

## 2022-12-06 DIAGNOSIS — Z42.1 ENCOUNTER FOR BREAST RECONSTRUCTION FOLLOWING MASTECTOMY: Primary | ICD-10-CM

## 2022-12-06 DIAGNOSIS — C44.612 BASAL CELL CARCINOMA (BCC) OF SKIN OF RIGHT UPPER EXTREMITY INCLUDING SHOULDER: ICD-10-CM

## 2022-12-06 DIAGNOSIS — Z85.3 PERSONAL HISTORY OF BREAST CANCER: ICD-10-CM

## 2023-01-13 DIAGNOSIS — J45.909 ASTHMA, UNSPECIFIED ASTHMA SEVERITY, UNSPECIFIED WHETHER COMPLICATED, UNSPECIFIED WHETHER PERSISTENT: Primary | ICD-10-CM

## 2023-01-13 RX ORDER — DEXTROMETHORPHAN HYDROBROMIDE AND PROMETHAZINE HYDROCHLORIDE 15; 6.25 MG/5ML; MG/5ML
5 SOLUTION ORAL 4 TIMES DAILY PRN
Qty: 180 ML | Refills: 0 | Status: SHIPPED | OUTPATIENT
Start: 2023-01-13

## 2023-01-13 RX ORDER — NYSTATIN 100000 [USP'U]/G
POWDER TOPICAL 2 TIMES DAILY
Qty: 15 G | Refills: 0 | Status: SHIPPED | OUTPATIENT
Start: 2023-01-13

## 2023-01-26 ENCOUNTER — OFFICE VISIT (OUTPATIENT)
Dept: PODIATRY | Facility: CLINIC | Age: 58
End: 2023-01-26

## 2023-01-26 VITALS
WEIGHT: 175 LBS | HEIGHT: 66 IN | HEART RATE: 78 BPM | DIASTOLIC BLOOD PRESSURE: 84 MMHG | SYSTOLIC BLOOD PRESSURE: 130 MMHG | BODY MASS INDEX: 28.12 KG/M2

## 2023-01-26 DIAGNOSIS — M21.42 ACQUIRED PES PLANUS, LEFT: ICD-10-CM

## 2023-01-26 DIAGNOSIS — M21.41 PES PLANUS OF RIGHT FOOT: ICD-10-CM

## 2023-01-26 DIAGNOSIS — G57.81 OTHER SPECIFIED MONONEUROPATHIES OF RIGHT LOWER LIMB: Primary | ICD-10-CM

## 2023-01-26 DIAGNOSIS — M24.875 OTHER SPECIFIC JOINT DERANGEMENTS LEFT FOOT, NOT ELSEWHERE CLASSIFIED: ICD-10-CM

## 2023-01-26 RX ORDER — BUPIVACAINE HYDROCHLORIDE 5 MG/ML
2 INJECTION, SOLUTION EPIDURAL; INTRACAUDAL ONCE
Status: COMPLETED | OUTPATIENT
Start: 2023-01-26 | End: 2023-01-26

## 2023-01-26 RX ORDER — TRIAMCINOLONE ACETONIDE 40 MG/ML
20 INJECTION, SUSPENSION INTRA-ARTICULAR; INTRAMUSCULAR ONCE
Status: COMPLETED | OUTPATIENT
Start: 2023-01-26 | End: 2023-01-26

## 2023-01-26 RX ADMIN — TRIAMCINOLONE ACETONIDE 20 MG: 40 INJECTION, SUSPENSION INTRA-ARTICULAR; INTRAMUSCULAR at 17:52

## 2023-01-26 RX ADMIN — BUPIVACAINE HYDROCHLORIDE 2 ML: 5 INJECTION, SOLUTION EPIDURAL; INTRACAUDAL at 17:51

## 2023-01-26 NOTE — PROGRESS NOTES
PATIENT:  Maciel Barnes  1965         ASSESSMENT:     1  Other specified mononeuropathies of right lower limb  bupivacaine (PF) (MARCAINE) 0 5 % injection 2 mL    triamcinolone acetonide (KENALOG-40) 40 mg/mL injection 20 mg    Nerve block      2  Other specific joint derangements left foot, not elsewhere classified  XR foot 3+ vw left      3  Pes planus of right foot        4  Acquired pes planus, left              PLAN:  1  Reviewed medical records  Patient was counseled and educated on the condition and the diagnosis  2  The diagnosis, treatment options and prognosis were discussed with the patient  3  She would try another nerve block  See procedure  4  Instructed supportive care, icing, resting, and arch support  5  X-ray of left foot was obtained / reviewed/ discussed  There is no disruption at surgical site  It is possible her symptom could be due to arthrosis of left naviculocuneiform joint  6  RA in 3 months  Nerve block    Date/Time: 1/26/2023 5:48 PM  Performed by: Ronnell Eisenmenger, DPM  Authorized by: Ronnell Eisenmenger, DPM     Patient location:  [facility]  [place] Protocol:  Consent: Verbal consent obtained  Risks and benefits: risks, benefits and alternatives were discussed  Consent given by: patient  Time out: Immediately prior to procedure a "time out" was called to verify the correct patient, procedure, equipment, support staff and site/side marked as required    Timeout called at: 1/26/2023 5:48 PM   Patient understanding: patient states understanding of the procedure being performed  Site marked: the operative site was marked  Patient identity confirmed: verbally with patient      Indications:     Indications:  Pain relief and procedural anesthesia  Location:     Body area:  Lower extremity    Lower extremity nerve:  Saphenous (Medial calcaneal nerve (Not saphenous))    Nerve type:  Peripheral    Laterality:  Right  Pre-procedure details:     Skin preparation: Alcohol  Skin anesthesia (see MAR for exact dosages):     Skin anesthesia method:  Topical application    Topical anesthesia: Ethyl Chloride spray  Procedure details (see MAR for exact dosages): Block needle gauge:  25 G    Anesthetic injected:  Bupivacaine 0 5% w/o epi    Steroid injected:  Triamcinolone    Injection procedure:  Anatomic landmarks identified and anatomic landmarks palpated  Post-procedure details:     Dressing:  Sterile dressing    Outcome:  Anesthesia achieved    Patient tolerance of procedure: Tolerated well, no immediate complications            Subjective:     HPI  The patient presents with chief complaint of recurring pain around right heel  Increased pain in the last few days  She does note recall injury  She feels some post-static dyskinesia/ stiffness/ weakening of left foot in the morning  She did not obtain X-ray yet  The following portions of the patient's history were reviewed and updated as appropriate: allergies, current medications, past family history, past medical history, past social history, past surgical history and problem list   All pertinent labs and images were reviewed        Past Medical History  Past Medical History:   Diagnosis Date   • Arthritis     Knees and feet   • Difficulty falling asleep    • Gluteal abscess     Last Assessed: 12/30/2016   • Limb alert care status     right   • Pain in left foot    • Pneumonia 2009    x1   • PONV (postoperative nausea and vomiting)    • Skin cancer        Past Surgical History  Past Surgical History:   Procedure Laterality Date   • BASAL CELL CARCINOMA EXCISION N/A 1/7/2021    Procedure: EXCISION BASAL CELL CARCINOMA OF MID BACK;  Surgeon: Teola Alpers, MD;  Location:  MAIN OR;  Service: Plastics   • BASAL CELL CARCINOMA EXCISION Left 4/25/2022    Procedure: EXCISION OF BCC (X2) LEFT SHOULDER WITH COMPLEX CLOSURE;  Surgeon: Teola Alpers, MD;  [facility]: [facility] MAIN OR;  Service: Plastics   • BREAST BIOPSY  05/02/2014   • BREAST BIOPSY  05/2013   • BREAST BIOPSY  05/2008   • BREAST LUMPECTOMY Right 2008   • BREAST RECONSTRUCTION Left 2/21/2019    Procedure: BREAST EXPANDER/IMPLANT EXCHANGE;  Surgeon: Tayo Cervantes MD;  Location: [facility] MAIN OR;  Service: Plastics   • BREAST SURGERY Bilateral 2008, 2013, 2014    Breast reconstruction with expanders  Total of 7 surgeries  7/3/14 right breast reconstruction revision  10/27/14 right breast reconstruction revision    • BREAST SURGERY Left 07/24/2013    Excision of breast lesion   • CAPSULOTOMY Left 2/21/2019    Procedure: CAPSULECTOMY;  Surgeon: Tayo Cervantes MD;  Location: [facility] MAIN OR;  Service: Plastics   • CHEST WALL BIOPSY Right 2/21/2019    Procedure: UPPER CHEST BASAL CELL CARSINOMA EXCISION;  Surgeon: Tayo Cervantes MD;  Location: [facility] MAIN OR;  Service: Plastics   • CLOSURE WOUND N/A 2/21/2019    Procedure: RIGHT upper chest & LEFT upper back complex closure;  Surgeon: Tayo Cervantes MD;  Location: [facility] MAIN OR;  Service: Plastics   • COLONOSCOPY     • COMPLEX WOUND CLOSURE TO EXTREMITY N/A 1/7/2021    Procedure: COMPLEX CLOSURE MID BACK;  Surgeon: Tayo Cervantes MD;  Location: [facility] MAIN OR;  Service: Plastics   • FOOT ARTHRODESIS      Pantalar   • KNEE ARTHROSCOPY Right 05/1997   • LIPOSUCTION W/ FAT INJECTION Bilateral 10/24/2019    Procedure: AUTOLOGOUS FAT GRAFTING TO BILATERAL BREAST;  Surgeon: Tayo Cervantes MD;  [facility];  Service: Plastics   • LIPOSUCTION W/ FAT INJECTION Bilateral 1/7/2021    Procedure: AUTOLOGOUS FAT GRAFTING TO BILATERAL BREAST;  Surgeon: Tayo Cervantes MD;  Location: [facility] MAIN OR;  Service: Plastics   • MASTECTOMY Bilateral 06/06/2014    Lymph nodes removed bilaterally  States she may have IV's in left side     • MOHS RECONSTRUCTION Right 3/7/2019    Procedure: RECONSTRUCTION MOHS DEFECT RIGHT NOSE/MEDIAL CANTHUS;  Surgeon: Tayo Cervantes MD;  [facility]: [facility];  Service: Plastics   • MOHS SURGERY      Mohs Micrographic Surgery Face; Last Assessed: 5/15/2015   • ND ADJT TIS REARGMT EYE/NOSE/EAR/LIP 10 1-30 0 SQCM Right 3/7/2019    Procedure: FLAP;  Surgeon: Skip Colindres MD;  Location: [facility] MAIN OR;  Service: Plastics   • ND ARTHRD MIDTARSL/TARSOMETATARSAL MULT/TRANSVRS Left 9/9/2016    Procedure: ARTHRODESIS FIRST [address], [facility] [address] ;  Surgeon: Daisha Washington DPM;  Location: [facility] Main OR;  Service: Podiatry   • ND BREAST RECONSTRUCTION W/LATISSIMUS DORSI FLAP Left 9/7/2017    Procedure: BREAST RECONSTRUCTION; LATISSIMUS DORSI FLAP; TISSUE EXPANDER;  Surgeon: Skip Colindres MD;  Location: [facility] MAIN OR;  Service: Plastics   • ND COLONOSCOPY FLX DX W/COLLJ SPEC WHEN PFRMD N/A 8/1/2018    Procedure: COLONOSCOPY;  Surgeon: Ulices Sanders MD;  Location: [facility] MAIN OR;  Service: Gastroenterology   • Piroska U  97  W/SESMDC W/RESCJ PROX PHAL  9/9/2016    Procedure: Odessa Munch MODIFIED ;  Surgeon: Daisha Washington DPM;  Location: [facility] Main OR;  Service: Podiatry   • ND EXCISION MALIGNANT LESION TRUNK/ARM/LEG > 4 0 CM N/A 6/27/2019    Procedure: EXCISION BASAL CELL CARCINOMA MID CHEST;  Surgeon: Skip Colindres MD;  [facility]: [facility];  Service: Plastics   • ND FTH/GFT FREE W/DIRECT CLOSURE N/E/E/L 20 SQ CM/< Right 3/7/2019    Procedure: SKIN GRAFT FULL THICKNESS  (FTSG);   Surgeon: Skip Colindres MD;  [facility]: [facility] MA[facility] OR;  Service: Plastics   • ND HYSTEROSCOPY BX ENDOMETRIUM&/POLYPC W/WO D&C N/A 1/7/2022    Procedure: DILATATION AND CURETTAGE (D&C) WITH HYSTEROSCOPY, POLYPECTOMY ;  Surgeon: Hany Gibson MD;  [facility]: [facility] MA[facility] OR;  Service: Gynecology   • ND INSERTION BREAST IMPLANT SAME DAY OF MASTECTOMY Left 9/7/2017    Procedure: TISSUE EXPANDER;  Surgeon: Skip Colindres MD;  Location: [facility] MAIN OR;  Service: Plastics   • ND OPEN [facility] Left 12/11/2019    Procedure: OPEN REDUCTION W/ INTERNAL FIXATION left distal humerus fracture, possible olecranon osteotomy;  Surgeon:  Marisela Thakkar MD;  Location: [address] [place] [address] MAIN OR;  Service: Orthopedics   • KY OSTEOTOMY CALCANEUS W/WO INTERNAL FIXATION Left 9/9/2016    Procedure: OSTEOTOMY CALCANEUS WITH APPLICATION AND ACTIVATION OF BONE STIMULATOR ;  Surgeon: Daisha Washington DPM;  Location: [facility] Main OR;  Service: Podiatry   • KY REMV TISSUE FOR GRAFT OTHR Bilateral 6/27/2019    Procedure: AUTOLOGOUS FAT GRAFTING BILATERAL BREASTS;  Surgeon: Skip Colindres MD;  [facility]IN OR;  Service: Plastics   • KY REPAIR COMPLEX F/C/C/M/N/AX/G/H/F 2 6-7 5 CM N/A 3/7/2019    Procedure: COMPLEX CLOSURE;  Surgeon: Skip Colindres MD;  [facility]: [facility] MAIN OR;  Service: Plastics   • KY REPAIR COMPLEX TRUNK 2 6-7 5 CM N/A 6/27/2019    Procedure: CLOSURE WOUND MID CHEST;  Surgeon: Skip Colindres MD;  Location: [facility] MAIN OR;  Service: Plastics   • KY REVISION OF RECONSTRUCTED BREAST Bilateral 6/27/2019    Procedure: BILATERAL BREAST MOUND REVISION;  Surgeon: Skip Colindres MD;  Location: [facility] MAIN OR;  Service: Plastics   • KY REVISION OF RECONSTRUCTED BREAST Bilateral 10/24/2019    Procedure: BILATERAL BREAST MOUND REVISION;  Surgeon: Skip Colindres MD;  [facility] MAIN OR;  Service: Plastics   • KY REVISION OF RECONSTRUCTED BREAST Bilateral 1/7/2021    Procedure: BILATERAL BREAST MOUND REVISION;  Surgeon: Skip Colindres MD;  Location: [facility] MAIN OR;  Service: Plastics   • SENTINEL LYMPH NODE BIOPSY Bilateral 06/06/2014   • SENTINEL LYMPH NODE BIOPSY Right 2008   • SKIN BIOPSY     • SKIN LESION EXCISION Left 2/21/2019    Procedure: UPPER BACK [address] EXCISION;  Surgeon: Skip Colindres MD;  Location[facility] MAIN OR;  Service: Plastics   • SQUAMOUS CELL CARCINOMA EXCISION N/A 3/7/2019    Procedure: EXCISION BCC LEFT FOREHEAD;  Surgeon: Skip Colindres MD;  Location: [facility] MAIN [facility];  Service: Plastics   • WISDOM TOOTH EXTRACTION          Allergies:  Codeine, Dilaudid [hydromorphone hcl], Hydromorphone, Tramadol, and Other    Medications:  Current Outpatient Medications   Medication Sig Dispense Refill   • acetaminophen (TYLENOL) 500 mg tablet Take 500 mg by mouth every 6 (six) hours as needed for mild pain  • albuterol (Ventolin HFA) 90 mcg/act inhaler Inhale 2 puffs every 6 (six) hours as needed for wheezing or shortness of breath 8 g 0   • ALPRAZolam (XANAX) 1 mg tablet Take 1 mg by mouth daily at bedtime     • Ascorbic Acid (VITAMIN C) 500 MG CAPS Take 500 mg by mouth in the morning       • calcium-vitamin D (OSCAL) 250-125 MG-UNIT per tablet Take 2 tablets by mouth daily  • Cholecalciferol (VITAMIN D) 2000 units CAPS Take by mouth in the morning       • Diclofenac Sodium (VOLTAREN) 1 % Apply 2 g topically 4 (four) times a day 300 g 5   • famotidine (PEPCID) 20 mg tablet Take 20 mg by mouth 2 (two) times a day as needed       • Lactobacillus (ACIDOPHILUS PO) Take by mouth daily  • Multiple Vitamin (MULTIVITAMIN) capsule Take 1 capsule by mouth daily       • nystatin (MYCOSTATIN) powder Apply topically 2 (two) times a day 15 g 0   • omeprazole (PriLOSEC) 20 mg delayed release capsule Take 20 mg by mouth as needed       • Promethazine-DM (PHENERGAN-DM) 6 25-15 mg/5 mL oral syrup Take 5 mL by mouth 4 (four) times a day as needed for cough 180 mL 0   • tamoxifen (NOLVADEX) 20 mg tablet Take 1 tablet (20 mg total) by mouth daily 90 tablet 3   • zinc gluconate 50 mg tablet Take by mouth daily       • ALPRAZolam (XANAX) 0 5 mg tablet Take 0 5 mg by mouth daily at bedtime as needed for anxiety (Patient not taking: Reported on 11/17/2022)     • dicyclomine (BENTYL) 10 mg capsule Take 1 capsule (10 mg total) by mouth 4 (four) times a day (before meals and at bedtime) for 15 days (Patient not taking: Reported on 11/17/2022) 60 capsule 0     Current Facility-Administered Medications   Medication Dose Route Frequency Provider Last Rate Last Admin   • bupivacaine (PF) (MARCAINE) 0 5 % injection 2 mL 2 mL Injection Once Genaro Mata DPM       • triamcinolone acetonide (KENALOG-40) 40 mg/mL injection 20 mg  20 mg Subcutaneous Once Genaro Mata DPM           Social History:  Social History     Socioeconomic History   • Marital status: /Civil Union     Spouse name: None   • Number of children: None   • Years of education: None   • Highest education level: None   Occupational History   • None   Tobacco Use   • Smoking status: Never   • Smokeless tobacco: Never   Vaping Use   • Vaping Use: Never used   Substance and Sexual Activity   • Alcohol use: Yes     Alcohol/week: 4 0 standard drinks     Types: 4 Glasses of wine per week     Comment: 4 weekly-wine   • Drug use: No   • Sexual activity: Yes     Partners: Male     Birth control/protection: Condom Male   Other Topics Concern   • None   Social History Narrative   • None     Social Determinants of Health     Financial Resource Strain: Not on file   Food Insecurity: Not on file   Transportation Needs: Not on file   Physical Activity: Not on file   Stress: Not on file   Social Connections: Not on file   Intimate Partner Violence: Not on file   Housing Stability: Not on file          Review of Systems   Constitutional: Negative for chills, fatigue and fever  Respiratory: Negative for cough, shortness of breath and wheezing  Cardiovascular: Negative for chest pain and palpitations  Gastrointestinal: Negative for diarrhea, nausea and vomiting  Musculoskeletal: Negative for arthralgias and gait problem  Skin: Negative for color change, pallor, rash and wound  Hematological: Negative  Objective:    /84   Pulse 78   Ht 5' 6" (1 676 m)   Wt 79 4 kg (175 lb)   BMI 28 25 kg/m²        Physical Exam  Vitals reviewed  Constitutional:       General: She is not in acute distress  Appearance: Normal appearance  She is well-developed  She is not toxic-appearing  Cardiovascular:      Rate and Rhythm: Normal rate and regular rhythm        Pulses: Normal pulses  Dorsalis pedis pulses are 2+ on the right side and 2+ on the left side  Posterior tibial pulses are 2+ on the right side and 2+ on the left side  Pulmonary:      Effort: Pulmonary effort is normal  No respiratory distress  Musculoskeletal:         General: Deformity present  No swelling, tenderness or signs of injury  Right lower leg: No edema  Left lower leg: No edema  Right foot: No foot drop  Left foot: No foot drop  Comments: Pain and Tinel sign along the course of medial calcaneal nerve on right medial heel  No acute finding in left foot  Good surgical correction left foot  No pain left foot with palpation or ROM  Skin:     General: Skin is warm  Coloration: Skin is not cyanotic or mottled  Findings: No abscess, ecchymosis, erythema, rash or wound  Nails: There is no clubbing  Neurological:      General: No focal deficit present  Mental Status: She is alert and oriented to person, place, and time  Cranial Nerves: No cranial nerve deficit  Sensory: No sensory deficit  Motor: No weakness or abnormal muscle tone  Coordination: Coordination normal    Psychiatric:         Mood and Affect: Mood normal          Behavior: Behavior normal          Thought Content:  Thought content normal          Judgment: Judgment normal

## 2023-02-03 ENCOUNTER — EVALUATION (OUTPATIENT)
Dept: PHYSICAL THERAPY | Facility: CLINIC | Age: 58
End: 2023-02-03

## 2023-02-03 DIAGNOSIS — M79.671 RIGHT FOOT PAIN: Primary | ICD-10-CM

## 2023-02-03 NOTE — PROGRESS NOTES
PT Evaluation     Today's date: 2/3/2023  Patient name: Myesha Kim  : 1965  MRN: [id number]  Referring provider: Lis Pulido DPM  Dx:   Encounter Diagnosis     ICD-10-CM    1  Right foot pain  [id number]                      Assessment  Assessment details: Myesha Kim is a 56 y o  female who presents with pain, decreased strength, decreased ROM and ambulatory dysfunction  Due to these impairments, patient has difficulty performing a/iadls  Patient's clinical presentation is consistent with their referring diagnosis of Right foot pain  Patient has been educated in weight bearing status and home exercise program  Patient would benefit from skilled physical therapy services to address their aforementioned functional limitations and progress towards prior level of function and independence with home exercise program    Impairments: abnormal muscle firing, abnormal or restricted ROM, activity intolerance, impaired physical strength, lacks appropriate home exercise program, pain with function and poor body mechanics    Symptom irritability: moderateUnderstanding of Dx/Px/POC: good   Prognosis: good    Goals  Short Term Goals: Target Date 4 weeks  1  Pt will initiate and advance HEP  2  Pt will have < 3/10 pain  3  Pt will have full arom of the right LE  4  Pt will be able to ambulate with out symptoms while wearing orthotics    Long Term Goals: Target Date 8 weeks  1  Pt will demonstrate independence in HEP  2  Pt will have <1/10 pain  3  Pt will have full core and B LE strength  4   Pt will be able to ambulate with out orthotics in the evening with out symptoms      Plan  Patient would benefit from: skilled PT  Planned modality interventions: cryotherapy and thermotherapy: hydrocollator packs  Planned therapy interventions: joint mobilization, manual therapy, patient education, postural training, activity modification, abdominal trunk stabilization, body mechanics training, flexibility, functional ROM exercises, graded exercise, home exercise program, neuromuscular re-education, strengthening, stretching, therapeutic activities, therapeutic exercise, motor coordination training, muscle pump exercises, gait training, balance/weight bearing training and ADL training  Frequency: 1x week  Duration in weeks: 8  Plan of Care beginning date: 2/3/2023  Plan of Care expiration date: 3/31/2023  Treatment plan discussed with: patient        Subjective Evaluation    History of Present Illness  Mechanism of injury: Pt notes a 10 month hx of insidious onset of right foot pain  Pt notes that it felt like it started as plantar fasciitis  She did get stretches but didn't seem to help  It then turned into what felt like a tens unit on the bottom of her foot up to 8 times a day  Nothing really seemed provoke it  She does note cramping in both feet in the middle of the night  Pt notes that she has had 3 cortisone injections in the past which last about 3 months  Pt notes that pain has been helped by cortisone and injections  Pt does note that the tingling happens more with out her orthotics  Pt has a hx of left foot surgery, but not on right  Pain  Current pain ratin  At best pain ratin  At worst pain ratin  Location: right dorsum of foot  Quality: discomfort (tingling)    Patient Goals  Patient goals for therapy: decreased pain, increased motion, independence with ADLs/IADLs, increased strength, return to sport/leisure activities and improved balance          Objective     Static Posture   General Observations  Asymmetrical weight bearing and shifted left  Ankle/Foot   Ankle/Foot (Left): Pes planus  Ankle/Foot (Right): Pes planus       Active Range of Motion     Right Ankle/Foot   Dorsiflexion (ke): 0 degrees   Dorsiflexion (kf): 0 degrees   Plantar flexion: WFL  Inversion: 50 degrees   Eversion: 15 degrees   Great toe extension: 40 degrees     Additional Active Range of Motion Details  All rom approximate as this was a virtual appt  Strength/Myotome Testing     Additional Strength Details  Pt is able to perform heel raise bilaterally but with poor posterior tibial activation      Tests   Left Ankle/Foot   Positive for navicular drop  Negative for Tinel's sign (tarsal tunnel)  Right Ankle/Foot   Positive for navicular drop and Tinel's sign (tarsal tunnel)  Ambulation     Observational Gait   Gait: antalgic and asymmetric     Telemedicine consent    Patient: Kosta Campbell  Provider: Jas Sanders PT  Provider located at [address] [place] [address],[address]  [address] [facility] [address]    The patient was identified by name and date of birth  Kosta Campbell was informed that this is a telemedicine visit and that the visit is being conducted through Telephone  My office door was closed  No one else was in the room  She acknowledged consent and understanding of privacy and security of the video platform  The patient has agreed to participate and understands they can discontinue the visit at any time  Patient is aware this is a billable service  I spent 30 minutes with the patient during this visit             Precautions: hx of breast cancer and reconstructive surgery, hx of skin cancer, hx of       Manuals 2/3                                                                Neuro Re-Ed                                                                                                        Ther Ex                                                                                                                     Ther Activity                                       Gait Training                                       Modalities             HEP DB

## 2023-02-06 DIAGNOSIS — R10.2 PELVIC PAIN: Primary | ICD-10-CM

## 2023-02-14 ENCOUNTER — HOSPITAL ENCOUNTER (OUTPATIENT)
Dept: ULTRASOUND IMAGING | Facility: HOSPITAL | Age: 58
Discharge: HOME | End: 2023-02-14
Attending: OBSTETRICS & GYNECOLOGY

## 2023-02-14 DIAGNOSIS — R10.2 PELVIC PAIN: ICD-10-CM

## 2023-02-21 ENCOUNTER — HOSPITAL ENCOUNTER (OUTPATIENT)
Dept: RADIOLOGY | Facility: HOSPITAL | Age: 58
Discharge: HOME | End: 2023-02-21
Attending: SURGERY

## 2023-02-21 DIAGNOSIS — Z98.890 STATUS POST BILATERAL BREAST RECONSTRUCTION: ICD-10-CM

## 2023-02-21 DIAGNOSIS — Z42.1 AFTERCARE POSTMASTECTOMY FOR BREAST RECONSTRUCTION: ICD-10-CM

## 2023-02-21 DIAGNOSIS — Z85.3 HISTORY OF BILATERAL BREAST CANCER: ICD-10-CM

## 2023-02-21 RX ADMIN — GADOBUTROL 8 ML: 604.72 INJECTION INTRAVENOUS at 19:12

## 2023-02-23 ENCOUNTER — TELEPHONE (OUTPATIENT)
Dept: OBGYN CLINIC | Facility: CLINIC | Age: 58
End: 2023-02-23

## 2023-02-23 NOTE — RESULT ENCOUNTER NOTE
Patient aware of results, offered sooner appt but patient going out of town, going to keep appt on 3/30 for now, if something opens up sooner would like a call back

## 2023-02-23 NOTE — TELEPHONE ENCOUNTER
Patient has an endo bx appt on 3/30 with Dr. Octavio Funes. Patient would like a sooner appt with possible. Patient is going out of down 2-3rd but if anything opens up she would like a call.

## 2023-02-28 NOTE — TELEPHONE ENCOUNTER
Spoke to patient about coming in for a sooner appt if interested, patient unable to come in tommororow.

## 2023-03-27 DIAGNOSIS — Z85.3 PERSONAL HISTORY OF BREAST CANCER: Primary | ICD-10-CM

## 2023-03-28 ENCOUNTER — CLINICAL SUPPORT (OUTPATIENT)
Dept: OBGYN CLINIC | Facility: OTHER | Age: 58
End: 2023-03-28

## 2023-03-28 DIAGNOSIS — C50.912 BILATERAL MALIGNANT NEOPLASM OF BREAST IN FEMALE, UNSPECIFIED ESTROGEN RECEPTOR STATUS, UNSPECIFIED SITE OF BREAST (HCC): Primary | ICD-10-CM

## 2023-03-28 DIAGNOSIS — C50.911 BILATERAL MALIGNANT NEOPLASM OF BREAST IN FEMALE, UNSPECIFIED ESTROGEN RECEPTOR STATUS, UNSPECIFIED SITE OF BREAST (HCC): Primary | ICD-10-CM

## 2023-03-28 NOTE — PROGRESS NOTES
Mastectomy Bra Fitting Order Details    Gustavo Bashir  1965  [id number]    Reason For Visit  Customer has a history of B BCA with mastectomy and reconstruction in 2014  Lat flap reconstruction  She has had multiple revisions and surgeries  Precautions   B mastecomy    Subjective  Client reports long history of multiple breast surgeries and reconstruction        Surgery Type: Mastectomy    Lymph node removal yes    Date of surgery 2014    Surgical side bilateral    Objective  Bras and prosthesis measurement    Assessment  Band:  17 =+ 17 = 34  Cup:  19 + 19 = 38     Plan  3 [place] given to client in office    Order Details:    3 valletta - ark, grey, white, large    [address] size 40B:  Dahiana Jean

## 2023-03-30 ENCOUNTER — PROCEDURE VISIT (OUTPATIENT)
Dept: OBGYN CLINIC | Facility: CLINIC | Age: 58
End: 2023-03-30

## 2023-03-30 VITALS — SYSTOLIC BLOOD PRESSURE: 142 MMHG | DIASTOLIC BLOOD PRESSURE: 90 MMHG | WEIGHT: 181 LBS | BODY MASS INDEX: 29.21 KG/M2

## 2023-03-30 DIAGNOSIS — N85.00 ENDOMETRIAL HYPERPLASIA: Primary | ICD-10-CM

## 2023-03-30 NOTE — PROGRESS NOTES
Susy Rainey is a 56 y o  G0, P0 female here for a follow-up visit  Patient is on year 5 of tamoxifen following breast cancer her endometrial lining has gone from 12 mm to 16 mm she had a previous D&C 2022 showing benign polyps  Patient desires discussed with her oncology specialists whether tamoxifen is of significant continued benefit in view of her continued thickened stripe  Patient has had some cramping but no bleeding until following her ultrasound  Reviewed and discussed endometrial biopsy with patient patient is known to be retroverted and had some difficulty with last biopsy but is amenable to trying  Gynecologic History  No LMP recorded  Contraception: post menopausal status  Last Pap: 2020  Results were: normal  Last breast MRI 2023 within normal limits    Obstetric History  OB History    Para Term  AB Living   0 0 0 0 0 0   SAB IAB Ectopic Multiple Live Births   0 0 0 0 0   Obstetric Comments   Menarche age 10  History of birth control pills  The following portions of the patient's history were reviewed and updated as appropriate: allergies, current medications, past family history, past medical history, past social history, past surgical history and problem list     Review of Systems  Review of Systems   Constitutional: Negative  Negative for chills and fever  HENT: Negative  Negative for ear pain and sore throat  Eyes: Negative  Negative for pain and visual disturbance  Respiratory: Negative  Negative for cough and shortness of breath  Cardiovascular: Negative  Negative for chest pain and palpitations  Gastrointestinal: Negative  Negative for abdominal pain and vomiting  Genitourinary: Negative  Negative for dysuria and hematuria  Musculoskeletal: Negative  Negative for arthralgias and back pain  Skin: Negative  Negative for color change and rash  Neurological: Negative    Negative for seizures and syncope  All other systems reviewed and are negative  Objective     /90 (BP Location: Left arm, Patient Position: Sitting, Cuff Size: Standard)   Wt 82 1 kg (181 lb)   BMI 29 21 kg/m²   General appearance: alert and oriented, in no acute distress  Pelvic: external genitalia normal, vagina normal without discharge, uterus normal size, shape, and consistency, no cervical motion tenderness, no adnexal masses or tenderness and Retroverted cervix anterior in vault atrophic findings  Extremities: extremities normal, warm and well-perfused; no cyanosis, clubbing, or edema    Endometrial biopsy    Date/Time: 3/31/2023 8:40 AM  Performed by: Minesh Pennington MD  Authorized by: Minesh Pennington MD   Universal Protocol:  Consent: Verbal consent obtained  Risks and benefits: risks, benefits and alternatives were discussed  Consent given by: patient  Patient understanding: patient states understanding of the procedure being performed  Patient identity confirmed: verbally with patient      Procedure:     Procedure: endometrial biopsy with Pipelle      A bivalve speculum was placed in the vagina: yes      Cervix cleaned and prepped: yes      A paracervical block was performed: no      An intracervical block was performed: no      The cervix was dilated: yes      Uterus sound depth (cm):  7    Specimen collected: specimen collected and sent to pathology      Patient tolerated procedure well with no complications: yes    Findings:     Uterus size:  Non-gravid    Cervix: normal      Adnexa: normal    Comments:     Procedure comments:  Cervix was cleansed with Betadine, cervix noted to be anterior vaginal vault    Cervix grasped at 6 o'clock position with Allis and Pipelle initially unable to be passed, dilator was used to open cervix and Pipelle was able to be passed to 7 cm x 2 patient was uncomfortable but tolerated very well follow-up based on results      Assessment  56year-old G0, P0 on year 9 of tamoxifen with thickened endometrial lining, last D&C with benign findings and endometrial polyps January 2022     Plan  Endometrial biopsy performed follow-up based on results patient was aware of option of D&C if tissue is benign we may await further plans regarding tamoxifen for further decision-making and consider following with ultrasound

## 2023-04-03 ENCOUNTER — TELEPHONE (OUTPATIENT)
Dept: OBGYN CLINIC | Facility: CLINIC | Age: 58
End: 2023-04-03

## 2023-04-03 NOTE — TELEPHONE ENCOUNTER
PC to patient to review results  Left patient a message that I would send her a [employer] message and that she should contact back for any further questions or concerns

## 2023-04-04 ENCOUNTER — TELEPHONE (OUTPATIENT)
Dept: HEMATOLOGY ONCOLOGY | Facility: CLINIC | Age: 58
End: 2023-04-04

## 2023-04-04 DIAGNOSIS — R93.89 THICKENED ENDOMETRIUM: ICD-10-CM

## 2023-04-04 DIAGNOSIS — N85.00 ENDOMETRIAL HYPERPLASIA: Primary | ICD-10-CM

## 2023-04-04 NOTE — TELEPHONE ENCOUNTER
Patient Call Form   Reason for patient call? Patient calling in wanting to speak with Dr Valorie Rojas regarding going off of her Tamoxifen  Patient's primary oncologist? Dr Valorie Rojsa    Name of person the patient was calling for? Does the patient issue require a call back? Yes   If call back required, inform patient that the message will be forwarded to the team and someone will get back to them as soon as possible    Did you relay this information to the patient?  Yes

## 2023-04-25 ENCOUNTER — OFFICE VISIT (OUTPATIENT)
Dept: PLASTIC SURGERY | Facility: CLINIC | Age: 58
End: 2023-04-25

## 2023-04-25 DIAGNOSIS — L98.9 SKIN LESIONS: Primary | ICD-10-CM

## 2023-04-25 NOTE — PROGRESS NOTES
Biopsy    Date/Time: 4/25/2023 11:43 AM  Performed by: Zion Ace PA-C  Authorized by: iZon Ace PA-C   Universal Protocol:  Consent: Verbal consent obtained  Written consent not obtained  Risks and benefits: risks, benefits and alternatives were discussed  Patient understanding: patient states understanding of the procedure being performed  Patient identity confirmed: verbally with patient      Procedure Details - Lesion Biopsy: Body area:  Trunk    Trunk location: Right shoulder, right upper back      Biopsy method: shave biopsy      Biopsy tissue type: skin    Initial size (mm):  3    Final defect size (mm):  3    Malignancy: malignancy unknown

## 2023-04-25 NOTE — PROGRESS NOTES
Assessment/Plan:     Patient is a 42-year-old female, well-known to our office for treatment of breast cancer as well as numerous BCCs, who presents to the office today with concern of 2 new skin lesions on her right older and right upper back  These 2 areas were biopsied today  We will discuss biopsy results when they have been returned  Diagnoses and all orders for this visit:    Skin lesions          Subjective:     Patient ID: Angeles Anderson is a 56 y o  female  HPI    Patient reports 2 new skin lesions that are concerning for BCC  Please see photo in media  Review of Systems      See HPI    Objective:     Physical Exam      2 raised, pearly, red skin lesions  Please see photo in media

## 2023-05-04 ENCOUNTER — OFFICE VISIT (OUTPATIENT)
Dept: PODIATRY | Facility: CLINIC | Age: 58
End: 2023-05-04

## 2023-05-04 VITALS
HEIGHT: 66 IN | BODY MASS INDEX: 29.09 KG/M2 | SYSTOLIC BLOOD PRESSURE: 137 MMHG | DIASTOLIC BLOOD PRESSURE: 83 MMHG | HEART RATE: 77 BPM | WEIGHT: 181 LBS

## 2023-05-04 DIAGNOSIS — M21.41 PES PLANUS OF RIGHT FOOT: ICD-10-CM

## 2023-05-04 DIAGNOSIS — G57.81 OTHER SPECIFIED MONONEUROPATHIES OF RIGHT LOWER LIMB: Primary | ICD-10-CM

## 2023-05-04 RX ORDER — TRIAMCINOLONE ACETONIDE 40 MG/ML
20 INJECTION, SUSPENSION INTRA-ARTICULAR; INTRAMUSCULAR ONCE
Status: COMPLETED | OUTPATIENT
Start: 2023-05-04 | End: 2023-05-04

## 2023-05-04 RX ORDER — BUPIVACAINE HYDROCHLORIDE 5 MG/ML
2 INJECTION, SOLUTION EPIDURAL; INTRACAUDAL ONCE
Status: COMPLETED | OUTPATIENT
Start: 2023-05-04 | End: 2023-05-04

## 2023-05-04 RX ADMIN — BUPIVACAINE HYDROCHLORIDE 2 ML: 5 INJECTION, SOLUTION EPIDURAL; INTRACAUDAL at 18:18

## 2023-05-04 RX ADMIN — TRIAMCINOLONE ACETONIDE 20 MG: 40 INJECTION, SUSPENSION INTRA-ARTICULAR; INTRAMUSCULAR at 18:19

## 2023-05-04 NOTE — PROGRESS NOTES
"               PATIENT:  Glenn Harrington  1965         ASSESSMENT:     1  Other specified mononeuropathies of right lower limb  Nerve block    bupivacaine (PF) (MARCAINE) 0 5 % injection 2 mL    triamcinolone acetonide (KENALOG-40) 40 mg/mL injection 20 mg      2  Pes planus of right foot              PLAN:  1  Reviewed medical records  Patient was counseled and educated on the condition and the diagnosis  2  The diagnosis, treatment options and prognosis were discussed with the patient  3  She would try another nerve block  See procedure  4  Instructed supportive care, icing, resting, and arch support  5  She will return in 3-4 months  Nerve block    Date/Time: 5/4/2023 5:18 PM  Performed by: Cesia Herrera DPM  Authorized by: Cesia Herrera DPM     Patient location:  Bedside  [place] Protocol:  Consent: Verbal consent obtained  Risks and benefits: risks, benefits and alternatives were discussed  Consent given by: patient  Time out: Immediately prior to procedure a \"time out\" was called to verify the correct patient, procedure, equipment, support staff and site/side marked as required  Timeout called at: 5/4/2023 5:18 PM   Patient understanding: patient states understanding of the procedure being performed  Site marked: the operative site was marked  Patient identity confirmed: verbally with patient      Indications:     Indications:  Pain relief and procedural anesthesia  Location:     Body area:  Lower extremity    Lower extremity nerve:  Saphenous (Medial calcaneal nerve (Not saphenous))    Laterality:  Right  Pre-procedure details:     Skin preparation:  Alcohol    Preparation: Patient was prepped and draped in usual sterile fashion    Skin anesthesia (see MAR for exact dosages):     Skin anesthesia method:  Topical application    Topical anesthesia: Ethyl Chloride spray  Procedure details (see MAR for exact dosages):      Block needle gauge:  25 G    Anesthetic injected:  Bupivacaine 0 5% w/o " epi    Steroid injected:  Triamcinolone    Injection procedure:  Anatomic landmarks identified and anatomic landmarks palpated  Post-procedure details:     Dressing:  Sterile dressing    Outcome:  Anesthesia achieved    Patient tolerance of procedure: Tolerated well, no immediate complications            Subjective:     HPI  The patient presents with chief complaint of recurring pain around right heel  Increased pain in the past week  No new injury  No swelling  The following portions of the patient's history were reviewed and updated as appropriate: allergies, current medications, past family history, past medical history, past social history, past surgical history and problem list   All pertinent labs and images were reviewed        Past Medical History  Past Medical History:   Diagnosis Date    Arthritis     Knees and feet    [facility] [address] [facility]     [facility] [address] [facility]     Difficulty falling asleep     Gluteal abscess     Last Assessed: 12/30/2016    Limb alert care status     right    Pain in left foot     Pneumonia 2009    x1    PONV (postoperative nausea and vomiting)     Skin cancer     Varicella        Past Surgical History  Past Surgical History:   Procedure Laterality Date    BASAL CELL CARCINOMA EXCISION N/A 1/7/2021    Procedure: EXCISION BASAL CELL CARCINOMA OF MID BACK;  Surgeon: Dolores Bailey MD;  [facility];  Service: Plastics    BASAL CELL CARCINOMA EXCISION Left 4/25/2022    Procedure: EXCISION OF BCC (X2) LEFT SHOULDER WITH COMPLEX CLOSURE;  Surgeon: Dolores Bailey MD;  [facility];  Service: Plastics    BREAST BIOPSY  05/02/2014    BREAST BIOPSY  05/2013    BREAST BIOPSY  05/2008    BREAST LUMPECTOMY Right 2008    BREAST RECONSTRUCTION Left 2/21/2019    Procedure: BREAST EXPANDER/IMPLANT EXCHANGE;  Surgeon: Dolorse Bailey MD;  Location:  MAIN OR;  Service: Plastics    BREAST SURGERY Bilateral 2008, 2013, 2014    Breast reconstruction with expanders  Total of 7 surgeries  7/3/14 right breast reconstruction revision  10/27/14 right breast reconstruction revision     BREAST SURGERY Left 07/24/2013    Excision of breast lesion    CAPSULOTOMY Left 2/21/2019    Procedure: CAPSULECTOMY;  Surgeon: Dolores Bailey MD;  [facility];  Service: Plastics    CHEST WALL BIOPSY Right 2/21/2019    Procedure: UPPER CHEST BASAL CELL CARSINOMA EXCISION;  Surgeon: Dolores Bailey MD;  [facility];  Service: Plastics    CLOSURE WOUND N/A 2/21/2019    Procedure: RIGHT upper chest & LEFT upper back complex closure;  Surgeon: Dolores Bailey MD;  [facility];  Service: Plastics    COLONOSCOPY      COMPLEX WOUND CLOSURE TO EXTREMITY N/A 1/7/2021    Procedure: COMPLEX CLOSURE MID BACK;  Surgeon: Dolores Bailey MD;  [facility];  Service: Plastics    FOOT ARTHRODESIS      Pantalar    KNEE ARTHROSCOPY Right 05/1997    LIPOSUCTION W/ FAT INJECTION Bilateral 10/24/2019    Procedure: AUTOLOGOUS FAT GRAFTING TO BILATERAL BREAST;  Surgeon: Dolores Bailey MD;  [facility];  Service: Plastics    LIPOSUCTION W/ FAT INJECTION Bilateral 1/7/2021    Procedure: AUTOLOGOUS FAT GRAFTING TO BILATERAL BREAST;  Surgeon: Dolores Bailey MD;  [facility];  Service: Plastics    MASTECTOMY Bilateral 06/06/2014    Lymph nodes removed bilaterally  States she may have IV's in left side   MOHS RECONSTRUCTION Right 3/7/2019    Procedure: RECONSTRUCTION MOHS DEFECT RIGHT NOSE/MEDIAL CANTHUS;  Surgeon: Dolores Bailey MD;  [facility];  Service: Plastics    MOHS SURGERY      Mohs Micrographic Surgery Face;  Last Assessed: 5/15/2015    WI ADJT TIS REARGMT EYE/NOSE/EAR/LIP 10 1-30 0 SQCM Right 3/7/2019    Procedure: FLAP;  Surgeon: Dolores Bailey MD;  [facility];  Service: Plastics    [place] ARTHRD MIDTARSL/TARSOMETATARSAL MULT/TRANSVRS Left 9/9/2016    Procedure: ARTHRODESIS FIRST [address] ;  Surgeon: Rufus Ferrara DPM;  [facility] Main [facility];  Service: Podiatry    TN BREAST RECONSTRUCTION W/LATISSIMUS DORSI FLAP Left 9/7/2017    Procedure: BREAST RECONSTRUCTION; LATISSIMUS DORSI FLAP; TISSUE EXPANDER;  Surgeon: Carol Anne MD;  [facility];  Service: Plastics    TN COLONOSCOPY FLX DX W/COLLJ [address] PFRMD N/A 8/1/2018    Procedure: COLONOSCOPY;  Surgeon: Ta Otero MD;  [facility] MA[facility];  Service: Gastroenterology    [address]  W/SESMDC W/RESCJ [address] [facility]  9/9/2016    Procedure: BUNIONECTOMY VARGAS MODIFIED ;  Surgeon: Rufus Ferrara DPM;  [facility];  Service: Podiatry    TN EXCISION MALIGNANT LESION TRUNK/ARM/LEG > 4 0 CM N/A 6/27/2019    Procedure: EXCISION BASAL CELL CARCINOMA MID CHEST;  Surgeon: Carol Anne MD;  [facility];  Service: Plastics    TN FTH/GFT FREE W/DIRECT CLOSURE N/E/E/L 20 SQ CM/< Right 3/7/2019    Procedure: SKIN GRAFT FULL THICKNESS  (FTSG); Surgeon: Carol Anne MD;  [facility];  Service: Plastics    TN HYSTEROSCOPY BX ENDOMETRIUM&/POLYPC W/WO D&C N/A 1/7/2022    Procedure: DILATATION AND CURETTAGE (D&C) WITH HYSTEROSCOPY, POLYPECTOMY ;  Surgeon: Teena Dumont MD;  [facility];  Service: Gynecology    TN INSERTION BREAST IMPLANT SAME DAY OF MASTECTOMY Left 9/7/2017    Procedure: TISSUE EXPANDER;  Surgeon: Carol Anne MD;  [facility];  Service: Plastics    TN OPEN [facility] Left 12/11/2019    Procedure: OPEN REDUCTION W/ INTERNAL FIXATION left distal humerus fracture, possible olecranon osteotomy;  Surgeon:  Baldev Gomez MD;  Location: [address] [place] [address] MAIN OR;  Service: Orthopedics    TN OSTEOTOMY CALCANEUS W/WO INTERNAL FIXATION Left 9/9/2016    Procedure: OSTEOTOMY CALCANEUS WITH APPLICATION AND ACTIVATION OF BONE STIMULATOR ;  Surgeon: Rufus Ferrara DPM;  [facility] Main [facility]; Service: Podiatry     [facility] TISSUE FOR GRAFT OTHR Bilateral 6/27/2019    Procedure: AUTOLOGOUS FAT GRAFTING BILATERAL BREASTS;  Surgeon: Gerard Riley MD;  [facility];  Service: [address] F/C/C/M/N/AX/G/H/F 2 6-7 5 CM N/A 3/7/2019    Procedure: COMPLEX CLOSURE;  Surgeon: Gerard Riley MD;  [facility];  Service: [address] [facility] 2 6-7 5 CM N/A 6/27/2019    Procedure: CLOSURE WOUND MID CHEST;  Surgeon: Gerard Riley MD;  [facility];  Service: Plastics    [place] REVISION OF RECONSTRUCTED BREAST Bilateral 6/27/2019    Procedure: BILATERAL BREAST MOUND REVISION;  Surgeon: Gerard Riley MD;  [facility];  Service: Plastics    [place] REVISION OF RECONSTRUCTED BREAST Bilateral 10/24/2019    Procedure: BILATERAL BREAST MOUND REVISION;  Surgeon: Gerard Riley MD;  [facility];  Service: Plastics    [place] REVISION OF RECONSTRUCTED BREAST Bilateral 1/7/2021    Procedure: BILATERAL BREAST MOUND REVISION;  Surgeon: Gerard Riley MD;  [facility];  Service: Plastics    SENTINEL LYMPH NODE BIOPSY Bilateral 06/06/2014    SENTINEL LYMPH NODE BIOPSY Right 2008    SKIN BIOPSY      SKIN LESION EXCISION Left 2/21/2019    Procedure: UPPER BACK [address];  Surgeon: Gerard Riley MD;  [facility];  Service: Plastics    SQUAMOUS CELL CARCINOMA EXCISION N/A 3/7/2019    Procedure: EXCISION BCC LEFT FOREHEAD;  Surgeon: Gerard Riley MD;  [facility];  Service: Plastics    WISDOM TOOTH EXTRACTION          Allergies:  Codeine, Dilaudid [hydromorphone hcl], Hydromorphone, Tramadol, and Other    Medications:  Current Outpatient Medications   Medication Sig Dispense Refill    acetaminophen (TYLENOL) 500 mg tablet Take 500 mg by mouth every 6 (six) hours as needed for mild pain        albuterol (Ventolin HFA) 90 mcg/act inhaler Inhale 2 puffs every 6 (six) hours as needed for wheezing or shortness of breath 8 g 0    ALPRAZolam (XANAX) 1 mg tablet Take 1 mg by mouth daily at bedtime      Ascorbic Acid (VITAMIN C) 500 MG CAPS Take 500 mg by mouth in the morning        calcium-vitamin D (OSCAL) 250-125 MG-UNIT per tablet Take 2 tablets by mouth daily   Cholecalciferol (VITAMIN D) 2000 units CAPS Take by mouth in the morning        Diclofenac Sodium (VOLTAREN) 1 % Apply 2 g topically 4 (four) times a day 300 g 5    famotidine (PEPCID) 20 mg tablet Take 20 mg by mouth 2 (two) times a day as needed        Lactobacillus (ACIDOPHILUS PO) Take by mouth daily   Multiple Vitamin (MULTIVITAMIN) capsule Take 1 capsule by mouth daily        omeprazole (PriLOSEC) 20 mg delayed release capsule Take 20 mg by mouth as needed        tamoxifen (NOLVADEX) 20 mg tablet Take 1 tablet (20 mg total) by mouth daily 90 tablet 3    zinc gluconate 50 mg tablet Take by mouth daily        ALPRAZolam (XANAX) 0 5 mg tablet Take 0 5 mg by mouth daily at bedtime as needed for anxiety      dicyclomine (BENTYL) 10 mg capsule Take 1 capsule (10 mg total) by mouth 4 (four) times a day (before meals and at bedtime) for 15 days 60 capsule 0    nystatin (MYCOSTATIN) powder Apply topically 2 (two) times a day 15 g 0    Promethazine-DM (PHENERGAN-DM) 6 25-15 mg/5 mL oral syrup Take 5 mL by mouth 4 (four) times a day as needed for cough 180 mL 0     Current Facility-Administered Medications   Medication Dose Route Frequency Provider Last Rate Last Admin    bupivacaine (PF) (MARCAINE) 0 5 % injection 2 mL  2 mL Injection Once Caralee Calvin, DPM        triamcinolone acetonide (KENALOG-40) 40 mg/mL injection 20 mg  20 mg Subcutaneous Once Caralee Calvin, DPM           Social History:  Social History     Socioeconomic History    Marital status: /Civil Union     Spouse name: None    Number of children: None    Years of education: None    Highest education level: None   Occupational History    None   Tobacco Use    Smoking "status: Never    Smokeless tobacco: Never   Vaping Use    Vaping Use: Never used   Substance and Sexual Activity    Alcohol use: Yes     Alcohol/week: 4 0 standard drinks     Types: 4 Glasses of wine per week     Comment: 4 weekly-wine    Drug use: No    Sexual activity: Yes     Partners: Male     Birth control/protection: Condom Male   Other Topics Concern    None   Social History Narrative    None     Social Determinants of Health     Financial Resource Strain: Not on file   Food Insecurity: Not on file   Transportation Needs: Not on file   Physical Activity: Not on file   Stress: Not on file   Social Connections: Not on file   Intimate Partner Violence: Not on file   Housing Stability: Not on file          Review of Systems   Constitutional: Negative for chills, fatigue and fever  Respiratory: Negative for cough, shortness of breath and wheezing  Cardiovascular: Negative for chest pain and palpitations  Gastrointestinal: Negative for diarrhea, nausea and vomiting  Musculoskeletal: Negative for arthralgias and gait problem  Skin: Negative for color change, pallor, rash and wound  Hematological: Negative  Objective:    /83   Pulse 77   Ht 5' 6\" (1 676 m)   Wt 82 1 kg (181 lb)   BMI 29 21 kg/m²        Physical Exam  Vitals reviewed  Constitutional:       General: She is not in acute distress  Appearance: Normal appearance  She is well-developed  She is not toxic-appearing  Cardiovascular:      Rate and Rhythm: Normal rate and regular rhythm  Pulses: Normal pulses  Dorsalis pedis pulses are 2+ on the right side and 2+ on the left side  Posterior tibial pulses are 2+ on the right side and 2+ on the left side  Pulmonary:      Effort: Pulmonary effort is normal  No respiratory distress  Musculoskeletal:         General: Deformity present  No swelling, tenderness or signs of injury  Right lower leg: No edema  Left lower leg: No edema        " Right foot: No foot drop  Left foot: No foot drop  Comments: Pain and Tinel sign along the course of medial calcaneal nerve on right medial heel  No acute finding in left foot  Good surgical correction left foot  No pain left foot with palpation or ROM  Skin:     General: Skin is warm  Coloration: Skin is not cyanotic or mottled  Findings: No abscess, ecchymosis, erythema, rash or wound  Nails: There is no clubbing  Neurological:      General: No focal deficit present  Mental Status: She is alert and oriented to person, place, and time  Cranial Nerves: No cranial nerve deficit  Sensory: No sensory deficit  Motor: No weakness or abnormal muscle tone  Coordination: Coordination normal    Psychiatric:         Mood and Affect: Mood normal          Behavior: Behavior normal          Thought Content:  Thought content normal          Judgment: Judgment normal

## 2023-05-12 ENCOUNTER — TELEPHONE (OUTPATIENT)
Dept: HEMATOLOGY ONCOLOGY | Facility: CLINIC | Age: 58
End: 2023-05-12

## 2023-05-12 DIAGNOSIS — Z17.0 MALIGNANT NEOPLASM OF BREAST IN FEMALE, ESTROGEN RECEPTOR POSITIVE, UNSPECIFIED LATERALITY, UNSPECIFIED SITE OF BREAST (HCC): ICD-10-CM

## 2023-05-12 DIAGNOSIS — C50.919 MALIGNANT NEOPLASM OF BREAST IN FEMALE, ESTROGEN RECEPTOR POSITIVE, UNSPECIFIED LATERALITY, UNSPECIFIED SITE OF BREAST (HCC): ICD-10-CM

## 2023-05-12 RX ORDER — TAMOXIFEN CITRATE 20 MG/1
20 TABLET ORAL DAILY
Qty: 90 TABLET | Refills: 3 | Status: SHIPPED | OUTPATIENT
Start: 2023-05-12

## 2023-05-12 NOTE — TELEPHONE ENCOUNTER
Medication Refill Request   Who are you speaking with? Patient   If it is not the patient, are they listed on an active communication consent form? N/A   Which medication is being requested for refill? Please list medication name and dosage     tamoxifen 20 mg tablet   How many pills does the patient have left? 5 tablets left    Preferred Pharmacy / [address] [place] [id number]     Phone:  [phone]    Fax:  [phone]    Who is the prescribing provider?  Dr Lili Morales   Call back number  [phone]   Relevant Information N/A

## 2023-05-17 ENCOUNTER — OFFICE VISIT (OUTPATIENT)
Dept: PLASTIC SURGERY | Facility: CLINIC | Age: 58
End: 2023-05-17

## 2023-05-17 DIAGNOSIS — C44.612 BASAL CELL CARCINOMA (BCC) OF RIGHT SHOULDER: Primary | ICD-10-CM

## 2023-05-17 NOTE — PROGRESS NOTES
Assessment/Plan: Please see HPI  Discussed excision of the basal cell carcinomas x2 of the right shoulder  How the procedure would be performed, as well as potential risk, complications and limitations including, but not limited to infection, bleeding, scarring, asymmetry, contour deformity, unfavorable scarring, residual/recurrent lesion, the more anterior of the 2 lesions in particular is likely to lead to unfavorable scarring following excision, she understands, her questions were answered to her satisfaction  Consent was obtained  She would prefer general anesthesia for the procedure, she will work with our coordinator to arrange a date for the procedure  There are no diagnoses linked to this encounter  Subjective: Basal cell carcinoma x2 right shoulder     Patient ID: Surjit Noonan is a 56 y o  female  HPI Anum Martini presents today in consultation for discussion regarding treatment of a recently biopsied lesion of the right shoulder, this has revealed basal cell carcinoma  In addition, the more anterior right shoulder lesion was biopsied in June 2022, this too was a basal cell carcinoma  The more anterior of the 2 lesions is larger in size, lesions are located 9 cm apart from 1 another  The following portions of the patient's history were reviewed and updated as appropriate: allergies, current medications, past family history, past medical history, past social history, past surgical history and problem list     Review of Systems   Constitutional: Negative for chills and fever  HENT: Negative for hearing loss  Eyes: Positive for visual disturbance  Negative for discharge  Respiratory: Negative for chest tightness and shortness of breath  Cardiovascular: Negative for chest pain and leg swelling  Gastrointestinal: Negative for blood in stool, constipation, diarrhea and nausea  Genitourinary: Negative for dysuria  Musculoskeletal: Negative for gait problem     Skin: Negative for rash    Allergic/Immunologic: Negative for immunocompromised state  Neurological: Negative for seizures and headaches  Hematological: Does not bruise/bleed easily  Psychiatric/Behavioral: Negative for dysphoric mood  The patient is not nervous/anxious  Objective: There were no vitals taken for this visit  Physical Exam  HENT:      Head: Normocephalic and atraumatic  Eyes:      Pupils: Pupils are equal, round, and reactive to light  Cardiovascular:      Rate and Rhythm: Normal rate  Pulmonary:      Effort: Pulmonary effort is normal    Abdominal:      Palpations: Abdomen is soft  Musculoskeletal:         General: Normal range of motion  Cervical back: Normal range of motion and neck supple  Skin:     General: Skin is warm  Comments: Well-healed biopsy site right superior shoulder, scaly/pinkish discoloration lesion right anterior shoulder, approximately 2 5 cm in diameter, the lesions are 9 cm apart, see photos   Neurological:      Mental Status: She is alert and oriented to person, place, and time     Psychiatric:         Mood and Affect: Mood normal

## 2023-05-18 ENCOUNTER — PREP FOR PROCEDURE (OUTPATIENT)
Dept: PLASTIC SURGERY | Facility: CLINIC | Age: 58
End: 2023-05-18

## 2023-05-18 DIAGNOSIS — C44.612 BASAL CELL CARCINOMA (BCC) OF SKIN OF RIGHT UPPER EXTREMITY INCLUDING SHOULDER: Primary | ICD-10-CM

## 2023-07-06 ENCOUNTER — OFFICE VISIT (OUTPATIENT)
Dept: PLASTIC SURGERY | Facility: CLINIC | Age: 58
End: 2023-07-06
Payer: COMMERCIAL

## 2023-07-06 DIAGNOSIS — L98.9 SKIN LESIONS: Primary | ICD-10-CM

## 2023-07-06 PROCEDURE — 88342 IMHCHEM/IMCYTCHM 1ST ANTB: CPT | Performed by: STUDENT IN AN ORGANIZED HEALTH CARE EDUCATION/TRAINING PROGRAM

## 2023-07-06 PROCEDURE — 88305 TISSUE EXAM BY PATHOLOGIST: CPT | Performed by: STUDENT IN AN ORGANIZED HEALTH CARE EDUCATION/TRAINING PROGRAM

## 2023-07-06 PROCEDURE — 99212 OFFICE O/P EST SF 10 MIN: CPT | Performed by: PHYSICIAN ASSISTANT

## 2023-07-06 PROCEDURE — 11104 PUNCH BX SKIN SINGLE LESION: CPT | Performed by: PHYSICIAN ASSISTANT

## 2023-07-06 NOTE — PROGRESS NOTES
Assessment/Plan:     Patient is a 56-year-old female with a past medical history of multiple excisions of basal cell carcinoma who returns to the office today with concerns for 3 pigmented lesions. Please see HPI.    2 areas on the chest and 1 on the right hip were anesthetized, cleaned and prepped and punch biopsies were taken. We will have the patient return to the office in 10 days for suture removal and to review pathology. There are no diagnoses linked to this encounter. Subjective:     Patient ID: Sandra Hyde is a 66 y.o. female. HPI     Patient reports 1 lesion that has been present for several weeks on her right hip and 2 small red lesions which "popped up" recently on her chest.      Review of Systems    See HPI    Objective:     Physical Exam       3 raised, round, red, flat skin lesions, 1 on the superior chest, 1 on the inferior chest, and 1 on the right hip. All measure less than 0.5 cm.

## 2023-07-06 NOTE — PROGRESS NOTES
Biopsy    Date/Time: 7/6/2023 9:00 AM    Performed by: Greer Carpenter PA-C  Authorized by: Greer Carpenter PA-C  Universal Protocol:  Consent: Verbal consent obtained. Risks and benefits: risks, benefits and alternatives were discussed  Consent given by: patient  Patient understanding: patient states understanding of the procedure being performed  Patient consent: the patient's understanding of the procedure matches consent given  Procedure consent: procedure consent matches procedure scheduled  Patient identity confirmed: verbally with patient      Procedure Details - Lesion Biopsy:      Body area:  Trunk    Trunk location:  Chest    Biopsy method: punch biopsy      Biopsy tissue type: skin    Initial size (mm):  3    Final defect size (mm):  3    Malignancy: malignancy unknown

## 2023-07-12 PROCEDURE — 88342 IMHCHEM/IMCYTCHM 1ST ANTB: CPT | Performed by: STUDENT IN AN ORGANIZED HEALTH CARE EDUCATION/TRAINING PROGRAM

## 2023-07-12 PROCEDURE — 88305 TISSUE EXAM BY PATHOLOGIST: CPT | Performed by: STUDENT IN AN ORGANIZED HEALTH CARE EDUCATION/TRAINING PROGRAM

## 2023-07-14 ENCOUNTER — OFFICE VISIT (OUTPATIENT)
Dept: PLASTIC SURGERY | Facility: HOSPITAL | Age: 58
End: 2023-07-14
Payer: COMMERCIAL

## 2023-07-14 DIAGNOSIS — C44.612 BASAL CELL CARCINOMA (BCC) OF RIGHT SHOULDER: Primary | ICD-10-CM

## 2023-07-14 DIAGNOSIS — C44.712: ICD-10-CM

## 2023-07-14 PROCEDURE — 99214 OFFICE O/P EST MOD 30 MIN: CPT | Performed by: SURGERY

## 2023-07-14 NOTE — PROGRESS NOTES
Assessment/Plan:   Please see HPI. We discussed excision of the basal cell carcinomas #1 right superior shoulder #2 the right inferior shoulder, and #3 the right hip/buttocks. We talked about complex closure versus local flap reconstruction. Her strong preference would be to have this performed with general anesthesia. We talked about the potential benefit of nose, as well as the option of frozen section, etc.  In order to minimize the length of time under anesthesia we will defer frozen section. I discussed the procedure in detail, how it will be performed, as well as potential risk, complications limitations including, but not limited to infection, bleeding, scarring, unfavorable scarring/hypertrophy/keloid scarring, residual/recurrent lesions which may require additional treatment. She understands, her questions were answered to her satisfaction and consent has been obtained. There are no diagnoses linked to this encounter. Subjective: Multiple basal cell carcinomas     Patient ID: Iker Nolasco is a 66 y.o. female.     HPI Isaiah presents today for a preoperative visit, recent biopsies have revealed basal cell carcinoma of #1 the right superior shoulder #2 right inferior shoulder #3 the right hip/buttock. She has a history of prior basal cell carcinomas, as well as actinic keratoses, and I strongly recommended that she return to her dermatologist for evaluation/advice regarding treatment for her AK's, etc. etc.  She states that she will call [facility] (previous provider) to schedule an appointment. The following portions of the patient's history were reviewed and updated as appropriate: allergies, current medications, past family history, past medical history, past social history, past surgical history and problem list.    Review of Systems   Constitutional: Negative for chills and fever. HENT: Negative for hearing loss. Eyes: Positive for visual disturbance. Negative for discharge. Respiratory: Negative for chest tightness and shortness of breath. Cardiovascular: Negative for chest pain and leg swelling. Gastrointestinal: Negative for blood in stool, constipation, diarrhea and nausea. Genitourinary: Negative for dysuria. Musculoskeletal: Negative for gait problem. Skin: Negative for rash. Allergic/Immunologic: Negative for immunocompromised state. Neurological: Negative for seizures and headaches. Hematological: Does not bruise/bleed easily. Psychiatric/Behavioral: Negative for dysphoric mood. The patient is not nervous/anxious. Objective: There were no vitals taken for this visit. Physical Exam  HENT:      Head: Normocephalic and atraumatic. Nose: Nose normal.   Eyes:      Pupils: Pupils are equal, round, and reactive to light. Cardiovascular:      Rate and Rhythm: Normal rate. Pulmonary:      Effort: Pulmonary effort is normal.   Abdominal:      Palpations: Abdomen is soft. Musculoskeletal:         General: Normal range of motion. Cervical back: Normal range of motion and neck supple. Skin:     General: Skin is warm.       Comments: Healing biopsy sites of right superior shoulder, right anterior shoulder, right hip/buttock, see photos in media   Neurological:      Mental Status: She is alert and oriented to person, place, and time.    Psychiatric:         Mood and Affect: Mood normal.

## 2023-07-17 ENCOUNTER — ANESTHESIA EVENT (OUTPATIENT)
Dept: PERIOP | Facility: AMBULARY SURGERY CENTER | Age: 58
End: 2023-07-17
Payer: COMMERCIAL

## 2023-07-18 ENCOUNTER — OFFICE VISIT (OUTPATIENT)
Dept: PLASTIC SURGERY | Facility: CLINIC | Age: 58
End: 2023-07-18

## 2023-07-18 DIAGNOSIS — C44.712: Primary | ICD-10-CM

## 2023-07-18 PROCEDURE — RECHECK: Performed by: SURGERY

## 2023-07-18 NOTE — PROGRESS NOTES
Rinconsukhjinder Martinezing presents today for a preoperative visit regarding clarification of the location of the basal cell carcinoma of the right superior shoulder. She had noticed following her previous appointment that the site that was marked/photograph was incorrect and points out the area of concern of the right superior shoulder at today's visit. Please see photograph in media indicating the correct location of the basal cell carcinoma of the right superior shoulder.

## 2023-07-24 ENCOUNTER — APPOINTMENT (OUTPATIENT)
Dept: LAB | Facility: CLINIC | Age: 58
End: 2023-07-24
Payer: COMMERCIAL

## 2023-07-24 DIAGNOSIS — C44.612 BASAL CELL CARCINOMA (BCC) OF SKIN OF RIGHT UPPER EXTREMITY INCLUDING SHOULDER: ICD-10-CM

## 2023-07-24 LAB
ANION GAP SERPL CALCULATED.3IONS-SCNC: 4 MMOL/L
BASOPHILS # BLD AUTO: 0.02 THOUSANDS/ÂΜL (ref 0–0.1)
BASOPHILS NFR BLD AUTO: 0 % (ref 0–1)
BUN SERPL-MCNC: 13 MG/DL (ref 5–25)
CALCIUM SERPL-MCNC: 9.3 MG/DL (ref 8.4–10.2)
CHLORIDE SERPL-SCNC: 109 MMOL/L (ref 96–108)
CO2 SERPL-SCNC: 28 MMOL/L (ref 21–32)
CREAT SERPL-MCNC: 0.68 MG/DL (ref 0.6–1.3)
EOSINOPHIL # BLD AUTO: 0.06 THOUSAND/ÂΜL (ref 0–0.61)
EOSINOPHIL NFR BLD AUTO: 1 % (ref 0–6)
ERYTHROCYTE [DISTWIDTH] IN BLOOD BY AUTOMATED COUNT: 12.3 % (ref 11.6–15.1)
GFR SERPL CREATININE-BSD FRML MDRD: 98 ML/MIN/1.73SQ M
GLUCOSE P FAST SERPL-MCNC: 96 MG/DL (ref 65–99)
HCT VFR BLD AUTO: 36.3 % (ref 34.8–46.1)
HGB BLD-MCNC: 11.7 G/DL (ref 11.5–15.4)
IMM GRANULOCYTES # BLD AUTO: 0.01 THOUSAND/UL (ref 0–0.2)
IMM GRANULOCYTES NFR BLD AUTO: 0 % (ref 0–2)
LYMPHOCYTES # BLD AUTO: 2.04 THOUSANDS/ÂΜL (ref 0.6–4.47)
LYMPHOCYTES NFR BLD AUTO: 44 % (ref 14–44)
MCH RBC QN AUTO: 29.6 PG (ref 26.8–34.3)
MCHC RBC AUTO-ENTMCNC: 32.2 G/DL (ref 31.4–37.4)
MCV RBC AUTO: 92 FL (ref 82–98)
MONOCYTES # BLD AUTO: 0.34 THOUSAND/ÂΜL (ref 0.17–1.22)
MONOCYTES NFR BLD AUTO: 7 % (ref 4–12)
NEUTROPHILS # BLD AUTO: 2.16 THOUSANDS/ÂΜL (ref 1.85–7.62)
NEUTS SEG NFR BLD AUTO: 48 % (ref 43–75)
NRBC BLD AUTO-RTO: 0 /100 WBCS
PLATELET # BLD AUTO: 171 THOUSANDS/UL (ref 149–390)
PMV BLD AUTO: 11.7 FL (ref 8.9–12.7)
POTASSIUM SERPL-SCNC: 4.8 MMOL/L (ref 3.5–5.3)
RBC # BLD AUTO: 3.95 MILLION/UL (ref 3.81–5.12)
SODIUM SERPL-SCNC: 141 MMOL/L (ref 135–147)
WBC # BLD AUTO: 4.63 THOUSAND/UL (ref 4.31–10.16)

## 2023-07-24 PROCEDURE — 85025 COMPLETE CBC W/AUTO DIFF WBC: CPT

## 2023-07-24 PROCEDURE — 80048 BASIC METABOLIC PNL TOTAL CA: CPT

## 2023-07-24 PROCEDURE — NC001 PR NO CHARGE: Performed by: SURGERY

## 2023-07-24 PROCEDURE — 36415 COLL VENOUS BLD VENIPUNCTURE: CPT

## 2023-07-24 NOTE — H&P
Assessment/Plan:   Please see HPI. We discussed excision of the basal cell carcinomas #1 right superior shoulder #2 the right inferior shoulder, and #3 the right hip/buttocks. We talked about complex closure versus local flap reconstruction. Her strong preference would be to have this performed with general anesthesia. We talked about the potential benefit of nose, as well as the option of frozen section, etc.  In order to minimize the length of time under anesthesia we will defer frozen section. I discussed the procedure in detail, how it will be performed, as well as potential risk, complications limitations including, but not limited to infection, bleeding, scarring, unfavorable scarring/hypertrophy/keloid scarring, residual/recurrent lesions which may require additional treatment. She understands, her questions were answered to her satisfaction and consent has been obtained. There are no diagnoses linked to this encounter. Subjective: Multiple basal cell carcinomas     Patient ID: Linda Mukherjee is a 66 y.o. female.     HPI Dorethia Landau presents today for a preoperative visit, recent biopsies have revealed basal cell carcinoma of #1 the right superior shoulder #2 right inferior shoulder #3 the right hip/buttock. She has a history of prior basal cell carcinomas, as well as actinic keratoses, and I strongly recommended that she return to her dermatologist for evaluation/advice regarding treatment for her AK's, etc. etc.  She states that she will call [facility] (previous provider) to schedule an appointment. The following portions of the patient's history were reviewed and updated as appropriate: allergies, current medications, past family history, past medical history, past social history, past surgical history and problem list.    Review of Systems   Constitutional: Negative for chills and fever. HENT: Negative for hearing loss. Eyes: Positive for visual disturbance. Negative for discharge. Respiratory: Negative for chest tightness and shortness of breath. Cardiovascular: Negative for chest pain and leg swelling. Gastrointestinal: Negative for blood in stool, constipation, diarrhea and nausea. Genitourinary: Negative for dysuria. Musculoskeletal: Negative for gait problem. Skin: Negative for rash. Allergic/Immunologic: Negative for immunocompromised state. Neurological: Negative for seizures and headaches. Hematological: Does not bruise/bleed easily. Psychiatric/Behavioral: Negative for dysphoric mood. The patient is not nervous/anxious. Objective: There were no vitals taken for this visit. Physical Exam  HENT:      Head: Normocephalic and atraumatic. Nose: Nose normal.   Eyes:      Pupils: Pupils are equal, round, and reactive to light. Cardiovascular:      Rate and Rhythm: Normal rate. Pulmonary:      Effort: Pulmonary effort is normal.   Abdominal:      Palpations: Abdomen is soft. Musculoskeletal:         General: Normal range of motion. Cervical back: Normal range of motion and neck supple. Skin:     General: Skin is warm.       Comments: Healing biopsy sites of right superior shoulder, right anterior shoulder, right hip/buttock, see photos in media   Neurological:      Mental Status: She is alert and oriented to person, place, and time.    Psychiatric:         Mood and Affect: Mood normal.

## 2023-07-25 RX ORDER — NYSTATIN 100000 U/G
OINTMENT TOPICAL AS NEEDED
COMMUNITY
Start: 2023-07-11

## 2023-07-25 NOTE — PRE-PROCEDURE INSTRUCTIONS
Pre-Surgery Instructions:   Medication Instructions   • acetaminophen (TYLENOL) 500 mg tablet Uses PRN- OK to take day of surgery   • albuterol (Ventolin HFA) 90 mcg/act inhaler Uses PRN- OK to take day of surgery   • ALPRAZolam (XANAX) 1 mg tablet Take night before surgery if needed   • Ascorbic Acid (VITAMIN C) 500 MG CAPS stopped 7/24   • calcium-vitamin D (Suhas Railing) 250-125 MG-UNIT per tablet stopped 7/24   • Cholecalciferol (VITAMIN D) 2000 units CAPS stopped 7/24   • famotidine (PEPCID) 20 mg tablet Uses PRN- OK to take day of surgery   • Lactobacillus (ACIDOPHILUS PO) stopped 7/24   • Multiple Vitamin (MULTIVITAMIN) capsule stopped 7/24   • nystatin (MYCOSTATIN) ointment Hold day of surgery. • omeprazole (PriLOSEC) 20 mg delayed release capsule Uses PRN- OK to take day of surgery   • tamoxifen (NOLVADEX) 20 mg tablet Instructions provided by MD stopped 7/24   • zinc gluconate 50 mg tablet stopped 7/24   Medication instructions for day surgery reviewed. Please use only a sip of water to take your instructed medications. Avoid all over the counter vitamins, supplements and NSAIDS for one week prior to surgery per anesthesia guidelines. Tylenol is ok to take as needed. You will receive a call one business day prior to surgery with an arrival time and hospital directions. If your surgery is scheduled on a Monday, the hospital will be calling you on the Friday prior to your surgery. If you have not heard from anyone by 8pm, please call the hospital supervisor through the hospital  at [phone]. Doni Ortiz [phone]). Do not eat or drink anything after midnight the night before your surgery, including candy, mints, lifesavers, or chewing gum. Do not drink alcohol 24hrs before your surgery. Try not to smoke at least 24hrs before your surgery. Follow the pre surgery showering instructions as listed in the [facility] Surgical Experience Booklet” or otherwise provided by your surgeon's office.  Do not shave the surgical area 24 hours before surgery. Do not apply any lotions, creams, including makeup, cologne, deodorant, or perfumes after showering on the day of your surgery. No contact lenses, eye make-up, or artificial eyelashes. Remove nail polish, including gel polish, and any artificial, gel, or acrylic nails if possible. Remove all jewelry including rings and body piercing jewelry. Wear causal clothing that is easy to take on and off. Consider your type of surgery. Keep any valuables, jewelry, piercings at home. Please bring any specially ordered equipment (sling, braces) if indicated. Arrange for a responsible person to drive you to and from the hospital on the day of your surgery. Visitor Guidelines discussed. Call the surgeon's office with any new illnesses, exposures, or additional questions prior to surgery. Please reference your [facility] Surgical Experience Booklet” for additional information to prepare for your upcoming surgery.

## 2023-07-31 ENCOUNTER — ANESTHESIA (OUTPATIENT)
Dept: PERIOP | Facility: AMBULARY SURGERY CENTER | Age: 58
End: 2023-07-31
Payer: COMMERCIAL

## 2023-07-31 ENCOUNTER — HOSPITAL ENCOUNTER (OUTPATIENT)
Facility: AMBULARY SURGERY CENTER | Age: 58
Setting detail: OUTPATIENT SURGERY
Discharge: HOME | End: 2023-07-31
Attending: SURGERY | Admitting: SURGERY
Payer: COMMERCIAL

## 2023-07-31 VITALS
RESPIRATION RATE: 18 BRPM | BODY MASS INDEX: 29.25 KG/M2 | DIASTOLIC BLOOD PRESSURE: 70 MMHG | SYSTOLIC BLOOD PRESSURE: 99 MMHG | OXYGEN SATURATION: 96 % | TEMPERATURE: 97.5 F | HEART RATE: 87 BPM | WEIGHT: 182 LBS | HEIGHT: 66 IN

## 2023-07-31 DIAGNOSIS — C44.712 BASAL CELL CARCINOMA OF SKIN OF RIGHT LOWER LIMB, INCLUDING HIP: ICD-10-CM

## 2023-07-31 DIAGNOSIS — C44.612 BASAL CELL CARCINOMA (BCC) OF SKIN OF RIGHT UPPER EXTREMITY INCLUDING SHOULDER: ICD-10-CM

## 2023-07-31 PROCEDURE — 13122 CMPLX RPR S/A/L ADDL 5 CM/>: CPT | Performed by: SURGERY

## 2023-07-31 PROCEDURE — 11604 EXC TR-EXT MAL+MARG 3.1-4 CM: CPT | Performed by: SURGERY

## 2023-07-31 PROCEDURE — 13121 CMPLX RPR S/A/L 2.6-7.5 CM: CPT | Performed by: SURGERY

## 2023-07-31 PROCEDURE — 88305 TISSUE EXAM BY PATHOLOGIST: CPT | Performed by: PATHOLOGY

## 2023-07-31 PROCEDURE — 88342 IMHCHEM/IMCYTCHM 1ST ANTB: CPT | Performed by: PATHOLOGY

## 2023-07-31 PROCEDURE — 11603 EXC TR-EXT MAL+MARG 2.1-3 CM: CPT | Performed by: SURGERY

## 2023-07-31 RX ORDER — ONDANSETRON 2 MG/ML
INJECTION INTRAMUSCULAR; INTRAVENOUS AS NEEDED
Status: DISCONTINUED | OUTPATIENT
Start: 2023-07-31 | End: 2023-07-31

## 2023-07-31 RX ORDER — MAGNESIUM HYDROXIDE 1200 MG/15ML
LIQUID ORAL AS NEEDED
Status: DISCONTINUED | OUTPATIENT
Start: 2023-07-31 | End: 2023-07-31 | Stop reason: HOSPADM

## 2023-07-31 RX ORDER — PROPOFOL 10 MG/ML
INJECTION, EMULSION INTRAVENOUS CONTINUOUS PRN
Status: DISCONTINUED | OUTPATIENT
Start: 2023-07-31 | End: 2023-07-31

## 2023-07-31 RX ORDER — FENTANYL CITRATE/PF 50 MCG/ML
50 SYRINGE (ML) INJECTION
Status: DISCONTINUED | OUTPATIENT
Start: 2023-07-31 | End: 2023-07-31 | Stop reason: HOSPADM

## 2023-07-31 RX ORDER — LIDOCAINE HYDROCHLORIDE 10 MG/ML
INJECTION, SOLUTION EPIDURAL; INFILTRATION; INTRACAUDAL; PERINEURAL AS NEEDED
Status: DISCONTINUED | OUTPATIENT
Start: 2023-07-31 | End: 2023-07-31

## 2023-07-31 RX ORDER — CEFAZOLIN SODIUM 2 G/50ML
2000 SOLUTION INTRAVENOUS ONCE
Status: COMPLETED | OUTPATIENT
Start: 2023-07-31 | End: 2023-07-31

## 2023-07-31 RX ORDER — PROPOFOL 10 MG/ML
INJECTION, EMULSION INTRAVENOUS AS NEEDED
Status: DISCONTINUED | OUTPATIENT
Start: 2023-07-31 | End: 2023-07-31

## 2023-07-31 RX ORDER — SODIUM CHLORIDE, SODIUM LACTATE, POTASSIUM CHLORIDE, CALCIUM CHLORIDE 600; 310; 30; 20 MG/100ML; MG/100ML; MG/100ML; MG/100ML
INJECTION, SOLUTION INTRAVENOUS CONTINUOUS PRN
Status: DISCONTINUED | OUTPATIENT
Start: 2023-07-31 | End: 2023-07-31

## 2023-07-31 RX ORDER — MIDAZOLAM HYDROCHLORIDE 2 MG/2ML
INJECTION, SOLUTION INTRAMUSCULAR; INTRAVENOUS AS NEEDED
Status: DISCONTINUED | OUTPATIENT
Start: 2023-07-31 | End: 2023-07-31

## 2023-07-31 RX ORDER — DEXAMETHASONE SODIUM PHOSPHATE 10 MG/ML
INJECTION, SOLUTION INTRAMUSCULAR; INTRAVENOUS AS NEEDED
Status: DISCONTINUED | OUTPATIENT
Start: 2023-07-31 | End: 2023-07-31

## 2023-07-31 RX ORDER — PHENYLEPHRINE HCL IN 0.9% NACL 1 MG/10 ML
SYRINGE (ML) INTRAVENOUS AS NEEDED
Status: DISCONTINUED | OUTPATIENT
Start: 2023-07-31 | End: 2023-07-31

## 2023-07-31 RX ORDER — LIDOCAINE HYDROCHLORIDE AND EPINEPHRINE 10; 10 MG/ML; UG/ML
INJECTION, SOLUTION INFILTRATION; PERINEURAL AS NEEDED
Status: DISCONTINUED | OUTPATIENT
Start: 2023-07-31 | End: 2023-07-31 | Stop reason: HOSPADM

## 2023-07-31 RX ORDER — FENTANYL CITRATE 50 UG/ML
INJECTION, SOLUTION INTRAMUSCULAR; INTRAVENOUS AS NEEDED
Status: DISCONTINUED | OUTPATIENT
Start: 2023-07-31 | End: 2023-07-31

## 2023-07-31 RX ORDER — GLYCOPYRROLATE 0.2 MG/ML
INJECTION INTRAMUSCULAR; INTRAVENOUS AS NEEDED
Status: DISCONTINUED | OUTPATIENT
Start: 2023-07-31 | End: 2023-07-31

## 2023-07-31 RX ORDER — ONDANSETRON 2 MG/ML
4 INJECTION INTRAMUSCULAR; INTRAVENOUS ONCE AS NEEDED
Status: DISCONTINUED | OUTPATIENT
Start: 2023-07-31 | End: 2023-07-31 | Stop reason: HOSPADM

## 2023-07-31 RX ORDER — SCOLOPAMINE TRANSDERMAL SYSTEM 1 MG/1
1 PATCH, EXTENDED RELEASE TRANSDERMAL ONCE
Status: DISCONTINUED | OUTPATIENT
Start: 2023-07-31 | End: 2023-07-31 | Stop reason: HOSPADM

## 2023-07-31 RX ADMIN — PROPOFOL 50 MG: 10 INJECTION, EMULSION INTRAVENOUS at 07:37

## 2023-07-31 RX ADMIN — Medication 100 MCG: at 07:55

## 2023-07-31 RX ADMIN — LIDOCAINE HYDROCHLORIDE 40 MG: 10 INJECTION, SOLUTION EPIDURAL; INFILTRATION; INTRACAUDAL; PERINEURAL at 07:29

## 2023-07-31 RX ADMIN — Medication 100 MCG: at 08:08

## 2023-07-31 RX ADMIN — Medication 100 MCG: at 08:15

## 2023-07-31 RX ADMIN — SODIUM CHLORIDE, SODIUM LACTATE, POTASSIUM CHLORIDE, AND CALCIUM CHLORIDE: .6; .31; .03; .02 INJECTION, SOLUTION INTRAVENOUS at 08:29

## 2023-07-31 RX ADMIN — ONDANSETRON 4 MG: 2 INJECTION INTRAMUSCULAR; INTRAVENOUS at 08:29

## 2023-07-31 RX ADMIN — PROPOFOL 150 MG: 10 INJECTION, EMULSION INTRAVENOUS at 07:29

## 2023-07-31 RX ADMIN — PROPOFOL 100 MCG/KG/MIN: 10 INJECTION, EMULSION INTRAVENOUS at 07:30

## 2023-07-31 RX ADMIN — PROPOFOL 30 MG: 10 INJECTION, EMULSION INTRAVENOUS at 08:22

## 2023-07-31 RX ADMIN — FENTANYL CITRATE 25 MCG: 50 INJECTION INTRAMUSCULAR; INTRAVENOUS at 07:51

## 2023-07-31 RX ADMIN — GLYCOPYRROLATE 0.1 MCG: 0.2 INJECTION, SOLUTION INTRAMUSCULAR; INTRAVENOUS at 07:47

## 2023-07-31 RX ADMIN — CEFAZOLIN SODIUM 2000 MG: 2 SOLUTION INTRAVENOUS at 07:32

## 2023-07-31 RX ADMIN — GLYCOPYRROLATE 0.1 MCG: 0.2 INJECTION, SOLUTION INTRAMUSCULAR; INTRAVENOUS at 07:55

## 2023-07-31 RX ADMIN — Medication 50 MCG: at 08:05

## 2023-07-31 RX ADMIN — FENTANYL CITRATE 25 MCG: 50 INJECTION INTRAMUSCULAR; INTRAVENOUS at 07:45

## 2023-07-31 RX ADMIN — SODIUM CHLORIDE, SODIUM LACTATE, POTASSIUM CHLORIDE, AND CALCIUM CHLORIDE: .6; .31; .03; .02 INJECTION, SOLUTION INTRAVENOUS at 07:16

## 2023-07-31 RX ADMIN — Medication 50 MCG: at 07:49

## 2023-07-31 RX ADMIN — DEXAMETHASONE SODIUM PHOSPHATE 10 MG: 10 INJECTION, SOLUTION INTRAMUSCULAR; INTRAVENOUS at 07:40

## 2023-07-31 RX ADMIN — Medication 100 MCG: at 08:02

## 2023-07-31 RX ADMIN — Medication 100 MCG: at 08:11

## 2023-07-31 RX ADMIN — MIDAZOLAM 2 MG: 1 INJECTION INTRAMUSCULAR; INTRAVENOUS at 07:25

## 2023-07-31 RX ADMIN — FENTANYL CITRATE 50 MCG: 50 INJECTION INTRAMUSCULAR; INTRAVENOUS at 07:33

## 2023-07-31 RX ADMIN — SCOPALAMINE 1 PATCH: 1 PATCH, EXTENDED RELEASE TRANSDERMAL at 07:14

## 2023-07-31 RX ADMIN — PROPOFOL 50 MG: 10 INJECTION, EMULSION INTRAVENOUS at 07:42

## 2023-07-31 NOTE — DISCHARGE INSTR - AVS FIRST PAGE
[facility]  Dr. Calvillo Ear.  [address], [place]  Phone: [phone]     Postoperative Instructions for Outpatient Surgery     These instructions are being provided by your doctor to give you basic guidelines during your post-op recovery. Please let our office know if your contact information has changed. Please call the office today for an appointment in 14 days for postoperative care     Dressings: Outer dressing may be removed in 24 hours. You have steri strips over your incision which will fall off on their own. Activity Restrictions: None     Bathing:  May shower in 36 hours. Medications:    Resume pre-op medications. You may take tylenol, aleve, or ibuprofen for pain control              Other: Pathology results will be discussed on your follow up visit.

## 2023-07-31 NOTE — ANESTHESIA PREPROCEDURE EVALUATION
Procedure:  excision of bcc right superior shoulder, right inferior shoulder and right hip/buttock and local flap vs complex closure (Right: Shoulder)    Relevant Problems   ANESTHESIA   (+) PONV (postoperative nausea and vomiting)      GI/HEPATIC   (+) Gastroesophageal reflux disease      GYN   (+) Bilateral breast cancer (HCC)      MUSCULOSKELETAL   (+) Arthritis      PULMONARY   (+) Asthma        Physical Exam    Airway    Mallampati score: II         Dental       Cardiovascular  Cardiovascular exam normal    Pulmonary  Pulmonary exam normal     Other Findings        Anesthesia Plan  ASA Score- 2     Anesthesia Type- general with ASA Monitors. Additional Monitors:   Airway Plan: LMA. Plan Factors-Exercise tolerance (METS): >4 METS. Chart reviewed. EKG reviewed. Imaging results reviewed. Existing labs reviewed. Patient summary reviewed. Patient is not a current smoker. Patient not instructed to abstain from smoking on day of procedure. Induction- intravenous. Postoperative Plan- Plan for postoperative opioid use. Informed Consent- Anesthetic plan and risks discussed with patient. I personally reviewed this patient with the CRNA. Discussed and agreed on the Anesthesia Plan with the CRNA. Kira Garcia

## 2023-07-31 NOTE — OP NOTE
OPERATIVE REPORT  PATIENT NAME: Tiana Tovar    :  1965  MRN: [id number]  Pt Location: [facility] OR ROOM 05    SURGERY DATE: 2023    Surgeon(s) and Role:     Nancy Jesus MD - Primary     * Alcides Bui MD - Assisting    Preop Diagnosis:  Basal cell carcinoma (BCC) of skin of right upper extremity including shoulder [C44.612] x2  Basal cell carcinoma of skin of right lower limb, including hip [C44.712]    Post-Op Diagnosis Codes:      * Basal cell carcinoma (BCC) of skin of right upper extremity including shoulder [C44.612]     * Basal cell carcinoma of skin of right lower limb, including hip [C44.712]     Procedure(s):  #1 excision of basal cell carcinoma right superior shoulder 2.3 cm #2 complex closure right superior shoulder 4 cm #3 excision basal cell carcinoma right inferior shoulder 3.3 cm #4 complex closure right inferior shoulder 5.5 cm 5 excision basal cell carcinoma right hip/buttock 2.2 cm 6 complex closure right hip/buttock 3.2 cm    Specimen(s):  ID Type Source Tests Collected by Time Destination   1 : EXCISION BCC RIGHT INFERIOR SHOULDER - LONG SUTURE MARKS 12 O'CLOCK SUPERIOR, SHORT SUTURE MARKS 6 O'CLOCK INFERIOR Tissue Soft Tissue, Other TISSUE Andrew Minaya MD 2023 1279    2 : EXCISION BCC RIGHT SUPERIOR SHOULDER - LONG SUTURE MARKS ANTERIOR, SHORT SUTURE MARKS POSTERIOR Tissue Soft Tissue, Other TISSUE Andrew Minaya MD 2023 9823    3 : EXCISION BCC RIGHT HIP/BUTTOCK - LONG SUTURE MARKS 12 O'CLOCK SUPERIOR, SHORT SUTURE MARKS 6 O'CLOCK INFERIOR Tissue Soft Tissue, Other TISSUE Andrew Minaya MD 2023 [id number]        Estimated Blood Loss:   Minimal    Drains:  * No LDAs found *    Anesthesia Type:   General/LMA    Operative Indications:  Basal cell carcinoma (BCC) of skin of right upper extremity including shoulder [C44.612] x2  Basal cell carcinoma of skin of right lower limb, including hip [C44.712]      Operative Findings:  As above    Complications:   None    Procedure and Technique:  Virgilio Guy was seen preoperatively in the holding area, the surgical sites were marked with her participation. I reviewed with her the planned procedure, potential risks, complications and limitations. She was taken to the operating room and underwent induction of general anesthesia by the anesthesia personnel. The operative field was then prepped and draped in sterile fashion and a proper timeout was performed. 2.5 loupe magnification was used to aid in visualization. At each of the 3 sites markings were made to excise the lesions in an elliptical fashion and to include the narrow margin of normal-appearing skin. Local anesthesia was then administered. At each site a 15 blade used to create the skin incisions these were carried down through the dermis, then the Bovie cautery was used to dissect through the subcutaneous tissue. The specimen was excised at the level of the superficial fascia utilizing a Bovie cautery. Each specimen was marked for orientation and placed separately in formalin. At each site the wound edges were then widely and circumferentially undermined deep to the dermis around the entire circumference of the wound. This wide undermining was performed for distance greater than 2.5 times the distance of the wound and in order to be able to close the wound without significant, undue tension. Following this the wounds were irrigated and hemostasis assured. Closure was then accomplished at the site utilizing 4-0 PDS sutures buried at the level of the deep dermis and this was followed by running 5-0 Monocryl in the subcuticular plane. Benzoin Steri-Strips and a light pressure dressing were applied to each site and the patient was transferred to the recovery room. I was present for the entire procedure.     Patient Disposition:  PACU           SIGNATURE: Lexii Head MD  DATE: July 31, 2023  TIME: 8:47 AM

## 2023-07-31 NOTE — INTERVAL H&P NOTE
H&P reviewed. After examining the patient I find no changes in the patients condition since the H&P had been written.     Vitals:    07/31/23 0647   BP: 140/72   Pulse: 84   Resp: 18   Temp: (!) 97.1 °F (36.2 °C)   SpO2: 96%

## 2023-07-31 NOTE — ANESTHESIA POSTPROCEDURE EVALUATION
Post-Op Assessment Note    CV Status:  Stable  Pain Score: 0    Pain management: adequate     Mental Status:  Alert   Hydration Status:  Stable   PONV Controlled:  None   Airway Patency:  Patent      Post Op Vitals Reviewed: Yes      Staff: CRNA         No notable events documented.     BP   103/76   Temp  97.3   Pulse  73   Resp   12   SpO2   97

## 2023-08-02 ENCOUNTER — OFFICE VISIT (OUTPATIENT)
Dept: PLASTIC SURGERY | Facility: CLINIC | Age: 58
End: 2023-08-02

## 2023-08-02 DIAGNOSIS — C44.712: Primary | ICD-10-CM

## 2023-08-02 DIAGNOSIS — C44.612 BASAL CELL CARCINOMA (BCC) OF RIGHT SHOULDER: ICD-10-CM

## 2023-08-02 PROCEDURE — 99024 POSTOP FOLLOW-UP VISIT: CPT

## 2023-08-02 NOTE — PROGRESS NOTES
[facility] SPECIALTY [facility] Plastic and Reconstructive Surgery  [address] [place] [address] BESS GonzalezNANCYEsme MILLS  [phone]    Patient Identification: Waldo Thornton is a 66 y.o. female     History of Present Illness: The patient is a 66y.o.  year-old female  who presents to the office for post-op visit. Patient is 2 days s/p Excision Bcc Right Superior Shoulder, Right Inferior Shoulder, Right Hip/buttock With Complex Closure - Right  on 7/31/2023 by Dr. Sergey Clifford. Patient states she is doing well at this time. She does not have any concerns. Denies pain, bleeding, fevers, chills or signs of infection. Patient has kept steri-strips in placed from OR.      Past Medical History:   Diagnosis Date   • Arthritis     Knees and feet   • Asthma     exercise induced   • Cancer ([address])     breast   • COVID 2021   • Difficulty falling asleep    • GERD (gastroesophageal reflux disease)    • Gluteal abscess     Last Assessed: 12/30/2016   • Limb alert care status     right   • Pain in left foot    • Pneumonia 2009    x1   • PONV (postoperative nausea and vomiting)    • Skin cancer    • Varicella         Patient Active Problem List   Diagnosis   • Surgical follow-up care   • Bilateral breast cancer ([address])   • Breast implant capsular contracture   • Aftercare postmastectomy for breast reconstruction   • Basal cell carcinoma (BCC) of skin of trunk   • Basal cell carcinoma (BCC) of right side of nose   • Closed bicondylar fracture of distal end of left humerus   • Arthritis   • PONV (postoperative nausea and vomiting)   • Dyspareunia, female   • Status post dilation and curettage   • Basal cell carcinoma of shoulder, left   • Asthma   • Gastroesophageal reflux disease   • Basal cell carcinoma (BCC) of left side of neck   • Enterocolitis   • Cat bite of left hand   • Use of tamoxifen (Nolvadex)   • Personal history of breast cancer   • BRCA gene mutation negative   • Skin lesions        Past Surgical History:   Procedure Laterality Date   • BASAL CELL CARCINOMA EXCISION N/A 01/07/2021    Procedure: EXCISION BASAL CELL CARCINOMA OF MID BACK;  Surgeon: Sarah Waters MD;  Location: [facility];  Service: Plastics   • BASAL CELL CARCINOMA EXCISION Left 04/25/2022    Procedure: EXCISION OF BCC (X2) LEFT SHOULDER WITH COMPLEX CLOSURE;  Surgeon: Sarah Waters MD;  [facility];  Service: Plastics   • BREAST BIOPSY  05/02/2014   • BREAST BIOPSY  05/2013   • BREAST BIOPSY  05/2008   • BREAST LUMPECTOMY Right 2008   • BREAST RECONSTRUCTION Left 02/21/2019    Procedure: BREAST EXPANDER/IMPLANT EXCHANGE;  Surgeon: Sarah Waters MD;  [facility];  Service: Plastics   • BREAST SURGERY Bilateral 2008, 2013, 2014    Breast reconstruction with expanders. Total of 7 surgeries. 7/3/14 right breast reconstruction revision.  10/27/14 right breast reconstruction revision    • BREAST SURGERY Left 07/24/2013    Excision of breast lesion   • CAPSULOTOMY Left 02/21/2019    Procedure: CAPSULECTOMY;  Surgeon: Sarah Waters MD;  [facility];  Service: Plastics   • CHEST WALL BIOPSY Right 02/21/2019    Procedure: UPPER CHEST BASAL CELL CARSINOMA EXCISION;  Surgeon: Sarah Waters MD;  [facility];  Service: Plastics   • CLOSURE WOUND N/A 02/21/2019    Procedure: RIGHT upper chest & LEFT upper back complex closure;  Surgeon: Sarah Waters MD;  [facility];  Service: Plastics   • COLONOSCOPY     • COMPLEX WOUND CLOSURE TO EXTREMITY N/A 01/07/2021    Procedure: COMPLEX CLOSURE MID BACK;  Surgeon: Sarah Waters MD;  Location: [facility];  Service: Plastics   • FOOT ARTHRODESIS Left     Pantalar-has pins, plates, screws-MRI compatible   • KNEE ARTHROSCOPY Right 05/1997   • LIPOSUCTION W/ FAT INJECTION Bilateral 10/24/2019    Procedure: AUTOLOGOUS FAT GRAFTING TO BILATERAL BREAST;  Surgeon: Sarah Waters MD;  [facility];  Service: Plastics   • LIPOSUCTION W/ FAT INJECTION Bilateral 01/07/2021 Procedure: AUTOLOGOUS FAT GRAFTING TO BILATERAL BREAST;  Surgeon: Lexii Head MD;  Location: [facility];  Service: Plastics   • MASS EXCISION Right 7/31/2023    Procedure: EXCISION BCC RIGHT SUPERIOR SHOULDER, RIGHT INFERIOR SHOULDER, RIGHT HIP/BUTTOCK WITH COMPLEX CLOSURE;  Surgeon: Lexii Head MD;  [facility];  Service: Plastics   • MASTECTOMY Bilateral 06/06/2014    Lymph nodes removed bilaterally. States she may have IV's in left side. • MOHS RECONSTRUCTION Right 03/07/2019    Procedure: RECONSTRUCTION MOHS DEFECT RIGHT NOSE/MEDIAL CANTHUS;  Surgeon: Lexii Head MD;  [facility];  Service: Plastics   • MOHS SURGERY      Mohs Micrographic Surgery Face;  Last Assessed: 5/15/2015   • NH ADJT TIS REARGMT EYE/NOSE/EAR/LIP 10.1-30.0 SQCM Right 03/07/2019    Procedure: FLAP;  Surgeon: Lexii Head MD;  [facility];  Service: Plastics   • NH ARTHRD MIDTARSL/TARSOMETATARSAL MULT/TRANSVRS Left 09/09/2016    Procedure: ARTHRODESIS FIRST [address], [address] [place] [address] ;  Surgeon: Calixto Verdugo DPM;  [facility];  Service: Podiatry   • NH BREAST RECONSTRUCTION W/LATISSIMUS DORSI FLAP Left 09/07/2017    Procedure: BREAST RECONSTRUCTION; LATISSIMUS DORSI FLAP; TISSUE EXPANDER;  Surgeon: Lexii Head MD;  [facility];  Service: Plastics   • NH COLONOSCOPY FLX DX W/COLLJ SPEC WHEN PFRMD N/A 08/01/2018    Procedure: COLONOSCOPY;  Surgeon: Edith Berry MD;  [facility];  Service: Gastroenterology   • [address] W/SESMDC W/RESCJ [address] PHAL  09/09/2016    Procedure: BUNIONECTOMY VARGAS MODIFIED ;  Surgeon: Calixto Verdugo DPM;  [facility];  Service: Podiatry   • NH EXCISION MALIGNANT LESION TRUNK/ARM/LEG > 4.0 CM N/A 06/27/2019    Procedure: EXCISION BASAL CELL CARCINOMA MID CHEST;  Surgeon: Lexii Head MD;  [facility];  Service: Plastics   • NH FTH/GFT FREE W/DIRECT CLOSURE N/E/E/L 20 SQ CM/< Right 03/07/2019    Procedure: SKIN GRAFT FULL THICKNESS  (FTSG); Surgeon: Sammie Baker MD;  [facility];  Service: Plastics   • GA HYSTEROSCOPY BX ENDOMETRIUM&/POLYPC W/WO D&C N/A 01/07/2022    Procedure: DILATATION AND CURETTAGE (D&C) WITH HYSTEROSCOPY, POLYPECTOMY ;  Surgeon: Verónica Silverio MD;  [facility];  Service: Gynecology   • GA INSERTION BREAST IMPLANT SAME DAY OF MASTECTOMY Left 09/07/2017    Procedure: TISSUE EXPANDER;  Surgeon: Sammie Baker MD;  [facility];  Service: Plastics   • GA OPEN [address] Left 12/11/2019    Procedure: OPEN REDUCTION W/ INTERNAL FIXATION left distal humerus fracture, possible olecranon osteotomy;  Surgeon:  Jessica Moreno MD;  Location: [address] [place] [address] MAIN OR;  Service: Orthopedics   • GA OSTEOTOMY CALCANEUS W/WO INTERNAL FIXATION Left 09/09/2016    Procedure: OSTEOTOMY CALCANEUS WITH APPLICATION AND ACTIVATION OF BONE STIMULATOR ;  Surgeon: Antonina Morejon DPM;  [facility];  Service: Podiatry   • GA REMV TISSUE FOR GRAFT OTHR Bilateral 06/27/2019    Procedure: AUTOLOGOUS FAT GRAFTING BILATERAL BREASTS;  Surgeon: Sammie Baker MD;  [facility];  Service: Plastics   • GA REPAIR COMPLEX F/C/C/M/N/AX/G/H/F 2.6-7.5 CM N/A 03/07/2019    Procedure: COMPLEX CLOSURE;  Surgeon: Sammie Baker MD;  [facility];  Service: Plastics   • GA REPAIR COMPLEX TRUNK 2.6-7.5 CM N/A 06/27/2019    Procedure: CLOSURE WOUND MID CHEST;  Surgeon: Sammie Baker MD;  [facility];  Service: Plastics   • GA REVISION OF RECONSTRUCTED BREAST Bilateral 06/27/2019    Procedure: BILATERAL BREAST MOUND REVISION;  Surgeon: Sammie Baker MD;  [facility];  Service: Plastics   • GA REVISION OF RECONSTRUCTED BREAST Bilateral 10/24/2019    Procedure: BILATERAL BREAST MOUND REVISION;  Surgeon: Sammie Baker MD;  [facility];  Service: Plastics   • GA REVISION OF RECONSTRUCTED BREAST Bilateral 01/07/2021    Procedure: BILATERAL BREAST MOUND REVISION;  Surgeon: Katja Salas MD;  [facility]: [facility];  Service: Plastics   • SENTINEL LYMPH NODE BIOPSY Bilateral 06/06/2014   • SENTINEL LYMPH NODE BIOPSY Right 2008   • SKIN BIOPSY     • SKIN LESION EXCISION Left 02/21/2019    Procedure: UPPER BACK BCC EXCISION;  Surgeon: Katja Salas MD;  [facility];  Service: Plastics   • SQUAMOUS CELL CARCINOMA EXCISION N/A 03/07/2019    Procedure: EXCISION BCC LEFT FOREHEAD;  Surgeon: Katja Salas MD;  [facility];  Service: Plastics   • WISDOM TOOTH EXTRACTION          Allergies   Allergen Reactions   • Codeine Itching     Severe   • Dilaudid [Hydromorphone Hcl] Itching     Severe   • Hydromorphone Itching   • Tramadol Itching   • Other Rash     Adhesive tape        Current Outpatient Medications on File Prior to Visit   Medication Sig Dispense Refill   • acetaminophen (TYLENOL) 500 mg tablet Take 500 mg by mouth every 6 (six) hours as needed for mild pain. • albuterol (Ventolin HFA) 90 mcg/act inhaler Inhale 2 puffs every 6 (six) hours as needed for wheezing or shortness of breath 8 g 0   • ALPRAZolam (XANAX) 0.5 mg tablet Take 0.5 mg by mouth daily at bedtime as needed for anxiety     • ALPRAZolam (XANAX) 1 mg tablet Take 1 mg by mouth daily at bedtime as needed     • Ascorbic Acid (VITAMIN C) 500 MG CAPS Take 500 mg by mouth in the morning       • calcium-vitamin D (OSCAL) 250-125 MG-UNIT per tablet Take 2 tablets by mouth daily.      • Cholecalciferol (VITAMIN D) 2000 units CAPS Take by mouth in the morning       • Diclofenac Sodium (VOLTAREN) 1 % Apply 2 g topically 4 (four) times a day 300 g 5   • dicyclomine (BENTYL) 10 mg capsule Take 1 capsule (10 mg total) by mouth 4 (four) times a day (before meals and at bedtime) for 15 days 60 capsule 0   • famotidine (PEPCID) 20 mg tablet Take 20 mg by mouth 2 (two) times a day as needed       • Lactobacillus (ACIDOPHILUS PO) Take by mouth daily. • Multiple Vitamin (MULTIVITAMIN) capsule Take 1 capsule by mouth daily. • nystatin (MYCOSTATIN) ointment if needed     • nystatin (MYCOSTATIN) powder Apply topically 2 (two) times a day 15 g 0   • omeprazole (PriLOSEC) 20 mg delayed release capsule Take 20 mg by mouth as needed       • Promethazine-DM (PHENERGAN-DM) 6.25-15 mg/5 mL oral syrup Take 5 mL by mouth 4 (four) times a day as needed for cough 180 mL 0   • tamoxifen (NOLVADEX) 20 mg tablet Take 1 tablet (20 mg total) by mouth daily 90 tablet 3   • zinc gluconate 50 mg tablet Take by mouth daily         No current facility-administered medications on file prior to visit. Tobacco Use: Low Risk  (8/1/2023)    Patient History    • Smoking Tobacco Use: Never    • Smokeless Tobacco Use: Never    • Passive Exposure: Not on file        Review of Systems  Constitutional: Denies fevers, chills or pain  Skin: Denies any warmth, erythema, acute edema or mucopurulent drainage. Physical Exam   Right Anterior Shoulder: Steri-strips removed, Surgical incisions are clean, dry, and intact. Sutures intact. Expected amount of post-operative edema. There are no signs of infection, obvious hematoma, seroma or wound dehiscence. Right Posterior Shoulder: Steri-strips removed, Surgical incisions are clean, dry, and intact. Sutures intact. Expected amount of post-operative edema. There are no signs of infection, obvious hematoma, seroma or wound dehiscence. Right Lateral Hip: Steri-strips removed, Surgical incisions are clean, dry, and intact. Sutures intact. Expected amount of post-operative edema. There are no signs of infection, obvious hematoma, seroma or wound dehiscence. Assessment and Plan:  The patient is an 66y.o.  year-old female who presents to the office for post-op visit.  Patient is 2 days s/p Excision Bcc Right Superior Shoulder, Right Inferior Shoulder, Right Hip/buttock With Complex Closure - Right  on 7/31/2023 by Dr. Mamta Porter.    -Patient's surgical sites were assessed and are healing appropriately at this time. All 3 areas were cleansed, and re-dressed with Steri-Strips. She is to keep these in place until they fall off. Pt is to keep the areas covered from direct sun exposure. -Final pathology is pending, review at next visit.  -The patient is to return in 1 week for suture removal.  - The patient is to call the office with any questions or concerns. All of the patient's questions were answered at this time and they agree with the plan of care.       Tadeo Cosme PA-C  [facility] Plastic and Reconstructive Surgery

## 2023-08-07 PROCEDURE — 88342 IMHCHEM/IMCYTCHM 1ST ANTB: CPT | Performed by: PATHOLOGY

## 2023-08-07 PROCEDURE — 88305 TISSUE EXAM BY PATHOLOGIST: CPT | Performed by: PATHOLOGY

## 2023-09-01 DIAGNOSIS — C44.612 BASAL CELL CARCINOMA (BCC) OF SKIN OF RIGHT UPPER EXTREMITY INCLUDING SHOULDER: Primary | ICD-10-CM

## 2023-09-01 RX ORDER — CEPHALEXIN 500 MG/1
500 CAPSULE ORAL EVERY 8 HOURS SCHEDULED
Qty: 21 CAPSULE | Refills: 0 | Status: SHIPPED | OUTPATIENT
Start: 2023-09-01 | End: 2023-09-08

## 2023-09-08 ENCOUNTER — OFFICE VISIT (OUTPATIENT)
Dept: PODIATRY | Facility: CLINIC | Age: 58
End: 2023-09-08
Payer: COMMERCIAL

## 2023-09-08 VITALS
WEIGHT: 181 LBS | HEIGHT: 66 IN | DIASTOLIC BLOOD PRESSURE: 84 MMHG | BODY MASS INDEX: 29.09 KG/M2 | SYSTOLIC BLOOD PRESSURE: 127 MMHG | HEART RATE: 80 BPM

## 2023-09-08 DIAGNOSIS — G57.81 OTHER SPECIFIED MONONEUROPATHIES OF RIGHT LOWER LIMB: Primary | ICD-10-CM

## 2023-09-08 DIAGNOSIS — M72.2 PLANTAR FASCIITIS: ICD-10-CM

## 2023-09-08 PROCEDURE — 64450 NJX AA&/STRD OTHER PN/BRANCH: CPT | Performed by: PODIATRIST

## 2023-09-08 PROCEDURE — 99213 OFFICE O/P EST LOW 20 MIN: CPT | Performed by: PODIATRIST

## 2023-09-08 RX ORDER — LIDOCAINE HYDROCHLORIDE 10 MG/ML
1 INJECTION, SOLUTION EPIDURAL; INFILTRATION; INTRACAUDAL; PERINEURAL ONCE
Status: COMPLETED | OUTPATIENT
Start: 2023-09-08 | End: 2023-09-08

## 2023-09-08 RX ORDER — TRIAMCINOLONE ACETONIDE 40 MG/ML
20 INJECTION, SUSPENSION INTRA-ARTICULAR; INTRAMUSCULAR ONCE
Status: COMPLETED | OUTPATIENT
Start: 2023-09-08 | End: 2023-09-08

## 2023-09-08 RX ADMIN — TRIAMCINOLONE ACETONIDE 20 MG: 40 INJECTION, SUSPENSION INTRA-ARTICULAR; INTRAMUSCULAR at 09:29

## 2023-09-08 RX ADMIN — LIDOCAINE HYDROCHLORIDE 1 ML: 10 INJECTION, SOLUTION EPIDURAL; INFILTRATION; INTRACAUDAL; PERINEURAL at 09:29

## 2023-09-08 NOTE — PROGRESS NOTES
PATIENT:  Malina Perez  1965         ASSESSMENT:     1. Other specified mononeuropathies of right lower limb  triamcinolone acetonide (KENALOG-40) 40 mg/mL injection 20 mg    lidocaine (PF) (XYLOCAINE-MPF) 1 % injection 1 mL      2. Plantar fasciitis              PLAN:  1. Reviewed medical records. Patient was counseled and educated on the condition and the diagnosis. 2. The diagnosis, treatment options and prognosis were discussed with the patient. 3. She would try another nerve block. See procedure. 4. Instructed supportive care, icing, resting, and arch support. Continue orthotics. 5. She will return in 3-4 months. Nerve block    Date/Time: 9/8/2023 8:30 AM    Performed by: Myesha Sorto DPM  Authorized by: Myesha Sorto DPM    Patient location:  [facility] Protocol:  Consent: Verbal consent obtained. Risks and benefits: risks, benefits and alternatives were discussed  Consent given by: patient  Time out: Immediately prior to procedure a "time out" was called to verify the correct patient, procedure, equipment, support staff and site/side marked as required. Timeout called at: 9/8/2023 9:00 AM.  Patient understanding: patient states understanding of the procedure being performed  Site marked: the operative site was marked  Patient identity confirmed: verbally with patient      Indications:     Indications:  Pain relief and procedural anesthesia  Location:     Body area:  Lower extremity    Lower extremity nerve:  Saphenous (Medical calcaneal nerve (Not saphenous))    Laterality:  Right  Pre-procedure details:     Skin preparation:  Alcohol  Skin anesthesia (see MAR for exact dosages):     Skin anesthesia method:  Topical application    Topical anesthesia: Ethyl Chloride spray. Procedure details (see MAR for exact dosages):      Block needle gauge:  25 G    Anesthetic injected:  Lidocaine 1% w/o epi    Steroid injected:  Triamcinolone    Injection procedure:  Anatomic landmarks identified and anatomic landmarks palpated  Post-procedure details:     Dressing:  Sterile dressing    Outcome:  Anesthesia achieved    Patient tolerance of procedure: Tolerated well, no immediate complications  Comments:      Injection included plantar medial plantar fascia at the insertion to heel. Subjective:     HPI  The patient presents with chief complaint of increased right heel pain in the last few weeks. No new injury. No swelling. The following portions of the patient's history were reviewed and updated as appropriate: allergies, current medications, past family history, past medical history, past social history, past surgical history and problem list.  All pertinent labs and images were reviewed.       Past Medical History  Past Medical History:   Diagnosis Date   • Arthritis     Knees and feet   • Asthma     exercise induced   • Cancer ([address])     breast   • COVID 2021   • Difficulty falling asleep    • GERD (gastroesophageal reflux disease)    • Gluteal abscess     Last Assessed: 12/30/2016   • Limb alert care status     right   • Pain in left foot    • Pneumonia 2009    x1   • PONV (postoperative nausea and vomiting)    • Skin cancer    • Varicella        Past Surgical History  Past Surgical History:   Procedure Laterality Date   • BASAL CELL CARCINOMA EXCISION N/A 01/07/2021    Procedure: EXCISION BASAL CELL CARCINOMA OF MID BACK;  Surgeon: David Alexander MD;  Location:  MAIN OR;  Service: Plastics   • BASAL CELL CARCINOMA EXCISION Left 04/25/2022    Procedure: EXCISION OF BCC (X2) LEFT SHOULDER WITH COMPLEX CLOSURE;  Surgeon: David Alexander MD;  [facility]: [facility];  Service: Plastics   • BREAST BIOPSY  05/02/2014   • BREAST BIOPSY  05/2013   • BREAST BIOPSY  05/2008   • BREAST LUMPECTOMY Right 2008   • BREAST RECONSTRUCTION Left 02/21/2019    Procedure: BREAST EXPANDER/IMPLANT EXCHANGE;  Surgeon: David Alexander MD;  Location:  MAIN OR;  Service: Plastics   • BREAST SURGERY Bilateral 2008, 2013, 2014    Breast reconstruction with expanders. Total of 7 surgeries. 7/3/14 right breast reconstruction revision. 10/27/14 right breast reconstruction revision    • BREAST SURGERY Left 07/24/2013    Excision of breast lesion   • CAPSULOTOMY Left 02/21/2019    Procedure: CAPSULECTOMY;  Surgeon: Mal Pineda MD;  Location: [facility] MAIN OR;  Service: Plastics   • CHEST WALL BIOPSY Right 02/21/2019    Procedure: UPPER CHEST BASAL CELL CARSINOMA EXCISION;  Surgeon: Mal Pineda MD;  [facility];  Service: Plastics   • CLOSURE WOUND N/A 02/21/2019    Procedure: RIGHT upper chest & LEFT upper back complex closure;  Surgeon: Mal Pineda MD;  [facility];  Service: Plastics   • COLONOSCOPY     • COMPLEX WOUND CLOSURE TO EXTREMITY N/A 01/07/2021    Procedure: COMPLEX CLOSURE MID BACK;  Surgeon: Mal Pineda MD;  [facility]: [facility];  Service: Plastics   • FOOT ARTHRODESIS Left     Pantalar-has pins, plates, screws-MRI compatible   • KNEE ARTHROSCOPY Right 05/1997   • LIPOSUCTION W/ FAT INJECTION Bilateral 10/24/2019    Procedure: AUTOLOGOUS FAT GRAFTING TO BILATERAL BREAST;  Surgeon: Mal Pineda MD;  [facility];  Service: Plastics   • LIPOSUCTION W/ FAT INJECTION Bilateral 01/07/2021    Procedure: AUTOLOGOUS FAT GRAFTING TO BILATERAL BREAST;  Surgeon: Mal Pineda MD;  [facility]: [facility];  Service: Plastics   • MASS EXCISION Right 7/31/2023    Procedure: EXCISION BCC RIGHT SUPERIOR SHOULDER, RIGHT INFERIOR SHOULDER, RIGHT HIP/BUTTOCK WITH COMPLEX CLOSURE;  Surgeon: Mal Pineda MD;  [facility];  Service: Plastics   • MASTECTOMY Bilateral 06/06/2014    Lymph nodes removed bilaterally. States she may have IV's in left side.    • MOHS RECONSTRUCTION Right 03/07/2019    Procedure: RECONSTRUCTION MOHS DEFECT RIGHT NOSE/MEDIAL CANTHUS;  Surgeon: Mal Pineda MD;  [facility];  Service: Plastics   • MOHS SURGERY      Mohs Micrographic Surgery Face; Last Assessed: 5/15/2015   • CO ADJT TIS REARGMT EYE/NOSE/EAR/LIP 10.1-30.0 SQCM Right 03/07/2019    Procedure: FLAP;  Surgeon: Hollie Rutledge MD;  [facility];  Service: Plastics   • CO ARTHRD MIDTARSL/TARSOMETATARSAL MULT/TRANSVRS Left 09/09/2016    Procedure: ARTHRODESIS FIRST [address], [address] [facility] ;  Surgeon: Jone Velasquez DPM;  Location[facility] Main OR;  Service: Podiatry   • CO BREAST RECONSTRUCTION W/LATISSIMUS DORSI FLAP Left 09/07/2017    Procedure: BREAST RECONSTRUCTION; LATISSIMUS DORSI FLAP; TISSUE EXPANDER;  Surgeon: Hollie Rutledge MD;  [facility];  Service: Plastics   • CO COLONOSCOPY FLX DX W/COLLJ SPEC WHEN PFRMD N/A 08/01/2018    Procedure: COLONOSCOPY;  Surgeon: Lizet Quintanilla MD;  [facility]: [facility] MAIN OR;  Service: Gastroenterology   • [address] W/SESMDC W/RESCJ [address] PHAL  09/09/2016    Procedure: Drea Khoury MODIFIED ;  Surgeon: Jone Velasquez DPM;  [facility];  Service: Podiatry   • CO EXCISION MALIGNANT LESION TRUNK/ARM/LEG > 4.0 CM N/A 06/27/2019    Procedure: EXCISION BASAL CELL CARCINOMA MID CHEST;  Surgeon: Hollie Rutledge MD;  [facility];  Service: Plastics   • CO FTH/GFT FREE W/DIRECT CLOSURE N/E/E/L 20 SQ CM/< Right 03/07/2019    Procedure: SKIN GRAFT FULL THICKNESS  (FTSG);   Surgeon: Hollie Rutledge MD;  [facility];  Service: Plastics   • CO HYSTEROSCOPY BX ENDOMETRIUM&/POLYPC W/WO D&C N/A 01/07/2022    Procedure: DILATATION AND CURETTAGE (D&C) WITH HYSTEROSCOPY, POLYPECTOMY ;  Surgeon: Orly Henley MD;  [facility];  Service: Gynecology   • CO INSERTION BREAST IMPLANT SAME DAY OF MASTECTOMY Left 09/07/2017    Procedure: TISSUE EXPANDER;  Surgeon: Hollie Rutledge MD;  [facility];  Service: Plastics   • CO OPEN [address] [place] [address] Left 12/11/2019    Procedure: OPEN REDUCTION W/ INTERNAL FIXATION left distal humerus fracture, possible olecranon osteotomy;  Surgeon:  Susan Al MD;  Location: [address] [place] [address] MAIN OR;  Service: Orthopedics   • WI OSTEOTOMY CALCANEUS W/WO INTERNAL FIXATION Left 09/09/2016    Procedure: OSTEOTOMY CALCANEUS WITH APPLICATION AND ACTIVATION OF BONE STIMULATOR ;  Surgeon: Leni Fontaine DPM;  [facility];  Service: Podiatry   • WI REMV TISSUE FOR GRAFT OTHR Bilateral 06/27/2019    Procedure: AUTOLOGOUS FAT GRAFTING BILATERAL BREASTS;  Surgeon: Chuckie Rollins MD;  [facility];  Service: Plastics   • WI REPAIR COMPLEX F/C/C/M/N/AX/G/H/F 2.6-7.5 CM N/A 03/07/2019    Procedure: COMPLEX CLOSURE;  Surgeon: Chuckie Rollins MD;  [facility];  Service: Plastics   • WI REPAIR COMPLEX TRUNK 2.6-7.5 CM N/A 06/27/2019    Procedure: CLOSURE WOUND MID CHEST;  Surgeon: Chuckie Rollins MD;  [facility];  Service: Plastics   • WI REVISION OF RECONSTRUCTED BREAST Bilateral 06/27/2019    Procedure: BILATERAL BREAST MOUND REVISION;  Surgeon: Chuckie Rollins MD;  [facility];  Service: Plastics   • WI REVISION OF RECONSTRUCTED BREAST Bilateral 10/24/2019    Procedure: BILATERAL BREAST MOUND REVISION;  Surgeon: Chuckie Rollins MD;  [facility];  Service: Plastics   • WI REVISION OF RECONSTRUCTED BREAST Bilateral 01/07/2021    Procedure: BILATERAL BREAST MOUND REVISION;  Surgeon: Chuckie Rollins MD;  Location:  MAIN OR;  Service: Plastics   • SENTINEL LYMPH NODE BIOPSY Bilateral 06/06/2014   • SENTINEL LYMPH NODE BIOPSY Right 2008   • SKIN BIOPSY     • SKIN LESION EXCISION Left 02/21/2019    Procedure: UPPER BACK [address] [facility] EXCISION;  Surgeon: Chuckie Rollins MD;  [facility];  Service: Plastics   • SQUAMOUS CELL CARCINOMA EXCISION N/A 03/07/2019    Procedure: EXCISION BCC LEFT FOREHEAD;  Surgeon: Chuckie Rollins MD;  [facility];  Service: Plastics   • WISDOM TOOTH EXTRACTION          Allergies:  Codeine, Dilaudid [hydromorphone hcl], Hydromorphone, Tramadol, and Other    Medications:  Current Outpatient Medications   Medication Sig Dispense Refill   • acetaminophen (TYLENOL) 500 mg tablet Take 500 mg by mouth every 6 (six) hours as needed for mild pain. • albuterol (Ventolin HFA) 90 mcg/act inhaler Inhale 2 puffs every 6 (six) hours as needed for wheezing or shortness of breath 8 g 0   • ALPRAZolam (XANAX) 1 mg tablet Take 1 mg by mouth daily at bedtime as needed     • Ascorbic Acid (VITAMIN C) 500 MG CAPS Take 500 mg by mouth in the morning       • calcium-vitamin D (OSCAL) 250-125 MG-UNIT per tablet Take 2 tablets by mouth daily. • cephalexin (KEFLEX) 500 mg capsule Take 1 capsule (500 mg total) by mouth every 8 (eight) hours for 7 days 21 capsule 0   • Cholecalciferol (VITAMIN D) 2000 units CAPS Take by mouth in the morning       • famotidine (PEPCID) 20 mg tablet Take 20 mg by mouth 2 (two) times a day as needed       • Lactobacillus (ACIDOPHILUS PO) Take by mouth daily. • Multiple Vitamin (MULTIVITAMIN) capsule Take 1 capsule by mouth daily.      • nystatin (MYCOSTATIN) ointment if needed     • omeprazole (PriLOSEC) 20 mg delayed release capsule Take 20 mg by mouth as needed       • tamoxifen (NOLVADEX) 20 mg tablet Take 1 tablet (20 mg total) by mouth daily 90 tablet 3   • zinc gluconate 50 mg tablet Take by mouth daily       • ALPRAZolam (XANAX) 0.5 mg tablet Take 0.5 mg by mouth daily at bedtime as needed for anxiety     • Diclofenac Sodium (VOLTAREN) 1 % Apply 2 g topically 4 (four) times a day 300 g 5   • dicyclomine (BENTYL) 10 mg capsule Take 1 capsule (10 mg total) by mouth 4 (four) times a day (before meals and at bedtime) for 15 days 60 capsule 0   • nystatin (MYCOSTATIN) powder Apply topically 2 (two) times a day 15 g 0   • Promethazine-DM (PHENERGAN-DM) 6.25-15 mg/5 mL oral syrup Take 5 mL by mouth 4 (four) times a day as needed for cough 180 mL 0     No current facility-administered medications for this visit. Social History:  Social History     Socioeconomic History   • Marital status: /Civil Union     Spouse name: None   • Number of children: None   • Years of education: None   • Highest education level: None   Occupational History   • None   Tobacco Use   • Smoking status: Never   • Smokeless tobacco: Never   Vaping Use   • Vaping Use: Never used   Substance and Sexual Activity   • Alcohol use: Yes     Alcohol/week: 4.0 standard drinks of alcohol     Types: 4 Glasses of wine per week     Comment: 4 weekly-wine   • Drug use: No   • Sexual activity: Yes     Partners: Male     Birth control/protection: Condom Male   Other Topics Concern   • None   Social History Narrative   • None     Social Determinants of Health     Financial Resource Strain: Not on file   Food Insecurity: Not on file   Transportation Needs: Not on file   Physical Activity: Not on file   Stress: Not on file   Social Connections: Not on file   Intimate Partner Violence: Not on file   Housing Stability: Not on file          Review of Systems   Constitutional: Negative for chills, fatigue and fever. Respiratory: Negative for cough, shortness of breath and wheezing. Cardiovascular: Negative for chest pain and palpitations. Gastrointestinal: Negative for diarrhea, nausea and vomiting. Musculoskeletal: Negative for arthralgias and gait problem. Skin: Negative for color change, pallor, rash and wound. Hematological: Negative. Objective:    /84   Pulse 80   Ht 5' 6" (1.676 m)   Wt 82.1 kg (181 lb)   LMP  (LMP Unknown)   BMI 29.21 kg/m²        Physical Exam  Vitals reviewed. Constitutional:       General: She is not in acute distress. Appearance: Normal appearance. She is well-developed. She is not toxic-appearing. Cardiovascular:      Rate and Rhythm: Normal rate and regular rhythm. Pulses: Normal pulses.            Dorsalis pedis pulses are 2+ on the right side and 2+ on the left side. Posterior tibial pulses are 2+ on the right side and 2+ on the left side. Pulmonary:      Effort: Pulmonary effort is normal. No respiratory distress. Musculoskeletal:         General: Tenderness and deformity present. No swelling or signs of injury. Right lower leg: No edema. Left lower leg: No edema. Right foot: No foot drop. Left foot: No foot drop. Comments: Pain and Tinel sign along the course of medial calcaneal nerve on right medial heel. Pain on right plantar medial heel. Skin:     General: Skin is warm. Coloration: Skin is not cyanotic or mottled. Findings: No abscess, ecchymosis, erythema, rash or wound. Nails: There is no clubbing. Neurological:      General: No focal deficit present. Mental Status: She is alert and oriented to person, place, and time. Cranial Nerves: No cranial nerve deficit. Sensory: No sensory deficit. Motor: No weakness or abnormal muscle tone. Coordination: Coordination normal.   Psychiatric:         Mood and Affect: Mood normal.         Behavior: Behavior normal.         Thought Content:  Thought content normal.         Judgment: Judgment normal.

## 2023-09-13 ENCOUNTER — APPOINTMENT (OUTPATIENT)
Dept: LAB | Facility: AMBULARY SURGERY CENTER | Age: 58
End: 2023-09-13
Payer: COMMERCIAL

## 2023-09-13 DIAGNOSIS — Z00.8 HEALTH EXAMINATION IN POPULATION SURVEY: ICD-10-CM

## 2023-09-13 LAB
CHOLEST SERPL-MCNC: 204 MG/DL
EST. AVERAGE GLUCOSE BLD GHB EST-MCNC: 120 MG/DL
HBA1C MFR BLD: 5.8 %
HDLC SERPL-MCNC: 83 MG/DL
LDLC SERPL CALC-MCNC: 108 MG/DL (ref 0–100)
NONHDLC SERPL-MCNC: 121 MG/DL
TRIGL SERPL-MCNC: 65 MG/DL

## 2023-09-13 PROCEDURE — 80061 LIPID PANEL: CPT

## 2023-09-13 PROCEDURE — 36415 COLL VENOUS BLD VENIPUNCTURE: CPT

## 2023-09-13 PROCEDURE — 83036 HEMOGLOBIN GLYCOSYLATED A1C: CPT

## 2023-09-16 ENCOUNTER — TELEPHONE (OUTPATIENT)
Dept: HEMATOLOGY ONCOLOGY | Facility: CLINIC | Age: 58
End: 2023-09-16

## 2023-09-16 NOTE — TELEPHONE ENCOUNTER
I called [facility] regarding an appointment that they have scheduled with Dr. Orlando Decker scheduled on 10/24/23     I left a voicemail explaining to patient that this appointment will need to be rescheduled due to a change in the providers schedule. Patient was advised to call [facility] to reschedule. A [employer] message (if applicable) has been sent to patient relaying the above information and advising patient to call [facility] and reschedule their appointment.    Patient can schedule ISAIAH with any AP

## 2023-09-18 ENCOUNTER — TELEPHONE (OUTPATIENT)
Dept: HEMATOLOGY ONCOLOGY | Facility: CLINIC | Age: 58
End: 2023-09-18

## 2023-09-18 NOTE — TELEPHONE ENCOUNTER
Appointment Change  Cancel, Reschedule, Change to Virtual      Who are you speaking with? Patient   If it is not the patient, is the caller listed on the communication consent form? N/A   Which provider is the appointment scheduled with? Dr. Rubin Philip   When was the original appointment scheduled? Please list date and time 10/24/23 8:00am   At which location is the appointment scheduled to take place? [facility]   Was the appointment rescheduled? Was the appointment changed from an in person visit to a virtual visit? If so, please list the details of the change. 10/31/23 2:20pm   What is the reason for the appointment change? ISAIAH from Dr. Rubin Philip       Was STAR transport scheduled? No   Does STAR transport need to be scheduled for the new visit (if applicable) No   Does the patient need an infusion appointment rescheduled? No   Does the patient have an upcoming infusion appointment scheduled? If so, when? No   Is the patient undergoing chemotherapy? No   For appointments cancelled with less than 24 hours:  Was the no-show policy reviewed?  Yes

## 2023-10-09 ENCOUNTER — TELEPHONE (OUTPATIENT)
Dept: HEMATOLOGY ONCOLOGY | Facility: CLINIC | Age: 58
End: 2023-10-09

## 2023-10-09 ENCOUNTER — TELEPHONE (OUTPATIENT)
Dept: SURGICAL ONCOLOGY | Facility: CLINIC | Age: 58
End: 2023-10-09

## 2023-10-09 ENCOUNTER — HOSPITAL ENCOUNTER (OUTPATIENT)
Dept: ULTRASOUND IMAGING | Facility: HOSPITAL | Age: 58
Discharge: HOME | End: 2023-10-09
Attending: OBSTETRICS & GYNECOLOGY
Payer: COMMERCIAL

## 2023-10-09 DIAGNOSIS — R93.89 THICKENED ENDOMETRIUM: ICD-10-CM

## 2023-10-09 PROCEDURE — 76830 TRANSVAGINAL US NON-OB: CPT

## 2023-10-09 PROCEDURE — 76856 US EXAM PELVIC COMPLETE: CPT

## 2023-10-09 NOTE — TELEPHONE ENCOUNTER
Patient reached out in regards to her appointment with Dr Scott Kim on 11/20/23. Requested earlier appointment date as she states she has a little lump that she thinks may be fat graphing but is concerned and requesting Dr Scott Kim look at it. Offered an appointment at [facility] on 10/12/23 at 8:15am and she was agreeable and appreciative of appointment.

## 2023-10-09 NOTE — TELEPHONE ENCOUNTER
Appointment Change  Cancel, Reschedule, Change to Virtual      Who are you speaking with? Patient   If it is not the patient, is the caller listed on the communication consent form? N/A   Which provider is the appointment scheduled with? Dr. Bebeto Gibson   When was the original appointment scheduled? Please list date and time 10/31/2023 @2:20PM    At which location is the appointment scheduled to take place? [id number]   Was the appointment rescheduled? Was the appointment changed from an in person visit to a virtual visit? If so, please list the details of the change. Yes, 11/14/2023 @8AM    What is the reason for the appointment change?  Scheduling conflict

## 2023-10-12 ENCOUNTER — OFFICE VISIT (OUTPATIENT)
Dept: SURGICAL ONCOLOGY | Facility: CLINIC | Age: 58
End: 2023-10-12
Payer: COMMERCIAL

## 2023-10-12 VITALS
RESPIRATION RATE: 15 BRPM | HEIGHT: 66 IN | OXYGEN SATURATION: 100 % | BODY MASS INDEX: 29.21 KG/M2 | DIASTOLIC BLOOD PRESSURE: 80 MMHG | SYSTOLIC BLOOD PRESSURE: 116 MMHG | HEART RATE: 93 BPM | TEMPERATURE: 97.4 F

## 2023-10-12 DIAGNOSIS — Z85.3 PERSONAL HISTORY OF BREAST CANCER: Primary | ICD-10-CM

## 2023-10-12 DIAGNOSIS — Z13.71 BRCA GENE MUTATION NEGATIVE: ICD-10-CM

## 2023-10-12 DIAGNOSIS — Z17.0 MALIGNANT NEOPLASM OF LOWER-OUTER QUADRANT OF RIGHT BREAST OF FEMALE, ESTROGEN RECEPTOR POSITIVE: ICD-10-CM

## 2023-10-12 DIAGNOSIS — Z79.810 USE OF TAMOXIFEN (NOLVADEX): ICD-10-CM

## 2023-10-12 DIAGNOSIS — M79.89 FAT NECROSIS: ICD-10-CM

## 2023-10-12 DIAGNOSIS — C50.511 MALIGNANT NEOPLASM OF LOWER-OUTER QUADRANT OF RIGHT BREAST OF FEMALE, ESTROGEN RECEPTOR POSITIVE: ICD-10-CM

## 2023-10-12 PROCEDURE — 76642 ULTRASOUND BREAST LIMITED: CPT | Performed by: SURGERY

## 2023-10-12 PROCEDURE — 99214 OFFICE O/P EST MOD 30 MIN: CPT | Performed by: SURGERY

## 2023-10-12 NOTE — PROGRESS NOTES
Surgical Oncology Follow Up       [address] [facility]  [facility] [place] [address] [address]  [facility] [address]    Claudia Negron  1965  [id number]  [address] [facility]  [facility] [place] [address]    Chief Complaint   Patient presents with    Follow-up       Assessment/Plan   Diagnoses and all orders for this visit:    Personal history of breast cancer  -     Ambulatory Referral to Oncology Genetics; Future    Malignant neoplasm of lower-outer quadrant of right breast of female, estrogen receptor positive     Use of tamoxifen (Nolvadex)    BRCA gene mutation negative    Fat necrosis        Advance Care Planning/Advance Directives:  Discussed disease status, cancer treatment plans and/or cancer treatment goals with the patient. Oncology History:    Oncology History   Bilateral malignant neoplasm of breast in female, estrogen receptor positive ([address])   2008 Initial Diagnosis    Breast cancer ([address])     5/2008 Surgery    Right breast stereotactic breast biopsy     2008 Surgery    Right breast lumpectomy, SLNB     5/2013 Surgery    Left breast stereotactic biopsy     7/24/2013 Surgery    Right breast excisional biopsy     5/2/2014 Biopsy    Left breast biopsy X 3 sites. Ultrasound guided left breast (200 position) core biopsy- 2 passes:     - Invasive ductal carcinoma, 0.25 cm maximum dimension, histologic grade   I-II, present on one core. - Combined Timbo score: 5-6/9   - Tubule formation: Less than 10% (3/3). - Nuclear pleomorphism: Mild to moderate (1-2/3). - Mitotic count: Fewer than 1 mitosis per 10 high power fields (1/3). Ultrasound guided left breast (300 position) core biopsy 3 passes:   - Invasive ductal carcinoma, 0.5 cm maximum dimension, histologic grade I-II,   present on one core and possibly involving a second core.    - Combined Letart score: 5-6/9   - Tubule formation: Less than 10% (3/3). - Nuclear pleomorphism: Mild to moderate (1-2/3). - Mitotic count: Fewer than 1 mitosis per 10 high power fields (1/3). Ultrasound guided left breast (500 position) core biopsy- 3 passes:   - Benign breast parenchyma. - No atypical ductal/lobular hyperplasia or malignancy is identified  Right breast US guided  Biopsy Ultrasound guided right breast core biopsy 3 passes:    - Invasive ductal carcinoma, 0.4 cm maximum dimension, histologic grade I,   present on one core and possibly involving a second core. - Combined Timbo score:4-5/9   -Tubule formation: 10-75% (2/3)   -Nuclear pleomorphism (1-2/3)   -Mitotic count: fewer than 1 mitosis per 10 HPFs. Comment: Block B1 is suggested if further studies are indicated for the left   breast. Block D2 is suggested if further studies are indicated for the right   breast.           6/6/2014 Surgery      Right and left breast, mastectomy:         Right breast:  Invasive ductal carcinoma, NOS, Grade I of III,   7mm in greatest   dimension.    -Margins:   - 2 mm from the posterior inked margin.    - >2 mm from all other margins.      - Ductal carcinoma in situ, solid, low nuclear grade,   1mm in greatest   dimension.   - Margins:   - >2mm from all inked margins.     - Negative for definitive lymphovascular invasion.     - Negative for microcalcifications. - Uninvolved breast with columnar cell change, sclerosing adenosis,   fibroadenomatoid       change and dense stromal fibrosis. Left breast, mastectomy:       - Multifocal Invasive ductal carcinoma, NOS, Grade II of III,   15mm in   greatest dimension,        see note. - Other foci measure 12mm, 11mm and 8mm with similar morphology. - Margins:   - <1 mm from the anterior and posterior inked margin.    - >2 mm from all other margins.      - Multiple foci of Ductal carcinoma in situ, solid, low nuclear grade, see   note.    - Margins:   - <1 mm from the anterior and posterior inked margin.    - >2 mm from all other margins.      - Negative for definitive lymphovascular invasion.     - Microcalcifications in association with invasive carcinoma. - Intraductal papilloma. - Uninvolved breast with sclerosing adenosis, papillary apocrine cell   change, fibroadenomatoid       change, duct ectasia and dense stromal fibrosis          7/3/2014 Surgery    Revision right breast.     7/2014 -  Hormone Therapy    Tamoxifen     9/17/2014 Surgery    latisimus dorsi flap, skin graft, replaced expander     10/10/2014 - 11/20/2014 Radiation    :  Course: C1    Plan ID Energy Fractions Dose per Fraction (cGy) Total Dose Delivered (cGy) Elapsed Days   Left PAB 6X 25 / 25 41 1,025 34   Left Sclav 6X 25 / 25 200 5,000 34   Lt CW 6X 13 / 13 200 2,600 34   Lt CW Bolus 6X 12 / 12 200 2,400 30   Lt CW e:1 6E/9E 5 / 5 200 1,000 6      Treatment dates:  C1: 10/10/2014 - 11/20/2014       10/27/2014 Surgery    Right breast revision     9/7/2017 Surgery    Left breast LDF reconstruction with expander         History of Present Illness: Breast cancer follow-up, feels a new lump in the lower left reconstructed breast, thinks this may be secondary to fat grafting  -Interval History: Did not have any updated genetic testing    Review of Systems:  Review of Systems   Constitutional: Negative. Negative for appetite change, fever and unexpected weight change. HENT: Negative. Negative for trouble swallowing. Eyes: Negative. Respiratory: Negative. Negative for cough and shortness of breath. Cardiovascular: Negative. Negative for chest pain. Gastrointestinal: Negative. Negative for abdominal pain, nausea and vomiting. Endocrine: Negative. Genitourinary: Negative. Negative for dysuria. Musculoskeletal: Negative. Negative for arthralgias and myalgias. Skin: Negative. Allergic/Immunologic: Negative. Neurological: Negative. Negative for headaches. Hematological: Negative.   Negative for adenopathy. Does not bruise/bleed easily. Psychiatric/Behavioral: Negative.          Patient Active Problem List   Diagnosis    Surgical follow-up care    Bilateral malignant neoplasm of breast in female, estrogen receptor positive ([address])    Breast implant capsular contracture    Aftercare postmastectomy for breast reconstruction    Basal cell carcinoma (BCC) of skin of trunk    Basal cell carcinoma (BCC) of right side of nose    Closed bicondylar fracture of distal end of left humerus    Arthritis    PONV (postoperative nausea and vomiting)    Dyspareunia, female    Status post dilation and curettage    Basal cell carcinoma of shoulder, left    Asthma    Gastroesophageal reflux disease    Basal cell carcinoma (BCC) of left side of neck    Enterocolitis    Cat bite of left hand    Use of tamoxifen (Nolvadex)    Personal history of breast cancer    BRCA gene mutation negative    Skin lesions    Fat necrosis     Past Medical History:   Diagnosis Date    Arthritis     Knees and feet    Asthma     exercise induced    Cancer ([address])     breast    COVID 2021    Difficulty falling asleep     GERD (gastroesophageal reflux disease)     Gluteal abscess     Last Assessed: 12/30/2016    Limb alert care status     right    Pain in left foot     Pneumonia 2009    x1    PONV (postoperative nausea and vomiting)     Skin cancer     Varicella      Past Surgical History:   Procedure Laterality Date    BASAL CELL CARCINOMA EXCISION N/A 01/07/2021    Procedure: EXCISION BASAL CELL CARCINOMA OF MID BACK;  Surgeon: Mal Pineda MD;  Location:  MAIN OR;  Service: Plastics    BASAL CELL CARCINOMA EXCISION Left 04/25/2022    Procedure: EXCISION OF BCC (X2) LEFT SHOULDER WITH COMPLEX CLOSURE;  Surgeon: Mal Pineda MD;  Location: [facility] MAIN OR;  Service: Plastics    BREAST BIOPSY  05/02/2014    BREAST BIOPSY  05/2013    BREAST BIOPSY  05/2008    BREAST LUMPECTOMY Right 2008    BREAST RECONSTRUCTION Left 02/21/2019    Procedure: BREAST EXPANDER/IMPLANT EXCHANGE;  Surgeon: Hollie Rutledge MD;  Location: [facility] MAIN OR;  Service: Plastics    BREAST SURGERY Bilateral 2008, 2013, 2014    Breast reconstruction with expanders. Total of 7 surgeries. 7/3/14 right breast reconstruction revision. 10/27/14 right breast reconstruction revision     BREAST SURGERY Left 07/24/2013    Excision of breast lesion    CAPSULOTOMY Left 02/21/2019    Procedure: CAPSULECTOMY;  Surgeon: Hollie Rutledge MD;  Location: [facility] MAIN OR;  Service: Plastics    CHEST WALL BIOPSY Right 02/21/2019    Procedure: UPPER CHEST BASAL CELL CARSINOMA EXCISION;  Surgeon: Hollie Rutledge MD;  [facility]: [facility] MAIN OR;  Service: Plastics    CLOSURE WOUND N/A 02/21/2019    Procedure: RIGHT upper chest & LEFT upper back complex closure;  Surgeon: Hollie Rutledge MD;  [facility] MAIN OR;  Service: Plastics    COLONOSCOPY      COMPLEX WOUND CLOSURE TO EXTREMITY N/A 01/07/2021    Procedure: COMPLEX CLOSURE MID BACK;  Surgeon: Hollie Rutledge MD;  Location: [facility] MAIN OR;  Service: Plastics    FOOT ARTHRODESIS Left     Pantalar-has pins, plates, screws-MRI compatible    KNEE ARTHROSCOPY Right 05/1997    LIPOSUCTION W/ FAT INJECTION Bilateral 10/24/2019    Procedure: AUTOLOGOUS FAT GRAFTING TO BILATERAL BREAST;  Surgeon: Hollie Rultedge MD;  [facility]: [facility];  Service: Plastics    LIPOSUCTION W/ FAT INJECTION Bilateral 01/07/2021    Procedure: AUTOLOGOUS FAT GRAFTING TO BILATERAL BREAST;  Surgeon: Hollie Rutledge MD;  Location: [facility] MAIN OR;  Service: Plastics    MASS EXCISION Right 7/31/2023    Procedure: EXCISION BCC RIGHT SUPERIOR SHOULDER, RIGHT INFERIOR SHOULDER, RIGHT HIP/BUTTOCK WITH COMPLEX CLOSURE;  Surgeon: Hollie Rutledge MD;  [facility];  Service: Plastics    MASTECTOMY Bilateral 06/06/2014    Lymph nodes removed bilaterally. States she may have IV's in left side.     MOHS RECONSTRUCTION Right 03/07/2019    Procedure: RECONSTRUCTION MOHS DEFECT RIGHT NOSE/MEDIAL CANTHUS;  Surgeon: Cruz Rolon MD;  [facility]: [facility];  Service: Plastics    MOHS SURGERY      Mohs Micrographic Surgery Face; Last Assessed: 5/15/2015    HI ADJT TIS REARGMT EYE/NOSE/EAR/LIP 10.1-30.0 SQCM Right 03/07/2019    Procedure: FLAP;  Surgeon: Cruz Rolon MD;  Location: [facility] MAIN [facility];  Service: Plastics    HI ARTHRD MIDTARSL/TARSOMETATARSAL MULT/TRANSVRS Left 09/09/2016    Procedure: ARTHRODESIS FIRST [address], [address] [place] [address] ;  Surgeon: China Herrera DPM;  Location: [facility] Main OR;  Service: Podiatry    HI BREAST RECONSTRUCTION W/LATISSIMUS DORSI FLAP Left 09/07/2017    Procedure: BREAST RECONSTRUCTION; LATISSIMUS DORSI FLAP; TISSUE EXPANDER;  Surgeon: Cruz Rolon MD;  [facility];  Service: Plastics    HI COLONOSCOPY FLX DX W/COLLJ [facility] WHEN PFRMD N/A 08/01/2018    Procedure: COLONOSCOPY;  Surgeon: Corinna Kelley MD;  Location: [facility] MAIN OR;  Service: Gastroenterology    HI [place] W/SESMDC W/RESCJ PROX [address] [place] [address]  09/09/2016    Procedure: Dougie VARGAS MODIFIED ;  Surgeon: China Herrera DPM;  [facility] Main OR;  Service: Podiatry    HI EXCISION MALIGNANT LESION TRUNK/ARM/LEG > 4.0 CM N/A 06/27/2019    Procedure: EXCISION BASAL CELL CARCINOMA MID CHEST;  Surgeon: Cruz Rolon MD;  [facility];  Service: Plastics    HI FTH/GFT FREE W/DIRECT CLOSURE N/E/E/L 20 SQ CM/< Right 03/07/2019    Procedure: SKIN GRAFT FULL THICKNESS  (FTSG);   Surgeon: Cruz Rolon MD;  [facility];  Service: Plastics    HI HYSTEROSCOPY BX ENDOMETRIUM&/POLYPC W/WO D&C N/A 01/07/2022    Procedure: DILATATION AND CURETTAGE (D&C) WITH HYSTEROSCOPY, POLYPECTOMY ;  Surgeon: Melisa Swift MD;  [facility];  Service: Gynecology    HI INSERTION BREAST IMPLANT SAME DAY OF MASTECTOMY Left 09/07/2017    Procedure: TISSUE EXPANDER;  Surgeon: Cruz Rolon MD;  Location:  MAIN OR;  Service: [address] [facility] MONTEGGIA FRACTURE DISLOCATION ELBOW Left 12/11/2019    Procedure: OPEN REDUCTION W/ INTERNAL FIXATION left distal humerus fracture, possible olecranon osteotomy;  Surgeon:  Edith Haddad MD;  Location: [facility] MAIN OR;  Service: Orthopedics    MD OSTEOTOMY CALCANEUS W/WO INTERNAL FIXATION Left 09/09/2016    Procedure: OSTEOTOMY CALCANEUS WITH APPLICATION AND ACTIVATION OF BONE STIMULATOR ;  Surgeon: Isidro Marin DPM;  Location: [facility] Main [facility];  Service: Podiatry    MD [address] [place] [address] TISSUE FOR GRAFT OTHR Bilateral 06/27/2019    Procedure: AUTOLOGOUS FAT GRAFTING BILATERAL BREASTS;  Surgeon: Robin Marina MD;  [facility]: [facility];  Service: Plastics    MD REPAIR COMPLEX F/C/C/M/N/AX/G/H/F 2.6-7.5 CM N/A 03/07/2019    Procedure: COMPLEX CLOSURE;  Surgeon: Robin Marina MD;  [facility];  Service: Plastics    MD REPAIR COMPLEX TRUNK 2.6-7.5 CM N/A 06/27/2019    Procedure: CLOSURE WOUND MID CHEST;  Surgeon: Robin Marina MD;  [facility];  Service: Plastics    MD REVISION OF RECONSTRUCTED BREAST Bilateral 06/27/2019    Procedure: BILATERAL BREAST MOUND REVISION;  Surgeon: Robin Marina MD;  [facility]: [facility];  Service: Plastics    MD REVISION OF RECONSTRUCTED BREAST Bilateral 10/24/2019    Procedure: BILATERAL BREAST MOUND REVISION;  Surgeon: Robin Marina MD;  [facility]: [facility];  Service: Plastics    MD REVISION OF RECONSTRUCTED BREAST Bilateral 01/07/2021    Procedure: BILATERAL BREAST MOUND REVISION;  Surgeon: Robin Marina MD;  Location: [facility];  Service: Plastics    SENTINEL LYMPH NODE BIOPSY Bilateral 06/06/2014    SENTINEL LYMPH NODE BIOPSY Right 2008    SKIN BIOPSY      SKIN LESION EXCISION Left 02/21/2019    Procedure: UPPER BACK [address] EXCISION;  Surgeon: Robin Marina MD;  [facility];  Service: Plastics    SQUAMOUS CELL CARCINOMA EXCISION N/A 03/07/2019    Procedure: EXCISION BCC LEFT FOREHEAD;  Surgeon: Robin Marina MD;  [facility]: [facility];  Service: Plastics    WISDOM TOOTH EXTRACTION       Family History   Adopted: Yes   Problem Relation Age of Onset    No Known Problems Mother     No Known Problems Father      Social History     Socioeconomic History    Marital status: /Civil Union     Spouse name: Not on file    Number of children: Not on file    Years of education: Not on file    Highest education level: Not on file   Occupational History    Not on file   Tobacco Use    Smoking status: Never    Smokeless tobacco: Never   Vaping Use    Vaping Use: Never used   Substance and Sexual Activity    Alcohol use: Yes     Alcohol/week: 4.0 standard drinks of alcohol     Types: 4 Glasses of wine per week     Comment: 4 weekly-wine    Drug use: No    Sexual activity: Yes     Partners: Male     Birth control/protection: Condom Male   Other Topics Concern    Not on file   Social History Narrative    Not on file     Social Determinants of Health     Financial Resource Strain: Not on file   Food Insecurity: Not on file   Transportation Needs: Not on file   Physical Activity: Not on file   Stress: Not on file   Social Connections: Not on file   Intimate Partner Violence: Not on file   Housing Stability: Not on file       Current Outpatient Medications:     acetaminophen (TYLENOL) 500 mg tablet, Take 500 mg by mouth every 6 (six) hours as needed for mild pain., Disp: , Rfl:     albuterol (Ventolin HFA) 90 mcg/act inhaler, Inhale 2 puffs every 6 (six) hours as needed for wheezing or shortness of breath, Disp: 8 g, Rfl: 0    ALPRAZolam (XANAX) 1 mg tablet, Take 1 mg by mouth daily at bedtime as needed, Disp: , Rfl:     Ascorbic Acid (VITAMIN C) 500 MG CAPS, Take 500 mg by mouth in the morning  , Disp: , Rfl:     calcium-vitamin D (OSCAL) 250-125 MG-UNIT per tablet, Take 2 tablets by mouth daily. , Disp: , Rfl:     Cholecalciferol (VITAMIN D) 2000 units CAPS, Take by mouth in the morning  , Disp: , Rfl:     Diclofenac Sodium (VOLTAREN) 1 %, Apply 2 g topically 4 (four) times a day, Disp: 300 g, Rfl: 5    famotidine (PEPCID) 20 mg tablet, Take 20 mg by mouth 2 (two) times a day as needed  , Disp: , Rfl:     Lactobacillus (ACIDOPHILUS PO), Take by mouth daily. , Disp: , Rfl:     Multiple Vitamin (MULTIVITAMIN) capsule, Take 1 capsule by mouth daily. , Disp: , Rfl:     nystatin (MYCOSTATIN) ointment, if needed, Disp: , Rfl:     nystatin (MYCOSTATIN) powder, Apply topically 2 (two) times a day (Patient taking differently: Apply topically as needed), Disp: 15 g, Rfl: 0    omeprazole (PriLOSEC) 20 mg delayed release capsule, Take 20 mg by mouth as needed  , Disp: , Rfl:     tamoxifen (NOLVADEX) 20 mg tablet, Take 1 tablet (20 mg total) by mouth daily, Disp: 90 tablet, Rfl: 3    zinc gluconate 50 mg tablet, Take by mouth daily  , Disp: , Rfl:     ALPRAZolam (XANAX) 0.5 mg tablet, Take 0.5 mg by mouth daily at bedtime as needed for anxiety (Patient not taking: Reported on 10/12/2023), Disp: , Rfl:     dicyclomine (BENTYL) 10 mg capsule, Take 1 capsule (10 mg total) by mouth 4 (four) times a day (before meals and at bedtime) for 15 days (Patient not taking: Reported on 10/12/2023), Disp: 60 capsule, Rfl: 0    Promethazine-DM (PHENERGAN-DM) 6.25-15 mg/5 mL oral syrup, Take 5 mL by mouth 4 (four) times a day as needed for cough (Patient not taking: Reported on 10/12/2023), Disp: 180 mL, Rfl: 0  Allergies   Allergen Reactions    Codeine Itching     Severe    Dilaudid [Hydromorphone Hcl] Itching     Severe    Hydromorphone Itching    Tramadol Itching    Other Rash     Adhesive tape       The following portions of the patient's history were reviewed and updated as appropriate: allergies, current medications, past family history, past medical history, past social history, past surgical history, and problem list.        Vitals:    10/12/23 0815   BP: 116/80   Pulse: 93   Resp: 15   Temp: (!) 97.4 °F (36.3 °C)   SpO2: 100%       Physical Exam  Constitutional:       General: She is not in acute distress. Appearance: Normal appearance. She is well-developed. HENT:      Head: Normocephalic and atraumatic. Cardiovascular:      Heart sounds: Normal heart sounds. Pulmonary:      Breath sounds: Normal breath sounds. Chest:   Breasts:     Right: Skin change (mastectomy scar) present. Left: Mass and skin change (mastectomy scar) present. Abdominal:      Palpations: Abdomen is soft. Musculoskeletal:      Right lower leg: No edema. Left lower leg: No edema. Lymphadenopathy:      Upper Body:      Right upper body: No supraclavicular, axillary or pectoral adenopathy. Left upper body: No supraclavicular, axillary or pectoral adenopathy. Neurological:      Mental Status: She is alert and oriented to person, place, and time. Psychiatric:         Mood and Affect: Mood normal.               Breast Ultrasound     Date/Time  10/12/2023 8:15 AM     Performed by  Ann Marie Berry MD   Authorized by  Ann Marie Berry MD     Procedure Details   Procedure Notes: Both the lower inner and far upper left reconstructed breast were scanned in the radial and antiradial views with attention to the palpable nodules. These both reveal subcentimeter meter anechoic lesions with potential calcification of the rim consistent with oil cyst from the prior fat grafting. This is a benign study. Discussion/Summary: 32-year-old female who initially had right-sided breast cancer in 2008. She then developed bilateral malignancy in 2014 and underwent bilateral mastectomy with reconstruction. She has had multiple revisions. She continues on tamoxifen. Prior genetic testing was negative. I previously discussed doing an stated panel with her. She has not had this done. I will replace this order for her. The area of concern today corresponds to an oil cyst likely secondary to fat grafting. She states that she is already scheduled to see me again in 1 year.

## 2023-10-16 ENCOUNTER — TELEPHONE (OUTPATIENT)
Dept: HEMATOLOGY ONCOLOGY | Facility: CLINIC | Age: 58
End: 2023-10-16

## 2023-10-16 NOTE — TELEPHONE ENCOUNTER
I called Dontrell Francois in response to a referral that was received for patient to establish care with [facility]. Outreach was made to schedule a consultation. I left a voicemail explaining the reason for my call and advised patient to call [facility] at [phone]. The referral has been closed.

## 2023-10-18 ENCOUNTER — TELEPHONE (OUTPATIENT)
Dept: LABOR AND DELIVERY | Facility: HOSPITAL | Age: 58
End: 2023-10-18

## 2023-10-18 ENCOUNTER — TELEPHONE (OUTPATIENT)
Dept: OBGYN CLINIC | Facility: CLINIC | Age: 58
End: 2023-10-18

## 2023-10-18 NOTE — TELEPHONE ENCOUNTER
PC to patient at her request.  Gagandeep Bahena back over patient past procedures, discussed that may still not find anything beyond benign polyps and  fibroids as has had D&C in past and endometrial biopsy all benign. Main concern is patient still on Tamoxifen for a period of time. Stripe is actually thinner than prior. Will schedule pre op appointment to discuss further. Patient reports no further questions and appreciation.   10/21 pelvic u/s 12 mm  1/22 D&C polyps benign  2/23 pelvic u/s 16 mm  3/23 endometrial biopsy small portions benign tissue  10/23 14 mm

## 2023-10-30 ENCOUNTER — OFFICE VISIT (OUTPATIENT)
Dept: OBGYN CLINIC | Facility: CLINIC | Age: 58
End: 2023-10-30
Payer: COMMERCIAL

## 2023-10-30 VITALS — HEIGHT: 66 IN | BODY MASS INDEX: 29.21 KG/M2 | SYSTOLIC BLOOD PRESSURE: 120 MMHG | DIASTOLIC BLOOD PRESSURE: 82 MMHG

## 2023-10-30 DIAGNOSIS — R93.89 THICKENED ENDOMETRIUM: Primary | ICD-10-CM

## 2023-10-30 PROCEDURE — 99213 OFFICE O/P EST LOW 20 MIN: CPT | Performed by: OBSTETRICS & GYNECOLOGY

## 2023-10-30 NOTE — PROGRESS NOTES
Susy Moy is a 66 y.o. G0, P0 female here for a discussion visit. Patient will likely continue tamoxifen until this coming summer. Patient has a history of submucosal fibroids which are adding to her measurement of her endometrial lining on ultrasound. Patient has had no bleeding no cramping no unusual discharge. 2021 pelvic ultrasound 12 mm  2022 D&C polyps submucosal fibroids benign sampling  2023 pelvic ultrasound 16 mm  2023 endometrial biopsy benign tissue small portions  2023 ultrasound 14 mm     We had a thorough discussion patient with no symptoms main concern being that she is on tamoxifen and stripe is measuring thicker but known submucosal fibroids based on prior D&C. Option to further evaluate with D&C versus close observation. Patient will call and go for ultrasound sooner with any symptoms and will otherwise repeat ultrasound in 6 months. Patient plans to discuss with her oncology team also. Patient thoroughly counseled no further questions at this time. Gynecologic History  No LMP recorded (lmp unknown). Patient is postmenopausal.  Contraception: post menopausal status  Last Pap: 2020. Results were: normal  Last mammogram: 2023 breast MRI no evidence of concern    Obstetric History  OB History    Para Term  AB Living   0 0 0 0 0 0   SAB IAB Ectopic Multiple Live Births   0 0 0 0 0   Obstetric Comments   Menarche age 10. History of birth control pills. The following portions of the patient's history were reviewed and updated as appropriate: allergies, current medications, past family history, past medical history, past social history, past surgical history, and problem list.    Review of Systems  Review of Systems   Constitutional: Negative. Negative for chills, fatigue, fever and unexpected weight change. HENT: Negative. Negative for dental problem, sinus pressure and sinus pain. Eyes: Negative. Negative for visual disturbance. Respiratory: Negative. Negative for cough, shortness of breath and wheezing. Cardiovascular: Negative. Negative for chest pain and leg swelling. Gastrointestinal: Negative. Negative for constipation, diarrhea, nausea and vomiting. Genitourinary: Negative. Negative for menstrual problem, pelvic pain and urgency. Musculoskeletal: Negative. Negative for back pain. Neurological:  Negative for dizziness and headaches. Objective     /82 (BP Location: Left arm, Patient Position: Sitting, Cuff Size: Large)   Ht 5' 6" (1.676 m)   LMP  (LMP Unknown)   BMI 29.21 kg/m²   General appearance: alert and oriented, in no acute distress  Head: Normocephalic, without obvious abnormality, atraumatic  Extremities: extremities normal, warm and well-perfused; no cyanosis, clubbing, or edema      Assessment  66year-old G0, P0 with continued thickened stripe on ultrasound slightly decreased and normal biopsies previously by [facility]  and endometrial biopsy. Patient current plan is for tamoxifen until this coming summer. Currently has no bleeding no cramping no unusual discharge. Plan  Ultrasound ordered for 6 months for follow-up, patient has already been counseled will simply need to come to office to sign consent and get wash and booklet if would need D&C hysteroscopy.   Return for annual or sooner as needed

## 2023-11-14 ENCOUNTER — OFFICE VISIT (OUTPATIENT)
Dept: HEMATOLOGY ONCOLOGY | Facility: CLINIC | Age: 58
End: 2023-11-14
Payer: COMMERCIAL

## 2023-11-14 VITALS
SYSTOLIC BLOOD PRESSURE: 130 MMHG | HEIGHT: 66 IN | WEIGHT: 180 LBS | OXYGEN SATURATION: 88 % | DIASTOLIC BLOOD PRESSURE: 86 MMHG | BODY MASS INDEX: 28.93 KG/M2 | TEMPERATURE: 98 F | HEART RATE: 67 BPM | RESPIRATION RATE: 17 BRPM

## 2023-11-14 DIAGNOSIS — Z17.0 MALIGNANT NEOPLASM OF BREAST IN FEMALE, ESTROGEN RECEPTOR POSITIVE, UNSPECIFIED LATERALITY, UNSPECIFIED SITE OF BREAST: Primary | ICD-10-CM

## 2023-11-14 DIAGNOSIS — C50.919 MALIGNANT NEOPLASM OF BREAST IN FEMALE, ESTROGEN RECEPTOR POSITIVE, UNSPECIFIED LATERALITY, UNSPECIFIED SITE OF BREAST: Primary | ICD-10-CM

## 2023-11-14 PROCEDURE — 99215 OFFICE O/P EST HI 40 MIN: CPT | Performed by: INTERNAL MEDICINE

## 2023-11-16 NOTE — PROGRESS NOTES
HEMATOLOGY / [facility] FOLLOW UP NOTE    Primary Care Provider: Sophia Diez DO  Referring Provider:    MRN: [id number]  : 1965    Reason for Encounter: follow up breast cancer       Oncology History Overview Note    - right sided DCIS, partial mastectomy followed by RT     - b/l mastectomy           - right sided invasive ductal carcinoma, grade 1, 7 mm, ER+ MA+ Her2 neg, [id number]           - left sided invasive ductal carcinoma, 1.5 cm, grade 2, Er+ MA+ Her2 neg,oncotype  7        [id number]    Start tamoxifen    2023 - stop tamoxifen     Bilateral malignant neoplasm of breast in female, estrogen receptor positive ([address])    Initial Diagnosis    Breast cancer ([address])     2008 Surgery    Right breast stereotactic breast biopsy      Surgery    Right breast lumpectomy, SLNB     2013 Surgery    Left breast stereotactic biopsy     2013 Surgery    Right breast excisional biopsy     2014 Biopsy    Left breast biopsy X 3 sites. Ultrasound guided left breast (200 position) core biopsy- 2 passes:     - Invasive ductal carcinoma, 0.25 cm maximum dimension, histologic grade   I-II, present on one core. - Combined Timbo score: 5-6/9   - Tubule formation: Less than 10% (3/3). - Nuclear pleomorphism: Mild to moderate (1-2/3). - Mitotic count: Fewer than 1 mitosis per 10 high power fields (1/3). Ultrasound guided left breast (300 position) core biopsy 3 passes:   - Invasive ductal carcinoma, 0.5 cm maximum dimension, histologic grade I-II,   present on one core and possibly involving a second core. - Combined Flushing score: 5-6/9   - Tubule formation: Less than 10% (3/3). - Nuclear pleomorphism: Mild to moderate (1-2/3). - Mitotic count: Fewer than 1 mitosis per 10 high power fields (1/3). Ultrasound guided left breast (500 position) core biopsy- 3 passes:   - Benign breast parenchyma.    - No atypical ductal/lobular hyperplasia or malignancy is identified  Right breast US guided  Biopsy Ultrasound guided right breast core biopsy 3 passes:    - Invasive ductal carcinoma, 0.4 cm maximum dimension, histologic grade I,   present on one core and possibly involving a second core. - Combined Timbo score:4-5/9   -Tubule formation: 10-75% (2/3)   -Nuclear pleomorphism (1-2/3)   -Mitotic count: fewer than 1 mitosis per 10 HPFs. Comment: Block B1 is suggested if further studies are indicated for the left   breast. Block D2 is suggested if further studies are indicated for the right   breast.           6/6/2014 Surgery      Right and left breast, mastectomy:         Right breast:  Invasive ductal carcinoma, NOS, Grade I of III,   7mm in greatest   dimension.    -Margins:   - 2 mm from the posterior inked margin.    - >2 mm from all other margins.      - Ductal carcinoma in situ, solid, low nuclear grade,   1mm in greatest   dimension.   - Margins:   - >2mm from all inked margins.     - Negative for definitive lymphovascular invasion.     - Negative for microcalcifications. - Uninvolved breast with columnar cell change, sclerosing adenosis,   fibroadenomatoid       change and dense stromal fibrosis. Left breast, mastectomy:       - Multifocal Invasive ductal carcinoma, NOS, Grade II of III,   15mm in   greatest dimension,        see note. - Other foci measure 12mm, 11mm and 8mm with similar morphology. - Margins:   - <1 mm from the anterior and posterior inked margin.    - >2 mm from all other margins.      - Multiple foci of Ductal carcinoma in situ, solid, low nuclear grade, see   note. - Margins:   - <1 mm from the anterior and posterior inked margin.    - >2 mm from all other margins.      - Negative for definitive lymphovascular invasion.     - Microcalcifications in association with invasive carcinoma. - Intraductal papilloma.      - Uninvolved breast with sclerosing adenosis, papillary apocrine cell   change, fibroadenomatoid       change, duct ectasia and dense stromal fibrosis          7/3/2014 Surgery    Revision right breast.     7/2014 -  Hormone Therapy    Tamoxifen     9/17/2014 Surgery    latisimus dorsi flap, skin graft, replaced expander     10/10/2014 - 11/20/2014 Radiation    :  Course: C1    Plan ID Energy Fractions Dose per Fraction (cGy) Total Dose Delivered (cGy) Elapsed Days   Left PAB 6X 25 / 25 41 1,025 34   Left Sclav 6X 25 / 25 200 5,000 34   Lt CW 6X 13 / 13 200 2,600 34   Lt CW Bolus 6X 12 / 12 200 2,400 30   Lt CW e:1 6E/9E 5 / 5 200 1,000 6      Treatment dates:  C1: 10/10/2014 - 11/20/2014       10/27/2014 Surgery    Right breast revision     9/7/2017 Surgery    Left breast LDF reconstruction with expander     Basal cell carcinoma of shoulder, left   4/25/2022 Initial Diagnosis    Basal cell carcinoma of shoulder, left         Interval History: Patient presents for follow up of her bilateral breast cancer. She has been on tamoxifen for 9-1/2 years. She does have side effects of vaginal atrophy with painful sexual intercourse. Recent imaging studies of her endometrium show a thickening that her gyn and is following. Her last menstrual period was 2 years ago. She denies any headaches, chest pain shortness of breath, bowel problems. She is otherwise in her normal state of health today and is transitioning her care from Dr. Ai Ceron to me         REVIEW OF SYSTEMS:  Please note that a 14-point review of systems was performed to include Constitutional, HEENT, Respiratory, CVS, GI, , Musculoskeletal, Integumentary, Neurologic, Rheumatologic, Endocrinologic, Psychiatric, Lymphatic, and Hematologic/Oncologic systems were reviewed and are negative unless otherwise stated in HPI. Positive and negative findings pertinent to this evaluation are incorporated into the history of present illness.       ECOG PS: 0    PROBLEM LIST:  Patient Active Problem List   Diagnosis    Surgical follow-up care    Bilateral malignant neoplasm of breast in female, estrogen receptor positive (HCC)    Breast implant capsular contracture    Aftercare postmastectomy for breast reconstruction    Basal cell carcinoma (BCC) of skin of trunk    Basal cell carcinoma (BCC) of right side of nose    Closed bicondylar fracture of distal end of left humerus    Arthritis    PONV (postoperative nausea and vomiting)    Dyspareunia, female    Status post dilation and curettage    Basal cell carcinoma of shoulder, left    Asthma    Gastroesophageal reflux disease    Basal cell carcinoma (BCC) of left side of neck    Enterocolitis    Cat bite of left hand    Use of tamoxifen (Nolvadex)    Personal history of breast cancer    BRCA gene mutation negative    Skin lesions    Fat necrosis       Assessment / Plan: 44-year-old female with a history of bilateral breast cancer with node positive disease on the left. She did not require adjuvant chemotherapy because of a low Oncotype score and has been on tamoxifen for 9-1/2 years. We discussed considering stopping tamoxifen now instead of the planned stop in June 2024. She is having some endometrial thickening on her ultrasound and it is causing sexual dysfunction. I do not think there will be a difference in her outcomes if she stops it a little earlier for quality-of-life purposes. We extensively discussed the risks and benefits of doing this. She is in agreement with stopping now. We also talked about the risks and benefits of a topical estrogen such as Vagifem. Hopefully she will not need this if she is stopping her tamoxifen. I will see her back in 1 year with a CBC and CMP prior. We discussed what signs or symptoms she should call us within the interim. I spent 45 minutes on chart review, face to face counseling time, coordination of care and documentation.     Past Medical History:   has a past medical history of Arthritis, Asthma, Cancer ([address]), COVID (2021), Difficulty falling asleep, GERD (gastroesophageal reflux disease), Gluteal abscess, Limb alert care status, Pain in left foot, Pneumonia (2009), PONV (postoperative nausea and vomiting), Skin cancer, and Varicella. PAST SURGICAL HISTORY:   has a past surgical history that includes Mastectomy (Bilateral, 06/06/2014); Breast surgery (Bilateral, 2008, 2013, 2014); [place] tooth extraction; pr osteotomy calcaneus w/wo internal fixation (Left, 09/09/2016); pr arthrd midtarsl/tarsometatarsal mult/transvrs (Left, 09/09/2016); pr corrj hallux valgus w/sesmdc w/rescj prox phal (09/09/2016); pr breast reconstruction w/latissimus dorsi flap (Left, 09/07/2017); pr insertion breast implant same day of mastectomy (Left, 09/07/2017); Knee arthroscopy (Right, 05/1997); Breast biopsy (05/02/2014); Breast surgery (Left, 07/24/2013); Breast biopsy (05/2013); Breast biopsy (05/2008); Foot arthrodesis (Left); Mohs surgery; Breast lumpectomy (Right, 2008); [place] lymph node biopsy (Bilateral, 06/06/2014); [place] lymph node biopsy (Right, 2008); pr colonoscopy flx dx w/collj spec when pfrmd (N/A, 08/01/2018); Breast reconstruction (Left, 02/21/2019); Chest wall biopsy (Right, 02/21/2019); Skin lesion excision (Left, 02/21/2019); CLOSURE WOUND (N/A, 02/21/2019); Capsulectomy (Left, 02/21/2019); pr repair complex f/c/c/m/n/ax/g/h/f 2.6-7.5 cm (N/A, 03/07/2019); pr adjt tis reargmt eye/nose/ear/lip 10.1-30.0 sqcm (Right, 03/07/2019); pr fth/gft free w/direct closure n/e/e/l 20 sq cm/< (Right, 03/07/2019); MOHS RECONSTRUCTION (Right, 03/07/2019); Squamous cell carcinoma excision (N/A, 03/07/2019); pr revision of reconstructed breast (Bilateral, 06/27/2019); pr remv tissue for graft othr (Bilateral, 06/27/2019); pr excision malignant lesion trunk/arm/leg > 4.0 cm (N/A, 06/27/2019); pr repair complex trunk 2.6-7.5 cm (N/A, 06/27/2019); pr revision of reconstructed breast (Bilateral, 10/24/2019);  Liposuction w/ fat injection (Bilateral, 10/24/2019); pr open tx monteggia fracture dislocation elbow (Left, 12/11/2019); Skin biopsy; pr revision of reconstructed breast (Bilateral, 01/07/2021); Excision basal cell carcinoma (N/A, 01/07/2021); COMPLEX WOUND CLOSURE TO EXTREMITY (N/A, 01/07/2021); Liposuction w/ fat injection (Bilateral, 01/07/2021); pr hysteroscopy bx endometrium&/polypc w/wo d&c (N/A, 01/07/2022); Colonoscopy; Excision basal cell carcinoma (Left, 04/25/2022); and Mass excision (Right, 7/31/2023). CURRENT MEDICATIONS  Current Outpatient Medications   Medication Sig Dispense Refill    acetaminophen (TYLENOL) 500 mg tablet Take 500 mg by mouth every 6 (six) hours as needed for mild pain. albuterol (Ventolin HFA) 90 mcg/act inhaler Inhale 2 puffs every 6 (six) hours as needed for wheezing or shortness of breath 8 g 0    ALPRAZolam (XANAX) 1 mg tablet Take 1 mg by mouth daily at bedtime as needed      Ascorbic Acid (VITAMIN C) 500 MG CAPS Take 500 mg by mouth in the morning        calcium-vitamin D (OSCAL) 250-125 MG-UNIT per tablet Take 2 tablets by mouth daily. Cholecalciferol (VITAMIN D) 2000 units CAPS Take by mouth in the morning        famotidine (PEPCID) 20 mg tablet Take 20 mg by mouth 2 (two) times a day as needed        Lactobacillus (ACIDOPHILUS PO) Take by mouth daily. Multiple Vitamin (MULTIVITAMIN) capsule Take 1 capsule by mouth daily.       nystatin (MYCOSTATIN) ointment if needed      omeprazole (PriLOSEC) 20 mg delayed release capsule Take 20 mg by mouth as needed        tamoxifen (NOLVADEX) 20 mg tablet Take 1 tablet (20 mg total) by mouth daily 90 tablet 3    zinc gluconate 50 mg tablet Take by mouth daily        ALPRAZolam (XANAX) 0.5 mg tablet Take 0.5 mg by mouth daily at bedtime as needed for anxiety (Patient not taking: Reported on 10/12/2023)      Diclofenac Sodium (VOLTAREN) 1 % Apply 2 g topically 4 (four) times a day 300 g 5    dicyclomine (BENTYL) 10 mg capsule Take 1 capsule (10 mg total) by mouth 4 (four) times a day (before meals and at bedtime) for 15 days (Patient not taking: Reported on 10/12/2023) 60 capsule 0    nystatin (MYCOSTATIN) powder Apply topically 2 (two) times a day (Patient taking differently: Apply topically as needed) 15 g 0    Promethazine-DM (PHENERGAN-DM) 6.25-15 mg/5 mL oral syrup Take 5 mL by mouth 4 (four) times a day as needed for cough (Patient not taking: Reported on 10/12/2023) 180 mL 0     No current facility-administered medications for this visit. [unfilled]    SOCIAL HISTORY:   reports that she has never smoked. She has never used smokeless tobacco. She reports current alcohol use of about 4.0 standard drinks of alcohol per week. She reports that she does not use drugs. FAMILY HISTORY:  family history includes No Known Problems in her father and mother. She was adopted. ALLERGIES:  is allergic to codeine, dilaudid [hydromorphone hcl], hydromorphone, tramadol, and other. Physical Exam:  Vital Signs:   Visit Vitals  /86 (BP Location: Left arm, Patient Position: Sitting, Cuff Size: Adult)   Pulse 67   Temp 98 °F (36.7 °C)   Resp 17   Ht 5' 6" (1.676 m)   Wt 81.6 kg (180 lb) Comment: wt per pt   LMP  (LMP Unknown)   SpO2 (!) 88%   BMI 29.05 kg/m²   OB Status Postmenopausal   Smoking Status Never   BSA 1.91 m²     Body mass index is 29.05 kg/m². Body surface area is 1.91 meters squared. GEN: Alert, awake oriented x3, in no acute distress  HEENT- No pallor, icterus, cyanosis, no oral mucosal lesions,   LAD - no palpable cervical, clavicle, axillary, inguinal LAD  Heart- normal S1 S2, regular rate and rhythm, No murmur, rubs.    Lungs- clear breathing sound bilateral.   Abdomen- soft, Non tender, bowel sounds present  Extremities- No cyanosis, clubbing, edema  Neuro- No focal neurological deficit    Labs:  Lab Results   Component Value Date    WBC 4.63 07/24/2023    HGB 11.7 07/24/2023    HCT 36.3 07/24/2023    MCV 92 07/24/2023     07/24/2023     Lab Results   Component Value Date     06/24/2015    SODIUM 141 07/24/2023    K 4.8 07/24/2023     (H) 07/24/2023    CO2 28 07/24/2023    ANIONGAP 11 06/24/2015    AGAP 4 07/24/2023    BUN 13 07/24/2023    CREATININE 0.68 07/24/2023    GLUC 98 05/12/2022    GLUF 96 07/24/2023    CALCIUM 9.3 07/24/2023    AST 18 05/11/2022    ALT 12 05/11/2022    ALKPHOS 50 05/11/2022    PROT 7.6 05/27/2014    TP 6.9 05/11/2022    BILITOT 0.3 05/27/2014    TBILI 0.35 05/11/2022    EGFR 98 07/24/2023

## 2023-11-27 ENCOUNTER — OFFICE VISIT (OUTPATIENT)
Dept: PLASTIC SURGERY | Facility: CLINIC | Age: 58
End: 2023-11-27

## 2023-11-27 DIAGNOSIS — D48.9 NEOPLASM OF UNCERTAIN BEHAVIOR: ICD-10-CM

## 2023-11-27 DIAGNOSIS — L98.9 SKIN LESIONS: Primary | ICD-10-CM

## 2023-11-27 PROCEDURE — 88305 TISSUE EXAM BY PATHOLOGIST: CPT | Performed by: PATHOLOGY

## 2023-11-27 PROCEDURE — RECHECK: Performed by: SURGERY

## 2023-11-27 NOTE — PROGRESS NOTES
Biopsy    Date/Time: 11/27/2023 9:30 AM    Performed by: Eliud Rodriguez MD  Authorized by: Eliud Rodriguez MD  Universal Protocol:  Consent: Verbal consent obtained.   Risks and benefits: risks, benefits and alternatives were discussed  Consent given by: patient  Patient understanding: patient states understanding of the procedure being performed    Procedure Details - Lesion Biopsy:     Biopsy method: punch biopsy      Biopsy tissue type: skin and subcutaneous    Initial size (mm):  6

## 2023-11-27 NOTE — PROGRESS NOTES
Daniel Phelan presents today regarding a lesion of the right chest/reconstructed breast.  She reports that the skin lesion of the right medial chest has been present for "months", without significant change. Examination reveals an approximately 6 mm pink/red macule with rolled borders. Given her history of multiple prior skin cancer/basal cell carcinomas we mutually agreed to biopsy the site. After the area was prepped lidocaine with epinephrine was administered. A 3 mm punch biopsy was used to form the biopsy. Specimen was placed in formalin. The site was closed with 5-0 nylon sutures and a Band-Aid was applied. The usual instructions were given and she will be seen in 10 days for suture removal and to review the pathology report.

## 2023-11-30 PROCEDURE — 88305 TISSUE EXAM BY PATHOLOGIST: CPT | Performed by: PATHOLOGY

## 2023-12-07 ENCOUNTER — OFFICE VISIT (OUTPATIENT)
Dept: PLASTIC SURGERY | Facility: CLINIC | Age: 58
End: 2023-12-07
Payer: COMMERCIAL

## 2023-12-07 VITALS
DIASTOLIC BLOOD PRESSURE: 80 MMHG | BODY MASS INDEX: 28.93 KG/M2 | WEIGHT: 180 LBS | TEMPERATURE: 98.4 F | SYSTOLIC BLOOD PRESSURE: 126 MMHG | HEIGHT: 66 IN | HEART RATE: 70 BPM

## 2023-12-07 DIAGNOSIS — C44.519 BASAL CELL CARCINOMA (BCC) OF SKIN OF TRUNK: Primary | ICD-10-CM

## 2023-12-07 PROCEDURE — 99211 OFF/OP EST MAY X REQ PHY/QHP: CPT | Performed by: PHYSICIAN ASSISTANT

## 2023-12-07 NOTE — PROGRESS NOTES
Assessment/Plan:    Pt is a 66 YOF who is s/p punch biopsy of a skin lesion of the chest, positive for BCC. Please see HPI. I discussed surgical excision with complex closure. Patient understood and agreed. This will be done under general vs local anesthesia. Discussed options, including forgoing surgery, as well as benefits and risks of surgery including but not limited to anesthesia, bleeding, infection, scarring and potential need for additional procedures. Consent was obtained and all questions answered to their satisfaction. We will plan for surgery at their earliest convenience. No problem-specific Assessment & Plan notes found for this encounter. Diagnoses and all orders for this visit:    Basal cell carcinoma (BCC) of skin of trunk          Subjective:      Patient ID: Julissa Kaufman is a 66 y.o. female. HPI    Patient presents to the office today for removal of stitches from her right chest biopsy site; stitch fell out on it's own. Patient has healing biopsy site of right chest/ upper pole of breast.     The following portions of the patient's history were reviewed and updated as appropriate: She  has a past medical history of Arthritis, Asthma, Cancer ([address]), COVID (2021), Difficulty falling asleep, GERD (gastroesophageal reflux disease), Gluteal abscess, Limb alert care status, Pain in left foot, Pneumonia (2009), PONV (postoperative nausea and vomiting), Skin cancer, and Varicella.   She   Patient Active Problem List    Diagnosis Date Noted    Fat necrosis 10/12/2023    Skin lesions 04/25/2023    Use of tamoxifen (Nolvadex) 11/17/2022    Personal history of breast cancer 11/17/2022    BRCA gene mutation negative 11/17/2022    Enterocolitis 05/11/2022    Cat bite of left hand 05/11/2022    Basal cell carcinoma (BCC) of left side of neck 04/27/2022    Basal cell carcinoma of shoulder, left 04/25/2022    Asthma 04/25/2022    Gastroesophageal reflux disease 04/25/2022    Status post dilation and curettage 01/07/2022    Dyspareunia, female 10/07/2021    Arthritis     PONV (postoperative nausea and vomiting)     Closed bicondylar fracture of distal end of left humerus 12/02/2019    Basal cell carcinoma (BCC) of skin of trunk 01/04/2019    Basal cell carcinoma (BCC) of right side of nose 01/04/2019    Aftercare postmastectomy for breast reconstruction 02/02/2018    Breast implant capsular contracture 09/09/2017    Bilateral malignant neoplasm of breast in female, estrogen receptor positive ([address]) 09/08/2017    Surgical follow-up care 09/07/2017     She  has a past surgical history that includes Mastectomy (Bilateral, 06/06/2014); Breast surgery (Bilateral, 2008, 2013, 2014); [place] tooth extraction; pr osteotomy calcaneus w/wo internal fixation (Left, 09/09/2016); pr arthrd midtarsl/tarsometatarsal mult/transvrs (Left, 09/09/2016); pr corrj hallux valgus w/sesmdc w/rescj prox phal (09/09/2016); pr breast reconstruction w/latissimus dorsi flap (Left, 09/07/2017); pr insertion breast implant same day of mastectomy (Left, 09/07/2017); Knee arthroscopy (Right, 05/1997); Breast biopsy (05/02/2014); Breast surgery (Left, 07/24/2013); Breast biopsy (05/2013); Breast biopsy (05/2008); Foot arthrodesis (Left); Mohs surgery; Breast lumpectomy (Right, 2008); [place] lymph node biopsy (Bilateral, 06/06/2014); [place] lymph node biopsy (Right, 2008); pr colonoscopy flx dx w/collj spec when pfrmd (N/A, 08/01/2018); Breast reconstruction (Left, 02/21/2019); Chest wall biopsy (Right, 02/21/2019); Skin lesion excision (Left, 02/21/2019); CLOSURE WOUND (N/A, 02/21/2019); Capsulectomy (Left, 02/21/2019); pr repair complex f/c/c/m/n/ax/g/h/f 2.6-7.5 cm (N/A, 03/07/2019); pr adjt tis reargmt eye/nose/ear/lip 10.1-30.0 sqcm (Right, 03/07/2019); pr fth/gft free w/direct closure n/e/e/l 20 sq cm/< (Right, 03/07/2019); MOHS RECONSTRUCTION (Right, 03/07/2019);  Squamous cell carcinoma excision (N/A, 03/07/2019); pr revision of reconstructed breast (Bilateral, 06/27/2019); pr remv tissue for graft othr (Bilateral, 06/27/2019); pr excision malignant lesion trunk/arm/leg > 4.0 cm (N/A, 06/27/2019); pr repair complex trunk 2.6-7.5 cm (N/A, 06/27/2019); pr revision of reconstructed breast (Bilateral, 10/24/2019); Liposuction w/ fat injection (Bilateral, 10/24/2019); pr open tx [place] fracture dislocation elbow (Left, 12/11/2019); Skin biopsy; pr revision of reconstructed breast (Bilateral, 01/07/2021); Excision basal cell carcinoma (N/A, 01/07/2021); COMPLEX WOUND CLOSURE TO EXTREMITY (N/A, 01/07/2021); Liposuction w/ fat injection (Bilateral, 01/07/2021); pr hysteroscopy bx endometrium&/polypc w/wo d&c (N/A, 01/07/2022); Colonoscopy; Excision basal cell carcinoma (Left, 04/25/2022); and Mass excision (Right, 7/31/2023). Her family history includes No Known Problems in her father and mother. She was adopted. She  reports that she has never smoked. She has never used smokeless tobacco. She reports current alcohol use of about 4.0 standard drinks of alcohol per week. She reports that she does not use drugs. Current Outpatient Medications   Medication Sig Dispense Refill    acetaminophen (TYLENOL) 500 mg tablet Take 500 mg by mouth every 6 (six) hours as needed for mild pain. albuterol (Ventolin HFA) 90 mcg/act inhaler Inhale 2 puffs every 6 (six) hours as needed for wheezing or shortness of breath 8 g 0    ALPRAZolam (XANAX) 0.5 mg tablet Take 0.5 mg by mouth daily at bedtime as needed for anxiety (Patient not taking: Reported on 10/12/2023)      ALPRAZolam (XANAX) 1 mg tablet Take 1 mg by mouth daily at bedtime as needed      Ascorbic Acid (VITAMIN C) 500 MG CAPS Take 500 mg by mouth in the morning        calcium-vitamin D (OSCAL) 250-125 MG-UNIT per tablet Take 2 tablets by mouth daily.       Cholecalciferol (VITAMIN D) 2000 units CAPS Take by mouth in the morning        Diclofenac Sodium (VOLTAREN) 1 % Apply 2 g topically 4 (four) times a day 300 g 5    dicyclomine (BENTYL) 10 mg capsule Take 1 capsule (10 mg total) by mouth 4 (four) times a day (before meals and at bedtime) for 15 days (Patient not taking: Reported on 10/12/2023) 60 capsule 0    famotidine (PEPCID) 20 mg tablet Take 20 mg by mouth 2 (two) times a day as needed        Lactobacillus (ACIDOPHILUS PO) Take by mouth daily. Multiple Vitamin (MULTIVITAMIN) capsule Take 1 capsule by mouth daily. nystatin (MYCOSTATIN) ointment if needed      nystatin (MYCOSTATIN) powder Apply topically 2 (two) times a day (Patient taking differently: Apply topically as needed) 15 g 0    omeprazole (PriLOSEC) 20 mg delayed release capsule Take 20 mg by mouth as needed        Promethazine-DM (PHENERGAN-DM) 6.25-15 mg/5 mL oral syrup Take 5 mL by mouth 4 (four) times a day as needed for cough (Patient not taking: Reported on 10/12/2023) 180 mL 0    tamoxifen (NOLVADEX) 20 mg tablet Take 1 tablet (20 mg total) by mouth daily 90 tablet 3    zinc gluconate 50 mg tablet Take by mouth daily         No current facility-administered medications for this visit. Current Outpatient Medications on File Prior to Visit   Medication Sig    acetaminophen (TYLENOL) 500 mg tablet Take 500 mg by mouth every 6 (six) hours as needed for mild pain. albuterol (Ventolin HFA) 90 mcg/act inhaler Inhale 2 puffs every 6 (six) hours as needed for wheezing or shortness of breath    ALPRAZolam (XANAX) 0.5 mg tablet Take 0.5 mg by mouth daily at bedtime as needed for anxiety (Patient not taking: Reported on 10/12/2023)    ALPRAZolam (XANAX) 1 mg tablet Take 1 mg by mouth daily at bedtime as needed    Ascorbic Acid (VITAMIN C) 500 MG CAPS Take 500 mg by mouth in the morning      calcium-vitamin D (OSCAL) 250-125 MG-UNIT per tablet Take 2 tablets by mouth daily.     Cholecalciferol (VITAMIN D) 2000 units CAPS Take by mouth in the morning      Diclofenac Sodium (VOLTAREN) 1 % Apply 2 g topically 4 (four) times a day    dicyclomine (BENTYL) 10 mg capsule Take 1 capsule (10 mg total) by mouth 4 (four) times a day (before meals and at bedtime) for 15 days (Patient not taking: Reported on 10/12/2023)    famotidine (PEPCID) 20 mg tablet Take 20 mg by mouth 2 (two) times a day as needed      Lactobacillus (ACIDOPHILUS PO) Take by mouth daily. Multiple Vitamin (MULTIVITAMIN) capsule Take 1 capsule by mouth daily. nystatin (MYCOSTATIN) ointment if needed    nystatin (MYCOSTATIN) powder Apply topically 2 (two) times a day (Patient taking differently: Apply topically as needed)    omeprazole (PriLOSEC) 20 mg delayed release capsule Take 20 mg by mouth as needed      Promethazine-DM (PHENERGAN-DM) 6.25-15 mg/5 mL oral syrup Take 5 mL by mouth 4 (four) times a day as needed for cough (Patient not taking: Reported on 10/12/2023)    tamoxifen (NOLVADEX) 20 mg tablet Take 1 tablet (20 mg total) by mouth daily    zinc gluconate 50 mg tablet Take by mouth daily       No current facility-administered medications on file prior to visit. She is allergic to codeine, dilaudid [hydromorphone hcl], hydromorphone, tramadol, and other. .    Review of Systems    A 12 point ROS was completed and is negative except as per HPI     Objective:      /80   Pulse 70   Temp 98.4 °F (36.9 °C)   Ht 5' 6" (1.676 m)   Wt 81.6 kg (180 lb) Comment: weight per patient  LMP  (LMP Unknown)   BMI 29.05 kg/m²          Physical Exam  Vitals and nursing note reviewed. Constitutional:       General: She is not in acute distress. Appearance: Normal appearance. She is normal weight. She is not ill-appearing. HENT:      Head: Normocephalic and atraumatic. Nose: Nose normal.      Mouth/Throat:      Mouth: Mucous membranes are moist.   Eyes:      Extraocular Movements: Extraocular movements intact. Conjunctiva/sclera: Conjunctivae normal.      Pupils: Pupils are equal, round, and reactive to light.    Neck:      Vascular: No carotid bruit. Cardiovascular:      Rate and Rhythm: Normal rate and regular rhythm. Pulses: Normal pulses. Heart sounds: Normal heart sounds. Pulmonary:      Effort: Pulmonary effort is normal. No respiratory distress. Breath sounds: Normal breath sounds. Abdominal:      General: Abdomen is flat. Palpations: Abdomen is soft. Musculoskeletal:         General: No swelling, tenderness, deformity or signs of injury. Normal range of motion. Cervical back: Normal range of motion and neck supple. No rigidity or tenderness. Right lower leg: No edema. Left lower leg: No edema. Lymphadenopathy:      Cervical: No cervical adenopathy. Skin:     General: Skin is warm and dry. Comments: See HPI    Neurological:      General: No focal deficit present. Mental Status: She is alert and oriented to person, place, and time. Cranial Nerves: No cranial nerve deficit. Sensory: No sensory deficit. Motor: No weakness.    Psychiatric:         Mood and Affect: Mood normal.         Behavior: Behavior normal.

## 2023-12-22 ENCOUNTER — OFFICE VISIT (OUTPATIENT)
Dept: PODIATRY | Facility: CLINIC | Age: 58
End: 2023-12-22
Payer: COMMERCIAL

## 2023-12-22 VITALS
BODY MASS INDEX: 29.05 KG/M2 | HEIGHT: 66 IN | DIASTOLIC BLOOD PRESSURE: 77 MMHG | HEART RATE: 97 BPM | SYSTOLIC BLOOD PRESSURE: 118 MMHG

## 2023-12-22 DIAGNOSIS — M72.2 PLANTAR FASCIITIS: ICD-10-CM

## 2023-12-22 DIAGNOSIS — G57.81 OTHER SPECIFIED MONONEUROPATHIES OF RIGHT LOWER LIMB: Primary | ICD-10-CM

## 2023-12-22 DIAGNOSIS — M21.41 PES PLANUS OF RIGHT FOOT: ICD-10-CM

## 2023-12-22 PROCEDURE — 64450 NJX AA&/STRD OTHER PN/BRANCH: CPT | Performed by: PODIATRIST

## 2023-12-22 PROCEDURE — 99213 OFFICE O/P EST LOW 20 MIN: CPT | Performed by: PODIATRIST

## 2023-12-22 RX ORDER — TRIAMCINOLONE ACETONIDE 40 MG/ML
20 INJECTION, SUSPENSION INTRA-ARTICULAR; INTRAMUSCULAR ONCE
Status: COMPLETED | OUTPATIENT
Start: 2023-12-22 | End: 2023-12-22

## 2023-12-22 RX ORDER — LIDOCAINE HYDROCHLORIDE 10 MG/ML
2 INJECTION, SOLUTION EPIDURAL; INFILTRATION; INTRACAUDAL; PERINEURAL ONCE
Status: COMPLETED | OUTPATIENT
Start: 2023-12-22 | End: 2023-12-22

## 2023-12-22 RX ADMIN — LIDOCAINE HYDROCHLORIDE 2 ML: 10 INJECTION, SOLUTION EPIDURAL; INFILTRATION; INTRACAUDAL; PERINEURAL at 09:29

## 2023-12-22 RX ADMIN — TRIAMCINOLONE ACETONIDE 20 MG: 40 INJECTION, SUSPENSION INTRA-ARTICULAR; INTRAMUSCULAR at 09:29

## 2023-12-22 NOTE — PROGRESS NOTES
"               PATIENT:  Tess Parr  1965         ASSESSMENT:     1. Other specified mononeuropathies of right lower limb  lidocaine (PF) (XYLOCAINE-MPF) 1 % injection 2 mL    triamcinolone acetonide (KENALOG-40) 40 mg/mL injection 20 mg    Nerve block      2. Pes planus of right foot  Device prior authorization      3. Plantar fasciitis  Device prior authorization              PLAN:  1. Reviewed medical records.  Patient was counseled and educated on the condition and the diagnosis.   2. The diagnosis, treatment options and prognosis were discussed with the patient.    3. She would proceed with a nerve block.  See procedure.  4. Instructed supportive care, icing, resting, and arch support.    5. Evaluated her old orthotics and recommended new pairs.  Will call her insurance for coverage.        Nerve block    Date/Time: 12/22/2023 8:00 AM    Performed by: Walker Kenny DPM  Authorized by: Walker Kenny DPM    Patient location:  [facility] Protocol:  Consent: Verbal consent obtained.  Risks and benefits: risks, benefits and alternatives were discussed  Consent given by: patient  Time out: Immediately prior to procedure a \"time out\" was called to verify the correct patient, procedure, equipment, support staff and site/side marked as required.  Timeout called at: 12/22/2023 8:27 AM.  Patient understanding: patient states understanding of the procedure being performed  Site marked: the operative site was marked  Patient identity confirmed: verbally with patient    Indications:     Indications:  Pain relief and procedural anesthesia  Location:     Body area:  Lower extremity    Lower extremity nerve:  Saphenous (Medial calcaneal nerve, not saphenous)    Laterality:  Right  Pre-procedure details:     Skin preparation:  Alcohol  Skin anesthesia (see MAR for exact dosages):     Skin anesthesia method:  Topical application    Topical anesthesia: Ethyl Chloride spray.  Procedure details (see MAR for exact dosages): "     Block needle gauge:  25 G    Anesthetic injected:  Lidocaine 1% w/o epi    Steroid injected:  Triamcinolone    Injection procedure:  Anatomic landmarks identified and anatomic landmarks palpated  Post-procedure details:     Dressing:  Sterile dressing    Outcome:  Anesthesia achieved    Patient tolerance of procedure:  Tolerated well, no immediate complications          Subjective:     HPI  The patient presents with chief complaint of increased right heel pain in the last couple of weeks.  No new injury.  No swelling.  She feels her orthotics worn out.  She feels less support in left foot.  History of flatfoot reconstruction on left foot in 2016.      The following portions of the patient's history were reviewed and updated as appropriate: allergies, current medications, past family history, past medical history, past social history, past surgical history and problem list.  All pertinent labs and images were reviewed.      Past Medical History  Past Medical History:   Diagnosis Date    Arthritis     Knees and feet    Asthma     exercise induced    Cancer (HCC)     breast    COVID 2021    Difficulty falling asleep     GERD (gastroesophageal reflux disease)     Gluteal abscess     Last Assessed: 12/30/2016    Limb alert care status     right    Pain in left foot     Pneumonia 2009    x1    PONV (postoperative nausea and vomiting)     Skin cancer     Varicella        Past Surgical History  Past Surgical History:   Procedure Laterality Date    BASAL CELL CARCINOMA EXCISION N/A 01/07/2021    Procedure: EXCISION BASAL CELL CARCINOMA OF MID BACK;  Surgeon: Du Herzog MD;  Location:  MAIN OR;  Service: Plastics    BASAL CELL CARCINOMA EXCISION Left 04/25/2022    Procedure: EXCISION OF BCC (X2) LEFT SHOULDER WITH COMPLEX CLOSURE;  Surgeon: Du Herzog MD;  Location: [facility] MAIN OR;  Service: Plastics    BREAST BIOPSY  05/02/2014    BREAST BIOPSY  05/2013    BREAST BIOPSY  05/2008    BREAST LUMPECTOMY  Right 2008    BREAST RECONSTRUCTION Left 02/21/2019    Procedure: BREAST EXPANDER/IMPLANT EXCHANGE;  Surgeon: Du Herzog MD;  Location: [facility] MAIN OR;  Service: Plastics    BREAST SURGERY Bilateral 2008, 2013, 2014    Breast reconstruction with expanders. Total of 7 surgeries.  7/3/14 right breast reconstruction revision. 10/27/14 right breast reconstruction revision     BREAST SURGERY Left 07/24/2013    Excision of breast lesion    CAPSULOTOMY Left 02/21/2019    Procedure: CAPSULECTOMY;  Surgeon: Du Herzog MD;  [facility]: [facility] MAIN [facility];  Service: Plastics    CHEST WALL BIOPSY Right 02/21/2019    Procedure: UPPER CHEST BASAL CELL CARSINOMA EXCISION;  Surgeon: Du Herzog MD;  [facility];  Service: Plastics    CLOSURE WOUND N/A 02/21/2019    Procedure: RIGHT upper chest & LEFT upper back complex closure;  Surgeon: Du Herzog MD;  [facility];  Service: Plastics    COLONOSCOPY      COMPLEX WOUND CLOSURE TO EXTREMITY N/A 01/07/2021    Procedure: COMPLEX CLOSURE MID BACK;  Surgeon: Du Herzog MD;  [facility];  Service: Plastics    FOOT ARTHRODESIS Left     Pantalar-has pins, plates, screws-MRI compatible    KNEE ARTHROSCOPY Right 05/1997    LIPOSUCTION W/ FAT INJECTION Bilateral 10/24/2019    Procedure: AUTOLOGOUS FAT GRAFTING TO BILATERAL BREAST;  Surgeon: Du Herzog MD;  [facility];  Service: Plastics    LIPOSUCTION W/ FAT INJECTION Bilateral 01/07/2021    Procedure: AUTOLOGOUS FAT GRAFTING TO BILATERAL BREAST;  Surgeon: Du Herzog MD;  [facility]: [facility];  Service: Plastics    MASS EXCISION Right 7/31/2023    Procedure: EXCISION BCC RIGHT SUPERIOR SHOULDER, RIGHT INFERIOR SHOULDER, RIGHT HIP/BUTTOCK WITH COMPLEX CLOSURE;  Surgeon: Du Herzog MD;  [facility];  Service: Plastics    MASTECTOMY Bilateral 06/06/2014    Lymph nodes removed bilaterally. States she may have IV's in left side.    MOHS RECONSTRUCTION  Right 03/07/2019    Procedure: RECONSTRUCTION MOHS DEFECT RIGHT NOSE/MEDIAL CANTHUS;  Surgeon: Du Herzog MD;  [facility];  Service: Plastics    MOHS SURGERY      Mohs Micrographic Surgery Face; Last Assessed: 5/15/2015    ID ADJT TIS REARGMT EYE/NOSE/EAR/LIP 10.1-30.0 SQCM Right 03/07/2019    Procedure: FLAP;  Surgeon: Du Herzog MD;  [facility];  Service: Plastics    ID ARTHRD MIDTARSL/TARSOMETATARSAL MULT/TRANSVRS Left 09/09/2016    Procedure: ARTHRODESIS FIRST METATARSALCUNIFORM JOINT FUSION, TALLNAVICULAR JOINT FUSION ;  Surgeon: Walker Kenny DPM;  [facility]: [facility];  Service: Podiatry    ID BREAST RECONSTRUCTION W/LATISSIMUS DORSI FLAP Left 09/07/2017    Procedure: BREAST RECONSTRUCTION; LATISSIMUS DORSI FLAP; TISSUE EXPANDER;  Surgeon: Du Herzog MD;  [facility];  Service: Plastics    ID COLONOSCOPY FLX DX W/COLLJ SPEC WHEN PFRMD N/A 08/01/2018    Procedure: COLONOSCOPY;  Surgeon: Du Viveros MD;  [facility];  Service: Gastroenterology    ID CORRJ HALLUX VALGUS W/SESMDC W/RESCJ PROX PHAL  09/09/2016    Procedure: BUNIONECTOMY VARGAS MODIFIED ;  Surgeon: Walker Kenny DPM;  [facility];  Service: Podiatry    ID EXCISION MALIGNANT LESION TRUNK/ARM/LEG > 4.0 CM N/A 06/27/2019    Procedure: EXCISION BASAL CELL CARCINOMA MID CHEST;  Surgeon: Du Herzog MD;  [facility];  Service: Plastics    ID FTH/GFT FREE W/DIRECT CLOSURE N/E/E/L 20 SQ CM/< Right 03/07/2019    Procedure: SKIN GRAFT FULL THICKNESS  (FTSG);  Surgeon: Du Herzog MD;  [facility];  Service: Plastics    ID HYSTEROSCOPY BX ENDOMETRIUM&/POLYPC W/WO D&C N/A 01/07/2022    Procedure: DILATATION AND CURETTAGE (D&C) WITH HYSTEROSCOPY, POLYPECTOMY ;  Surgeon: Fernanda Kennedy MD;  [facility];  Service: Gynecology    ID INSERTION BREAST IMPLANT SAME DAY OF MASTECTOMY Left 09/07/2017    Procedure: TISSUE EXPANDER;  Surgeon: Du  MD Mino;  [facility];  Service: Plastics    PA OPEN TX MONTEGGIA FRACTURE DISLOCATION ELBOW Left 12/11/2019    Procedure: OPEN REDUCTION W/ INTERNAL FIXATION left distal humerus fracture, possible olecranon osteotomy;  Surgeon: Silvano Scott MD;  Location: [facility] MAIN OR;  Service: Orthopedics    PA OSTEOTOMY CALCANEUS W/WO INTERNAL FIXATION Left 09/09/2016    Procedure: OSTEOTOMY CALCANEUS WITH APPLICATION AND ACTIVATION OF BONE STIMULATOR ;  Surgeon: Walker Kenny DPM;  [facility];  Service: Podiatry    PA REMV TISSUE FOR GRAFT OTHR Bilateral 06/27/2019    Procedure: AUTOLOGOUS FAT GRAFTING BILATERAL BREASTS;  Surgeon: Du Herzog MD;  [facility];  Service: Plastics    PA REPAIR COMPLEX F/C/C/M/N/AX/G/H/F 2.6-7.5 CM N/A 03/07/2019    Procedure: COMPLEX CLOSURE;  Surgeon: Du Herzog MD;  [facility];  Service: Plastics    PA REPAIR COMPLEX TRUNK 2.6-7.5 CM N/A 06/27/2019    Procedure: CLOSURE WOUND MID CHEST;  Surgeon: Du Herzog MD;  [facility];  Service: Plastics    PA REVISION OF RECONSTRUCTED BREAST Bilateral 06/27/2019    Procedure: BILATERAL BREAST MOUND REVISION;  Surgeon: Du Herzog MD;  [facility];  Service: Plastics    PA REVISION OF RECONSTRUCTED BREAST Bilateral 10/24/2019    Procedure: BILATERAL BREAST MOUND REVISION;  Surgeon: Du Herzog MD;  [facility];  Service: Plastics    PA REVISION OF RECONSTRUCTED BREAST Bilateral 01/07/2021    Procedure: BILATERAL BREAST MOUND REVISION;  Surgeon: Du Herzog MD;  Location: [facility];  Service: Plastics    SENTINEL LYMPH NODE BIOPSY Bilateral 06/06/2014    SENTINEL LYMPH NODE BIOPSY Right 2008    SKIN BIOPSY      SKIN LESION EXCISION Left 02/21/2019    Procedure: UPPER BACK BCC EXCISION;  Surgeon: Du Herzog MD;  [facility];  Service: Plastics    SQUAMOUS CELL CARCINOMA EXCISION N/A 03/07/2019    Procedure: EXCISION BCC LEFT  FOREHEAD;  Surgeon: Du Herzog MD;  [facility];  Service: Plastics    WISDOM TOOTH EXTRACTION          Allergies:  Codeine, Dilaudid [hydromorphone hcl], Hydromorphone, Tramadol, and Other    Medications:  Current Outpatient Medications   Medication Sig Dispense Refill    acetaminophen (TYLENOL) 500 mg tablet Take 500 mg by mouth every 6 (six) hours as needed for mild pain.      albuterol (Ventolin HFA) 90 mcg/act inhaler Inhale 2 puffs every 6 (six) hours as needed for wheezing or shortness of breath 8 g 0    ALPRAZolam (XANAX) 1 mg tablet Take 1 mg by mouth daily at bedtime as needed      Ascorbic Acid (VITAMIN C) 500 MG CAPS Take 500 mg by mouth in the morning        calcium-vitamin D (OSCAL) 250-125 MG-UNIT per tablet Take 2 tablets by mouth daily.      Cholecalciferol (VITAMIN D) 2000 units CAPS Take by mouth in the morning        famotidine (PEPCID) 20 mg tablet Take 20 mg by mouth 2 (two) times a day as needed        Lactobacillus (ACIDOPHILUS PO) Take by mouth daily.      Multiple Vitamin (MULTIVITAMIN) capsule Take 1 capsule by mouth daily.      nystatin (MYCOSTATIN) ointment if needed      omeprazole (PriLOSEC) 20 mg delayed release capsule Take 20 mg by mouth as needed        tamoxifen (NOLVADEX) 20 mg tablet Take 1 tablet (20 mg total) by mouth daily 90 tablet 3    zinc gluconate 50 mg tablet Take by mouth daily        ALPRAZolam (XANAX) 0.5 mg tablet Take 0.5 mg by mouth daily at bedtime as needed for anxiety (Patient not taking: Reported on 10/12/2023)      Diclofenac Sodium (VOLTAREN) 1 % Apply 2 g topically 4 (four) times a day 300 g 5    dicyclomine (BENTYL) 10 mg capsule Take 1 capsule (10 mg total) by mouth 4 (four) times a day (before meals and at bedtime) for 15 days (Patient not taking: Reported on 10/12/2023) 60 capsule 0    nystatin (MYCOSTATIN) powder Apply topically 2 (two) times a day (Patient taking differently: Apply topically as needed) 15 g 0    Promethazine-DM  "(PHENERGAN-DM) 6.25-15 mg/5 mL oral syrup Take 5 mL by mouth 4 (four) times a day as needed for cough (Patient not taking: Reported on 10/12/2023) 180 mL 0     Current Facility-Administered Medications   Medication Dose Route Frequency Provider Last Rate Last Admin    lidocaine (PF) (XYLOCAINE-MPF) 1 % injection 2 mL  2 mL Injection Once Walker Kenny DPM        triamcinolone acetonide (KENALOG-40) 40 mg/mL injection 20 mg  20 mg Subcutaneous Once Walker Kenny DPM           Social History:  Social History     Socioeconomic History    Marital status: /Civil Union     Spouse name: None    Number of children: None    Years of education: None    Highest education level: None   Occupational History    None   Tobacco Use    Smoking status: Never    Smokeless tobacco: Never   Vaping Use    Vaping status: Never Used   Substance and Sexual Activity    Alcohol use: Yes     Alcohol/week: 4.0 standard drinks of alcohol     Types: 4 Glasses of wine per week     Comment: 4 weekly-wine    Drug use: No    Sexual activity: Yes     Partners: Male     Birth control/protection: Condom Male   Other Topics Concern    None   Social History Narrative    None     Social Determinants of Health     Financial Resource Strain: Not on file   Food Insecurity: Not on file   Transportation Needs: Not on file   Physical Activity: Not on file   Stress: Not on file   Social Connections: Not on file   Intimate Partner Violence: Not on file   Housing Stability: Not on file          Review of Systems   Constitutional:  Negative for chills, fatigue and fever.   Respiratory:  Negative for cough, shortness of breath and wheezing.    Cardiovascular:  Negative for chest pain and palpitations.   Gastrointestinal:  Negative for diarrhea, nausea and vomiting.   Musculoskeletal:  Negative for arthralgias and gait problem.   Skin:  Negative for color change, pallor, rash and wound.   Hematological: Negative.          Objective:    /77   Pulse 97   Ht 5' 6\" " (1.676 m)   LMP  (LMP Unknown)   BMI 29.05 kg/m²        Physical Exam  Vitals reviewed.   Constitutional:       General: She is not in acute distress.     Appearance: Normal appearance. She is well-developed. She is not toxic-appearing.   Cardiovascular:      Rate and Rhythm: Normal rate and regular rhythm.      Pulses: Normal pulses.           Dorsalis pedis pulses are 2+ on the right side and 2+ on the left side.        Posterior tibial pulses are 2+ on the right side and 2+ on the left side.   Pulmonary:      Effort: Pulmonary effort is normal. No respiratory distress.   Musculoskeletal:         General: Tenderness and deformity present. No swelling or signs of injury.      Right lower leg: No edema.      Left lower leg: No edema.      Right foot: No foot drop.      Left foot: No foot drop.      Comments: Pain and Tinel sign along the course of medial calcaneal nerve on right medial heel.  Pain on right plantar medial heel.  Left foot is in good alignment.     Skin:     General: Skin is warm.      Coloration: Skin is not cyanotic or mottled.      Findings: No abscess, ecchymosis, erythema, rash or wound.      Nails: There is no clubbing.   Neurological:      General: No focal deficit present.      Mental Status: She is alert and oriented to person, place, and time.      Cranial Nerves: No cranial nerve deficit.      Sensory: No sensory deficit.      Motor: No weakness or abnormal muscle tone.      Coordination: Coordination normal.   Psychiatric:         Mood and Affect: Mood normal.         Behavior: Behavior normal.         Thought Content: Thought content normal.         Judgment: Judgment normal.

## 2024-02-06 ENCOUNTER — TELEPHONE (OUTPATIENT)
Dept: PODIATRY | Facility: CLINIC | Age: 59
End: 2024-02-06

## 2024-02-20 ENCOUNTER — OFFICE VISIT (OUTPATIENT)
Dept: PLASTIC SURGERY | Facility: CLINIC | Age: 59
End: 2024-02-20
Payer: COMMERCIAL

## 2024-02-20 DIAGNOSIS — C44.511 BASAL CELL CARCINOMA (BCC) OF SKIN OF RIGHT BREAST: Primary | ICD-10-CM

## 2024-02-20 PROCEDURE — 99214 OFFICE O/P EST MOD 30 MIN: CPT | Performed by: SURGERY

## 2024-02-20 NOTE — PROGRESS NOTES
Assessment/Plan: Please see HPI.  We discussed excision of the basal cell carcinoma of the right medial breast, the option of frozen section, and reconstruction options including local flap reconstruction.  She is interested in proceeding and would prefer local anesthesia, she would prefer to avoid frozen section.  Her questions have been answered to her satisfaction and consent has been obtained.  She will work with our coordinator to arrange a date for the procedure.           There are no diagnoses linked to this encounter.      Subjective: Basal cell carcinoma     Patient ID: Tess Parr is a 58 y.o. female.    HPI Tess presents for preoperative visit, she has a diagnosis of basal cell carcinoma of the right medial breast.  Her preference would be to have this removed utilizing local anesthesia.    The following portions of the patient's history were reviewed and updated as appropriate: allergies, current medications, past family history, past medical history, past social history, past surgical history, and problem list.    Review of Systems   Constitutional:  Negative for chills and fever.   HENT:  Negative for hearing loss.    Eyes:  Positive for visual disturbance. Negative for discharge.   Respiratory:  Negative for chest tightness and shortness of breath.    Cardiovascular:  Negative for chest pain and leg swelling.   Gastrointestinal:  Negative for blood in stool, constipation, diarrhea and nausea.   Genitourinary:  Negative for dysuria.   Musculoskeletal:  Negative for gait problem.   Skin:  Negative for rash.   Allergic/Immunologic: Negative for immunocompromised state.   Neurological:  Negative for seizures and headaches.   Hematological:  Does not bruise/bleed easily.   Psychiatric/Behavioral:  Negative for dysphoric mood. The patient is nervous/anxious.        Objective:      LMP  (LMP Unknown)          Physical Exam  HENT:      Head: Normocephalic and atraumatic.   Eyes:      Pupils: Pupils are  equal, round, and reactive to light.   Cardiovascular:      Rate and Rhythm: Normal rate.   Pulmonary:      Effort: Pulmonary effort is normal.   Abdominal:      Palpations: Abdomen is soft.   Musculoskeletal:         General: Normal range of motion.      Cervical back: Normal range of motion and neck supple.   Skin:     General: Skin is warm.      Comments: Well-healed biopsy site right medial breast, see previous visit for photos   Neurological:      Mental Status: She is alert and oriented to person, place, and time.   Psychiatric:         Mood and Affect: Mood normal.

## 2024-02-24 ENCOUNTER — PREP FOR PROCEDURE (OUTPATIENT)
Dept: PLASTIC SURGERY | Facility: CLINIC | Age: 59
End: 2024-02-24

## 2024-02-24 DIAGNOSIS — C44.511 BASAL CELL CARCINOMA (BCC) OF SKIN OF RIGHT BREAST: Primary | ICD-10-CM

## 2024-03-18 PROCEDURE — NC001 PR NO CHARGE: Performed by: PHYSICIAN ASSISTANT

## 2024-03-25 ENCOUNTER — HOSPITAL ENCOUNTER (OUTPATIENT)
Facility: AMBULARY SURGERY CENTER | Age: 59
Setting detail: OUTPATIENT SURGERY
Discharge: HOME | End: 2024-03-25
Attending: SURGERY | Admitting: SURGERY
Payer: COMMERCIAL

## 2024-03-25 VITALS
RESPIRATION RATE: 20 BRPM | WEIGHT: 180 LBS | DIASTOLIC BLOOD PRESSURE: 83 MMHG | BODY MASS INDEX: 28.93 KG/M2 | TEMPERATURE: 97.8 F | OXYGEN SATURATION: 100 % | HEART RATE: 79 BPM | SYSTOLIC BLOOD PRESSURE: 127 MMHG | HEIGHT: 66 IN

## 2024-03-25 DIAGNOSIS — C44.511 BASAL CELL CARCINOMA (BCC) OF SKIN OF RIGHT BREAST: ICD-10-CM

## 2024-03-25 PROCEDURE — 13101 CMPLX RPR TRUNK 2.6-7.5 CM: CPT | Performed by: PHYSICIAN ASSISTANT

## 2024-03-25 PROCEDURE — 88305 TISSUE EXAM BY PATHOLOGIST: CPT | Performed by: PATHOLOGY

## 2024-03-25 PROCEDURE — 11603 EXC TR-EXT MAL+MARG 2.1-3 CM: CPT | Performed by: PHYSICIAN ASSISTANT

## 2024-03-25 PROCEDURE — 11603 EXC TR-EXT MAL+MARG 2.1-3 CM: CPT | Performed by: SURGERY

## 2024-03-25 PROCEDURE — 13101 CMPLX RPR TRUNK 2.6-7.5 CM: CPT | Performed by: SURGERY

## 2024-03-25 RX ORDER — MAGNESIUM HYDROXIDE 1200 MG/15ML
LIQUID ORAL AS NEEDED
Status: DISCONTINUED | OUTPATIENT
Start: 2024-03-25 | End: 2024-03-25 | Stop reason: HOSPADM

## 2024-03-25 RX ORDER — LIDOCAINE HYDROCHLORIDE AND EPINEPHRINE 10; 10 MG/ML; UG/ML
INJECTION, SOLUTION INFILTRATION; PERINEURAL AS NEEDED
Status: DISCONTINUED | OUTPATIENT
Start: 2024-03-25 | End: 2024-03-25 | Stop reason: HOSPADM

## 2024-03-25 NOTE — OP NOTE
OPERATIVE REPORT  PATIENT NAME: Tess Parr    :  1965  MRN: [id number]  Pt [facility]    SURGERY DATE: 3/25/2024    Surgeons and Role:     * Du Herzog MD - Primary     * Greta Ramey PA-C - Assisting    Preop Diagnosis:  Basal cell carcinoma (BCC) of skin of right breast [C44.511]    Post-Op Diagnosis Codes:     * Basal cell carcinoma (BCC) of skin of right breast [C44.511]    Procedure(s):  Right - EXCISION OF BCC RIGHT BREAST 2.3 cm,.  Complex closure right breast 3.6 cm    Specimen(s):  ID Type Source Tests Collected by Time Destination   1 : EXCISION OF BASIL CELL CARCINOMA OF RIGHT BREAST; SEE COMMENTS Tissue Soft Tissue, Other TISSUE EXAM Du Herzog MD 3/25/2024 1008        Estimated Blood Loss:   Minimal    Drains:  * No LDAs found *    Anesthesia Type:   Local    Operative Indications:  Basal cell carcinoma (BCC) of skin of right breast [C44.511]      Operative Findings:  As above    Complications:   None    Procedure and Technique:  Tess was seen preoperatively in the holding area, the surgical site was marked with her participation.  I reviewed with her the planned procedure, potential risks, complications, and limitations.  She was taken to the operating room, the surgical field was prepped and draped in sterile fashion and a proper timeout was performed.  2.5 loupe magnification was used to aid in visualization.  The area of concern was marked to be excised in an elliptical fashion and to incorporate a margin of normal-appearing skin.  Local anesthesia was then administered.  A 15 blade was utilized to create skin incision these were carried down through the dermis and the lesion was excised deep in the plane of subcutaneous fat utilizing curved iris scissors.  It was marked with sutures for orientation and placed in formalin.  Wound was irrigated and hemostasis assured.  In order to close the wound without significant, undue tension, wide and  circumferential undermining the wound margins was performed.  This wide undermining was carried out deep to the dermis for distance greater than 3 times the width of the wound utilizing a Bovie cautery.  Once the wide undermining had been completed the wound was irrigated and hemostasis again was assured.  Closure was then accomplished with 4-0 PDS sutures buried at the level of the deep dermis this was followed by running subcuticular 5-0 Monocryl.  Benzoin Steri-Strips and light pressure dressing were applied and the patient was transferred to the recovery room.   I was present for the entire procedure. and A qualified resident physician was not available.  The physician assistant provided assistance with retraction, exposure hemostasis and wound closure.    Patient Disposition:  PACU           SIGNATURE: Du Herzog MD  DATE: March 25, 2024  TIME: 10:37 AM

## 2024-03-25 NOTE — INTERVAL H&P NOTE
H&P reviewed. After examining the patient I find no changes in the patients condition since the H&P had been written.    Vitals:    03/25/24 0859   BP: 168/97   Pulse: 83   Resp: 18   Temp: 98 °F (36.7 °C)   SpO2: 99%

## 2024-03-25 NOTE — INTERVAL H&P NOTE
H&P reviewed. After examining the patient I find no changes in the patients condition since the H&P had been written.  A     S   A         2    Vitals:    03/25/24 0859   BP: 168/97   Pulse: 83   Resp: 18   Temp: 98 °F (36.7 °C)   SpO2: 99%

## 2024-03-25 NOTE — DISCHARGE INSTR - AVS FIRST PAGE
[facility]  Dr. BRUCE Herzog Jr.  [address] [place] [address]  Phone: [phone]     Postoperative Instructions for Outpatient Surgery     These instructions are being provided by your doctor to give you basic guidelines during your post-op recovery. Please let our office know if your contact information has changed.      Please call the office today for an appointment in 12-14 days for postoperative care     Dressings: Leave outer dressing in place for 36hrs, then remove. Leave steri-strips in place until seen     Activity Restrictions: No strenuous activity     Bathing: You can shower in 36hrs     Medications:    Resume pre-op medications.   You may take tylenol, aleve, or ibuprofen for pain control

## 2024-03-27 PROCEDURE — 88305 TISSUE EXAM BY PATHOLOGIST: CPT | Performed by: PATHOLOGY

## 2024-04-09 ENCOUNTER — OFFICE VISIT (OUTPATIENT)
Dept: PLASTIC SURGERY | Facility: CLINIC | Age: 59
End: 2024-04-09

## 2024-04-09 DIAGNOSIS — C44.511 BASAL CELL CARCINOMA (BCC) OF SKIN OF RIGHT BREAST: Primary | ICD-10-CM

## 2024-04-09 PROCEDURE — 99024 POSTOP FOLLOW-UP VISIT: CPT | Performed by: PHYSICIAN ASSISTANT

## 2024-04-09 NOTE — PROGRESS NOTES
Assessment/Plan:     Diagnoses and all orders for this visit:    Basal cell carcinoma (BCC) of skin of right breast  She underwent excision of BCC right breast, complex closure right breast on 3/25 by Dr. Herzog. She denies any complaints. Incision is C/D/I. She can start using scar care.         Subjective:      Patient ID: Tess Parr is a 58 y.o. female.    HPI    Pt is here for a post-op visit. She underwent excision of BCC right breast, complex closure right breast on 3/25 by Dr. Herzog. She denies any complaints. Pathology shows:  - Scar and residual basal cell carcinoma (0.3 cm), superficial type.     -- No perineural or lymphovascular invasion.  - Resection margins (peripheral and deep) negative for carcinoma.      Patient Active Problem List   Diagnosis    Surgical follow-up care    Bilateral malignant neoplasm of breast in female, estrogen receptor positive (HCC)    Breast implant capsular contracture    Aftercare postmastectomy for breast reconstruction    Basal cell carcinoma (BCC) of skin of trunk    Basal cell carcinoma (BCC) of right side of nose    Closed bicondylar fracture of distal end of left humerus    Arthritis    PONV (postoperative nausea and vomiting)    Dyspareunia, female    Status post dilation and curettage    Basal cell carcinoma of shoulder, left    Asthma    Gastroesophageal reflux disease    Basal cell carcinoma (BCC) of left side of neck    Enterocolitis    Cat bite of left hand    Use of tamoxifen (Nolvadex)    Personal history of breast cancer    BRCA gene mutation negative    Skin lesions    Fat necrosis    Basal cell carcinoma (BCC) of skin of right breast     Allergies   Allergen Reactions    Codeine Itching     Severe    Dilaudid [Hydromorphone Hcl] Itching     Severe    Hydromorphone Itching    Tramadol Itching    Other Rash     Adhesive tape     Current Outpatient Medications on File Prior to Visit   Medication Sig    acetaminophen (TYLENOL) 500 mg tablet Take 500  mg by mouth every 6 (six) hours as needed for mild pain.    albuterol (Ventolin HFA) 90 mcg/act inhaler Inhale 2 puffs every 6 (six) hours as needed for wheezing or shortness of breath    ALPRAZolam (XANAX) 0.5 mg tablet Take 0.5 mg by mouth daily at bedtime as needed for anxiety (Patient not taking: Reported on 10/12/2023)    ALPRAZolam (XANAX) 1 mg tablet Take 1 mg by mouth daily at bedtime as needed    Ascorbic Acid (VITAMIN C) 500 MG CAPS Take 500 mg by mouth in the morning      calcium-vitamin D (OSCAL) 250-125 MG-UNIT per tablet Take 2 tablets by mouth daily.    Cholecalciferol (VITAMIN D) 2000 units CAPS Take by mouth in the morning      Diclofenac Sodium (VOLTAREN) 1 % Apply 2 g topically 4 (four) times a day    dicyclomine (BENTYL) 10 mg capsule Take 1 capsule (10 mg total) by mouth 4 (four) times a day (before meals and at bedtime) for 15 days (Patient not taking: Reported on 10/12/2023)    famotidine (PEPCID) 20 mg tablet Take 20 mg by mouth 2 (two) times a day as needed      Lactobacillus (ACIDOPHILUS PO) Take by mouth daily.    Multiple Vitamin (MULTIVITAMIN) capsule Take 1 capsule by mouth daily.    nystatin (MYCOSTATIN) ointment if needed    nystatin (MYCOSTATIN) powder Apply topically 2 (two) times a day (Patient taking differently: Apply topically as needed)    omeprazole (PriLOSEC) 20 mg delayed release capsule Take 20 mg by mouth as needed      Promethazine-DM (PHENERGAN-DM) 6.25-15 mg/5 mL oral syrup Take 5 mL by mouth 4 (four) times a day as needed for cough (Patient not taking: Reported on 10/12/2023)    tamoxifen (NOLVADEX) 20 mg tablet Take 1 tablet (20 mg total) by mouth daily    zinc gluconate 50 mg tablet Take by mouth daily       No current facility-administered medications on file prior to visit.     Family History   Adopted: Yes   Problem Relation Age of Onset    No Known Problems Mother     No Known Problems Father      Past Medical History:   Diagnosis Date    Arthritis     Knees and  feet    Asthma     exercise induced    Cancer (HCC)     breast    COVID 2021    Difficulty falling asleep     GERD (gastroesophageal reflux disease)     Gluteal abscess     Last Assessed: 12/30/2016    Limb alert care status     right    Pain in left foot     Pneumonia 2009    x1    PONV (postoperative nausea and vomiting)     Skin cancer     Varicella      Social History     Socioeconomic History    Marital status: /Civil Union     Spouse name: None    Number of children: None    Years of education: None    Highest education level: None   Occupational History    None   Tobacco Use    Smoking status: Never    Smokeless tobacco: Never   Vaping Use    Vaping status: Never Used   Substance and Sexual Activity    Alcohol use: Yes     Alcohol/week: 4.0 standard drinks of alcohol     Types: 4 Glasses of wine per week     Comment: 4 weekly-wine    Drug use: No    Sexual activity: Yes     Partners: Male     Birth control/protection: Condom Male   Other Topics Concern    None   Social History Narrative    None     Social Determinants of Health     Financial Resource Strain: Not on file   Food Insecurity: Not on file   Transportation Needs: Not on file   Physical Activity: Not on file   Stress: Not on file   Social Connections: Not on file   Intimate Partner Violence: Not on file   Housing Stability: Not on file     Past Surgical History:   Procedure Laterality Date    BASAL CELL CARCINOMA EXCISION N/A 01/07/2021    Procedure: EXCISION BASAL CELL CARCINOMA OF MID BACK;  Surgeon: Du Herzog MD;  Location:  MAIN OR;  Service: Plastics    BASAL CELL CARCINOMA EXCISION Left 04/25/2022    Procedure: EXCISION OF BCC (X2) LEFT SHOULDER WITH COMPLEX CLOSURE;  Surgeon: Du Herzog MD;  Location: [facility]IN OR;  Service: Plastics    BREAST BIOPSY  05/02/2014    BREAST BIOPSY  05/2013    BREAST BIOPSY  05/2008    BREAST LUMPECTOMY Right 2008    BREAST RECONSTRUCTION Left 02/21/2019    Procedure: BREAST  EXPANDER/IMPLANT EXCHANGE;  Surgeon: Du Herzog MD;  Location: [facility];  Service: Plastics    BREAST SURGERY Bilateral 2008, 2013, 2014    Breast reconstruction with expanders. Total of 7 surgeries.  7/3/14 right breast reconstruction revision. 10/27/14 right breast reconstruction revision     BREAST SURGERY Left 07/24/2013    Excision of breast lesion    CAPSULOTOMY Left 02/21/2019    Procedure: CAPSULECTOMY;  Surgeon: Du Herzog MD;  [facility];  Service: Plastics    CHEST WALL BIOPSY Right 02/21/2019    Procedure: UPPER CHEST BASAL CELL CARSINOMA EXCISION;  Surgeon: Du Herzog MD;  [facility];  Service: Plastics    CLOSURE WOUND N/A 02/21/2019    Procedure: RIGHT upper chest & LEFT upper back complex closure;  Surgeon: Du Herzog MD;  [facility];  Service: Plastics    COLONOSCOPY      COMPLEX WOUND CLOSURE TO EXTREMITY N/A 01/07/2021    Procedure: COMPLEX CLOSURE MID BACK;  Surgeon: Du Herzog MD;  [facility];  Service: Plastics    FOOT ARTHRODESIS Left     Pantalar-has pins, plates, screws-MRI compatible    KNEE ARTHROSCOPY Right 05/1997    LIPOSUCTION W/ FAT INJECTION Bilateral 10/24/2019    Procedure: AUTOLOGOUS FAT GRAFTING TO BILATERAL BREAST;  Surgeon: Du Herzog MD;  [facility];  Service: Plastics    LIPOSUCTION W/ FAT INJECTION Bilateral 01/07/2021    Procedure: AUTOLOGOUS FAT GRAFTING TO BILATERAL BREAST;  Surgeon: Du Herzog MD;  [facility]: [facility];  Service: Plastics    MASS EXCISION Right 7/31/2023    Procedure: EXCISION BCC RIGHT SUPERIOR SHOULDER, RIGHT INFERIOR SHOULDER, RIGHT HIP/BUTTOCK WITH COMPLEX CLOSURE;  Surgeon: Du Herzog MD;  [facility];  Service: Plastics    MASTECTOMY Bilateral 06/06/2014    Lymph nodes removed bilaterally. States she may have IV's in left side.    MOHS RECONSTRUCTION Right 03/07/2019    Procedure: RECONSTRUCTION MOHS DEFECT RIGHT  NOSE/MEDIAL CANTHUS;  Surgeon: Du Herzog MD;  [facility];  Service: Plastics    MOHS SURGERY      Mohs Micrographic Surgery Face; Last Assessed: 5/15/2015    IL ADJT TIS REARGMT EYE/NOSE/EAR/LIP 10.1-30.0 SQCM Right 03/07/2019    Procedure: FLAP;  Surgeon: Du Herzog MD;  [facility];  Service: Plastics    IL ARTHRD MIDTARSL/TARSOMETATARSAL MULT/TRANSVRS Left 09/09/2016    Procedure: ARTHRODESIS FIRST METATARSALCUNIFORM JOINT FUSION, TALLNAVICULAR JOINT FUSION ;  Surgeon: Walker Kenny DPM;  [facility];  Service: Podiatry    IL BREAST RECONSTRUCTION W/LATISSIMUS DORSI FLAP Left 09/07/2017    Procedure: BREAST RECONSTRUCTION; LATISSIMUS DORSI FLAP; TISSUE EXPANDER;  Surgeon: Du Herzog MD;  [facility];  Service: Plastics    IL COLONOSCOPY FLX DX W/COLLJ SPEC WHEN PFRMD N/A 08/01/2018    Procedure: COLONOSCOPY;  Surgeon: Du Viveros MD;  [facility];  Service: Gastroenterology    IL CORRJ HLX VLGS BNCTY SESMDC RESCJ PROX PHLX BASE  09/09/2016    Procedure: BUNIONECTOMY VARGAS MODIFIED ;  Surgeon: Walker Kenny DPM;  [facility];  Service: Podiatry    IL EXCISION MALIGNANT LESION TRUNK/ARM/LEG > 4.0 CM N/A 06/27/2019    Procedure: EXCISION BASAL CELL CARCINOMA MID CHEST;  Surgeon: Du Herzog MD;  [facility];  Service: Plastics    IL EXCISION MALIGNANT LESION TRUNK/ARM/LEG > 4.0 CM Right 3/25/2024    Procedure: EXCISION OF BCC RIGHT BREAST, LOCAL FLAP RECONSTRUCTION;  Surgeon: Du Herzog MD;  [facility];  Service: Plastics    IL FTH/GFT FREE W/DIRECT CLOSURE N/E/E/L 20 SQ CM/< Right 03/07/2019    Procedure: SKIN GRAFT FULL THICKNESS  (FTSG);  Surgeon: Du Herzog MD;  [facility];  Service: Plastics    IL HYSTEROSCOPY BX ENDOMETRIUM&/POLYPC W/WO D&C N/A 01/07/2022    Procedure: DILATATION AND CURETTAGE (D&C) WITH HYSTEROSCOPY, POLYPECTOMY ;  Surgeon: Fernanda Kennedy MD;  [facility];  Service: Gynecology    KY INSERTION BREAST IMPLANT SAME DAY OF MASTECTOMY Left 09/07/2017    Procedure: TISSUE EXPANDER;  Surgeon: Du Herzog MD;  [facility];  Service: Plastics    KY OPEN TX MONTEGGIA FRACTURE DISLOCATION ELBOW Left 12/11/2019    Procedure: OPEN REDUCTION W/ INTERNAL FIXATION left distal humerus fracture, possible olecranon osteotomy;  Surgeon: Silvano Scott MD;  Location: [facility];  Service: Orthopedics    KY OSTEOTOMY CALCANEUS W/WO INTERNAL FIXATION Left 09/09/2016    Procedure: OSTEOTOMY CALCANEUS WITH APPLICATION AND ACTIVATION OF BONE STIMULATOR ;  Surgeon: Walker Kenny DPM;  [facility];  Service: Podiatry    KY REMV TISSUE FOR GRAFT OTHR Bilateral 06/27/2019    Procedure: AUTOLOGOUS FAT GRAFTING BILATERAL BREASTS;  Surgeon: Du Herzog MD;  [facility];  Service: Plastics    KY REPAIR COMPLEX F/C/C/M/N/AX/G/H/F 2.6-7.5 CM N/A 03/07/2019    Procedure: COMPLEX CLOSURE;  Surgeon: Du Herzog MD;  [facility];  Service: Plastics    KY REPAIR COMPLEX TRUNK 2.6-7.5 CM N/A 06/27/2019    Procedure: CLOSURE WOUND MID CHEST;  Surgeon: Du Herzog MD;  [facility];  Service: Plastics    KY REVISION OF RECONSTRUCTED BREAST Bilateral 06/27/2019    Procedure: BILATERAL BREAST MOUND REVISION;  Surgeon: Du Herzog MD;  [facility];  Service: Plastics    KY REVISION OF RECONSTRUCTED BREAST Bilateral 10/24/2019    Procedure: BILATERAL BREAST MOUND REVISION;  Surgeon: Du Herzog MD;  [facility];  Service: Plastics    KY REVISION OF RECONSTRUCTED BREAST Bilateral 01/07/2021    Procedure: BILATERAL BREAST MOUND REVISION;  Surgeon: Du Herzog MD;  [facility]: [facility];  Service: Plastics    SENTINEL LYMPH NODE BIOPSY Bilateral 06/06/2014    SENTINEL LYMPH NODE BIOPSY Right 2008    SKIN BIOPSY      SKIN LESION EXCISION Left 02/21/2019    Procedure: UPPER BACK BCC EXCISION;  Surgeon:  Du Herzog MD;  Location:  MAIN OR;  Service: Plastics    SQUAMOUS CELL CARCINOMA EXCISION N/A 03/07/2019    Procedure: EXCISION BCC LEFT FOREHEAD;  Surgeon: Du Herzog MD;  Location:  MAIN OR;  Service: Plastics    WISDOM TOOTH EXTRACTION           Review of Systems   All other systems reviewed and are negative.        Objective:      LMP  (LMP Unknown)          Physical Exam  Constitutional:       Appearance: Normal appearance. She is well-developed.   HENT:      Head: Normocephalic and atraumatic.   Eyes:      Conjunctiva/sclera: Conjunctivae normal.   Pulmonary:      Effort: Pulmonary effort is normal.      Comments: Right breast incision is C/D/I. Sutures removed.   Musculoskeletal:         General: Normal range of motion.      Cervical back: Normal range of motion.   Skin:     General: Skin is warm and dry.   Neurological:      Mental Status: She is alert and oriented to person, place, and time.   Psychiatric:         Mood and Affect: Mood normal.         Behavior: Behavior normal.

## 2024-04-12 ENCOUNTER — OFFICE VISIT (OUTPATIENT)
Dept: PLASTIC SURGERY | Facility: HOSPITAL | Age: 59
End: 2024-04-12

## 2024-04-12 DIAGNOSIS — C44.511 BASAL CELL CARCINOMA (BCC) OF SKIN OF RIGHT BREAST: Primary | ICD-10-CM

## 2024-04-12 NOTE — PROGRESS NOTES
"Tess presents today in follow-up, status post excision of basal cell carcinoma on the right breast on March 25, 2024.  Pathology report reveals presence of scar and residual basal cell carcinoma, no perineural or lymphatic invasion, resection margins are negative for carcinoma.  The site is healing nicely, I suggested that she apply silicone scar gel twice daily.  Additionally she points out in the area of the left upper chest where she is having some \"soreness\" recently she feels may be related to lifting heavy objects.  Examination does not reveal any concerning findings, there is some palpable subcutaneous fat, likely from prior fat grafting procedures, I suggested if her symptoms continue that ultrasound be considered to assess the site.  "

## 2024-04-30 ENCOUNTER — HOSPITAL ENCOUNTER (OUTPATIENT)
Dept: ULTRASOUND IMAGING | Facility: HOSPITAL | Age: 59
Discharge: HOME | End: 2024-04-30
Payer: COMMERCIAL

## 2024-04-30 DIAGNOSIS — R93.89 THICKENED ENDOMETRIUM: ICD-10-CM

## 2024-04-30 PROCEDURE — 76856 US EXAM PELVIC COMPLETE: CPT

## 2024-04-30 PROCEDURE — 76830 TRANSVAGINAL US NON-OB: CPT

## 2024-05-01 DIAGNOSIS — R93.89 THICKENED ENDOMETRIUM: Primary | ICD-10-CM

## 2024-05-10 ENCOUNTER — OFFICE VISIT (OUTPATIENT)
Dept: PLASTIC SURGERY | Facility: HOSPITAL | Age: 59
End: 2024-05-10
Payer: COMMERCIAL

## 2024-05-10 DIAGNOSIS — Z42.1 AFTERCARE POSTMASTECTOMY FOR BREAST RECONSTRUCTION: Primary | ICD-10-CM

## 2024-05-10 PROCEDURE — 99214 OFFICE O/P EST MOD 30 MIN: CPT | Performed by: SURGERY

## 2024-05-10 NOTE — PROGRESS NOTES
Assessment/Plan: The see HPI.  Overall, she is doing well, and has inquired about the potential for additional autologous fat grafting to fill the medial poles of both reconstructed breasts.  I discussed the procedure, how it is performed, as well as potential risks, complications and limitations.  We we will utilize the abdomen and flanks as donor sites, she understands the likelihood of lax/redundant skin following fat harvest, her questions were answered to her satisfaction and consent has been obtained.  She will work with our    to arrange a date for the procedure.         There are no diagnoses linked to this encounter.      Subjective: Breast reconstruction     Patient ID: Tess Parr is a 58 y.o. female.    HPI Pat presents in follow-up, having undergone bilateral mastectomies, latissimus dorsi flap/implant-based reconstruction, she is interested in autologous fat grafting.    The following portions of the patient's history were reviewed and updated as appropriate: allergies, current medications, past family history, past medical history, past social history, past surgical history, and problem list.    Review of Systems   Constitutional:  Negative for chills and fever.   HENT:  Negative for hearing loss.    Eyes:  Positive for visual disturbance. Negative for discharge.   Respiratory:  Negative for chest tightness and shortness of breath.    Cardiovascular:  Negative for chest pain and leg swelling.   Gastrointestinal:  Negative for blood in stool, constipation, diarrhea and nausea.   Genitourinary:  Negative for dysuria.   Musculoskeletal:  Negative for gait problem.   Skin:  Negative for rash.   Allergic/Immunologic: Negative for immunocompromised state.   Neurological:  Negative for seizures and headaches.   Hematological:  Does not bruise/bleed easily.   Psychiatric/Behavioral:  Negative for dysphoric mood. The patient is nervous/anxious.          Objective:      LMP  (LMP Unknown)           Physical Exam  HENT:      Head: Normocephalic and atraumatic.   Eyes:      Extraocular Movements: Extraocular movements intact.      Pupils: Pupils are equal, round, and reactive to light.   Cardiovascular:      Rate and Rhythm: Normal rate.   Pulmonary:      Effort: Pulmonary effort is normal.   Abdominal:      Palpations: Abdomen is soft.   Musculoskeletal:         General: Normal range of motion.      Cervical back: Normal range of motion and neck supple.   Skin:     General: Skin is warm.      Comments: Bilateral reconstructed breasts, well-healed latissimus dorsi flaps, symmetry reasonable, loss of volume medially, as expected, see photos in [employer]   Neurological:      Mental Status: She is alert and oriented to person, place, and time.   Psychiatric:         Mood and Affect: Mood normal.

## 2024-05-23 ENCOUNTER — OFFICE VISIT (OUTPATIENT)
Dept: PODIATRY | Facility: CLINIC | Age: 59
End: 2024-05-23
Payer: COMMERCIAL

## 2024-05-23 VITALS — BODY MASS INDEX: 28.93 KG/M2 | HEIGHT: 66 IN | WEIGHT: 180 LBS

## 2024-05-23 DIAGNOSIS — G57.81 OTHER SPECIFIED MONONEUROPATHIES OF RIGHT LOWER LIMB: Primary | ICD-10-CM

## 2024-05-23 DIAGNOSIS — M21.41 PES PLANUS OF RIGHT FOOT: ICD-10-CM

## 2024-05-23 PROCEDURE — 99213 OFFICE O/P EST LOW 20 MIN: CPT | Performed by: PODIATRIST

## 2024-05-23 PROCEDURE — 64450 NJX AA&/STRD OTHER PN/BRANCH: CPT | Performed by: PODIATRIST

## 2024-05-23 RX ORDER — LIDOCAINE HYDROCHLORIDE 10 MG/ML
1 INJECTION, SOLUTION EPIDURAL; INFILTRATION; INTRACAUDAL; PERINEURAL ONCE
Status: COMPLETED | OUTPATIENT
Start: 2024-05-23 | End: 2024-05-23

## 2024-05-23 RX ORDER — TRIAMCINOLONE ACETONIDE 40 MG/ML
20 INJECTION, SUSPENSION INTRA-ARTICULAR; INTRAMUSCULAR ONCE
Status: COMPLETED | OUTPATIENT
Start: 2024-05-23 | End: 2024-05-23

## 2024-05-23 RX ADMIN — TRIAMCINOLONE ACETONIDE 20 MG: 40 INJECTION, SUSPENSION INTRA-ARTICULAR; INTRAMUSCULAR at 18:17

## 2024-05-23 RX ADMIN — LIDOCAINE HYDROCHLORIDE 1 ML: 10 INJECTION, SOLUTION EPIDURAL; INFILTRATION; INTRACAUDAL; PERINEURAL at 18:17

## 2024-05-23 NOTE — PROGRESS NOTES
"               PATIENT:  Tess Parr  1965         ASSESSMENT:     1. Other specified mononeuropathies of right lower limb  Nerve block    triamcinolone acetonide (KENALOG-40) 40 mg/mL injection 20 mg    lidocaine (PF) (XYLOCAINE-MPF) 1 % injection 1 mL      2. Pes planus of right foot                PLAN:  1. Reviewed medical records.  Patient was counseled and educated on the condition and the diagnosis.   2. The diagnosis, treatment options and prognosis were discussed with the patient.    3. She would proceed with a nerve block.  See procedure.  4. Instructed supportive care, icing, resting, and arch support.    5. She will return in 1 week for orthotic casting.        Nerve block    Date/Time: 5/23/2024 5:15 PM    Performed by: Walker Kenny DPM  Authorized by: Walker Kenny DPM    Patient location:  [facility] Protocol:  Consent: Verbal consent obtained.  Risks and benefits: risks, benefits and alternatives were discussed  Consent given by: patient  Time out: Immediately prior to procedure a \"time out\" was called to verify the correct patient, procedure, equipment, support staff and site/side marked as required.  Timeout called at: 5/23/2024 6:00 PM.  Patient understanding: patient states understanding of the procedure being performed  Site marked: the operative site was marked  Patient identity confirmed: verbally with patient    Indications:     Indications:  Pain relief and procedural anesthesia  Location:     Body area:  Lower extremity    Lower extremity nerve:  Saphenous (Medial calcaneal nerve, not saphenous)    Laterality:  Right  Pre-procedure details:     Skin preparation:  Alcohol  Skin anesthesia (see MAR for exact dosages):     Skin anesthesia method:  Topical application    Topical anesthesia: Ethyl Chloride spray.  Procedure details (see MAR for exact dosages):     Block needle gauge:  25 G    Anesthetic injected:  Lidocaine 1% w/o epi    Steroid injected:  Triamcinolone    Injection " procedure:  Anatomic landmarks identified and anatomic landmarks palpated  Post-procedure details:     Dressing:  Sterile dressing    Outcome:  Anesthesia achieved    Patient tolerance of procedure:  Tolerated well, no immediate complications      Subjective:     HPI    The patient presents with chief complaint of increased right heel pain in the last couple of weeks.  No new injury.  No swelling.        The following portions of the patient's history were reviewed and updated as appropriate: allergies, current medications, past family history, past medical history, past social history, past surgical history and problem list.  All pertinent labs and images were reviewed.      Past Medical History  Past Medical History:   Diagnosis Date    Arthritis     Knees and feet    Asthma     exercise induced    Cancer (HCC)     breast    COVID 2021    Difficulty falling asleep     GERD (gastroesophageal reflux disease)     Gluteal abscess     Last Assessed: 12/30/2016    Limb alert care status     right    Pain in left foot     Pneumonia 2009    x1    PONV (postoperative nausea and vomiting)     Skin cancer     Varicella        Past Surgical History  Past Surgical History:   Procedure Laterality Date    BASAL CELL CARCINOMA EXCISION N/A 01/07/2021    Procedure: EXCISION BASAL CELL CARCINOMA OF MID BACK;  Surgeon: uD Herzog MD;  [facility];  Service: Plastics    BASAL CELL CARCINOMA EXCISION Left 04/25/2022    Procedure: EXCISION OF BCC (X2) LEFT SHOULDER WITH COMPLEX CLOSURE;  Surgeon: Du Herzog MD;  [facility];  Service: Plastics    BREAST BIOPSY  05/02/2014    BREAST BIOPSY  05/2013    BREAST BIOPSY  05/2008    BREAST LUMPECTOMY Right 2008    BREAST RECONSTRUCTION Left 02/21/2019    Procedure: BREAST EXPANDER/IMPLANT EXCHANGE;  Surgeon: Du Herzog MD;  [facility];  Service: Plastics    BREAST SURGERY Bilateral 2008, 2013, 2014    Breast reconstruction with  expanders. Total of 7 surgeries.  7/3/14 right breast reconstruction revision. 10/27/14 right breast reconstruction revision     BREAST SURGERY Left 07/24/2013    Excision of breast lesion    CAPSULOTOMY Left 02/21/2019    Procedure: CAPSULECTOMY;  Surgeon: Du Herzog MD;  [facility];  Service: Plastics    CHEST WALL BIOPSY Right 02/21/2019    Procedure: UPPER CHEST BASAL CELL CARSINOMA EXCISION;  Surgeon: Du Herzog MD;  [facility];  Service: Plastics    CLOSURE WOUND N/A 02/21/2019    Procedure: RIGHT upper chest & LEFT upper back complex closure;  Surgeon: Du Herzog MD;  [facility];  Service: Plastics    COLONOSCOPY      COMPLEX WOUND CLOSURE TO EXTREMITY N/A 01/07/2021    Procedure: COMPLEX CLOSURE MID BACK;  Surgeon: Du Herzog MD;  [facility];  Service: Plastics    FOOT ARTHRODESIS Left     Pantalar-has pins, plates, screws-MRI compatible    KNEE ARTHROSCOPY Right 05/1997    LIPOSUCTION W/ FAT INJECTION Bilateral 10/24/2019    Procedure: AUTOLOGOUS FAT GRAFTING TO BILATERAL BREAST;  Surgeon: Du Herzog MD;  [facility];  Service: Plastics    LIPOSUCTION W/ FAT INJECTION Bilateral 01/07/2021    Procedure: AUTOLOGOUS FAT GRAFTING TO BILATERAL BREAST;  Surgeon: Du Herzog MD;  [facility];  Service: Plastics    MASS EXCISION Right 7/31/2023    Procedure: EXCISION BCC RIGHT SUPERIOR SHOULDER, RIGHT INFERIOR SHOULDER, RIGHT HIP/BUTTOCK WITH COMPLEX CLOSURE;  Surgeon: Du Herzog MD;  [facility];  Service: Plastics    MASTECTOMY Bilateral 06/06/2014    Lymph nodes removed bilaterally. States she may have IV's in left side.    MOHS RECONSTRUCTION Right 03/07/2019    Procedure: RECONSTRUCTION MOHS DEFECT RIGHT NOSE/MEDIAL CANTHUS;  Surgeon: Du Herzog MD;  [facility];  Service: Plastics    MOHS SURGERY      Mohs Micrographic Surgery Face; Last Assessed: 5/15/2015    LA ADJT  TIS REARGMT EYE/NOSE/EAR/LIP 10.1-30.0 SQCM Right 03/07/2019    Procedure: FLAP;  Surgeon: Du Herzog MD;  [facility];  Service: Plastics    VA ARTHRD MIDTARSL/TARSOMETATARSAL MULT/TRANSVRS Left 09/09/2016    Procedure: ARTHRODESIS FIRST METATARSALCUNIFORM JOINT FUSION, TALLNAVICULAR JOINT FUSION ;  Surgeon: Walker Kenny DPM;  [facility];  Service: Podiatry    VA BREAST RECONSTRUCTION W/LATISSIMUS DORSI FLAP Left 09/07/2017    Procedure: BREAST RECONSTRUCTION; LATISSIMUS DORSI FLAP; TISSUE EXPANDER;  Surgeon: Du Herzog MD;  [facility];  Service: Plastics    VA COLONOSCOPY FLX DX W/COLLJ SPEC WHEN PFRMD N/A 08/01/2018    Procedure: COLONOSCOPY;  Surgeon: Du Viveros MD;  [facility];  Service: Gastroenterology    VA CORRJ HLX VLGS BNCTY SESMDC RESCJ PROX PHLX BASE  09/09/2016    Procedure: BUNIONECTOMY VARGAS MODIFIED ;  Surgeon: Walker Kenny DPM;  [facility];  Service: Podiatry    VA EXCISION MALIGNANT LESION TRUNK/ARM/LEG > 4.0 CM N/A 06/27/2019    Procedure: EXCISION BASAL CELL CARCINOMA MID CHEST;  Surgeon: Du Herzog MD;  [facility];  Service: Plastics    VA EXCISION MALIGNANT LESION TRUNK/ARM/LEG > 4.0 CM Right 3/25/2024    Procedure: EXCISION OF BCC RIGHT BREAST, LOCAL FLAP RECONSTRUCTION;  Surgeon: Du Herzog MD;  [facility];  Service: Plastics    VA FTH/GFT FREE W/DIRECT CLOSURE N/E/E/L 20 SQ CM/< Right 03/07/2019    Procedure: SKIN GRAFT FULL THICKNESS  (FTSG);  Surgeon: Du Herzog MD;  [facility];  Service: Plastics    VA HYSTEROSCOPY BX ENDOMETRIUM&/POLYPC W/WO D&C N/A 01/07/2022    Procedure: DILATATION AND CURETTAGE (D&C) WITH HYSTEROSCOPY, POLYPECTOMY ;  Surgeon: Fernanda Kennedy MD;  [facility];  Service: Gynecology    VA INSERTION BREAST IMPLANT SAME DAY OF MASTECTOMY Left 09/07/2017    Procedure: TISSUE EXPANDER;  Surgeon: Du Herzog MD;  [facility];  Service: Plastics    IN OPEN TX MONTEGGIA FRACTURE DISLOCATION ELBOW Left 12/11/2019    Procedure: OPEN REDUCTION W/ INTERNAL FIXATION left distal humerus fracture, possible olecranon osteotomy;  Surgeon: Silvano Scott MD;  Location: [facility];  Service: Orthopedics    IN OSTEOTOMY CALCANEUS W/WO INTERNAL FIXATION Left 09/09/2016    Procedure: OSTEOTOMY CALCANEUS WITH APPLICATION AND ACTIVATION OF BONE STIMULATOR ;  Surgeon: Walker Kenny DPM;  [facility];  Service: Podiatry    IN REMV TISSUE FOR GRAFT OTHR Bilateral 06/27/2019    Procedure: AUTOLOGOUS FAT GRAFTING BILATERAL BREASTS;  Surgeon: Du Herzog MD;  [facility];  Service: Plastics    IN REPAIR COMPLEX F/C/C/M/N/AX/G/H/F 2.6-7.5 CM N/A 03/07/2019    Procedure: COMPLEX CLOSURE;  Surgeon: Du Herzog MD;  [facility];  Service: Plastics    IN REPAIR COMPLEX TRUNK 2.6-7.5 CM N/A 06/27/2019    Procedure: CLOSURE WOUND MID CHEST;  Surgeon: Du Herzog MD;  [facility];  Service: Plastics    IN REVISION OF RECONSTRUCTED BREAST Bilateral 06/27/2019    Procedure: BILATERAL BREAST MOUND REVISION;  Surgeon: Du Herzog MD;  [facility];  Service: Plastics    IN REVISION OF RECONSTRUCTED BREAST Bilateral 10/24/2019    Procedure: BILATERAL BREAST MOUND REVISION;  Surgeon: Du Herzog MD;  [facility];  Service: Plastics    IN REVISION OF RECONSTRUCTED BREAST Bilateral 01/07/2021    Procedure: BILATERAL BREAST MOUND REVISION;  Surgeon: Du Herzog MD;  [facility];  Service: Plastics    SENTINEL LYMPH NODE BIOPSY Bilateral 06/06/2014    SENTINEL LYMPH NODE BIOPSY Right 2008    SKIN BIOPSY      SKIN LESION EXCISION Left 02/21/2019    Procedure: UPPER BACK BCC EXCISION;  Surgeon: Du Herzog MD;  [facility];  Service: Plastics    SQUAMOUS CELL CARCINOMA EXCISION N/A 03/07/2019    Procedure: EXCISION BCC LEFT FOREHEAD;  Surgeon: Du  MD Mino;  Location: [facility];  Service: Plastics    WISDOM TOOTH EXTRACTION          Allergies:  Codeine, Dilaudid [hydromorphone hcl], Hydromorphone, Tramadol, and Other    Medications:  Current Outpatient Medications   Medication Sig Dispense Refill    acetaminophen (TYLENOL) 500 mg tablet Take 500 mg by mouth every 6 (six) hours as needed for mild pain.      albuterol (Ventolin HFA) 90 mcg/act inhaler Inhale 2 puffs every 6 (six) hours as needed for wheezing or shortness of breath 8 g 0    ALPRAZolam (XANAX) 1 mg tablet Take 1 mg by mouth daily at bedtime as needed      Ascorbic Acid (VITAMIN C) 500 MG CAPS Take 500 mg by mouth in the morning        calcium-vitamin D (OSCAL) 250-125 MG-UNIT per tablet Take 2 tablets by mouth daily.      Cholecalciferol (VITAMIN D) 2000 units CAPS Take by mouth in the morning        famotidine (PEPCID) 20 mg tablet Take 20 mg by mouth 2 (two) times a day as needed        Lactobacillus (ACIDOPHILUS PO) Take by mouth daily.      Multiple Vitamin (MULTIVITAMIN) capsule Take 1 capsule by mouth daily.      omeprazole (PriLOSEC) 20 mg delayed release capsule Take 20 mg by mouth as needed        zinc gluconate 50 mg tablet Take by mouth daily        ALPRAZolam (XANAX) 0.5 mg tablet Take 0.5 mg by mouth daily at bedtime as needed for anxiety (Patient not taking: Reported on 10/12/2023)      Diclofenac Sodium (VOLTAREN) 1 % Apply 2 g topically 4 (four) times a day 300 g 5    dicyclomine (BENTYL) 10 mg capsule Take 1 capsule (10 mg total) by mouth 4 (four) times a day (before meals and at bedtime) for 15 days (Patient not taking: Reported on 10/12/2023) 60 capsule 0    nystatin (MYCOSTATIN) ointment if needed      nystatin (MYCOSTATIN) powder Apply topically 2 (two) times a day (Patient taking differently: Apply topically as needed) 15 g 0    Promethazine-DM (PHENERGAN-DM) 6.25-15 mg/5 mL oral syrup Take 5 mL by mouth 4 (four) times a day as needed for cough (Patient not taking:  "Reported on 10/12/2023) 180 mL 0    tamoxifen (NOLVADEX) 20 mg tablet Take 1 tablet (20 mg total) by mouth daily 90 tablet 3     Current Facility-Administered Medications   Medication Dose Route Frequency Provider Last Rate Last Admin    lidocaine (PF) (XYLOCAINE-MPF) 1 % injection 1 mL  1 mL Injection Once         triamcinolone acetonide (KENALOG-40) 40 mg/mL injection 20 mg  20 mg Subcutaneous Once            Social History:  Social History     Socioeconomic History    Marital status: /Civil Union     Spouse name: None    Number of children: None    Years of education: None    Highest education level: None   Occupational History    None   Tobacco Use    Smoking status: Never    Smokeless tobacco: Never   Vaping Use    Vaping status: Never Used   Substance and Sexual Activity    Alcohol use: Yes     Alcohol/week: 4.0 standard drinks of alcohol     Types: 4 Glasses of wine per week     Comment: 4 weekly-wine    Drug use: No    Sexual activity: Yes     Partners: Male     Birth control/protection: Condom Male   Other Topics Concern    None   Social History Narrative    None     Social Determinants of Health     Financial Resource Strain: Not on file   Food Insecurity: Not on file   Transportation Needs: Not on file   Physical Activity: Not on file   Stress: Not on file   Social Connections: Not on file   Intimate Partner Violence: Not on file   Housing Stability: Not on file          Review of Systems   Constitutional:  Negative for chills, fatigue and fever.   Respiratory:  Negative for cough, shortness of breath and wheezing.    Cardiovascular:  Negative for chest pain and palpitations.   Gastrointestinal:  Negative for diarrhea, nausea and vomiting.   Musculoskeletal:  Negative for arthralgias and gait problem.   Skin:  Negative for color change, pallor, rash and wound.   Hematological: Negative.          Objective:    Ht 5' 6\" (1.676 m)   Wt 81.6 kg (180 lb)   LMP  (LMP Unknown)   BMI 29.05 kg/m²        " Physical Exam  Vitals reviewed.   Constitutional:       General: She is not in acute distress.     Appearance: Normal appearance. She is well-developed. She is not toxic-appearing.   Cardiovascular:      Rate and Rhythm: Normal rate and regular rhythm.      Pulses: Normal pulses.           Dorsalis pedis pulses are 2+ on the right side and 2+ on the left side.        Posterior tibial pulses are 2+ on the right side and 2+ on the left side.   Pulmonary:      Effort: Pulmonary effort is normal. No respiratory distress.   Musculoskeletal:         General: Tenderness and deformity present. No swelling or signs of injury.      Right lower leg: No edema.      Left lower leg: No edema.      Right foot: No foot drop.      Left foot: No foot drop.      Comments: Pain and Tinel sign along the course of medial calcaneal nerve on right medial heel.       Skin:     General: Skin is warm.      Coloration: Skin is not cyanotic or mottled.      Findings: No abscess, ecchymosis, erythema, rash or wound.      Nails: There is no clubbing.   Neurological:      General: No focal deficit present.      Mental Status: She is alert and oriented to person, place, and time.      Cranial Nerves: No cranial nerve deficit.      Sensory: No sensory deficit.      Motor: No weakness or abnormal muscle tone.      Coordination: Coordination normal.   Psychiatric:         Mood and Affect: Mood normal.         Behavior: Behavior normal.         Thought Content: Thought content normal.         Judgment: Judgment normal.

## 2024-05-24 ENCOUNTER — TELEPHONE (OUTPATIENT)
Dept: OBGYN CLINIC | Facility: OTHER | Age: 59
End: 2024-05-24

## 2024-05-30 ENCOUNTER — PROCEDURE VISIT (OUTPATIENT)
Dept: PODIATRY | Facility: CLINIC | Age: 59
End: 2024-05-30

## 2024-05-30 VITALS — HEIGHT: 66 IN | BODY MASS INDEX: 28.93 KG/M2 | WEIGHT: 180 LBS

## 2024-05-30 DIAGNOSIS — M21.41 PES PLANUS OF RIGHT FOOT: ICD-10-CM

## 2024-05-30 DIAGNOSIS — M72.2 PLANTAR FASCIITIS: Primary | ICD-10-CM

## 2024-06-10 ENCOUNTER — TELEPHONE (OUTPATIENT)
Dept: OBGYN CLINIC | Facility: OTHER | Age: 59
End: 2024-06-10

## 2024-06-10 NOTE — TELEPHONE ENCOUNTER
I called patient and left message to schedule another fitting in [facility]. Call back #: [phone].

## 2024-06-20 ENCOUNTER — OFFICE VISIT (OUTPATIENT)
Dept: PODIATRY | Facility: CLINIC | Age: 59
End: 2024-06-20
Payer: COMMERCIAL

## 2024-06-20 DIAGNOSIS — M21.41 PES PLANUS OF RIGHT FOOT: ICD-10-CM

## 2024-06-20 DIAGNOSIS — M72.2 PLANTAR FASCIITIS: Primary | ICD-10-CM

## 2024-06-20 DIAGNOSIS — M21.42 ACQUIRED PES PLANUS, LEFT: ICD-10-CM

## 2024-06-20 PROCEDURE — L3000 FT INSERT UCB BERKELEY SHELL: HCPCS | Performed by: PODIATRIST

## 2024-06-20 NOTE — PROGRESS NOTES
The patient was fitted for orthotics and they were dispensed.  The instruction was provided.   The patient will return in 3-4 weeks for follow-up.

## 2024-06-26 ENCOUNTER — PREP FOR PROCEDURE (OUTPATIENT)
Dept: PLASTIC SURGERY | Facility: CLINIC | Age: 59
End: 2024-06-26

## 2024-06-26 DIAGNOSIS — Z85.3 PERSONAL HISTORY OF BREAST CANCER: Primary | ICD-10-CM

## 2024-08-02 ENCOUNTER — OFFICE VISIT (OUTPATIENT)
Dept: PLASTIC SURGERY | Facility: HOSPITAL | Age: 59
End: 2024-08-02
Payer: COMMERCIAL

## 2024-08-02 DIAGNOSIS — L98.9 SKIN LESION: Primary | ICD-10-CM

## 2024-08-02 PROCEDURE — 88312 SPECIAL STAINS GROUP 1: CPT | Performed by: PATHOLOGY

## 2024-08-02 PROCEDURE — 88305 TISSUE EXAM BY PATHOLOGIST: CPT | Performed by: PATHOLOGY

## 2024-08-02 PROCEDURE — 11104 PUNCH BX SKIN SINGLE LESION: CPT | Performed by: SURGERY

## 2024-08-02 PROCEDURE — RECHECK: Performed by: SURGERY

## 2024-08-02 NOTE — PROGRESS NOTES
Tess presents today regarding several skin lesions, specifically left chest, right chest, and mid back.  These are pinkish, scaly lesions, the chest lesions are approximately 8 mm in diameter, the back lesion is approximately 10 mm in diameter.  She reports that these have been present for approximately a year, given her history of multiple prior skin cancers biopsy is warranted.  All 3 sites were biopsied, see procedure note.  Usual post biopsy instructions were given.  She will be seen in 10 days for suture removal.

## 2024-08-02 NOTE — PROGRESS NOTES
Biopsy    Date/Time: 8/2/2024 2:30 PM    Performed by: Du Herzog MD  Authorized by: Du Herzog MD  Universal Protocol:  Consent: Verbal consent obtained.  Risks and benefits: risks, benefits and alternatives were discussed  Consent given by: patient  Patient understanding: patient states understanding of the procedure being performed    Procedure Details - Lesion Biopsy:     Body area:  Trunk    Biopsy method: punch biopsy      Biopsy tissue type: skin and subcutaneous    Initial size (mm):  10    Final defect size (mm):  10    Malignancy: malignancy unknown       The mid central back site was prepped with alcohol, local anesthesia was administered.  A 3 mm punch biopsy was performed.  The site was closed with 5-0 nylon and a Band-Aid was applied.

## 2024-08-02 NOTE — PROGRESS NOTES
Biopsy    Date/Time: 8/2/2024 2:30 PM    Performed by: Du Herzog MD  Authorized by: Du Herzog MD  Universal Protocol:  Consent: Verbal consent obtained.  Risks and benefits: risks, benefits and alternatives were discussed  Consent given by: patient  Patient understanding: patient states understanding of the procedure being performed    Procedure Details - Lesion Biopsy:     Body area:  Trunk    Trunk location:  Chest    Biopsy method: punch biopsy      Biopsy tissue type: skin and subcutaneous    Initial size (mm):  8    Final defect size (mm):  8    Malignancy: malignancy unknown       The site was prepped with alcohol, local anesthesia was administered a 3 mm punch biopsy was performed, the site was closed with 5-0 nylon and a Band-Aid applied

## 2024-08-02 NOTE — PROGRESS NOTES
Biopsy    Date/Time: 8/2/2024 2:30 PM    Performed by: Du Herzog MD  Authorized by: Du Herzog MD  Universal Protocol:  Consent: Verbal consent obtained.  Risks and benefits: risks, benefits and alternatives were discussed  Consent given by: patient  Patient understanding: patient states understanding of the procedure being performed    Procedure Details - Lesion Biopsy:     Body area:  Trunk    Biopsy method: punch biopsy      Biopsy tissue type: skin and subcutaneous    Initial size (mm):  8    Final defect size (mm):  8     The right chest lesion was biopsied, prior to the biopsy the skin was prepped with alcohol local anesthesia was administered a 3 mm punch was performed, this was closed with 5-0 nylon.  A Band-Aid was applied.  Will be seen in 10 days for suture removal

## 2024-08-08 PROCEDURE — 88312 SPECIAL STAINS GROUP 1: CPT | Performed by: PATHOLOGY

## 2024-08-08 PROCEDURE — 88305 TISSUE EXAM BY PATHOLOGIST: CPT | Performed by: PATHOLOGY

## 2024-08-29 ENCOUNTER — PREP FOR PROCEDURE (OUTPATIENT)
Dept: PLASTIC SURGERY | Facility: CLINIC | Age: 59
End: 2024-08-29

## 2024-08-29 DIAGNOSIS — D09.9 SQUAMOUS CELL CARCINOMA IN SITU (SCCIS): ICD-10-CM

## 2024-08-29 DIAGNOSIS — L57.0 ACTINIC KERATOSIS: ICD-10-CM

## 2024-08-29 DIAGNOSIS — C44.519 BASAL CELL CARCINOMA (BCC) OF SKIN OF OTHER PART OF TORSO: Primary | ICD-10-CM

## 2024-09-03 ENCOUNTER — APPOINTMENT (OUTPATIENT)
Dept: LAB | Facility: CLINIC | Age: 59
End: 2024-09-03
Payer: COMMERCIAL

## 2024-09-03 DIAGNOSIS — Z00.8 OTHER SPECIFIED GENERAL MEDICAL EXAMINATION: ICD-10-CM

## 2024-09-03 DIAGNOSIS — Z85.3 PERSONAL HISTORY OF BREAST CANCER: ICD-10-CM

## 2024-09-03 LAB
ANION GAP SERPL CALCULATED.3IONS-SCNC: 4 MMOL/L (ref 4–13)
BASOPHILS # BLD AUTO: 0.02 THOUSANDS/ÂΜL (ref 0–0.1)
BASOPHILS NFR BLD AUTO: 1 % (ref 0–1)
BUN SERPL-MCNC: 14 MG/DL (ref 5–25)
CALCIUM SERPL-MCNC: 9.7 MG/DL (ref 8.4–10.2)
CHLORIDE SERPL-SCNC: 103 MMOL/L (ref 96–108)
CHOLEST SERPL-MCNC: 257 MG/DL
CO2 SERPL-SCNC: 29 MMOL/L (ref 21–32)
CREAT SERPL-MCNC: 0.65 MG/DL (ref 0.6–1.3)
EOSINOPHIL # BLD AUTO: 0.04 THOUSAND/ÂΜL (ref 0–0.61)
EOSINOPHIL NFR BLD AUTO: 1 % (ref 0–6)
ERYTHROCYTE [DISTWIDTH] IN BLOOD BY AUTOMATED COUNT: 17 % (ref 11.6–15.1)
EST. AVERAGE GLUCOSE BLD GHB EST-MCNC: 128 MG/DL
GFR SERPL CREATININE-BSD FRML MDRD: 98 ML/MIN/1.73SQ M
GLUCOSE P FAST SERPL-MCNC: 91 MG/DL (ref 65–99)
HBA1C MFR BLD: 6.1 %
HCT VFR BLD AUTO: 36.5 % (ref 34.8–46.1)
HDLC SERPL-MCNC: 87 MG/DL
HGB BLD-MCNC: 10.9 G/DL (ref 11.5–15.4)
IMM GRANULOCYTES # BLD AUTO: 0.01 THOUSAND/UL (ref 0–0.2)
IMM GRANULOCYTES NFR BLD AUTO: 0 % (ref 0–2)
LDLC SERPL CALC-MCNC: 156 MG/DL (ref 0–100)
LYMPHOCYTES # BLD AUTO: 1.5 THOUSANDS/ÂΜL (ref 0.6–4.47)
LYMPHOCYTES NFR BLD AUTO: 34 % (ref 14–44)
MCH RBC QN AUTO: 24.1 PG (ref 26.8–34.3)
MCHC RBC AUTO-ENTMCNC: 29.9 G/DL (ref 31.4–37.4)
MCV RBC AUTO: 81 FL (ref 82–98)
MONOCYTES # BLD AUTO: 0.42 THOUSAND/ÂΜL (ref 0.17–1.22)
MONOCYTES NFR BLD AUTO: 10 % (ref 4–12)
NEUTROPHILS # BLD AUTO: 2.44 THOUSANDS/ÂΜL (ref 1.85–7.62)
NEUTS SEG NFR BLD AUTO: 54 % (ref 43–75)
NONHDLC SERPL-MCNC: 170 MG/DL
NRBC BLD AUTO-RTO: 0 /100 WBCS
PLATELET # BLD AUTO: 285 THOUSANDS/UL (ref 149–390)
PMV BLD AUTO: 11.7 FL (ref 8.9–12.7)
POTASSIUM SERPL-SCNC: 4.5 MMOL/L (ref 3.5–5.3)
RBC # BLD AUTO: 4.53 MILLION/UL (ref 3.81–5.12)
SODIUM SERPL-SCNC: 136 MMOL/L (ref 135–147)
TRIGL SERPL-MCNC: 69 MG/DL
WBC # BLD AUTO: 4.43 THOUSAND/UL (ref 4.31–10.16)

## 2024-09-03 PROCEDURE — 80048 BASIC METABOLIC PNL TOTAL CA: CPT

## 2024-09-03 PROCEDURE — 85025 COMPLETE CBC W/AUTO DIFF WBC: CPT

## 2024-09-03 PROCEDURE — 83036 HEMOGLOBIN GLYCOSYLATED A1C: CPT

## 2024-09-03 PROCEDURE — 36415 COLL VENOUS BLD VENIPUNCTURE: CPT

## 2024-09-03 PROCEDURE — 80061 LIPID PANEL: CPT

## 2024-09-04 PROCEDURE — NC001 PR NO CHARGE: Performed by: PHYSICIAN ASSISTANT

## 2024-09-04 NOTE — H&P
Assessment/Plan: Please see HPI.  We discussed excision of the actinic keratosis of the right medial chest, the squamous cell carcinoma in situ of the left medial chest (she would also like to undergo excision of the aviation tattoo nearby), and the basal cell carcinoma of the central back.  I discussed the procedures, how they would be performed, as well as potential risk, complications and limitations.  Her questions were answered to her satisfaction, consent has been obtained.  She clearly would do best with general anesthesia.     There are no diagnoses linked to this encounter.      Subjective: Multiple skin lesions     Patient ID: Tess Parr is a 59 y.o. female.    HPI Tess presents today for a preoperative visit, recent biopsies have revealed actinic keratosis of the right medial chest, squamous cell carcinoma in situ of the left medial chest, and basal cell carcinoma of the central back (see previous visit for physical exam/photos)  Review of Systems   Constitutional:  Negative for chills and fever.   HENT:  Negative for hearing loss.    Eyes:  Positive for visual disturbance. Negative for discharge.   Respiratory:  Negative for chest tightness and shortness of breath.    Cardiovascular:  Negative for chest pain and leg swelling.   Gastrointestinal:  Negative for blood in stool, constipation, diarrhea and nausea.   Genitourinary:  Negative for dysuria.   Musculoskeletal:  Negative for gait problem.   Skin:  Negative for rash.   Allergic/Immunologic: Negative for immunocompromised state.   Neurological:  Negative for seizures and headaches.   Hematological:  Does not bruise/bleed easily.   Psychiatric/Behavioral:  Negative for dysphoric mood. The patient is nervous/anxious.          Objective:     Physical Exam  HENT:      Head: Normocephalic and atraumatic.   Eyes:      Extraocular Movements: Extraocular movements intact.      Pupils: Pupils are equal, round, and reactive to light.   Pulmonary:       Effort: Pulmonary effort is normal.   Abdominal:      Palpations: Abdomen is soft.   Musculoskeletal:         General: Normal range of motion.      Cervical back: Normal range of motion and neck supple.   Skin:     General: Skin is warm.      Comments: Well-healed biopsy sites right medial chest, left medial chest, central back (left of midline) see previous visit for photos   Neurological:      Mental Status: She is alert and oriented to person, place, and time.   Psychiatric:         Mood and Affect: Mood normal.

## 2024-09-06 NOTE — PRE-PROCEDURE INSTRUCTIONS
Pre-Surgery Instructions:   Medication Instructions    acetaminophen (TYLENOL) 500 mg tablet Uses PRN- OK to take day of surgery    albuterol (Ventolin HFA) 90 mcg/act inhaler Uses PRN- OK to take day of surgery    ALPRAZolam (XANAX) 1 mg tablet Uses PRN- OK to take day of surgery    Ascorbic Acid (VITAMIN C) 500 MG CAPS Stop taking 7 days prior to surgery.    Calcium Carbonate (CALCIUM 500 PO) Stop taking 7 days prior to surgery.    Cholecalciferol (VITAMIN D) 2000 units CAPS Stop taking 7 days prior to surgery.    famotidine (PEPCID) 20 mg tablet Uses PRN- OK to take day of surgery    Lactobacillus (ACIDOPHILUS PO) Stop taking 7 days prior to surgery.    omeprazole (PriLOSEC) 20 mg delayed release capsule Uses PRN- OK to take day of surgery    zinc gluconate 50 mg tablet Stop taking 7 days prior to surgery.    Medication instructions for day surgery reviewed. Please use only a sip of water to take your instructed medications. Avoid all over the counter vitamins, supplements and NSAIDS for one week prior to surgery per anesthesia guidelines. Tylenol is ok to take as needed.     You will receive a call one business day prior to surgery with an arrival time and hospital directions. If your surgery is scheduled on a Monday, the hospital will be calling you on the Friday prior to your surgery. If you have not heard from anyone by 8pm, please call the hospital supervisor through the hospital  at [phone]. ([place] [phone] or [place] [phone]).    Do not eat or drink anything after midnight the night before your surgery, including candy, mints, lifesavers, or chewing gum. Do not drink alcohol 24hrs before your surgery. Try not to smoke at least 24hrs before your surgery.       Follow the pre surgery showering instructions as listed in the “My Surgical Experience Booklet” or otherwise provided by your surgeon's office. Do not use a blade to shave the surgical area 1 week before surgery. It is okay  to use a clean electric clippers up to 24 hours before surgery. Do not apply any lotions, creams, including makeup, cologne, deodorant, or perfumes after showering on the day of your surgery. Do not use dry shampoo, hair spray, hair gel, or any type of hair products.     No contact lenses, eye make-up, or artificial eyelashes. Remove nail polish, including gel polish, and any artificial, gel, or acrylic nails if possible. Remove all jewelry including rings and body piercing jewelry.     Wear causal clothing that is easy to take on and off. Consider your type of surgery.    Keep any valuables, jewelry, piercings at home. Please bring any specially ordered equipment (sling, braces) if indicated.    Arrange for a responsible person to drive you to and from the hospital on the day of your surgery. Please confirm the visitor policy for the day of your procedure when you receive your phone call with an arrival time.     Call the surgeon's office with any new illnesses, exposures, or additional questions prior to surgery.    Please reference your “My Surgical Experience Booklet” for additional information to prepare for your upcoming surgery.

## 2024-09-11 ENCOUNTER — ANESTHESIA EVENT (OUTPATIENT)
Dept: PERIOP | Facility: HOSPITAL | Age: 59
End: 2024-09-11
Payer: COMMERCIAL

## 2024-09-11 NOTE — ANESTHESIA PREPROCEDURE EVALUATION
Procedure:  EXCISION OF LESIONS OF RIGHT CHEST, LEFT MEDIAL CHEST AND MID BACK, LOCAL FLAP VS COMPLEX CLOSURE (Chest)    Relevant Problems   ANESTHESIA   (+) PONV (postoperative nausea and vomiting)      GI/HEPATIC   (+) Gastroesophageal reflux disease      GYN   (+) Bilateral malignant neoplasm of breast in female, estrogen receptor positive (HCC)      MUSCULOSKELETAL   (+) Arthritis      PULMONARY   (+) Asthma      Orthopedic/Musculoskeletal   (+) Basal cell carcinoma of shoulder, left      Dermatology   (+) Basal cell carcinoma (BCC) of left side of neck   (+) Basal cell carcinoma (BCC) of skin of trunk      excision of the actinic keratosis of the right medial chest, the squamous cell carcinoma in situ of the left medial chest (she would also like to undergo excision of the aviation tattoo nearby), and the basal cell carcinoma of the central back   Physical Exam    Airway    Mallampati score: II  TM Distance: >3 FB  Neck ROM: full     Dental   No notable dental hx     Cardiovascular      Pulmonary      Other Findings  post-pubertal.      Anesthesia Plan  ASA Score- 2     Anesthesia Type- general with ASA Monitors.         Additional Monitors:     Airway Plan: LMA.    Comment: GA with ETT, IV, antiemetics, supine and prone positioning  Scopolamine patch for motion sickness.       Plan Factors-    Chart reviewed.    Patient summary reviewed.    Patient is not a current smoker.              Induction- intravenous.    Postoperative Plan- . Planned trial extubation    Perioperative Resuscitation Plan - Level 1 - Full Code.       Informed Consent- Anesthetic plan and risks discussed with patient.  I personally reviewed this patient with the CRNA. Discussed and agreed on the Anesthesia Plan with the CRNA..

## 2024-09-12 ENCOUNTER — ANESTHESIA (OUTPATIENT)
Dept: PERIOP | Facility: HOSPITAL | Age: 59
End: 2024-09-12
Payer: COMMERCIAL

## 2024-09-12 ENCOUNTER — HOSPITAL ENCOUNTER (OUTPATIENT)
Facility: HOSPITAL | Age: 59
Setting detail: OUTPATIENT SURGERY
Discharge: HOME | End: 2024-09-12
Attending: SURGERY | Admitting: SURGERY
Payer: COMMERCIAL

## 2024-09-12 VITALS
OXYGEN SATURATION: 99 % | BODY MASS INDEX: 29.54 KG/M2 | TEMPERATURE: 98 F | HEART RATE: 70 BPM | SYSTOLIC BLOOD PRESSURE: 109 MMHG | DIASTOLIC BLOOD PRESSURE: 58 MMHG | WEIGHT: 183.8 LBS | HEIGHT: 66 IN | RESPIRATION RATE: 12 BRPM

## 2024-09-12 DIAGNOSIS — L57.0 ACTINIC KERATOSIS: ICD-10-CM

## 2024-09-12 DIAGNOSIS — D09.9 SQUAMOUS CELL CARCINOMA IN SITU (SCCIS): ICD-10-CM

## 2024-09-12 DIAGNOSIS — C44.519 BASAL CELL CARCINOMA (BCC) OF SKIN OF OTHER PART OF TORSO: ICD-10-CM

## 2024-09-12 PROBLEM — C44.91 BASAL CELL CARCINOMA: Status: ACTIVE | Noted: 2022-04-27

## 2024-09-12 PROCEDURE — 88305 TISSUE EXAM BY PATHOLOGIST: CPT | Performed by: PATHOLOGY

## 2024-09-12 PROCEDURE — 13101 CMPLX RPR TRUNK 2.6-7.5 CM: CPT | Performed by: SURGERY

## 2024-09-12 PROCEDURE — 13102 CMPLX RPR TRUNK ADDL 5CM/<: CPT | Performed by: SURGERY

## 2024-09-12 PROCEDURE — 11603 EXC TR-EXT MAL+MARG 2.1-3 CM: CPT | Performed by: SURGERY

## 2024-09-12 PROCEDURE — 11403 EXC TR-EXT B9+MARG 2.1-3CM: CPT | Performed by: SURGERY

## 2024-09-12 RX ORDER — LIDOCAINE HYDROCHLORIDE AND EPINEPHRINE 10; 10 MG/ML; UG/ML
INJECTION, SOLUTION INFILTRATION; PERINEURAL AS NEEDED
Status: DISCONTINUED | OUTPATIENT
Start: 2024-09-12 | End: 2024-09-12 | Stop reason: HOSPADM

## 2024-09-12 RX ORDER — FENTANYL CITRATE 50 UG/ML
INJECTION, SOLUTION INTRAMUSCULAR; INTRAVENOUS AS NEEDED
Status: DISCONTINUED | OUTPATIENT
Start: 2024-09-12 | End: 2024-09-12

## 2024-09-12 RX ORDER — FENTANYL CITRATE/PF 50 MCG/ML
25 SYRINGE (ML) INJECTION
Status: DISCONTINUED | OUTPATIENT
Start: 2024-09-12 | End: 2024-09-12 | Stop reason: HOSPADM

## 2024-09-12 RX ORDER — SODIUM CHLORIDE, SODIUM LACTATE, POTASSIUM CHLORIDE, CALCIUM CHLORIDE 600; 310; 30; 20 MG/100ML; MG/100ML; MG/100ML; MG/100ML
INJECTION, SOLUTION INTRAVENOUS CONTINUOUS PRN
Status: DISCONTINUED | OUTPATIENT
Start: 2024-09-12 | End: 2024-09-12

## 2024-09-12 RX ORDER — BUPIVACAINE HYDROCHLORIDE 2.5 MG/ML
INJECTION, SOLUTION EPIDURAL; INFILTRATION; INTRACAUDAL AS NEEDED
Status: DISCONTINUED | OUTPATIENT
Start: 2024-09-12 | End: 2024-09-12 | Stop reason: HOSPADM

## 2024-09-12 RX ORDER — DEXAMETHASONE SODIUM PHOSPHATE 10 MG/ML
INJECTION, SOLUTION INTRAMUSCULAR; INTRAVENOUS AS NEEDED
Status: DISCONTINUED | OUTPATIENT
Start: 2024-09-12 | End: 2024-09-12

## 2024-09-12 RX ORDER — SODIUM CHLORIDE, SODIUM LACTATE, POTASSIUM CHLORIDE, CALCIUM CHLORIDE 600; 310; 30; 20 MG/100ML; MG/100ML; MG/100ML; MG/100ML
125 INJECTION, SOLUTION INTRAVENOUS CONTINUOUS
Status: DISCONTINUED | OUTPATIENT
Start: 2024-09-12 | End: 2024-09-12 | Stop reason: HOSPADM

## 2024-09-12 RX ORDER — ONDANSETRON 2 MG/ML
INJECTION INTRAMUSCULAR; INTRAVENOUS AS NEEDED
Status: DISCONTINUED | OUTPATIENT
Start: 2024-09-12 | End: 2024-09-12

## 2024-09-12 RX ORDER — MIDAZOLAM HYDROCHLORIDE 2 MG/2ML
INJECTION, SOLUTION INTRAMUSCULAR; INTRAVENOUS AS NEEDED
Status: DISCONTINUED | OUTPATIENT
Start: 2024-09-12 | End: 2024-09-12

## 2024-09-12 RX ORDER — ROCURONIUM BROMIDE 10 MG/ML
INJECTION, SOLUTION INTRAVENOUS AS NEEDED
Status: DISCONTINUED | OUTPATIENT
Start: 2024-09-12 | End: 2024-09-12

## 2024-09-12 RX ORDER — PROPOFOL 10 MG/ML
INJECTION, EMULSION INTRAVENOUS AS NEEDED
Status: DISCONTINUED | OUTPATIENT
Start: 2024-09-12 | End: 2024-09-12

## 2024-09-12 RX ORDER — LIDOCAINE HYDROCHLORIDE 10 MG/ML
INJECTION, SOLUTION EPIDURAL; INFILTRATION; INTRACAUDAL; PERINEURAL AS NEEDED
Status: DISCONTINUED | OUTPATIENT
Start: 2024-09-12 | End: 2024-09-12

## 2024-09-12 RX ORDER — CEFAZOLIN SODIUM 2 G/50ML
2000 SOLUTION INTRAVENOUS ONCE
Status: COMPLETED | OUTPATIENT
Start: 2024-09-12 | End: 2024-09-12

## 2024-09-12 RX ORDER — SCOLOPAMINE TRANSDERMAL SYSTEM 1 MG/1
1 PATCH, EXTENDED RELEASE TRANSDERMAL
Status: DISCONTINUED | OUTPATIENT
Start: 2024-09-12 | End: 2024-09-12 | Stop reason: HOSPADM

## 2024-09-12 RX ORDER — GLYCOPYRROLATE 0.2 MG/ML
INJECTION INTRAMUSCULAR; INTRAVENOUS AS NEEDED
Status: DISCONTINUED | OUTPATIENT
Start: 2024-09-12 | End: 2024-09-12

## 2024-09-12 RX ORDER — NEOSTIGMINE METHYLSULFATE 1 MG/ML
INJECTION INTRAVENOUS AS NEEDED
Status: DISCONTINUED | OUTPATIENT
Start: 2024-09-12 | End: 2024-09-12

## 2024-09-12 RX ADMIN — FENTANYL CITRATE 25 MCG: 50 INJECTION, SOLUTION INTRAMUSCULAR; INTRAVENOUS at 08:00

## 2024-09-12 RX ADMIN — SODIUM CHLORIDE, SODIUM LACTATE, POTASSIUM CHLORIDE, AND CALCIUM CHLORIDE: .6; .31; .03; .02 INJECTION, SOLUTION INTRAVENOUS at 07:10

## 2024-09-12 RX ADMIN — MIDAZOLAM 0.5 MG: 1 INJECTION INTRAMUSCULAR; INTRAVENOUS at 08:06

## 2024-09-12 RX ADMIN — DEXAMETHASONE SODIUM PHOSPHATE 10 MG: 10 INJECTION, SOLUTION INTRAMUSCULAR; INTRAVENOUS at 08:06

## 2024-09-12 RX ADMIN — ONDANSETRON 4 MG: 2 INJECTION INTRAMUSCULAR; INTRAVENOUS at 08:08

## 2024-09-12 RX ADMIN — PROPOFOL 130 MCG/KG/MIN: 10 INJECTION, EMULSION INTRAVENOUS at 07:48

## 2024-09-12 RX ADMIN — NEOSTIGMINE METHYLSULFATE 1 MG: 1 INJECTION INTRAVENOUS at 09:01

## 2024-09-12 RX ADMIN — GLYCOPYRROLATE 0.2 MG: 0.2 INJECTION INTRAMUSCULAR; INTRAVENOUS at 09:01

## 2024-09-12 RX ADMIN — MIDAZOLAM 2 MG: 1 INJECTION INTRAMUSCULAR; INTRAVENOUS at 07:30

## 2024-09-12 RX ADMIN — MIDAZOLAM 1 MG: 1 INJECTION INTRAMUSCULAR; INTRAVENOUS at 09:07

## 2024-09-12 RX ADMIN — FENTANYL CITRATE 50 MCG: 50 INJECTION, SOLUTION INTRAMUSCULAR; INTRAVENOUS at 07:36

## 2024-09-12 RX ADMIN — PROPOFOL 200 MG: 10 INJECTION, EMULSION INTRAVENOUS at 07:36

## 2024-09-12 RX ADMIN — LIDOCAINE HYDROCHLORIDE 50 MG: 10 INJECTION, SOLUTION EPIDURAL; INFILTRATION; INTRACAUDAL; PERINEURAL at 07:36

## 2024-09-12 RX ADMIN — FENTANYL CITRATE 25 MCG: 50 INJECTION, SOLUTION INTRAMUSCULAR; INTRAVENOUS at 08:04

## 2024-09-12 RX ADMIN — CEFAZOLIN SODIUM 2000 MG: 2 SOLUTION INTRAVENOUS at 07:50

## 2024-09-12 RX ADMIN — ROCURONIUM BROMIDE 30 MG: 10 INJECTION, SOLUTION INTRAVENOUS at 07:37

## 2024-09-12 RX ADMIN — MIDAZOLAM 0.5 MG: 1 INJECTION INTRAMUSCULAR; INTRAVENOUS at 08:13

## 2024-09-12 RX ADMIN — SCOPALAMINE 1 PATCH: 1 PATCH, EXTENDED RELEASE TRANSDERMAL at 07:29

## 2024-09-12 NOTE — INTERVAL H&P NOTE
H&P reviewed. After examining the patient I find no changes in the patients condition since the H&P had been written.    Vitals:    09/12/24 0650   BP: 156/82   Pulse: 77   Resp: 17   Temp: 97.5 °F (36.4 °C)   SpO2: 99%

## 2024-09-12 NOTE — OP NOTE
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    Postop diagnosis:  Same as preop diagnosis    Procedure(s):  #1 excision of basal cell carcinoma of the left mid back 2.3 cm #2 complex closure left mid back 5 cm #3 excision actinic keratosis of right chest 2.2 cm #4 complex closure of right chest 3 cm #5 excision squamous cell carcinoma in situ left chest 2.6 cm #6 complex closure left chest 3.3 cm    Specimen(s):  ID Type Source Tests Collected by Time Destination   1 : BCC LEFT MID BACK Tissue Back TISSUE EXAM Du Herzog MD 2024 0810    2 : ACTINIC KERITOSIS RIGHT CHEST Tissue Chest Wall TISSUE EXAM Du Herzog MD 2024 0841    3 : SCC IN SITU LEFT CHEST Tissue Chest Wall TISSUE EXAM Du Herzog MD 2024 0850        Estimated Blood Loss:   Minimal    Drains:  * No LDAs found *    Anesthesia Type:   Choice    Operative Indications:  Basal cell carcinoma (BCC) of skin of other part of torso [C44.519]  Actinic keratosis [L57.0]  Squamous cell carcinoma in situ (SCCIS) [D09.9]      Operative Findings:  As above      Complications:   None    Procedure and Technique:  Tess was seen preoperatively in the holding area, the surgical sites were marked with her participation, and I reviewed with her the planned procedure, potential risks, complications and limitations.  He was taken to the operating room and underwent induction of general anesthesia by the anesthesia personnel.  He was placed in the right lateral position initially, and the back was prepped and draped in  sterile fashion.  A proper timeout was performed.  2.5 loupe magnification was used to aid in visualization.  Area of concern was marked to be excised with a margin of normal-appearing skin.  Local anesthesia was administered.  A 15 blade was then used to create the skin incisions these were carried down to the dermis, the lesion was excised deep in the plane of subcutaneous fat utilizing the Bovie cautery.  The specimen was marked with sutures for orientation and placed in formalin.  The wound edges were then widely and circumferentially undermined deep to the dermis around the entire circumference of the wound.  This wide undermining was performed with the Bovie cautery for distance greater than 3 times the width of the wound, in order to allow closure without significant tension.  Once this wide undermining is being completed the wound was irrigated and hemostasis was assured with the Bovie cautery.  Closure was then accomplished utilizing 3-0 and 4-0 PDS.  At the level of the deep dermis this was followed by running subcuticular 5-0 Monocryl.  Benzoin Steri-Strips and a light pressure dressing were then applied to the surgical site.  The patient was then turned to the prone position, the chest was prepped and draped in sterile fashion and a second proper timeout was performed.  The lesions of the right and left chest were then addressed in an identical fashion as described for the back.  Similar dressings were applied.  The patient was then transferred to the recovery room.   I was present for the entire procedure.    Patient Disposition:  PACU                SIGNATURE: Du Herzog MD  DATE: September 12, 2024  TIME: 9:24 AM

## 2024-09-12 NOTE — DISCHARGE INSTR - AVS FIRST PAGE
[facility]  Dr. BRUCE Herzog Jr.  [address] [place] [address]  Phone: [phone]     Postoperative Instructions for Outpatient Surgery     These instructions are being provided by your doctor to give you basic guidelines during your post-op recovery. Please let our office know if your contact information has changed.      Please call the office today for an appointment in 14 days for postoperative care     Dressings: You can remove outer dressing tomorrow. Leave steri-strips in place     Activity Restrictions: None     Bathing: You can shower in 36hrs     Medications:    Resume pre-op medications.   You may take tylenol, aleve, or ibuprofen for pain control

## 2024-09-17 PROCEDURE — 88305 TISSUE EXAM BY PATHOLOGIST: CPT | Performed by: PATHOLOGY

## 2024-09-25 ENCOUNTER — OFFICE VISIT (OUTPATIENT)
Dept: PLASTIC SURGERY | Facility: CLINIC | Age: 59
End: 2024-09-25

## 2024-09-25 DIAGNOSIS — Z48.89 POSTOPERATIVE VISIT: Primary | ICD-10-CM

## 2024-09-25 PROCEDURE — 99024 POSTOP FOLLOW-UP VISIT: CPT | Performed by: SURGERY

## 2024-09-25 NOTE — PROGRESS NOTES
Tess presents today for a follow-up visit.  On September 12, 2024 she underwent excision of #1 basal cell carcinoma left mid back #2 actinic keratosis right chest #3 squamous cell carcinoma in situ left chest.  Pathology report for all 3 sites reveals uninvolved margins.  The sites are healing nicely, suture knots were trimmed, the usual instructions were given regarding scar care, use of topical silicone gel, avoidance of significant sun exposure, etc.  She will be seen again in 2 to 3 months to evaluate the surgical sites.

## 2024-11-20 ENCOUNTER — HOSPITAL ENCOUNTER (OUTPATIENT)
Dept: ULTRASOUND IMAGING | Facility: HOSPITAL | Age: 59
Discharge: HOME | End: 2024-11-20
Payer: COMMERCIAL

## 2024-11-20 DIAGNOSIS — R93.89 THICKENED ENDOMETRIUM: ICD-10-CM

## 2024-11-20 PROCEDURE — 76830 TRANSVAGINAL US NON-OB: CPT

## 2024-11-20 PROCEDURE — 76856 US EXAM PELVIC COMPLETE: CPT

## 2024-11-26 ENCOUNTER — RESULTS FOLLOW-UP (OUTPATIENT)
Dept: OBGYN CLINIC | Facility: CLINIC | Age: 59
End: 2024-11-26

## 2024-11-26 ENCOUNTER — TELEPHONE (OUTPATIENT)
Age: 59
End: 2024-11-26

## 2024-11-26 DIAGNOSIS — Z17.0 MALIGNANT NEOPLASM OF BREAST IN FEMALE, ESTROGEN RECEPTOR POSITIVE, UNSPECIFIED LATERALITY, UNSPECIFIED SITE OF BREAST (HCC): Primary | ICD-10-CM

## 2024-11-26 DIAGNOSIS — C50.919 MALIGNANT NEOPLASM OF BREAST IN FEMALE, ESTROGEN RECEPTOR POSITIVE, UNSPECIFIED LATERALITY, UNSPECIFIED SITE OF BREAST (HCC): Primary | ICD-10-CM

## 2024-11-26 NOTE — TELEPHONE ENCOUNTER
Pt called to schedule appt w/ Dr. Kennedy for surgical discussion per messaging on MY[facility] from Dr. Kennedy. First available appt is not until 02/2025. Please follow-up with pt for sooner available appt.

## 2024-11-26 NOTE — TELEPHONE ENCOUNTER
Patient called back and asked if we can ask the doctor if she should be seen sooner than February. She said she can go to any office any time to see the doctor and she is flexible. She said we do not have to call her first if theres an opening if we can schedule her and then call her after.  She can be reached at [phone] .  Thank you

## 2024-11-27 NOTE — PROGRESS NOTES
The metronidazole has been converted to Oral per [facility] IV-to-PO Auto-Conversion Protocol for Adults as approved by the Pharmacy and Therapeutics Committee  The patient met all eligible criteria:  3 Age = 25years old   2) Received at least one dose of the IV form   3) Receiving at least one other scheduled oral/enteral medication   4) Tolerating an oral/enteral diet   and did not have any exclusions:   1) Critical care patient   2) Active GI bleed (IF assessing H2RAs or PPIs)   3) Continuous tube feeding (IF assessing cipro, doxycycline, levofloxacin, minocycline, rifampin, or voriconazole)   4) Receiving PO vancomycin (IF assessing metronidazole)   5) Persistent nausea and/or vomiting   6) Ileus or gastrointestinal obstruction   7) Arely/nasogastric tube set for continuous suction   8) Specific order not to automatically convert to PO (in the order's comments or if discussed in the most recent Infectious Disease or primary team's progress notes)  Universal Safety Interventions

## 2024-12-04 ENCOUNTER — APPOINTMENT (OUTPATIENT)
Dept: LAB | Facility: AMBULARY SURGERY CENTER | Age: 59
End: 2024-12-04
Payer: COMMERCIAL

## 2024-12-04 ENCOUNTER — OFFICE VISIT (OUTPATIENT)
Dept: OBGYN CLINIC | Facility: CLINIC | Age: 59
End: 2024-12-04
Payer: COMMERCIAL

## 2024-12-04 VITALS — HEIGHT: 66 IN | BODY MASS INDEX: 29.67 KG/M2 | DIASTOLIC BLOOD PRESSURE: 78 MMHG | SYSTOLIC BLOOD PRESSURE: 140 MMHG

## 2024-12-04 DIAGNOSIS — Z17.0 MALIGNANT NEOPLASM OF BREAST IN FEMALE, ESTROGEN RECEPTOR POSITIVE, UNSPECIFIED LATERALITY, UNSPECIFIED SITE OF BREAST (HCC): ICD-10-CM

## 2024-12-04 DIAGNOSIS — R93.89 ABNORMAL ULTRASOUND: Primary | ICD-10-CM

## 2024-12-04 DIAGNOSIS — C50.919 MALIGNANT NEOPLASM OF BREAST IN FEMALE, ESTROGEN RECEPTOR POSITIVE, UNSPECIFIED LATERALITY, UNSPECIFIED SITE OF BREAST (HCC): ICD-10-CM

## 2024-12-04 LAB
ALBUMIN SERPL BCG-MCNC: 4.4 G/DL (ref 3.5–5)
ALP SERPL-CCNC: 63 U/L (ref 34–104)
ALT SERPL W P-5'-P-CCNC: 16 U/L (ref 7–52)
ANION GAP SERPL CALCULATED.3IONS-SCNC: 7 MMOL/L (ref 4–13)
AST SERPL W P-5'-P-CCNC: 20 U/L (ref 13–39)
BASOPHILS # BLD AUTO: 0.02 THOUSANDS/ÂΜL (ref 0–0.1)
BASOPHILS NFR BLD AUTO: 0 % (ref 0–1)
BILIRUB SERPL-MCNC: 0.41 MG/DL (ref 0.2–1)
BUN SERPL-MCNC: 18 MG/DL (ref 5–25)
CALCIUM SERPL-MCNC: 9.4 MG/DL (ref 8.4–10.2)
CHLORIDE SERPL-SCNC: 102 MMOL/L (ref 96–108)
CO2 SERPL-SCNC: 28 MMOL/L (ref 21–32)
CREAT SERPL-MCNC: 0.65 MG/DL (ref 0.6–1.3)
EOSINOPHIL # BLD AUTO: 0.08 THOUSAND/ÂΜL (ref 0–0.61)
EOSINOPHIL NFR BLD AUTO: 2 % (ref 0–6)
ERYTHROCYTE [DISTWIDTH] IN BLOOD BY AUTOMATED COUNT: 16.1 % (ref 11.6–15.1)
GFR SERPL CREATININE-BSD FRML MDRD: 98 ML/MIN/1.73SQ M
GLUCOSE P FAST SERPL-MCNC: 86 MG/DL (ref 65–99)
HCT VFR BLD AUTO: 40.3 % (ref 34.8–46.1)
HGB BLD-MCNC: 12.6 G/DL (ref 11.5–15.4)
IMM GRANULOCYTES # BLD AUTO: 0.01 THOUSAND/UL (ref 0–0.2)
IMM GRANULOCYTES NFR BLD AUTO: 0 % (ref 0–2)
LYMPHOCYTES # BLD AUTO: 2.03 THOUSANDS/ÂΜL (ref 0.6–4.47)
LYMPHOCYTES NFR BLD AUTO: 38 % (ref 14–44)
MCH RBC QN AUTO: 27 PG (ref 26.8–34.3)
MCHC RBC AUTO-ENTMCNC: 31.3 G/DL (ref 31.4–37.4)
MCV RBC AUTO: 87 FL (ref 82–98)
MONOCYTES # BLD AUTO: 0.46 THOUSAND/ÂΜL (ref 0.17–1.22)
MONOCYTES NFR BLD AUTO: 9 % (ref 4–12)
NEUTROPHILS # BLD AUTO: 2.77 THOUSANDS/ÂΜL (ref 1.85–7.62)
NEUTS SEG NFR BLD AUTO: 51 % (ref 43–75)
NRBC BLD AUTO-RTO: 0 /100 WBCS
PLATELET # BLD AUTO: 226 THOUSANDS/UL (ref 149–390)
PMV BLD AUTO: 12.4 FL (ref 8.9–12.7)
POTASSIUM SERPL-SCNC: 4.2 MMOL/L (ref 3.5–5.3)
PROT SERPL-MCNC: 7.2 G/DL (ref 6.4–8.4)
RBC # BLD AUTO: 4.66 MILLION/UL (ref 3.81–5.12)
SODIUM SERPL-SCNC: 137 MMOL/L (ref 135–147)
WBC # BLD AUTO: 5.37 THOUSAND/UL (ref 4.31–10.16)

## 2024-12-04 PROCEDURE — 99213 OFFICE O/P EST LOW 20 MIN: CPT | Performed by: OBSTETRICS & GYNECOLOGY

## 2024-12-04 PROCEDURE — 85025 COMPLETE CBC W/AUTO DIFF WBC: CPT

## 2024-12-04 PROCEDURE — 36415 COLL VENOUS BLD VENIPUNCTURE: CPT

## 2024-12-04 PROCEDURE — 80053 COMPREHEN METABOLIC PANEL: CPT

## 2024-12-04 RX ORDER — NYSTATIN TOPICAL POWDER 100000 U/G
1 POWDER TOPICAL 2 TIMES DAILY
COMMUNITY
Start: 2024-10-14

## 2024-12-04 NOTE — PROGRESS NOTES
Subjective     Tess Parr is a 59 y.o. G0, P0 female here for a problem visit.  Patient had been on tamoxifen for breast cancer treatment and had stopped.  Endometrial lining went from 9 mm and more cystic to less cystic but 12 mm with possibly some fluid.  Reviewed with patient recommend sampling and evaluation expectation that likely results will be benign.  We discussed possible follow-up and plans if there is cancer.  All of patient's and friends questions answered.  Reviewed procedure exam under anesthesia D&C hysteroscopy risks and benefits expected outcomes and surgical instructions.  Patient reports understanding and consent and no further questions     Gynecologic History  No LMP recorded (lmp unknown). Patient is postmenopausal.  Contraception: post menopausal status  Last Pap: 2020. Results were: normal  2021 pelvic ultrasound 12 mm  2022 D&C polyps benign  2023 pelvic ultrasound 16 mm  2023 endometrial biopsy small portions of benign tissue  2023 pelvic ultrasound 14 mm cystic changes and vascularity  2024 pelvic ultrasound 9 mm  2024 pelvic ultrasound 12 mm    Obstetric History  OB History    Para Term  AB Living   0 0 0 0 0 0   SAB IAB Ectopic Multiple Live Births   0 0 0 0 0   Obstetric Comments   Menarche age 12.  History of birth control pills.     Past Medical History:   Diagnosis Date    Arthritis     Knees and feet    Asthma     exercise induced    Cancer (HCC)     breast    COVID     Difficulty falling asleep     GERD (gastroesophageal reflux disease)     Gluteal abscess     Last Assessed: 2016    Limb alert care status     right    Pain in left foot     Pneumonia 2009    x1    PONV (postoperative nausea and vomiting)     Skin cancer     Varicella      Past Surgical History:   Procedure Laterality Date    BASAL CELL CARCINOMA EXCISION N/A 2021    Procedure: EXCISION BASAL CELL CARCINOMA OF MID  BACK;  Surgeon: Du Herzog MD;  Location: [facility];  Service: Plastics    BASAL CELL CARCINOMA EXCISION Left 04/25/2022    Procedure: EXCISION OF BCC (X2) LEFT SHOULDER WITH COMPLEX CLOSURE;  Surgeon: Du Herzog MD;  [facility];  Service: Plastics    BREAST BIOPSY  05/02/2014    BREAST BIOPSY  05/2013    BREAST BIOPSY  05/2008    BREAST LUMPECTOMY Right 2008    BREAST RECONSTRUCTION Left 02/21/2019    Procedure: BREAST EXPANDER/IMPLANT EXCHANGE;  Surgeon: Du Herzog MD;  [facility];  Service: Plastics    BREAST SURGERY Bilateral 2008, 2013, 2014    Breast reconstruction with expanders. Total of 7 surgeries.  7/3/14 right breast reconstruction revision. 10/27/14 right breast reconstruction revision     BREAST SURGERY Left 07/24/2013    Excision of breast lesion    CAPSULOTOMY Left 02/21/2019    Procedure: CAPSULECTOMY;  Surgeon: Du Herzog MD;  [facility];  Service: Plastics    CHEST WALL BIOPSY Right 02/21/2019    Procedure: UPPER CHEST BASAL CELL CARSINOMA EXCISION;  Surgeon: Du Herzog MD;  [facility];  Service: Plastics    CLOSURE WOUND N/A 02/21/2019    Procedure: RIGHT upper chest & LEFT upper back complex closure;  Surgeon: Du Herzog MD;  [facility];  Service: Plastics    COLONOSCOPY      COMPLEX WOUND CLOSURE TO EXTREMITY N/A 01/07/2021    Procedure: COMPLEX CLOSURE MID BACK;  Surgeon: Du Herzog MD;  [facility];  Service: Plastics    FOOT ARTHRODESIS Left     Pantalar-has pins, plates, screws-MRI compatible    KNEE ARTHROSCOPY Right 05/1997    LIPOSUCTION W/ FAT INJECTION Bilateral 10/24/2019    Procedure: AUTOLOGOUS FAT GRAFTING TO BILATERAL BREAST;  Surgeon: Du Herzog MD;  [facility];  Service: Plastics    LIPOSUCTION W/ FAT INJECTION Bilateral 01/07/2021    Procedure: AUTOLOGOUS FAT GRAFTING TO BILATERAL BREAST;  Surgeon: Du Herzog MD;  [facility];  Service: Plastics    MASS EXCISION Right 7/31/2023    Procedure: EXCISION BCC RIGHT SUPERIOR SHOULDER, RIGHT INFERIOR SHOULDER, RIGHT HIP/BUTTOCK WITH COMPLEX CLOSURE;  Surgeon: Du Herzog MD;  [facility];  Service: Plastics    MASTECTOMY Bilateral 06/06/2014    Lymph nodes removed bilaterally. States she may have IV's in left side.    MOHS RECONSTRUCTION Right 03/07/2019    Procedure: RECONSTRUCTION MOHS DEFECT RIGHT NOSE/MEDIAL CANTHUS;  Surgeon: Du Herzog MD;  [facility];  Service: Plastics    MOHS SURGERY      Mohs Micrographic Surgery Face; Last Assessed: 5/15/2015    AL ADJT TIS REARGMT EYE/NOSE/EAR/LIP 10.1-30.0 SQCM Right 03/07/2019    Procedure: FLAP;  Surgeon: Du Herzog MD;  [facility];  Service: Plastics    AL ARTHRD MIDTARSL/TARSOMETATARSAL MULT/TRANSVRS Left 09/09/2016    Procedure: ARTHRODESIS FIRST METATARSALCUNIFORM JOINT FUSION, TALLNAVICULAR JOINT FUSION ;  Surgeon: Walker Kenny DPM;  [facility];  Service: Podiatry    AL BREAST RECONSTRUCTION W/LATISSIMUS DORSI FLAP Left 09/07/2017    Procedure: BREAST RECONSTRUCTION; LATISSIMUS DORSI FLAP; TISSUE EXPANDER;  Surgeon: Du Herzog MD;  [facility];  Service: Plastics    AL COLONOSCOPY FLX DX W/COLLJ SPEC WHEN PFRMD N/A 08/01/2018    Procedure: COLONOSCOPY;  Surgeon: Du Viveros MD;  [facility];  Service: Gastroenterology    [facility]  09/09/2016    Procedure: BUNIONECTOMY VARGAS MODIFIED ;  Surgeon: Walker Kenny DPM;  [facility];  Service: Podiatry    AL EXCISION MALIGNANT LESION TRUNK/ARM/LEG > 4.0 CM N/A 06/27/2019    Procedure: EXCISION BASAL CELL CARCINOMA MID CHEST;  Surgeon: Du Herzog MD;  [facility];  Service: Plastics    AL EXCISION MALIGNANT LESION TRUNK/ARM/LEG > 4.0 CM Right 3/25/2024    Procedure: EXCISION OF BCC RIGHT BREAST, LOCAL FLAP RECONSTRUCTION;  Surgeon:  Du Herzog MD;  [facility];  Service: Plastics    DC EXCISION MALIGNANT LESION TRUNK/ARM/LEG > 4.0 CM N/A 9/12/2024    Procedure: EXCISION OF LESIONS OF RIGHT CHEST, LEFT MEDIAL CHEST AND MID BACK WITH COMPLEX CLOSURE;  Surgeon: Du Herzog MD;  [facility];  Service: Plastics    DC FTH/GFT FREE W/DIRECT CLOSURE N/E/E/L 20 SQ CM/< Right 03/07/2019    Procedure: SKIN GRAFT FULL THICKNESS  (FTSG);  Surgeon: Du Herzog MD;  [facility];  Service: Plastics    DC HYSTEROSCOPY BX ENDOMETRIUM&/POLYPC W/WO D&C N/A 01/07/2022    Procedure: DILATATION AND CURETTAGE (D&C) WITH HYSTEROSCOPY, POLYPECTOMY ;  Surgeon: Fernanda Kennedy MD;  [facility];  Service: Gynecology    DC INSERTION BREAST IMPLANT SAME DAY OF MASTECTOMY Left 09/07/2017    Procedure: TISSUE EXPANDER;  Surgeon: Du Herzog MD;  [facility];  Service: Plastics    DC OPEN TX MONTEGGIA FRACTURE DISLOCATION ELBOW Left 12/11/2019    Procedure: OPEN REDUCTION W/ INTERNAL FIXATION left distal humerus fracture, possible olecranon osteotomy;  Surgeon: Silvano Scott MD;  [facility];  Service: Orthopedics    DC OSTEOTOMY CALCANEUS W/WO INTERNAL FIXATION Left 09/09/2016    Procedure: OSTEOTOMY CALCANEUS WITH APPLICATION AND ACTIVATION OF BONE STIMULATOR ;  Surgeon: Walker Kenny DPM;  [facility];  Service: Podiatry    DC REMV TISSUE FOR GRAFT OTHR Bilateral 06/27/2019    Procedure: AUTOLOGOUS FAT GRAFTING BILATERAL BREASTS;  Surgeon: Du Herzog MD;  [facility];  Service: Plastics    DC REPAIR COMPLEX F/C/C/M/N/AX/G/H/F 2.6-7.5 CM N/A 03/07/2019    Procedure: COMPLEX CLOSURE;  Surgeon: Du Herzog MD;  [facility];  Service: Plastics    DC REPAIR COMPLEX TRUNK 2.6-7.5 CM N/A 06/27/2019    Procedure: CLOSURE WOUND MID CHEST;  Surgeon: Du Herzog MD;  [facility];  Service: Plastics    DC REVISION OF RECONSTRUCTED BREAST  Bilateral 06/27/2019    Procedure: BILATERAL BREAST MOUND REVISION;  Surgeon: Du Herzog MD;  [facility];  Service: Plastics    LA REVISION OF RECONSTRUCTED BREAST Bilateral 10/24/2019    Procedure: BILATERAL BREAST MOUND REVISION;  Surgeon: Du Herzog MD;  [facility];  Service: Plastics    LA REVISION OF RECONSTRUCTED BREAST Bilateral 01/07/2021    Procedure: BILATERAL BREAST MOUND REVISION;  Surgeon: Du Herzog MD;  [facility];  Service: Plastics    SENTINEL LYMPH NODE BIOPSY Bilateral 06/06/2014    SENTINEL LYMPH NODE BIOPSY Right 2008    SKIN BIOPSY      SKIN LESION EXCISION Left 02/21/2019    Procedure: UPPER BACK BCC EXCISION;  Surgeon: Du Herzog MD;  [facility];  Service: Plastics    SQUAMOUS CELL CARCINOMA EXCISION N/A 03/07/2019    Procedure: EXCISION BCC LEFT FOREHEAD;  Surgeon: Du Herzog MD;  [facility];  Service: Plastics    WISDOM TOOTH EXTRACTION       Current Outpatient Medications   Medication Instructions    acetaminophen (TYLENOL) 500 mg, Every 6 hours PRN    albuterol (Ventolin HFA) 90 mcg/act inhaler 2 puffs, Inhalation, Every 6 hours PRN    ALPRAZolam (XANAX) 1 mg, Daily at bedtime PRN    Calcium Carbonate (CALCIUM 500 PO) Take by mouth    Cholecalciferol (VITAMIN D) 2000 units CAPS Daily    famotidine (PEPCID) 20 mg, 2 times daily PRN    Klayesta powder 1 Application, 2 times daily    Lactobacillus (ACIDOPHILUS PO) Daily    Multiple Vitamin (MULTIVITAMIN) capsule 1 capsule, Daily    omeprazole (PRILOSEC) 20 mg, As needed    Vitamin C 500 mg, Daily    zinc gluconate 50 mg tablet Daily     Allergies   Allergen Reactions    Codeine Itching     Severe    Dilaudid [Hydromorphone Hcl] Itching     Severe    Hydromorphone Itching    Tramadol Itching    Other Rash     Adhesive tape     Social History     Tobacco Use    Smoking status: Never    Smokeless tobacco: Never   Vaping Use    Vaping status: Never Used  "  Substance Use Topics    Alcohol use: Yes     Alcohol/week: 4.0 standard drinks of alcohol     Types: 4 Glasses of wine per week     Comment: 4 weekly-wine    Drug use: No       Review of Systems  Review of Systems   Constitutional: Negative.  Negative for chills, fatigue, fever and unexpected weight change.   HENT: Negative.  Negative for dental problem, sinus pressure and sinus pain.    Eyes: Negative.  Negative for visual disturbance.   Respiratory: Negative.  Negative for cough, shortness of breath and wheezing.    Cardiovascular: Negative.  Negative for chest pain and leg swelling.   Gastrointestinal: Negative.  Negative for constipation, diarrhea, nausea and vomiting.   Genitourinary: Negative.  Negative for menstrual problem, pelvic pain and urgency.   Musculoskeletal: Negative.  Negative for back pain.   Allergic/Immunologic: Positive for environmental allergies.   Neurological: Negative.  Negative for dizziness and headaches.        Objective     /78 (BP Location: Left arm, Patient Position: Sitting, Cuff Size: Standard)   Ht 5' 6\" (1.676 m)   LMP  (LMP Unknown)   BMI 29.67 kg/m²   General appearance: alert and oriented, in no acute distress  Head: Normocephalic, without obvious abnormality, atraumatic  Lungs: clear to auscultation bilaterally  Heart: regular rate and rhythm, S1, S2 normal, no murmur, click, rub or gallop  Abdomen: soft, non-tender; bowel sounds normal; no masses,  no organomegaly  Extremities: extremities normal, warm and well-perfused; no cyanosis, clubbing, or edema      Assessment  59-year-old G0, P0 here for follow-up of thickened stripe on ultrasound that has increased.     Plan  D&C hysteroscopy  "

## 2024-12-06 ENCOUNTER — OFFICE VISIT (OUTPATIENT)
Dept: HEMATOLOGY ONCOLOGY | Facility: CLINIC | Age: 59
End: 2024-12-06
Payer: COMMERCIAL

## 2024-12-06 VITALS
OXYGEN SATURATION: 95 % | SYSTOLIC BLOOD PRESSURE: 140 MMHG | HEART RATE: 86 BPM | TEMPERATURE: 97.8 F | BODY MASS INDEX: 29.67 KG/M2 | HEIGHT: 66 IN | DIASTOLIC BLOOD PRESSURE: 86 MMHG | RESPIRATION RATE: 16 BRPM

## 2024-12-06 DIAGNOSIS — C50.919 MALIGNANT NEOPLASM OF BREAST IN FEMALE, ESTROGEN RECEPTOR POSITIVE, UNSPECIFIED LATERALITY, UNSPECIFIED SITE OF BREAST (HCC): Primary | ICD-10-CM

## 2024-12-06 DIAGNOSIS — Z17.0 MALIGNANT NEOPLASM OF BREAST IN FEMALE, ESTROGEN RECEPTOR POSITIVE, UNSPECIFIED LATERALITY, UNSPECIFIED SITE OF BREAST (HCC): Primary | ICD-10-CM

## 2024-12-06 PROCEDURE — 99214 OFFICE O/P EST MOD 30 MIN: CPT | Performed by: INTERNAL MEDICINE

## 2024-12-06 NOTE — PROGRESS NOTES
Name: Tess Parr      : 1965      MRN: [id number]  Encounter Provider: Ida Raines DO  Encounter Date: 2024   Encounter department: [facility] [place]  :  Assessment & Plan  Malignant neoplasm of breast in female, estrogen receptor positive, unspecified laterality, unspecified site of breast (HCC)    Orders:    CBC and differential; Future    Comprehensive metabolic panel; Future    Ambulatory Referral to Oncology Genetics; Future    59-year-old female with a history of bilateral breast cancer who completed 9-1/2 years of adjuvant tamoxifen.  We discussed using vaginal moisturizers on a regular basis and she will try this.  If she is still really suffering with a vaginal dryness we could consider Vagifem.  There is a very minimal amount of systemic absorption and most of the effects are local.  I prefer to avoid it if possible but quality of life does have to take priority at times.  I will continue to see her on a yearly basis with a CBC and CMP prior.  We revisited the idea of completing her genetic testing with a panel.  She is interested in this and I have made that referral again.  She knows to call in the interim with any questions or concerns.    History of Present Illness   Chief Complaint   Patient presents with    Follow-up   Tess Parr is a 59 y.o. female with a history of right-sided DCIS in .  She had recurrence of disease on the right and left sided disease in  and is status post bilateral mastectomy.  She took tamoxifen for 9-1/2 years in the adjuvant setting and did not require any chemotherapy.  She does feel slightly better after being off tamoxifen.  She is getting a D&C for ongoing thickening of her endometrial stripe.  Labs prior to this visit are normal.  She continues to have issues with vaginal dryness.  She has not noticed any changes in her reconstructed breast.  No headache, chest pain shortness of breath, back pain.        Oncology History   Oncology History Overview Note   2008 - right sided DCIS, partial mastectomy followed by RT    2014 - b/l mastectomy           - right sided invasive ductal carcinoma, grade 1, 7 mm, ER+ DC+ Her2 neg, [id number]           - left sided invasive ductal carcinoma, 1.5 cm, grade 2, Er+ DC+ Her2 neg,oncotype  7        [id number]    Start tamoxifen    11/2023 - stop tamoxifen     Bilateral malignant neoplasm of breast in female, estrogen receptor positive (HCC)   2008 Initial Diagnosis    Breast cancer (HCC)     5/2008 Surgery    Right breast stereotactic breast biopsy     2008 Surgery    Right breast lumpectomy, SLNB     5/2013 Surgery    Left breast stereotactic biopsy     7/24/2013 Surgery    Right breast excisional biopsy     5/2/2014 Biopsy    Left breast biopsy X 3 sites.  Ultrasound guided left breast (200 position) core biopsy- 2 passes:     - Invasive ductal carcinoma, 0.25 cm maximum dimension, histologic grade   I-II, present on one core.   - Combined Timbo score: 5-6/9   - Tubule formation: Less than 10% (3/3).   - Nuclear pleomorphism: Mild to moderate (1-2/3).   - Mitotic count: Fewer than 1 mitosis per 10 high power fields (1/3). Ultrasound guided left breast (300 position) core biopsy 3 passes:   - Invasive ductal carcinoma, 0.5 cm maximum dimension, histologic grade I-II,   present on one core and possibly involving a second core.   - Combined Timbo score: 5-6/9   - Tubule formation: Less than 10% (3/3).   - Nuclear pleomorphism: Mild to moderate (1-2/3).   - Mitotic count: Fewer than 1 mitosis per 10 high power fields (1/3). Ultrasound guided left breast (500 position) core biopsy- 3 passes:   - Benign breast parenchyma.   - No atypical ductal/lobular hyperplasia or malignancy is identified  Right breast US guided  Biopsy Ultrasound guided right breast core biopsy 3 passes:    - Invasive ductal carcinoma, 0.4 cm maximum dimension, histologic grade I,   present on one core and  possibly involving a second core.   - Combined Timbo score:4-5/9   -Tubule formation: 10-75% (2/3)   -Nuclear pleomorphism (1-2/3)   -Mitotic count: fewer than 1 mitosis per 10 HPFs.    Comment: Block B1 is suggested if further studies are indicated for the left   breast. Block D2 is suggested if further studies are indicated for the right   breast.           6/6/2014 Surgery      Right and left breast, mastectomy:         Right breast:  Invasive ductal carcinoma, NOS, Grade I of III,   7mm in greatest   dimension.    -Margins:   - 2 mm from the posterior inked margin.    - >2 mm from all other margins.      - Ductal carcinoma in situ, solid, low nuclear grade,   1mm in greatest   dimension.   - Margins:   - >2mm from all inked margins.     - Negative for definitive lymphovascular invasion.     - Negative for microcalcifications.      - Uninvolved breast with columnar cell change, sclerosing adenosis,   fibroadenomatoid       change and dense stromal fibrosis.  Left breast, mastectomy:       - Multifocal Invasive ductal carcinoma, NOS, Grade II of III,   15mm in   greatest dimension,        see note.   - Other foci measure 12mm, 11mm and 8mm with similar morphology.    - Margins:   - <1 mm from the anterior and posterior inked margin.    - >2 mm from all other margins.      - Multiple foci of Ductal carcinoma in situ, solid, low nuclear grade, see   note.   - Margins:   - <1 mm from the anterior and posterior inked margin.    - >2 mm from all other margins.      - Negative for definitive lymphovascular invasion.     - Microcalcifications in association with invasive carcinoma.      - Intraductal papilloma.     - Uninvolved breast with sclerosing adenosis, papillary apocrine cell   change, fibroadenomatoid       change, duct ectasia and dense stromal fibrosis          7/3/2014 Surgery    Revision right breast.     7/2014 -  Hormone Therapy    Tamoxifen     9/17/2014 Surgery    latisimus dorsi flap, skin graft,  "replaced expander     10/10/2014 - 11/20/2014 Radiation    :  Course: C1    Plan ID Energy Fractions Dose per Fraction (cGy) Total Dose Delivered (cGy) Elapsed Days   Left PAB 6X 25 / 25 41 1,025 34   Left Sclav 6X 25 / 25 200 5,000 34   Lt CW 6X 13 / 13 200 2,600 34   Lt CW Bolus 6X 12 / 12 200 2,400 30   Lt CW e:1 6E/9E 5 / 5 200 1,000 6      Treatment dates:  C1: 10/10/2014 - 11/20/2014       10/27/2014 Surgery    Right breast revision     9/7/2017 Surgery    Left breast LDF reconstruction with expander     Basal cell carcinoma of shoulder, left   4/25/2022 Initial Diagnosis    Basal cell carcinoma of shoulder, left        Review of Systems        Objective   /86 (BP Location: Right arm, Patient Position: Sitting, Cuff Size: Adult)   Pulse 86   Temp 97.8 °F (36.6 °C) (Temporal)   Resp 16   Ht 5' 6\" (1.676 m)   LMP  (LMP Unknown)   SpO2 95%   BMI 29.67 kg/m²     ECOG   0  Physical Exam    GEN: Alert, awake oriented x3, in no acute distress  HEENT- No pallor, icterus, cyanosis, no oral mucosal lesions,   LAD - no palpable cervical, clavicle, axillary, inguinal LAD  Heart- normal S1 S2, regular rate and rhythm, No murmur, rubs.   Lungs- clear breathing sound bilateral.   Abdomen- soft, Non tender, bowel sounds present  Extremities- No cyanosis, clubbing, edema  Neuro- No focal neurological deficit      Labs: I have reviewed the following labs:  No results found for: \"CBCPLATDIFF\"  Lab Results   Component Value Date/Time    Potassium 4.2 12/04/2024 07:35 AM    Chloride 102 12/04/2024 07:35 AM    CO2 28 12/04/2024 07:35 AM    BUN 18 12/04/2024 07:35 AM    Creatinine 0.65 12/04/2024 07:35 AM    Glucose, Fasting 86 12/04/2024 07:35 AM    Calcium 9.4 12/04/2024 07:35 AM    AST 20 12/04/2024 07:35 AM    ALT 16 12/04/2024 07:35 AM    Alkaline Phosphatase 63 12/04/2024 07:35 AM    eGFR 98 12/04/2024 07:35 AM               "

## 2024-12-11 ENCOUNTER — TELEPHONE (OUTPATIENT)
Age: 59
End: 2024-12-11

## 2024-12-11 NOTE — TELEPHONE ENCOUNTER
Rosetta from [facility] called asking if we could fax over the pathology report from 9/12/24 to [phone] attention Genevieve Escalona

## 2024-12-19 ENCOUNTER — TELEPHONE (OUTPATIENT)
Dept: OBGYN CLINIC | Facility: CLINIC | Age: 59
End: 2024-12-19

## 2024-12-19 NOTE — TELEPHONE ENCOUNTER
----- Message from Fernanda Kennedy MD sent at 2024  3:30 PM EST -----  Regarding: D&C hysteroscopy  Patient prefers [facility] or [place].  I am willing to accommodate her with advanced planning on a call day if necessary.    [facility] GYN Department  Surgery Scheduling Sheet    Patient Name: Tess Parr  : 1965    Provider: Fernanda Kennedy MD     Needed: no; Language: N/A    Procedure: exam under anesthesia, dilation and curettage , and diagnostic hysteroscopy    Diagnosis: Thickened endometrial lining    Special Needs or Equipment: Symphion    Anesthesia: IV sedation with anesthesia    Length of stay: outpatient  Does patient have comorbid conditions that will require close perioperative monitoring prior to safe discharge: no    The patient has comorbid conditions that will require close perioperative monitoring prior to safe discharge, including N/A.   This may require acute care beyond the usual and routine recovery period. As such, inpatient admission post-operatively is expected and appropriate, and anticipated hospital length of stay will be >2 midnights.    Pre-Admission Testing Needed: yes   Labs that should be ordered: cbc and cmp    Order PAT that is recommended in prep for procedure?:  Please do    Medical Clearance Needed: no; Provider: N/A    MA Form Signed (tubals/hysterectomy): Not Indicated    Surgical Drink Given: no     How many days out of work: 5 day(s)     How many days no drivin day(s)       Is pre op appt needed?  no  Interval for post op appt: 2 week(s)       For Surgical Scheduler:     Surgery Scheduled On:  [place]    Pre-op Appt:   Post op Appt:  Consult/Medical clearance appt:

## 2025-01-03 RX ORDER — CETIRIZINE HYDROCHLORIDE 10 MG/1
10 TABLET, CHEWABLE ORAL DAILY
COMMUNITY

## 2025-01-03 RX ORDER — ZINC OXIDE 13 %
CREAM (GRAM) TOPICAL DAILY
COMMUNITY

## 2025-01-03 NOTE — PRE-PROCEDURE INSTRUCTIONS
Pre-Surgery Instructions:   Medication Instructions    acetaminophen (TYLENOL) 500 mg tablet Hold day of surgery.    albuterol (Ventolin HFA) 90 mcg/act inhaler Uses PRN- OK to take day of surgery    ALPRAZolam (XANAX) 1 mg tablet Uses PRN- OK to take day of surgery    Ascorbic Acid (VITAMIN C) 500 MG CAPS Stop taking 7 days prior to surgery.    Calcium Magnesium Zinc 333-133-5 MG TABS Stop taking 7 days prior to surgery.    cetirizine (ZyrTEC) 10 MG chewable tablet Hold day of surgery.    Cholecalciferol (VITAMIN D) 2000 units CAPS Stop taking 7 days prior to surgery.    famotidine (PEPCID) 20 mg tablet Uses PRN- OK to take day of surgery    omeprazole (PriLOSEC) 20 mg delayed release capsule Uses PRN- OK to take day of surgery    Probiotic Product (Probiotic Daily) CAPS Stop taking 7 days prior to surgery.    Medication instructions for day surgery reviewed. Please use only a sip of water to take your instructed medications. Avoid all over the counter vitamins, supplements and NSAIDS for one week prior to surgery per anesthesia guidelines. Tylenol is ok to take as needed.     You will receive a call one business day prior to surgery with an arrival time and hospital directions. If your surgery is scheduled on a Monday, the hospital will be calling you on the Friday prior to your surgery. If you have not heard from anyone by 8pm, please call the hospital supervisor through the hospital  at [phone]. ([place] [phone] or [place] [phone]).    Do not eat or drink anything after midnight the night before your surgery, including candy, mints, lifesavers, or chewing gum. Do not drink alcohol 24hrs before your surgery. Try not to smoke at least 24hrs before your surgery.       Follow the pre surgery showering instructions as listed in the “My Surgical Experience Booklet” or otherwise provided by your surgeon's office. Do not use a blade to shave the surgical area 1 week before surgery. It is okay to  use a clean electric clippers up to 24 hours before surgery. Do not apply any lotions, creams, including makeup, cologne, deodorant, or perfumes after showering on the day of your surgery. Do not use dry shampoo, hair spray, hair gel, or any type of hair products.     No contact lenses, eye make-up, or artificial eyelashes. Remove nail polish, including gel polish, and any artificial, gel, or acrylic nails if possible. Remove all jewelry including rings and body piercing jewelry.     Wear causal clothing that is easy to take on and off. Consider your type of surgery.    Keep any valuables, jewelry, piercings at home. Please bring any specially ordered equipment (sling, braces) if indicated.    Arrange for a responsible person to drive you to and from the hospital on the day of your surgery. Please confirm the visitor policy for the day of your procedure when you receive your phone call with an arrival time.     Call the surgeon's office with any new illnesses, exposures, or additional questions prior to surgery.    Please reference your “My Surgical Experience Booklet” for additional information to prepare for your upcoming surgery.    Pt verbalized understanding of shower and med instructions.

## 2025-01-06 PROCEDURE — NC001 PR NO CHARGE: Performed by: OBSTETRICS & GYNECOLOGY

## 2025-01-07 NOTE — H&P
Tess Parr is a 60 y/o  who had finished course of Tamoxifen for breast cancer treatment.  Endometrial lining had been 9 mm and more cystic to now less cystic but 12 mm and possibly some fluid.  Reviewed evaluation and h/o cervical stenosis discussed D&C hysteroscopy procedure risks and benefits, expected outcomes, and surgical instructions.  Patient and support person report no further questions, patient consents to proceed.    Gyn History:  Postmenopausal  Last pap 2020 normal  2021 pelvic ultrasound 12 mm  2022 D&C polyps benign  2023 pelvic ultrasound 16 mm  2023 endometrial biopsy small portions of benign tissue  2023 pelvic ultrasound 14 mm cystic changes and vascularity  2024 pelvic ultrasound 9 mm  2024 pelvic ultrasound 12 mm    Breast MRI  intact implants no concerning findings    Past Medical History:   Diagnosis Date    Arthritis     Knees and feet    Asthma     exercise induced    Cancer (HCC)     breast    COVID     Difficulty falling asleep     GERD (gastroesophageal reflux disease)     Gluteal abscess     Last Assessed: 2016    Limb alert care status     right    Pain in left foot     Pneumonia 2009    x1    PONV (postoperative nausea and vomiting)     Skin cancer     Varicella      Past Surgical History:   Procedure Laterality Date    BASAL CELL CARCINOMA EXCISION N/A 2021    Procedure: EXCISION BASAL CELL CARCINOMA OF MID BACK;  Surgeon: Du Herzog MD;  Location:  MAIN OR;  Service: Plastics    BASAL CELL CARCINOMA EXCISION Left 2022    Procedure: EXCISION OF BCC (X2) LEFT SHOULDER WITH COMPLEX CLOSURE;  Surgeon: Du Herzog MD;  Location: [facility] MAIN OR;  Service: Plastics    BREAST BIOPSY  2014    BREAST BIOPSY  2013    BREAST BIOPSY  2008    BREAST LUMPECTOMY Right 2008    BREAST RECONSTRUCTION Left 2019    Procedure: BREAST EXPANDER/IMPLANT EXCHANGE;  Surgeon: Du  MD Mino;  Location: [facility] MAIN OR;  Service: Plastics    BREAST SURGERY Bilateral 2008, 2013, 2014    Breast reconstruction with expanders. Total of 7 surgeries.  7/3/14 right breast reconstruction revision. 10/27/14 right breast reconstruction revision     BREAST SURGERY Left 07/24/2013    Excision of breast lesion    CAPSULOTOMY Left 02/21/2019    Procedure: CAPSULECTOMY;  Surgeon: Du Herzog MD;  [facility];  Service: Plastics    CHEST WALL BIOPSY Right 02/21/2019    Procedure: UPPER CHEST BASAL CELL CARSINOMA EXCISION;  Surgeon: Du Herzog MD;  [facility];  Service: Plastics    CLOSURE WOUND N/A 02/21/2019    Procedure: RIGHT upper chest & LEFT upper back complex closure;  Surgeon: Du Herzog MD;  [facility];  Service: Plastics    COLONOSCOPY      COMPLEX WOUND CLOSURE TO EXTREMITY N/A 01/07/2021    Procedure: COMPLEX CLOSURE MID BACK;  Surgeon: Du Herzog MD;  [facility]: [facility];  Service: Plastics    FOOT ARTHRODESIS Left     Pantalar-has pins, plates, screws-MRI compatible    KNEE ARTHROSCOPY Right 05/1997    LIPOSUCTION W/ FAT INJECTION Bilateral 10/24/2019    Procedure: AUTOLOGOUS FAT GRAFTING TO BILATERAL BREAST;  Surgeon: Du Herzog MD;  [facility];  Service: Plastics    LIPOSUCTION W/ FAT INJECTION Bilateral 01/07/2021    Procedure: AUTOLOGOUS FAT GRAFTING TO BILATERAL BREAST;  Surgeon: Du Herzog MD;  [facility];  Service: Plastics    MASS EXCISION Right 7/31/2023    Procedure: EXCISION BCC RIGHT SUPERIOR SHOULDER, RIGHT INFERIOR SHOULDER, RIGHT HIP/BUTTOCK WITH COMPLEX CLOSURE;  Surgeon: Du Herzog MD;  [facility];  Service: Plastics    MASTECTOMY Bilateral 06/06/2014    Lymph nodes removed bilaterally. States she may have IV's in left side.    MOHS RECONSTRUCTION Right 03/07/2019    Procedure: RECONSTRUCTION MOHS DEFECT RIGHT NOSE/MEDIAL CANTHUS;  Surgeon: Du Herzog MD;   [facility];  Service: Plastics    MOHS SURGERY      Mohs Micrographic Surgery Face; Last Assessed: 5/15/2015    IN ADJT TIS REARGMT EYE/NOSE/EAR/LIP 10.1-30.0 SQCM Right 03/07/2019    Procedure: FLAP;  Surgeon: Du Herzog MD;  [facility];  Service: Plastics    IN ARTHRD MIDTARSL/TARSOMETATARSAL MULT/TRANSVRS Left 09/09/2016    Procedure: ARTHRODESIS FIRST METATARSALCUNIFORM JOINT FUSION, TALLNAVICULAR JOINT FUSION ;  Surgeon: Walker Kenny DPM;  Location: [facility] Main [facility];  Service: Podiatry    IN BREAST RECONSTRUCTION W/LATISSIMUS DORSI FLAP Left 09/07/2017    Procedure: BREAST RECONSTRUCTION; LATISSIMUS DORSI FLAP; TISSUE EXPANDER;  Surgeon: Du Herzog MD;  [facility];  Service: Plastics    IN COLONOSCOPY FLX DX W/COLLJ SPEC WHEN PFRMD N/A 08/01/2018    Procedure: COLONOSCOPY;  Surgeon: Du Viveros MD;  [facility];  Service: Gastroenterology    IN CORRJ HLX VLGS BNCTY SESMDC RESCJ PROX PHLX BASE  09/09/2016    Procedure: BUNIONECTOMY VARGAS MODIFIED ;  Surgeon: Walker Kenny DPM;  [facility];  Service: Podiatry    IN EXCISION MALIGNANT LESION TRUNK/ARM/LEG > 4.0 CM N/A 06/27/2019    Procedure: EXCISION BASAL CELL CARCINOMA MID CHEST;  Surgeon: Du Herzog MD;  [facility];  Service: Plastics    IN EXCISION MALIGNANT LESION TRUNK/ARM/LEG > 4.0 CM Right 3/25/2024    Procedure: EXCISION OF BCC RIGHT BREAST, LOCAL FLAP RECONSTRUCTION;  Surgeon: Du Herzog MD;  [facility];  Service: Plastics    IN EXCISION MALIGNANT LESION TRUNK/ARM/LEG > 4.0 CM N/A 9/12/2024    Procedure: EXCISION OF LESIONS OF RIGHT CHEST, LEFT MEDIAL CHEST AND MID BACK WITH COMPLEX CLOSURE;  Surgeon: Du Herzog MD;  [facility]: [facility];  Service: Plastics    IN FTH/GFT FREE W/DIRECT CLOSURE N/E/E/L 20 SQ CM/< Right 03/07/2019    Procedure: SKIN GRAFT FULL THICKNESS  (FTSG);  Surgeon: Du Herzog MD;  [facility];  Service:  Plastics    MN HYSTEROSCOPY BX ENDOMETRIUM&/POLYPC W/WO D&C N/A 01/07/2022    Procedure: DILATATION AND CURETTAGE (D&C) WITH HYSTEROSCOPY, POLYPECTOMY ;  Surgeon: Fernanda Kennedy MD;  [facility];  Service: Gynecology    MN INSERTION BREAST IMPLANT SAME DAY OF MASTECTOMY Left 09/07/2017    Procedure: TISSUE EXPANDER;  Surgeon: Du Herzog MD;  [facility];  Service: Plastics    MN OPEN TX MONTEGGIA FRACTURE DISLOCATION ELBOW Left 12/11/2019    Procedure: OPEN REDUCTION W/ INTERNAL FIXATION left distal humerus fracture, possible olecranon osteotomy;  Surgeon: Silvano Scott MD;  Location: [facility] MAIN OR;  Service: Orthopedics    MN OSTEOTOMY CALCANEUS W/WO INTERNAL FIXATION Left 09/09/2016    Procedure: OSTEOTOMY CALCANEUS WITH APPLICATION AND ACTIVATION OF BONE STIMULATOR ;  Surgeon: Walker Kenny DPM;  [facility];  Service: Podiatry    MN REMV TISSUE FOR GRAFT OTHR Bilateral 06/27/2019    Procedure: AUTOLOGOUS FAT GRAFTING BILATERAL BREASTS;  Surgeon: Du Herzog MD;  [facility];  Service: Plastics    MN REPAIR COMPLEX F/C/C/M/N/AX/G/H/F 2.6-7.5 CM N/A 03/07/2019    Procedure: COMPLEX CLOSURE;  Surgeon: Du Herzog MD;  [facility];  Service: Plastics    MN REPAIR COMPLEX TRUNK 2.6-7.5 CM N/A 06/27/2019    Procedure: CLOSURE WOUND MID CHEST;  Surgeon: Du Herzog MD;  [facility];  Service: Plastics    MN REVISION OF RECONSTRUCTED BREAST Bilateral 06/27/2019    Procedure: BILATERAL BREAST MOUND REVISION;  Surgeon: Du Herzog MD;  [facility];  Service: Plastics    MN REVISION OF RECONSTRUCTED BREAST Bilateral 10/24/2019    Procedure: BILATERAL BREAST MOUND REVISION;  Surgeon: Du Herzog MD;  [facility];  Service: Plastics    MN REVISION OF RECONSTRUCTED BREAST Bilateral 01/07/2021    Procedure: BILATERAL BREAST MOUND REVISION;  Surgeon: Du Herzog MD;  Location: [facility];  Service: Plastics  "   SENTINEL LYMPH NODE BIOPSY Bilateral 2014    SENTINEL LYMPH NODE BIOPSY Right     SKIN BIOPSY      SKIN LESION EXCISION Left 2019    Procedure: UPPER BACK BCC EXCISION;  Surgeon: Du Herzog MD;  [facility];  Service: Plastics    SQUAMOUS CELL CARCINOMA EXCISION N/A 2019    Procedure: EXCISION BCC LEFT FOREHEAD;  Surgeon: Du Herzog MD;  [facility];  Service: Plastics    WISDOM TOOTH EXTRACTION       OB History          0    Para   0    Term   0       0    AB   0    Living   0         SAB   0    IAB   0    Ectopic   0    Multiple   0    Live Births   0           Obstetric Comments   Menarche age 12.  History of birth control pills.             Current Outpatient Medications   Medication Instructions    acetaminophen (TYLENOL) 500 mg, Every 6 hours PRN    albuterol (Ventolin HFA) 90 mcg/act inhaler 2 puffs, Inhalation, Every 6 hours PRN    ALPRAZolam (XANAX) 1 mg, Daily at bedtime PRN    Calcium Magnesium Zinc 333-133-5 MG TABS Daily    cetirizine (ZYRTEC) 10 mg, Daily    Cholecalciferol (VITAMIN D) 2000 units CAPS Daily    famotidine (PEPCID) 20 mg, 2 times daily PRN    omeprazole (PRILOSEC) 20 mg, As needed    Probiotic Product (Probiotic Daily) CAPS Daily    Vitamin C 500 mg, Daily     Allergies   Allergen Reactions    Codeine Itching     Severe    Dilaudid [Hydromorphone Hcl] Itching     Severe    Hydromorphone Itching    Tramadol Itching    Other Rash     Adhesive tape     Social History     Tobacco Use    Smoking status: Never    Smokeless tobacco: Never   Vaping Use    Vaping status: Never Used   Substance Use Topics    Alcohol use: Not Currently     Alcohol/week: 3.0 standard drinks of alcohol     Types: 3 Shots of liquor per week    Drug use: Never     Physical Exam: Ht: 5' 6\"  Wt: 183 lb (83.4 kg)  BMI 29.67  BP: 140/78  Head: Normocephalic, atraumatic  Lungs: CTA B  Heart: RRR S1 S2  Abd: soft non tender positive bowel sound no " organomegaly  Ext: normal warm well perfused, no cyanosis, clubbing, or edema    Assessment: 60 y/o  with thickened stripe on ultrasound.    Plan D&C hysteroscopy, exam under anesthesia

## 2025-01-09 ENCOUNTER — OFFICE VISIT (OUTPATIENT)
Dept: SURGICAL ONCOLOGY | Facility: CLINIC | Age: 60
End: 2025-01-09
Payer: COMMERCIAL

## 2025-01-09 VITALS
BODY MASS INDEX: 27.8 KG/M2 | SYSTOLIC BLOOD PRESSURE: 140 MMHG | HEART RATE: 102 BPM | WEIGHT: 173 LBS | OXYGEN SATURATION: 99 % | TEMPERATURE: 96.1 F | HEIGHT: 66 IN | DIASTOLIC BLOOD PRESSURE: 84 MMHG

## 2025-01-09 DIAGNOSIS — Z08 ENCNTR FOR FOLLOW-UP EXAM AFTER TRTMT FOR MALIGNANT NEOPLASM: Primary | ICD-10-CM

## 2025-01-09 DIAGNOSIS — Z85.3 HISTORY OF BILATERAL BREAST CANCER: ICD-10-CM

## 2025-01-09 PROCEDURE — 99213 OFFICE O/P EST LOW 20 MIN: CPT

## 2025-01-09 NOTE — ASSESSMENT & PLAN NOTE
There is no evidence of disease recurrence at this time.  We will continue with annual clinical exams.

## 2025-01-09 NOTE — PROGRESS NOTES
Name: Tess Parr      : 1965      MRN: [id number]  Encounter Provider: PRECIOUS [facility]  Encounter Date: 2025   Encounter department: [facility] [place]  :  Assessment & Plan  Encntr for follow-up exam after trtmt for malignant neoplasm         History of bilateral breast cancer  There is no evidence of disease recurrence at this time.  We will continue with annual clinical exams.             History of Present Illness   Tess Parr is a 59 y.o. year old female who presents today in follow-up for her history of bilateral breast cancer.  She is currently FRANKY at 10-1/2 years and doing well.  She still has a palpable nodule of fat necrosis in the inner left breast, but denies any new breast lumps, skin changes or tenderness.  She reports she is scheduled for a gyn procedure in the near future for endometrial thickening and abnormal bleeding. She denies any other symptoms.        Oncology History   Cancer Staging   No matching staging information was found for the patient.  Oncology History Overview Note    - right sided DCIS, partial mastectomy followed by RT     - b/l mastectomy           - right sided invasive ductal carcinoma, grade 1, 7 mm, ER+ PA+ Her2 neg, [id number]           - left sided invasive ductal carcinoma, 1.5 cm, grade 2, Er+ PA+ Her2 neg,oncotype  7        [id number]    Start tamoxifen    2023 - stop tamoxifen     History of bilateral breast cancer    Initial Diagnosis    Breast cancer (HCC)     2008 Surgery    Right breast stereotactic breast biopsy      Surgery    Right breast lumpectomy, SLNB     2013 Surgery    Left breast stereotactic biopsy     2013 Surgery    Right breast excisional biopsy     2014 Biopsy    Left breast biopsy X 3 sites.  Ultrasound guided left breast (200 position) core biopsy- 2 passes:     - Invasive ductal carcinoma, 0.25 cm maximum dimension, histologic grade   I-II, present on one core.   -  Combined Timbo score: 5-6/9   - Tubule formation: Less than 10% (3/3).   - Nuclear pleomorphism: Mild to moderate (1-2/3).   - Mitotic count: Fewer than 1 mitosis per 10 high power fields (1/3). Ultrasound guided left breast (300 position) core biopsy 3 passes:   - Invasive ductal carcinoma, 0.5 cm maximum dimension, histologic grade I-II,   present on one core and possibly involving a second core.   - Combined Timbo score: 5-6/9   - Tubule formation: Less than 10% (3/3).   - Nuclear pleomorphism: Mild to moderate (1-2/3).   - Mitotic count: Fewer than 1 mitosis per 10 high power fields (1/3). Ultrasound guided left breast (500 position) core biopsy- 3 passes:   - Benign breast parenchyma.   - No atypical ductal/lobular hyperplasia or malignancy is identified  Right breast US guided  Biopsy Ultrasound guided right breast core biopsy 3 passes:    - Invasive ductal carcinoma, 0.4 cm maximum dimension, histologic grade I,   present on one core and possibly involving a second core.   - Combined Timbo score:4-5/9   -Tubule formation: 10-75% (2/3)   -Nuclear pleomorphism (1-2/3)   -Mitotic count: fewer than 1 mitosis per 10 HPFs.    Comment: Block B1 is suggested if further studies are indicated for the left   breast. Block D2 is suggested if further studies are indicated for the right   breast.           6/6/2014 Surgery      Right and left breast, mastectomy:         Right breast:  Invasive ductal carcinoma, NOS, Grade I of III,   7mm in greatest   dimension.    -Margins:   - 2 mm from the posterior inked margin.    - >2 mm from all other margins.      - Ductal carcinoma in situ, solid, low nuclear grade,   1mm in greatest   dimension.   - Margins:   - >2mm from all inked margins.     - Negative for definitive lymphovascular invasion.     - Negative for microcalcifications.      - Uninvolved breast with columnar cell change, sclerosing adenosis,   fibroadenomatoid       change and dense stromal  "fibrosis.  Left breast, mastectomy:       - Multifocal Invasive ductal carcinoma, NOS, Grade II of III,   15mm in   greatest dimension,        see note.   - Other foci measure 12mm, 11mm and 8mm with similar morphology.    - Margins:   - <1 mm from the anterior and posterior inked margin.    - >2 mm from all other margins.      - Multiple foci of Ductal carcinoma in situ, solid, low nuclear grade, see   note.   - Margins:   - <1 mm from the anterior and posterior inked margin.    - >2 mm from all other margins.      - Negative for definitive lymphovascular invasion.     - Microcalcifications in association with invasive carcinoma.      - Intraductal papilloma.     - Uninvolved breast with sclerosing adenosis, papillary apocrine cell   change, fibroadenomatoid       change, duct ectasia and dense stromal fibrosis          7/3/2014 Surgery    Revision right breast.     7/2014 -  Hormone Therapy    Tamoxifen     9/17/2014 Surgery    latisimus dorsi flap, skin graft, replaced expander     10/10/2014 - 11/20/2014 Radiation    :  Course: C1    Plan ID Energy Fractions Dose per Fraction (cGy) Total Dose Delivered (cGy) Elapsed Days   Left PAB 6X 25 / 25 41 1,025 34   Left Sclav 6X 25 / 25 200 5,000 34   Lt CW 6X 13 / 13 200 2,600 34   Lt CW Bolus 6X 12 / 12 200 2,400 30   Lt CW e:1 6E/9E 5 / 5 200 1,000 6      Treatment dates:  C1: 10/10/2014 - 11/20/2014       10/27/2014 Surgery    Right breast revision     9/7/2017 Surgery    Left breast LDF reconstruction with expander     Basal cell carcinoma of shoulder, left   4/25/2022 Initial Diagnosis    Basal cell carcinoma of shoulder, left        Review of Systems A complete review of systems is negative other than that noted above in the HPI.         Objective   /84   Pulse 102   Temp (!) 96.1 °F (35.6 °C)   Ht 5' 6\" (1.676 m)   Wt 78.5 kg (173 lb)   LMP  (LMP Unknown)   SpO2 99%   BMI 27.92 kg/m²     Pain Screening:  Pain Score: 0-No pain  ECOG    Physical " Exam  Vitals reviewed.   Constitutional:       General: She is not in acute distress.     Appearance: Normal appearance. She is normal weight. She is not ill-appearing or toxic-appearing.   HENT:      Head: Normocephalic and atraumatic.   Eyes:      General: No scleral icterus.  Cardiovascular:      Rate and Rhythm: Normal rate.   Pulmonary:      Effort: Pulmonary effort is normal.   Chest:      Comments: Bilateral reconstructed breasts are generally smooth.  There is a tiny firm nodule in the left medial breast.  Otherwise there are no masses, nodules, skin changes or tenderness. I do not appreciate any adenopathy.  Musculoskeletal:         General: No swelling or tenderness. Normal range of motion.      Cervical back: Normal range of motion and neck supple.   Lymphadenopathy:      Cervical: No cervical adenopathy.      Upper Body:      Right upper body: No supraclavicular, axillary or pectoral adenopathy.      Left upper body: No supraclavicular, axillary or pectoral adenopathy.   Skin:     General: Skin is warm and dry.      Coloration: Skin is not jaundiced.      Findings: No erythema.   Neurological:      General: No focal deficit present.      Mental Status: She is alert and oriented to person, place, and time.   Psychiatric:         Mood and Affect: Mood normal.         Behavior: Behavior normal.         Thought Content: Thought content normal.         Judgment: Judgment normal.

## 2025-01-14 ENCOUNTER — ANESTHESIA (OUTPATIENT)
Dept: PERIOP | Facility: HOSPITAL | Age: 60
End: 2025-01-14
Payer: COMMERCIAL

## 2025-01-14 ENCOUNTER — ANESTHESIA EVENT (OUTPATIENT)
Dept: PERIOP | Facility: HOSPITAL | Age: 60
End: 2025-01-14
Payer: COMMERCIAL

## 2025-01-14 ENCOUNTER — HOSPITAL ENCOUNTER (OUTPATIENT)
Facility: HOSPITAL | Age: 60
Setting detail: OUTPATIENT SURGERY
Discharge: HOME | End: 2025-01-14
Attending: OBSTETRICS & GYNECOLOGY | Admitting: OBSTETRICS & GYNECOLOGY
Payer: COMMERCIAL

## 2025-01-14 VITALS
HEART RATE: 62 BPM | BODY MASS INDEX: 27.64 KG/M2 | RESPIRATION RATE: 16 BRPM | TEMPERATURE: 96.6 F | SYSTOLIC BLOOD PRESSURE: 138 MMHG | HEIGHT: 66 IN | DIASTOLIC BLOOD PRESSURE: 75 MMHG | OXYGEN SATURATION: 100 % | WEIGHT: 172 LBS

## 2025-01-14 DIAGNOSIS — R93.89 THICKENED ENDOMETRIUM: ICD-10-CM

## 2025-01-14 PROCEDURE — 88305 TISSUE EXAM BY PATHOLOGIST: CPT | Performed by: PATHOLOGY

## 2025-01-14 PROCEDURE — 58558 HYSTEROSCOPY BIOPSY: CPT | Performed by: OBSTETRICS & GYNECOLOGY

## 2025-01-14 RX ORDER — SCOPOLAMINE 1 MG/3D
1 PATCH, EXTENDED RELEASE TRANSDERMAL ONCE
Status: DISCONTINUED | OUTPATIENT
Start: 2025-01-14 | End: 2025-01-14 | Stop reason: HOSPADM

## 2025-01-14 RX ORDER — FENTANYL CITRATE 50 UG/ML
INJECTION, SOLUTION INTRAMUSCULAR; INTRAVENOUS AS NEEDED
Status: DISCONTINUED | OUTPATIENT
Start: 2025-01-14 | End: 2025-01-14

## 2025-01-14 RX ORDER — SODIUM CHLORIDE, SODIUM LACTATE, POTASSIUM CHLORIDE, CALCIUM CHLORIDE 600; 310; 30; 20 MG/100ML; MG/100ML; MG/100ML; MG/100ML
INJECTION, SOLUTION INTRAVENOUS CONTINUOUS PRN
Status: DISCONTINUED | OUTPATIENT
Start: 2025-01-14 | End: 2025-01-14

## 2025-01-14 RX ORDER — SODIUM CHLORIDE, SODIUM LACTATE, POTASSIUM CHLORIDE, CALCIUM CHLORIDE 600; 310; 30; 20 MG/100ML; MG/100ML; MG/100ML; MG/100ML
125 INJECTION, SOLUTION INTRAVENOUS CONTINUOUS
Status: DISCONTINUED | OUTPATIENT
Start: 2025-01-14 | End: 2025-01-14 | Stop reason: HOSPADM

## 2025-01-14 RX ORDER — ACETAMINOPHEN 325 MG/1
975 TABLET ORAL EVERY 6 HOURS PRN
Status: CANCELLED | OUTPATIENT
Start: 2025-01-14

## 2025-01-14 RX ORDER — ONDANSETRON 2 MG/ML
4 INJECTION INTRAMUSCULAR; INTRAVENOUS ONCE AS NEEDED
Status: DISCONTINUED | OUTPATIENT
Start: 2025-01-14 | End: 2025-01-14 | Stop reason: HOSPADM

## 2025-01-14 RX ORDER — ONDANSETRON 2 MG/ML
4 INJECTION INTRAMUSCULAR; INTRAVENOUS EVERY 6 HOURS PRN
Status: CANCELLED | OUTPATIENT
Start: 2025-01-14

## 2025-01-14 RX ORDER — PROPOFOL 10 MG/ML
INJECTION, EMULSION INTRAVENOUS AS NEEDED
Status: DISCONTINUED | OUTPATIENT
Start: 2025-01-14 | End: 2025-01-14

## 2025-01-14 RX ORDER — PHENYLEPHRINE HCL IN 0.9% NACL 1 MG/10 ML
SYRINGE (ML) INTRAVENOUS AS NEEDED
Status: DISCONTINUED | OUTPATIENT
Start: 2025-01-14 | End: 2025-01-14

## 2025-01-14 RX ORDER — FENTANYL CITRATE/PF 50 MCG/ML
25 SYRINGE (ML) INJECTION
Status: DISCONTINUED | OUTPATIENT
Start: 2025-01-14 | End: 2025-01-14 | Stop reason: HOSPADM

## 2025-01-14 RX ORDER — ONDANSETRON 2 MG/ML
INJECTION INTRAMUSCULAR; INTRAVENOUS AS NEEDED
Status: DISCONTINUED | OUTPATIENT
Start: 2025-01-14 | End: 2025-01-14

## 2025-01-14 RX ORDER — IBUPROFEN 600 MG/1
600 TABLET, FILM COATED ORAL EVERY 6 HOURS PRN
Status: CANCELLED | OUTPATIENT
Start: 2025-01-14

## 2025-01-14 RX ORDER — DEXAMETHASONE SODIUM PHOSPHATE 10 MG/ML
INJECTION, SOLUTION INTRAMUSCULAR; INTRAVENOUS AS NEEDED
Status: DISCONTINUED | OUTPATIENT
Start: 2025-01-14 | End: 2025-01-14

## 2025-01-14 RX ORDER — MIDAZOLAM HYDROCHLORIDE 2 MG/2ML
INJECTION, SOLUTION INTRAMUSCULAR; INTRAVENOUS AS NEEDED
Status: DISCONTINUED | OUTPATIENT
Start: 2025-01-14 | End: 2025-01-14

## 2025-01-14 RX ORDER — LIDOCAINE HYDROCHLORIDE 10 MG/ML
INJECTION, SOLUTION EPIDURAL; INFILTRATION; INTRACAUDAL; PERINEURAL AS NEEDED
Status: DISCONTINUED | OUTPATIENT
Start: 2025-01-14 | End: 2025-01-14

## 2025-01-14 RX ORDER — GLYCOPYRROLATE 0.2 MG/ML
INJECTION INTRAMUSCULAR; INTRAVENOUS AS NEEDED
Status: DISCONTINUED | OUTPATIENT
Start: 2025-01-14 | End: 2025-01-14

## 2025-01-14 RX ORDER — PROPOFOL 10 MG/ML
INJECTION, EMULSION INTRAVENOUS CONTINUOUS PRN
Status: DISCONTINUED | OUTPATIENT
Start: 2025-01-14 | End: 2025-01-14

## 2025-01-14 RX ADMIN — SODIUM CHLORIDE, SODIUM LACTATE, POTASSIUM CHLORIDE, AND CALCIUM CHLORIDE: .6; .31; .03; .02 INJECTION, SOLUTION INTRAVENOUS at 15:37

## 2025-01-14 RX ADMIN — LIDOCAINE HYDROCHLORIDE 80 MG: 10 INJECTION, SOLUTION EPIDURAL; INFILTRATION; INTRACAUDAL; PERINEURAL at 15:47

## 2025-01-14 RX ADMIN — PROPOFOL 150 MG: 10 INJECTION, EMULSION INTRAVENOUS at 15:47

## 2025-01-14 RX ADMIN — PROPOFOL 50 MG: 10 INJECTION, EMULSION INTRAVENOUS at 15:53

## 2025-01-14 RX ADMIN — FENTANYL CITRATE 25 MCG: 50 INJECTION INTRAMUSCULAR; INTRAVENOUS at 16:40

## 2025-01-14 RX ADMIN — GLYCOPYRROLATE 0.2 MCG: 0.2 INJECTION, SOLUTION INTRAMUSCULAR; INTRAVENOUS at 16:03

## 2025-01-14 RX ADMIN — MIDAZOLAM 2 MG: 1 INJECTION INTRAMUSCULAR; INTRAVENOUS at 15:41

## 2025-01-14 RX ADMIN — PROPOFOL 50 MCG/KG/MIN: 10 INJECTION, EMULSION INTRAVENOUS at 15:51

## 2025-01-14 RX ADMIN — ONDANSETRON 4 MG: 2 INJECTION INTRAMUSCULAR; INTRAVENOUS at 15:55

## 2025-01-14 RX ADMIN — Medication 200 MCG: at 16:00

## 2025-01-14 RX ADMIN — SODIUM CHLORIDE, SODIUM LACTATE, POTASSIUM CHLORIDE, AND CALCIUM CHLORIDE 125 ML/HR: .6; .31; .03; .02 INJECTION, SOLUTION INTRAVENOUS at 14:11

## 2025-01-14 RX ADMIN — FENTANYL CITRATE 50 MCG: 50 INJECTION INTRAMUSCULAR; INTRAVENOUS at 15:44

## 2025-01-14 RX ADMIN — SCOPOLAMINE 1 PATCH: 1.5 PATCH, EXTENDED RELEASE TRANSDERMAL at 14:25

## 2025-01-14 RX ADMIN — FENTANYL CITRATE 25 MCG: 50 INJECTION INTRAMUSCULAR; INTRAVENOUS at 16:45

## 2025-01-14 RX ADMIN — DEXAMETHASONE SODIUM PHOSPHATE 10 MG: 10 INJECTION, SOLUTION INTRAMUSCULAR; INTRAVENOUS at 15:55

## 2025-01-14 RX ADMIN — Medication 100 MCG: at 16:12

## 2025-01-14 NOTE — ANESTHESIA POSTPROCEDURE EVALUATION
Post-Op Assessment Note    CV Status:  Stable  Pain Score: 0    Pain management: adequate       Mental Status:  Alert and awake   Hydration Status:  Euvolemic   PONV Controlled:  Controlled   Airway Patency:  Patent     Post Op Vitals Reviewed: Yes    No anethesia notable event occurred.    Staff: CRNA           Last Filed PACU Vitals:  Vitals Value Taken Time   Temp 97.6 °F (36.4 °C) 01/14/25 1631   Pulse 103 01/14/25 1632   /85 01/14/25 1632   Resp 21 01/14/25 1632   SpO2 98 % 01/14/25 1632   Vitals shown include unfiled device data.

## 2025-01-14 NOTE — ANESTHESIA PREPROCEDURE EVALUATION
Procedure:  HYSTEROSCOPY, DILATION AND CURETTAGE, EXAM UNDER ANESTHESIA (Uterus)    Relevant Problems   ANESTHESIA   (+) PONV (postoperative nausea and vomiting)      GI/HEPATIC   (+) Gastroesophageal reflux disease      MUSCULOSKELETAL   (+) Arthritis      PULMONARY   (+) Asthma        Physical Exam    Airway    Mallampati score: I  TM Distance: >3 FB  Neck ROM: full     Dental       Cardiovascular  Cardiovascular exam normal    Pulmonary  Pulmonary exam normal     Other Findings  post-pubertal.      Anesthesia Plan  ASA Score- 3     Anesthesia Type- IV sedation with anesthesia with ASA Monitors.         Additional Monitors:     Airway Plan:            Plan Factors-Exercise tolerance (METS): >4 METS.    Chart reviewed. EKG reviewed.  Existing labs reviewed. Patient summary reviewed.                  Induction- intravenous.    Postoperative Plan-     Perioperative Resuscitation Plan - Level 1 - Full Code.       Informed Consent- Anesthetic plan and risks discussed with patient and spouse.  I personally reviewed this patient with the CRNA. Discussed and agreed on the Anesthesia Plan with the CRNA..      NPO Status:  Vitals Value Taken Time   Date of last liquid 01/14/25 01/14/25 1355   Time of last liquid 0400 01/14/25 1355   Date of last solid 01/13/25 01/14/25 1355   Time of last solid 1915 01/14/25 1355

## 2025-01-14 NOTE — OP NOTE
OPERATIVE REPORT  PATIENT NAME: Tess Parr    :  1965  MRN: [id number]  Pt Location: [facility] OR ROOM 06    SURGERY DATE: 2025    Surgeons and Role:     * Fernanda Kennedy MD - Primary     * Omar Fletcher MD - Assisting    Preop Diagnosis:  Thickened endometrium [R93.89]    Post-Op Diagnosis Codes:     * Thickened endometrium [R93.89]    Procedure(s):  HYSTEROSCOPY. DILATION AND CURETTAGE. EXAM UNDER ANESTHESIA    Specimen(s):  ID Type Source Tests Collected by Time Destination   1 : [facility] Tissue Endocervical TISSUE EXAM Fernanda Kennedy MD 2025 1613    2 : [facility] Tissue Endometrium TISSUE EXAM Fernanda Kennedy MD 2025 1613        Estimated Blood Loss:   5 cc    Drains:  None    Anesthesia Type:   IV Sedation with Anesthesia    Operative Indications:  Thickened endometrium [R93.89]    Operative Findings:  Grossly normal appearing external female genitalia  Parous cervix, numerous nabothian cysts  Atrophic uterine cavity  Small polyp on posterior aspect    Complications:   None    Procedure and Technique:  Patient was identified in the preop holding area as well as the operating suite. Procedure was reviewed and patient consented verbally once again.   She was placed in the dorsal lithotomy position using yellowfin stirrups.  She had pneumatic compression boots in place.  She had a Chlorhexidine vaginal prep and was draped in sterile fashion.  A operative timeout was accomplished.  Exam under anesthesia revealed no vaginal or cervical lesions.  The cervix was parous.  Uterus was anteverted and small in size.     The anterior lip of the cervix was grasped using a single-tooth tenaculum.   Endocervical curetting were collected, and sample was sent for routine pathology.     Uterus sounded in midposition fashion to 6 cm.  The endocervix was dilated to accommodate the symphion operating hysteroscope. The hysteroscope was then introduced with saline as a distention medium.  Excellent visualization of the  endocervix and endometrial cavity was accomplished.  Atrophic endometrial tissue noted. Small polyp seen on posterior aspect of uterine cavity.      A medium sharp curette was introduced and endometrial curettage was then undertaken with specimen being sent for routine pathology.     Total fluid deficit was 0 cc of normal saline.    The tenaculum was removed. The patient had excellent hemostasis at this time.  She was cleansed and was awakened from anesthesia without incident and was taken to the recovery room in satisfactory condition.    Dr. Kennedy was present and participated in all key portions of the case.      Patient Disposition:  PACU         SIGNATURE: Omar Fletcher MD  DATE: January 14, 2025  TIME: 4:32 PM

## 2025-01-14 NOTE — INTERVAL H&P NOTE
H&P reviewed. After examining the patient I find no changes in the patients condition since the H&P had been written.    Vitals:    01/14/25 1340   BP: 144/78   Pulse: 78   Resp: 14   Temp: (!) 97.2 °F (36.2 °C)   SpO2: 100%

## 2025-01-16 NOTE — ANESTHESIA POSTPROCEDURE EVALUATION
Post-Op Assessment Note            No anethesia notable event occurred.    Staff: Anesthesiologist           Last Filed PACU Vitals:  Vitals Value Taken Time   Temp 98 °F (36.7 °C) 01/14/25 1700   Pulse 83 01/14/25 1701   /78 01/14/25 1700   Resp 10 01/14/25 1701   SpO2 98 % 01/14/25 1701   Vitals shown include unfiled device data.    Modified Amanda:     Vitals Value Taken Time   Activity 2 01/14/25 1700   Respiration 2 01/14/25 1700   Circulation 2 01/14/25 1700   Consciousness 2 01/14/25 1700   Oxygen Saturation 2 01/14/25 1700     Modified Amanda Score: 10

## 2025-01-17 ENCOUNTER — RESULTS FOLLOW-UP (OUTPATIENT)
Dept: OBGYN CLINIC | Facility: CLINIC | Age: 60
End: 2025-01-17

## 2025-01-17 PROCEDURE — 88305 TISSUE EXAM BY PATHOLOGIST: CPT | Performed by: PATHOLOGY

## 2025-02-05 ENCOUNTER — OFFICE VISIT (OUTPATIENT)
Dept: OBGYN CLINIC | Facility: CLINIC | Age: 60
End: 2025-02-05

## 2025-02-05 VITALS — DIASTOLIC BLOOD PRESSURE: 80 MMHG | HEIGHT: 66 IN | BODY MASS INDEX: 27.76 KG/M2 | SYSTOLIC BLOOD PRESSURE: 130 MMHG

## 2025-02-05 DIAGNOSIS — R93.89 THICKENED ENDOMETRIUM: Primary | ICD-10-CM

## 2025-02-05 PROCEDURE — 99024 POSTOP FOLLOW-UP VISIT: CPT | Performed by: OBSTETRICS & GYNECOLOGY

## 2025-02-05 RX ORDER — NYSTATIN TOPICAL POWDER 100000 U/G
1 POWDER TOPICAL 2 TIMES DAILY
COMMUNITY
Start: 2025-01-04

## 2025-02-05 RX ORDER — TRIAMCINOLONE ACETONIDE 1 MG/G
CREAM TOPICAL
COMMUNITY
Start: 2024-12-10

## 2025-02-05 NOTE — PROGRESS NOTES
Subjective     Tess Parr is a 59-year-old G0, P0 female here for a postop visit.  Reviewed with patient very atrophic findings of cervix and endometrium with small polypoid area at top of cervix removed with D&C.  Patient is recovering well from surgery 2 weeks ago tolerating regular diet had minimal spotting and cramping which resolved quickly, voiding and bowel without difficulty.  We reviewed options to continue to follow and observe patient would like to consider ultrasound in about 6 months with annual exam with me following that and we will discuss continued follow-up after that perhaps spacing to yearly.     Gynecologic History  No LMP recorded (lmp unknown). Patient is postmenopausal.  Contraception: post menopausal status  Last Pap: 2020. Results were: normal  Breast MRI 2023 normal    Obstetric History  OB History    Para Term  AB Living   0 0 0 0 0 0   SAB IAB Ectopic Multiple Live Births   0 0 0 0 0   Obstetric Comments   Menarche age 12.  History of birth control pills.         The following portions of the patient's history were reviewed and updated as appropriate: allergies, current medications, past family history, past medical history, past social history, past surgical history, and problem list.    Review of Systems  Review of Systems   Constitutional: Negative.  Negative for chills, fatigue, fever and unexpected weight change.   HENT: Negative.  Negative for dental problem, sinus pressure and sinus pain.    Eyes: Negative.  Negative for visual disturbance.   Respiratory: Negative.  Negative for cough, shortness of breath and wheezing.    Cardiovascular: Negative.  Negative for chest pain and leg swelling.   Gastrointestinal: Negative.  Negative for constipation, diarrhea, nausea and vomiting.   Genitourinary: Negative.  Negative for menstrual problem, pelvic pain and urgency.   Musculoskeletal: Negative.  Negative for back pain.   Allergic/Immunologic:  "Positive for environmental allergies.   Neurological:  Positive for headaches. Negative for dizziness.        Objective     /80 (BP Location: Left arm, Patient Position: Sitting, Cuff Size: Standard)   Ht 5' 6\" (1.676 m)   LMP  (LMP Unknown)   BMI 27.76 kg/m²   General appearance: alert and oriented, in no acute distress  Head: Normocephalic, without obvious abnormality, atraumatic  Lungs: clear to auscultation bilaterally  Heart: regular rate and rhythm, S1, S2 normal, no murmur, click, rub or gallop  Abdomen: soft, non-tender; bowel sounds normal; no masses,  no organomegaly  Extremities: extremities normal, warm and well-perfused; no cyanosis, clubbing, or edema      Assessment  59-year-old G0, P0 2 weeks post D&C hysteroscopy for thickened endometrium atrophic and benign.     Plan  Return after ultrasound for annual exam or sooner as needed  "

## 2025-02-07 ENCOUNTER — OFFICE VISIT (OUTPATIENT)
Dept: PLASTIC SURGERY | Facility: CLINIC | Age: 60
End: 2025-02-07

## 2025-02-07 DIAGNOSIS — L98.9 SKIN LESION: Primary | ICD-10-CM

## 2025-02-07 PROCEDURE — 88312 SPECIAL STAINS GROUP 1: CPT | Performed by: PATHOLOGY

## 2025-02-07 PROCEDURE — 88305 TISSUE EXAM BY PATHOLOGIST: CPT | Performed by: PATHOLOGY

## 2025-02-07 PROCEDURE — RECHECK: Performed by: SURGERY

## 2025-02-07 NOTE — PROGRESS NOTES
Tess presents today regarding a lesion of the left lower extremity, pretibial region.  She reports that this has been present for, she has previously been seen by [facility] in regard to this lesion.  Per the patient, she was seen in October 2024, at that time moisturizer was recommended, subsequent visit was switched to topical triamcinolone.  The lesion has persisted and she is interested in having it biopsied.  It is approximately 1.2 cm in diameter, pinkish, scaly plaque, slightly elevated from the surrounding skin surface.  The area was prepped with alcohol swab, local anesthesia was administered.  A 3 mm punch biopsy was performed and this was closed with two #4-0 nylon sutures.  A Band-Aid was applied.  She will be seen in 2 weeks for suture removal and to discuss findings.

## 2025-02-12 PROCEDURE — 88305 TISSUE EXAM BY PATHOLOGIST: CPT | Performed by: PATHOLOGY

## 2025-02-12 PROCEDURE — 88312 SPECIAL STAINS GROUP 1: CPT | Performed by: PATHOLOGY

## 2025-02-15 ENCOUNTER — APPOINTMENT (EMERGENCY)
Dept: RADIOLOGY | Facility: HOSPITAL | Age: 60
End: 2025-02-15
Payer: COMMERCIAL

## 2025-02-15 ENCOUNTER — APPOINTMENT (EMERGENCY)
Dept: CT IMAGING | Facility: HOSPITAL | Age: 60
End: 2025-02-15
Payer: COMMERCIAL

## 2025-02-15 ENCOUNTER — HOSPITAL ENCOUNTER (EMERGENCY)
Facility: HOSPITAL | Age: 60
Discharge: HOME | End: 2025-02-15
Attending: EMERGENCY MEDICINE | Admitting: EMERGENCY MEDICINE
Payer: COMMERCIAL

## 2025-02-15 VITALS
TEMPERATURE: 98.6 F | SYSTOLIC BLOOD PRESSURE: 163 MMHG | RESPIRATION RATE: 18 BRPM | OXYGEN SATURATION: 96 % | DIASTOLIC BLOOD PRESSURE: 88 MMHG | HEART RATE: 119 BPM

## 2025-02-15 DIAGNOSIS — S83.91XA RIGHT KNEE SPRAIN: Primary | ICD-10-CM

## 2025-02-15 PROCEDURE — 99284 EMERGENCY DEPT VISIT MOD MDM: CPT | Performed by: EMERGENCY MEDICINE

## 2025-02-15 PROCEDURE — 99284 EMERGENCY DEPT VISIT MOD MDM: CPT

## 2025-02-15 PROCEDURE — 73564 X-RAY EXAM KNEE 4 OR MORE: CPT

## 2025-02-15 PROCEDURE — 73700 CT LOWER EXTREMITY W/O DYE: CPT

## 2025-02-15 RX ORDER — ACETAMINOPHEN 325 MG/1
975 TABLET ORAL ONCE
Status: COMPLETED | OUTPATIENT
Start: 2025-02-15 | End: 2025-02-15

## 2025-02-15 RX ORDER — IBUPROFEN 600 MG/1
600 TABLET, FILM COATED ORAL ONCE
Status: COMPLETED | OUTPATIENT
Start: 2025-02-15 | End: 2025-02-15

## 2025-02-15 RX ADMIN — ACETAMINOPHEN 975 MG: 325 TABLET, FILM COATED ORAL at 15:54

## 2025-02-15 RX ADMIN — IBUPROFEN 600 MG: 600 TABLET, FILM COATED ORAL at 15:55

## 2025-02-15 NOTE — ED PROVIDER NOTES
Time reflects when diagnosis was documented in both MDM as applicable and the Disposition within this note       Time User Action Codes Description Comment    2/15/2025  5:07 PM Carlos Zuniga Add [S83.91XA] Right knee sprain           ED Disposition       ED Disposition   Discharge    Condition   Stable    Date/Time   Sat Feb 15, 2025  5:07 PM    Comment   Tess Parr discharge to home/self care.                   Assessment & Plan       Medical Decision Making  59-year-old female presents with right knee pain after twisting it while slipping and falling on a slippery driveway, with no head strike, will be evaluated for a possible fracture or dislocation, if her x-ray is unremarkable the patient will be discharged home be encouraged to follow-up with Ortho, will be placed in a knee immobilizer, and crutches.  She will be given analgesia.    The patient's imaging was unremarkable, the patient is safe for discharge home in a knee immobilizer and crutches.  Return indications and follow-up instructions given including to follow-up with orthopedic surgery.    Disposition: Patient stable for outpatient management. Discussed need for follow up with their primary doctor or specialist to review all results, including incidental findings as below. Patient discharged with explanation of ED workup and diagnosis, instructions on how to obtain outpatient follow up, care instructions at home, and strict return precautions if patient develops new or worsening symptoms. Patients questions answered and agreeable with discharge plan.        PLEASE NOTE:  This encounter was completed utilizing the [employer] Direct Speech Voice Recognition Software. Grammatical errors, random word insertions, pronoun errors and incomplete sentences are occasional inherent consequences of the system due to software limitations, ambient noise and hardware issues.These may be missed by proof reading prior to affixing electronic signature.  Any questions or concerns about the content, text or information contained within the body of this dictation should be directly addressed to the physician for clarification. Please do not hesitate to call me directly if you have any questions or concerns.      Amount and/or Complexity of Data Reviewed  Radiology: ordered.    Risk  OTC drugs.  Prescription drug management.             Medications   acetaminophen (TYLENOL) tablet 975 mg (975 mg Oral Given 2/15/25 1554)   ibuprofen (MOTRIN) tablet 600 mg (600 mg Oral Given 2/15/25 1555)       ED Risk Strat Scores                                                History of Present Illness       Chief Complaint   Patient presents with    Fall     Patient arrives to the Ed with complaint of right knee pain after falling on her driveway. Patient stats she can not straighten or place weight on leg due to pain. No medication taken prior to arrival.        Past Medical History:   Diagnosis Date    Arthritis     Knees and feet    Asthma     exercise induced    Cancer (HCC)     breast    COVID 2021    Difficulty falling asleep     GERD (gastroesophageal reflux disease)     Gluteal abscess     Last Assessed: 12/30/2016    Limb alert care status     right    Pain in left foot     Pneumonia 2009    x1    PONV (postoperative nausea and vomiting)     Skin cancer     Varicella       Past Surgical History:   Procedure Laterality Date    BASAL CELL CARCINOMA EXCISION N/A 01/07/2021    Procedure: EXCISION BASAL CELL CARCINOMA OF MID BACK;  Surgeon: Du Herzog MD;  Location:  MAIN OR;  Service: Plastics    BASAL CELL CARCINOMA EXCISION Left 04/25/2022    Procedure: EXCISION OF BCC (X2) LEFT SHOULDER WITH COMPLEX CLOSURE;  Surgeon: Du Herzog MD;  [facility]: [facility] MAIN OR;  Service: Plastics    BREAST BIOPSY  05/02/2014    BREAST BIOPSY  05/2013    BREAST BIOPSY  05/2008    BREAST LUMPECTOMY Right 2008    BREAST RECONSTRUCTION Left 02/21/2019    Procedure: BREAST  EXPANDER/IMPLANT EXCHANGE;  Surgeon: Du Herzog MD;  Location: [facility];  Service: Plastics    BREAST SURGERY Bilateral 2008, 2013, 2014    Breast reconstruction with expanders. Total of 7 surgeries.  7/3/14 right breast reconstruction revision. 10/27/14 right breast reconstruction revision     BREAST SURGERY Left 07/24/2013    Excision of breast lesion    CAPSULOTOMY Left 02/21/2019    Procedure: CAPSULECTOMY;  Surgeon: Du Herzog MD;  [facility];  Service: Plastics    CHEST WALL BIOPSY Right 02/21/2019    Procedure: UPPER CHEST BASAL CELL CARSINOMA EXCISION;  Surgeon: Du Herzog MD;  [facility];  Service: Plastics    CLOSURE WOUND N/A 02/21/2019    Procedure: RIGHT upper chest & LEFT upper back complex closure;  Surgeon: Du Herzog MD;  [facility];  Service: Plastics    COLONOSCOPY      COMPLEX WOUND CLOSURE TO EXTREMITY N/A 01/07/2021    Procedure: COMPLEX CLOSURE MID BACK;  Surgeon: Du Herzog MD;  [facility];  Service: Plastics    FOOT ARTHRODESIS Left     Pantalar-has pins, plates, screws-MRI compatible    KNEE ARTHROSCOPY Right 05/1997    LIPOSUCTION W/ FAT INJECTION Bilateral 10/24/2019    Procedure: AUTOLOGOUS FAT GRAFTING TO BILATERAL BREAST;  Surgeon: Du Herzog MD;  [facility];  Service: Plastics    LIPOSUCTION W/ FAT INJECTION Bilateral 01/07/2021    Procedure: AUTOLOGOUS FAT GRAFTING TO BILATERAL BREAST;  Surgeon: Du Herzog MD;  [facility];  Service: Plastics    MASS EXCISION Right 7/31/2023    Procedure: EXCISION BCC RIGHT SUPERIOR SHOULDER, RIGHT INFERIOR SHOULDER, RIGHT HIP/BUTTOCK WITH COMPLEX CLOSURE;  Surgeon: Du Herzog MD;  [facility];  Service: Plastics    MASTECTOMY Bilateral 06/06/2014    Lymph nodes removed bilaterally. States she may have IV's in left side.    MOHS RECONSTRUCTION Right 03/07/2019    Procedure: RECONSTRUCTION MOHS DEFECT RIGHT  NOSE/MEDIAL CANTHUS;  Surgeon: Du Herzog MD;  [facility];  Service: Plastics    MOHS SURGERY      Mohs Micrographic Surgery Face; Last Assessed: 5/15/2015    HI ADJT TIS REARGMT EYE/NOSE/EAR/LIP 10.1-30.0 SQCM Right 03/07/2019    Procedure: FLAP;  Surgeon: Du Herzog MD;  [facility];  Service: Plastics    HI ARTHRD MIDTARSL/TARSOMETATARSAL MULT/TRANSVRS Left 09/09/2016    Procedure: ARTHRODESIS FIRST METATARSALCUNIFORM JOINT FUSION, TALLNAVICULAR JOINT FUSION ;  Surgeon: Walker Kenny DPM;  [facility];  Service: Podiatry    HI BREAST RECONSTRUCTION W/LATISSIMUS DORSI FLAP Left 09/07/2017    Procedure: BREAST RECONSTRUCTION; LATISSIMUS DORSI FLAP; TISSUE EXPANDER;  Surgeon: Du Herzog MD;  [facility];  Service: Plastics    HI COLONOSCOPY FLX DX W/COLLJ SPEC WHEN PFRMD N/A 08/01/2018    Procedure: COLONOSCOPY;  Surgeon: Du Viveros MD;  [facility];  Service: Gastroenterology    HI CORRJ HLX VLGS BNCTY SESMDC RESCJ PROX PHLX BASE  09/09/2016    Procedure: BUNIONECTOMY VARGAS MODIFIED ;  Surgeon: Walker Kenny DPM;  [facility];  Service: Podiatry    HI EXCISION MALIGNANT LESION TRUNK/ARM/LEG > 4.0 CM N/A 06/27/2019    Procedure: EXCISION BASAL CELL CARCINOMA MID CHEST;  Surgeon: Du Herzog MD;  [facility];  Service: Plastics    HI EXCISION MALIGNANT LESION TRUNK/ARM/LEG > 4.0 CM Right 3/25/2024    Procedure: EXCISION OF BCC RIGHT BREAST, LOCAL FLAP RECONSTRUCTION;  Surgeon: Du Herzog MD;  [facility];  Service: Plastics    HI EXCISION MALIGNANT LESION TRUNK/ARM/LEG > 4.0 CM N/A 9/12/2024    Procedure: EXCISION OF LESIONS OF RIGHT CHEST, LEFT MEDIAL CHEST AND MID BACK WITH COMPLEX CLOSURE;  Surgeon: Du Herzog MD;  [facility];  Service: Plastics    HI FTH/GFT FREE W/DIRECT CLOSURE N/E/E/L 20 SQ CM/< Right 03/07/2019    Procedure: SKIN GRAFT FULL THICKNESS  (FTSG);  Surgeon:  Du Herzog MD;  [facility];  Service: Plastics    WA HYSTEROSCOPY BX ENDOMETRIUM&/POLYPC W/WO D&C N/A 01/07/2022    Procedure: DILATATION AND CURETTAGE (D&C) WITH HYSTEROSCOPY, POLYPECTOMY ;  Surgeon: Fernanda Kennedy MD;  [facility];  Service: Gynecology    WA HYSTEROSCOPY BX ENDOMETRIUM&/POLYPC W/WO D&C N/A 1/14/2025    Procedure: HYSTEROSCOPY, DILATION AND CURETTAGE, EXAM UNDER ANESTHESIA;  Surgeon: Fernanda Kennedy MD;  Location: [facility];  Service: Gynecology    WA INSERTION BREAST IMPLANT SAME DAY OF MASTECTOMY Left 09/07/2017    Procedure: TISSUE EXPANDER;  Surgeon: Du Herzog MD;  [facility];  Service: Plastics    WA OPEN TX MONTEGGIA FRACTURE DISLOCATION ELBOW Left 12/11/2019    Procedure: OPEN REDUCTION W/ INTERNAL FIXATION left distal humerus fracture, possible olecranon osteotomy;  Surgeon: Silvano Scott MD;  Location: [facility]IN OR;  Service: Orthopedics    WA OSTEOTOMY CALCANEUS W/WO INTERNAL FIXATION Left 09/09/2016    Procedure: OSTEOTOMY CALCANEUS WITH APPLICATION AND ACTIVATION OF BONE STIMULATOR ;  Surgeon: Walker Kenny DPM;  [facility];  Service: Podiatry    WA REMV TISSUE FOR GRAFT OTHR Bilateral 06/27/2019    Procedure: AUTOLOGOUS FAT GRAFTING BILATERAL BREASTS;  Surgeon: Du Herzog MD;  [facility];  Service: Plastics    WA REPAIR COMPLEX F/C/C/M/N/AX/G/H/F 2.6-7.5 CM N/A 03/07/2019    Procedure: COMPLEX CLOSURE;  Surgeon: Du Herzog MD;  [facility];  Service: Plastics    WA REPAIR COMPLEX TRUNK 2.6-7.5 CM N/A 06/27/2019    Procedure: CLOSURE WOUND MID CHEST;  Surgeon: Du Herzog MD;  [facility];  Service: Plastics    WA REVISION OF RECONSTRUCTED BREAST Bilateral 06/27/2019    Procedure: BILATERAL BREAST MOUND REVISION;  Surgeon: Du Herzog MD;  [facility];  Service: Plastics    WA REVISION OF RECONSTRUCTED BREAST Bilateral 10/24/2019    Procedure: BILATERAL BREAST  MOUND REVISION;  Surgeon: Du Herzog MD;  [facility];  Service: Plastics    LA REVISION OF RECONSTRUCTED BREAST Bilateral 01/07/2021    Procedure: BILATERAL BREAST MOUND REVISION;  Surgeon: Du Herzog MD;  [facility];  Service: Plastics    SENTINEL LYMPH NODE BIOPSY Bilateral 06/06/2014    SENTINEL LYMPH NODE BIOPSY Right 2008    SKIN BIOPSY      SKIN LESION EXCISION Left 02/21/2019    Procedure: UPPER BACK BCC EXCISION;  Surgeon: Du Herzog MD;  [facility];  Service: Plastics    SQUAMOUS CELL CARCINOMA EXCISION N/A 03/07/2019    Procedure: EXCISION BCC LEFT FOREHEAD;  Surgeon: Du Herzog MD;  [facility];  Service: Plastics    WISDOM TOOTH EXTRACTION        Family History   Adopted: Yes   Problem Relation Age of Onset    No Known Problems Mother     No Known Problems Father       Social History     Tobacco Use    Smoking status: Never    Smokeless tobacco: Never   Vaping Use    Vaping status: Never Used   Substance Use Topics    Alcohol use: Not Currently     Alcohol/week: 3.0 standard drinks of alcohol     Types: 3 Shots of liquor per week    Drug use: Never      E-Cigarette/Vaping    E-Cigarette Use Never User       E-Cigarette/Vaping Substances    Nicotine No     THC No     CBD No     Flavoring No     Other No     Unknown No       I have reviewed and agree with the history as documented.     59-year-old female presents with right knee pain after she slipped in her driveway and twisted her knee, reports that she did not fall down or hit her head or any other part of her body, has no pain anywhere else, and no weakness or pain or paresthesias in her right ankle or foot, no pain in her hip, and no other complaints.  Patient has no other medical problems, takes no medications, drinks occasionally, does not smoke or use drugs.        Review of Systems   Constitutional:  Negative for fever.   Respiratory:  Negative for cough and shortness of  breath.    Cardiovascular:  Negative for chest pain.   Gastrointestinal:  Negative for abdominal pain, constipation, diarrhea, nausea and vomiting.   Genitourinary:  Negative for dysuria and hematuria.   Neurological:  Negative for light-headedness and headaches.           Objective       ED Triage Vitals [02/15/25 1529]   Temperature Pulse Blood Pressure Respirations SpO2 Patient Position - Orthostatic VS   98.6 °F (37 °C) (!) 119 163/88 18 96 % Sitting      Temp Source Heart Rate Source BP Location FiO2 (%) Pain Score    Oral Monitor Right arm -- --      Vitals      Date and Time Temp Pulse SpO2 Resp BP Pain Score FACES Pain Rating User   02/15/25 1529 98.6 °F (37 °C) 119 96 % 18 163/88 -- -- BM            Physical Exam  Vitals and nursing note reviewed.   Constitutional:       General: She is not in acute distress.     Appearance: Normal appearance. She is well-developed.   HENT:      Head: Normocephalic and atraumatic.   Eyes:      Extraocular Movements: Extraocular movements intact.      Conjunctiva/sclera: Conjunctivae normal.   Cardiovascular:      Rate and Rhythm: Normal rate and regular rhythm.   Pulmonary:      Effort: Pulmonary effort is normal. No respiratory distress.      Breath sounds: Normal breath sounds.   Abdominal:      General: There is no distension.      Palpations: Abdomen is soft.   Musculoskeletal:         General: No swelling.      Cervical back: Normal range of motion.      Comments: Tenderness to palpation diffusely around the patella, with no fluctuance or effusion appearance, no pain in the popliteal region, Lachman's test was negative, no pain with valgus or varus straining, and no abnormalities in the right hip or ankle   Skin:     General: Skin is warm and dry.      Capillary Refill: Capillary refill takes less than 2 seconds.   Neurological:      General: No focal deficit present.      Mental Status: She is alert and oriented to person, place, and time.   Psychiatric:         Mood  and Affect: Mood normal.         Results Reviewed       None            CT lower extremity wo contrast right   Final Interpretation by Bradley Landon Kocher, MD (02/15 1701)      1.  No acute osseous abnormality.   2.  Degenerative changes         Workstation performed: [id number]         XR knee 4+ vw right injury   Final Interpretation by Bradley Landon Kocher, MD (02/15 1702)      No acute osseous abnormality.      Degenerative changes as described.         Computerized Assisted Algorithm (CAA) may have been used to analyze all applicable images.         Workstation performed: [id number]             Procedures    ED Medication and Procedure Management   Prior to Admission Medications   Prescriptions Last Dose Informant Patient Reported? Taking?   ALPRAZolam (XANAX) 1 mg tablet  Self Yes No   Sig: Take 1 mg by mouth daily at bedtime as needed   Ascorbic Acid (VITAMIN C) 500 MG CAPS  Self Yes No   Sig: Take 500 mg by mouth in the morning     Calcium Magnesium Zinc 333-133-5 MG TABS   Yes No   Sig: Take by mouth in the morning   Cholecalciferol (VITAMIN D) 2000 units CAPS  Self Yes No   Sig: Take by mouth in the morning     Klayesta powder   Yes No   Sig: Apply 1 Application topically 2 (two) times a day To affected area   Probiotic Product (Probiotic Daily) CAPS   Yes No   Sig: Take by mouth in the morning   acetaminophen (TYLENOL) 500 mg tablet  Self Yes No   Sig: Take 500 mg by mouth every 6 (six) hours as needed for mild pain.   albuterol (Ventolin HFA) 90 mcg/act inhaler  Self No No   Sig: Inhale 2 puffs every 6 (six) hours as needed for wheezing or shortness of breath   cetirizine (ZyrTEC) 10 MG chewable tablet   Yes No   Sig: Chew 10 mg daily   famotidine (PEPCID) 20 mg tablet  Self Yes No   Sig: Take 20 mg by mouth 2 (two) times a day as needed     omeprazole (PriLOSEC) 20 mg delayed release capsule  Self Yes No   Sig: Take 20 mg by mouth as needed     triamcinolone (KENALOG) 0.1 % cream   Yes No   Sig: Apply  topically      Facility-Administered Medications: None     Discharge Medication List as of 2/15/2025  5:07 PM        CONTINUE these medications which have NOT CHANGED    Details   acetaminophen (TYLENOL) 500 mg tablet Take 500 mg by mouth every 6 (six) hours as needed for mild pain., Historical Med      albuterol (Ventolin HFA) 90 mcg/act inhaler Inhale 2 puffs every 6 (six) hours as needed for wheezing or shortness of breath, Starting Wed 8/17/2022, Normal      ALPRAZolam (XANAX) 1 mg tablet Take 1 mg by mouth daily at bedtime as needed, Starting Mon 5/16/2022, Historical Med      Ascorbic Acid (VITAMIN C) 500 MG CAPS Take 500 mg by mouth in the morning  , Historical Med      Calcium Magnesium Zinc 333-133-5 MG TABS Take by mouth in the morning, Historical Med      cetirizine (ZyrTEC) 10 MG chewable tablet Chew 10 mg daily, Historical Med      Cholecalciferol (VITAMIN D) 2000 units CAPS Take by mouth in the morning  , Historical Med      famotidine (PEPCID) 20 mg tablet Take 20 mg by mouth 2 (two) times a day as needed  , Historical Med      Klayesta powder Apply 1 Application topically 2 (two) times a day To affected area, Starting Sat 1/4/2025, Historical Med      omeprazole (PriLOSEC) 20 mg delayed release capsule Take 20 mg by mouth as needed  , Historical Med      Probiotic Product (Probiotic Daily) CAPS Take by mouth in the morning, Historical Med      triamcinolone (KENALOG) 0.1 % cream Apply topically, Starting Tue 12/10/2024, Historical Med             ED SEPSIS DOCUMENTATION   Time reflects when diagnosis was documented in both MDM as applicable and the Disposition within this note       Time User Action Codes Description Comment    2/15/2025  5:07 PM Carlos Zuniga Add [S83.91XA] Right knee sprain                  Carlos Zuniga MD  02/15/25 9043

## 2025-02-19 ENCOUNTER — OFFICE VISIT (OUTPATIENT)
Dept: OBGYN CLINIC | Facility: CLINIC | Age: 60
End: 2025-02-19
Payer: COMMERCIAL

## 2025-02-19 DIAGNOSIS — M17.11 PRIMARY OSTEOARTHRITIS OF RIGHT KNEE: Primary | ICD-10-CM

## 2025-02-19 DIAGNOSIS — M25.561 ACUTE PAIN OF RIGHT KNEE: ICD-10-CM

## 2025-02-19 PROCEDURE — 99204 OFFICE O/P NEW MOD 45 MIN: CPT | Performed by: ORTHOPAEDIC SURGERY

## 2025-02-19 NOTE — PROGRESS NOTES
Assessment/Plan:  1. Primary osteoarthritis of right knee        2. Acute pain of right knee  Ambulatory Referral to Orthopedic Surgery        Scribe Attestation      I,:  Hal Arita am acting as a scribe while in the presence of the attending physician.:       I,:  Omar Gallego, DO personally performed the services described in this documentation    as scribed in my presence.:           Tess upon examination and review of the x-rays and CT scan of the right knee demonstrates painful symptoms associated with an arthritic flare.  He does have severe tricompartmental osteoarthritis that is evident on the x-rays.  No acute fractures observed on either the x-ray or the CT scan.  As she is experiencing symptomatic relief with regards to pain and overall function I did note that she could continue to see how she does in the coming weeks.  I did advise on gentle less impactful motion of the knee such as biking as well as the use of Tylenol and Motrin as needed.  Intra-articular steroid injection can be considered if her symptoms continue or worsen.  No surgical intervention such as arthroscopy is warranted at this time.  I did advise that she will statistically require a total knee arthroplasty in her future.  However activity modifications, and maintaining appropriate weight will help mitigate the for the development of symptoms and degenerative disease of the knee.  She verbalized understanding had no further questions.  She may return to work in office on Friday, 2/21/2025.  A note was facilitated for her today.  I will see her back on an as-needed basis.    Subjective: New Patient Right knee    Patient ID: Tess Parr is a very pleasant 59 y.o. female presenting for initial evaluation of her right knee.  She unfortunately suffered a slip and fall in her driveway on February 15, 2025.  She states that there was a twisting component to the injury.  She did report to the emergency department and did have x-rays  and a CT scan completed that were negative for acute fracture.  They did demonstrate significant arthritic changes in all 3 compartments.  She does have a history of a chondroplasty of the right knee performed 1997.  She notes that she has had persistent pain into her knee over the the past several years.  However, notes that this recent fall has exacerbated her pain significantly and notes that she is unable to fully extend her knee actively.  She did discontinue the use of the knee immobilizer.  She notes that she is able to weight-bear but is painful.  She states that her pain has improved since the injury.  She does localize pain to the medial and anterior aspect of the knee. Pain is better while at rest. She has been using tylenol and motrin with symptomatic relief. She denies any distal paresthesias.    Review of Systems   Constitutional:  Negative for chills, fever and unexpected weight change.   HENT:  Negative for hearing loss, nosebleeds and sore throat.    Eyes:  Negative for pain, redness and visual disturbance.   Respiratory:  Negative for cough, shortness of breath and wheezing.    Cardiovascular:  Negative for chest pain, palpitations and leg swelling.   Gastrointestinal:  Negative for abdominal pain, nausea and vomiting.   Endocrine: Negative for polydipsia and polyuria.   Genitourinary:  Negative for dysuria and hematuria.   Musculoskeletal:  Positive for arthralgias and myalgias.        See HPI   Skin:  Negative for rash and wound.   Neurological:  Negative for dizziness, numbness and headaches.   Psychiatric/Behavioral:  Negative for decreased concentration and suicidal ideas. The patient is not nervous/anxious.          Past Medical History:   Diagnosis Date    Arthritis     Knees and feet    Asthma     exercise induced    Cancer (HCC)     breast    COVID 2021    Difficulty falling asleep     Fractures 11/2019    GERD (gastroesophageal reflux disease)     Gluteal abscess     Last Assessed:  12/30/2016    Limb alert care status     right    Pain in left foot     Pneumonia 2009    x1    PONV (postoperative nausea and vomiting)     Skin cancer     Varicella        Past Surgical History:   Procedure Laterality Date    ANKLE SURGERY  09/2016    BASAL CELL CARCINOMA EXCISION N/A 01/07/2021    Procedure: EXCISION BASAL CELL CARCINOMA OF MID BACK;  Surgeon: Du Herzog MD;  Location[facility];  Service: Plastics    BASAL CELL CARCINOMA EXCISION Left 04/25/2022    Procedure: EXCISION OF BCC (X2) LEFT SHOULDER WITH COMPLEX CLOSURE;  Surgeon: Du Herzog MD;  [facility];  Service: Plastics    BREAST BIOPSY  05/02/2014    BREAST BIOPSY  05/2013    BREAST BIOPSY  05/2008    BREAST LUMPECTOMY Right 2008    BREAST RECONSTRUCTION Left 02/21/2019    Procedure: BREAST EXPANDER/IMPLANT EXCHANGE;  Surgeon: Du Herzog MD;  [facility];  Service: Plastics    BREAST SURGERY Bilateral 2008, 2013, 2014    Breast reconstruction with expanders. Total of 7 surgeries.  7/3/14 right breast reconstruction revision. 10/27/14 right breast reconstruction revision     BREAST SURGERY Left 07/24/2013    Excision of breast lesion    CAPSULOTOMY Left 02/21/2019    Procedure: CAPSULECTOMY;  Surgeon: Du Herzog MD;  [facility];  Service: Plastics    CHEST WALL BIOPSY Right 02/21/2019    Procedure: UPPER CHEST BASAL CELL CARSINOMA EXCISION;  Surgeon: Du Herzog MD;  [facility];  Service: Plastics    CLOSURE WOUND N/A 02/21/2019    Procedure: RIGHT upper chest & LEFT upper back complex closure;  Surgeon: Du Herzog MD;  [facility];  Service: Plastics    COLONOSCOPY      COMPLEX WOUND CLOSURE TO EXTREMITY N/A 01/07/2021    Procedure: COMPLEX CLOSURE MID BACK;  Surgeon: Du Herzog MD;  [facility]: [facility];  Service: Plastics    ELBOW SURGERY  12/2019    FOOT ARTHRODESIS Left     Pantalar-has pins, plates, screws-MRI compatible    KNEE  ARTHROSCOPY Right 05/1997    LIPOSUCTION W/ FAT INJECTION Bilateral 10/24/2019    Procedure: AUTOLOGOUS FAT GRAFTING TO BILATERAL BREAST;  Surgeon: Du Herzog MD;  [facility];  Service: Plastics    LIPOSUCTION W/ FAT INJECTION Bilateral 01/07/2021    Procedure: AUTOLOGOUS FAT GRAFTING TO BILATERAL BREAST;  Surgeon: Du Herzog MD;  [facility];  Service: Plastics    MASS EXCISION Right 07/31/2023    Procedure: EXCISION BCC RIGHT SUPERIOR SHOULDER, RIGHT INFERIOR SHOULDER, RIGHT HIP/BUTTOCK WITH COMPLEX CLOSURE;  Surgeon: Du Herzog MD;  [facility];  Service: Plastics    MASTECTOMY Bilateral 06/06/2014    Lymph nodes removed bilaterally. States she may have IV's in left side.    MOHS RECONSTRUCTION Right 03/07/2019    Procedure: RECONSTRUCTION MOHS DEFECT RIGHT NOSE/MEDIAL CANTHUS;  Surgeon: Du Herzog MD;  [facility];  Service: Plastics    MOHS SURGERY      Mohs Micrographic Surgery Face; Last Assessed: 5/15/2015    MO ADJT TIS REARGMT EYE/NOSE/EAR/LIP 10.1-30.0 SQCM Right 03/07/2019    Procedure: FLAP;  Surgeon: Du Herzog MD;  [facility];  Service: Plastics    MO ARTHRD MIDTARSL/TARSOMETATARSAL MULT/TRANSVRS Left 09/09/2016    Procedure: ARTHRODESIS FIRST METATARSALCUNIFORM JOINT FUSION, TALLNAVICULAR JOINT FUSION ;  Surgeon: Walker Kenny DPM;  [facility];  Service: Podiatry    MO BREAST RECONSTRUCTION W/LATISSIMUS DORSI FLAP Left 09/07/2017    Procedure: BREAST RECONSTRUCTION; LATISSIMUS DORSI FLAP; TISSUE EXPANDER;  Surgeon: Du Herzog MD;  [facility];  Service: Plastics    MO COLONOSCOPY FLX DX W/COLLJ SPEC WHEN PFRMD N/A 08/01/2018    Procedure: COLONOSCOPY;  Surgeon: Du Viveros MD;  [facility];  Service: Gastroenterology    MO CORRJ HLX VLGS BNCTY SESMDC RESCJ PROX PHLX BASE  09/09/2016    Procedure: BUNIONECTOMY VARGAS MODIFIED ;  Surgeon: Walker Kenny DPM;  [facility];  Service: Podiatry    MI EXCISION MALIGNANT LESION TRUNK/ARM/LEG > 4.0 CM N/A 06/27/2019    Procedure: EXCISION BASAL CELL CARCINOMA MID CHEST;  Surgeon: Du Herzog MD;  [facility];  Service: Plastics    MI EXCISION MALIGNANT LESION TRUNK/ARM/LEG > 4.0 CM Right 03/25/2024    Procedure: EXCISION OF BCC RIGHT BREAST, LOCAL FLAP RECONSTRUCTION;  Surgeon: Du Herzog MD;  [facility];  Service: Plastics    MI EXCISION MALIGNANT LESION TRUNK/ARM/LEG > 4.0 CM N/A 09/12/2024    Procedure: EXCISION OF LESIONS OF RIGHT CHEST, LEFT MEDIAL CHEST AND MID BACK WITH COMPLEX CLOSURE;  Surgeon: Du Herzog MD;  [facility];  Service: Plastics    MI FTH/GFT FREE W/DIRECT CLOSURE N/E/E/L 20 SQ CM/< Right 03/07/2019    Procedure: SKIN GRAFT FULL THICKNESS  (FTSG);  Surgeon: Du Herzog MD;  [facility];  Service: Plastics    MI HYSTEROSCOPY BX ENDOMETRIUM&/POLYPC W/WO D&C N/A 01/07/2022    Procedure: DILATATION AND CURETTAGE (D&C) WITH HYSTEROSCOPY, POLYPECTOMY ;  Surgeon: Fernanda Kennedy MD;  [facility];  Service: Gynecology    MI HYSTEROSCOPY BX ENDOMETRIUM&/POLYPC W/WO D&C N/A 01/14/2025    Procedure: HYSTEROSCOPY, DILATION AND CURETTAGE, EXAM UNDER ANESTHESIA;  Surgeon: Fernanda Kennedy MD;  [facility];  Service: Gynecology    MI INSERTION BREAST IMPLANT SAME DAY OF MASTECTOMY Left 09/07/2017    Procedure: TISSUE EXPANDER;  Surgeon: Du Herzog MD;  [facility];  Service: Plastics    MI OPEN TX MONTEGGIA FRACTURE DISLOCATION ELBOW Left 12/11/2019    Procedure: OPEN REDUCTION W/ INTERNAL FIXATION left distal humerus fracture, possible olecranon osteotomy;  Surgeon: Silvano Scott MD;  Location[facility];  Service: Orthopedics    MI OSTEOTOMY CALCANEUS W/WO INTERNAL FIXATION Left 09/09/2016    Procedure: OSTEOTOMY CALCANEUS WITH APPLICATION AND ACTIVATION OF BONE STIMULATOR ;  Surgeon: Walker Kenny DPM;  [facility];  Service: Podiatry    SC REMV TISSUE FOR GRAFT OTHR Bilateral 06/27/2019    Procedure: AUTOLOGOUS FAT GRAFTING BILATERAL BREASTS;  Surgeon: Du Herzog MD;  [facility];  Service: Plastics    SC REPAIR COMPLEX F/C/C/M/N/AX/G/H/F 2.6-7.5 CM N/A 03/07/2019    Procedure: COMPLEX CLOSURE;  Surgeon: Du Herzog MD;  [facility];  Service: Plastics    SC REPAIR COMPLEX TRUNK 2.6-7.5 CM N/A 06/27/2019    Procedure: CLOSURE WOUND MID CHEST;  Surgeon: Du Herzog MD;  [facility];  Service: Plastics    SC REVISION OF RECONSTRUCTED BREAST Bilateral 06/27/2019    Procedure: BILATERAL BREAST MOUND REVISION;  Surgeon: Du Herzog MD;  [facility];  Service: Plastics    SC REVISION OF RECONSTRUCTED BREAST Bilateral 10/24/2019    Procedure: BILATERAL BREAST MOUND REVISION;  Surgeon: Du Herzog MD;  [facility];  Service: Plastics    SC REVISION OF RECONSTRUCTED BREAST Bilateral 01/07/2021    Procedure: BILATERAL BREAST MOUND REVISION;  Surgeon: Du Herzog MD;  [facility];  Service: Plastics    SENTINEL LYMPH NODE BIOPSY Bilateral 06/06/2014    SENTINEL LYMPH NODE BIOPSY Right 2008    SKIN BIOPSY      SKIN LESION EXCISION Left 02/21/2019    Procedure: UPPER BACK BCC EXCISION;  Surgeon: Du Herzog MD;  [facility];  Service: Plastics    SQUAMOUS CELL CARCINOMA EXCISION N/A 03/07/2019    Procedure: EXCISION BCC LEFT FOREHEAD;  Surgeon: Du Herzog MD;  [facility];  Service: Plastics    WISDOM TOOTH EXTRACTION         Family History   Adopted: Yes   Problem Relation Age of Onset    No Known Problems Mother     No Known Problems Father        Social History     Occupational History    Not on file   Tobacco Use    Smoking status: Never    Smokeless tobacco: Never   Vaping Use    Vaping status: Never Used   Substance and Sexual Activity    Alcohol use: Yes     Alcohol/week: 2.0 standard drinks of  alcohol     Types: 2 Glasses of wine per week    Drug use: No    Sexual activity: Yes     Partners: Male     Birth control/protection: Condom Male         Current Outpatient Medications:     acetaminophen (TYLENOL) 500 mg tablet, Take 500 mg by mouth every 6 (six) hours as needed for mild pain., Disp: , Rfl:     albuterol (Ventolin HFA) 90 mcg/act inhaler, Inhale 2 puffs every 6 (six) hours as needed for wheezing or shortness of breath, Disp: 8 g, Rfl: 0    ALPRAZolam (XANAX) 1 mg tablet, Take 1 mg by mouth daily at bedtime as needed, Disp: , Rfl:     Ascorbic Acid (VITAMIN C) 500 MG CAPS, Take 500 mg by mouth in the morning  , Disp: , Rfl:     Calcium Magnesium Zinc 333-133-5 MG TABS, Take by mouth in the morning, Disp: , Rfl:     cetirizine (ZyrTEC) 10 MG chewable tablet, Chew 10 mg daily, Disp: , Rfl:     Cholecalciferol (VITAMIN D) 2000 units CAPS, Take by mouth in the morning  , Disp: , Rfl:     famotidine (PEPCID) 20 mg tablet, Take 20 mg by mouth 2 (two) times a day as needed  , Disp: , Rfl:     Klayesta powder, Apply 1 Application topically 2 (two) times a day To affected area, Disp: , Rfl:     omeprazole (PriLOSEC) 20 mg delayed release capsule, Take 20 mg by mouth as needed  , Disp: , Rfl:     Probiotic Product (Probiotic Daily) CAPS, Take by mouth in the morning, Disp: , Rfl:     triamcinolone (KENALOG) 0.1 % cream, Apply topically, Disp: , Rfl:     Allergies   Allergen Reactions    Codeine Itching     Severe    Dilaudid [Hydromorphone Hcl] Itching     Severe    Hydromorphone Itching    Tramadol Itching    Other Rash     Adhesive tape       Objective:  There were no vitals filed for this visit.    There is no height or weight on file to calculate BMI.    Right Knee Exam     Tenderness   The patient is experiencing tenderness in the medial joint line.    Range of Motion   Extension:  0 (passive)   Flexion:  120     Tests   Varus: negative Valgus: negative    Other   Erythema: absent  Scars: present  (Well-healed anterior arthroscopy portals)  Sensation: normal  Pulse: present  Swelling: mild (suprapatellar bursitis)  Effusion: effusion present      Left Knee Exam     Other   Effusion: trace.          Observations   Left Knee   Effusion: trace.      Right Knee   Positive for effusion.       Physical Exam  Nursing note reviewed.   Constitutional:       Appearance: Normal appearance. She is well-developed.   HENT:      Head: Normocephalic and atraumatic.      Right Ear: External ear normal.      Left Ear: External ear normal.      Nose: Nose normal.   Eyes:      General:         Right eye: No discharge.         Left eye: No discharge.      Extraocular Movements: Extraocular movements intact.      Conjunctiva/sclera: Conjunctivae normal.   Cardiovascular:      Rate and Rhythm: Normal rate.      Pulses: Normal pulses.   Pulmonary:      Effort: Pulmonary effort is normal.   Musculoskeletal:      Cervical back: Normal range of motion and neck supple.      Right knee: Effusion present.      Left knee: Effusion: trace.      Comments: See orthopedic exam   Skin:     General: Skin is warm and dry.   Neurological:      Mental Status: She is alert and oriented to person, place, and time.         I have personally reviewed pertinent films in PACS.      X-rays of the right knee demonstrates severe tricompartmental osteoarthritis with subchondral sclerosis, joint line narrowing and osteophytosis. Lateral subluxation of tibia in relation of the distal femur. No acute fracture. No lytic to blastic lesions.    CT scan of the right knee demonstrates severe tricompartmental osteoarthritis. No acute fracture observed.    This document was created using speech voice recognition software.   Grammatical errors, random word insertions, pronoun errors, and incomplete sentences are an occasional consequence of this system due to software limitations, ambient noise, and hardware issues.   Any formal questions or concerns about content,  text, or information contained within the body of this dictation should be directly addressed to the provider for clarification.

## 2025-02-19 NOTE — LETTER
February 19, 2025     Patient: Tess Parr  YOB: 1965  Date of Visit: 2/19/2025      To Whom it May Concern:    Tess Parr is under my professional care. Tess was seen in my office on 2/19/2025. Tess may work from home for remainder of the week. Return to work in office 2/24/25.    If you have any questions or concerns, please don't hesitate to call.         Sincerely,          Omar Gallego,         CC: No Recipients

## 2025-02-19 NOTE — LETTER
February 19, 2025     Patient: Tess Parr  YOB: 1965  Date of Visit: 2/19/2025      To Whom it May Concern:    Tess Prar is under my professional care. Tess was seen in my office on 2/19/2025. Tess she may return to work Friday 2/21/25 .    If you have any questions or concerns, please don't hesitate to call.         Sincerely,          Omar Gallego,         CC: No Recipients

## 2025-02-21 ENCOUNTER — OFFICE VISIT (OUTPATIENT)
Dept: PLASTIC SURGERY | Facility: CLINIC | Age: 60
End: 2025-02-21

## 2025-02-21 DIAGNOSIS — L98.9 SKIN LESION: Primary | ICD-10-CM

## 2025-02-21 PROCEDURE — 99024 POSTOP FOLLOW-UP VISIT: CPT | Performed by: SURGERY

## 2025-02-26 ENCOUNTER — TELEPHONE (OUTPATIENT)
Age: 60
End: 2025-02-26

## 2025-02-26 NOTE — TELEPHONE ENCOUNTER
Received call from [facility] on behalf of Patient. Patient has been working between Dr. Herzog and [facility]. [facility] needs the pathology report faxed to them.   Phone: [phone]  Fax: [phone]      [facility] [place]: Please fax Pathology Report to [facility]: Fax: [phone]

## 2025-03-12 ENCOUNTER — PATIENT MESSAGE (OUTPATIENT)
Age: 60
End: 2025-03-12

## 2025-03-12 ENCOUNTER — TELEPHONE (OUTPATIENT)
Age: 60
End: 2025-03-12

## 2025-03-12 NOTE — TELEPHONE ENCOUNTER
Pt's appt needs to be rescheduled d/t provider not being in the office. Attempted to reach patient about need of appointment change with no success. Detailed VM left with [employer] number [phone] for patient to return call to reschedule.     Additional message sent Via [employer].

## 2025-03-13 DIAGNOSIS — L40.9 PSORIASIS: Primary | ICD-10-CM

## 2025-03-13 RX ORDER — TRIAMCINOLONE ACETONIDE 1 MG/G
CREAM TOPICAL 2 TIMES DAILY
Qty: 15 G | Refills: 0 | Status: SHIPPED | OUTPATIENT
Start: 2025-03-13 | End: 2025-03-27

## 2025-03-13 RX ORDER — TRIAMCINOLONE ACETONIDE 1 MG/G
OINTMENT TOPICAL 2 TIMES DAILY
Status: DISCONTINUED | OUTPATIENT
Start: 2025-03-13 | End: 2025-03-13

## 2025-03-13 NOTE — TELEPHONE ENCOUNTER
Pt's appt needs to be rescheduled d/t provider not being in the office. Attempted to reach patient about need of appointment change with no success. Detailed VM left with [facility] number [phone] for patient to return call to reschedule.      Additional message sent Via [employer].

## 2025-03-14 NOTE — TELEPHONE ENCOUNTER
Pt's appt needs to be rescheduled d/t provider not being in the office. Attempted to reach patient about need of appointment change with no success. Detailed VM left with [facility] number [phone] for patient to return call to reschedule.      Additional message sent Via [employer]          [facility]

## 2025-03-17 ENCOUNTER — TELEPHONE (OUTPATIENT)
Age: 60
End: 2025-03-17

## 2025-04-01 DIAGNOSIS — M25.561 ACUTE PAIN OF RIGHT KNEE: Primary | ICD-10-CM

## 2025-04-01 DIAGNOSIS — M25.461 EFFUSION OF RIGHT KNEE: ICD-10-CM

## 2025-04-04 DIAGNOSIS — R11.0 NAUSEA: Primary | ICD-10-CM

## 2025-04-04 RX ORDER — ONDANSETRON 4 MG/1
4 TABLET, FILM COATED ORAL EVERY 8 HOURS PRN
Qty: 20 TABLET | Refills: 0 | Status: SHIPPED | OUTPATIENT
Start: 2025-04-04

## 2025-04-07 ENCOUNTER — HOSPITAL ENCOUNTER (OUTPATIENT)
Dept: MRI IMAGING | Facility: HOSPITAL | Age: 60
Discharge: HOME | End: 2025-04-07
Payer: COMMERCIAL

## 2025-04-07 DIAGNOSIS — M25.461 EFFUSION OF RIGHT KNEE: ICD-10-CM

## 2025-04-07 DIAGNOSIS — M25.561 ACUTE PAIN OF RIGHT KNEE: ICD-10-CM

## 2025-04-07 PROCEDURE — 73721 MRI JNT OF LWR EXTRE W/O DYE: CPT

## 2025-04-24 ENCOUNTER — OFFICE VISIT (OUTPATIENT)
Dept: OBGYN CLINIC | Facility: CLINIC | Age: 60
End: 2025-04-24
Payer: COMMERCIAL

## 2025-04-24 DIAGNOSIS — M17.11 PRIMARY OSTEOARTHRITIS OF RIGHT KNEE: Primary | ICD-10-CM

## 2025-04-24 DIAGNOSIS — M25.561 ACUTE PAIN OF RIGHT KNEE: ICD-10-CM

## 2025-04-24 PROCEDURE — 99214 OFFICE O/P EST MOD 30 MIN: CPT | Performed by: ORTHOPAEDIC SURGERY

## 2025-04-24 PROCEDURE — 20610 DRAIN/INJ JOINT/BURSA W/O US: CPT | Performed by: ORTHOPAEDIC SURGERY

## 2025-04-24 RX ORDER — BUPIVACAINE HYDROCHLORIDE 5 MG/ML
2 INJECTION, SOLUTION EPIDURAL; INTRACAUDAL; PERINEURAL
Status: COMPLETED | OUTPATIENT
Start: 2025-04-24 | End: 2025-04-24

## 2025-04-24 RX ORDER — TRIAMCINOLONE ACETONIDE 40 MG/ML
80 INJECTION, SUSPENSION INTRA-ARTICULAR; INTRAMUSCULAR
Status: COMPLETED | OUTPATIENT
Start: 2025-04-24 | End: 2025-04-24

## 2025-04-24 RX ADMIN — TRIAMCINOLONE ACETONIDE 80 MG: 40 INJECTION, SUSPENSION INTRA-ARTICULAR; INTRAMUSCULAR at 15:00

## 2025-04-24 RX ADMIN — BUPIVACAINE HYDROCHLORIDE 2 ML: 5 INJECTION, SOLUTION EPIDURAL; INTRACAUDAL; PERINEURAL at 15:00

## 2025-04-24 NOTE — PROGRESS NOTES
"Name: Tess Parr      : 1965      MRN: [id number]  Encounter Provider: Omar Gallego DO  Encounter Date: 2025   Encounter department: [facility]  :  Assessment & Plan  Primary osteoarthritis of right knee    Orders:    Large joint arthrocentesis: R knee    Acute pain of right knee    Orders:    Large joint arthrocentesis: R knee         Tess Parr is a pleasant 59 y.o. female presenting today for follow-up of her activity related right knee pain after undergoing an MRI.  We discussed that although she has a horizontal tear of the lateral meniscus, it is unlikely that this is the source of her pain.  In fact, we believe it is likely a sequela of her severe underlying osteoarthritis.  Due to the severity of her arthritis, we discussed that she may ultimately require total knee arthroplasty, and would not benefit from arthroscopy at this point.  Given her young age and high activity level, we would like to exhaust all conservative treatment.  Therefore, she consented to and underwent a right knee cortisone injection as detailed below, which she tolerated well without difficulty or complication.  Postinjection instructions were provided.  We we will plan to see her back in 3 to 4 months as needed.  She expressed understanding all of her questions were addressed    I have personally reviewed pertinent films in PACS of the recently taken MRI of her right knee without contrast which shows a horizontal lateral meniscus tear.  The collateral cruciate ligaments are intact.  Also details severe tricompartmental degenerative changes     Large joint arthrocentesis: R knee  Universal Protocol:  Consent: Verbal consent obtained.  Risks and benefits: risks, benefits and alternatives were discussed  Consent given by: patient  Time out: Immediately prior to procedure a \"time out\" was called to verify the correct patient, procedure, equipment, support staff and site/side marked " "as required.  Timeout called at: 4/24/2025 3:18 PM.  Site marked: the operative site was marked  Patient identity confirmed: verbally with patient  Supporting Documentation  Indications: pain     Is this a Visco injection? NoProcedure Details  Location: knee - R knee  Preparation: Patient was prepped and draped in the usual sterile fashion  Needle size: 20 G  Ultrasound guidance: no  Approach: anterolateral  Medications administered: 80 mg triamcinolone acetonide 40 mg/mL; 2 mL bupivacaine (PF) 0.5 %    Patient tolerance: patient tolerated the procedure well with no immediate complications  Dressing:  Sterile dressing applied        Subjective: Right knee MRI follow-up    History: Tess Parr is a 59 y.o. female presenting today for follow-up of her right knee.  She reports no new injuries, but has had worsening daily symptoms with activity.  This is significantly limited her ability to perform ADLs.    Estimated body mass index is 27.76 kg/m² as calculated from the following:    Height as of 2/5/25: 5' 6\" (1.676 m).    Weight as of 1/14/25: 78 kg (172 lb).    Lab Results   Component Value Date    HGBA1C 6.1 (H) 09/03/2024       Social History     Occupational History    Not on file   Tobacco Use    Smoking status: Never    Smokeless tobacco: Never   Vaping Use    Vaping status: Never Used   Substance and Sexual Activity    Alcohol use: Yes     Alcohol/week: 2.0 standard drinks of alcohol     Types: 2 Glasses of wine per week    Drug use: No    Sexual activity: Yes     Partners: Male     Birth control/protection: Condom Male       Objective:  Right Knee Exam     Muscle Strength   The patient has normal right knee strength.    Tenderness   The patient is experiencing tenderness in the medial joint line and lateral joint line.    Range of Motion   Extension:  0 (passive) normal   Flexion:  120 normal     Tests   Varus: negative Valgus: negative  Drawer:  Anterior - negative      Patellar apprehension: " negative    Other   Erythema: absent  Scars: present (Well-healed anterior arthroscopy portals)  Sensation: normal  Pulse: present  Swelling: none  Effusion: effusion (Scant) present    Comments:  Positive patellofemoral crepitance and patellofemoral grind  Collateral ligaments stable at 0, 30, 90 degrees  Ambulates with slightly antalgia gait on the right without assistive device      Left Knee Exam     Other   Effusion: trace.          Observations   Left Knee   Effusion: trace.      Right Knee   Positive for effusion (Scant).       There were no vitals filed for this visit.    Past Medical History:   Diagnosis Date    Arthritis     Knees and feet    Asthma     exercise induced    Cancer (HCC)     breast    COVID 2021    Difficulty falling asleep     Fractures 11/2019    GERD (gastroesophageal reflux disease)     Gluteal abscess     Last Assessed: 12/30/2016    Limb alert care status     right    Pain in left foot     Pneumonia 2009    x1    PONV (postoperative nausea and vomiting)     Skin cancer     Varicella        Past Surgical History:   Procedure Laterality Date    ANKLE SURGERY  09/2016    BASAL CELL CARCINOMA EXCISION N/A 01/07/2021    Procedure: EXCISION BASAL CELL CARCINOMA OF MID BACK;  Surgeon: Du Herzog MD;  Location:  MAIN OR;  Service: Plastics    BASAL CELL CARCINOMA EXCISION Left 04/25/2022    Procedure: EXCISION OF BCC (X2) LEFT SHOULDER WITH COMPLEX CLOSURE;  Surgeon: Du Herzog MD;  Location: [facility] MAIN OR;  Service: Plastics    BREAST BIOPSY  05/02/2014    BREAST BIOPSY  05/2013    BREAST BIOPSY  05/2008    BREAST LUMPECTOMY Right 2008    BREAST RECONSTRUCTION Left 02/21/2019    Procedure: BREAST EXPANDER/IMPLANT EXCHANGE;  Surgeon: Du Herzog MD;  Location:  MAIN OR;  Service: Plastics    BREAST SURGERY Bilateral 2008, 2013, 2014    Breast reconstruction with expanders. Total of 7 surgeries.  7/3/14 right breast reconstruction revision. 10/27/14 right breast  reconstruction revision     BREAST SURGERY Left 07/24/2013    Excision of breast lesion    CAPSULOTOMY Left 02/21/2019    Procedure: CAPSULECTOMY;  Surgeon: Du Herzog MD;  Location: [facility]IN OR;  Service: Plastics    CHEST WALL BIOPSY Right 02/21/2019    Procedure: UPPER CHEST BASAL CELL CARSINOMA EXCISION;  Surgeon: Du Herzog MD;  [facility];  Service: Plastics    CLOSURE WOUND N/A 02/21/2019    Procedure: RIGHT upper chest & LEFT upper back complex closure;  Surgeon: Du Herzog MD;  [facility];  Service: Plastics    COLONOSCOPY      COMPLEX WOUND CLOSURE TO EXTREMITY N/A 01/07/2021    Procedure: COMPLEX CLOSURE MID BACK;  Surgeon: Du Herzog MD;  [facility]: [facility];  Service: Plastics    ELBOW SURGERY  12/2019    FOOT ARTHRODESIS Left     Pantalar-has pins, plates, screws-MRI compatible    KNEE ARTHROSCOPY Right 05/1997    LIPOSUCTION W/ FAT INJECTION Bilateral 10/24/2019    Procedure: AUTOLOGOUS FAT GRAFTING TO BILATERAL BREAST;  Surgeon: Du Herzog MD;  [facility];  Service: Plastics    LIPOSUCTION W/ FAT INJECTION Bilateral 01/07/2021    Procedure: AUTOLOGOUS FAT GRAFTING TO BILATERAL BREAST;  Surgeon: Du Herzog MD;  [facility]: [facility];  Service: Plastics    MASS EXCISION Right 07/31/2023    Procedure: EXCISION BCC RIGHT SUPERIOR SHOULDER, RIGHT INFERIOR SHOULDER, RIGHT HIP/BUTTOCK WITH COMPLEX CLOSURE;  Surgeon: Du Herzog MD;  [facility];  Service: Plastics    MASTECTOMY Bilateral 06/06/2014    Lymph nodes removed bilaterally. States she may have IV's in left side.    MOHS RECONSTRUCTION Right 03/07/2019    Procedure: RECONSTRUCTION MOHS DEFECT RIGHT NOSE/MEDIAL CANTHUS;  Surgeon: Du Herzog MD;  [facility];  Service: Plastics    MOHS SURGERY      Mohs Micrographic Surgery Face; Last Assessed: 5/15/2015    OH ADJT TIS REARGMT EYE/NOSE/EAR/LIP 10.1-30.0 SQCM Right 03/07/2019    Procedure:  FLAP;  Surgeon: Du Herzog MD;  [facility];  Service: Plastics    WV ARTHRD MIDTARSL/TARSOMETATARSAL MULT/TRANSVRS Left 09/09/2016    Procedure: ARTHRODESIS FIRST METATARSALCUNIFORM JOINT FUSION, TALLNAVICULAR JOINT FUSION ;  Surgeon: Walker Kenny DPM;  Location[facility];  Service: Podiatry    WV BREAST RECONSTRUCTION W/LATISSIMUS DORSI FLAP Left 09/07/2017    Procedure: BREAST RECONSTRUCTION; LATISSIMUS DORSI FLAP; TISSUE EXPANDER;  Surgeon: Du Herzog MD;  [facility];  Service: Plastics    WV COLONOSCOPY FLX DX W/COLLJ SPEC WHEN PFRMD N/A 08/01/2018    Procedure: COLONOSCOPY;  Surgeon: Du Viveros MD;  [facility];  Service: Gastroenterology    WV CORRJ HLX VLGS BNCTY SESMDC RESCJ PROX PHLX BASE  09/09/2016    Procedure: BUNIONECTOMY VARGAS MODIFIED ;  Surgeon: Walker Kenny DPM;  [facility];  Service: Podiatry    WV EXCISION MALIGNANT LESION TRUNK/ARM/LEG > 4.0 CM N/A 06/27/2019    Procedure: EXCISION BASAL CELL CARCINOMA MID CHEST;  Surgeon: Du Herzog MD;  [facility];  Service: Plastics    WV EXCISION MALIGNANT LESION TRUNK/ARM/LEG > 4.0 CM Right 03/25/2024    Procedure: EXCISION OF BCC RIGHT BREAST, LOCAL FLAP RECONSTRUCTION;  Surgeon: Du Herzog MD;  [facility];  Service: Plastics    WV EXCISION MALIGNANT LESION TRUNK/ARM/LEG > 4.0 CM N/A 09/12/2024    Procedure: EXCISION OF LESIONS OF RIGHT CHEST, LEFT MEDIAL CHEST AND MID BACK WITH COMPLEX CLOSURE;  Surgeon: Du Herzog MD;  [facility]: [facility];  Service: Plastics    WV FTH/GFT FREE W/DIRECT CLOSURE N/E/E/L 20 SQ CM/< Right 03/07/2019    Procedure: SKIN GRAFT FULL THICKNESS  (FTSG);  Surgeon: Du Herzog MD;  [facility];  Service: Plastics    WV HYSTEROSCOPY BX ENDOMETRIUM&/POLYPC W/WO D&C N/A 01/07/2022    Procedure: DILATATION AND CURETTAGE (D&C) WITH HYSTEROSCOPY, POLYPECTOMY ;  Surgeon: Fernanda Kennedy MD;  [facility];  Service: Gynecology    CO HYSTEROSCOPY BX ENDOMETRIUM&/POLYPC W/WO D&C N/A 01/14/2025    Procedure: HYSTEROSCOPY, DILATION AND CURETTAGE, EXAM UNDER ANESTHESIA;  Surgeon: Fernanda Kennedy MD;  Location: [facility];  Service: Gynecology    CO INSERTION BREAST IMPLANT SAME DAY OF MASTECTOMY Left 09/07/2017    Procedure: TISSUE EXPANDER;  Surgeon: Du Herzog MD;  [facility];  Service: Plastics    CO OPEN TX MONTEGGIA FRACTURE DISLOCATION ELBOW Left 12/11/2019    Procedure: OPEN REDUCTION W/ INTERNAL FIXATION left distal humerus fracture, possible olecranon osteotomy;  Surgeon: Silvano Scott MD;  Location: [facility] MAIN OR;  Service: Orthopedics    CO OSTEOTOMY CALCANEUS W/WO INTERNAL FIXATION Left 09/09/2016    Procedure: OSTEOTOMY CALCANEUS WITH APPLICATION AND ACTIVATION OF BONE STIMULATOR ;  Surgeon: Walker Kenny DPM;  [facility];  Service: Podiatry    CO REMV TISSUE FOR GRAFT OTHR Bilateral 06/27/2019    Procedure: AUTOLOGOUS FAT GRAFTING BILATERAL BREASTS;  Surgeon: Du Herzog MD;  [facility];  Service: Plastics    CO REPAIR COMPLEX F/C/C/M/N/AX/G/H/F 2.6-7.5 CM N/A 03/07/2019    Procedure: COMPLEX CLOSURE;  Surgeon: Du Herzog MD;  [facility];  Service: Plastics    CO REPAIR COMPLEX TRUNK 2.6-7.5 CM N/A 06/27/2019    Procedure: CLOSURE WOUND MID CHEST;  Surgeon: Du Herzog MD;  [facility];  Service: Plastics    CO REVISION OF RECONSTRUCTED BREAST Bilateral 06/27/2019    Procedure: BILATERAL BREAST MOUND REVISION;  Surgeon: Du Herzog MD;  [facility];  Service: Plastics    CO REVISION OF RECONSTRUCTED BREAST Bilateral 10/24/2019    Procedure: BILATERAL BREAST MOUND REVISION;  Surgeon: Du Herzog MD;  [facility];  Service: Plastics    CO REVISION OF RECONSTRUCTED BREAST Bilateral 01/07/2021    Procedure: BILATERAL BREAST MOUND REVISION;  Surgeon: Du Herzog MD;  [facility]: [facility];  Service:  Plastics    SENTINEL LYMPH NODE BIOPSY Bilateral 06/06/2014    SENTINEL LYMPH NODE BIOPSY Right 2008    SKIN BIOPSY      SKIN LESION EXCISION Left 02/21/2019    Procedure: UPPER BACK BCC EXCISION;  Surgeon: Du Herzog MD;  [facility];  Service: Plastics    SQUAMOUS CELL CARCINOMA EXCISION N/A 03/07/2019    Procedure: EXCISION BCC LEFT FOREHEAD;  Surgeon: Du Herzog MD;  [facility];  Service: Plastics    WISDOM TOOTH EXTRACTION         Family History   Adopted: Yes   Problem Relation Age of Onset    No Known Problems Mother     No Known Problems Father          Current Outpatient Medications:     acetaminophen (TYLENOL) 500 mg tablet, Take 500 mg by mouth every 6 (six) hours as needed for mild pain., Disp: , Rfl:     albuterol (Ventolin HFA) 90 mcg/act inhaler, Inhale 2 puffs every 6 (six) hours as needed for wheezing or shortness of breath, Disp: 8 g, Rfl: 0    ALPRAZolam (XANAX) 1 mg tablet, Take 1 mg by mouth daily at bedtime as needed, Disp: , Rfl:     Ascorbic Acid (VITAMIN C) 500 MG CAPS, Take 500 mg by mouth in the morning  , Disp: , Rfl:     Calcium Magnesium Zinc 333-133-5 MG TABS, Take by mouth in the morning, Disp: , Rfl:     cetirizine (ZyrTEC) 10 MG chewable tablet, Chew 10 mg daily, Disp: , Rfl:     Cholecalciferol (VITAMIN D) 2000 units CAPS, Take by mouth in the morning  , Disp: , Rfl:     famotidine (PEPCID) 20 mg tablet, Take 20 mg by mouth 2 (two) times a day as needed  , Disp: , Rfl:     Klayesta powder, Apply 1 Application topically 2 (two) times a day To affected area, Disp: , Rfl:     omeprazole (PriLOSEC) 20 mg delayed release capsule, Take 20 mg by mouth as needed  , Disp: , Rfl:     ondansetron (ZOFRAN) 4 mg tablet, Take 1 tablet (4 mg total) by mouth every 8 (eight) hours as needed for nausea or vomiting, Disp: 20 tablet, Rfl: 0    Probiotic Product (Probiotic Daily) CAPS, Take by mouth in the morning, Disp: , Rfl:     triamcinolone (KENALOG) 0.1 % cream,  Apply topically, Disp: , Rfl:     triamcinolone (KENALOG) 0.1 % cream, Apply topically 2 (two) times a day for 14 days Lower left leg, Disp: 15 g, Rfl: 0    Allergies   Allergen Reactions    Codeine Itching     Severe    Dilaudid [Hydromorphone Hcl] Itching     Severe    Hydromorphone Itching    Tramadol Itching    Other Rash     Adhesive tape         Review of Systems   Constitutional: Negative.    HENT: Negative.     Eyes: Negative.    Respiratory: Negative.     Cardiovascular: Negative.    Gastrointestinal: Negative.    Endocrine: Negative.    Genitourinary: Negative.    Musculoskeletal:  Positive for arthralgias, joint swelling and myalgias.   Skin: Negative.    Allergic/Immunologic: Negative.    Neurological: Negative.    Hematological: Negative.    Psychiatric/Behavioral: Negative.           Physical Exam  Vitals and nursing note reviewed.   Constitutional:       Appearance: Normal appearance. She is well-developed.      Comments: There is no height or weight on file to calculate BMI.   HENT:      Head: Normocephalic and atraumatic.      Right Ear: External ear normal.      Left Ear: External ear normal.   Eyes:      Extraocular Movements: Extraocular movements intact.      Conjunctiva/sclera: Conjunctivae normal.   Cardiovascular:      Rate and Rhythm: Normal rate.      Pulses: Normal pulses.   Pulmonary:      Effort: Pulmonary effort is normal.   Abdominal:      Palpations: Abdomen is soft.   Musculoskeletal:      Cervical back: Normal range of motion.      Right knee: Effusion (Scant) present.      Left knee: Effusion: trace.      Comments: See ortho exam   Skin:     General: Skin is warm and dry.   Neurological:      General: No focal deficit present.      Mental Status: She is alert and oriented to person, place, and time. Mental status is at baseline.   Psychiatric:         Mood and Affect: Mood normal.         Behavior: Behavior normal.         Thought Content: Thought content normal.         Judgment:  Judgment normal.         Scribe Attestation      I,:  Salvatore Manriquez PA-C am acting as a scribe while in the presence of the attending physician.:       I,:  Omar Gallego, DO personally performed the services described in this documentation    as scribed in my presence.:             This document was created using speech voice recognition software.   Grammatical errors, random word insertions, pronoun errors, and incomplete sentences are an occasional consequence of this system due to software limitations, ambient noise, and hardware issues.   Any formal questions or concerns about content, text, or information contained within the body of this dictation should be directly addressed to the provider for clarification.

## 2025-04-29 ENCOUNTER — TELEPHONE (OUTPATIENT)
Dept: DERMATOLOGY | Facility: CLINIC | Age: 60
End: 2025-04-29

## 2025-04-29 ENCOUNTER — OFFICE VISIT (OUTPATIENT)
Dept: DERMATOLOGY | Facility: CLINIC | Age: 60
End: 2025-04-29
Payer: COMMERCIAL

## 2025-04-29 VITALS — HEIGHT: 66 IN | WEIGHT: 172 LBS | BODY MASS INDEX: 27.64 KG/M2 | TEMPERATURE: 96.7 F

## 2025-04-29 DIAGNOSIS — Z85.828 HISTORY OF BASAL CELL CARCINOMA: ICD-10-CM

## 2025-04-29 DIAGNOSIS — Z85.89 HISTORY OF SQUAMOUS CELL CARCINOMA: Primary | ICD-10-CM

## 2025-04-29 DIAGNOSIS — L30.0 NUMMULAR ECZEMA: ICD-10-CM

## 2025-04-29 DIAGNOSIS — L21.9 SEBORRHEIC DERMATITIS: ICD-10-CM

## 2025-04-29 PROCEDURE — 99204 OFFICE O/P NEW MOD 45 MIN: CPT | Performed by: NURSE PRACTITIONER

## 2025-04-29 RX ORDER — KETOCONAZOLE 20 MG/ML
SHAMPOO, SUSPENSION TOPICAL ONCE
Qty: 120 ML | Status: CANCELLED | OUTPATIENT
Start: 2025-04-29 | End: 2025-04-29

## 2025-04-29 RX ORDER — KETOCONAZOLE 20 MG/ML
SHAMPOO, SUSPENSION TOPICAL
Qty: 100 ML | Refills: 3 | Status: SHIPPED | OUTPATIENT
Start: 2025-04-29

## 2025-04-29 RX ORDER — CLOBETASOL PROPIONATE 0.5 MG/G
CREAM TOPICAL 2 TIMES DAILY
Qty: 60 G | Refills: 0 | Status: SHIPPED | OUTPATIENT
Start: 2025-04-29

## 2025-04-29 NOTE — TELEPHONE ENCOUNTER
Recv'd fax from PT for Release of Health Information at [facility]    Scanned into chart

## 2025-04-29 NOTE — PROGRESS NOTES
"[facility] Note     Patient Name: Tess Parr  Encounter Date: 04/29/2025       Have you been cared for by a [facility] Dermatologist in the last 3 years and, if so, which description applies to you? NO. I am considered a \"new\" patient and must complete all patient intake questions. I am of child-bearing potential.     REVIEW OF SYSTEMS:  Have you recently had or currently have any of the following? Recent fever or chills? No  Any non-healing wound? No  Are you pregnant or planning to become pregnant? No  Are you currently or planning to be nursing or breast feeding? No   PAST MEDICAL HISTORY:  Have you personally ever had or currently have any of the following?  If \"YES,\" then please provide more detail. Skin cancer (such as Melanoma, Basal Cell Carcinoma, Squamous Cell Carcinoma?  YES, BCC, SCC  Tuberculosis, HIV/AIDS, Hepatitis B or C: No  Radiation Treatment YES, BREAST CANCER 2006, 2014   HISTORY OF IMMUNOSUPPRESSION:   Do you have a history of any of the following:  Systemic Immunosuppression such as Diabetes, Biologic or Immunotherapy, Chemotherapy, Organ Transplantation, Bone Marrow Transplantation or Prednsione?  No    Answering \"YES\" requires the addition of the dotphrase \"IMMUNOSUPPRESSED\" as the first diagnosis of the patient's visit.   FAMILY HISTORY:  Any \"first degree relatives\" (parent, brother, sister, or child) with the following?    Skin Cancer, Pancreatic or Other Cancer? No   PATIENT EXPERIENCE:    Do you want the Dermatologist to perform a COMPLETE skin exam today including a clinical examination under the \"bra and underwear\" areas?  NO  If necessary, do we have your permission to call and leave a detailed message on your Preferred Phone number that includes your specific medical information?  Yes      Allergies   Allergen Reactions    Codeine Itching     Severe    Dilaudid [Hydromorphone Hcl] Itching     Severe    Hydromorphone Itching    Tramadol Itching    Other Rash "     Adhesive tape      Current Outpatient Medications:     acetaminophen (TYLENOL) 500 mg tablet, Take 500 mg by mouth every 6 (six) hours as needed for mild pain., Disp: , Rfl:     albuterol (Ventolin HFA) 90 mcg/act inhaler, Inhale 2 puffs every 6 (six) hours as needed for wheezing or shortness of breath, Disp: 8 g, Rfl: 0    ALPRAZolam (XANAX) 1 mg tablet, Take 1 mg by mouth daily at bedtime as needed, Disp: , Rfl:     Ascorbic Acid (VITAMIN C) 500 MG CAPS, Take 500 mg by mouth in the morning  , Disp: , Rfl:     Calcium Magnesium Zinc 333-133-5 MG TABS, Take by mouth in the morning, Disp: , Rfl:     cetirizine (ZyrTEC) 10 MG chewable tablet, Chew 10 mg daily, Disp: , Rfl:     Cholecalciferol (VITAMIN D) 2000 units CAPS, Take by mouth in the morning  , Disp: , Rfl:     famotidine (PEPCID) 20 mg tablet, Take 20 mg by mouth 2 (two) times a day as needed  , Disp: , Rfl:     Klayesta powder, Apply 1 Application topically 2 (two) times a day To affected area, Disp: , Rfl:     omeprazole (PriLOSEC) 20 mg delayed release capsule, Take 20 mg by mouth as needed  , Disp: , Rfl:     ondansetron (ZOFRAN) 4 mg tablet, Take 1 tablet (4 mg total) by mouth every 8 (eight) hours as needed for nausea or vomiting, Disp: 20 tablet, Rfl: 0    Probiotic Product (Probiotic Daily) CAPS, Take by mouth in the morning, Disp: , Rfl:     triamcinolone (KENALOG) 0.1 % cream, Apply topically, Disp: , Rfl:     triamcinolone (KENALOG) 0.1 % cream, Apply topically 2 (two) times a day for 14 days Lower left leg, Disp: 15 g, Rfl: 0              Whom besides the patient is providing clinical information about today's encounter?   NO ADDITIONAL HISTORIAN (patient alone provided history)    Physical Exam and Assessment/Plan by Diagnosis:    Patient presents in consultation, transferring care from [facility].  Records not available for review.      HISTORY OF BASAL CELL CARCINOMA    Physical Exam:  Anatomic Location Affected: left mid back  (excision 9/12/24), right breast (excision 3/25/24), right upper extremity (excision 7/31/23), right lower limb (excision 7/31/23), left neck x2 (excision 4/25/22), mid back (excision 1/7/21), trunk (excision 3/7/19), right side of nose (Mohs 3/7/19), left upper back (excision 2/21/19), right upper chest (excision 2/21/19)  Morphological Description of scar:  well healed  Suspected Recurrence: No  Pertinent Positives:  Pertinent Negatives:      Additional History of Present Condition:  History of multiple BCC.  She has been following with [facility], and all excisions/Mohs performed by Dr. Herzog with plastic surgery.  States last skin exam was a few months ago.       Assessment and Plan:  Based on a thorough discussion of this condition and the management approach to it (including a comprehensive discussion of the known risks, side effects and potential benefits of treatment), the patient (family) agrees to implement the following specific plan:  Continue 6 month skin checks.  Next skin check due in July 2025  Will request records from prior dermatologist for review    HISTORY OF SQUAMOUS CELL CARCINOMA in SITU    Physical Exam:  Anatomic Location Affected:  left chest (excision 9/12/24)  Morphological Description of Scar:  well healed  Suspected Recurrence: no  Regional adenopathy: no    Additional History of Present Condition:  History of SCCIS.  Last excision performed on 9/12/24.    Assessment and Plan:  Based on a thorough discussion of this condition and the management approach to it (including a comprehensive discussion of the known risks, side effects and potential benefits of treatment), the patient (family) agrees to implement the following specific plan:  Continue 6 month skin checks.    NUMMULAR ECZEMA    Physical Exam:  Anatomic Location Affected:  left shin   Morphological Description:  circumferential pink plaque with scale  Pertinent Positives:  Pertinent Negatives:    Additional  "History of Present Condition:  Reports unchanged patch on left shin.  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  Patient notes occasional itch.  She was prescribed Triamcinolone with little improvement.  Patient used BID for 2 weeks then discontinued.  She does not moisturize daily, washes with Dove soap.    Assessment and Plan:  Based on a thorough discussion of this condition and the management approach to it (including a comprehensive discussion of the known risks, side effects and potential benefits of treatment), the patient (family) agrees to implement the following specific plan:  Start Clobetasol 0.05 % cream bid two weeks 1 week break  then continue as need when symptomatic (avoid face armpits and groin area)  Moisturize with otc cream 2-3x/day  Contact office if no improvement in the next month      SEBORRHEIC DERMATITIS    Physical Exam:  Anatomic Location: scalp  Morphologic Description:  Erythematous plaques with greasy scale  Pertinent Positives:  Pertinent Negatives:    Additional History of Present Condition:  Patient reports occasional itchy scalp.  Washes with Ronny shampoo and Head and Shoulders.    Plan:  Ketoconazole 2% shampoo: Apply to affected area daily for 5 to 10 minutes, then rinse clear.    Medical Complexity:    CHRONIC ILLNESS (expected duration of >1 year):  STABLE (i.e., AT TREATMENT GOAL). \"Stable\" refers to treatment goals for the individual patient.  A patient not at treatment goal is NOT stable even if the condition is not changing and the patient is asymptomatic because the risk of morbidity without treatment is significant.     Scribe Attestation      I,:  Michaela Marley MA am acting as a scribe while in the presence of the attending physician.:       I,:  PRECIOUS Roche personally performed the services described in this documentation    as scribed in my presence.:           "

## 2025-05-23 ENCOUNTER — OFFICE VISIT (OUTPATIENT)
Dept: PLASTIC SURGERY | Facility: CLINIC | Age: 60
End: 2025-05-23

## 2025-05-23 VITALS
SYSTOLIC BLOOD PRESSURE: 143 MMHG | HEIGHT: 66 IN | TEMPERATURE: 98 F | HEART RATE: 79 BPM | WEIGHT: 170 LBS | BODY MASS INDEX: 27.32 KG/M2 | DIASTOLIC BLOOD PRESSURE: 97 MMHG

## 2025-05-23 DIAGNOSIS — Z42.1 AFTERCARE POSTMASTECTOMY FOR BREAST RECONSTRUCTION: Primary | ICD-10-CM

## 2025-05-23 PROCEDURE — RECHECK: Performed by: SURGERY

## 2025-05-23 NOTE — PROGRESS NOTES
"Tess is being seen today for routine follow-up visit, status post bilateral mastectomies, radiation, latissimus dorsi flap/tissue expander reconstruction, followed by tissue expander/implant exchange.  She recalls that the right tissue expander implant exchange was performed in 2015, per the available notes, the left tissue expander implant exchange was performed on February 21, 2019 (350 cc moderate plus smooth round silicone gel implant).  MRI from February 21, 2023 revealed intact bilateral silicone implants.  She presents today inquiring whether it is \"time for the implants to be replaced\".  We talked about the anticipated longevity of breast implants, benefit of MRI surveillance to assess the integrity of silicone implants, and she has asked about the possibility of fat grafting to address the upper pole contour/rippling.  We talked about autologous fat grafting to address this issue, she is familiar with a FG, given multiple prior treatments.  She is unsure of when she would prefer to undergo this type of procedure, and we will schedule a preoperative visit accordingly.  "

## 2025-05-29 ENCOUNTER — OFFICE VISIT (OUTPATIENT)
Dept: CARDIOLOGY CLINIC | Facility: CLINIC | Age: 60
End: 2025-05-29
Payer: COMMERCIAL

## 2025-05-29 VITALS
HEART RATE: 69 BPM | OXYGEN SATURATION: 97 % | DIASTOLIC BLOOD PRESSURE: 78 MMHG | BODY MASS INDEX: 27.52 KG/M2 | WEIGHT: 170.5 LBS | SYSTOLIC BLOOD PRESSURE: 132 MMHG

## 2025-05-29 DIAGNOSIS — R00.2 PALPITATIONS: Primary | ICD-10-CM

## 2025-05-29 DIAGNOSIS — E78.00 HYPERCHOLESTEROLEMIA: ICD-10-CM

## 2025-05-29 PROCEDURE — 99204 OFFICE O/P NEW MOD 45 MIN: CPT | Performed by: INTERNAL MEDICINE

## 2025-05-29 PROCEDURE — 93000 ELECTROCARDIOGRAM COMPLETE: CPT | Performed by: INTERNAL MEDICINE

## 2025-05-29 NOTE — PROGRESS NOTES
[facility] Cardiology  Office Consultation  Tess Parr 60 y.o. female MRN: [id number]        Assessment & Plan  Palpitations  Racing sensation, that she sometimes feels could be anxiety symptoms.  She has never had an cardiac monitoring.  No prior stroke.  Her differential for a cause of palpitations is fairly broad  Hypercholesterolemia  Uncontrolled in 2024 with an LDL of 156, but in 2023 at a lower body weight she had an LDL of 108.  She is adopted, no family history available, additional screening and testing advised as below with ApoB, lipoprotein a, but no need for coronary calcium CT    Plan    Adopted, so no family history, will check apolipoprotein B, lipoprotein a, she does not need a coronary calcium CT considering lack of coronary calcium on a CTA in 2022.  She should certainly avoid high salt substitute electrolyte solutions and foods.  She does seem to have lost weight since last year which was when she had her worst cholesterol panel with an LDL of 156, I anticipate improvement.  Will also check a Zio patch x 7 days and an echocardiogram considering the palpitations.      Reason for Consult / Principal Problem: Palpitations    HPI: Tess Parr is a 60 y.o. year old female comes to see me for complaints of palpitations.  She admits to eating poorly.  Her cholesterol in 2024 was high with an LDL of 156, significant change from 2023 with an LDL of 108.  Her symptoms are sometimes confused with anxiety.  She has never had a stroke.  EKG is normal.  She does bring in a number of blood pressure on her blood pressure log, some mildly elevated, she was taking in a fair high amount of sodium especially with electrolyte solutions.  She has stopped that for the last few weeks blood pressures are currently normal.  He is adopted, history of        Review of Systems   Constitutional:  Negative for appetite change, diaphoresis, fatigue and fever.   Respiratory:  Negative for chest tightness, shortness of  breath and wheezing.    Cardiovascular:  Positive for palpitations. Negative for chest pain and leg swelling.   Gastrointestinal:  Negative for abdominal pain and blood in stool.   Musculoskeletal:  Negative for arthralgias and joint swelling.   Skin:  Negative for rash.   Neurological:  Negative for dizziness, syncope and light-headedness.   Psychiatric/Behavioral:  The patient is nervous/anxious.          Past Medical History[1]  Past Surgical History[2]  Social History     Substance and Sexual Activity   Alcohol Use Yes   • Alcohol/week: 2.0 standard drinks of alcohol   • Types: 2 Glasses of wine per week     Social History     Substance and Sexual Activity   Drug Use No     Tobacco Use History[3]  Family History[4]    Allergies:  Allergies[5]    Medications:   Current Medications[6]      Vitals:    05/29/25 0921   BP: 132/78   Pulse: 69   SpO2: 97%     Weight (last 2 days)     Date/Time Weight    05/29/25 0921 77.3 (170.5)     Weight: Per pt at 05/29/25 0921        Physical Exam  Constitutional:       General: She is not in acute distress.     Appearance: She is well-developed. She is not diaphoretic.   HENT:      Head: Normocephalic and atraumatic.     Eyes:      General: No scleral icterus.     Conjunctiva/sclera: Conjunctivae normal.      Pupils: Pupils are equal, round, and reactive to light.     Neck:      Thyroid: No thyromegaly.      Vascular: No JVD.      Trachea: No tracheal deviation.     Cardiovascular:      Rate and Rhythm: Normal rate and regular rhythm.      Heart sounds: Normal heart sounds. No murmur heard.     No friction rub. No gallop.   Pulmonary:      Effort: Pulmonary effort is normal. No respiratory distress.      Breath sounds: Normal breath sounds. No wheezing or rales.   Abdominal:      General: Bowel sounds are normal. There is no distension.      Palpations: Abdomen is soft.      Tenderness: There is no abdominal tenderness.     Musculoskeletal:         General: No tenderness or  "deformity. Normal range of motion.      Cervical back: Normal range of motion and neck supple.      Right lower leg: No edema.      Left lower leg: No edema.     Skin:     General: Skin is warm and dry.      Findings: No erythema or rash.     Neurological:      Mental Status: She is alert and oriented to person, place, and time.      Cranial Nerves: No cranial nerve deficit.     Psychiatric:         Judgment: Judgment normal.           Laboratory Studies:    Laboratory studies personally reviewed    Cardiac testing:     EKG reviewed personally:   Results for orders placed or performed in visit on 05/29/25   POCT ECG    Impression    Normal sinus, normal EKG       Echocardiogram:      Stress test:      Holter:      Cardiac catheterization:        Johann Stinson MD    Portions of the record may have been created with voice recognition software.  Occasional wrong word or \"sound a like\" substitutions may have occurred due to the inherent limitations of voice recognition software.  Read the chart carefully and recognize, using context, where substitutions have occurred.         [1]  Past Medical History:  Diagnosis Date   • Acne    • Actinic keratosis    • Arthritis     Knees and feet   • Asthma     exercise induced   • Basal cell carcinoma    • Breast cancer (HCC) 2007   • Breast discharge    • Cancer (HCC)     breast   • COVID 2021   • Difficulty falling asleep    • Fractures 11/2019   • GERD (gastroesophageal reflux disease)    • Gluteal abscess     Last Assessed: 12/30/2016   • Limb alert care status     right   • Pain in left foot    • Pneumonia 2009    x1   • PONV (postoperative nausea and vomiting)    • Psoriasis    • Skin cancer    • Squamous cell skin cancer    • Varicella    [2]  Past Surgical History:  Procedure Laterality Date   • ANKLE SURGERY  09/2016   • BASAL CELL CARCINOMA EXCISION N/A 01/07/2021    Procedure: EXCISION BASAL CELL CARCINOMA OF MID BACK;  Surgeon: Du Herzog MD;  Location: [facility]" OR;  Service: Plastics   • BASAL CELL CARCINOMA EXCISION Left 04/25/2022    Procedure: EXCISION OF BCC (X2) LEFT SHOULDER WITH COMPLEX CLOSURE;  Surgeon: Du Herzog MD;  [facility];  Service: Plastics   • BREAST BIOPSY  05/02/2014   • BREAST BIOPSY  05/2013   • BREAST BIOPSY  05/2008   • BREAST LUMPECTOMY Right 2008   • BREAST RECONSTRUCTION Left 02/21/2019    Procedure: BREAST EXPANDER/IMPLANT EXCHANGE;  Surgeon: Du Herzog MD;  [facility]: [facility];  Service: Plastics   • BREAST SURGERY Bilateral 2008, 2013, 2014    Breast reconstruction with expanders. Total of 7 surgeries.  7/3/14 right breast reconstruction revision. 10/27/14 right breast reconstruction revision    • BREAST SURGERY Left 07/24/2013    Excision of breast lesion   • CAPSULOTOMY Left 02/21/2019    Procedure: CAPSULECTOMY;  Surgeon: Du Herzog MD;  [facility];  Service: Plastics   • CHEST WALL BIOPSY Right 02/21/2019    Procedure: UPPER CHEST BASAL CELL CARSINOMA EXCISION;  Surgeon: Du Herzog MD;  [facility];  Service: Plastics   • CLOSURE WOUND N/A 02/21/2019    Procedure: RIGHT upper chest & LEFT upper back complex closure;  Surgeon: Du Herzog MD;  [facility];  Service: Plastics   • COLONOSCOPY     • COMPLEX WOUND CLOSURE TO EXTREMITY N/A 01/07/2021    Procedure: COMPLEX CLOSURE MID BACK;  Surgeon: Du Herzog MD;  [facility]: [facility];  Service: Plastics   • ELBOW SURGERY  12/2019   • FOOT ARTHRODESIS Left     Pantalar-has pins, plates, screws-MRI compatible   • HYSTEROSCOPY  2021   • KNEE ARTHROSCOPY Right 05/1997   • LIPOSUCTION W/ FAT INJECTION Bilateral 10/24/2019    Procedure: AUTOLOGOUS FAT GRAFTING TO BILATERAL BREAST;  Surgeon: Du Herzog MD;  [facility];  Service: Plastics   • LIPOSUCTION W/ FAT INJECTION Bilateral 01/07/2021    Procedure: AUTOLOGOUS FAT GRAFTING TO BILATERAL BREAST;  Surgeon: Du Herzog MD;  Location:  [facility];  Service: Plastics   • MASS EXCISION Right 07/31/2023    Procedure: EXCISION BCC RIGHT SUPERIOR SHOULDER, RIGHT INFERIOR SHOULDER, RIGHT HIP/BUTTOCK WITH COMPLEX CLOSURE;  Surgeon: Du Herzog MD;  [facility];  Service: Plastics   • MASTECTOMY Bilateral 06/06/2014    Lymph nodes removed bilaterally. States she may have IV's in left side.   • MOHS RECONSTRUCTION Right 03/07/2019    Procedure: RECONSTRUCTION MOHS DEFECT RIGHT NOSE/MEDIAL CANTHUS;  Surgeon: Du Herzog MD;  [facility];  Service: Plastics   • MOHS SURGERY      Mohs Micrographic Surgery Face; Last Assessed: 5/15/2015   • OR ADJT TIS REARGMT EYE/NOSE/EAR/LIP 10.1-30.0 SQCM Right 03/07/2019    Procedure: FLAP;  Surgeon: Du Herzog MD;  [facility];  Service: Plastics   • OR ARTHRD MIDTARSL/TARSOMETATARSAL MULT/TRANSVRS Left 09/09/2016    Procedure: ARTHRODESIS FIRST METATARSALCUNIFORM JOINT FUSION, TALLNAVICULAR JOINT FUSION ;  Surgeon: Walker Kenny DPM;  [facility];  Service: Podiatry   • OR BREAST RECONSTRUCTION W/LATISSIMUS DORSI FLAP Left 09/07/2017    Procedure: BREAST RECONSTRUCTION; LATISSIMUS DORSI FLAP; TISSUE EXPANDER;  Surgeon: Du Herzog MD;  [facility];  Service: Plastics   • OR COLONOSCOPY FLX DX W/COLLJ SPEC WHEN PFRMD N/A 08/01/2018    Procedure: COLONOSCOPY;  Surgeon: Du Viveros MD;  [facility];  Service: Gastroenterology   • OR CORRJ HLX VLGS BNCTY SESMDC RESCJ PROX PHLX BASE  09/09/2016    Procedure: BUNIONECTOMY VARGAS MODIFIED ;  Surgeon: Walker Kenny DPM;  [facility];  Service: Podiatry   • OR EXCISION MALIGNANT LESION TRUNK/ARM/LEG > 4.0 CM N/A 06/27/2019    Procedure: EXCISION BASAL CELL CARCINOMA MID CHEST;  Surgeon: Du Herzog MD;  [facility];  Service: Plastics   • OR EXCISION MALIGNANT LESION TRUNK/ARM/LEG > 4.0 CM Right 03/25/2024    Procedure: EXCISION OF BCC RIGHT BREAST, LOCAL FLAP  RECONSTRUCTION;  Surgeon: Du Herzog MD;  [facility];  Service: Plastics   • AK EXCISION MALIGNANT LESION TRUNK/ARM/LEG > 4.0 CM N/A 09/12/2024    Procedure: EXCISION OF LESIONS OF RIGHT CHEST, LEFT MEDIAL CHEST AND MID BACK WITH COMPLEX CLOSURE;  Surgeon: Du Herzog MD;  [facility];  Service: Plastics   • AK FTH/GFT FREE W/DIRECT CLOSURE N/E/E/L 20 SQ CM/< Right 03/07/2019    Procedure: SKIN GRAFT FULL THICKNESS  (FTSG);  Surgeon: Du Herzog MD;  [facility];  Service: Plastics   • AK HYSTEROSCOPY BX ENDOMETRIUM&/POLYPC W/WO D&C N/A 01/07/2022    Procedure: DILATATION AND CURETTAGE (D&C) WITH HYSTEROSCOPY, POLYPECTOMY ;  Surgeon: Fernanda Kennedy MD;  [facility];  Service: Gynecology   • AK HYSTEROSCOPY BX ENDOMETRIUM&/POLYPC W/WO D&C N/A 01/14/2025    Procedure: HYSTEROSCOPY, DILATION AND CURETTAGE, EXAM UNDER ANESTHESIA;  Surgeon: Fernanda Kennedy MD;  [facility];  Service: Gynecology   • AK INSERTION BREAST IMPLANT SAME DAY OF MASTECTOMY Left 09/07/2017    Procedure: TISSUE EXPANDER;  Surgeon: Du Herzog MD;  Location:  MAIN OR;  Service: Plastics   • AK OPEN TX MONTEGGIA FRACTURE DISLOCATION ELBOW Left 12/11/2019    Procedure: OPEN REDUCTION W/ INTERNAL FIXATION left distal humerus fracture, possible olecranon osteotomy;  Surgeon: Silvano Scott MD;  Location:  MAIN OR;  Service: Orthopedics   • AK OSTEOTOMY CALCANEUS W/WO INTERNAL FIXATION Left 09/09/2016    Procedure: OSTEOTOMY CALCANEUS WITH APPLICATION AND ACTIVATION OF BONE STIMULATOR ;  Surgeon: Walker Kenny DPM;  [facility];  Service: Podiatry   • AK REMV TISSUE FOR GRAFT OTHR Bilateral 06/27/2019    Procedure: AUTOLOGOUS FAT GRAFTING BILATERAL BREASTS;  Surgeon: Du Herzog MD;  [facility];  Service: Plastics   • AK REPAIR COMPLEX F/C/C/M/N/AX/G/H/F 2.6-7.5 CM N/A 03/07/2019    Procedure: COMPLEX CLOSURE;  Surgeon: Du Herzog MD;   [facility];  Service: Plastics   • PA REPAIR COMPLEX TRUNK 2.6-7.5 CM N/A 06/27/2019    Procedure: CLOSURE WOUND MID CHEST;  Surgeon: Du Herzog MD;  [facility];  Service: Plastics   • PA REVISION OF RECONSTRUCTED BREAST Bilateral 06/27/2019    Procedure: BILATERAL BREAST MOUND REVISION;  Surgeon: Du Herzog MD;  [facility];  Service: Plastics   • PA REVISION OF RECONSTRUCTED BREAST Bilateral 10/24/2019    Procedure: BILATERAL BREAST MOUND REVISION;  Surgeon: Du Herzog MD;  [facility];  Service: Plastics   • PA REVISION OF RECONSTRUCTED BREAST Bilateral 01/07/2021    Procedure: BILATERAL BREAST MOUND REVISION;  Surgeon: Du Herzog MD;  [facility];  Service: Plastics   • SENTINEL LYMPH NODE BIOPSY Bilateral 06/06/2014   • SENTINEL LYMPH NODE BIOPSY Right 2008   • SKIN BIOPSY     • SKIN LESION EXCISION Left 02/21/2019    Procedure: UPPER BACK BCC EXCISION;  Surgeon: Du Herzog MD;  [facility];  Service: Plastics   • SQUAMOUS CELL CARCINOMA EXCISION N/A 03/07/2019    Procedure: EXCISION BCC LEFT FOREHEAD;  Surgeon: Du Herzog MD;  [facility];  Service: Plastics   • WISDOM TOOTH EXTRACTION     [3]  Social History  Tobacco Use   Smoking Status Never   • Passive exposure: Never   Smokeless Tobacco Never   [4]  Family History  Adopted: Yes   Problem Relation Name Age of Onset   • No Known Problems Mother     • No Known Problems Father     [5]  Allergies  Allergen Reactions   • Codeine Itching     Severe   • Dilaudid [Hydromorphone Hcl] Itching     Severe   • Hydromorphone Itching   • Tramadol Itching   • Other Rash     Adhesive tape   [6]    Current Outpatient Medications:   •  acetaminophen (TYLENOL) 500 mg tablet, Take 500 mg by mouth every 6 (six) hours as needed for mild pain, Disp: , Rfl:   •  albuterol (Ventolin HFA) 90 mcg/act inhaler, Inhale 2 puffs every 6 (six) hours as needed for wheezing or  "shortness of breath, Disp: 8 g, Rfl: 0  •  ALPRAZolam (XANAX) 1 mg tablet, Take 1 mg by mouth daily at bedtime as needed, Disp: , Rfl:   •  Ascorbic Acid (VITAMIN C) 500 MG CAPS, Take 500 mg by mouth in the morning, Disp: , Rfl:   •  Calcium Magnesium Zinc 333-133-5 MG TABS, Take by mouth in the morning, Disp: , Rfl:   •  cetirizine (ZyrTEC) 10 MG chewable tablet, Chew 10 mg in the morning., Disp: , Rfl:   •  Cholecalciferol (VITAMIN D) 2000 units CAPS, Take by mouth in the morning, Disp: , Rfl:   •  clobetasol (TEMOVATE) 0.05 % cream, Apply topically 2 (two) times a day Up to 2 weeks, take 1 week break, use as needed for flares.  Avoid face, groin, armpits, Disp: 60 g, Rfl: 0  •  famotidine (PEPCID) 20 mg tablet, Take 20 mg by mouth as needed in the morning and 20 mg as needed in the evening., Disp: , Rfl:   •  ketoconazole (NIZORAL) 2 % shampoo, Daily for 2 weeks straight and then on \"Mondays, Wednesdays and Fridays\": Lather into scalp ; leave on for 5 minutes and then rinse off completely., Disp: 100 mL, Rfl: 3  •  Klayesta powder, Apply 1 Application topically in the morning and 1 Application in the evening. To affected area., Disp: , Rfl:   •  omeprazole (PriLOSEC) 20 mg delayed release capsule, Take 20 mg by mouth as needed, Disp: , Rfl:   •  ondansetron (ZOFRAN) 4 mg tablet, Take 1 tablet (4 mg total) by mouth every 8 (eight) hours as needed for nausea or vomiting, Disp: 20 tablet, Rfl: 0  •  Probiotic Product (Probiotic Daily) CAPS, Take by mouth in the morning, Disp: , Rfl:   •  triamcinolone (KENALOG) 0.1 % cream, Apply topically (Patient not taking: Reported on 5/29/2025), Disp: , Rfl: "

## 2025-06-13 ENCOUNTER — OFFICE VISIT (OUTPATIENT)
Dept: DERMATOLOGY | Facility: CLINIC | Age: 60
End: 2025-06-13
Payer: COMMERCIAL

## 2025-06-13 DIAGNOSIS — D22.70 MULTIPLE BENIGN NEVI OF UPPER EXTREMITY, LOWER EXTREMITY, AND TRUNK: ICD-10-CM

## 2025-06-13 DIAGNOSIS — D18.01 CHERRY ANGIOMA: ICD-10-CM

## 2025-06-13 DIAGNOSIS — L81.4 SOLAR LENTIGO: ICD-10-CM

## 2025-06-13 DIAGNOSIS — L71.9 ROSACEA: Primary | ICD-10-CM

## 2025-06-13 DIAGNOSIS — Z85.828 HISTORY OF BASAL CELL CARCINOMA: ICD-10-CM

## 2025-06-13 DIAGNOSIS — D22.60 MULTIPLE BENIGN NEVI OF UPPER EXTREMITY, LOWER EXTREMITY, AND TRUNK: ICD-10-CM

## 2025-06-13 DIAGNOSIS — D22.5 MULTIPLE BENIGN NEVI OF UPPER EXTREMITY, LOWER EXTREMITY, AND TRUNK: ICD-10-CM

## 2025-06-13 DIAGNOSIS — Z85.89 HISTORY OF SQUAMOUS CELL CARCINOMA: ICD-10-CM

## 2025-06-13 PROCEDURE — 99214 OFFICE O/P EST MOD 30 MIN: CPT | Performed by: NURSE PRACTITIONER

## 2025-06-13 RX ORDER — METRONIDAZOLE TOPICAL 7.5 MG/G
GEL TOPICAL 2 TIMES DAILY
Qty: 45 G | Refills: 2 | Status: SHIPPED | OUTPATIENT
Start: 2025-06-13

## 2025-06-13 NOTE — PROGRESS NOTES
"[facility] Note     Patient Name: Tess Parr  Encounter Date: 06/13/2025       Have you been cared for by a [facility] in the last 3 years and, if so, which description applies to you? Yes. I have been here within the last 3 years, and my medical history has NOT changed since that time. I am not of child-bearing potential.     REVIEW OF SYSTEMS:  Have you recently had or currently have any of the following? No changes in my recent health.   PAST MEDICAL HISTORY:  Have you personally ever had or currently have any of the following?  If \"YES,\" then please provide more detail. No changes in my medical history.   HISTORY OF IMMUNOSUPPRESSION: Do you have a history of any of the following:  Systemic Immunosuppression such as Diabetes, Biologic or Immunotherapy, Chemotherapy, Organ Transplantation, Bone Marrow Transplantation or Prednisone?  YES, Breast cancer.      Answering \"YES\" requires the addition of the dotphrase \"IMMUNOSUPPRESSED\" as the first diagnosis of the patient's visit.   FAMILY HISTORY:  Any \"first degree relatives\" (parent, brother, sister, or child) with the following?    No changes in my family's known health.   PATIENT EXPERIENCE:    Do you want the Dermatologist to perform a COMPLETE skin exam today including a clinical examination under the \"bra and underwear\" areas?  Yes  If necessary, do we have your permission to call and leave a detailed message on your Preferred Phone number that includes your specific medical information?  Yes      Allergies[1] Current Medications[2]        Whom besides the patient is providing clinical information about today's encounter?   NO ADDITIONAL HISTORIAN (patient alone provided history)    Physical Exam and Assessment/Plan by Diagnosis: Patient last evaluated on 4/29/25.    HISTORY OF BASAL CELL CARCINOMA     Physical Exam:  Anatomic Location Affected: left mid back (excision 9/12/24), right breast (excision 3/25/24), right upper " extremity (excision 7/31/23), right lower limb (excision 7/31/23), left neck x2 (excision 4/25/22), mid back (excision 1/7/21), trunk (excision 3/7/19), right side of nose (Mohs 3/7/19), left upper back (excision 2/21/19), right upper chest (excision 2/21/19)  Morphological Description of scar:  well healed  Suspected Recurrence: No  Pertinent Positives:  Pertinent Negatives:        Additional History of Present Condition:  History of multiple BCC.  She has been following with [facility], and all excisions/Mohs performed by Dr. Herzog with plastic surgery.       Assessment and Plan:  Based on a thorough discussion of this condition and the management approach to it (including a comprehensive discussion of the known risks, side effects and potential benefits of treatment), the patient (family) agrees to implement the following specific plan:  Continue 6 month skin checks.    Monitor for any changes  Sun protection     HISTORY OF SQUAMOUS CELL CARCINOMA in SITU     Physical Exam:  Anatomic Location Affected:  left chest (excision 9/12/24)  Morphological Description of Scar:  well healed  Suspected Recurrence: no  Regional adenopathy: no     Additional History of Present Condition:  History of SCCIS.  Last excision performed on 9/12/24.     Assessment and Plan:  Based on a thorough discussion of this condition and the management approach to it (including a comprehensive discussion of the known risks, side effects and potential benefits of treatment), the patient (family) agrees to implement the following specific plan:  Continue 6 month skin checks.    MELANOCYTIC NEVI  -Relevant exam: Scattered over the trunk/extremities are homogenously pigmented brown macules and papules. ELM performed and without concerning findings.  - Exam and clinical history consistent with melanocytic nevi  - Educated on the ABCDE's of melanoma; handout provided  - Counseled to return to clinic prior to scheduled appointment  should any of these lesions change or should any new lesions of concern arise  - Counseled on use of sun protection daily. Reviewed latest FDA sunscreen guidelines, including use of broad spectrum (UVA and UVB blocking) sunscreen or sun protective clothing with SPF 30-50 every 2-3 hours and reapplied after exposure to water; use of photoprotective clothing, including a broad brim hat and UPF rated clothing if outdoors for several hours; avoid use of tanning beds as these pose significant risk for melanoma and skin cancer.    LENTIGINES  OTHER SKIN CHANGES DUE TO CHRONIC EXPOSURE TO NONIONIZING RADIATION  - Relevant exam: Over sun exposed areas are brown macules. ELM performed and without concerning findings.  - Exam and clinical history consistent with lentigines.  - Educated that these are indicative of prior sun exposure.   - Counseled to return to clinic prior to scheduled appointment should any of these lesions change or should any new lesions of concern arise.  - Recommended use of sunscreen as above and below.  - Counseled on use of sun protection daily. Reviewed latest FDA sunscreen guidelines, including use of broad spectrum (UVA and UVB blocking) sunscreen or sun protective clothing with SPF 30-50 every 2-3 hours and reapplied after exposure to water; use of photoprotective clothing, including a broad brim hat and UPF rated clothing if outdoors for several hours; avoid use of tanning beds as these pose significant risk for melanoma and skin cancer.    CHERRY ANGIOMAS  - Relevant exam: Scattered over the trunk/extremities are red papules  - Exam and clinical history consistent with cherry angiomas  - Educated that these are benign  - Educated that removal is considered aesthetic and would incur a fee.  - Patient does not wish to pursue removal at this time but will contact us should this change.    ROSACEA    Physical Exam:  Anatomic Location Affected:  Bilateral cheeks   Morphological Description:   Background centrofacial erythema with scattered erythematous papules/pustules and visible telangiectasias  Pertinent Positives:  Pertinent Negatives:    Additional History of Present Condition:  Patient reports slight redness on cheeks with occasional pustules and papules.  Washes with a gentle cleanser and uses Cerave cream as moisturizer. Worse with exercise and alcohol.    Assessment and Plan:  Based on a thorough discussion of this condition and the management approach to it (including a comprehensive discussion of the known risks, side effects and potential benefits of treatment), the patient (family) agrees to implement the following specific plan:  Start applying Metrogel 0.75% gel, twice daily to the cheeks.   Wash face with gentle cleanser  SPF daily    Scribe Attestation      I,:  Alexa Maldonado MA am acting as a scribe while in the presence of the attending physician.:       I,:  PRECIOUS Roche personally performed the services described in this documentation    as scribed in my presence.:                [1]   Allergies  Allergen Reactions    Codeine Itching     Severe    Dilaudid [Hydromorphone Hcl] Itching     Severe    Hydromorphone Itching    Tramadol Itching    Other Rash     Adhesive tape   [2]   Current Outpatient Medications:     acetaminophen (TYLENOL) 500 mg tablet, Take 500 mg by mouth every 6 (six) hours as needed for mild pain, Disp: , Rfl:     albuterol (Ventolin HFA) 90 mcg/act inhaler, Inhale 2 puffs every 6 (six) hours as needed for wheezing or shortness of breath, Disp: 8 g, Rfl: 0    ALPRAZolam (XANAX) 1 mg tablet, Take 1 mg by mouth daily at bedtime as needed, Disp: , Rfl:     Ascorbic Acid (VITAMIN C) 500 MG CAPS, Take 500 mg by mouth in the morning, Disp: , Rfl:     Calcium Magnesium Zinc 333-133-5 MG TABS, Take by mouth in the morning, Disp: , Rfl:     cetirizine (ZyrTEC) 10 MG chewable tablet, Chew 10 mg in the morning., Disp: , Rfl:     Cholecalciferol (VITAMIN D)  "2000 units CAPS, Take by mouth in the morning, Disp: , Rfl:     clobetasol (TEMOVATE) 0.05 % cream, Apply topically 2 (two) times a day Up to 2 weeks, take 1 week break, use as needed for flares.  Avoid face, groin, armpits, Disp: 60 g, Rfl: 0    famotidine (PEPCID) 20 mg tablet, Take 20 mg by mouth as needed in the morning and 20 mg as needed in the evening., Disp: , Rfl:     ketoconazole (NIZORAL) 2 % shampoo, Daily for 2 weeks straight and then on \"Mondays, Wednesdays and Fridays\": Lather into scalp ; leave on for 5 minutes and then rinse off completely., Disp: 100 mL, Rfl: 3    Klayesta powder, Apply 1 Application topically in the morning and 1 Application in the evening. To affected area., Disp: , Rfl:     omeprazole (PriLOSEC) 20 mg delayed release capsule, Take 20 mg by mouth as needed, Disp: , Rfl:     ondansetron (ZOFRAN) 4 mg tablet, Take 1 tablet (4 mg total) by mouth every 8 (eight) hours as needed for nausea or vomiting, Disp: 20 tablet, Rfl: 0    Probiotic Product (Probiotic Daily) CAPS, Take by mouth in the morning, Disp: , Rfl:     triamcinolone (KENALOG) 0.1 % cream, Apply topically (Patient not taking: Reported on 5/29/2025), Disp: , Rfl:     "

## 2025-06-27 ENCOUNTER — OFFICE VISIT (OUTPATIENT)
Dept: PLASTIC SURGERY | Facility: CLINIC | Age: 60
End: 2025-06-27

## 2025-06-27 DIAGNOSIS — Z91.018 ALLERGY TO TREE NUTS: Primary | ICD-10-CM

## 2025-06-27 PROCEDURE — RECHECK: Performed by: SURGERY

## 2025-06-27 NOTE — PROGRESS NOTES
Patient reports recent experience with pine nut allergy, documented as diagnosis today.  PCP/Dr. Enriquez aware of same.

## 2025-07-01 ENCOUNTER — TELEPHONE (OUTPATIENT)
Dept: OBGYN CLINIC | Facility: CLINIC | Age: 60
End: 2025-07-01

## 2025-07-20 DIAGNOSIS — L40.9 PSORIASIS, UNSPECIFIED: ICD-10-CM

## 2025-07-21 RX ORDER — TRIAMCINOLONE ACETONIDE 1 MG/G
CREAM TOPICAL
Qty: 80 G | Refills: 0 | Status: SHIPPED | OUTPATIENT
Start: 2025-07-21

## 2025-08-05 ENCOUNTER — TELEPHONE (OUTPATIENT)
Dept: OBGYN CLINIC | Facility: CLINIC | Age: 60
End: 2025-08-05

## 2025-08-05 ENCOUNTER — HOSPITAL ENCOUNTER (OUTPATIENT)
Dept: ULTRASOUND IMAGING | Facility: HOSPITAL | Age: 60
Discharge: HOME | End: 2025-08-05
Attending: OBSTETRICS & GYNECOLOGY
Payer: COMMERCIAL

## 2025-08-05 DIAGNOSIS — R93.89 THICKENED ENDOMETRIUM: ICD-10-CM

## 2025-08-05 PROCEDURE — 76856 US EXAM PELVIC COMPLETE: CPT

## 2025-08-05 PROCEDURE — 76830 TRANSVAGINAL US NON-OB: CPT

## 2025-08-15 ENCOUNTER — TELEPHONE (OUTPATIENT)
Dept: SURGICAL ONCOLOGY | Facility: CLINIC | Age: 60
End: 2025-08-15

## 2025-08-15 ENCOUNTER — OFFICE VISIT (OUTPATIENT)
Dept: PLASTIC SURGERY | Facility: CLINIC | Age: 60
End: 2025-08-15

## 2025-08-15 ENCOUNTER — TELEPHONE (OUTPATIENT)
Age: 60
End: 2025-08-15

## 2025-08-15 ENCOUNTER — OFFICE VISIT (OUTPATIENT)
Dept: OBGYN CLINIC | Facility: CLINIC | Age: 60
End: 2025-08-15
Payer: COMMERCIAL

## 2025-08-15 DIAGNOSIS — M17.11 PRIMARY OSTEOARTHRITIS OF RIGHT KNEE: Primary | ICD-10-CM

## 2025-08-15 DIAGNOSIS — M25.561 ACUTE PAIN OF RIGHT KNEE: ICD-10-CM

## 2025-08-15 PROCEDURE — 20610 DRAIN/INJ JOINT/BURSA W/O US: CPT | Performed by: ORTHOPAEDIC SURGERY

## 2025-08-15 PROCEDURE — 99214 OFFICE O/P EST MOD 30 MIN: CPT | Performed by: ORTHOPAEDIC SURGERY

## 2025-08-15 RX ORDER — PREDNISONE 20 MG/1
TABLET ORAL
COMMUNITY
Start: 2025-06-25

## 2025-08-15 RX ORDER — EPINEPHRINE 0.3 MG/.3ML
INJECTION SUBCUTANEOUS
COMMUNITY
Start: 2025-06-25

## 2025-08-15 RX ADMIN — TRIAMCINOLONE ACETONIDE 80 MG: 40 INJECTION, SUSPENSION INTRA-ARTICULAR; INTRAMUSCULAR at 16:00

## 2025-08-15 RX ADMIN — BUPIVACAINE HYDROCHLORIDE 2 ML: 5 INJECTION, SOLUTION EPIDURAL; INTRACAUDAL; PERINEURAL at 16:00

## 2025-08-19 ENCOUNTER — OFFICE VISIT (OUTPATIENT)
Dept: SURGICAL ONCOLOGY | Facility: CLINIC | Age: 60
End: 2025-08-19
Payer: COMMERCIAL

## 2025-08-19 ENCOUNTER — TELEPHONE (OUTPATIENT)
Dept: MAMMOGRAPHY | Facility: CLINIC | Age: 60
End: 2025-08-19

## 2025-08-19 VITALS
HEART RATE: 68 BPM | RESPIRATION RATE: 18 BRPM | SYSTOLIC BLOOD PRESSURE: 136 MMHG | TEMPERATURE: 97.7 F | BODY MASS INDEX: 26.84 KG/M2 | OXYGEN SATURATION: 99 % | DIASTOLIC BLOOD PRESSURE: 82 MMHG | HEIGHT: 66 IN | WEIGHT: 167 LBS

## 2025-08-19 DIAGNOSIS — N63.15 MASS OVERLAPPING MULTIPLE QUADRANTS OF RIGHT BREAST: ICD-10-CM

## 2025-08-19 DIAGNOSIS — Z17.0 MALIGNANT NEOPLASM OF BREAST IN FEMALE, ESTROGEN RECEPTOR POSITIVE, UNSPECIFIED LATERALITY, UNSPECIFIED SITE OF BREAST (HCC): Primary | ICD-10-CM

## 2025-08-19 DIAGNOSIS — C50.919 MALIGNANT NEOPLASM OF BREAST IN FEMALE, ESTROGEN RECEPTOR POSITIVE, UNSPECIFIED LATERALITY, UNSPECIFIED SITE OF BREAST (HCC): Primary | ICD-10-CM

## 2025-08-19 DIAGNOSIS — Z85.3 HISTORY OF BILATERAL BREAST CANCER: ICD-10-CM

## 2025-08-19 PROCEDURE — 99214 OFFICE O/P EST MOD 30 MIN: CPT

## 2025-08-19 RX ORDER — TRIAMCINOLONE ACETONIDE 40 MG/ML
80 INJECTION, SUSPENSION INTRA-ARTICULAR; INTRAMUSCULAR
Status: COMPLETED | OUTPATIENT
Start: 2025-08-15 | End: 2025-08-15

## 2025-08-19 RX ORDER — BUPIVACAINE HYDROCHLORIDE 5 MG/ML
2 INJECTION, SOLUTION EPIDURAL; INTRACAUDAL; PERINEURAL
Status: COMPLETED | OUTPATIENT
Start: 2025-08-15 | End: 2025-08-15

## (undated) DEVICE — SUT CHROMIC 6-0 G-1 18 IN 796G

## (undated) DEVICE — PAD GROUNDING ADULT

## (undated) DEVICE — SCD SEQUENTIAL COMPRESSION COMFORT SLEEVE MEDIUM KNEE LENGTH: Brand: KENDALL SCD

## (undated) DEVICE — MEDI-VAC YANKAUER SUCTION HANDLE W/BULBOUS AND CONTROL VENT: Brand: CARDINAL HEALTH

## (undated) DEVICE — BETHLEHEM UNIVERSAL OUTPATIENT: Brand: CARDINAL HEALTH

## (undated) DEVICE — SUT PDS II 3-0 RB-1 27 IN Z305H

## (undated) DEVICE — SKIN MARKER DUAL TIP WITH RULER CAP, FLEXIBLE RULER AND LABELS: Brand: DEVON

## (undated) DEVICE — CULTURE TUBE AEROBIC

## (undated) DEVICE — STERILE POLYISOPRENE POWDER-FREE SURGICAL GLOVES WITH EMOLLIENT COATING: Brand: PROTEXIS

## (undated) DEVICE — SUT VICRYL 2-0 CT-1 27 IN J259H

## (undated) DEVICE — ANTIBACTERIAL UNDYED BRAIDED (POLYGLACTIN 910), SYNTHETIC ABSORBABLE SUTURE: Brand: COATED VICRYL

## (undated) DEVICE — TUBING LIPOSUCTION ASPIRATION 12FT STERILE

## (undated) DEVICE — CHLORAPREP HI-LITE 26ML ORANGE

## (undated) DEVICE — BETHLEHEM UNIVERSAL  MIONR EXT: Brand: CARDINAL HEALTH

## (undated) DEVICE — SPONGE LAP 18 X 18 IN

## (undated) DEVICE — JP CHAN DRN SIL HUBLESS 15FR W/TRO: Brand: CARDINAL HEALTH

## (undated) DEVICE — DRAIN JACKSON PRATT 15FR: Brand: CARDINAL HEALTH

## (undated) DEVICE — SPONGE LAP 18 X 18 IN STRL RFD

## (undated) DEVICE — GAUZE SPONGES,16 PLY: Brand: CURITY

## (undated) DEVICE — NEEDLE 27 G X 1 1/4

## (undated) DEVICE — SYRINGE 10ML LL

## (undated) DEVICE — GAUZE SPONGES,USP TYPE VII GAUZE, 12 PLY: Brand: CURITY

## (undated) DEVICE — GLOVE PI ULTRA TOUCH SZ.7.0

## (undated) DEVICE — FUNNEL E KELLER 2 DELIVERY DEVICE

## (undated) DEVICE — OCCLUSIVE GAUZE STRIP,3% BISMUTH TRIBROMOPHENATE IN PETROLATUM BLEND: Brand: XEROFORM

## (undated) DEVICE — Device

## (undated) DEVICE — 2.5MM DRILL BIT/QC/GOLD/110MM

## (undated) DEVICE — TRAY FOLEY 16FR URIMETER SURESTEP

## (undated) DEVICE — DRESSING XEROFORM 5 X 9

## (undated) DEVICE — BETHLEHEM UNIVERSAL MINOR VAG: Brand: CARDINAL HEALTH

## (undated) DEVICE — ABDOMINAL PAD: Brand: DERMACEA

## (undated) DEVICE — PADDING CAST 6IN COTTON STRL

## (undated) DEVICE — SUT MONOCRYL PLUS 5-0 P-3 18IN MCP493G

## (undated) DEVICE — INTENDED FOR TISSUE SEPARATION, AND OTHER PROCEDURES THAT REQUIRE A SHARP SURGICAL BLADE TO PUNCTURE OR CUT.: Brand: BARD-PARKER ® CARBON RIB-BACK BLADES

## (undated) DEVICE — 2.0MM DRILL BIT WITH DEPTH MARK/QC/140MM

## (undated) DEVICE — PADDING CAST 4 IN  COTTON STRL

## (undated) DEVICE — SUT MONOCRYL 4-0 PS-2 27 IN Y426H

## (undated) DEVICE — CHLORHEXIDINE 4PCT 4 OZ

## (undated) DEVICE — WEBRIL 6 IN UNSTERILE

## (undated) DEVICE — 3M™ TRANSPORE™ WHITE SURGICAL TAPE 1534-2, 2 INCH X 10 YARD (5CM X 9,1M), 6 ROLLS/CARTON 10 CARTONS/CASE: Brand: 3M™ TRANSPORE™

## (undated) DEVICE — NEEDLE 25G X 1 1/2

## (undated) DEVICE — 3M™ STERI-STRIP™ COMPOUND BENZOIN TINCTURE 40 BAGS/CARTON 4 CARTONS/CASE C1544: Brand: 3M™ STERI-STRIP™

## (undated) DEVICE — X-RAY DETECTABLE SPONGES,16 PLY: Brand: VISTEC

## (undated) DEVICE — SPONGE COUNTING BAG: Brand: DEVON

## (undated) DEVICE — TUBING SUCTION 5MM X 12 FT

## (undated) DEVICE — GLOVE SRG BIOGEL 7

## (undated) DEVICE — TISSEEL FIBRIN 10 ML FROZEN

## (undated) DEVICE — BULB SYRINGE,IRRIGATION WITH PROTECTIVE CAP: Brand: DOVER

## (undated) DEVICE — 1200CC GUARDIAN II: Brand: GUARDIAN

## (undated) DEVICE — SUT ETHILON 3-0 PS-1 18 IN 1663H

## (undated) DEVICE — PROXIMATE SKIN STAPLERS (35 WIDE) CONTAINS 35 STAINLESS STEEL STAPLES (FIXED HEAD): Brand: PROXIMATE

## (undated) DEVICE — ELECTRODE EZ CLEAN BLADE -0012

## (undated) DEVICE — PVC URETHRAL CATHETER: Brand: DOVER

## (undated) DEVICE — GLOVE SRG BIOGEL 6.5

## (undated) DEVICE — GLOVE INDICATOR PI UNDERGLOVE SZ 6.5 BLUE

## (undated) DEVICE — 3M™ DURAPORE™ SURGICAL TAPE 2 INCHES X 10YARDS (5.0CM X 9.1M) 6ROLLS/CARTON 10CARTONS/CASE 1538-2: Brand: 3M™ DURAPORE™

## (undated) DEVICE — SUT VICRYL 3-0 SH 27 IN J416H

## (undated) DEVICE — SUT SILK 5-0 P-3 18 IN 640G

## (undated) DEVICE — Device: Brand: REVOLVE ADVANCED ADIPOSE SYSTEM

## (undated) DEVICE — DRAPE TOWEL: Brand: CONVERTORS

## (undated) DEVICE — MEDI-VAC YANKAUER SUCTION HANDLE W/BULBOUS TIP: Brand: CARDINAL HEALTH

## (undated) DEVICE — STRL UNIVERSAL MINOR GENERAL: Brand: CARDINAL HEALTH

## (undated) DEVICE — SYRINGE CATH TIP 50ML

## (undated) DEVICE — TIBURON SPLIT SHEET: Brand: CONVERTORS

## (undated) DEVICE — CHLORAPREP HI-LITE 10.5ML ORANGE

## (undated) DEVICE — ELECTRODE BLADE MOD  E-Z CLEAN 6.5IN -0014M

## (undated) DEVICE — TUBE CONNECTING PSI-TEC FULTER

## (undated) DEVICE — SUT STRATAFIX SPIRAL 3-0 PGA/PCL 30 X 30 CM SXMD2B408

## (undated) DEVICE — 3M™ TEGADERM™ TRANSPARENT FILM DRESSING FRAME STYLE, 1628, 6 IN X 8 IN (15 CM X 20 CM), 10/CT 8CT/CASE: Brand: 3M™ TEGADERM™

## (undated) DEVICE — INTENDED FOR TISSUE SEPARATION, AND OTHER PROCEDURES THAT REQUIRE A SHARP SURGICAL BLADE TO PUNCTURE OR CUT.: Brand: BARD-PARKER SAFETY BLADES SIZE 15, STERILE

## (undated) DEVICE — BINDER ABDOMINAL 63-74 IN

## (undated) DEVICE — 3M™ STERI-STRIP™ REINFORCED ADHESIVE SKIN CLOSURES, R1547, 1/2 IN X 4 IN (12 MM X 100 MM), 6 STRIPS/ENVELOPE: Brand: 3M™ STERI-STRIP™

## (undated) DEVICE — TUBE OUTFLOW F/FLUID CONTROL SYSTEM AQUILEX

## (undated) DEVICE — SUT MONOCRYL 5-0 P-3 18 IN Y493G

## (undated) DEVICE — GLOVE INDICATOR PI UNDERGLOVE SZ 7.5 BLUE

## (undated) DEVICE — UNDYED MONOFILAMENT (POLYDIOXANONE), ABSORBABLE SURGICAL SUTURE: Brand: PDS

## (undated) DEVICE — 2.7MM THREE-FLUTED DRILL BIT QC/125MM

## (undated) DEVICE — STERILE POLYISOPRENE POWDER-FREE SURGICAL GLOVES: Brand: PROTEXIS

## (undated) DEVICE — TIBURON TRANSVERSE LAPAROTOMY SHEET: Brand: CONVERTORS

## (undated) DEVICE — ELECTRODE NEEDLE MEGAFINE 2IN E-Z CLEAN MEGADYNE -0118

## (undated) DEVICE — SUT VICRYL 2-0 SH 27 IN UNDYED J417H

## (undated) DEVICE — ELECTRODE NEEDLE MOD E-Z CLEAN 2.75IN 7CM -0013M

## (undated) DEVICE — 1840 FOAM BLOCK NEEDLE COUNTER: Brand: DEVON

## (undated) DEVICE — TELFA NON-ADHERENT ABSORBENT DRESSING: Brand: TELFA

## (undated) DEVICE — VIOLET BRAIDED (POLYGLACTIN 910), SYNTHETIC ABSORBABLE SUTURE: Brand: COATED VICRYL

## (undated) DEVICE — CAST PADDING 4 IN SYNTHETIC NON-STRL

## (undated) DEVICE — BETHLEHEM UNIVERSAL MINOR GEN: Brand: CARDINAL HEALTH

## (undated) DEVICE — SUT VICRYL PLUS 2-0 CTB-1 27 IN VCPB259H

## (undated) DEVICE — DRAIN SPONGES,6 PLY: Brand: EXCILON

## (undated) DEVICE — JACKSON-PRATT 100CC BULB RESERVOIR: Brand: CARDINAL HEALTH

## (undated) DEVICE — PENCIL ELECTROSURG E-Z CLEAN -0035H

## (undated) DEVICE — SUT ETHILON 5-0 PS-2 18 IN 1666H

## (undated) DEVICE — 3M™ TEGADERM™ TRANSPARENT FILM DRESSING FRAME STYLE, 1626W, 4 IN X 4-3/4 IN (10 CM X 12 CM), 50/CT 4CT/CASE: Brand: 3M™ TEGADERM™

## (undated) DEVICE — SUT PDS II 3-0 PS-2 18 IN Z497G

## (undated) DEVICE — HYDROPHILIC WOUND DRESSING WITH ZINC PLUS VITAMINS A AND B6.: Brand: DERMAGRAN®-B

## (undated) DEVICE — VESSEL CANNULA

## (undated) DEVICE — ASTOUND STANDARD SURGICAL GOWN, XL: Brand: CONVERTORS

## (undated) DEVICE — BUCKET PLASTER DISPOSABLE

## (undated) DEVICE — SUT PDS II 2-0 CT-1 27 IN Z339H

## (undated) DEVICE — TISSUE REMOVAL SYSTEM FLUID MANAGEMENT ACCESSORIES: Brand: SYMPHION

## (undated) DEVICE — CUFF TOURNIQUET DISP SZ18

## (undated) DEVICE — LINER SUCTION CANISTER 2000ML DISP

## (undated) DEVICE — DRESSING SILON DUAL-DRESS 50 FOAM 5.5 X 6 IN

## (undated) DEVICE — STANDARD SURGICAL GOWN, L: Brand: CONVERTORS

## (undated) DEVICE — SYRINGE 50ML LL

## (undated) DEVICE — PREMIUM DRY TRAY LF: Brand: MEDLINE INDUSTRIES, INC.

## (undated) DEVICE — INTENDED FOR TISSUE SEPARATION, AND OTHER PROCEDURES THAT REQUIRE A SHARP SURGICAL BLADE TO PUNCTURE OR CUT.: Brand: BARD-PARKER ® SAFETYLOCK CARBON RIB-BACK BLADES

## (undated) DEVICE — REM POLYHESIVE ADULT PATIENT RETURN ELECTRODE: Brand: VALLEYLAB

## (undated) DEVICE — LIGHT HANDLE COVER SLEEVE DISP BLUE STELLAR

## (undated) DEVICE — UNDER BUTTOCKS DRAPE W/FLUID CONTROL POUCH: Brand: CONVERTORS

## (undated) DEVICE — APPLICATOR SURG DUPLOSPRAY 20CM SNAPLOCK

## (undated) DEVICE — 4-PORT MANIFOLD: Brand: NEPTUNE 2

## (undated) DEVICE — SYRINGE 10ML LL CONTROL TOP

## (undated) DEVICE — SUT MONOCRYL 4-0 P-3 18 IN Y494G

## (undated) DEVICE — HEX-LOCKING BLADE ELECTRODE: Brand: EDGE

## (undated) DEVICE — ACE WRAP 4 IN STERILE

## (undated) DEVICE — CAST PLASTER 4 IN ROLL FAST SET

## (undated) DEVICE — DRAPE SHEET THREE QUARTER

## (undated) DEVICE — SUT NYLON 5-0 P-3 18 IN 690G

## (undated) DEVICE — DRESSING MEPORE FILM ADHESIVE 4 X 5IN

## (undated) DEVICE — THIN OFFSET (9.0 X 0.38 X 25.0MM)

## (undated) DEVICE — ADHESIVE SKN CLSR HISTOACRYL FLEX 0.5ML LF

## (undated) DEVICE — NEPTUNE E-SEP SMOKE EVACUATION PENCIL, COATED, 70MM BLADE, PUSH BUTTON SWITCH: Brand: NEPTUNE E-SEP

## (undated) DEVICE — MAXI PAD5.51 X 13.78 IN. (14.0 X 35.0 CM)HEAVYCONTOUREDUNSCENTED: Brand: CURITY

## (undated) DEVICE — MINOR PROCEDURE DRAPE: Brand: CONVERTORS

## (undated) DEVICE — MAYO STAND COVER: Brand: CONVERTORS

## (undated) DEVICE — ULTRACLEAN ACCESSORY ELECTRODE 1" (2.54 CM) COATED BLADE: Brand: ULTRACLEAN

## (undated) DEVICE — PADDING,UNDERCAST,COTTON, 3X4YD STERILE: Brand: MEDLINE

## (undated) DEVICE — SUT ETHILON 5-0 PS-3 18 IN 1668H

## (undated) DEVICE — SUT PDS II 3-0 SH 27 IN Z316H

## (undated) DEVICE — SUT STRATAFIX SPIRAL MONOCRYL PLUS 4-0 PS-2 45CM SXMP1B118

## (undated) DEVICE — CONVERTORS UNDER BUTTOCKS DRAPE WITH FLUID CONTROL POUCH II: Brand: CONVERTORS

## (undated) DEVICE — GLOVE INDICATOR PI UNDERGLOVE SZ 7 BLUE

## (undated) DEVICE — 1.25MM KIRSCHNER WIRE, TROCAR POINTS ON BOTH ENDS 150MM
Type: IMPLANTABLE DEVICE | Site: ELBOW | Status: NON-FUNCTIONAL
Removed: 2019-12-11

## (undated) DEVICE — SYRINGE 30ML LL

## (undated) DEVICE — ELECTRODE BLADE MOD E-Z CLEAN 2.5IN 6.4CM -0012M

## (undated) DEVICE — SPECIMEN CONTAINER STERILE PEEL PACK

## (undated) DEVICE — PACK MAJOR ORTHO W/SPLITS PBDS

## (undated) DEVICE — STRL PENROSE DRAIN 18" X 1/2": Brand: CARDINAL HEALTH

## (undated) DEVICE — U-DRAPE: Brand: CONVERTORS

## (undated) DEVICE — MEDI-VAC YANKAUER SUCTION HANDLE W/STRAIGHT TIP & CONTROL VENT: Brand: CARDINAL HEALTH

## (undated) DEVICE — TUBE INFLOW F/FLUID CONTROL SYSTEM AQUILEX

## (undated) DEVICE — CULTURE TUBE ANAEROBIC

## (undated) DEVICE — STERILE SURGICAL LUBRICANT,  TUBE: Brand: SURGILUBE

## (undated) DEVICE — DECANTER: Brand: UNBRANDED

## (undated) DEVICE — SUT VICRYL 6-0 P-1 18 IN J489G

## (undated) DEVICE — DRAPE C-ARM X-RAY

## (undated) DEVICE — ELECTRODE NEEDLE E-Z CLEAN 2.75IN 7CM -0013

## (undated) DEVICE — DRAPE ADOLESCENT LAPAROTOMY

## (undated) DEVICE — 2000CC GUARDIAN II: Brand: GUARDIAN

## (undated) DEVICE — VALVE REFLUX AND CAP

## (undated) DEVICE — 1.6MM KIRSCHNER WIRE W/TROCAR POINT 150MM
Type: IMPLANTABLE DEVICE | Site: ELBOW | Status: NON-FUNCTIONAL
Removed: 2019-12-11